# Patient Record
Sex: MALE | Race: WHITE | Employment: FULL TIME | URBAN - METROPOLITAN AREA
[De-identification: names, ages, dates, MRNs, and addresses within clinical notes are randomized per-mention and may not be internally consistent; named-entity substitution may affect disease eponyms.]

---

## 2017-01-01 ENCOUNTER — APPOINTMENT (OUTPATIENT)
Dept: CT IMAGING | Age: 82
DRG: 653 | End: 2017-01-01
Attending: INTERNAL MEDICINE
Payer: MEDICARE

## 2017-01-01 ENCOUNTER — ANESTHESIA EVENT (OUTPATIENT)
Dept: SURGERY | Age: 82
DRG: 653 | End: 2017-01-01
Payer: MEDICARE

## 2017-01-01 ENCOUNTER — ANESTHESIA (OUTPATIENT)
Dept: SURGERY | Age: 82
DRG: 653 | End: 2017-01-01
Payer: MEDICARE

## 2017-01-01 ENCOUNTER — APPOINTMENT (OUTPATIENT)
Dept: GENERAL RADIOLOGY | Age: 82
DRG: 653 | End: 2017-01-01
Attending: INTERNAL MEDICINE
Payer: MEDICARE

## 2017-01-01 ENCOUNTER — APPOINTMENT (OUTPATIENT)
Dept: ULTRASOUND IMAGING | Age: 82
DRG: 653 | End: 2017-01-01
Attending: INTERNAL MEDICINE
Payer: MEDICARE

## 2017-01-01 ENCOUNTER — APPOINTMENT (OUTPATIENT)
Dept: CT IMAGING | Age: 82
DRG: 653 | End: 2017-01-01
Attending: FAMILY MEDICINE
Payer: MEDICARE

## 2017-01-01 ENCOUNTER — APPOINTMENT (OUTPATIENT)
Dept: CT IMAGING | Age: 82
DRG: 653 | End: 2017-01-01
Attending: NURSE PRACTITIONER
Payer: MEDICARE

## 2017-01-01 ENCOUNTER — HOSPITAL ENCOUNTER (INPATIENT)
Age: 82
LOS: 45 days | DRG: 653 | End: 2017-09-11
Attending: EMERGENCY MEDICINE | Admitting: UROLOGY
Payer: MEDICARE

## 2017-01-01 ENCOUNTER — APPOINTMENT (OUTPATIENT)
Dept: MRI IMAGING | Age: 82
DRG: 653 | End: 2017-01-01
Attending: NURSE PRACTITIONER
Payer: MEDICARE

## 2017-01-01 ENCOUNTER — APPOINTMENT (OUTPATIENT)
Dept: GENERAL RADIOLOGY | Age: 82
DRG: 653 | End: 2017-01-01
Attending: ANESTHESIOLOGY
Payer: MEDICARE

## 2017-01-01 ENCOUNTER — HOSPITAL ENCOUNTER (INPATIENT)
Dept: CARDIAC CATH/INVASIVE PROCEDURES | Age: 82
LOS: 5 days | Discharge: HOME HEALTH | DRG: 247 | End: 2017-07-26
Attending: INTERNAL MEDICINE | Admitting: INTERNAL MEDICINE
Payer: MEDICARE

## 2017-01-01 ENCOUNTER — APPOINTMENT (OUTPATIENT)
Dept: GENERAL RADIOLOGY | Age: 82
DRG: 653 | End: 2017-01-01
Attending: SURGERY
Payer: MEDICARE

## 2017-01-01 ENCOUNTER — APPOINTMENT (OUTPATIENT)
Dept: NUCLEAR MEDICINE | Age: 82
DRG: 653 | End: 2017-01-01
Attending: INTERNAL MEDICINE
Payer: MEDICARE

## 2017-01-01 ENCOUNTER — APPOINTMENT (OUTPATIENT)
Dept: GENERAL RADIOLOGY | Age: 82
DRG: 653 | End: 2017-01-01
Attending: NURSE PRACTITIONER
Payer: MEDICARE

## 2017-01-01 ENCOUNTER — HOSPITAL ENCOUNTER (EMERGENCY)
Age: 82
Discharge: HOME | End: 2017-03-26
Attending: EMERGENCY MEDICINE
Payer: MEDICARE

## 2017-01-01 ENCOUNTER — APPOINTMENT (OUTPATIENT)
Dept: GENERAL RADIOLOGY | Age: 82
End: 2017-01-01
Attending: EMERGENCY MEDICINE
Payer: MEDICARE

## 2017-01-01 ENCOUNTER — HOSPITAL ENCOUNTER (OUTPATIENT)
Dept: GENERAL RADIOLOGY | Age: 82
Discharge: HOME | End: 2017-07-17
Payer: MEDICARE

## 2017-01-01 VITALS
SYSTOLIC BLOOD PRESSURE: 106 MMHG | WEIGHT: 244.05 LBS | DIASTOLIC BLOOD PRESSURE: 52 MMHG | OXYGEN SATURATION: 85 % | HEART RATE: 60 BPM | TEMPERATURE: 98.9 F | HEIGHT: 70 IN | BODY MASS INDEX: 34.94 KG/M2 | RESPIRATION RATE: 12 BRPM

## 2017-01-01 VITALS
BODY MASS INDEX: 30.24 KG/M2 | SYSTOLIC BLOOD PRESSURE: 160 MMHG | OXYGEN SATURATION: 98 % | TEMPERATURE: 98.4 F | RESPIRATION RATE: 18 BRPM | WEIGHT: 211.2 LBS | HEART RATE: 78 BPM | HEIGHT: 70 IN | DIASTOLIC BLOOD PRESSURE: 65 MMHG

## 2017-01-01 VITALS
BODY MASS INDEX: 30.91 KG/M2 | DIASTOLIC BLOOD PRESSURE: 93 MMHG | SYSTOLIC BLOOD PRESSURE: 171 MMHG | TEMPERATURE: 98 F | RESPIRATION RATE: 24 BRPM | WEIGHT: 215.39 LBS | OXYGEN SATURATION: 96 %

## 2017-01-01 DIAGNOSIS — Z71.89 COUNSELING REGARDING ADVANCED CARE PLANNING AND GOALS OF CARE: ICD-10-CM

## 2017-01-01 DIAGNOSIS — Z71.89 COUNSELING REGARDING GOALS OF CARE: ICD-10-CM

## 2017-01-01 DIAGNOSIS — R41.3 DISTURBANCE OF MEMORY: ICD-10-CM

## 2017-01-01 DIAGNOSIS — R55 CONVULSIVE SYNCOPE: ICD-10-CM

## 2017-01-01 DIAGNOSIS — I20.0 UNSTABLE ANGINA (HCC): Primary | ICD-10-CM

## 2017-01-01 DIAGNOSIS — R06.02 SHORTNESS OF BREATH: ICD-10-CM

## 2017-01-01 DIAGNOSIS — R31.9 HEMATURIA: Primary | ICD-10-CM

## 2017-01-01 DIAGNOSIS — R53.81 DEBILITY: ICD-10-CM

## 2017-01-01 DIAGNOSIS — R53.81 PHYSICAL DECONDITIONING: ICD-10-CM

## 2017-01-01 DIAGNOSIS — R13.11 ORAL PHASE DYSPHAGIA: ICD-10-CM

## 2017-01-01 DIAGNOSIS — I67.89 CEREBRAL MICROVASCULAR DISEASE: ICD-10-CM

## 2017-01-01 DIAGNOSIS — I65.23 STENOSIS OF BOTH INTERNAL CAROTID ARTERIES: ICD-10-CM

## 2017-01-01 DIAGNOSIS — F05 DELIRIUM DUE TO GENERAL MEDICAL CONDITION: ICD-10-CM

## 2017-01-01 DIAGNOSIS — T81.31XA WOUND DEHISCENCE, SURGICAL, INITIAL ENCOUNTER: ICD-10-CM

## 2017-01-01 DIAGNOSIS — R33.9 URINARY RETENTION: ICD-10-CM

## 2017-01-01 DIAGNOSIS — R41.82 ALTERED MENTAL STATUS, UNSPECIFIED: ICD-10-CM

## 2017-01-01 DIAGNOSIS — R60.1 ANASARCA: ICD-10-CM
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
## 2017-01-01 DIAGNOSIS — R53.1 WEAKNESS: ICD-10-CM

## 2017-01-01 DIAGNOSIS — S20.211A CONTUSION OF CHEST WALL, RIGHT, INITIAL ENCOUNTER: ICD-10-CM

## 2017-01-01 LAB
ABO + RH BLD: NORMAL
ABO + RH BLD: NORMAL
ACT BLD: 208 SECS (ref 79–138)
ACT BLD: 230 SECS (ref 79–138)
ACT BLD: 252 SECS (ref 79–138)
ALBUMIN SERPL BCP-MCNC: 1.6 G/DL (ref 3.5–5)
ALBUMIN SERPL BCP-MCNC: 1.6 G/DL (ref 3.5–5)
ALBUMIN SERPL BCP-MCNC: 1.7 G/DL (ref 3.5–5)
ALBUMIN SERPL BCP-MCNC: 1.7 G/DL (ref 3.5–5)
ALBUMIN SERPL BCP-MCNC: 1.8 G/DL (ref 3.5–5)
ALBUMIN SERPL BCP-MCNC: 1.9 G/DL (ref 3.5–5)
ALBUMIN SERPL BCP-MCNC: 2 G/DL (ref 3.5–5)
ALBUMIN SERPL BCP-MCNC: 2.5 G/DL (ref 3.5–5)
ALBUMIN SERPL BCP-MCNC: 3.3 G/DL (ref 3.5–5)
ALBUMIN SERPL-MCNC: 1.3 G/DL (ref 3.5–5)
ALBUMIN SERPL-MCNC: 1.4 G/DL (ref 3.5–5)
ALBUMIN SERPL-MCNC: 1.5 G/DL (ref 3.5–5)
ALBUMIN SERPL-MCNC: 1.5 G/DL (ref 3.5–5)
ALBUMIN SERPL-MCNC: 1.6 G/DL (ref 3.5–5)
ALBUMIN SERPL-MCNC: 1.6 G/DL (ref 3.5–5)
ALBUMIN SERPL-MCNC: 1.8 G/DL (ref 3.5–5)
ALBUMIN SERPL-MCNC: 2 G/DL (ref 3.5–5)
ALBUMIN SERPL-MCNC: 2 G/DL (ref 3.5–5)
ALBUMIN SERPL-MCNC: 2.2 G/DL (ref 3.5–5)
ALBUMIN/GLOB SERPL: 0.2 {RATIO} (ref 1.1–2.2)
ALBUMIN/GLOB SERPL: 0.2 {RATIO} (ref 1.1–2.2)
ALBUMIN/GLOB SERPL: 0.3 {RATIO} (ref 1.1–2.2)
ALBUMIN/GLOB SERPL: 0.4 {RATIO} (ref 1.1–2.2)
ALBUMIN/GLOB SERPL: 0.5 {RATIO} (ref 1.1–2.2)
ALBUMIN/GLOB SERPL: 0.5 {RATIO} (ref 1.1–2.2)
ALBUMIN/GLOB SERPL: 0.6 {RATIO} (ref 1.1–2.2)
ALBUMIN/GLOB SERPL: 0.6 {RATIO} (ref 1.1–2.2)
ALBUMIN/GLOB SERPL: 0.7 {RATIO} (ref 1.1–2.2)
ALP SERPL-CCNC: 122 U/L (ref 45–117)
ALP SERPL-CCNC: 136 U/L (ref 45–117)
ALP SERPL-CCNC: 150 U/L (ref 45–117)
ALP SERPL-CCNC: 161 U/L (ref 45–117)
ALP SERPL-CCNC: 167 U/L (ref 45–117)
ALP SERPL-CCNC: 168 U/L (ref 45–117)
ALP SERPL-CCNC: 174 U/L (ref 45–117)
ALP SERPL-CCNC: 186 U/L (ref 45–117)
ALP SERPL-CCNC: 189 U/L (ref 45–117)
ALP SERPL-CCNC: 224 U/L (ref 45–117)
ALP SERPL-CCNC: 39 U/L (ref 45–117)
ALP SERPL-CCNC: 52 U/L (ref 45–117)
ALP SERPL-CCNC: 53 U/L (ref 45–117)
ALP SERPL-CCNC: 56 U/L (ref 45–117)
ALP SERPL-CCNC: 61 U/L (ref 45–117)
ALP SERPL-CCNC: 62 U/L (ref 45–117)
ALP SERPL-CCNC: 69 U/L (ref 45–117)
ALP SERPL-CCNC: 69 U/L (ref 45–117)
ALP SERPL-CCNC: 83 U/L (ref 45–117)
ALP SERPL-CCNC: 90 U/L (ref 45–117)
ALP SERPL-CCNC: 92 U/L (ref 45–117)
ALT SERPL-CCNC: 101 U/L (ref 12–78)
ALT SERPL-CCNC: 127 U/L (ref 12–78)
ALT SERPL-CCNC: 150 U/L (ref 12–78)
ALT SERPL-CCNC: 157 U/L (ref 12–78)
ALT SERPL-CCNC: 19 U/L (ref 12–78)
ALT SERPL-CCNC: 27 U/L (ref 12–78)
ALT SERPL-CCNC: 28 U/L (ref 12–78)
ALT SERPL-CCNC: 28 U/L (ref 12–78)
ALT SERPL-CCNC: 30 U/L (ref 12–78)
ALT SERPL-CCNC: 31 U/L (ref 12–78)
ALT SERPL-CCNC: 31 U/L (ref 12–78)
ALT SERPL-CCNC: 32 U/L (ref 12–78)
ALT SERPL-CCNC: 36 U/L (ref 12–78)
ALT SERPL-CCNC: 44 U/L (ref 12–78)
ALT SERPL-CCNC: 44 U/L (ref 12–78)
ALT SERPL-CCNC: 46 U/L (ref 12–78)
ALT SERPL-CCNC: 59 U/L (ref 12–78)
ALT SERPL-CCNC: 61 U/L (ref 12–78)
ALT SERPL-CCNC: 67 U/L (ref 12–78)
ALT SERPL-CCNC: 71 U/L (ref 12–78)
ALT SERPL-CCNC: 76 U/L (ref 12–78)
ALT SERPL-CCNC: 87 U/L (ref 12–78)
ALT SERPL-CCNC: 92 U/L (ref 12–78)
AMMONIA PLAS-SCNC: 33 UMOL/L
ANION GAP BLD CALC-SCNC: 10 MMOL/L (ref 5–15)
ANION GAP BLD CALC-SCNC: 11 MMOL/L (ref 5–15)
ANION GAP BLD CALC-SCNC: 11 MMOL/L (ref 5–15)
ANION GAP BLD CALC-SCNC: 13 MMOL/L (ref 5–15)
ANION GAP BLD CALC-SCNC: 4 MMOL/L (ref 5–15)
ANION GAP BLD CALC-SCNC: 5 MMOL/L (ref 5–15)
ANION GAP BLD CALC-SCNC: 6 MMOL/L (ref 5–15)
ANION GAP BLD CALC-SCNC: 6 MMOL/L (ref 5–15)
ANION GAP BLD CALC-SCNC: 7 MMOL/L (ref 5–15)
ANION GAP BLD CALC-SCNC: 7 MMOL/L (ref 5–15)
ANION GAP BLD CALC-SCNC: 8 MMOL/L (ref 5–15)
ANION GAP BLD CALC-SCNC: 9 MMOL/L (ref 5–15)
ANION GAP SERPL CALC-SCNC: 10 MMOL/L (ref 5–15)
ANION GAP SERPL CALC-SCNC: 12 MMOL/L (ref 5–15)
ANION GAP SERPL CALC-SCNC: 13 MMOL/L (ref 5–15)
ANION GAP SERPL CALC-SCNC: 3 MMOL/L (ref 5–15)
ANION GAP SERPL CALC-SCNC: 4 MMOL/L (ref 5–15)
ANION GAP SERPL CALC-SCNC: 5 MMOL/L (ref 5–15)
ANION GAP SERPL CALC-SCNC: 6 MMOL/L (ref 5–15)
ANION GAP SERPL CALC-SCNC: 7 MMOL/L (ref 5–15)
ANION GAP SERPL CALC-SCNC: 8 MMOL/L (ref 5–15)
ANION GAP SERPL CALC-SCNC: 8 MMOL/L (ref 5–15)
ANION GAP SERPL CALC-SCNC: 9 MMOL/L (ref 5–15)
APPEARANCE UR: ABNORMAL
APPEARANCE UR: CLEAR
ARTERIAL PATENCY WRIST A: ABNORMAL
ARTERIAL PATENCY WRIST A: YES
AST SERPL W P-5'-P-CCNC: 27 U/L (ref 15–37)
AST SERPL W P-5'-P-CCNC: 31 U/L (ref 15–37)
AST SERPL W P-5'-P-CCNC: 32 U/L (ref 15–37)
AST SERPL W P-5'-P-CCNC: 34 U/L (ref 15–37)
AST SERPL W P-5'-P-CCNC: 35 U/L (ref 15–37)
AST SERPL W P-5'-P-CCNC: 35 U/L (ref 15–37)
AST SERPL W P-5'-P-CCNC: 54 U/L (ref 15–37)
AST SERPL W P-5'-P-CCNC: 60 U/L (ref 15–37)
AST SERPL W P-5'-P-CCNC: 70 U/L (ref 15–37)
AST SERPL W P-5'-P-CCNC: 72 U/L (ref 15–37)
AST SERPL W P-5'-P-CCNC: 75 U/L (ref 15–37)
AST SERPL-CCNC: 106 U/L (ref 15–37)
AST SERPL-CCNC: 133 U/L (ref 15–37)
AST SERPL-CCNC: 133 U/L (ref 15–37)
AST SERPL-CCNC: 164 U/L (ref 15–37)
AST SERPL-CCNC: 259 U/L (ref 15–37)
AST SERPL-CCNC: 282 U/L (ref 15–37)
AST SERPL-CCNC: 40 U/L (ref 15–37)
AST SERPL-CCNC: 40 U/L (ref 15–37)
AST SERPL-CCNC: 41 U/L (ref 15–37)
AST SERPL-CCNC: 42 U/L (ref 15–37)
AST SERPL-CCNC: 43 U/L (ref 15–37)
AST SERPL-CCNC: 48 U/L (ref 15–37)
ATRIAL RATE: 105 BPM
ATRIAL RATE: 68 BPM
ATRIAL RATE: 69 BPM
ATRIAL RATE: 70 BPM
ATRIAL RATE: 90 BPM
BACTERIA SPEC CULT: ABNORMAL
BACTERIA SPEC CULT: NORMAL
BACTERIA URNS QL MICRO: ABNORMAL /HPF
BACTERIA URNS QL MICRO: NEGATIVE /HPF
BASE DEFICIT BLDA-SCNC: 0.2 MMOL/L
BASE DEFICIT BLDA-SCNC: 17 MMOL/L
BASE DEFICIT BLDA-SCNC: 3.4 MMOL/L
BASE DEFICIT BLDA-SCNC: 3.7 MMOL/L
BASE DEFICIT BLDA-SCNC: 6.8 MMOL/L
BASE DEFICIT BLDA-SCNC: 7 MMOL/L
BASE DEFICIT BLDA-SCNC: 7.3 MMOL/L
BASE DEFICIT BLDA-SCNC: 8.8 MMOL/L
BASE EXCESS BLDA CALC-SCNC: 0.5 MMOL/L
BASE EXCESS BLDA CALC-SCNC: 1.4 MMOL/L
BASE EXCESS BLDA CALC-SCNC: 2.1 MMOL/L
BASE EXCESS BLDA CALC-SCNC: 4.9 MMOL/L
BASE EXCESS BLDA CALC-SCNC: 6.5 MMOL/L
BASOPHILS # BLD AUTO: 0 K/UL (ref 0–0.1)
BASOPHILS # BLD AUTO: 0.1 K/UL (ref 0–0.1)
BASOPHILS # BLD: 0 % (ref 0–1)
BASOPHILS # BLD: 0 % (ref 0–1)
BASOPHILS # BLD: 0 K/UL
BASOPHILS # BLD: 0 K/UL
BASOPHILS # BLD: 0 K/UL (ref 0–0.1)
BASOPHILS # BLD: 0.1 K/UL
BASOPHILS # BLD: 0.1 K/UL (ref 0–0.1)
BASOPHILS # BLD: 1 % (ref 0–1)
BASOPHILS NFR BLD: 0 %
BASOPHILS NFR BLD: 0 %
BASOPHILS NFR BLD: 0 % (ref 0–1)
BASOPHILS NFR BLD: 1 %
BASOPHILS NFR BLD: 1 % (ref 0–1)
BDY SITE: ABNORMAL
BILIRUB DIRECT SERPL-MCNC: 0.2 MG/DL (ref 0–0.2)
BILIRUB DIRECT SERPL-MCNC: 0.2 MG/DL (ref 0–0.2)
BILIRUB SERPL-MCNC: 0.4 MG/DL (ref 0.2–1)
BILIRUB SERPL-MCNC: 0.4 MG/DL (ref 0.2–1)
BILIRUB SERPL-MCNC: 0.5 MG/DL (ref 0.2–1)
BILIRUB SERPL-MCNC: 0.6 MG/DL (ref 0.2–1)
BILIRUB SERPL-MCNC: 0.7 MG/DL (ref 0.2–1)
BILIRUB SERPL-MCNC: 0.8 MG/DL (ref 0.2–1)
BILIRUB SERPL-MCNC: 0.9 MG/DL (ref 0.2–1)
BILIRUB SERPL-MCNC: 1 MG/DL (ref 0.2–1)
BILIRUB SERPL-MCNC: 1 MG/DL (ref 0.2–1)
BILIRUB SERPL-MCNC: 1.1 MG/DL (ref 0.2–1)
BILIRUB SERPL-MCNC: 1.4 MG/DL (ref 0.2–1)
BILIRUB SERPL-MCNC: 1.4 MG/DL (ref 0.2–1)
BILIRUB UR QL: NEGATIVE
BLD PROD TYP BPU: NORMAL
BLOOD GROUP ANTIBODIES SERPL: NORMAL
BLOOD GROUP ANTIBODIES SERPL: NORMAL
BNP SERPL-MCNC: 301 PG/ML (ref 0–450)
BNP SERPL-MCNC: 756 PG/ML (ref 0–100)
BPU ID: NORMAL
BREATHS.SPONTANEOUS ON VENT: 16
BREATHS.SPONTANEOUS ON VENT: 21
BREATHS.SPONTANEOUS ON VENT: 24
BUN SERPL-MCNC: 22 MG/DL (ref 6–20)
BUN SERPL-MCNC: 23 MG/DL (ref 6–20)
BUN SERPL-MCNC: 23 MG/DL (ref 6–20)
BUN SERPL-MCNC: 24 MG/DL (ref 6–20)
BUN SERPL-MCNC: 26 MG/DL (ref 6–20)
BUN SERPL-MCNC: 27 MG/DL (ref 6–20)
BUN SERPL-MCNC: 28 MG/DL (ref 6–20)
BUN SERPL-MCNC: 29 MG/DL (ref 6–20)
BUN SERPL-MCNC: 29 MG/DL (ref 6–20)
BUN SERPL-MCNC: 30 MG/DL (ref 6–20)
BUN SERPL-MCNC: 30 MG/DL (ref 6–20)
BUN SERPL-MCNC: 31 MG/DL (ref 6–20)
BUN SERPL-MCNC: 33 MG/DL (ref 6–20)
BUN SERPL-MCNC: 33 MG/DL (ref 6–20)
BUN SERPL-MCNC: 34 MG/DL (ref 6–20)
BUN SERPL-MCNC: 36 MG/DL (ref 6–20)
BUN SERPL-MCNC: 36 MG/DL (ref 6–20)
BUN SERPL-MCNC: 43 MG/DL (ref 6–20)
BUN SERPL-MCNC: 43 MG/DL (ref 6–20)
BUN SERPL-MCNC: 44 MG/DL (ref 6–20)
BUN SERPL-MCNC: 46 MG/DL (ref 6–20)
BUN SERPL-MCNC: 46 MG/DL (ref 6–20)
BUN SERPL-MCNC: 49 MG/DL (ref 6–20)
BUN SERPL-MCNC: 49 MG/DL (ref 6–20)
BUN SERPL-MCNC: 50 MG/DL (ref 6–20)
BUN SERPL-MCNC: 51 MG/DL (ref 6–20)
BUN SERPL-MCNC: 52 MG/DL (ref 6–20)
BUN SERPL-MCNC: 52 MG/DL (ref 6–20)
BUN SERPL-MCNC: 53 MG/DL (ref 6–20)
BUN SERPL-MCNC: 55 MG/DL (ref 6–20)
BUN SERPL-MCNC: 57 MG/DL (ref 6–20)
BUN SERPL-MCNC: 58 MG/DL (ref 6–20)
BUN SERPL-MCNC: 60 MG/DL (ref 6–20)
BUN SERPL-MCNC: 77 MG/DL (ref 6–20)
BUN SERPL-MCNC: 97 MG/DL (ref 6–20)
BUN SERPL-MCNC: 97 MG/DL (ref 6–20)
BUN/CREAT SERPL: 18 (ref 12–20)
BUN/CREAT SERPL: 18 (ref 12–20)
BUN/CREAT SERPL: 19 (ref 12–20)
BUN/CREAT SERPL: 20 (ref 12–20)
BUN/CREAT SERPL: 21 (ref 12–20)
BUN/CREAT SERPL: 23 (ref 12–20)
BUN/CREAT SERPL: 23 (ref 12–20)
BUN/CREAT SERPL: 25 (ref 12–20)
BUN/CREAT SERPL: 25 (ref 12–20)
BUN/CREAT SERPL: 26 (ref 12–20)
BUN/CREAT SERPL: 27 (ref 12–20)
BUN/CREAT SERPL: 28 (ref 12–20)
BUN/CREAT SERPL: 29 (ref 12–20)
BUN/CREAT SERPL: 30 (ref 12–20)
BUN/CREAT SERPL: 31 (ref 12–20)
BUN/CREAT SERPL: 32 (ref 12–20)
BUN/CREAT SERPL: 33 (ref 12–20)
BUN/CREAT SERPL: 33 (ref 12–20)
BUN/CREAT SERPL: 34 (ref 12–20)
BUN/CREAT SERPL: 35 (ref 12–20)
BUN/CREAT SERPL: 35 (ref 12–20)
BUN/CREAT SERPL: 36 (ref 12–20)
BUN/CREAT SERPL: 37 (ref 12–20)
BUN/CREAT SERPL: 40 (ref 12–20)
C DIFF TOX GENS STL QL NAA+PROBE: POSITIVE
CA PHOS MFR STONE: 3 %
CALCIUM SERPL-MCNC: 6.6 MG/DL (ref 8.5–10.1)
CALCIUM SERPL-MCNC: 6.8 MG/DL (ref 8.5–10.1)
CALCIUM SERPL-MCNC: 7 MG/DL (ref 8.5–10.1)
CALCIUM SERPL-MCNC: 7 MG/DL (ref 8.5–10.1)
CALCIUM SERPL-MCNC: 7.2 MG/DL (ref 8.5–10.1)
CALCIUM SERPL-MCNC: 7.2 MG/DL (ref 8.5–10.1)
CALCIUM SERPL-MCNC: 7.3 MG/DL (ref 8.5–10.1)
CALCIUM SERPL-MCNC: 7.3 MG/DL (ref 8.5–10.1)
CALCIUM SERPL-MCNC: 7.4 MG/DL (ref 8.5–10.1)
CALCIUM SERPL-MCNC: 7.4 MG/DL (ref 8.5–10.1)
CALCIUM SERPL-MCNC: 7.5 MG/DL (ref 8.5–10.1)
CALCIUM SERPL-MCNC: 7.5 MG/DL (ref 8.5–10.1)
CALCIUM SERPL-MCNC: 7.6 MG/DL (ref 8.5–10.1)
CALCIUM SERPL-MCNC: 7.7 MG/DL (ref 8.5–10.1)
CALCIUM SERPL-MCNC: 7.8 MG/DL (ref 8.5–10.1)
CALCIUM SERPL-MCNC: 7.9 MG/DL (ref 8.5–10.1)
CALCIUM SERPL-MCNC: 8 MG/DL (ref 8.5–10.1)
CALCIUM SERPL-MCNC: 8.1 MG/DL (ref 8.5–10.1)
CALCIUM SERPL-MCNC: 8.2 MG/DL (ref 8.5–10.1)
CALCIUM SERPL-MCNC: 8.3 MG/DL (ref 8.5–10.1)
CALCIUM SERPL-MCNC: 8.3 MG/DL (ref 8.5–10.1)
CALCIUM SERPL-MCNC: 8.6 MG/DL (ref 8.5–10.1)
CALCULATED P AXIS, ECG09: 61 DEGREES
CALCULATED P AXIS, ECG09: 62 DEGREES
CALCULATED P AXIS, ECG09: 64 DEGREES
CALCULATED P AXIS, ECG09: 77 DEGREES
CALCULATED R AXIS, ECG10: -3 DEGREES
CALCULATED R AXIS, ECG10: -36 DEGREES
CALCULATED R AXIS, ECG10: -37 DEGREES
CALCULATED R AXIS, ECG10: 1 DEGREES
CALCULATED R AXIS, ECG10: 2 DEGREES
CALCULATED T AXIS, ECG11: 37 DEGREES
CALCULATED T AXIS, ECG11: 68 DEGREES
CALCULATED T AXIS, ECG11: 76 DEGREES
CALCULATED T AXIS, ECG11: 82 DEGREES
CALCULATED T AXIS, ECG11: 92 DEGREES
CC UR VC: ABNORMAL
CC UR VC: NORMAL
CHLORIDE SERPL-SCNC: 100 MMOL/L (ref 97–108)
CHLORIDE SERPL-SCNC: 100 MMOL/L (ref 97–108)
CHLORIDE SERPL-SCNC: 101 MMOL/L (ref 97–108)
CHLORIDE SERPL-SCNC: 102 MMOL/L (ref 97–108)
CHLORIDE SERPL-SCNC: 103 MMOL/L (ref 97–108)
CHLORIDE SERPL-SCNC: 103 MMOL/L (ref 97–108)
CHLORIDE SERPL-SCNC: 104 MMOL/L (ref 97–108)
CHLORIDE SERPL-SCNC: 105 MMOL/L (ref 97–108)
CHLORIDE SERPL-SCNC: 105 MMOL/L (ref 97–108)
CHLORIDE SERPL-SCNC: 106 MMOL/L (ref 97–108)
CHLORIDE SERPL-SCNC: 106 MMOL/L (ref 97–108)
CHLORIDE SERPL-SCNC: 107 MMOL/L (ref 97–108)
CHLORIDE SERPL-SCNC: 108 MMOL/L (ref 97–108)
CHLORIDE SERPL-SCNC: 108 MMOL/L (ref 97–108)
CHLORIDE SERPL-SCNC: 109 MMOL/L (ref 97–108)
CHLORIDE SERPL-SCNC: 109 MMOL/L (ref 97–108)
CHLORIDE SERPL-SCNC: 110 MMOL/L (ref 97–108)
CHLORIDE SERPL-SCNC: 111 MMOL/L (ref 97–108)
CHLORIDE SERPL-SCNC: 112 MMOL/L (ref 97–108)
CHLORIDE SERPL-SCNC: 113 MMOL/L (ref 97–108)
CHLORIDE SERPL-SCNC: 114 MMOL/L (ref 97–108)
CHLORIDE SERPL-SCNC: 114 MMOL/L (ref 97–108)
CHLORIDE SERPL-SCNC: 115 MMOL/L (ref 97–108)
CHLORIDE SERPL-SCNC: 116 MMOL/L (ref 97–108)
CHLORIDE SERPL-SCNC: 117 MMOL/L (ref 97–108)
CHLORIDE SERPL-SCNC: 98 MMOL/L (ref 97–108)
CHOLEST SERPL-MCNC: 178 MG/DL
CK MB CFR SERPL CALC: 1.5 % (ref 0–2.5)
CK MB CFR SERPL CALC: ABNORMAL % (ref 0–2.5)
CK MB SERPL-MCNC: 2.7 NG/ML (ref 5–25)
CK MB SERPL-MCNC: <1 NG/ML (ref 5–25)
CK SERPL-CCNC: 184 U/L (ref 39–308)
CK SERPL-CCNC: 38 U/L (ref 39–308)
CO2 SERPL-SCNC: 16 MMOL/L (ref 21–32)
CO2 SERPL-SCNC: 16 MMOL/L (ref 21–32)
CO2 SERPL-SCNC: 17 MMOL/L (ref 21–32)
CO2 SERPL-SCNC: 17 MMOL/L (ref 21–32)
CO2 SERPL-SCNC: 19 MMOL/L (ref 21–32)
CO2 SERPL-SCNC: 20 MMOL/L (ref 21–32)
CO2 SERPL-SCNC: 21 MMOL/L (ref 21–32)
CO2 SERPL-SCNC: 22 MMOL/L (ref 21–32)
CO2 SERPL-SCNC: 23 MMOL/L (ref 21–32)
CO2 SERPL-SCNC: 24 MMOL/L (ref 21–32)
CO2 SERPL-SCNC: 25 MMOL/L (ref 21–32)
CO2 SERPL-SCNC: 26 MMOL/L (ref 21–32)
CO2 SERPL-SCNC: 27 MMOL/L (ref 21–32)
CO2 SERPL-SCNC: 27 MMOL/L (ref 21–32)
CO2 SERPL-SCNC: 28 MMOL/L (ref 21–32)
CO2 SERPL-SCNC: 29 MMOL/L (ref 21–32)
CO2 SERPL-SCNC: 30 MMOL/L (ref 21–32)
CO2 SERPL-SCNC: 31 MMOL/L (ref 21–32)
CO2 SERPL-SCNC: 32 MMOL/L (ref 21–32)
CO2 SERPL-SCNC: 33 MMOL/L (ref 21–32)
CO2 SERPL-SCNC: 33 MMOL/L (ref 21–32)
COLOR STONE: NORMAL
COLOR UR: ABNORMAL
COM MFR STONE: 40 %
COMMENT, 519994: NORMAL
COMPOSITION, 120440: NORMAL
CREAT FLD-MCNC: 1.06 MG/DL
CREAT FLD-MCNC: 1.08 MG/DL
CREAT SERPL-MCNC: 0.75 MG/DL (ref 0.7–1.3)
CREAT SERPL-MCNC: 0.78 MG/DL (ref 0.7–1.3)
CREAT SERPL-MCNC: 0.79 MG/DL (ref 0.7–1.3)
CREAT SERPL-MCNC: 0.82 MG/DL (ref 0.7–1.3)
CREAT SERPL-MCNC: 0.83 MG/DL (ref 0.7–1.3)
CREAT SERPL-MCNC: 0.85 MG/DL (ref 0.7–1.3)
CREAT SERPL-MCNC: 0.93 MG/DL (ref 0.7–1.3)
CREAT SERPL-MCNC: 0.96 MG/DL (ref 0.7–1.3)
CREAT SERPL-MCNC: 0.98 MG/DL (ref 0.7–1.3)
CREAT SERPL-MCNC: 0.98 MG/DL (ref 0.7–1.3)
CREAT SERPL-MCNC: 1 MG/DL (ref 0.7–1.3)
CREAT SERPL-MCNC: 1.02 MG/DL (ref 0.7–1.3)
CREAT SERPL-MCNC: 1.02 MG/DL (ref 0.7–1.3)
CREAT SERPL-MCNC: 1.05 MG/DL (ref 0.7–1.3)
CREAT SERPL-MCNC: 1.09 MG/DL (ref 0.7–1.3)
CREAT SERPL-MCNC: 1.12 MG/DL (ref 0.7–1.3)
CREAT SERPL-MCNC: 1.13 MG/DL (ref 0.7–1.3)
CREAT SERPL-MCNC: 1.15 MG/DL (ref 0.7–1.3)
CREAT SERPL-MCNC: 1.16 MG/DL (ref 0.7–1.3)
CREAT SERPL-MCNC: 1.21 MG/DL (ref 0.7–1.3)
CREAT SERPL-MCNC: 1.27 MG/DL (ref 0.7–1.3)
CREAT SERPL-MCNC: 1.31 MG/DL (ref 0.7–1.3)
CREAT SERPL-MCNC: 1.31 MG/DL (ref 0.7–1.3)
CREAT SERPL-MCNC: 1.36 MG/DL (ref 0.7–1.3)
CREAT SERPL-MCNC: 1.39 MG/DL (ref 0.7–1.3)
CREAT SERPL-MCNC: 1.46 MG/DL (ref 0.7–1.3)
CREAT SERPL-MCNC: 1.57 MG/DL (ref 0.7–1.3)
CREAT SERPL-MCNC: 1.58 MG/DL (ref 0.7–1.3)
CREAT SERPL-MCNC: 1.59 MG/DL (ref 0.7–1.3)
CREAT SERPL-MCNC: 1.61 MG/DL (ref 0.7–1.3)
CREAT SERPL-MCNC: 1.63 MG/DL (ref 0.7–1.3)
CREAT SERPL-MCNC: 1.64 MG/DL (ref 0.7–1.3)
CREAT SERPL-MCNC: 1.66 MG/DL (ref 0.7–1.3)
CREAT SERPL-MCNC: 1.66 MG/DL (ref 0.7–1.3)
CREAT SERPL-MCNC: 1.69 MG/DL (ref 0.7–1.3)
CREAT SERPL-MCNC: 1.77 MG/DL (ref 0.7–1.3)
CREAT SERPL-MCNC: 1.78 MG/DL (ref 0.7–1.3)
CREAT SERPL-MCNC: 1.79 MG/DL (ref 0.7–1.3)
CREAT SERPL-MCNC: 1.79 MG/DL (ref 0.7–1.3)
CREAT SERPL-MCNC: 2.15 MG/DL (ref 0.7–1.3)
CREAT SERPL-MCNC: 3.04 MG/DL (ref 0.7–1.3)
CREAT SERPL-MCNC: 4.26 MG/DL (ref 0.7–1.3)
CREAT SERPL-MCNC: 5.04 MG/DL (ref 0.7–1.3)
CROSSMATCH RESULT,%XM: NORMAL
DATE LAST DOSE: ABNORMAL
DIAGNOSIS, 93000: NORMAL
DIFFERENTIAL METHOD BLD: ABNORMAL
DISCLAIMER, STO32L: NORMAL
EOSINOPHIL # BLD: 0 K/UL
EOSINOPHIL # BLD: 0 K/UL (ref 0–0.4)
EOSINOPHIL # BLD: 0 K/UL (ref 0–0.4)
EOSINOPHIL # BLD: 0.1 K/UL (ref 0–0.4)
EOSINOPHIL # BLD: 0.2 K/UL (ref 0–0.4)
EOSINOPHIL # BLD: 0.3 K/UL (ref 0–0.4)
EOSINOPHIL # BLD: 0.4 K/UL (ref 0–0.4)
EOSINOPHIL # BLD: 0.5 K/UL
EOSINOPHIL # BLD: 0.5 K/UL (ref 0–0.4)
EOSINOPHIL # BLD: 0.6 K/UL
EOSINOPHIL # BLD: 0.6 K/UL (ref 0–0.4)
EOSINOPHIL # BLD: 0.7 K/UL (ref 0–0.4)
EOSINOPHIL NFR BLD: 0 %
EOSINOPHIL NFR BLD: 0 % (ref 0–7)
EOSINOPHIL NFR BLD: 0 % (ref 0–7)
EOSINOPHIL NFR BLD: 1 % (ref 0–7)
EOSINOPHIL NFR BLD: 2 % (ref 0–7)
EOSINOPHIL NFR BLD: 2 % (ref 0–7)
EOSINOPHIL NFR BLD: 3 % (ref 0–7)
EOSINOPHIL NFR BLD: 4 %
EOSINOPHIL NFR BLD: 4 %
EOSINOPHIL NFR BLD: 4 % (ref 0–7)
EOSINOPHIL NFR BLD: 4 % (ref 0–7)
EOSINOPHIL NFR BLD: 5 % (ref 0–7)
EOSINOPHIL NFR BLD: 5 % (ref 0–7)
EOSINOPHIL NFR BLD: 6 % (ref 0–7)
EOSINOPHIL NFR BLD: 8 % (ref 0–7)
EPAP/CPAP/PEEP, PAPEEP: 6
EPITH CASTS URNS QL MICRO: ABNORMAL /LPF
ERYTHROCYTE [DISTWIDTH] IN BLOOD BY AUTOMATED COUNT: 13.1 % (ref 11.5–14.5)
ERYTHROCYTE [DISTWIDTH] IN BLOOD BY AUTOMATED COUNT: 13.6 % (ref 11.5–14.5)
ERYTHROCYTE [DISTWIDTH] IN BLOOD BY AUTOMATED COUNT: 13.8 % (ref 11.5–14.5)
ERYTHROCYTE [DISTWIDTH] IN BLOOD BY AUTOMATED COUNT: 13.9 % (ref 11.5–14.5)
ERYTHROCYTE [DISTWIDTH] IN BLOOD BY AUTOMATED COUNT: 13.9 % (ref 11.5–14.5)
ERYTHROCYTE [DISTWIDTH] IN BLOOD BY AUTOMATED COUNT: 14.2 % (ref 11.5–14.5)
ERYTHROCYTE [DISTWIDTH] IN BLOOD BY AUTOMATED COUNT: 14.2 % (ref 11.5–14.5)
ERYTHROCYTE [DISTWIDTH] IN BLOOD BY AUTOMATED COUNT: 14.3 % (ref 11.5–14.5)
ERYTHROCYTE [DISTWIDTH] IN BLOOD BY AUTOMATED COUNT: 14.4 % (ref 11.5–14.5)
ERYTHROCYTE [DISTWIDTH] IN BLOOD BY AUTOMATED COUNT: 14.4 % (ref 11.5–14.5)
ERYTHROCYTE [DISTWIDTH] IN BLOOD BY AUTOMATED COUNT: 14.5 % (ref 11.5–14.5)
ERYTHROCYTE [DISTWIDTH] IN BLOOD BY AUTOMATED COUNT: 14.7 % (ref 11.5–14.5)
ERYTHROCYTE [DISTWIDTH] IN BLOOD BY AUTOMATED COUNT: 14.8 % (ref 11.5–14.5)
ERYTHROCYTE [DISTWIDTH] IN BLOOD BY AUTOMATED COUNT: 14.9 % (ref 11.5–14.5)
ERYTHROCYTE [DISTWIDTH] IN BLOOD BY AUTOMATED COUNT: 15 % (ref 11.5–14.5)
ERYTHROCYTE [DISTWIDTH] IN BLOOD BY AUTOMATED COUNT: 15.2 % (ref 11.5–14.5)
ERYTHROCYTE [DISTWIDTH] IN BLOOD BY AUTOMATED COUNT: 15.3 % (ref 11.5–14.5)
ERYTHROCYTE [DISTWIDTH] IN BLOOD BY AUTOMATED COUNT: 15.3 % (ref 11.5–14.5)
ERYTHROCYTE [DISTWIDTH] IN BLOOD BY AUTOMATED COUNT: 15.4 % (ref 11.5–14.5)
ERYTHROCYTE [DISTWIDTH] IN BLOOD BY AUTOMATED COUNT: 15.6 % (ref 11.5–14.5)
ERYTHROCYTE [DISTWIDTH] IN BLOOD BY AUTOMATED COUNT: 15.7 % (ref 11.5–14.5)
ERYTHROCYTE [DISTWIDTH] IN BLOOD BY AUTOMATED COUNT: 16.3 % (ref 11.5–14.5)
ERYTHROCYTE [DISTWIDTH] IN BLOOD BY AUTOMATED COUNT: 16.5 % (ref 11.5–14.5)
ERYTHROCYTE [DISTWIDTH] IN BLOOD BY AUTOMATED COUNT: 16.6 % (ref 11.5–14.5)
ERYTHROCYTE [DISTWIDTH] IN BLOOD BY AUTOMATED COUNT: 16.7 % (ref 11.5–14.5)
ERYTHROCYTE [DISTWIDTH] IN BLOOD BY AUTOMATED COUNT: 16.8 % (ref 11.5–14.5)
ERYTHROCYTE [DISTWIDTH] IN BLOOD BY AUTOMATED COUNT: 17 % (ref 11.5–14.5)
ERYTHROCYTE [DISTWIDTH] IN BLOOD BY AUTOMATED COUNT: 17.2 % (ref 11.5–14.5)
ERYTHROCYTE [DISTWIDTH] IN BLOOD BY AUTOMATED COUNT: 17.3 % (ref 11.5–14.5)
ERYTHROCYTE [DISTWIDTH] IN BLOOD BY AUTOMATED COUNT: 17.8 % (ref 11.5–14.5)
ERYTHROCYTE [DISTWIDTH] IN BLOOD BY AUTOMATED COUNT: 18.3 % (ref 11.5–14.5)
ERYTHROCYTE [DISTWIDTH] IN BLOOD BY AUTOMATED COUNT: 18.3 % (ref 11.5–14.5)
ERYTHROCYTE [DISTWIDTH] IN BLOOD BY AUTOMATED COUNT: 18.4 % (ref 11.5–14.5)
ERYTHROCYTE [DISTWIDTH] IN BLOOD BY AUTOMATED COUNT: 19.1 % (ref 11.5–14.5)
ERYTHROCYTE [DISTWIDTH] IN BLOOD BY AUTOMATED COUNT: 19.5 % (ref 11.5–14.5)
ERYTHROCYTE [DISTWIDTH] IN BLOOD BY AUTOMATED COUNT: NORMAL % (ref 11.5–14.5)
EST. AVERAGE GLUCOSE BLD GHB EST-MCNC: 128 MG/DL
FIO2 ON VENT: 100 %
FIO2 ON VENT: 100 %
FIO2 ON VENT: 28 %
FIO2 ON VENT: 30 %
FIO2 ON VENT: 35 %
FIO2 ON VENT: 40 %
FIO2 ON VENT: 50 %
FIO2 ON VENT: 50 %
FIO2 ON VENT: 80 %
FLUAV AG NPH QL IA: NEGATIVE
FLUBV AG NOSE QL IA: NEGATIVE
GAS FLOW.O2 O2 DELIVERY SYS: 3 L/MIN
GAS FLOW.O2 O2 DELIVERY SYS: 4 L/MIN
GAS FLOW.O2 SETTING OXYMISER: 12 L/MIN
GAS FLOW.O2 SETTING OXYMISER: 14 L/MIN
GLOBULIN SER CALC-MCNC: 3.3 G/DL (ref 2–4)
GLOBULIN SER CALC-MCNC: 3.9 G/DL (ref 2–4)
GLOBULIN SER CALC-MCNC: 4 G/DL (ref 2–4)
GLOBULIN SER CALC-MCNC: 4.1 G/DL (ref 2–4)
GLOBULIN SER CALC-MCNC: 4.2 G/DL (ref 2–4)
GLOBULIN SER CALC-MCNC: 4.3 G/DL (ref 2–4)
GLOBULIN SER CALC-MCNC: 4.3 G/DL (ref 2–4)
GLOBULIN SER CALC-MCNC: 4.4 G/DL (ref 2–4)
GLOBULIN SER CALC-MCNC: 4.5 G/DL (ref 2–4)
GLOBULIN SER CALC-MCNC: 4.6 G/DL (ref 2–4)
GLOBULIN SER CALC-MCNC: 4.7 G/DL (ref 2–4)
GLOBULIN SER CALC-MCNC: 5.2 G/DL (ref 2–4)
GLOBULIN SER CALC-MCNC: 5.3 G/DL (ref 2–4)
GLOBULIN SER CALC-MCNC: 5.4 G/DL (ref 2–4)
GLOBULIN SER CALC-MCNC: 5.4 G/DL (ref 2–4)
GLOBULIN SER CALC-MCNC: 5.5 G/DL (ref 2–4)
GLOBULIN SER CALC-MCNC: 5.6 G/DL (ref 2–4)
GLOBULIN SER CALC-MCNC: 5.6 G/DL (ref 2–4)
GLOBULIN SER CALC-MCNC: 5.7 G/DL (ref 2–4)
GLOBULIN SER CALC-MCNC: 5.7 G/DL (ref 2–4)
GLOBULIN SER CALC-MCNC: 5.9 G/DL (ref 2–4)
GLUCOSE BLD STRIP.AUTO-MCNC: 104 MG/DL (ref 65–100)
GLUCOSE BLD STRIP.AUTO-MCNC: 107 MG/DL (ref 65–100)
GLUCOSE BLD STRIP.AUTO-MCNC: 117 MG/DL (ref 65–100)
GLUCOSE BLD STRIP.AUTO-MCNC: 128 MG/DL (ref 65–100)
GLUCOSE BLD STRIP.AUTO-MCNC: 129 MG/DL (ref 65–100)
GLUCOSE BLD STRIP.AUTO-MCNC: 130 MG/DL (ref 65–100)
GLUCOSE BLD STRIP.AUTO-MCNC: 130 MG/DL (ref 65–100)
GLUCOSE BLD STRIP.AUTO-MCNC: 131 MG/DL (ref 65–100)
GLUCOSE BLD STRIP.AUTO-MCNC: 131 MG/DL (ref 65–100)
GLUCOSE BLD STRIP.AUTO-MCNC: 137 MG/DL (ref 65–100)
GLUCOSE BLD STRIP.AUTO-MCNC: 139 MG/DL (ref 65–100)
GLUCOSE BLD STRIP.AUTO-MCNC: 140 MG/DL (ref 65–100)
GLUCOSE BLD STRIP.AUTO-MCNC: 140 MG/DL (ref 65–100)
GLUCOSE BLD STRIP.AUTO-MCNC: 141 MG/DL (ref 65–100)
GLUCOSE BLD STRIP.AUTO-MCNC: 142 MG/DL (ref 65–100)
GLUCOSE BLD STRIP.AUTO-MCNC: 143 MG/DL (ref 65–100)
GLUCOSE BLD STRIP.AUTO-MCNC: 145 MG/DL (ref 65–100)
GLUCOSE BLD STRIP.AUTO-MCNC: 146 MG/DL (ref 65–100)
GLUCOSE BLD STRIP.AUTO-MCNC: 146 MG/DL (ref 65–100)
GLUCOSE BLD STRIP.AUTO-MCNC: 147 MG/DL (ref 65–100)
GLUCOSE BLD STRIP.AUTO-MCNC: 148 MG/DL (ref 65–100)
GLUCOSE BLD STRIP.AUTO-MCNC: 148 MG/DL (ref 65–100)
GLUCOSE BLD STRIP.AUTO-MCNC: 149 MG/DL (ref 65–100)
GLUCOSE BLD STRIP.AUTO-MCNC: 149 MG/DL (ref 65–100)
GLUCOSE BLD STRIP.AUTO-MCNC: 150 MG/DL (ref 65–100)
GLUCOSE BLD STRIP.AUTO-MCNC: 151 MG/DL (ref 65–100)
GLUCOSE BLD STRIP.AUTO-MCNC: 152 MG/DL (ref 65–100)
GLUCOSE BLD STRIP.AUTO-MCNC: 152 MG/DL (ref 65–100)
GLUCOSE BLD STRIP.AUTO-MCNC: 153 MG/DL (ref 65–100)
GLUCOSE BLD STRIP.AUTO-MCNC: 154 MG/DL (ref 65–100)
GLUCOSE BLD STRIP.AUTO-MCNC: 156 MG/DL (ref 65–100)
GLUCOSE BLD STRIP.AUTO-MCNC: 157 MG/DL (ref 65–100)
GLUCOSE BLD STRIP.AUTO-MCNC: 158 MG/DL (ref 65–100)
GLUCOSE BLD STRIP.AUTO-MCNC: 159 MG/DL (ref 65–100)
GLUCOSE BLD STRIP.AUTO-MCNC: 160 MG/DL (ref 65–100)
GLUCOSE BLD STRIP.AUTO-MCNC: 161 MG/DL (ref 65–100)
GLUCOSE BLD STRIP.AUTO-MCNC: 161 MG/DL (ref 65–100)
GLUCOSE BLD STRIP.AUTO-MCNC: 162 MG/DL (ref 65–100)
GLUCOSE BLD STRIP.AUTO-MCNC: 163 MG/DL (ref 65–100)
GLUCOSE BLD STRIP.AUTO-MCNC: 163 MG/DL (ref 65–100)
GLUCOSE BLD STRIP.AUTO-MCNC: 165 MG/DL (ref 65–100)
GLUCOSE BLD STRIP.AUTO-MCNC: 166 MG/DL (ref 65–100)
GLUCOSE BLD STRIP.AUTO-MCNC: 167 MG/DL (ref 65–100)
GLUCOSE BLD STRIP.AUTO-MCNC: 168 MG/DL (ref 65–100)
GLUCOSE BLD STRIP.AUTO-MCNC: 168 MG/DL (ref 65–100)
GLUCOSE BLD STRIP.AUTO-MCNC: 169 MG/DL (ref 65–100)
GLUCOSE BLD STRIP.AUTO-MCNC: 170 MG/DL (ref 65–100)
GLUCOSE BLD STRIP.AUTO-MCNC: 170 MG/DL (ref 65–100)
GLUCOSE BLD STRIP.AUTO-MCNC: 171 MG/DL (ref 65–100)
GLUCOSE BLD STRIP.AUTO-MCNC: 172 MG/DL (ref 65–100)
GLUCOSE BLD STRIP.AUTO-MCNC: 174 MG/DL (ref 65–100)
GLUCOSE BLD STRIP.AUTO-MCNC: 176 MG/DL (ref 65–100)
GLUCOSE BLD STRIP.AUTO-MCNC: 176 MG/DL (ref 65–100)
GLUCOSE BLD STRIP.AUTO-MCNC: 177 MG/DL (ref 65–100)
GLUCOSE BLD STRIP.AUTO-MCNC: 178 MG/DL (ref 65–100)
GLUCOSE BLD STRIP.AUTO-MCNC: 178 MG/DL (ref 65–100)
GLUCOSE BLD STRIP.AUTO-MCNC: 179 MG/DL (ref 65–100)
GLUCOSE BLD STRIP.AUTO-MCNC: 182 MG/DL (ref 65–100)
GLUCOSE BLD STRIP.AUTO-MCNC: 183 MG/DL (ref 65–100)
GLUCOSE BLD STRIP.AUTO-MCNC: 185 MG/DL (ref 65–100)
GLUCOSE BLD STRIP.AUTO-MCNC: 186 MG/DL (ref 65–100)
GLUCOSE BLD STRIP.AUTO-MCNC: 187 MG/DL (ref 65–100)
GLUCOSE BLD STRIP.AUTO-MCNC: 187 MG/DL (ref 65–100)
GLUCOSE BLD STRIP.AUTO-MCNC: 188 MG/DL (ref 65–100)
GLUCOSE BLD STRIP.AUTO-MCNC: 188 MG/DL (ref 65–100)
GLUCOSE BLD STRIP.AUTO-MCNC: 189 MG/DL (ref 65–100)
GLUCOSE BLD STRIP.AUTO-MCNC: 190 MG/DL (ref 65–100)
GLUCOSE BLD STRIP.AUTO-MCNC: 190 MG/DL (ref 65–100)
GLUCOSE BLD STRIP.AUTO-MCNC: 191 MG/DL (ref 65–100)
GLUCOSE BLD STRIP.AUTO-MCNC: 191 MG/DL (ref 65–100)
GLUCOSE BLD STRIP.AUTO-MCNC: 194 MG/DL (ref 65–100)
GLUCOSE BLD STRIP.AUTO-MCNC: 196 MG/DL (ref 65–100)
GLUCOSE BLD STRIP.AUTO-MCNC: 196 MG/DL (ref 65–100)
GLUCOSE BLD STRIP.AUTO-MCNC: 197 MG/DL (ref 65–100)
GLUCOSE BLD STRIP.AUTO-MCNC: 197 MG/DL (ref 65–100)
GLUCOSE BLD STRIP.AUTO-MCNC: 199 MG/DL (ref 65–100)
GLUCOSE BLD STRIP.AUTO-MCNC: 199 MG/DL (ref 65–100)
GLUCOSE BLD STRIP.AUTO-MCNC: 200 MG/DL (ref 65–100)
GLUCOSE BLD STRIP.AUTO-MCNC: 200 MG/DL (ref 65–100)
GLUCOSE BLD STRIP.AUTO-MCNC: 201 MG/DL (ref 65–100)
GLUCOSE BLD STRIP.AUTO-MCNC: 201 MG/DL (ref 65–100)
GLUCOSE BLD STRIP.AUTO-MCNC: 202 MG/DL (ref 65–100)
GLUCOSE BLD STRIP.AUTO-MCNC: 205 MG/DL (ref 65–100)
GLUCOSE BLD STRIP.AUTO-MCNC: 206 MG/DL (ref 65–100)
GLUCOSE BLD STRIP.AUTO-MCNC: 209 MG/DL (ref 65–100)
GLUCOSE BLD STRIP.AUTO-MCNC: 209 MG/DL (ref 65–100)
GLUCOSE BLD STRIP.AUTO-MCNC: 210 MG/DL (ref 65–100)
GLUCOSE BLD STRIP.AUTO-MCNC: 210 MG/DL (ref 65–100)
GLUCOSE BLD STRIP.AUTO-MCNC: 211 MG/DL (ref 65–100)
GLUCOSE BLD STRIP.AUTO-MCNC: 214 MG/DL (ref 65–100)
GLUCOSE BLD STRIP.AUTO-MCNC: 216 MG/DL (ref 65–100)
GLUCOSE BLD STRIP.AUTO-MCNC: 217 MG/DL (ref 65–100)
GLUCOSE BLD STRIP.AUTO-MCNC: 219 MG/DL (ref 65–100)
GLUCOSE BLD STRIP.AUTO-MCNC: 220 MG/DL (ref 65–100)
GLUCOSE BLD STRIP.AUTO-MCNC: 225 MG/DL (ref 65–100)
GLUCOSE BLD STRIP.AUTO-MCNC: 227 MG/DL (ref 65–100)
GLUCOSE BLD STRIP.AUTO-MCNC: 228 MG/DL (ref 65–100)
GLUCOSE BLD STRIP.AUTO-MCNC: 259 MG/DL (ref 65–100)
GLUCOSE BLD STRIP.AUTO-MCNC: 261 MG/DL (ref 65–100)
GLUCOSE BLD STRIP.AUTO-MCNC: 299 MG/DL (ref 65–100)
GLUCOSE BLD STRIP.AUTO-MCNC: 78 MG/DL (ref 65–100)
GLUCOSE BLD STRIP.AUTO-MCNC: >600 MG/DL (ref 65–100)
GLUCOSE SERPL-MCNC: 100 MG/DL (ref 65–100)
GLUCOSE SERPL-MCNC: 102 MG/DL (ref 65–100)
GLUCOSE SERPL-MCNC: 103 MG/DL (ref 65–100)
GLUCOSE SERPL-MCNC: 108 MG/DL (ref 65–100)
GLUCOSE SERPL-MCNC: 109 MG/DL (ref 65–100)
GLUCOSE SERPL-MCNC: 113 MG/DL (ref 65–100)
GLUCOSE SERPL-MCNC: 122 MG/DL (ref 65–100)
GLUCOSE SERPL-MCNC: 139 MG/DL (ref 65–100)
GLUCOSE SERPL-MCNC: 140 MG/DL (ref 65–100)
GLUCOSE SERPL-MCNC: 141 MG/DL (ref 65–100)
GLUCOSE SERPL-MCNC: 142 MG/DL (ref 65–100)
GLUCOSE SERPL-MCNC: 142 MG/DL (ref 65–100)
GLUCOSE SERPL-MCNC: 144 MG/DL (ref 65–100)
GLUCOSE SERPL-MCNC: 146 MG/DL (ref 65–100)
GLUCOSE SERPL-MCNC: 147 MG/DL (ref 65–100)
GLUCOSE SERPL-MCNC: 148 MG/DL (ref 65–100)
GLUCOSE SERPL-MCNC: 152 MG/DL (ref 65–100)
GLUCOSE SERPL-MCNC: 152 MG/DL (ref 65–100)
GLUCOSE SERPL-MCNC: 155 MG/DL (ref 65–100)
GLUCOSE SERPL-MCNC: 156 MG/DL (ref 65–100)
GLUCOSE SERPL-MCNC: 158 MG/DL (ref 65–100)
GLUCOSE SERPL-MCNC: 160 MG/DL (ref 65–100)
GLUCOSE SERPL-MCNC: 162 MG/DL (ref 65–100)
GLUCOSE SERPL-MCNC: 162 MG/DL (ref 65–100)
GLUCOSE SERPL-MCNC: 164 MG/DL (ref 65–100)
GLUCOSE SERPL-MCNC: 164 MG/DL (ref 65–100)
GLUCOSE SERPL-MCNC: 167 MG/DL (ref 65–100)
GLUCOSE SERPL-MCNC: 170 MG/DL (ref 65–100)
GLUCOSE SERPL-MCNC: 175 MG/DL (ref 65–100)
GLUCOSE SERPL-MCNC: 176 MG/DL (ref 65–100)
GLUCOSE SERPL-MCNC: 183 MG/DL (ref 65–100)
GLUCOSE SERPL-MCNC: 183 MG/DL (ref 65–100)
GLUCOSE SERPL-MCNC: 185 MG/DL (ref 65–100)
GLUCOSE SERPL-MCNC: 187 MG/DL (ref 65–100)
GLUCOSE SERPL-MCNC: 188 MG/DL (ref 65–100)
GLUCOSE SERPL-MCNC: 188 MG/DL (ref 65–100)
GLUCOSE SERPL-MCNC: 192 MG/DL (ref 65–100)
GLUCOSE SERPL-MCNC: 194 MG/DL (ref 65–100)
GLUCOSE SERPL-MCNC: 214 MG/DL (ref 65–100)
GLUCOSE SERPL-MCNC: 223 MG/DL (ref 65–100)
GLUCOSE SERPL-MCNC: 246 MG/DL (ref 65–100)
GLUCOSE UR STRIP.AUTO-MCNC: NEGATIVE MG/DL
GRAM STN SPEC: ABNORMAL
HBA1C MFR BLD: 6.1 % (ref 4.2–6.3)
HCO3 BLDA-SCNC: 13 MMOL/L (ref 22–26)
HCO3 BLDA-SCNC: 17 MMOL/L (ref 22–26)
HCO3 BLDA-SCNC: 18 MMOL/L (ref 22–26)
HCO3 BLDA-SCNC: 18 MMOL/L (ref 22–26)
HCO3 BLDA-SCNC: 19 MMOL/L (ref 22–26)
HCO3 BLDA-SCNC: 20 MMOL/L (ref 22–26)
HCO3 BLDA-SCNC: 22 MMOL/L (ref 22–26)
HCO3 BLDA-SCNC: 22 MMOL/L (ref 22–26)
HCO3 BLDA-SCNC: 24 MMOL/L (ref 22–26)
HCO3 BLDA-SCNC: 24 MMOL/L (ref 22–26)
HCO3 BLDA-SCNC: 26 MMOL/L (ref 22–26)
HCO3 BLDA-SCNC: 28 MMOL/L (ref 22–26)
HCO3 BLDA-SCNC: 32 MMOL/L (ref 22–26)
HCT VFR BLD AUTO: 18.7 % (ref 36.6–50.3)
HCT VFR BLD AUTO: 19.9 % (ref 36.6–50.3)
HCT VFR BLD AUTO: 20.2 % (ref 36.6–50.3)
HCT VFR BLD AUTO: 21.2 % (ref 36.6–50.3)
HCT VFR BLD AUTO: 22.2 % (ref 36.6–50.3)
HCT VFR BLD AUTO: 22.3 % (ref 36.6–50.3)
HCT VFR BLD AUTO: 22.6 % (ref 36.6–50.3)
HCT VFR BLD AUTO: 24.7 % (ref 36.6–50.3)
HCT VFR BLD AUTO: 25 % (ref 36.6–50.3)
HCT VFR BLD AUTO: 25.3 % (ref 36.6–50.3)
HCT VFR BLD AUTO: 25.8 % (ref 36.6–50.3)
HCT VFR BLD AUTO: 25.8 % (ref 36.6–50.3)
HCT VFR BLD AUTO: 26.4 % (ref 36.6–50.3)
HCT VFR BLD AUTO: 27 % (ref 36.6–50.3)
HCT VFR BLD AUTO: 27.5 % (ref 36.6–50.3)
HCT VFR BLD AUTO: 28.3 % (ref 36.6–50.3)
HCT VFR BLD AUTO: 28.4 % (ref 36.6–50.3)
HCT VFR BLD AUTO: 28.7 % (ref 36.6–50.3)
HCT VFR BLD AUTO: 29 % (ref 36.6–50.3)
HCT VFR BLD AUTO: 29.3 % (ref 36.6–50.3)
HCT VFR BLD AUTO: 29.4 % (ref 36.6–50.3)
HCT VFR BLD AUTO: 29.9 % (ref 36.6–50.3)
HCT VFR BLD AUTO: 29.9 % (ref 36.6–50.3)
HCT VFR BLD AUTO: 30.2 % (ref 36.6–50.3)
HCT VFR BLD AUTO: 30.2 % (ref 36.6–50.3)
HCT VFR BLD AUTO: 30.5 % (ref 36.6–50.3)
HCT VFR BLD AUTO: 30.5 % (ref 36.6–50.3)
HCT VFR BLD AUTO: 31.1 % (ref 36.6–50.3)
HCT VFR BLD AUTO: 31.6 % (ref 36.6–50.3)
HCT VFR BLD AUTO: 32.8 % (ref 36.6–50.3)
HCT VFR BLD AUTO: 33.2 % (ref 36.6–50.3)
HCT VFR BLD AUTO: 33.7 % (ref 36.6–50.3)
HCT VFR BLD AUTO: 33.9 % (ref 36.6–50.3)
HCT VFR BLD AUTO: 34.1 % (ref 36.6–50.3)
HCT VFR BLD AUTO: 34.7 % (ref 36.6–50.3)
HCT VFR BLD AUTO: 35.2 % (ref 36.6–50.3)
HCT VFR BLD AUTO: 35.3 % (ref 36.6–50.3)
HCT VFR BLD AUTO: 36.2 % (ref 36.6–50.3)
HCT VFR BLD AUTO: 42.4 % (ref 36.6–50.3)
HCT VFR BLD AUTO: 45.1 % (ref 36.6–50.3)
HCT VFR BLD AUTO: NORMAL %
HDLC SERPL-MCNC: 48 MG/DL
HDLC SERPL: 3.7 {RATIO} (ref 0–5)
HGB BLD-MCNC: 10.1 G/DL (ref 12.1–17)
HGB BLD-MCNC: 10.2 G/DL (ref 12.1–17)
HGB BLD-MCNC: 10.4 G/DL (ref 12.1–17)
HGB BLD-MCNC: 10.7 G/DL (ref 12.1–17)
HGB BLD-MCNC: 10.9 G/DL (ref 12.1–17)
HGB BLD-MCNC: 11 G/DL (ref 12.1–17)
HGB BLD-MCNC: 11 G/DL (ref 12.1–17)
HGB BLD-MCNC: 11.3 G/DL (ref 12.1–17)
HGB BLD-MCNC: 11.4 G/DL (ref 12.1–17)
HGB BLD-MCNC: 11.4 G/DL (ref 12.1–17)
HGB BLD-MCNC: 11.7 G/DL (ref 12.1–17)
HGB BLD-MCNC: 12.2 G/DL (ref 12.1–17)
HGB BLD-MCNC: 12.2 G/DL (ref 12.1–17)
HGB BLD-MCNC: 14.2 G/DL (ref 12.1–17)
HGB BLD-MCNC: 15.4 G/DL (ref 12.1–17)
HGB BLD-MCNC: 6.2 G/DL (ref 12.1–17)
HGB BLD-MCNC: 6.4 G/DL (ref 12.1–17)
HGB BLD-MCNC: 6.7 G/DL (ref 12.1–17)
HGB BLD-MCNC: 7 G/DL (ref 12.1–17)
HGB BLD-MCNC: 7.2 G/DL (ref 12.1–17)
HGB BLD-MCNC: 7.3 G/DL (ref 12.1–17)
HGB BLD-MCNC: 7.3 G/DL (ref 12.1–17)
HGB BLD-MCNC: 7.9 G/DL (ref 12.1–17)
HGB BLD-MCNC: 8.1 G/DL (ref 12.1–17)
HGB BLD-MCNC: 8.5 G/DL (ref 12.1–17)
HGB BLD-MCNC: 8.5 G/DL (ref 12.1–17)
HGB BLD-MCNC: 8.8 G/DL (ref 12.1–17)
HGB BLD-MCNC: 9 G/DL (ref 12.1–17)
HGB BLD-MCNC: 9.2 G/DL (ref 12.1–17)
HGB BLD-MCNC: 9.2 G/DL (ref 12.1–17)
HGB BLD-MCNC: 9.4 G/DL (ref 12.1–17)
HGB BLD-MCNC: 9.4 G/DL (ref 12.1–17)
HGB BLD-MCNC: 9.8 G/DL (ref 12.1–17)
HGB BLD-MCNC: 9.8 G/DL (ref 12.1–17)
HGB BLD-MCNC: 9.9 G/DL (ref 12.1–17)
HGB BLD-MCNC: 9.9 G/DL (ref 12.1–17)
HGB BLD-MCNC: NORMAL G/DL (ref 13–16)
HGB UR QL STRIP: ABNORMAL
HGB UR QL STRIP: NEGATIVE
HYALINE CASTS URNS QL MICRO: ABNORMAL /LPF (ref 0–5)
KETONES UR QL STRIP.AUTO: ABNORMAL MG/DL
KETONES UR QL STRIP.AUTO: NEGATIVE MG/DL
LACTATE SERPL-SCNC: 0.9 MMOL/L (ref 0.4–2)
LACTATE SERPL-SCNC: 1.1 MMOL/L (ref 0.4–2)
LACTATE SERPL-SCNC: 1.2 MMOL/L (ref 0.4–2)
LACTATE SERPL-SCNC: 1.4 MMOL/L (ref 0.4–2)
LACTATE SERPL-SCNC: 2 MMOL/L (ref 0.4–2)
LDLC SERPL CALC-MCNC: 117.8 MG/DL (ref 0–100)
LEUKOCYTE ESTERASE UR QL STRIP.AUTO: ABNORMAL
LEUKOCYTE ESTERASE UR QL STRIP.AUTO: NEGATIVE
LEUKOCYTE ESTERASE UR QL STRIP.AUTO: NEGATIVE
LIPID PROFILE,FLP: ABNORMAL
LYMPHOCYTES # BLD AUTO: 12 % (ref 12–49)
LYMPHOCYTES # BLD AUTO: 15 % (ref 12–49)
LYMPHOCYTES # BLD AUTO: 18 % (ref 12–49)
LYMPHOCYTES # BLD AUTO: 18 % (ref 12–49)
LYMPHOCYTES # BLD AUTO: 19 % (ref 12–49)
LYMPHOCYTES # BLD AUTO: 23 % (ref 12–49)
LYMPHOCYTES # BLD AUTO: 39 % (ref 12–49)
LYMPHOCYTES # BLD AUTO: 7 % (ref 12–49)
LYMPHOCYTES # BLD: 1.2 K/UL (ref 0.8–3.5)
LYMPHOCYTES # BLD: 1.4 K/UL (ref 0.8–3.5)
LYMPHOCYTES # BLD: 1.6 K/UL (ref 0.8–3.5)
LYMPHOCYTES # BLD: 1.7 K/UL (ref 0.8–3.5)
LYMPHOCYTES # BLD: 1.8 K/UL (ref 0.8–3.5)
LYMPHOCYTES # BLD: 1.8 K/UL (ref 0.8–3.5)
LYMPHOCYTES # BLD: 1.9 K/UL (ref 0.8–3.5)
LYMPHOCYTES # BLD: 2 K/UL (ref 0.8–3.5)
LYMPHOCYTES # BLD: 2 K/UL (ref 0.8–3.5)
LYMPHOCYTES # BLD: 2.2 K/UL (ref 0.8–3.5)
LYMPHOCYTES # BLD: 2.3 K/UL (ref 0.8–3.5)
LYMPHOCYTES # BLD: 2.3 K/UL (ref 0.8–3.5)
LYMPHOCYTES # BLD: 2.5 K/UL (ref 0.8–3.5)
LYMPHOCYTES # BLD: 2.6 K/UL (ref 0.8–3.5)
LYMPHOCYTES # BLD: 2.6 K/UL (ref 0.8–3.5)
LYMPHOCYTES # BLD: 2.7 K/UL
LYMPHOCYTES # BLD: 3.2 K/UL
LYMPHOCYTES # BLD: 3.5 K/UL
LYMPHOCYTES # BLD: 3.6 K/UL (ref 0.8–3.5)
LYMPHOCYTES NFR BLD: 18 % (ref 12–49)
LYMPHOCYTES NFR BLD: 19 %
LYMPHOCYTES NFR BLD: 19 % (ref 12–49)
LYMPHOCYTES NFR BLD: 20 % (ref 12–49)
LYMPHOCYTES NFR BLD: 21 %
LYMPHOCYTES NFR BLD: 21 % (ref 12–49)
LYMPHOCYTES NFR BLD: 21 % (ref 12–49)
LYMPHOCYTES NFR BLD: 22 % (ref 12–49)
LYMPHOCYTES NFR BLD: 23 % (ref 12–49)
LYMPHOCYTES NFR BLD: 23 % (ref 12–49)
LYMPHOCYTES NFR BLD: 24 % (ref 12–49)
LYMPHOCYTES NFR BLD: 25 % (ref 12–49)
LYMPHOCYTES NFR BLD: 26 %
MAGNESIUM SERPL-MCNC: 1.6 MG/DL (ref 1.6–2.4)
MAGNESIUM SERPL-MCNC: 1.7 MG/DL (ref 1.6–2.4)
MAGNESIUM SERPL-MCNC: 1.8 MG/DL (ref 1.6–2.4)
MAGNESIUM SERPL-MCNC: 1.9 MG/DL (ref 1.6–2.4)
MAGNESIUM SERPL-MCNC: 2 MG/DL (ref 1.6–2.4)
MAGNESIUM SERPL-MCNC: 2 MG/DL (ref 1.6–2.4)
MAGNESIUM SERPL-MCNC: 2.1 MG/DL (ref 1.6–2.4)
MAGNESIUM SERPL-MCNC: 2.1 MG/DL (ref 1.6–2.4)
MAGNESIUM SERPL-MCNC: 2.2 MG/DL (ref 1.6–2.4)
MAGNESIUM SERPL-MCNC: 2.3 MG/DL (ref 1.6–2.4)
MAGNESIUM SERPL-MCNC: 2.3 MG/DL (ref 1.6–2.4)
MAGNESIUM SERPL-MCNC: 2.4 MG/DL (ref 1.6–2.4)
MAGNESIUM SERPL-MCNC: 2.5 MG/DL (ref 1.6–2.4)
MAGNESIUM SERPL-MCNC: 2.6 MG/DL (ref 1.6–2.4)
MCH RBC QN AUTO: 28.3 PG (ref 26–34)
MCH RBC QN AUTO: 28.4 PG (ref 26–34)
MCH RBC QN AUTO: 28.7 PG (ref 26–34)
MCH RBC QN AUTO: 28.7 PG (ref 26–34)
MCH RBC QN AUTO: 28.8 PG (ref 26–34)
MCH RBC QN AUTO: 28.8 PG (ref 26–34)
MCH RBC QN AUTO: 28.9 PG (ref 26–34)
MCH RBC QN AUTO: 29 PG (ref 26–34)
MCH RBC QN AUTO: 29.3 PG (ref 26–34)
MCH RBC QN AUTO: 29.4 PG (ref 26–34)
MCH RBC QN AUTO: 29.4 PG (ref 26–34)
MCH RBC QN AUTO: 29.5 PG (ref 26–34)
MCH RBC QN AUTO: 29.5 PG (ref 26–34)
MCH RBC QN AUTO: 29.6 PG (ref 26–34)
MCH RBC QN AUTO: 29.7 PG (ref 26–34)
MCH RBC QN AUTO: 29.7 PG (ref 26–34)
MCH RBC QN AUTO: 29.8 PG (ref 26–34)
MCH RBC QN AUTO: 29.8 PG (ref 26–34)
MCH RBC QN AUTO: 29.9 PG (ref 26–34)
MCH RBC QN AUTO: 30 PG (ref 26–34)
MCH RBC QN AUTO: 30.1 PG (ref 26–34)
MCH RBC QN AUTO: 30.3 PG (ref 26–34)
MCH RBC QN AUTO: 30.3 PG (ref 26–34)
MCH RBC QN AUTO: 30.4 PG (ref 26–34)
MCH RBC QN AUTO: 30.5 PG (ref 26–34)
MCH RBC QN AUTO: 30.6 PG (ref 26–34)
MCH RBC QN AUTO: 30.7 PG (ref 26–34)
MCH RBC QN AUTO: 30.7 PG (ref 26–34)
MCH RBC QN AUTO: 30.9 PG (ref 26–34)
MCH RBC QN AUTO: 31 PG (ref 26–34)
MCH RBC QN AUTO: NORMAL PG (ref 25–35)
MCHC RBC AUTO-ENTMCNC: 31.1 G/DL (ref 30–36.5)
MCHC RBC AUTO-ENTMCNC: 31.4 G/DL (ref 30–36.5)
MCHC RBC AUTO-ENTMCNC: 31.5 G/DL (ref 30–36.5)
MCHC RBC AUTO-ENTMCNC: 31.6 G/DL (ref 30–36.5)
MCHC RBC AUTO-ENTMCNC: 31.7 G/DL (ref 30–36.5)
MCHC RBC AUTO-ENTMCNC: 31.9 G/DL (ref 30–36.5)
MCHC RBC AUTO-ENTMCNC: 32 G/DL (ref 30–36.5)
MCHC RBC AUTO-ENTMCNC: 32.1 G/DL (ref 30–36.5)
MCHC RBC AUTO-ENTMCNC: 32.2 G/DL (ref 30–36.5)
MCHC RBC AUTO-ENTMCNC: 32.3 G/DL (ref 30–36.5)
MCHC RBC AUTO-ENTMCNC: 32.3 G/DL (ref 30–36.5)
MCHC RBC AUTO-ENTMCNC: 32.4 G/DL (ref 30–36.5)
MCHC RBC AUTO-ENTMCNC: 32.5 G/DL (ref 30–36.5)
MCHC RBC AUTO-ENTMCNC: 32.5 G/DL (ref 30–36.5)
MCHC RBC AUTO-ENTMCNC: 32.7 G/DL (ref 30–36.5)
MCHC RBC AUTO-ENTMCNC: 32.8 G/DL (ref 30–36.5)
MCHC RBC AUTO-ENTMCNC: 32.9 G/DL (ref 30–36.5)
MCHC RBC AUTO-ENTMCNC: 32.9 G/DL (ref 30–36.5)
MCHC RBC AUTO-ENTMCNC: 33.1 G/DL (ref 30–36.5)
MCHC RBC AUTO-ENTMCNC: 33.2 G/DL (ref 30–36.5)
MCHC RBC AUTO-ENTMCNC: 33.3 G/DL (ref 30–36.5)
MCHC RBC AUTO-ENTMCNC: 33.4 G/DL (ref 30–36.5)
MCHC RBC AUTO-ENTMCNC: 33.5 G/DL (ref 30–36.5)
MCHC RBC AUTO-ENTMCNC: 33.7 G/DL (ref 30–36.5)
MCHC RBC AUTO-ENTMCNC: 33.9 G/DL (ref 30–36.5)
MCHC RBC AUTO-ENTMCNC: 34.1 G/DL (ref 30–36.5)
MCHC RBC AUTO-ENTMCNC: 34.4 G/DL (ref 30–36.5)
MCHC RBC AUTO-ENTMCNC: 34.6 G/DL (ref 30–36.5)
MCHC RBC AUTO-ENTMCNC: 34.7 G/DL (ref 30–36.5)
MCHC RBC AUTO-ENTMCNC: 34.8 G/DL (ref 30–36.5)
MCHC RBC AUTO-ENTMCNC: NORMAL G/DL (ref 31–37)
MCV RBC AUTO: 87.6 FL (ref 80–99)
MCV RBC AUTO: 87.8 FL (ref 80–99)
MCV RBC AUTO: 87.9 FL (ref 80–99)
MCV RBC AUTO: 87.9 FL (ref 80–99)
MCV RBC AUTO: 88.2 FL (ref 80–99)
MCV RBC AUTO: 88.3 FL (ref 80–99)
MCV RBC AUTO: 88.4 FL (ref 80–99)
MCV RBC AUTO: 88.5 FL (ref 80–99)
MCV RBC AUTO: 88.5 FL (ref 80–99)
MCV RBC AUTO: 88.9 FL (ref 80–99)
MCV RBC AUTO: 89 FL (ref 80–99)
MCV RBC AUTO: 89.2 FL (ref 80–99)
MCV RBC AUTO: 89.3 FL (ref 80–99)
MCV RBC AUTO: 89.4 FL (ref 80–99)
MCV RBC AUTO: 89.5 FL (ref 80–99)
MCV RBC AUTO: 89.8 FL (ref 80–99)
MCV RBC AUTO: 89.9 FL (ref 80–99)
MCV RBC AUTO: 90.1 FL (ref 80–99)
MCV RBC AUTO: 90.2 FL (ref 80–99)
MCV RBC AUTO: 90.4 FL (ref 80–99)
MCV RBC AUTO: 90.4 FL (ref 80–99)
MCV RBC AUTO: 90.6 FL (ref 80–99)
MCV RBC AUTO: 90.7 FL (ref 80–99)
MCV RBC AUTO: 90.8 FL (ref 80–99)
MCV RBC AUTO: 90.8 FL (ref 80–99)
MCV RBC AUTO: 91 FL (ref 80–99)
MCV RBC AUTO: 91.1 FL (ref 80–99)
MCV RBC AUTO: 91.2 FL (ref 80–99)
MCV RBC AUTO: 91.3 FL (ref 80–99)
MCV RBC AUTO: 91.6 FL (ref 80–99)
MCV RBC AUTO: 91.6 FL (ref 80–99)
MCV RBC AUTO: 91.8 FL (ref 80–99)
MCV RBC AUTO: 91.9 FL (ref 80–99)
MCV RBC AUTO: 91.9 FL (ref 80–99)
MCV RBC AUTO: 92.3 FL (ref 80–99)
MCV RBC AUTO: 92.4 FL (ref 80–99)
MCV RBC AUTO: 92.7 FL (ref 80–99)
MCV RBC AUTO: 95.3 FL (ref 80–99)
MCV RBC AUTO: NORMAL FL (ref 78–98)
METAMYELOCYTES NFR BLD MANUAL: 1 %
MONOCYTES # BLD: 0.4 K/UL
MONOCYTES # BLD: 0.4 K/UL (ref 0–1)
MONOCYTES # BLD: 0.4 K/UL (ref 0–1)
MONOCYTES # BLD: 0.5 K/UL (ref 0–1)
MONOCYTES # BLD: 0.6 K/UL
MONOCYTES # BLD: 0.6 K/UL (ref 0–1)
MONOCYTES # BLD: 0.7 K/UL (ref 0–1)
MONOCYTES # BLD: 0.7 K/UL (ref 0–1)
MONOCYTES # BLD: 0.8 K/UL
MONOCYTES # BLD: 0.8 K/UL (ref 0–1)
MONOCYTES # BLD: 0.8 K/UL (ref 0–1)
MONOCYTES # BLD: 0.9 K/UL (ref 0–1)
MONOCYTES # BLD: 1 K/UL (ref 0–1)
MONOCYTES # BLD: 1.1 K/UL (ref 0–1)
MONOCYTES # BLD: 1.1 K/UL (ref 0–1)
MONOCYTES # BLD: 1.9 K/UL (ref 0–1)
MONOCYTES NFR BLD AUTO: 10 % (ref 5–13)
MONOCYTES NFR BLD AUTO: 12 % (ref 5–13)
MONOCYTES NFR BLD AUTO: 4 % (ref 5–13)
MONOCYTES NFR BLD AUTO: 5 % (ref 5–13)
MONOCYTES NFR BLD AUTO: 6 % (ref 5–13)
MONOCYTES NFR BLD AUTO: 6 % (ref 5–13)
MONOCYTES NFR BLD AUTO: 8 % (ref 5–13)
MONOCYTES NFR BLD AUTO: 9 % (ref 5–13)
MONOCYTES NFR BLD: 10 % (ref 5–13)
MONOCYTES NFR BLD: 3 %
MONOCYTES NFR BLD: 4 %
MONOCYTES NFR BLD: 5 %
MONOCYTES NFR BLD: 5 % (ref 5–13)
MONOCYTES NFR BLD: 6 % (ref 5–13)
MONOCYTES NFR BLD: 6 % (ref 5–13)
MONOCYTES NFR BLD: 7 % (ref 5–13)
MUCOUS THREADS URNS QL MICRO: ABNORMAL /LPF
MUCOUS THREADS URNS QL MICRO: ABNORMAL /LPF
NEUTS BAND NFR BLD MANUAL: 2 % (ref 0–6)
NEUTS BAND NFR BLD MANUAL: 3 %
NEUTS SEG # BLD: 10.1 K/UL
NEUTS SEG # BLD: 10.5 K/UL (ref 1.8–8)
NEUTS SEG # BLD: 11 K/UL
NEUTS SEG # BLD: 11.3 K/UL (ref 1.8–8)
NEUTS SEG # BLD: 17.3 K/UL (ref 1.8–8)
NEUTS SEG # BLD: 3.3 K/UL (ref 1.8–8)
NEUTS SEG # BLD: 4.3 K/UL (ref 1.8–8)
NEUTS SEG # BLD: 4.3 K/UL (ref 1.8–8)
NEUTS SEG # BLD: 4.7 K/UL (ref 1.8–8)
NEUTS SEG # BLD: 6 K/UL (ref 1.8–8)
NEUTS SEG # BLD: 6.1 K/UL (ref 1.8–8)
NEUTS SEG # BLD: 6.2 K/UL (ref 1.8–8)
NEUTS SEG # BLD: 6.3 K/UL (ref 1.8–8)
NEUTS SEG # BLD: 6.7 K/UL (ref 1.8–8)
NEUTS SEG # BLD: 6.9 K/UL (ref 1.8–8)
NEUTS SEG # BLD: 6.9 K/UL (ref 1.8–8)
NEUTS SEG # BLD: 7 K/UL (ref 1.8–8)
NEUTS SEG # BLD: 7.5 K/UL (ref 1.8–8)
NEUTS SEG # BLD: 7.6 K/UL (ref 1.8–8)
NEUTS SEG # BLD: 7.7 K/UL (ref 1.8–8)
NEUTS SEG # BLD: 8 K/UL (ref 1.8–8)
NEUTS SEG # BLD: 8.6 K/UL
NEUTS SEG # BLD: 8.8 K/UL (ref 1.8–8)
NEUTS SEG # BLD: 8.9 K/UL (ref 1.8–8)
NEUTS SEG NFR BLD AUTO: 49 % (ref 32–75)
NEUTS SEG NFR BLD AUTO: 64 % (ref 32–75)
NEUTS SEG NFR BLD AUTO: 68 % (ref 32–75)
NEUTS SEG NFR BLD AUTO: 74 % (ref 32–75)
NEUTS SEG NFR BLD AUTO: 79 % (ref 32–75)
NEUTS SEG NFR BLD AUTO: 84 % (ref 32–75)
NEUTS SEG NFR BLD: 56 % (ref 32–75)
NEUTS SEG NFR BLD: 60 % (ref 32–75)
NEUTS SEG NFR BLD: 65 %
NEUTS SEG NFR BLD: 66 % (ref 32–75)
NEUTS SEG NFR BLD: 67 % (ref 32–75)
NEUTS SEG NFR BLD: 68 % (ref 32–75)
NEUTS SEG NFR BLD: 68 % (ref 32–75)
NEUTS SEG NFR BLD: 70 %
NEUTS SEG NFR BLD: 70 % (ref 32–75)
NEUTS SEG NFR BLD: 71 % (ref 32–75)
NEUTS SEG NFR BLD: 72 % (ref 32–75)
NEUTS SEG NFR BLD: 74 %
NEUTS SEG NFR BLD: 74 % (ref 32–75)
NEUTS SEG NFR BLD: 74 % (ref 32–75)
NIDUS STONE QL: NORMAL
NITRITE UR QL STRIP.AUTO: NEGATIVE
NRBC # BLD: 0 K/UL (ref 0–0.01)
NRBC BLD-RTO: 0 PER 100 WBC
P-R INTERVAL, ECG05: 178 MS
P-R INTERVAL, ECG05: 192 MS
P-R INTERVAL, ECG05: 196 MS
P-R INTERVAL, ECG05: 262 MS
PCO2 BLDA: 26 MMHG (ref 35–45)
PCO2 BLDA: 28 MMHG (ref 35–45)
PCO2 BLDA: 31 MMHG (ref 35–45)
PCO2 BLDA: 34 MMHG (ref 35–45)
PCO2 BLDA: 34 MMHG (ref 35–45)
PCO2 BLDA: 36 MMHG (ref 35–45)
PCO2 BLDA: 38 MMHG (ref 35–45)
PCO2 BLDA: 39 MMHG (ref 35–45)
PCO2 BLDA: 41 MMHG (ref 35–45)
PCO2 BLDA: 44 MMHG (ref 35–45)
PCO2 BLDA: 44 MMHG (ref 35–45)
PCO2 BLDA: 51 MMHG (ref 35–45)
PCO2 BLDA: 52 MMHG (ref 35–45)
PH BLDA: 7.08 [PH] (ref 7.35–7.45)
PH BLDA: 7.26 [PH] (ref 7.35–7.45)
PH BLDA: 7.27 [PH] (ref 7.35–7.45)
PH BLDA: 7.28 [PH] (ref 7.35–7.45)
PH BLDA: 7.33 [PH] (ref 7.35–7.45)
PH BLDA: 7.39 [PH] (ref 7.35–7.45)
PH BLDA: 7.39 [PH] (ref 7.35–7.45)
PH BLDA: 7.42 [PH] (ref 7.35–7.45)
PH BLDA: 7.45 [PH] (ref 7.35–7.45)
PH BLDA: 7.47 [PH] (ref 7.35–7.45)
PH BLDA: 7.48 [PH] (ref 7.35–7.45)
PH BLDA: 7.49 [PH] (ref 7.35–7.45)
PH BLDA: 7.55 [PH] (ref 7.35–7.45)
PH UR STRIP: 5 [PH] (ref 5–8)
PH UR STRIP: 5.5 [PH] (ref 5–8)
PH UR STRIP: 6 [PH] (ref 5–8)
PH UR STRIP: 6.5 [PH] (ref 5–8)
PH UR STRIP: 6.5 [PH] (ref 5–8)
PHOSPHATE SERPL-MCNC: 2 MG/DL (ref 2.6–4.7)
PHOSPHATE SERPL-MCNC: 2.2 MG/DL (ref 2.6–4.7)
PHOSPHATE SERPL-MCNC: 2.3 MG/DL (ref 2.6–4.7)
PHOSPHATE SERPL-MCNC: 2.4 MG/DL (ref 2.6–4.7)
PHOSPHATE SERPL-MCNC: 2.5 MG/DL (ref 2.6–4.7)
PHOSPHATE SERPL-MCNC: 3 MG/DL (ref 2.6–4.7)
PHOSPHATE SERPL-MCNC: 3.1 MG/DL (ref 2.6–4.7)
PHOSPHATE SERPL-MCNC: 3.2 MG/DL (ref 2.6–4.7)
PHOSPHATE SERPL-MCNC: 3.2 MG/DL (ref 2.6–4.7)
PHOSPHATE SERPL-MCNC: 3.3 MG/DL (ref 2.6–4.7)
PHOSPHATE SERPL-MCNC: 3.3 MG/DL (ref 2.6–4.7)
PHOSPHATE SERPL-MCNC: 3.4 MG/DL (ref 2.6–4.7)
PHOSPHATE SERPL-MCNC: 3.6 MG/DL (ref 2.6–4.7)
PHOSPHATE SERPL-MCNC: 3.6 MG/DL (ref 2.6–4.7)
PHOSPHATE SERPL-MCNC: 3.8 MG/DL (ref 2.6–4.7)
PHOSPHATE SERPL-MCNC: 4 MG/DL (ref 2.6–4.7)
PHOSPHATE SERPL-MCNC: 4.2 MG/DL (ref 2.6–4.7)
PHOSPHATE SERPL-MCNC: 4.4 MG/DL (ref 2.6–4.7)
PHOSPHATE SERPL-MCNC: 8 MG/DL (ref 2.6–4.7)
PLATELET # BLD AUTO: 159 K/UL (ref 150–400)
PLATELET # BLD AUTO: 201 K/UL (ref 150–400)
PLATELET # BLD AUTO: 204 K/UL (ref 150–400)
PLATELET # BLD AUTO: 213 K/UL (ref 150–400)
PLATELET # BLD AUTO: 218 K/UL (ref 150–400)
PLATELET # BLD AUTO: 221 K/UL (ref 150–400)
PLATELET # BLD AUTO: 222 K/UL (ref 150–400)
PLATELET # BLD AUTO: 235 K/UL (ref 150–400)
PLATELET # BLD AUTO: 237 K/UL (ref 150–400)
PLATELET # BLD AUTO: 239 K/UL (ref 150–400)
PLATELET # BLD AUTO: 247 K/UL (ref 150–400)
PLATELET # BLD AUTO: 248 K/UL (ref 150–400)
PLATELET # BLD AUTO: 254 K/UL (ref 150–400)
PLATELET # BLD AUTO: 255 K/UL (ref 150–400)
PLATELET # BLD AUTO: 256 K/UL (ref 150–400)
PLATELET # BLD AUTO: 257 K/UL (ref 150–400)
PLATELET # BLD AUTO: 270 K/UL (ref 150–400)
PLATELET # BLD AUTO: 274 K/UL (ref 150–400)
PLATELET # BLD AUTO: 275 K/UL (ref 150–400)
PLATELET # BLD AUTO: 279 K/UL (ref 150–400)
PLATELET # BLD AUTO: 282 K/UL (ref 150–400)
PLATELET # BLD AUTO: 283 K/UL (ref 150–400)
PLATELET # BLD AUTO: 286 K/UL (ref 150–400)
PLATELET # BLD AUTO: 289 K/UL (ref 150–400)
PLATELET # BLD AUTO: 299 K/UL (ref 150–400)
PLATELET # BLD AUTO: 303 K/UL (ref 150–400)
PLATELET # BLD AUTO: 303 K/UL (ref 150–400)
PLATELET # BLD AUTO: 305 K/UL (ref 150–400)
PLATELET # BLD AUTO: 307 K/UL (ref 150–400)
PLATELET # BLD AUTO: 309 K/UL (ref 150–400)
PLATELET # BLD AUTO: 317 K/UL (ref 150–400)
PLATELET # BLD AUTO: 317 K/UL (ref 150–400)
PLATELET # BLD AUTO: 327 K/UL (ref 150–400)
PLATELET # BLD AUTO: 329 K/UL (ref 150–400)
PLATELET # BLD AUTO: 339 K/UL (ref 150–400)
PLATELET # BLD AUTO: 340 K/UL (ref 150–400)
PLATELET # BLD AUTO: 343 K/UL (ref 150–400)
PLATELET # BLD AUTO: 345 K/UL (ref 150–400)
PLATELET # BLD AUTO: 347 K/UL (ref 150–400)
PLATELET # BLD AUTO: 357 K/UL (ref 150–400)
PLATELET # BLD AUTO: NORMAL K/UL
PO2 BLDA: 114 MMHG (ref 80–100)
PO2 BLDA: 118 MMHG (ref 80–100)
PO2 BLDA: 124 MMHG (ref 80–100)
PO2 BLDA: 124 MMHG (ref 80–100)
PO2 BLDA: 152 MMHG (ref 80–100)
PO2 BLDA: 166 MMHG (ref 80–100)
PO2 BLDA: 181 MMHG (ref 80–100)
PO2 BLDA: 199 MMHG (ref 80–100)
PO2 BLDA: 62 MMHG (ref 80–100)
PO2 BLDA: 84 MMHG (ref 80–100)
PO2 BLDA: 84 MMHG (ref 80–100)
PO2 BLDA: 86 MMHG (ref 80–100)
PO2 BLDA: 95 MMHG (ref 80–100)
POTASSIUM SERPL-SCNC: 3.4 MMOL/L (ref 3.5–5.1)
POTASSIUM SERPL-SCNC: 3.4 MMOL/L (ref 3.5–5.1)
POTASSIUM SERPL-SCNC: 3.5 MMOL/L (ref 3.5–5.1)
POTASSIUM SERPL-SCNC: 3.5 MMOL/L (ref 3.5–5.1)
POTASSIUM SERPL-SCNC: 3.6 MMOL/L (ref 3.5–5.1)
POTASSIUM SERPL-SCNC: 3.6 MMOL/L (ref 3.5–5.1)
POTASSIUM SERPL-SCNC: 3.7 MMOL/L (ref 3.5–5.1)
POTASSIUM SERPL-SCNC: 3.8 MMOL/L (ref 3.5–5.1)
POTASSIUM SERPL-SCNC: 3.9 MMOL/L (ref 3.5–5.1)
POTASSIUM SERPL-SCNC: 4 MMOL/L (ref 3.5–5.1)
POTASSIUM SERPL-SCNC: 4.1 MMOL/L (ref 3.5–5.1)
POTASSIUM SERPL-SCNC: 4.2 MMOL/L (ref 3.5–5.1)
POTASSIUM SERPL-SCNC: 4.3 MMOL/L (ref 3.5–5.1)
POTASSIUM SERPL-SCNC: 4.4 MMOL/L (ref 3.5–5.1)
POTASSIUM SERPL-SCNC: 4.5 MMOL/L (ref 3.5–5.1)
POTASSIUM SERPL-SCNC: 4.5 MMOL/L (ref 3.5–5.1)
POTASSIUM SERPL-SCNC: 4.7 MMOL/L (ref 3.5–5.1)
POTASSIUM SERPL-SCNC: 4.7 MMOL/L (ref 3.5–5.1)
PRESSURE SUPPORT SETTING VENT: 10 CM[H2O]
PRESSURE SUPPORT SETTING VENT: 5 CM[H2O]
PROT SERPL-MCNC: 5.2 G/DL (ref 6.4–8.2)
PROT SERPL-MCNC: 5.7 G/DL (ref 6.4–8.2)
PROT SERPL-MCNC: 5.8 G/DL (ref 6.4–8.2)
PROT SERPL-MCNC: 5.8 G/DL (ref 6.4–8.2)
PROT SERPL-MCNC: 5.9 G/DL (ref 6.4–8.2)
PROT SERPL-MCNC: 5.9 G/DL (ref 6.4–8.2)
PROT SERPL-MCNC: 6.1 G/DL (ref 6.4–8.2)
PROT SERPL-MCNC: 6.3 G/DL (ref 6.4–8.2)
PROT SERPL-MCNC: 6.5 G/DL (ref 6.4–8.2)
PROT SERPL-MCNC: 6.7 G/DL (ref 6.4–8.2)
PROT SERPL-MCNC: 6.8 G/DL (ref 6.4–8.2)
PROT SERPL-MCNC: 6.8 G/DL (ref 6.4–8.2)
PROT SERPL-MCNC: 7 G/DL (ref 6.4–8.2)
PROT SERPL-MCNC: 7.1 G/DL (ref 6.4–8.2)
PROT SERPL-MCNC: 7.2 G/DL (ref 6.4–8.2)
PROT SERPL-MCNC: 7.3 G/DL (ref 6.4–8.2)
PROT SERPL-MCNC: 7.6 G/DL (ref 6.4–8.2)
PROT SERPL-MCNC: 7.8 G/DL (ref 6.4–8.2)
PROT SERPL-MCNC: 7.9 G/DL (ref 6.4–8.2)
PROT UR STRIP-MCNC: 100 MG/DL
PROT UR STRIP-MCNC: 30 MG/DL
PROT UR STRIP-MCNC: ABNORMAL MG/DL
Q-T INTERVAL, ECG07: 316 MS
Q-T INTERVAL, ECG07: 346 MS
Q-T INTERVAL, ECG07: 378 MS
Q-T INTERVAL, ECG07: 444 MS
Q-T INTERVAL, ECG07: 448 MS
QRS DURATION, ECG06: 102 MS
QRS DURATION, ECG06: 88 MS
QRS DURATION, ECG06: 92 MS
QRS DURATION, ECG06: 94 MS
QRS DURATION, ECG06: 94 MS
QTC CALCULATION (BEZET), ECG08: 457 MS
QTC CALCULATION (BEZET), ECG08: 457 MS
QTC CALCULATION (BEZET), ECG08: 462 MS
QTC CALCULATION (BEZET), ECG08: 479 MS
QTC CALCULATION (BEZET), ECG08: 480 MS
RBC # BLD AUTO: 2.06 M/UL (ref 4.1–5.7)
RBC # BLD AUTO: 2.18 M/UL (ref 4.1–5.7)
RBC # BLD AUTO: 2.34 M/UL (ref 4.1–5.7)
RBC # BLD AUTO: 2.36 M/UL (ref 4.1–5.7)
RBC # BLD AUTO: 2.44 M/UL (ref 4.1–5.7)
RBC # BLD AUTO: 2.46 M/UL (ref 4.1–5.7)
RBC # BLD AUTO: 2.46 M/UL (ref 4.1–5.7)
RBC # BLD AUTO: 2.5 M/UL (ref 4.1–5.7)
RBC # BLD AUTO: 2.71 M/UL (ref 4.1–5.7)
RBC # BLD AUTO: 2.72 M/UL (ref 4.1–5.7)
RBC # BLD AUTO: 2.86 M/UL (ref 4.1–5.7)
RBC # BLD AUTO: 2.9 M/UL (ref 4.1–5.7)
RBC # BLD AUTO: 2.94 M/UL (ref 4.1–5.7)
RBC # BLD AUTO: 2.95 M/UL (ref 4.1–5.7)
RBC # BLD AUTO: 3.11 M/UL (ref 4.1–5.7)
RBC # BLD AUTO: 3.11 M/UL (ref 4.1–5.7)
RBC # BLD AUTO: 3.14 M/UL (ref 4.1–5.7)
RBC # BLD AUTO: 3.2 M/UL (ref 4.1–5.7)
RBC # BLD AUTO: 3.21 M/UL (ref 4.1–5.7)
RBC # BLD AUTO: 3.25 M/UL (ref 4.1–5.7)
RBC # BLD AUTO: 3.31 M/UL (ref 4.1–5.7)
RBC # BLD AUTO: 3.32 M/UL (ref 4.1–5.7)
RBC # BLD AUTO: 3.35 M/UL (ref 4.1–5.7)
RBC # BLD AUTO: 3.38 M/UL (ref 4.1–5.7)
RBC # BLD AUTO: 3.38 M/UL (ref 4.1–5.7)
RBC # BLD AUTO: 3.41 M/UL (ref 4.1–5.7)
RBC # BLD AUTO: 3.43 M/UL (ref 4.1–5.7)
RBC # BLD AUTO: 3.45 M/UL (ref 4.1–5.7)
RBC # BLD AUTO: 3.46 M/UL (ref 4.1–5.7)
RBC # BLD AUTO: 3.66 M/UL (ref 4.1–5.7)
RBC # BLD AUTO: 3.67 M/UL (ref 4.1–5.7)
RBC # BLD AUTO: 3.68 M/UL (ref 4.1–5.7)
RBC # BLD AUTO: 3.73 M/UL (ref 4.1–5.7)
RBC # BLD AUTO: 3.81 M/UL (ref 4.1–5.7)
RBC # BLD AUTO: 3.83 M/UL (ref 4.1–5.7)
RBC # BLD AUTO: 3.83 M/UL (ref 4.1–5.7)
RBC # BLD AUTO: 4.03 M/UL (ref 4.1–5.7)
RBC # BLD AUTO: 4.12 M/UL (ref 4.1–5.7)
RBC # BLD AUTO: 4.67 M/UL (ref 4.1–5.7)
RBC # BLD AUTO: 5.11 M/UL (ref 4.1–5.7)
RBC # BLD AUTO: NORMAL M/UL
RBC #/AREA URNS HPF: >100 /HPF (ref 0–5)
RBC #/AREA URNS HPF: >100 /HPF (ref 0–5)
RBC #/AREA URNS HPF: ABNORMAL /HPF (ref 0–5)
RBC MORPH BLD: ABNORMAL
REPORTED DOSE,DOSE: ABNORMAL UNITS
REPORTED DOSE/TIME,TMG: ABNORMAL
SAO2 % BLD: 93 % (ref 92–97)
SAO2 % BLD: 96 % (ref 92–97)
SAO2 % BLD: 96 % (ref 92–97)
SAO2 % BLD: 97 % (ref 92–97)
SAO2 % BLD: 97 % (ref 92–97)
SAO2 % BLD: 98 % (ref 92–97)
SAO2 % BLD: 99 % (ref 92–97)
SAO2% DEVICE SAO2% SENSOR NAME: ABNORMAL
SERVICE CMNT-IMP: ABNORMAL
SERVICE CMNT-IMP: NORMAL
SIZE STONE: 31 MM
SODIUM SERPL-SCNC: 131 MMOL/L (ref 136–145)
SODIUM SERPL-SCNC: 132 MMOL/L (ref 136–145)
SODIUM SERPL-SCNC: 133 MMOL/L (ref 136–145)
SODIUM SERPL-SCNC: 134 MMOL/L (ref 136–145)
SODIUM SERPL-SCNC: 135 MMOL/L (ref 136–145)
SODIUM SERPL-SCNC: 135 MMOL/L (ref 136–145)
SODIUM SERPL-SCNC: 136 MMOL/L (ref 136–145)
SODIUM SERPL-SCNC: 137 MMOL/L (ref 136–145)
SODIUM SERPL-SCNC: 137 MMOL/L (ref 136–145)
SODIUM SERPL-SCNC: 138 MMOL/L (ref 136–145)
SODIUM SERPL-SCNC: 139 MMOL/L (ref 136–145)
SODIUM SERPL-SCNC: 141 MMOL/L (ref 136–145)
SODIUM SERPL-SCNC: 142 MMOL/L (ref 136–145)
SODIUM SERPL-SCNC: 143 MMOL/L (ref 136–145)
SODIUM SERPL-SCNC: 143 MMOL/L (ref 136–145)
SODIUM SERPL-SCNC: 144 MMOL/L (ref 136–145)
SODIUM SERPL-SCNC: 145 MMOL/L (ref 136–145)
SODIUM SERPL-SCNC: 146 MMOL/L (ref 136–145)
SODIUM SERPL-SCNC: 146 MMOL/L (ref 136–145)
SODIUM SERPL-SCNC: 150 MMOL/L (ref 136–145)
SODIUM SERPL-SCNC: 151 MMOL/L (ref 136–145)
SODIUM SERPL-SCNC: 153 MMOL/L (ref 136–145)
SODIUM SERPL-SCNC: 157 MMOL/L (ref 136–145)
SP GR UR REFRACTOMETRY: 1.01 (ref 1–1.03)
SP GR UR REFRACTOMETRY: 1.02 (ref 1–1.03)
SPECIMEN EXP DATE BLD: NORMAL
SPECIMEN EXP DATE BLD: NORMAL
SPECIMEN SITE: ABNORMAL
SPECIMEN SOURCE FLD: NORMAL
SPECIMEN SOURCE FLD: NORMAL
STATUS OF UNIT,%ST: NORMAL
STONE ANALYSIS-IMP: NORMAL
TRIGL SERPL-MCNC: 286 MG/DL (ref ?–150)
TRIGL SERPL-MCNC: 61 MG/DL (ref ?–150)
TROPONIN I SERPL-MCNC: 0.05 NG/ML
TROPONIN I SERPL-MCNC: 0.06 NG/ML
TROPONIN I SERPL-MCNC: 0.12 NG/ML
TROPONIN I SERPL-MCNC: 0.12 NG/ML
TROPONIN I SERPL-MCNC: 1.59 NG/ML
TROPONIN I SERPL-MCNC: 2.35 NG/ML
TROPONIN I SERPL-MCNC: <0.04 NG/ML
TROPONIN I SERPL-MCNC: <0.04 NG/ML
TSH SERPL DL<=0.05 MIU/L-ACNC: 3.7 UIU/ML (ref 0.36–3.74)
UA: UC IF INDICATED,UAUC: ABNORMAL
UNIT DIVISION, %UDIV: 0
URATE MFR STONE: 57 %
UROBILINOGEN UR QL STRIP.AUTO: 0.2 EU/DL (ref 0.2–1)
UROBILINOGEN UR QL STRIP.AUTO: 1 EU/DL (ref 0.2–1)
UROBILINOGEN UR QL STRIP.AUTO: 2 EU/DL (ref 0.2–1)
UROBILINOGEN UR QL STRIP.AUTO: 2 EU/DL (ref 0.2–1)
VANCOMYCIN TROUGH SERPL-MCNC: 20.4 UG/ML (ref 5–10)
VENTILATION MODE VENT: ABNORMAL
VENTRICULAR RATE, ECG03: 105 BPM
VENTRICULAR RATE, ECG03: 126 BPM
VENTRICULAR RATE, ECG03: 69 BPM
VENTRICULAR RATE, ECG03: 70 BPM
VENTRICULAR RATE, ECG03: 90 BPM
VIT B12 SERPL-MCNC: 1046 PG/ML (ref 211–911)
VLDLC SERPL CALC-MCNC: 12.2 MG/DL
VT SETTING VENT: 450 ML
VT SETTING VENT: 450 ML
VT SETTING VENT: 500 ML
VT SETTING VENT: 550 ML
VT SETTING VENT: 550 ML
WBC # BLD AUTO: 10.2 K/UL (ref 4.1–11.1)
WBC # BLD AUTO: 10.7 K/UL (ref 4.1–11.1)
WBC # BLD AUTO: 10.9 K/UL (ref 4.1–11.1)
WBC # BLD AUTO: 11.2 K/UL (ref 4.1–11.1)
WBC # BLD AUTO: 11.4 K/UL (ref 4.1–11.1)
WBC # BLD AUTO: 11.7 K/UL (ref 4.1–11.1)
WBC # BLD AUTO: 11.8 K/UL (ref 4.1–11.1)
WBC # BLD AUTO: 12.3 K/UL (ref 4.1–11.1)
WBC # BLD AUTO: 13.1 K/UL (ref 4.1–11.1)
WBC # BLD AUTO: 13.3 K/UL (ref 4.1–11.1)
WBC # BLD AUTO: 13.8 K/UL (ref 4.1–11.1)
WBC # BLD AUTO: 14 K/UL (ref 4.1–11.1)
WBC # BLD AUTO: 14.2 K/UL (ref 4.1–11.1)
WBC # BLD AUTO: 14.3 K/UL (ref 4.1–11.1)
WBC # BLD AUTO: 14.4 K/UL (ref 4.1–11.1)
WBC # BLD AUTO: 15 K/UL (ref 4.1–11.1)
WBC # BLD AUTO: 15 K/UL (ref 4.1–11.1)
WBC # BLD AUTO: 15.1 K/UL (ref 4.1–11.1)
WBC # BLD AUTO: 15.4 K/UL (ref 4.1–11.1)
WBC # BLD AUTO: 15.4 K/UL (ref 4.1–11.1)
WBC # BLD AUTO: 15.6 K/UL (ref 4.1–11.1)
WBC # BLD AUTO: 16.1 K/UL (ref 4.1–11.1)
WBC # BLD AUTO: 16.7 K/UL (ref 4.1–11.1)
WBC # BLD AUTO: 17.3 K/UL (ref 4.1–11.1)
WBC # BLD AUTO: 18.5 K/UL (ref 4.1–11.1)
WBC # BLD AUTO: 18.7 K/UL (ref 4.1–11.1)
WBC # BLD AUTO: 20.4 K/UL (ref 4.1–11.1)
WBC # BLD AUTO: 20.6 K/UL (ref 4.1–11.1)
WBC # BLD AUTO: 6.7 K/UL (ref 4.1–11.1)
WBC # BLD AUTO: 6.8 K/UL (ref 4.1–11.1)
WBC # BLD AUTO: 7.1 K/UL (ref 4.1–11.1)
WBC # BLD AUTO: 7.7 K/UL (ref 4.1–11.1)
WBC # BLD AUTO: 8.9 K/UL (ref 4.1–11.1)
WBC # BLD AUTO: 8.9 K/UL (ref 4.1–11.1)
WBC # BLD AUTO: 9.1 K/UL (ref 4.1–11.1)
WBC # BLD AUTO: 9.2 K/UL (ref 4.1–11.1)
WBC # BLD AUTO: 9.3 K/UL (ref 4.1–11.1)
WBC # BLD AUTO: 9.3 K/UL (ref 4.1–11.1)
WBC # BLD AUTO: 9.4 K/UL (ref 4.1–11.1)
WBC # BLD AUTO: 9.5 K/UL (ref 4.1–11.1)
WBC # BLD AUTO: NORMAL K/UL
WBC MORPH BLD: ABNORMAL
WBC URNS QL MICRO: >100 /HPF (ref 0–4)
WBC URNS QL MICRO: >100 /HPF (ref 0–4)
WBC URNS QL MICRO: ABNORMAL /HPF (ref 0–4)
WT STONE: NORMAL MG
YEAST URNS QL MICRO: PRESENT

## 2017-01-01 PROCEDURE — 74011250637 HC RX REV CODE- 250/637: Performed by: INTERNAL MEDICINE

## 2017-01-01 PROCEDURE — 74011250636 HC RX REV CODE- 250/636: Performed by: INTERNAL MEDICINE

## 2017-01-01 PROCEDURE — 77030019563 HC DEV ATTCH FEED HOLL -A

## 2017-01-01 PROCEDURE — 77030018786 HC NDL GD F/USND BARD -B

## 2017-01-01 PROCEDURE — 77030002966 HC SUT PDS J&J -A: Performed by: FAMILY MEDICINE

## 2017-01-01 PROCEDURE — 74011636637 HC RX REV CODE- 636/637: Performed by: INTERNAL MEDICINE

## 2017-01-01 PROCEDURE — 36415 COLL VENOUS BLD VENIPUNCTURE: CPT | Performed by: INTERNAL MEDICINE

## 2017-01-01 PROCEDURE — 84100 ASSAY OF PHOSPHORUS: CPT | Performed by: INTERNAL MEDICINE

## 2017-01-01 PROCEDURE — 82962 GLUCOSE BLOOD TEST: CPT

## 2017-01-01 PROCEDURE — 77030018719 HC DRSG PTCH ANTIMIC J&J -A

## 2017-01-01 PROCEDURE — 77030032490 HC SLV COMPR SCD KNE COVD -B: Performed by: SURGERY

## 2017-01-01 PROCEDURE — 77030011640 HC PAD GRND REM COVD -A: Performed by: SURGERY

## 2017-01-01 PROCEDURE — 77030008771 HC TU NG SALEM SUMP -A: Performed by: FAMILY MEDICINE

## 2017-01-01 PROCEDURE — 36592 COLLECT BLOOD FROM PICC: CPT

## 2017-01-01 PROCEDURE — 83735 ASSAY OF MAGNESIUM: CPT | Performed by: NURSE PRACTITIONER

## 2017-01-01 PROCEDURE — 74011000250 HC RX REV CODE- 250: Performed by: INTERNAL MEDICINE

## 2017-01-01 PROCEDURE — 0WJG0ZZ INSPECTION OF PERITONEAL CAVITY, OPEN APPROACH: ICD-10-PCS | Performed by: SURGERY

## 2017-01-01 PROCEDURE — 85025 COMPLETE CBC W/AUTO DIFF WBC: CPT | Performed by: INTERNAL MEDICINE

## 2017-01-01 PROCEDURE — 65660000000 HC RM CCU STEPDOWN

## 2017-01-01 PROCEDURE — 51700 IRRIGATION OF BLADDER: CPT

## 2017-01-01 PROCEDURE — 77030018846 HC SOL IRR STRL H20 ICUM -A

## 2017-01-01 PROCEDURE — 71010 XR CHEST PORT: CPT

## 2017-01-01 PROCEDURE — 84484 ASSAY OF TROPONIN QUANT: CPT | Performed by: UROLOGY

## 2017-01-01 PROCEDURE — 82803 BLOOD GASES ANY COMBINATION: CPT | Performed by: INTERNAL MEDICINE

## 2017-01-01 PROCEDURE — C1874 STENT, COATED/COV W/DEL SYS: HCPCS

## 2017-01-01 PROCEDURE — C1769 GUIDE WIRE: HCPCS

## 2017-01-01 PROCEDURE — 80053 COMPREHEN METABOLIC PANEL: CPT | Performed by: INTERNAL MEDICINE

## 2017-01-01 PROCEDURE — 77030018836 HC SOL IRR NACL ICUM -A: Performed by: UROLOGY

## 2017-01-01 PROCEDURE — 51798 US URINE CAPACITY MEASURE: CPT

## 2017-01-01 PROCEDURE — 77030011256 HC DRSG MEPILEX <16IN NO BORD MOLN -A

## 2017-01-01 PROCEDURE — 81001 URINALYSIS AUTO W/SCOPE: CPT | Performed by: INTERNAL MEDICINE

## 2017-01-01 PROCEDURE — 74011000250 HC RX REV CODE- 250: Performed by: UROLOGY

## 2017-01-01 PROCEDURE — 77030011640 HC PAD GRND REM COVD -A: Performed by: FAMILY MEDICINE

## 2017-01-01 PROCEDURE — 83735 ASSAY OF MAGNESIUM: CPT | Performed by: INTERNAL MEDICINE

## 2017-01-01 PROCEDURE — 74011000250 HC RX REV CODE- 250

## 2017-01-01 PROCEDURE — 77010033678 HC OXYGEN DAILY

## 2017-01-01 PROCEDURE — 0270366 DILATION OF CORONARY ARTERY, ONE ARTERY, BIFURCATION, WITH THREE DRUG-ELUTING INTRALUMINAL DEVICES, PERCUTANEOUS APPROACH: ICD-10-PCS | Performed by: INTERNAL MEDICINE

## 2017-01-01 PROCEDURE — 80053 COMPREHEN METABOLIC PANEL: CPT | Performed by: NURSE PRACTITIONER

## 2017-01-01 PROCEDURE — 77030019908 HC STETH ESOPH SIMS -A: Performed by: NURSE ANESTHETIST, CERTIFIED REGISTERED

## 2017-01-01 PROCEDURE — 94640 AIRWAY INHALATION TREATMENT: CPT

## 2017-01-01 PROCEDURE — P9016 RBC LEUKOCYTES REDUCED: HCPCS | Performed by: UROLOGY

## 2017-01-01 PROCEDURE — 74011250637 HC RX REV CODE- 250/637: Performed by: SURGERY

## 2017-01-01 PROCEDURE — 65610000006 HC RM INTENSIVE CARE

## 2017-01-01 PROCEDURE — 74011250637 HC RX REV CODE- 250/637: Performed by: UROLOGY

## 2017-01-01 PROCEDURE — 3E0536Z INTRODUCTION OF NUTRITIONAL SUBSTANCE INTO PERIPHERAL ARTERY, PERCUTANEOUS APPROACH: ICD-10-PCS | Performed by: UROLOGY

## 2017-01-01 PROCEDURE — 81001 URINALYSIS AUTO W/SCOPE: CPT | Performed by: FAMILY MEDICINE

## 2017-01-01 PROCEDURE — 83735 ASSAY OF MAGNESIUM: CPT | Performed by: FAMILY MEDICINE

## 2017-01-01 PROCEDURE — 77030013567 HC DRN WND RESERV BARD -A: Performed by: UROLOGY

## 2017-01-01 PROCEDURE — 77030008027

## 2017-01-01 PROCEDURE — 77030012961 HC IRR KT CYSTO/TUR ICUM -A: Performed by: UROLOGY

## 2017-01-01 PROCEDURE — 82248 BILIRUBIN DIRECT: CPT | Performed by: SURGERY

## 2017-01-01 PROCEDURE — 94003 VENT MGMT INPAT SUBQ DAY: CPT

## 2017-01-01 PROCEDURE — 85027 COMPLETE CBC AUTOMATED: CPT | Performed by: INTERNAL MEDICINE

## 2017-01-01 PROCEDURE — 83880 ASSAY OF NATRIURETIC PEPTIDE: CPT | Performed by: INTERNAL MEDICINE

## 2017-01-01 PROCEDURE — 77030034849

## 2017-01-01 PROCEDURE — 80048 BASIC METABOLIC PNL TOTAL CA: CPT | Performed by: INTERNAL MEDICINE

## 2017-01-01 PROCEDURE — 87077 CULTURE AEROBIC IDENTIFY: CPT | Performed by: INTERNAL MEDICINE

## 2017-01-01 PROCEDURE — 74011000258 HC RX REV CODE- 258: Performed by: INTERNAL MEDICINE

## 2017-01-01 PROCEDURE — 87086 URINE CULTURE/COLONY COUNT: CPT | Performed by: INTERNAL MEDICINE

## 2017-01-01 PROCEDURE — 74011250636 HC RX REV CODE- 250/636: Performed by: SURGERY

## 2017-01-01 PROCEDURE — 80048 BASIC METABOLIC PNL TOTAL CA: CPT | Performed by: FAMILY MEDICINE

## 2017-01-01 PROCEDURE — 97162 PT EVAL MOD COMPLEX 30 MIN: CPT

## 2017-01-01 PROCEDURE — 85025 COMPLETE CBC W/AUTO DIFF WBC: CPT | Performed by: EMERGENCY MEDICINE

## 2017-01-01 PROCEDURE — 77030008771 HC TU NG SALEM SUMP -A

## 2017-01-01 PROCEDURE — 76060000033 HC ANESTHESIA 1 TO 1.5 HR: Performed by: FAMILY MEDICINE

## 2017-01-01 PROCEDURE — 94002 VENT MGMT INPAT INIT DAY: CPT

## 2017-01-01 PROCEDURE — 74011250636 HC RX REV CODE- 250/636: Performed by: NURSE PRACTITIONER

## 2017-01-01 PROCEDURE — 77030019607 HC DSG BURN S&N -A

## 2017-01-01 PROCEDURE — 74011250636 HC RX REV CODE- 250/636: Performed by: EMERGENCY MEDICINE

## 2017-01-01 PROCEDURE — 96375 TX/PRO/DX INJ NEW DRUG ADDON: CPT

## 2017-01-01 PROCEDURE — 74011000258 HC RX REV CODE- 258: Performed by: NURSE PRACTITIONER

## 2017-01-01 PROCEDURE — 87070 CULTURE OTHR SPECIMN AEROBIC: CPT | Performed by: INTERNAL MEDICINE

## 2017-01-01 PROCEDURE — 97535 SELF CARE MNGMENT TRAINING: CPT | Performed by: OCCUPATIONAL THERAPIST

## 2017-01-01 PROCEDURE — 97530 THERAPEUTIC ACTIVITIES: CPT | Performed by: OCCUPATIONAL THERAPIST

## 2017-01-01 PROCEDURE — 87040 BLOOD CULTURE FOR BACTERIA: CPT | Performed by: INTERNAL MEDICINE

## 2017-01-01 PROCEDURE — 85347 COAGULATION TIME ACTIVATED: CPT

## 2017-01-01 PROCEDURE — 74011250636 HC RX REV CODE- 250/636

## 2017-01-01 PROCEDURE — 84484 ASSAY OF TROPONIN QUANT: CPT | Performed by: INTERNAL MEDICINE

## 2017-01-01 PROCEDURE — 99153 MOD SED SAME PHYS/QHP EA: CPT

## 2017-01-01 PROCEDURE — 77030010545

## 2017-01-01 PROCEDURE — 74000 XR ABD (KUB): CPT

## 2017-01-01 PROCEDURE — 87804 INFLUENZA ASSAY W/OPTIC: CPT | Performed by: EMERGENCY MEDICINE

## 2017-01-01 PROCEDURE — 77030029131 HC ADMN ST IV BLD N DEHP ICUM -B

## 2017-01-01 PROCEDURE — 86900 BLOOD TYPING SEROLOGIC ABO: CPT | Performed by: INTERNAL MEDICINE

## 2017-01-01 PROCEDURE — 36415 COLL VENOUS BLD VENIPUNCTURE: CPT | Performed by: EMERGENCY MEDICINE

## 2017-01-01 PROCEDURE — 87186 SC STD MICRODIL/AGAR DIL: CPT | Performed by: INTERNAL MEDICINE

## 2017-01-01 PROCEDURE — C1729 CATH, DRAINAGE: HCPCS | Performed by: UROLOGY

## 2017-01-01 PROCEDURE — 74011250637 HC RX REV CODE- 250/637: Performed by: HOSPITALIST

## 2017-01-01 PROCEDURE — 80048 BASIC METABOLIC PNL TOTAL CA: CPT | Performed by: NURSE PRACTITIONER

## 2017-01-01 PROCEDURE — 36430 TRANSFUSION BLD/BLD COMPNT: CPT

## 2017-01-01 PROCEDURE — 74011636320 HC RX REV CODE- 636/320: Performed by: UROLOGY

## 2017-01-01 PROCEDURE — 80048 BASIC METABOLIC PNL TOTAL CA: CPT | Performed by: UROLOGY

## 2017-01-01 PROCEDURE — 86923 COMPATIBILITY TEST ELECTRIC: CPT | Performed by: UROLOGY

## 2017-01-01 PROCEDURE — 77030013798 HC KT TRNSDUC PRSSR EDWD -B: Performed by: NURSE ANESTHETIST, CERTIFIED REGISTERED

## 2017-01-01 PROCEDURE — 83036 HEMOGLOBIN GLYCOSYLATED A1C: CPT | Performed by: INTERNAL MEDICINE

## 2017-01-01 PROCEDURE — 77030027138 HC INCENT SPIROMETER -A

## 2017-01-01 PROCEDURE — 36593 DECLOT VASCULAR DEVICE: CPT

## 2017-01-01 PROCEDURE — 36600 WITHDRAWAL OF ARTERIAL BLOOD: CPT | Performed by: UROLOGY

## 2017-01-01 PROCEDURE — C1887 CATHETER, GUIDING: HCPCS

## 2017-01-01 PROCEDURE — 77030013797 HC KT TRNSDUC PRSSR EDWD -A

## 2017-01-01 PROCEDURE — 84484 ASSAY OF TROPONIN QUANT: CPT | Performed by: EMERGENCY MEDICINE

## 2017-01-01 PROCEDURE — 92526 ORAL FUNCTION THERAPY: CPT | Performed by: SPEECH-LANGUAGE PATHOLOGIST

## 2017-01-01 PROCEDURE — 36600 WITHDRAWAL OF ARTERIAL BLOOD: CPT | Performed by: INTERNAL MEDICINE

## 2017-01-01 PROCEDURE — 84100 ASSAY OF PHOSPHORUS: CPT | Performed by: UROLOGY

## 2017-01-01 PROCEDURE — 96374 THER/PROPH/DIAG INJ IV PUSH: CPT

## 2017-01-01 PROCEDURE — 77030029065 HC DRSG HEMO QCLOT ZMED -B

## 2017-01-01 PROCEDURE — 74011000250 HC RX REV CODE- 250: Performed by: NURSE PRACTITIONER

## 2017-01-01 PROCEDURE — 82803 BLOOD GASES ANY COMBINATION: CPT | Performed by: ANESTHESIOLOGY

## 2017-01-01 PROCEDURE — 74011250636 HC RX REV CODE- 250/636: Performed by: UROLOGY

## 2017-01-01 PROCEDURE — 74011250636 HC RX REV CODE- 250/636: Performed by: FAMILY MEDICINE

## 2017-01-01 PROCEDURE — 97530 THERAPEUTIC ACTIVITIES: CPT

## 2017-01-01 PROCEDURE — 77030020847 HC STATLOK BARD -A

## 2017-01-01 PROCEDURE — C1751 CATH, INF, PER/CENT/MIDLINE: HCPCS | Performed by: NURSE ANESTHETIST, CERTIFIED REGISTERED

## 2017-01-01 PROCEDURE — 74011000250 HC RX REV CODE- 250: Performed by: HOSPITALIST

## 2017-01-01 PROCEDURE — 97166 OT EVAL MOD COMPLEX 45 MIN: CPT | Performed by: OCCUPATIONAL THERAPIST

## 2017-01-01 PROCEDURE — 84100 ASSAY OF PHOSPHORUS: CPT | Performed by: NURSE PRACTITIONER

## 2017-01-01 PROCEDURE — 92526 ORAL FUNCTION THERAPY: CPT

## 2017-01-01 PROCEDURE — 77030018798 HC PMP KT ENTRL FED COVD -A

## 2017-01-01 PROCEDURE — 36415 COLL VENOUS BLD VENIPUNCTURE: CPT | Performed by: NURSE PRACTITIONER

## 2017-01-01 PROCEDURE — 82607 VITAMIN B-12: CPT | Performed by: INTERNAL MEDICINE

## 2017-01-01 PROCEDURE — 77030018836 HC SOL IRR NACL ICUM -A

## 2017-01-01 PROCEDURE — 76210000016 HC OR PH I REC 1 TO 1.5 HR: Performed by: SURGERY

## 2017-01-01 PROCEDURE — 77030032490 HC SLV COMPR SCD KNE COVD -B: Performed by: FAMILY MEDICINE

## 2017-01-01 PROCEDURE — C1894 INTRO/SHEATH, NON-LASER: HCPCS

## 2017-01-01 PROCEDURE — 80053 COMPREHEN METABOLIC PANEL: CPT | Performed by: UROLOGY

## 2017-01-01 PROCEDURE — 76010000131 HC OR TIME 2 TO 2.5 HR: Performed by: SURGERY

## 2017-01-01 PROCEDURE — 77030033269 HC SLV COMPR SCD KNE2 CARD -B

## 2017-01-01 PROCEDURE — 74000 XR ABD PORT  1 V: CPT

## 2017-01-01 PROCEDURE — 0TQB0ZZ REPAIR BLADDER, OPEN APPROACH: ICD-10-PCS | Performed by: UROLOGY

## 2017-01-01 PROCEDURE — 77030002996 HC SUT SLK J&J -A: Performed by: FAMILY MEDICINE

## 2017-01-01 PROCEDURE — 85025 COMPLETE CBC W/AUTO DIFF WBC: CPT | Performed by: NURSE PRACTITIONER

## 2017-01-01 PROCEDURE — 70450 CT HEAD/BRAIN W/O DYE: CPT

## 2017-01-01 PROCEDURE — 87086 URINE CULTURE/COLONY COUNT: CPT | Performed by: FAMILY MEDICINE

## 2017-01-01 PROCEDURE — 77030020186 HC BOOT HL PROTCT SAGE -B

## 2017-01-01 PROCEDURE — 80053 COMPREHEN METABOLIC PANEL: CPT | Performed by: EMERGENCY MEDICINE

## 2017-01-01 PROCEDURE — 84484 ASSAY OF TROPONIN QUANT: CPT | Performed by: SURGERY

## 2017-01-01 PROCEDURE — 85027 COMPLETE CBC AUTOMATED: CPT | Performed by: UROLOGY

## 2017-01-01 PROCEDURE — 77030020143 HC AIRWY LARYN INTUB CGAS -A: Performed by: NURSE ANESTHETIST, CERTIFIED REGISTERED

## 2017-01-01 PROCEDURE — P9045 ALBUMIN (HUMAN), 5%, 250 ML: HCPCS | Performed by: INTERNAL MEDICINE

## 2017-01-01 PROCEDURE — 36415 COLL VENOUS BLD VENIPUNCTURE: CPT | Performed by: SURGERY

## 2017-01-01 PROCEDURE — 65270000029 HC RM PRIVATE

## 2017-01-01 PROCEDURE — B2111ZZ FLUOROSCOPY OF MULTIPLE CORONARY ARTERIES USING LOW OSMOLAR CONTRAST: ICD-10-PCS | Performed by: INTERNAL MEDICINE

## 2017-01-01 PROCEDURE — 76937 US GUIDE VASCULAR ACCESS: CPT

## 2017-01-01 PROCEDURE — 0WJP0ZZ INSPECTION OF GASTROINTESTINAL TRACT, OPEN APPROACH: ICD-10-PCS | Performed by: SURGERY

## 2017-01-01 PROCEDURE — 77030031139 HC SUT VCRL2 J&J -A: Performed by: UROLOGY

## 2017-01-01 PROCEDURE — 86900 BLOOD TYPING SEROLOGIC ABO: CPT | Performed by: UROLOGY

## 2017-01-01 PROCEDURE — 74011250636 HC RX REV CODE- 250/636: Performed by: ANESTHESIOLOGY

## 2017-01-01 PROCEDURE — 94761 N-INVAS EAR/PLS OXIMETRY MLT: CPT

## 2017-01-01 PROCEDURE — 77030005538 HC CATH URETH FOL44 BARD -B

## 2017-01-01 PROCEDURE — 77030021678 HC GLIDESCP STAT DISP VERT -B: Performed by: NURSE ANESTHETIST, CERTIFIED REGISTERED

## 2017-01-01 PROCEDURE — 77030018846 HC SOL IRR STRL H20 ICUM -A: Performed by: FAMILY MEDICINE

## 2017-01-01 PROCEDURE — 36600 WITHDRAWAL OF ARTERIAL BLOOD: CPT

## 2017-01-01 PROCEDURE — 87077 CULTURE AEROBIC IDENTIFY: CPT | Performed by: NURSE PRACTITIONER

## 2017-01-01 PROCEDURE — 77030011267 HC ELECTRD BLD COVD -A: Performed by: SURGERY

## 2017-01-01 PROCEDURE — P9047 ALBUMIN (HUMAN), 25%, 50ML: HCPCS | Performed by: INTERNAL MEDICINE

## 2017-01-01 PROCEDURE — 77030034850: Performed by: FAMILY MEDICINE

## 2017-01-01 PROCEDURE — 93005 ELECTROCARDIOGRAM TRACING: CPT

## 2017-01-01 PROCEDURE — 82570 ASSAY OF URINE CREATININE: CPT | Performed by: UROLOGY

## 2017-01-01 PROCEDURE — 87040 BLOOD CULTURE FOR BACTERIA: CPT

## 2017-01-01 PROCEDURE — 77030004550 HC CATH ANGI DX PRF MRTM -B

## 2017-01-01 PROCEDURE — 76010000132 HC OR TIME 2.5 TO 3 HR: Performed by: UROLOGY

## 2017-01-01 PROCEDURE — 0TJB8ZZ INSPECTION OF BLADDER, VIA NATURAL OR ARTIFICIAL OPENING ENDOSCOPIC: ICD-10-PCS | Performed by: UROLOGY

## 2017-01-01 PROCEDURE — C1751 CATH, INF, PER/CENT/MIDLINE: HCPCS

## 2017-01-01 PROCEDURE — 36415 COLL VENOUS BLD VENIPUNCTURE: CPT | Performed by: FAMILY MEDICINE

## 2017-01-01 PROCEDURE — 77030011266 HC ELECTRD BLD INSL COVD -A: Performed by: SURGERY

## 2017-01-01 PROCEDURE — 97116 GAIT TRAINING THERAPY: CPT

## 2017-01-01 PROCEDURE — 85018 HEMOGLOBIN: CPT | Performed by: UROLOGY

## 2017-01-01 PROCEDURE — 76060000035 HC ANESTHESIA 2 TO 2.5 HR: Performed by: SURGERY

## 2017-01-01 PROCEDURE — 77030008467 HC STPLR SKN COVD -B: Performed by: SURGERY

## 2017-01-01 PROCEDURE — 77030034848

## 2017-01-01 PROCEDURE — 77030018836 HC SOL IRR NACL ICUM -A: Performed by: FAMILY MEDICINE

## 2017-01-01 PROCEDURE — 83605 ASSAY OF LACTIC ACID: CPT | Performed by: INTERNAL MEDICINE

## 2017-01-01 PROCEDURE — 0JQ80ZZ REPAIR ABDOMEN SUBCUTANEOUS TISSUE AND FASCIA, OPEN APPROACH: ICD-10-PCS | Performed by: FAMILY MEDICINE

## 2017-01-01 PROCEDURE — 77030011265 HC ELECTRD BLD HEX COVD -A: Performed by: UROLOGY

## 2017-01-01 PROCEDURE — 77030019697 HC SYR ANGI INFL MRTM -B

## 2017-01-01 PROCEDURE — 87205 SMEAR GRAM STAIN: CPT | Performed by: NURSE PRACTITIONER

## 2017-01-01 PROCEDURE — 77030002966 HC SUT PDS J&J -A: Performed by: UROLOGY

## 2017-01-01 PROCEDURE — 77030008684 HC TU ET CUF COVD -B: Performed by: NURSE ANESTHETIST, CERTIFIED REGISTERED

## 2017-01-01 PROCEDURE — 77030011640 HC PAD GRND REM COVD -A: Performed by: UROLOGY

## 2017-01-01 PROCEDURE — 87186 SC STD MICRODIL/AGAR DIL: CPT | Performed by: NURSE PRACTITIONER

## 2017-01-01 PROCEDURE — 82803 BLOOD GASES ANY COMBINATION: CPT | Performed by: UROLOGY

## 2017-01-01 PROCEDURE — 77030002916 HC SUT ETHLN J&J -A: Performed by: FAMILY MEDICINE

## 2017-01-01 PROCEDURE — 80061 LIPID PANEL: CPT | Performed by: INTERNAL MEDICINE

## 2017-01-01 PROCEDURE — 77030008467 HC STPLR SKN COVD -B: Performed by: FAMILY MEDICINE

## 2017-01-01 PROCEDURE — 71020 XR CHEST PA LAT: CPT

## 2017-01-01 PROCEDURE — 83735 ASSAY OF MAGNESIUM: CPT | Performed by: UROLOGY

## 2017-01-01 PROCEDURE — C1725 CATH, TRANSLUMIN NON-LASER: HCPCS

## 2017-01-01 PROCEDURE — 86923 COMPATIBILITY TEST ELECTRIC: CPT | Performed by: INTERNAL MEDICINE

## 2017-01-01 PROCEDURE — 93880 EXTRACRANIAL BILAT STUDY: CPT

## 2017-01-01 PROCEDURE — 97110 THERAPEUTIC EXERCISES: CPT | Performed by: OCCUPATIONAL THERAPIST

## 2017-01-01 PROCEDURE — 85025 COMPLETE CBC W/AUTO DIFF WBC: CPT | Performed by: UROLOGY

## 2017-01-01 PROCEDURE — G8978 MOBILITY CURRENT STATUS: HCPCS

## 2017-01-01 PROCEDURE — 93306 TTE W/DOPPLER COMPLETE: CPT

## 2017-01-01 PROCEDURE — 77030019702 HC WRP THER MENM -C: Performed by: SURGERY

## 2017-01-01 PROCEDURE — 97530 THERAPEUTIC ACTIVITIES: CPT | Performed by: PHYSICAL THERAPIST

## 2017-01-01 PROCEDURE — G8979 MOBILITY GOAL STATUS: HCPCS

## 2017-01-01 PROCEDURE — 36415 COLL VENOUS BLD VENIPUNCTURE: CPT | Performed by: UROLOGY

## 2017-01-01 PROCEDURE — 77030008467 HC STPLR SKN COVD -B: Performed by: UROLOGY

## 2017-01-01 PROCEDURE — 77030010547 HC BG URIN W/UMETER -A

## 2017-01-01 PROCEDURE — 74176 CT ABD & PELVIS W/O CONTRAST: CPT

## 2017-01-01 PROCEDURE — 99285 EMERGENCY DEPT VISIT HI MDM: CPT

## 2017-01-01 PROCEDURE — 77030011264 HC ELECTRD BLD EXT COVD -A: Performed by: UROLOGY

## 2017-01-01 PROCEDURE — 80053 COMPREHEN METABOLIC PANEL: CPT | Performed by: HOSPITALIST

## 2017-01-01 PROCEDURE — 99218 HC RM OBSERVATION: CPT

## 2017-01-01 PROCEDURE — 77030021668 HC NEB PREFIL KT VYRM -A

## 2017-01-01 PROCEDURE — 71101 X-RAY EXAM UNILAT RIBS/CHEST: CPT

## 2017-01-01 PROCEDURE — 74011000258 HC RX REV CODE- 258: Performed by: SURGERY

## 2017-01-01 PROCEDURE — 70553 MRI BRAIN STEM W/O & W/DYE: CPT

## 2017-01-01 PROCEDURE — 0T9B30Z DRAINAGE OF BLADDER WITH DRAINAGE DEVICE, PERCUTANEOUS APPROACH: ICD-10-PCS | Performed by: UROLOGY

## 2017-01-01 PROCEDURE — 76010000149 HC OR TIME 1 TO 1.5 HR: Performed by: FAMILY MEDICINE

## 2017-01-01 PROCEDURE — 92610 EVALUATE SWALLOWING FUNCTION: CPT

## 2017-01-01 PROCEDURE — 36569 INSJ PICC 5 YR+ W/O IMAGING: CPT | Performed by: INTERNAL MEDICINE

## 2017-01-01 PROCEDURE — 82248 BILIRUBIN DIRECT: CPT | Performed by: HOSPITALIST

## 2017-01-01 PROCEDURE — 77030028837 HC SYR ANGI PWR INJ COEU -A

## 2017-01-01 PROCEDURE — P9016 RBC LEUKOCYTES REDUCED: HCPCS | Performed by: INTERNAL MEDICINE

## 2017-01-01 PROCEDURE — 74011000272 HC RX REV CODE- 272: Performed by: FAMILY MEDICINE

## 2017-01-01 PROCEDURE — A9558 XE133 XENON 10MCI: HCPCS

## 2017-01-01 PROCEDURE — P9045 ALBUMIN (HUMAN), 5%, 250 ML: HCPCS

## 2017-01-01 PROCEDURE — B2151ZZ FLUOROSCOPY OF LEFT HEART USING LOW OSMOLAR CONTRAST: ICD-10-PCS | Performed by: INTERNAL MEDICINE

## 2017-01-01 PROCEDURE — 84478 ASSAY OF TRIGLYCERIDES: CPT | Performed by: INTERNAL MEDICINE

## 2017-01-01 PROCEDURE — 77030028157 HC ELECTRD BPLR BLDR RWOL -B: Performed by: UROLOGY

## 2017-01-01 PROCEDURE — 74011000250 HC RX REV CODE- 250: Performed by: EMERGENCY MEDICINE

## 2017-01-01 PROCEDURE — 77030005546 HC CATH URETH FOL 3W BARD -A: Performed by: UROLOGY

## 2017-01-01 PROCEDURE — 97161 PT EVAL LOW COMPLEX 20 MIN: CPT

## 2017-01-01 PROCEDURE — 77030018836 HC SOL IRR NACL ICUM -A: Performed by: SURGERY

## 2017-01-01 PROCEDURE — 74177 CT ABD & PELVIS W/CONTRAST: CPT

## 2017-01-01 PROCEDURE — 84100 ASSAY OF PHOSPHORUS: CPT | Performed by: FAMILY MEDICINE

## 2017-01-01 PROCEDURE — 77030005520 HC CATH URETH FOL38 BARD -A

## 2017-01-01 PROCEDURE — 77030026438 HC STYL ET INTUB CARD -A: Performed by: NURSE ANESTHETIST, CERTIFIED REGISTERED

## 2017-01-01 PROCEDURE — 77030031139 HC SUT VCRL2 J&J -A: Performed by: FAMILY MEDICINE

## 2017-01-01 PROCEDURE — 77030005401 HC CATH RAD ARRO -A: Performed by: NURSE ANESTHETIST, CERTIFIED REGISTERED

## 2017-01-01 PROCEDURE — 76770 US EXAM ABDO BACK WALL COMP: CPT

## 2017-01-01 PROCEDURE — 95816 EEG AWAKE AND DROWSY: CPT | Performed by: PSYCHIATRY & NEUROLOGY

## 2017-01-01 PROCEDURE — 93970 EXTREMITY STUDY: CPT

## 2017-01-01 PROCEDURE — 0BH17EZ INSERTION OF ENDOTRACHEAL AIRWAY INTO TRACHEA, VIA NATURAL OR ARTIFICIAL OPENING: ICD-10-PCS | Performed by: INTERNAL MEDICINE

## 2017-01-01 PROCEDURE — 80202 ASSAY OF VANCOMYCIN: CPT | Performed by: INTERNAL MEDICINE

## 2017-01-01 PROCEDURE — 74011000250 HC RX REV CODE- 250: Performed by: SURGERY

## 2017-01-01 PROCEDURE — 81001 URINALYSIS AUTO W/SCOPE: CPT | Performed by: EMERGENCY MEDICINE

## 2017-01-01 PROCEDURE — 80053 COMPREHEN METABOLIC PANEL: CPT | Performed by: SURGERY

## 2017-01-01 PROCEDURE — 77030007304 HC ELECTRD LP RND MEGA -A: Performed by: FAMILY MEDICINE

## 2017-01-01 PROCEDURE — 87106 FUNGI IDENTIFICATION YEAST: CPT | Performed by: FAMILY MEDICINE

## 2017-01-01 PROCEDURE — 87493 C DIFF AMPLIFIED PROBE: CPT | Performed by: INTERNAL MEDICINE

## 2017-01-01 PROCEDURE — 76210000000 HC OR PH I REC 2 TO 2.5 HR: Performed by: UROLOGY

## 2017-01-01 PROCEDURE — 4A023N7 MEASUREMENT OF CARDIAC SAMPLING AND PRESSURE, LEFT HEART, PERCUTANEOUS APPROACH: ICD-10-PCS | Performed by: INTERNAL MEDICINE

## 2017-01-01 PROCEDURE — 5A1945Z RESPIRATORY VENTILATION, 24-96 CONSECUTIVE HOURS: ICD-10-PCS | Performed by: UROLOGY

## 2017-01-01 PROCEDURE — 77030020061 HC IV BLD WRMR ADMIN SET 3M -B: Performed by: NURSE ANESTHETIST, CERTIFIED REGISTERED

## 2017-01-01 PROCEDURE — 74011250637 HC RX REV CODE- 250/637

## 2017-01-01 PROCEDURE — A9576 INJ PROHANCE MULTIPACK: HCPCS | Performed by: UROLOGY

## 2017-01-01 PROCEDURE — G8988 SELF CARE GOAL STATUS: HCPCS | Performed by: OCCUPATIONAL THERAPIST

## 2017-01-01 PROCEDURE — 77030009834 HC MSK O2 TRACH VYRM -A

## 2017-01-01 PROCEDURE — 5A1945Z RESPIRATORY VENTILATION, 24-96 CONSECUTIVE HOURS: ICD-10-PCS | Performed by: INTERNAL MEDICINE

## 2017-01-01 PROCEDURE — G8987 SELF CARE CURRENT STATUS: HCPCS | Performed by: OCCUPATIONAL THERAPIST

## 2017-01-01 PROCEDURE — 74011636320 HC RX REV CODE- 636/320: Performed by: INTERNAL MEDICINE

## 2017-01-01 PROCEDURE — C1760 CLOSURE DEV, VASC: HCPCS

## 2017-01-01 PROCEDURE — 0TCB0ZZ EXTIRPATION OF MATTER FROM BLADDER, OPEN APPROACH: ICD-10-PCS | Performed by: UROLOGY

## 2017-01-01 PROCEDURE — 83605 ASSAY OF LACTIC ACID: CPT | Performed by: FAMILY MEDICINE

## 2017-01-01 PROCEDURE — 77030008771 HC TU NG SALEM SUMP -A: Performed by: NURSE ANESTHETIST, CERTIFIED REGISTERED

## 2017-01-01 PROCEDURE — 5A1955Z RESPIRATORY VENTILATION, GREATER THAN 96 CONSECUTIVE HOURS: ICD-10-PCS | Performed by: INTERNAL MEDICINE

## 2017-01-01 PROCEDURE — 76060000036 HC ANESTHESIA 2.5 TO 3 HR: Performed by: UROLOGY

## 2017-01-01 PROCEDURE — 76210000006 HC OR PH I REC 0.5 TO 1 HR: Performed by: FAMILY MEDICINE

## 2017-01-01 PROCEDURE — 84443 ASSAY THYROID STIM HORMONE: CPT | Performed by: INTERNAL MEDICINE

## 2017-01-01 PROCEDURE — 85025 COMPLETE CBC W/AUTO DIFF WBC: CPT | Performed by: SURGERY

## 2017-01-01 PROCEDURE — 74011000250 HC RX REV CODE- 250: Performed by: FAMILY MEDICINE

## 2017-01-01 PROCEDURE — 77030010545: Performed by: UROLOGY

## 2017-01-01 PROCEDURE — 82140 ASSAY OF AMMONIA: CPT | Performed by: INTERNAL MEDICINE

## 2017-01-01 PROCEDURE — 51703 INSERT BLADDER CATH COMPLEX: CPT

## 2017-01-01 PROCEDURE — 82360 CALCULUS ASSAY QUANT: CPT | Performed by: UROLOGY

## 2017-01-01 PROCEDURE — 74011250636 HC RX REV CODE- 250/636: Performed by: PSYCHIATRY & NEUROLOGY

## 2017-01-01 PROCEDURE — 87040 BLOOD CULTURE FOR BACTERIA: CPT | Performed by: FAMILY MEDICINE

## 2017-01-01 PROCEDURE — 99283 EMERGENCY DEPT VISIT LOW MDM: CPT

## 2017-01-01 PROCEDURE — 77030012407 HC DRN WND BARD -B: Performed by: UROLOGY

## 2017-01-01 PROCEDURE — 82550 ASSAY OF CK (CPK): CPT | Performed by: EMERGENCY MEDICINE

## 2017-01-01 PROCEDURE — 77030032490 HC SLV COMPR SCD KNE COVD -B: Performed by: UROLOGY

## 2017-01-01 PROCEDURE — 82550 ASSAY OF CK (CPK): CPT | Performed by: UROLOGY

## 2017-01-01 PROCEDURE — 77030008027: Performed by: FAMILY MEDICINE

## 2017-01-01 PROCEDURE — 77030018846 HC SOL IRR STRL H20 ICUM -A: Performed by: UROLOGY

## 2017-01-01 PROCEDURE — 30243P1 TRANSFUSION OF NONAUTOLOGOUS FROZEN RED CELLS INTO CENTRAL VEIN, PERCUTANEOUS APPROACH: ICD-10-PCS | Performed by: UROLOGY

## 2017-01-01 PROCEDURE — 74011636320 HC RX REV CODE- 636/320

## 2017-01-01 PROCEDURE — 76450000000

## 2017-01-01 RX ORDER — DEXTROSE MONOHYDRATE 50 MG/ML
100 INJECTION, SOLUTION INTRAVENOUS CONTINUOUS
Status: DISCONTINUED | OUTPATIENT
Start: 2017-01-01 | End: 2017-01-01

## 2017-01-01 RX ORDER — SODIUM CHLORIDE, SODIUM LACTATE, POTASSIUM CHLORIDE, CALCIUM CHLORIDE 600; 310; 30; 20 MG/100ML; MG/100ML; MG/100ML; MG/100ML
25 INJECTION, SOLUTION INTRAVENOUS CONTINUOUS
Status: CANCELLED | OUTPATIENT
Start: 2017-01-01

## 2017-01-01 RX ORDER — MORPHINE SULFATE 10 MG/ML
2 INJECTION, SOLUTION INTRAMUSCULAR; INTRAVENOUS
Status: DISCONTINUED | OUTPATIENT
Start: 2017-01-01 | End: 2017-01-01 | Stop reason: HOSPADM

## 2017-01-01 RX ORDER — SODIUM CHLORIDE 0.9 % (FLUSH) 0.9 %
5-10 SYRINGE (ML) INJECTION EVERY 8 HOURS
Status: DISCONTINUED | OUTPATIENT
Start: 2017-01-01 | End: 2017-01-01

## 2017-01-01 RX ORDER — LABETALOL HYDROCHLORIDE 5 MG/ML
20 INJECTION, SOLUTION INTRAVENOUS
Status: COMPLETED | OUTPATIENT
Start: 2017-01-01 | End: 2017-01-01

## 2017-01-01 RX ORDER — FENTANYL CITRATE 50 UG/ML
25-50 INJECTION, SOLUTION INTRAMUSCULAR; INTRAVENOUS
Status: DISCONTINUED | OUTPATIENT
Start: 2017-01-01 | End: 2017-01-01

## 2017-01-01 RX ORDER — FENTANYL CITRATE 50 UG/ML
25 INJECTION, SOLUTION INTRAMUSCULAR; INTRAVENOUS
Status: DISCONTINUED | OUTPATIENT
Start: 2017-01-01 | End: 2017-01-01

## 2017-01-01 RX ORDER — HEPARIN SODIUM 200 [USP'U]/100ML
500 INJECTION, SOLUTION INTRAVENOUS ONCE
Status: COMPLETED | OUTPATIENT
Start: 2017-01-01 | End: 2017-01-01

## 2017-01-01 RX ORDER — NALOXONE HYDROCHLORIDE 0.4 MG/ML
0.4 INJECTION, SOLUTION INTRAMUSCULAR; INTRAVENOUS; SUBCUTANEOUS AS NEEDED
Status: DISCONTINUED | OUTPATIENT
Start: 2017-01-01 | End: 2017-01-01

## 2017-01-01 RX ORDER — SODIUM CHLORIDE 9 MG/ML
75 INJECTION, SOLUTION INTRAVENOUS CONTINUOUS
Status: DISCONTINUED | OUTPATIENT
Start: 2017-01-01 | End: 2017-01-01

## 2017-01-01 RX ORDER — CEFAZOLIN SODIUM IN 0.9 % NACL 2 G/100 ML
2 PLASTIC BAG, INJECTION (ML) INTRAVENOUS
Status: COMPLETED | OUTPATIENT
Start: 2017-01-01 | End: 2017-01-01

## 2017-01-01 RX ORDER — FENTANYL CITRATE 50 UG/ML
25 INJECTION, SOLUTION INTRAMUSCULAR; INTRAVENOUS
Status: CANCELLED | OUTPATIENT
Start: 2017-01-01

## 2017-01-01 RX ORDER — HEPARIN SODIUM 1000 [USP'U]/ML
3000 INJECTION, SOLUTION INTRAVENOUS; SUBCUTANEOUS ONCE
Status: COMPLETED | OUTPATIENT
Start: 2017-01-01 | End: 2017-01-01

## 2017-01-01 RX ORDER — OXYBUTYNIN CHLORIDE 5 MG/1
15 TABLET, EXTENDED RELEASE ORAL
Status: DISCONTINUED | OUTPATIENT
Start: 2017-01-01 | End: 2017-01-01

## 2017-01-01 RX ORDER — MORPHINE SULFATE 2 MG/ML
2 INJECTION, SOLUTION INTRAMUSCULAR; INTRAVENOUS
Status: DISCONTINUED | OUTPATIENT
Start: 2017-01-01 | End: 2017-01-01 | Stop reason: HOSPADM

## 2017-01-01 RX ORDER — MAGNESIUM SULFATE 100 %
4 CRYSTALS MISCELLANEOUS AS NEEDED
Status: DISCONTINUED | OUTPATIENT
Start: 2017-01-01 | End: 2017-01-01

## 2017-01-01 RX ORDER — MUPIROCIN 20 MG/G
OINTMENT TOPICAL 2 TIMES DAILY
Status: COMPLETED | OUTPATIENT
Start: 2017-01-01 | End: 2017-01-01

## 2017-01-01 RX ORDER — SODIUM CHLORIDE, SODIUM LACTATE, POTASSIUM CHLORIDE, CALCIUM CHLORIDE 600; 310; 30; 20 MG/100ML; MG/100ML; MG/100ML; MG/100ML
25 INJECTION, SOLUTION INTRAVENOUS CONTINUOUS
Status: CANCELLED | OUTPATIENT
Start: 2017-01-01 | End: 2017-01-01

## 2017-01-01 RX ORDER — ENOXAPARIN SODIUM 100 MG/ML
40 INJECTION SUBCUTANEOUS EVERY 24 HOURS
Status: DISCONTINUED | OUTPATIENT
Start: 2017-01-01 | End: 2017-01-01

## 2017-01-01 RX ORDER — IPRATROPIUM BROMIDE AND ALBUTEROL SULFATE 2.5; .5 MG/3ML; MG/3ML
3 SOLUTION RESPIRATORY (INHALATION)
Status: DISCONTINUED | OUTPATIENT
Start: 2017-01-01 | End: 2017-01-01

## 2017-01-01 RX ORDER — DIPHENHYDRAMINE HYDROCHLORIDE 50 MG/ML
12.5 INJECTION, SOLUTION INTRAMUSCULAR; INTRAVENOUS ONCE
Status: ACTIVE | OUTPATIENT
Start: 2017-01-01 | End: 2017-01-01

## 2017-01-01 RX ORDER — PROPOFOL 10 MG/ML
INJECTION, EMULSION INTRAVENOUS
Status: DISPENSED
Start: 2017-01-01 | End: 2017-01-01

## 2017-01-01 RX ORDER — SODIUM CHLORIDE 9 MG/ML
250 INJECTION, SOLUTION INTRAVENOUS AS NEEDED
Status: DISCONTINUED | OUTPATIENT
Start: 2017-01-01 | End: 2017-01-01 | Stop reason: SDUPTHER

## 2017-01-01 RX ORDER — GUAIFENESIN 100 MG/5ML
81 LIQUID (ML) ORAL DAILY
Status: DISCONTINUED | OUTPATIENT
Start: 2017-01-01 | End: 2017-01-01 | Stop reason: HOSPADM

## 2017-01-01 RX ORDER — PHENYLEPHRINE HCL IN 0.9% NACL 0.4MG/10ML
SYRINGE (ML) INTRAVENOUS AS NEEDED
Status: DISCONTINUED | OUTPATIENT
Start: 2017-01-01 | End: 2017-01-01 | Stop reason: HOSPADM

## 2017-01-01 RX ORDER — LIDOCAINE HYDROCHLORIDE 10 MG/ML
1-20 INJECTION INFILTRATION; PERINEURAL
Status: DISCONTINUED | OUTPATIENT
Start: 2017-01-01 | End: 2017-01-01 | Stop reason: HOSPADM

## 2017-01-01 RX ORDER — FENTANYL CITRATE 50 UG/ML
INJECTION, SOLUTION INTRAMUSCULAR; INTRAVENOUS
Status: COMPLETED
Start: 2017-01-01 | End: 2017-01-01

## 2017-01-01 RX ORDER — HYDROMORPHONE HYDROCHLORIDE 2 MG/ML
0.1 INJECTION, SOLUTION INTRAMUSCULAR; INTRAVENOUS; SUBCUTANEOUS
Status: DISCONTINUED | OUTPATIENT
Start: 2017-01-01 | End: 2017-01-01

## 2017-01-01 RX ORDER — LIDOCAINE HYDROCHLORIDE 20 MG/ML
INJECTION, SOLUTION EPIDURAL; INFILTRATION; INTRACAUDAL; PERINEURAL AS NEEDED
Status: DISCONTINUED | OUTPATIENT
Start: 2017-01-01 | End: 2017-01-01 | Stop reason: HOSPADM

## 2017-01-01 RX ORDER — SUCCINYLCHOLINE CHLORIDE 20 MG/ML
INJECTION INTRAMUSCULAR; INTRAVENOUS
Status: DISCONTINUED
Start: 2017-01-01 | End: 2017-01-01

## 2017-01-01 RX ORDER — MORPHINE SULFATE 2 MG/ML
4 INJECTION, SOLUTION INTRAMUSCULAR; INTRAVENOUS
Status: COMPLETED | OUTPATIENT
Start: 2017-01-01 | End: 2017-01-01

## 2017-01-01 RX ORDER — PHENYLEPHRINE HYDROCHLORIDE 10 MG/ML
INJECTION INTRAVENOUS
Status: DISPENSED
Start: 2017-01-01 | End: 2017-01-01

## 2017-01-01 RX ORDER — SODIUM CHLORIDE, SODIUM LACTATE, POTASSIUM CHLORIDE, CALCIUM CHLORIDE 600; 310; 30; 20 MG/100ML; MG/100ML; MG/100ML; MG/100ML
25 INJECTION, SOLUTION INTRAVENOUS CONTINUOUS
Status: DISCONTINUED | OUTPATIENT
Start: 2017-01-01 | End: 2017-01-01 | Stop reason: HOSPADM

## 2017-01-01 RX ORDER — SODIUM CHLORIDE 0.9 % (FLUSH) 0.9 %
5-10 SYRINGE (ML) INJECTION AS NEEDED
Status: DISCONTINUED | OUTPATIENT
Start: 2017-01-01 | End: 2017-01-01

## 2017-01-01 RX ORDER — METOCLOPRAMIDE HYDROCHLORIDE 5 MG/ML
5 INJECTION INTRAMUSCULAR; INTRAVENOUS
Status: DISCONTINUED | OUTPATIENT
Start: 2017-01-01 | End: 2017-01-01 | Stop reason: HOSPADM

## 2017-01-01 RX ORDER — LOSARTAN POTASSIUM 100 MG/1
100 TABLET ORAL DAILY
Status: ON HOLD | COMMUNITY
End: 2017-01-01

## 2017-01-01 RX ORDER — CEFAZOLIN SODIUM IN 0.9 % NACL 2 G/100 ML
PLASTIC BAG, INJECTION (ML) INTRAVENOUS AS NEEDED
Status: DISCONTINUED | OUTPATIENT
Start: 2017-01-01 | End: 2017-01-01 | Stop reason: HOSPADM

## 2017-01-01 RX ORDER — SUCCINYLCHOLINE CHLORIDE 20 MG/ML
INJECTION INTRAMUSCULAR; INTRAVENOUS AS NEEDED
Status: DISCONTINUED | OUTPATIENT
Start: 2017-01-01 | End: 2017-01-01 | Stop reason: HOSPADM

## 2017-01-01 RX ORDER — HYDROCODONE BITARTRATE AND ACETAMINOPHEN 5; 325 MG/1; MG/1
1 TABLET ORAL
Qty: 15 TAB | Refills: 0 | Status: ON HOLD | OUTPATIENT
Start: 2017-01-01 | End: 2017-01-01

## 2017-01-01 RX ORDER — PROPOFOL 10 MG/ML
INJECTION, EMULSION INTRAVENOUS
Status: DISCONTINUED | OUTPATIENT
Start: 2017-01-01 | End: 2017-01-01 | Stop reason: HOSPADM

## 2017-01-01 RX ORDER — ACETAMINOPHEN 325 MG/1
650 TABLET ORAL
Status: DISCONTINUED | OUTPATIENT
Start: 2017-01-01 | End: 2017-01-01

## 2017-01-01 RX ORDER — ROCURONIUM BROMIDE 10 MG/ML
INJECTION, SOLUTION INTRAVENOUS AS NEEDED
Status: DISCONTINUED | OUTPATIENT
Start: 2017-01-01 | End: 2017-01-01 | Stop reason: HOSPADM

## 2017-01-01 RX ORDER — LOSARTAN POTASSIUM 50 MG/1
50 TABLET ORAL DAILY
Qty: 30 TAB | Refills: 12 | Status: SHIPPED | OUTPATIENT
Start: 2017-01-01

## 2017-01-01 RX ORDER — MORPHINE SULFATE 2 MG/ML
1 INJECTION, SOLUTION INTRAMUSCULAR; INTRAVENOUS
Status: DISCONTINUED | OUTPATIENT
Start: 2017-01-01 | End: 2017-01-01

## 2017-01-01 RX ORDER — HYDROMORPHONE HYDROCHLORIDE 2 MG/ML
INJECTION, SOLUTION INTRAMUSCULAR; INTRAVENOUS; SUBCUTANEOUS AS NEEDED
Status: DISCONTINUED | OUTPATIENT
Start: 2017-01-01 | End: 2017-01-01 | Stop reason: HOSPADM

## 2017-01-01 RX ORDER — LEVALBUTEROL INHALATION SOLUTION 0.63 MG/3ML
0.63 SOLUTION RESPIRATORY (INHALATION)
Status: DISCONTINUED | OUTPATIENT
Start: 2017-01-01 | End: 2017-01-01

## 2017-01-01 RX ORDER — SODIUM CHLORIDE 0.9 % (FLUSH) 0.9 %
10 SYRINGE (ML) INJECTION
Status: DISCONTINUED | OUTPATIENT
Start: 2017-01-01 | End: 2017-01-01

## 2017-01-01 RX ORDER — SODIUM CHLORIDE 0.9 % (FLUSH) 0.9 %
5-10 SYRINGE (ML) INJECTION AS NEEDED
Status: CANCELLED | OUTPATIENT
Start: 2017-01-01

## 2017-01-01 RX ORDER — SODIUM CHLORIDE 0.9 % (FLUSH) 0.9 %
10 SYRINGE (ML) INJECTION EVERY 24 HOURS
Status: CANCELLED | OUTPATIENT
Start: 2017-01-01

## 2017-01-01 RX ORDER — SODIUM CHLORIDE 0.9 % (FLUSH) 0.9 %
5-10 SYRINGE (ML) INJECTION EVERY 8 HOURS
Status: DISCONTINUED | OUTPATIENT
Start: 2017-01-01 | End: 2017-01-01 | Stop reason: HOSPADM

## 2017-01-01 RX ORDER — SODIUM CHLORIDE 0.9 % (FLUSH) 0.9 %
10-40 SYRINGE (ML) INJECTION EVERY 8 HOURS
Status: DISCONTINUED | OUTPATIENT
Start: 2017-01-01 | End: 2017-01-01 | Stop reason: HOSPADM

## 2017-01-01 RX ORDER — CARVEDILOL 3.12 MG/1
3.12 TABLET ORAL 2 TIMES DAILY WITH MEALS
Status: DISCONTINUED | OUTPATIENT
Start: 2017-01-01 | End: 2017-01-01 | Stop reason: HOSPADM

## 2017-01-01 RX ORDER — DILTIAZEM HYDROCHLORIDE 5 MG/ML
10 INJECTION INTRAVENOUS ONCE
Status: DISCONTINUED | OUTPATIENT
Start: 2017-01-01 | End: 2017-01-01

## 2017-01-01 RX ORDER — GUAIFENESIN 100 MG/5ML
81 LIQUID (ML) ORAL DAILY
Qty: 30 TAB | Refills: 12 | Status: SHIPPED
Start: 2017-01-01

## 2017-01-01 RX ORDER — CARVEDILOL 3.12 MG/1
3.12 TABLET ORAL 2 TIMES DAILY WITH MEALS
Qty: 60 TAB | Refills: 12 | Status: SHIPPED | OUTPATIENT
Start: 2017-01-01

## 2017-01-01 RX ORDER — SODIUM CHLORIDE 0.9 % (FLUSH) 0.9 %
5-10 SYRINGE (ML) INJECTION AS NEEDED
Status: DISCONTINUED | OUTPATIENT
Start: 2017-01-01 | End: 2017-01-01 | Stop reason: HOSPADM

## 2017-01-01 RX ORDER — FENTANYL CITRATE 50 UG/ML
INJECTION, SOLUTION INTRAMUSCULAR; INTRAVENOUS AS NEEDED
Status: DISCONTINUED | OUTPATIENT
Start: 2017-01-01 | End: 2017-01-01 | Stop reason: HOSPADM

## 2017-01-01 RX ORDER — MUPIROCIN 20 MG/G
OINTMENT TOPICAL EVERY 12 HOURS
Status: COMPLETED | OUTPATIENT
Start: 2017-01-01 | End: 2017-01-01

## 2017-01-01 RX ORDER — BUPIVACAINE HYDROCHLORIDE AND EPINEPHRINE 5; 5 MG/ML; UG/ML
INJECTION, SOLUTION EPIDURAL; INTRACAUDAL; PERINEURAL AS NEEDED
Status: DISCONTINUED | OUTPATIENT
Start: 2017-01-01 | End: 2017-01-01 | Stop reason: HOSPADM

## 2017-01-01 RX ORDER — MIDAZOLAM HYDROCHLORIDE 1 MG/ML
INJECTION, SOLUTION INTRAMUSCULAR; INTRAVENOUS
Status: COMPLETED
Start: 2017-01-01 | End: 2017-01-01

## 2017-01-01 RX ORDER — SODIUM CHLORIDE 9 MG/ML
50 INJECTION, SOLUTION INTRAVENOUS
Status: DISCONTINUED | OUTPATIENT
Start: 2017-01-01 | End: 2017-01-01

## 2017-01-01 RX ORDER — SODIUM CHLORIDE 0.9 % (FLUSH) 0.9 %
10-30 SYRINGE (ML) INJECTION AS NEEDED
Status: DISCONTINUED | OUTPATIENT
Start: 2017-01-01 | End: 2017-01-01

## 2017-01-01 RX ORDER — DIPHENHYDRAMINE HYDROCHLORIDE 50 MG/ML
12.5 INJECTION, SOLUTION INTRAMUSCULAR; INTRAVENOUS AS NEEDED
Status: DISCONTINUED | OUTPATIENT
Start: 2017-01-01 | End: 2017-01-01

## 2017-01-01 RX ORDER — HYDROMORPHONE HYDROCHLORIDE 1 MG/ML
0.2 INJECTION, SOLUTION INTRAMUSCULAR; INTRAVENOUS; SUBCUTANEOUS
Status: CANCELLED | OUTPATIENT
Start: 2017-01-01

## 2017-01-01 RX ORDER — FUROSEMIDE 10 MG/ML
40 INJECTION INTRAMUSCULAR; INTRAVENOUS DAILY PRN
Status: DISCONTINUED | OUTPATIENT
Start: 2017-01-01 | End: 2017-01-01

## 2017-01-01 RX ORDER — HEPARIN 100 UNIT/ML
300 SYRINGE INTRAVENOUS AS NEEDED
Status: CANCELLED | OUTPATIENT
Start: 2017-01-01

## 2017-01-01 RX ORDER — ONDANSETRON 2 MG/ML
4 INJECTION INTRAMUSCULAR; INTRAVENOUS
Status: DISCONTINUED | OUTPATIENT
Start: 2017-01-01 | End: 2017-01-01 | Stop reason: HOSPADM

## 2017-01-01 RX ORDER — FINASTERIDE 5 MG/1
5 TABLET, FILM COATED ORAL DAILY
Qty: 30 TAB | Refills: 6 | Status: SHIPPED | OUTPATIENT
Start: 2017-01-01

## 2017-01-01 RX ORDER — ASPIRIN 300 MG/1
300 SUPPOSITORY RECTAL DAILY
Status: DISCONTINUED | OUTPATIENT
Start: 2017-01-01 | End: 2017-01-01

## 2017-01-01 RX ORDER — PROPOFOL 10 MG/ML
5-50 VIAL (ML) INTRAVENOUS
Status: DISCONTINUED | OUTPATIENT
Start: 2017-01-01 | End: 2017-01-01

## 2017-01-01 RX ORDER — HYDROMORPHONE HYDROCHLORIDE 1 MG/ML
0.1 INJECTION, SOLUTION INTRAMUSCULAR; INTRAVENOUS; SUBCUTANEOUS
Status: DISCONTINUED | OUTPATIENT
Start: 2017-01-01 | End: 2017-01-01

## 2017-01-01 RX ORDER — ALBUMIN HUMAN 50 G/1000ML
SOLUTION INTRAVENOUS AS NEEDED
Status: DISCONTINUED | OUTPATIENT
Start: 2017-01-01 | End: 2017-01-01 | Stop reason: HOSPADM

## 2017-01-01 RX ORDER — LEVALBUTEROL INHALATION SOLUTION 0.63 MG/3ML
0.63 SOLUTION RESPIRATORY (INHALATION)
Status: DISCONTINUED | OUTPATIENT
Start: 2017-01-01 | End: 2017-01-01 | Stop reason: HOSPADM

## 2017-01-01 RX ORDER — ZOLPIDEM TARTRATE 5 MG/1
5 TABLET ORAL
Status: DISCONTINUED | OUTPATIENT
Start: 2017-01-01 | End: 2017-01-01 | Stop reason: HOSPADM

## 2017-01-01 RX ORDER — CLOPIDOGREL BISULFATE 75 MG/1
75 TABLET ORAL DAILY
Qty: 30 TAB | Refills: 12 | Status: SHIPPED | OUTPATIENT
Start: 2017-01-01

## 2017-01-01 RX ORDER — METOPROLOL TARTRATE 5 MG/5ML
5 INJECTION INTRAVENOUS EVERY 6 HOURS
Status: DISCONTINUED | OUTPATIENT
Start: 2017-01-01 | End: 2017-01-01

## 2017-01-01 RX ORDER — FENTANYL CITRATE 50 UG/ML
25 INJECTION, SOLUTION INTRAMUSCULAR; INTRAVENOUS
Status: DISCONTINUED | OUTPATIENT
Start: 2017-01-01 | End: 2017-01-01 | Stop reason: HOSPADM

## 2017-01-01 RX ORDER — NEOSTIGMINE METHYLSULFATE 1 MG/ML
INJECTION INTRAVENOUS AS NEEDED
Status: DISCONTINUED | OUTPATIENT
Start: 2017-01-01 | End: 2017-01-01 | Stop reason: HOSPADM

## 2017-01-01 RX ORDER — ETOMIDATE 2 MG/ML
INJECTION INTRAVENOUS AS NEEDED
Status: DISCONTINUED | OUTPATIENT
Start: 2017-01-01 | End: 2017-01-01 | Stop reason: HOSPADM

## 2017-01-01 RX ORDER — CLOPIDOGREL BISULFATE 75 MG/1
75 TABLET ORAL DAILY
Status: DISCONTINUED | OUTPATIENT
Start: 2017-01-01 | End: 2017-01-01 | Stop reason: HOSPADM

## 2017-01-01 RX ORDER — FENTANYL CITRATE 50 UG/ML
50 INJECTION, SOLUTION INTRAMUSCULAR; INTRAVENOUS AS NEEDED
Status: DISCONTINUED | OUTPATIENT
Start: 2017-01-01 | End: 2017-01-01 | Stop reason: HOSPADM

## 2017-01-01 RX ORDER — GLYCOPYRROLATE 0.2 MG/ML
INJECTION INTRAMUSCULAR; INTRAVENOUS AS NEEDED
Status: DISCONTINUED | OUTPATIENT
Start: 2017-01-01 | End: 2017-01-01 | Stop reason: HOSPADM

## 2017-01-01 RX ORDER — KETOROLAC TROMETHAMINE 30 MG/ML
15 INJECTION, SOLUTION INTRAMUSCULAR; INTRAVENOUS
Status: COMPLETED | OUTPATIENT
Start: 2017-01-01 | End: 2017-01-01

## 2017-01-01 RX ORDER — DILTIAZEM HYDROCHLORIDE 5 MG/ML
10 INJECTION INTRAVENOUS ONCE
Status: COMPLETED | OUTPATIENT
Start: 2017-01-01 | End: 2017-01-01

## 2017-01-01 RX ORDER — LABETALOL HYDROCHLORIDE 5 MG/ML
10 INJECTION, SOLUTION INTRAVENOUS EVERY 6 HOURS
Status: DISCONTINUED | OUTPATIENT
Start: 2017-01-01 | End: 2017-01-01

## 2017-01-01 RX ORDER — BARIUM SULFATE 20 MG/ML
900 SUSPENSION ORAL
Status: DISCONTINUED | OUTPATIENT
Start: 2017-01-01 | End: 2017-01-01

## 2017-01-01 RX ORDER — FUROSEMIDE 10 MG/ML
40 INJECTION INTRAMUSCULAR; INTRAVENOUS ONCE
Status: COMPLETED | OUTPATIENT
Start: 2017-01-01 | End: 2017-01-01

## 2017-01-01 RX ORDER — SODIUM CHLORIDE 0.9 % (FLUSH) 0.9 %
10 SYRINGE (ML) INJECTION AS NEEDED
Status: DISCONTINUED | OUTPATIENT
Start: 2017-01-01 | End: 2017-01-01 | Stop reason: HOSPADM

## 2017-01-01 RX ORDER — LIDOCAINE HYDROCHLORIDE 20 MG/ML
JELLY TOPICAL
Status: COMPLETED | OUTPATIENT
Start: 2017-01-01 | End: 2017-01-01

## 2017-01-01 RX ORDER — VITAMIN E 1000 UNIT
1000 CAPSULE ORAL DAILY
COMMUNITY

## 2017-01-01 RX ORDER — HYDROMORPHONE HYDROCHLORIDE 1 MG/ML
.2-.5 INJECTION, SOLUTION INTRAMUSCULAR; INTRAVENOUS; SUBCUTANEOUS
Status: DISCONTINUED | OUTPATIENT
Start: 2017-01-01 | End: 2017-01-01 | Stop reason: HOSPADM

## 2017-01-01 RX ORDER — FUROSEMIDE 10 MG/ML
20 INJECTION INTRAMUSCULAR; INTRAVENOUS ONCE
Status: COMPLETED | OUTPATIENT
Start: 2017-01-01 | End: 2017-01-01

## 2017-01-01 RX ORDER — LOPERAMIDE HYDROCHLORIDE 2 MG/1
2 CAPSULE ORAL
Status: DISCONTINUED | OUTPATIENT
Start: 2017-01-01 | End: 2017-01-01 | Stop reason: HOSPADM

## 2017-01-01 RX ORDER — HEPARIN SODIUM 1000 [USP'U]/ML
INJECTION, SOLUTION INTRAVENOUS; SUBCUTANEOUS
Status: COMPLETED
Start: 2017-01-01 | End: 2017-01-01

## 2017-01-01 RX ORDER — LABETALOL HYDROCHLORIDE 5 MG/ML
20 INJECTION, SOLUTION INTRAVENOUS
Status: DISCONTINUED | OUTPATIENT
Start: 2017-01-01 | End: 2017-01-01

## 2017-01-01 RX ORDER — CLOPIDOGREL BISULFATE 75 MG/1
75 TABLET ORAL DAILY
Status: DISCONTINUED | OUTPATIENT
Start: 2017-01-01 | End: 2017-01-01

## 2017-01-01 RX ORDER — LORAZEPAM 2 MG/ML
1 INJECTION INTRAMUSCULAR
Status: DISCONTINUED | OUTPATIENT
Start: 2017-01-01 | End: 2017-01-01

## 2017-01-01 RX ORDER — HYDRALAZINE HYDROCHLORIDE 20 MG/ML
20 INJECTION INTRAMUSCULAR; INTRAVENOUS
Status: DISCONTINUED | OUTPATIENT
Start: 2017-01-01 | End: 2017-01-01

## 2017-01-01 RX ORDER — SUCCINYLCHOLINE CHLORIDE 20 MG/ML
INJECTION INTRAMUSCULAR; INTRAVENOUS
Status: DISPENSED
Start: 2017-01-01 | End: 2017-01-01

## 2017-01-01 RX ORDER — VECURONIUM BROMIDE FOR INJECTION 1 MG/ML
INJECTION, POWDER, LYOPHILIZED, FOR SOLUTION INTRAVENOUS AS NEEDED
Status: DISCONTINUED | OUTPATIENT
Start: 2017-01-01 | End: 2017-01-01 | Stop reason: HOSPADM

## 2017-01-01 RX ORDER — SODIUM CHLORIDE 9 MG/ML
100 INJECTION, SOLUTION INTRAVENOUS CONTINUOUS
Status: DISCONTINUED | OUTPATIENT
Start: 2017-01-01 | End: 2017-01-01

## 2017-01-01 RX ORDER — METOCLOPRAMIDE HYDROCHLORIDE 5 MG/ML
5 INJECTION INTRAMUSCULAR; INTRAVENOUS EVERY 6 HOURS
Status: DISCONTINUED | OUTPATIENT
Start: 2017-01-01 | End: 2017-01-01

## 2017-01-01 RX ORDER — ONDANSETRON 2 MG/ML
4 INJECTION INTRAMUSCULAR; INTRAVENOUS
Status: DISCONTINUED | OUTPATIENT
Start: 2017-01-01 | End: 2017-01-01

## 2017-01-01 RX ORDER — ALUMINA, MAGNESIA, AND SIMETHICONE 2400; 2400; 240 MG/30ML; MG/30ML; MG/30ML
15 SUSPENSION ORAL
Status: DISCONTINUED | OUTPATIENT
Start: 2017-01-01 | End: 2017-01-01 | Stop reason: HOSPADM

## 2017-01-01 RX ORDER — PROPOFOL 10 MG/ML
5-50 VIAL (ML) INTRAVENOUS
Status: DISCONTINUED | OUTPATIENT
Start: 2017-01-01 | End: 2017-01-01 | Stop reason: SDUPTHER

## 2017-01-01 RX ORDER — ACETAMINOPHEN 650 MG/1
650 SUPPOSITORY RECTAL
Status: DISCONTINUED | OUTPATIENT
Start: 2017-01-01 | End: 2017-01-01 | Stop reason: HOSPADM

## 2017-01-01 RX ORDER — SODIUM CHLORIDE 9 MG/ML
25 INJECTION, SOLUTION INTRAVENOUS CONTINUOUS
Status: DISCONTINUED | OUTPATIENT
Start: 2017-01-01 | End: 2017-01-01 | Stop reason: ALTCHOICE

## 2017-01-01 RX ORDER — SODIUM CHLORIDE, SODIUM LACTATE, POTASSIUM CHLORIDE, CALCIUM CHLORIDE 600; 310; 30; 20 MG/100ML; MG/100ML; MG/100ML; MG/100ML
25 INJECTION, SOLUTION INTRAVENOUS CONTINUOUS
Status: DISCONTINUED | OUTPATIENT
Start: 2017-01-01 | End: 2017-01-01

## 2017-01-01 RX ORDER — GUAIFENESIN 100 MG/5ML
81 LIQUID (ML) ORAL DAILY
Status: DISCONTINUED | OUTPATIENT
Start: 2017-01-01 | End: 2017-01-01

## 2017-01-01 RX ORDER — HEPARIN SODIUM 1000 [USP'U]/ML
9000 INJECTION, SOLUTION INTRAVENOUS; SUBCUTANEOUS ONCE
Status: COMPLETED | OUTPATIENT
Start: 2017-01-01 | End: 2017-01-01

## 2017-01-01 RX ORDER — NYSTATIN 100000 [USP'U]/G
POWDER TOPICAL 2 TIMES DAILY
Status: DISCONTINUED | OUTPATIENT
Start: 2017-01-01 | End: 2017-01-01 | Stop reason: HOSPADM

## 2017-01-01 RX ORDER — CLOPIDOGREL 300 MG/1
TABLET, FILM COATED ORAL
Status: COMPLETED
Start: 2017-01-01 | End: 2017-01-01

## 2017-01-01 RX ORDER — FLUMAZENIL 0.1 MG/ML
0.3 INJECTION INTRAVENOUS ONCE
Status: COMPLETED | OUTPATIENT
Start: 2017-01-01 | End: 2017-01-01

## 2017-01-01 RX ORDER — FENTANYL CITRATE 50 UG/ML
50 INJECTION, SOLUTION INTRAMUSCULAR; INTRAVENOUS ONCE
Status: COMPLETED | OUTPATIENT
Start: 2017-01-01 | End: 2017-01-01

## 2017-01-01 RX ORDER — PHENAZOPYRIDINE HYDROCHLORIDE 100 MG/1
200 TABLET, FILM COATED ORAL
Status: DISCONTINUED | OUTPATIENT
Start: 2017-01-01 | End: 2017-01-01

## 2017-01-01 RX ORDER — METOPROLOL TARTRATE 25 MG/1
25 TABLET, FILM COATED ORAL EVERY 12 HOURS
Status: DISCONTINUED | OUTPATIENT
Start: 2017-01-01 | End: 2017-01-01

## 2017-01-01 RX ORDER — DEXTROSE MONOHYDRATE 100 MG/ML
125-250 INJECTION, SOLUTION INTRAVENOUS AS NEEDED
Status: DISCONTINUED | OUTPATIENT
Start: 2017-01-01 | End: 2017-01-01

## 2017-01-01 RX ORDER — SODIUM CHLORIDE, SODIUM LACTATE, POTASSIUM CHLORIDE, CALCIUM CHLORIDE 600; 310; 30; 20 MG/100ML; MG/100ML; MG/100ML; MG/100ML
75 INJECTION, SOLUTION INTRAVENOUS CONTINUOUS
Status: DISCONTINUED | OUTPATIENT
Start: 2017-01-01 | End: 2017-01-01

## 2017-01-01 RX ORDER — CHLORHEXIDINE GLUCONATE 1.2 MG/ML
15 RINSE ORAL EVERY 12 HOURS
Status: DISCONTINUED | OUTPATIENT
Start: 2017-01-01 | End: 2017-01-01

## 2017-01-01 RX ORDER — HYDROMORPHONE HYDROCHLORIDE 1 MG/ML
0.5 INJECTION, SOLUTION INTRAMUSCULAR; INTRAVENOUS; SUBCUTANEOUS
Status: DISCONTINUED | OUTPATIENT
Start: 2017-01-01 | End: 2017-01-01

## 2017-01-01 RX ORDER — ACETAMINOPHEN 10 MG/ML
1000 INJECTION, SOLUTION INTRAVENOUS
Status: DISCONTINUED | OUTPATIENT
Start: 2017-01-01 | End: 2017-01-01

## 2017-01-01 RX ORDER — HYDROMORPHONE HYDROCHLORIDE 1 MG/ML
.2-.5 INJECTION, SOLUTION INTRAMUSCULAR; INTRAVENOUS; SUBCUTANEOUS
Status: DISCONTINUED | OUTPATIENT
Start: 2017-01-01 | End: 2017-01-01

## 2017-01-01 RX ORDER — METOPROLOL TARTRATE 5 MG/5ML
5 INJECTION INTRAVENOUS
Status: DISCONTINUED | OUTPATIENT
Start: 2017-01-01 | End: 2017-01-01

## 2017-01-01 RX ORDER — FINASTERIDE 5 MG/1
5 TABLET, FILM COATED ORAL DAILY
Status: DISCONTINUED | OUTPATIENT
Start: 2017-01-01 | End: 2017-01-01 | Stop reason: HOSPADM

## 2017-01-01 RX ORDER — VANCOMYCIN HYDROCHLORIDE
1250
Status: DISCONTINUED | OUTPATIENT
Start: 2017-01-01 | End: 2017-01-01

## 2017-01-01 RX ORDER — CARVEDILOL 3.12 MG/1
6.25 TABLET ORAL 2 TIMES DAILY WITH MEALS
Status: DISCONTINUED | OUTPATIENT
Start: 2017-01-01 | End: 2017-01-01

## 2017-01-01 RX ORDER — PROPOFOL 10 MG/ML
INJECTION, EMULSION INTRAVENOUS
Status: DISCONTINUED
Start: 2017-01-01 | End: 2017-01-01

## 2017-01-01 RX ORDER — HYDROMORPHONE HYDROCHLORIDE 1 MG/ML
0.2 INJECTION, SOLUTION INTRAMUSCULAR; INTRAVENOUS; SUBCUTANEOUS
Status: DISCONTINUED | OUTPATIENT
Start: 2017-01-01 | End: 2017-01-01

## 2017-01-01 RX ORDER — CARVEDILOL 12.5 MG/1
25 TABLET ORAL 2 TIMES DAILY WITH MEALS
Status: DISCONTINUED | OUTPATIENT
Start: 2017-01-01 | End: 2017-01-01

## 2017-01-01 RX ORDER — INSULIN LISPRO 100 [IU]/ML
INJECTION, SOLUTION INTRAVENOUS; SUBCUTANEOUS EVERY 6 HOURS
Status: DISCONTINUED | OUTPATIENT
Start: 2017-01-01 | End: 2017-01-01

## 2017-01-01 RX ORDER — DIPHENHYDRAMINE HYDROCHLORIDE 50 MG/ML
12.5 INJECTION, SOLUTION INTRAMUSCULAR; INTRAVENOUS AS NEEDED
Status: DISCONTINUED | OUTPATIENT
Start: 2017-01-01 | End: 2017-01-01 | Stop reason: HOSPADM

## 2017-01-01 RX ORDER — MIDAZOLAM HYDROCHLORIDE 1 MG/ML
0.5 INJECTION, SOLUTION INTRAMUSCULAR; INTRAVENOUS
Status: DISCONTINUED | OUTPATIENT
Start: 2017-01-01 | End: 2017-01-01 | Stop reason: HOSPADM

## 2017-01-01 RX ORDER — ALBUMIN HUMAN 50 G/1000ML
25 SOLUTION INTRAVENOUS ONCE
Status: COMPLETED | OUTPATIENT
Start: 2017-01-01 | End: 2017-01-01

## 2017-01-01 RX ORDER — ONDANSETRON 2 MG/ML
4 INJECTION INTRAMUSCULAR; INTRAVENOUS AS NEEDED
Status: DISCONTINUED | OUTPATIENT
Start: 2017-01-01 | End: 2017-01-01 | Stop reason: HOSPADM

## 2017-01-01 RX ORDER — MORPHINE SULFATE 10 MG/ML
2 INJECTION, SOLUTION INTRAMUSCULAR; INTRAVENOUS
Status: DISCONTINUED | OUTPATIENT
Start: 2017-01-01 | End: 2017-01-01

## 2017-01-01 RX ORDER — VANCOMYCIN/0.9 % SOD CHLORIDE 1.5G/250ML
1500 PLASTIC BAG, INJECTION (ML) INTRAVENOUS EVERY 24 HOURS
Status: DISCONTINUED | OUTPATIENT
Start: 2017-01-01 | End: 2017-01-01

## 2017-01-01 RX ORDER — ASPIRIN 81 MG/1
81 TABLET ORAL DAILY
Status: DISCONTINUED | OUTPATIENT
Start: 2017-01-01 | End: 2017-01-01

## 2017-01-01 RX ORDER — ACETAMINOPHEN 10 MG/ML
INJECTION, SOLUTION INTRAVENOUS AS NEEDED
Status: DISCONTINUED | OUTPATIENT
Start: 2017-01-01 | End: 2017-01-01 | Stop reason: HOSPADM

## 2017-01-01 RX ORDER — ONDANSETRON 2 MG/ML
INJECTION INTRAMUSCULAR; INTRAVENOUS AS NEEDED
Status: DISCONTINUED | OUTPATIENT
Start: 2017-01-01 | End: 2017-01-01 | Stop reason: HOSPADM

## 2017-01-01 RX ORDER — SODIUM CHLORIDE 9 MG/ML
50 INJECTION, SOLUTION INTRAVENOUS
Status: COMPLETED | OUTPATIENT
Start: 2017-01-01 | End: 2017-01-01

## 2017-01-01 RX ORDER — CARVEDILOL 12.5 MG/1
12.5 TABLET ORAL
Status: COMPLETED | OUTPATIENT
Start: 2017-01-01 | End: 2017-01-01

## 2017-01-01 RX ORDER — LEVOFLOXACIN 5 MG/ML
750 INJECTION, SOLUTION INTRAVENOUS
Status: DISCONTINUED | OUTPATIENT
Start: 2017-01-01 | End: 2017-01-01

## 2017-01-01 RX ORDER — ATROPA BELLADONNA AND OPIUM 16.2; 3 MG/1; MG/1
1 SUPPOSITORY RECTAL
Status: DISCONTINUED | OUTPATIENT
Start: 2017-01-01 | End: 2017-01-01 | Stop reason: HOSPADM

## 2017-01-01 RX ORDER — CHOLECALCIFEROL (VITAMIN D3) 125 MCG
2000 CAPSULE ORAL DAILY
COMMUNITY

## 2017-01-01 RX ORDER — HALOPERIDOL 5 MG/ML
5 INJECTION INTRAMUSCULAR
Status: DISCONTINUED | OUTPATIENT
Start: 2017-01-01 | End: 2017-01-01 | Stop reason: HOSPADM

## 2017-01-01 RX ORDER — ACETAMINOPHEN 10 MG/ML
1000 INJECTION, SOLUTION INTRAVENOUS EVERY 6 HOURS
Status: DISCONTINUED | OUTPATIENT
Start: 2017-01-01 | End: 2017-01-01

## 2017-01-01 RX ORDER — SODIUM CHLORIDE 9 MG/ML
250 INJECTION, SOLUTION INTRAVENOUS AS NEEDED
Status: DISCONTINUED | OUTPATIENT
Start: 2017-01-01 | End: 2017-01-01

## 2017-01-01 RX ORDER — OXYBUTYNIN CHLORIDE 15 MG/1
15 TABLET, EXTENDED RELEASE ORAL DAILY
COMMUNITY

## 2017-01-01 RX ORDER — HEPARIN SODIUM 5000 [USP'U]/ML
5000 INJECTION, SOLUTION INTRAVENOUS; SUBCUTANEOUS EVERY 12 HOURS
Status: DISCONTINUED | OUTPATIENT
Start: 2017-01-01 | End: 2017-01-01

## 2017-01-01 RX ORDER — CLOPIDOGREL 300 MG/1
600 TABLET, FILM COATED ORAL ONCE
Status: COMPLETED | OUTPATIENT
Start: 2017-01-01 | End: 2017-01-01

## 2017-01-01 RX ORDER — ACETAMINOPHEN 10 MG/ML
1000 INJECTION, SOLUTION INTRAVENOUS
Status: DISCONTINUED | OUTPATIENT
Start: 2017-01-01 | End: 2017-01-01 | Stop reason: SDUPTHER

## 2017-01-01 RX ORDER — LORAZEPAM 2 MG/ML
1-2 INJECTION INTRAMUSCULAR
Status: DISCONTINUED | OUTPATIENT
Start: 2017-01-01 | End: 2017-01-01

## 2017-01-01 RX ORDER — LIDOCAINE HYDROCHLORIDE 20 MG/ML
JELLY TOPICAL AS NEEDED
Status: DISCONTINUED | OUTPATIENT
Start: 2017-01-01 | End: 2017-01-01 | Stop reason: HOSPADM

## 2017-01-01 RX ORDER — DEXTROSE 50 % IN WATER (D50W) INTRAVENOUS SYRINGE
12.5-25 AS NEEDED
Status: DISCONTINUED | OUTPATIENT
Start: 2017-01-01 | End: 2017-01-01

## 2017-01-01 RX ORDER — SODIUM CHLORIDE 0.9 % (FLUSH) 0.9 %
10 SYRINGE (ML) INJECTION EVERY 24 HOURS
Status: DISCONTINUED | OUTPATIENT
Start: 2017-01-01 | End: 2017-01-01

## 2017-01-01 RX ORDER — FENTANYL CITRATE 50 UG/ML
25-50 INJECTION, SOLUTION INTRAMUSCULAR; INTRAVENOUS
Status: DISCONTINUED | OUTPATIENT
Start: 2017-01-01 | End: 2017-01-01 | Stop reason: HOSPADM

## 2017-01-01 RX ORDER — FUROSEMIDE 10 MG/ML
40 INJECTION INTRAMUSCULAR; INTRAVENOUS DAILY
Status: DISCONTINUED | OUTPATIENT
Start: 2017-01-01 | End: 2017-01-01

## 2017-01-01 RX ORDER — MUPIROCIN 20 MG/G
OINTMENT TOPICAL 2 TIMES DAILY
Status: DISCONTINUED | OUTPATIENT
Start: 2017-01-01 | End: 2017-01-01

## 2017-01-01 RX ORDER — ATORVASTATIN CALCIUM 20 MG/1
20 TABLET, FILM COATED ORAL
Qty: 30 TAB | Refills: 12 | Status: SHIPPED | OUTPATIENT
Start: 2017-01-01

## 2017-01-01 RX ORDER — PROPOFOL 10 MG/ML
INJECTION, EMULSION INTRAVENOUS AS NEEDED
Status: DISCONTINUED | OUTPATIENT
Start: 2017-01-01 | End: 2017-01-01 | Stop reason: HOSPADM

## 2017-01-01 RX ORDER — CEFAZOLIN SODIUM IN 0.9 % NACL 2 G/100 ML
2 PLASTIC BAG, INJECTION (ML) INTRAVENOUS EVERY 8 HOURS
Status: DISCONTINUED | OUTPATIENT
Start: 2017-01-01 | End: 2017-01-01

## 2017-01-01 RX ORDER — SODIUM CHLORIDE 0.9 % (FLUSH) 0.9 %
10-40 SYRINGE (ML) INJECTION EVERY 8 HOURS
Status: CANCELLED | OUTPATIENT
Start: 2017-01-01

## 2017-01-01 RX ORDER — NALOXONE HYDROCHLORIDE 0.4 MG/ML
INJECTION, SOLUTION INTRAMUSCULAR; INTRAVENOUS; SUBCUTANEOUS
Status: DISPENSED
Start: 2017-01-01 | End: 2017-01-01

## 2017-01-01 RX ORDER — CIPROFLOXACIN 500 MG/1
500 TABLET ORAL DAILY
Qty: 6 TAB | Refills: 0 | Status: SHIPPED | OUTPATIENT
Start: 2017-01-01 | End: 2017-01-01

## 2017-01-01 RX ORDER — SODIUM CHLORIDE 9 MG/ML
250 INJECTION, SOLUTION INTRAVENOUS AS NEEDED
Status: DISCONTINUED | OUTPATIENT
Start: 2017-01-01 | End: 2017-01-01 | Stop reason: ALTCHOICE

## 2017-01-01 RX ORDER — PROPOFOL 10 MG/ML
INJECTION, EMULSION INTRAVENOUS
Status: COMPLETED
Start: 2017-01-01 | End: 2017-01-01

## 2017-01-01 RX ORDER — LISINOPRIL 5 MG/1
5 TABLET ORAL DAILY
Status: DISCONTINUED | OUTPATIENT
Start: 2017-01-01 | End: 2017-01-01 | Stop reason: HOSPADM

## 2017-01-01 RX ORDER — VALSARTAN 80 MG/1
80 TABLET ORAL DAILY
Status: DISCONTINUED | OUTPATIENT
Start: 2017-01-01 | End: 2017-01-01

## 2017-01-01 RX ORDER — FENTANYL CITRATE 50 UG/ML
50 INJECTION, SOLUTION INTRAMUSCULAR; INTRAVENOUS
Status: COMPLETED | OUTPATIENT
Start: 2017-01-01 | End: 2017-01-01

## 2017-01-01 RX ORDER — LIDOCAINE HYDROCHLORIDE AND EPINEPHRINE 5; 5 MG/ML; UG/ML
INJECTION, SOLUTION INFILTRATION; PERINEURAL AS NEEDED
Status: DISCONTINUED | OUTPATIENT
Start: 2017-01-01 | End: 2017-01-01 | Stop reason: HOSPADM

## 2017-01-01 RX ORDER — VANCOMYCIN 2 GRAM/500 ML IN 0.9 % SODIUM CHLORIDE INTRAVENOUS
2000 ONCE
Status: COMPLETED | OUTPATIENT
Start: 2017-01-01 | End: 2017-01-01

## 2017-01-01 RX ORDER — ATORVASTATIN CALCIUM 20 MG/1
20 TABLET, FILM COATED ORAL
Status: DISCONTINUED | OUTPATIENT
Start: 2017-01-01 | End: 2017-01-01 | Stop reason: HOSPADM

## 2017-01-01 RX ORDER — KETAMINE HYDROCHLORIDE 10 MG/ML
0.1 INJECTION, SOLUTION INTRAMUSCULAR; INTRAVENOUS
Status: COMPLETED | OUTPATIENT
Start: 2017-01-01 | End: 2017-01-01

## 2017-01-01 RX ORDER — SODIUM CHLORIDE 9 MG/ML
75 INJECTION, SOLUTION INTRAVENOUS CONTINUOUS
Status: DISPENSED | OUTPATIENT
Start: 2017-01-01 | End: 2017-01-01

## 2017-01-01 RX ORDER — MIDAZOLAM HYDROCHLORIDE 1 MG/ML
.5-2 INJECTION, SOLUTION INTRAMUSCULAR; INTRAVENOUS
Status: DISCONTINUED | OUTPATIENT
Start: 2017-01-01 | End: 2017-01-01 | Stop reason: HOSPADM

## 2017-01-01 RX ORDER — ACETAMINOPHEN, DIPHENHYDRAMINE HCL, PHENYLEPHRINE HCL 325; 25; 5 MG/1; MG/1; MG/1
5000 TABLET ORAL DAILY
COMMUNITY

## 2017-01-01 RX ORDER — HYDROMORPHONE HYDROCHLORIDE 1 MG/ML
1 INJECTION, SOLUTION INTRAMUSCULAR; INTRAVENOUS; SUBCUTANEOUS ONCE
Status: COMPLETED | OUTPATIENT
Start: 2017-01-01 | End: 2017-01-01

## 2017-01-01 RX ORDER — HEPARIN SODIUM 200 [USP'U]/100ML
INJECTION, SOLUTION INTRAVENOUS
Status: COMPLETED
Start: 2017-01-01 | End: 2017-01-01

## 2017-01-01 RX ORDER — LIDOCAINE HYDROCHLORIDE 10 MG/ML
0.1 INJECTION, SOLUTION EPIDURAL; INFILTRATION; INTRACAUDAL; PERINEURAL AS NEEDED
Status: DISCONTINUED | OUTPATIENT
Start: 2017-01-01 | End: 2017-01-01 | Stop reason: HOSPADM

## 2017-01-01 RX ORDER — BISMUTH SUBSALICYLATE 262 MG
1 TABLET,CHEWABLE ORAL DAILY
COMMUNITY

## 2017-01-01 RX ORDER — FACIAL-BODY WIPES
10 EACH TOPICAL DAILY PRN
Status: DISCONTINUED | OUTPATIENT
Start: 2017-01-01 | End: 2017-01-01 | Stop reason: HOSPADM

## 2017-01-01 RX ORDER — LIDOCAINE HYDROCHLORIDE 10 MG/ML
0.1 INJECTION, SOLUTION EPIDURAL; INFILTRATION; INTRACAUDAL; PERINEURAL AS NEEDED
Status: CANCELLED | OUTPATIENT
Start: 2017-01-01

## 2017-01-01 RX ORDER — SCOLOPAMINE TRANSDERMAL SYSTEM 1 MG/1
1.5 PATCH, EXTENDED RELEASE TRANSDERMAL
Status: DISCONTINUED | OUTPATIENT
Start: 2017-01-01 | End: 2017-01-01 | Stop reason: HOSPADM

## 2017-01-01 RX ORDER — SODIUM CHLORIDE 9 MG/ML
150 INJECTION, SOLUTION INTRAVENOUS CONTINUOUS
Status: DISPENSED | OUTPATIENT
Start: 2017-01-01 | End: 2017-01-01

## 2017-01-01 RX ORDER — LANOLIN ALCOHOL/MO/W.PET/CERES
400 CREAM (GRAM) TOPICAL DAILY
COMMUNITY

## 2017-01-01 RX ORDER — SODIUM BICARBONATE 1 MEQ/ML
100 SYRINGE (ML) INTRAVENOUS ONCE
Status: COMPLETED | OUTPATIENT
Start: 2017-01-01 | End: 2017-01-01

## 2017-01-01 RX ORDER — SODIUM CHLORIDE 0.9 % (FLUSH) 0.9 %
SYRINGE (ML) INJECTION
Status: COMPLETED
Start: 2017-01-01 | End: 2017-01-01

## 2017-01-01 RX ORDER — OXYBUTYNIN CHLORIDE 15 MG/1
15 TABLET, EXTENDED RELEASE ORAL
Qty: 30 TAB | Refills: 0 | Status: SHIPPED | OUTPATIENT
Start: 2017-01-01 | End: 2017-01-01

## 2017-01-01 RX ORDER — SODIUM CHLORIDE 0.9 % (FLUSH) 0.9 %
10-30 SYRINGE (ML) INJECTION AS NEEDED
Status: CANCELLED | OUTPATIENT
Start: 2017-01-01

## 2017-01-01 RX ORDER — LOPERAMIDE HYDROCHLORIDE 2 MG/1
4 CAPSULE ORAL ONCE
Status: COMPLETED | OUTPATIENT
Start: 2017-01-01 | End: 2017-01-01

## 2017-01-01 RX ORDER — SODIUM CHLORIDE, SODIUM LACTATE, POTASSIUM CHLORIDE, CALCIUM CHLORIDE 600; 310; 30; 20 MG/100ML; MG/100ML; MG/100ML; MG/100ML
INJECTION, SOLUTION INTRAVENOUS
Status: DISCONTINUED | OUTPATIENT
Start: 2017-01-01 | End: 2017-01-01 | Stop reason: HOSPADM

## 2017-01-01 RX ORDER — CEPHRADINE 500 MG
200 CAPSULE ORAL DAILY
COMMUNITY

## 2017-01-01 RX ORDER — CARVEDILOL 3.12 MG/1
3.12 TABLET ORAL 2 TIMES DAILY WITH MEALS
Status: DISCONTINUED | OUTPATIENT
Start: 2017-01-01 | End: 2017-01-01

## 2017-01-01 RX ORDER — METOCLOPRAMIDE HYDROCHLORIDE 5 MG/ML
5 INJECTION INTRAMUSCULAR; INTRAVENOUS EVERY 8 HOURS
Status: DISCONTINUED | OUTPATIENT
Start: 2017-01-01 | End: 2017-01-01

## 2017-01-01 RX ORDER — FINASTERIDE 5 MG/1
5 TABLET, FILM COATED ORAL DAILY
Status: DISCONTINUED | OUTPATIENT
Start: 2017-01-01 | End: 2017-01-01

## 2017-01-01 RX ORDER — CIPROFLOXACIN 500 MG/1
500 TABLET ORAL DAILY
Status: DISCONTINUED | OUTPATIENT
Start: 2017-01-01 | End: 2017-01-01 | Stop reason: HOSPADM

## 2017-01-01 RX ORDER — CYANOCOBALAMIN (VITAMIN B-12) 1000MCG/ML
1000 DROPS ORAL DAILY
COMMUNITY

## 2017-01-01 RX ORDER — CHOLECALCIFEROL (VITAMIN D3) 125 MCG
300 CAPSULE ORAL DAILY
COMMUNITY

## 2017-01-01 RX ORDER — LIDOCAINE HYDROCHLORIDE 10 MG/ML
INJECTION INFILTRATION; PERINEURAL
Status: COMPLETED
Start: 2017-01-01 | End: 2017-01-01

## 2017-01-01 RX ORDER — ASPIRIN 325 MG
325 TABLET ORAL DAILY
Status: DISCONTINUED | OUTPATIENT
Start: 2017-01-01 | End: 2017-01-01

## 2017-01-01 RX ORDER — ONDANSETRON 2 MG/ML
4 INJECTION INTRAMUSCULAR; INTRAVENOUS AS NEEDED
Status: DISCONTINUED | OUTPATIENT
Start: 2017-01-01 | End: 2017-01-01

## 2017-01-01 RX ORDER — ASPIRIN 325 MG
325 TABLET ORAL DAILY
COMMUNITY
End: 2017-01-01

## 2017-01-01 RX ORDER — MORPHINE SULFATE 4 MG/ML
2 INJECTION, SOLUTION INTRAMUSCULAR; INTRAVENOUS
Status: DISCONTINUED | OUTPATIENT
Start: 2017-01-01 | End: 2017-01-01 | Stop reason: HOSPADM

## 2017-01-01 RX ORDER — VANCOMYCIN HYDROCHLORIDE
1250 EVERY 24 HOURS
Status: DISCONTINUED | OUTPATIENT
Start: 2017-01-01 | End: 2017-01-01

## 2017-01-01 RX ORDER — METRONIDAZOLE 500 MG/100ML
500 INJECTION, SOLUTION INTRAVENOUS EVERY 8 HOURS
Status: DISCONTINUED | OUTPATIENT
Start: 2017-01-01 | End: 2017-01-01

## 2017-01-01 RX ORDER — SODIUM CHLORIDE 0.9 % (FLUSH) 0.9 %
10 SYRINGE (ML) INJECTION
Status: COMPLETED | OUTPATIENT
Start: 2017-01-01 | End: 2017-01-01

## 2017-01-01 RX ORDER — VANCOMYCIN HYDROCHLORIDE
1250
Status: COMPLETED | OUTPATIENT
Start: 2017-01-01 | End: 2017-01-01

## 2017-01-01 RX ORDER — EPINEPHRINE 0.1 MG/ML
INJECTION INTRACARDIAC; INTRAVENOUS
Status: COMPLETED | OUTPATIENT
Start: 2017-01-01 | End: 2017-01-01

## 2017-01-01 RX ORDER — HALOPERIDOL 5 MG/ML
3 INJECTION INTRAMUSCULAR EVERY 6 HOURS
Status: DISCONTINUED | OUTPATIENT
Start: 2017-01-01 | End: 2017-01-01

## 2017-01-01 RX ORDER — NYSTATIN 100000 [USP'U]/G
POWDER TOPICAL 2 TIMES DAILY
Qty: 1 BOTTLE | Refills: 0 | Status: SHIPPED | OUTPATIENT
Start: 2017-01-01 | End: 2017-01-01

## 2017-01-01 RX ORDER — ALBUMIN HUMAN 250 G/1000ML
12.5 SOLUTION INTRAVENOUS ONCE
Status: COMPLETED | OUTPATIENT
Start: 2017-01-01 | End: 2017-01-01

## 2017-01-01 RX ORDER — LINEZOLID 2 MG/ML
600 INJECTION, SOLUTION INTRAVENOUS EVERY 12 HOURS
Status: DISCONTINUED | OUTPATIENT
Start: 2017-01-01 | End: 2017-01-01

## 2017-01-01 RX ORDER — SODIUM CHLORIDE 9 MG/ML
10 INJECTION, SOLUTION INTRAVENOUS CONTINUOUS
Status: DISCONTINUED | OUTPATIENT
Start: 2017-01-01 | End: 2017-01-01 | Stop reason: HOSPADM

## 2017-01-01 RX ORDER — SODIUM CHLORIDE 0.9 % (FLUSH) 0.9 %
5-10 SYRINGE (ML) INJECTION EVERY 8 HOURS
Status: CANCELLED | OUTPATIENT
Start: 2017-01-01

## 2017-01-01 RX ORDER — METOPROLOL SUCCINATE 25 MG/1
25 TABLET, EXTENDED RELEASE ORAL DAILY
Status: DISCONTINUED | OUTPATIENT
Start: 2017-01-01 | End: 2017-01-01

## 2017-01-01 RX ORDER — SODIUM CHLORIDE 0.9 % (FLUSH) 0.9 %
20 SYRINGE (ML) INJECTION EVERY 24 HOURS
Status: DISCONTINUED | OUTPATIENT
Start: 2017-01-01 | End: 2017-01-01

## 2017-01-01 RX ORDER — CEFOXITIN 2 G/1
INJECTION, POWDER, FOR SOLUTION INTRAVENOUS AS NEEDED
Status: DISCONTINUED | OUTPATIENT
Start: 2017-01-01 | End: 2017-01-01 | Stop reason: HOSPADM

## 2017-01-01 RX ORDER — DIPHENHYDRAMINE HYDROCHLORIDE 50 MG/ML
12.5 INJECTION, SOLUTION INTRAMUSCULAR; INTRAVENOUS AS NEEDED
Status: CANCELLED | OUTPATIENT
Start: 2017-01-01 | End: 2017-01-01

## 2017-01-01 RX ORDER — VANCOMYCIN 1.75 GRAM/500 ML IN 0.9 % SODIUM CHLORIDE INTRAVENOUS
1750
Status: DISCONTINUED | OUTPATIENT
Start: 2017-01-01 | End: 2017-01-01

## 2017-01-01 RX ADMIN — LINEZOLID 600 MG: 600 INJECTION, SOLUTION INTRAVENOUS at 21:29

## 2017-01-01 RX ADMIN — FAMOTIDINE: 10 INJECTION INTRAVENOUS at 19:09

## 2017-01-01 RX ADMIN — SODIUM CHLORIDE 1000 ML: 900 INJECTION, SOLUTION INTRAVENOUS at 14:30

## 2017-01-01 RX ADMIN — LABETALOL HYDROCHLORIDE 10 MG: 5 INJECTION INTRAVENOUS at 10:00

## 2017-01-01 RX ADMIN — METOCLOPRAMIDE 5 MG: 5 INJECTION, SOLUTION INTRAMUSCULAR; INTRAVENOUS at 11:56

## 2017-01-01 RX ADMIN — Medication 10 ML: at 12:29

## 2017-01-01 RX ADMIN — FUROSEMIDE 40 MG: 10 INJECTION, SOLUTION INTRAMUSCULAR; INTRAVENOUS at 21:43

## 2017-01-01 RX ADMIN — NITROGLYCERIN 1 INCH: 20 OINTMENT TOPICAL at 11:20

## 2017-01-01 RX ADMIN — ATORVASTATIN CALCIUM 20 MG: 20 TABLET, FILM COATED ORAL at 20:17

## 2017-01-01 RX ADMIN — FUROSEMIDE 40 MG: 10 INJECTION, SOLUTION INTRAMUSCULAR; INTRAVENOUS at 12:00

## 2017-01-01 RX ADMIN — FUROSEMIDE 40 MG: 10 INJECTION, SOLUTION INTRAMUSCULAR; INTRAVENOUS at 10:14

## 2017-01-01 RX ADMIN — PROPOFOL 17 MCG/KG/MIN: 10 INJECTION, EMULSION INTRAVENOUS at 07:03

## 2017-01-01 RX ADMIN — VANCOMYCIN HYDROCHLORIDE 250 MG: 1 INJECTION, POWDER, LYOPHILIZED, FOR SOLUTION INTRAVENOUS at 17:53

## 2017-01-01 RX ADMIN — FAMOTIDINE 20 MG: 10 INJECTION, SOLUTION INTRAVENOUS at 08:49

## 2017-01-01 RX ADMIN — VANCOMYCIN HYDROCHLORIDE 2250 MG: 10 INJECTION, POWDER, LYOPHILIZED, FOR SOLUTION INTRAVENOUS at 15:50

## 2017-01-01 RX ADMIN — Medication 10 ML: at 21:06

## 2017-01-01 RX ADMIN — FAMOTIDINE: 10 INJECTION INTRAVENOUS at 18:10

## 2017-01-01 RX ADMIN — Medication 10 ML: at 14:49

## 2017-01-01 RX ADMIN — AMIODARONE HYDROCHLORIDE 0.5 MG/MIN: 50 INJECTION, SOLUTION INTRAVENOUS at 18:42

## 2017-01-01 RX ADMIN — LEVALBUTEROL HYDROCHLORIDE 0.63 MG: 0.63 SOLUTION RESPIRATORY (INHALATION) at 11:29

## 2017-01-01 RX ADMIN — ENOXAPARIN SODIUM 40 MG: 30 INJECTION SUBCUTANEOUS at 09:22

## 2017-01-01 RX ADMIN — INSULIN LISPRO 2 UNITS: 100 INJECTION, SOLUTION INTRAVENOUS; SUBCUTANEOUS at 18:16

## 2017-01-01 RX ADMIN — INSULIN LISPRO 2 UNITS: 100 INJECTION, SOLUTION INTRAVENOUS; SUBCUTANEOUS at 18:05

## 2017-01-01 RX ADMIN — INSULIN LISPRO 2 UNITS: 100 INJECTION, SOLUTION INTRAVENOUS; SUBCUTANEOUS at 17:54

## 2017-01-01 RX ADMIN — MORPHINE SULFATE 1 MG: 2 INJECTION, SOLUTION INTRAMUSCULAR; INTRAVENOUS at 12:21

## 2017-01-01 RX ADMIN — Medication 10 ML: at 21:10

## 2017-01-01 RX ADMIN — ASPIRIN 300 MG: 300 SUPPOSITORY RECTAL at 10:31

## 2017-01-01 RX ADMIN — CLOPIDOGREL BISULFATE 75 MG: 75 TABLET ORAL at 08:35

## 2017-01-01 RX ADMIN — INSULIN LISPRO 3 UNITS: 100 INJECTION, SOLUTION INTRAVENOUS; SUBCUTANEOUS at 17:57

## 2017-01-01 RX ADMIN — METOCLOPRAMIDE 5 MG: 5 INJECTION, SOLUTION INTRAMUSCULAR; INTRAVENOUS at 18:51

## 2017-01-01 RX ADMIN — Medication 10 ML: at 05:27

## 2017-01-01 RX ADMIN — HYDROMORPHONE HYDROCHLORIDE 0.5 MG: 2 INJECTION, SOLUTION INTRAMUSCULAR; INTRAVENOUS; SUBCUTANEOUS at 01:56

## 2017-01-01 RX ADMIN — Medication 120 MCG: at 01:57

## 2017-01-01 RX ADMIN — I.V. FAT EMULSION 250 ML: 20 EMULSION INTRAVENOUS at 18:08

## 2017-01-01 RX ADMIN — LABETALOL HYDROCHLORIDE 10 MG: 5 INJECTION INTRAVENOUS at 00:51

## 2017-01-01 RX ADMIN — ASPIRIN 300 MG: 300 SUPPOSITORY RECTAL at 09:03

## 2017-01-01 RX ADMIN — CIPROFLOXACIN HYDROCHLORIDE 500 MG: 500 TABLET, FILM COATED ORAL at 16:22

## 2017-01-01 RX ADMIN — NITROGLYCERIN 1 INCH: 20 OINTMENT TOPICAL at 17:24

## 2017-01-01 RX ADMIN — INSULIN LISPRO 2 UNITS: 100 INJECTION, SOLUTION INTRAVENOUS; SUBCUTANEOUS at 00:14

## 2017-01-01 RX ADMIN — LABETALOL HYDROCHLORIDE 10 MG: 5 INJECTION INTRAVENOUS at 23:17

## 2017-01-01 RX ADMIN — METRONIDAZOLE 500 MG: 500 INJECTION, SOLUTION INTRAVENOUS at 21:53

## 2017-01-01 RX ADMIN — VANCOMYCIN HYDROCHLORIDE 250 MG: 1 INJECTION, POWDER, LYOPHILIZED, FOR SOLUTION INTRAVENOUS at 23:29

## 2017-01-01 RX ADMIN — DILTIAZEM HYDROCHLORIDE 10 MG: 5 INJECTION INTRAVENOUS at 12:05

## 2017-01-01 RX ADMIN — ASPIRIN 300 MG: 300 SUPPOSITORY RECTAL at 09:13

## 2017-01-01 RX ADMIN — FENTANYL CITRATE 50 MCG: 50 INJECTION, SOLUTION INTRAMUSCULAR; INTRAVENOUS at 10:14

## 2017-01-01 RX ADMIN — PROPOFOL 40 MCG/KG/MIN: 10 INJECTION, EMULSION INTRAVENOUS at 03:36

## 2017-01-01 RX ADMIN — ENOXAPARIN SODIUM 40 MG: 40 INJECTION SUBCUTANEOUS at 09:42

## 2017-01-01 RX ADMIN — FAMOTIDINE 20 MG: 10 INJECTION INTRAVENOUS at 21:58

## 2017-01-01 RX ADMIN — LINEZOLID 600 MG: 600 INJECTION, SOLUTION INTRAVENOUS at 22:04

## 2017-01-01 RX ADMIN — Medication 30 ML: at 05:29

## 2017-01-01 RX ADMIN — CEFEPIME HYDROCHLORIDE 2 G: 2 INJECTION, POWDER, FOR SOLUTION INTRAVENOUS at 04:31

## 2017-01-01 RX ADMIN — Medication 10 ML: at 21:59

## 2017-01-01 RX ADMIN — SODIUM CHLORIDE 100 ML/HR: 900 INJECTION, SOLUTION INTRAVENOUS at 08:33

## 2017-01-01 RX ADMIN — VANCOMYCIN HYDROCHLORIDE 250 MG: 1 INJECTION, POWDER, LYOPHILIZED, FOR SOLUTION INTRAVENOUS at 17:50

## 2017-01-01 RX ADMIN — LEVALBUTEROL HYDROCHLORIDE 0.63 MG: 0.63 SOLUTION RESPIRATORY (INHALATION) at 04:12

## 2017-01-01 RX ADMIN — AMIODARONE HYDROCHLORIDE 0.5 MG/MIN: 50 INJECTION, SOLUTION INTRAVENOUS at 22:21

## 2017-01-01 RX ADMIN — ACETAMINOPHEN 650 MG: 325 SOLUTION ORAL at 19:55

## 2017-01-01 RX ADMIN — INSULIN LISPRO 2 UNITS: 100 INJECTION, SOLUTION INTRAVENOUS; SUBCUTANEOUS at 13:51

## 2017-01-01 RX ADMIN — SODIUM CHLORIDE 3 G: 900 INJECTION, SOLUTION INTRAVENOUS at 05:11

## 2017-01-01 RX ADMIN — CEFEPIME HYDROCHLORIDE 2 G: 2 INJECTION, POWDER, FOR SOLUTION INTRAVENOUS at 03:38

## 2017-01-01 RX ADMIN — LABETALOL HYDROCHLORIDE 10 MG: 5 INJECTION INTRAVENOUS at 05:56

## 2017-01-01 RX ADMIN — PROPOFOL 25 MCG/KG/MIN: 10 INJECTION, EMULSION INTRAVENOUS at 11:56

## 2017-01-01 RX ADMIN — Medication 200 MCG/HR: at 08:51

## 2017-01-01 RX ADMIN — FAMOTIDINE 20 MG: 10 INJECTION INTRAVENOUS at 21:33

## 2017-01-01 RX ADMIN — Medication 10 ML: at 05:40

## 2017-01-01 RX ADMIN — IOPAMIDOL 30 ML: 755 INJECTION, SOLUTION INTRAVENOUS at 09:00

## 2017-01-01 RX ADMIN — MORPHINE SULFATE 1 MG: 2 INJECTION, SOLUTION INTRAMUSCULAR; INTRAVENOUS at 22:52

## 2017-01-01 RX ADMIN — FENTANYL CITRATE 50 MCG: 50 INJECTION, SOLUTION INTRAMUSCULAR; INTRAVENOUS at 23:39

## 2017-01-01 RX ADMIN — Medication 10 ML: at 22:35

## 2017-01-01 RX ADMIN — Medication 10 ML: at 05:55

## 2017-01-01 RX ADMIN — METRONIDAZOLE 500 MG: 500 INJECTION, SOLUTION INTRAVENOUS at 14:03

## 2017-01-01 RX ADMIN — ACETAMINOPHEN 975 MG: 325 SOLUTION ORAL at 00:19

## 2017-01-01 RX ADMIN — Medication 10 ML: at 14:54

## 2017-01-01 RX ADMIN — FAMOTIDINE 20 MG: 10 INJECTION INTRAVENOUS at 09:37

## 2017-01-01 RX ADMIN — CLOPIDOGREL BISULFATE 75 MG: 75 TABLET ORAL at 08:41

## 2017-01-01 RX ADMIN — Medication 20 ML: at 06:16

## 2017-01-01 RX ADMIN — NITROGLYCERIN 1 INCH: 20 OINTMENT TOPICAL at 18:04

## 2017-01-01 RX ADMIN — VANCOMYCIN HYDROCHLORIDE 250 MG: 1 INJECTION, POWDER, LYOPHILIZED, FOR SOLUTION INTRAVENOUS at 18:21

## 2017-01-01 RX ADMIN — FINASTERIDE 5 MG: 5 TABLET, FILM COATED ORAL at 11:24

## 2017-01-01 RX ADMIN — CHLORHEXIDINE GLUCONATE 15 ML: 1.2 RINSE ORAL at 21:00

## 2017-01-01 RX ADMIN — Medication 150 MCG/HR: at 17:55

## 2017-01-01 RX ADMIN — FUROSEMIDE 40 MG: 10 INJECTION, SOLUTION INTRAMUSCULAR; INTRAVENOUS at 04:44

## 2017-01-01 RX ADMIN — LABETALOL HYDROCHLORIDE 10 MG: 5 INJECTION INTRAVENOUS at 00:16

## 2017-01-01 RX ADMIN — METOCLOPRAMIDE 5 MG: 5 INJECTION, SOLUTION INTRAMUSCULAR; INTRAVENOUS at 23:34

## 2017-01-01 RX ADMIN — FENTANYL CITRATE 25 MCG: 50 INJECTION, SOLUTION INTRAMUSCULAR; INTRAVENOUS at 02:15

## 2017-01-01 RX ADMIN — ENOXAPARIN SODIUM 40 MG: 30 INJECTION SUBCUTANEOUS at 09:17

## 2017-01-01 RX ADMIN — SODIUM CHLORIDE 7.5 MG/HR: 900 INJECTION, SOLUTION INTRAVENOUS at 00:19

## 2017-01-01 RX ADMIN — LABETALOL HYDROCHLORIDE 10 MG: 5 INJECTION INTRAVENOUS at 17:30

## 2017-01-01 RX ADMIN — AMIODARONE HYDROCHLORIDE 0.5 MG/MIN: 50 INJECTION, SOLUTION INTRAVENOUS at 04:32

## 2017-01-01 RX ADMIN — Medication 10 ML: at 11:24

## 2017-01-01 RX ADMIN — Medication 10 ML: at 05:18

## 2017-01-01 RX ADMIN — FAMOTIDINE: 10 INJECTION INTRAVENOUS at 17:45

## 2017-01-01 RX ADMIN — GLYCOPYRROLATE 0.4 MG: 0.2 INJECTION INTRAMUSCULAR; INTRAVENOUS at 01:15

## 2017-01-01 RX ADMIN — Medication 10 ML: at 14:07

## 2017-01-01 RX ADMIN — LABETALOL HYDROCHLORIDE 10 MG: 5 INJECTION INTRAVENOUS at 05:41

## 2017-01-01 RX ADMIN — SODIUM CHLORIDE, SODIUM LACTATE, POTASSIUM CHLORIDE, CALCIUM CHLORIDE: 600; 310; 30; 20 INJECTION, SOLUTION INTRAVENOUS at 03:25

## 2017-01-01 RX ADMIN — VANCOMYCIN HYDROCHLORIDE 250 MG: 1 INJECTION, POWDER, LYOPHILIZED, FOR SOLUTION INTRAVENOUS at 05:33

## 2017-01-01 RX ADMIN — LABETALOL HYDROCHLORIDE 10 MG: 5 INJECTION INTRAVENOUS at 11:23

## 2017-01-01 RX ADMIN — INSULIN LISPRO 2 UNITS: 100 INJECTION, SOLUTION INTRAVENOUS; SUBCUTANEOUS at 06:45

## 2017-01-01 RX ADMIN — LABETALOL HYDROCHLORIDE 10 MG: 5 INJECTION INTRAVENOUS at 23:28

## 2017-01-01 RX ADMIN — LABETALOL HYDROCHLORIDE 10 MG: 5 INJECTION INTRAVENOUS at 05:51

## 2017-01-01 RX ADMIN — SODIUM CHLORIDE 10 MG/HR: 900 INJECTION, SOLUTION INTRAVENOUS at 01:15

## 2017-01-01 RX ADMIN — ATORVASTATIN CALCIUM 20 MG: 20 TABLET, FILM COATED ORAL at 21:12

## 2017-01-01 RX ADMIN — FAMOTIDINE 20 MG: 10 INJECTION INTRAVENOUS at 08:57

## 2017-01-01 RX ADMIN — VANCOMYCIN HYDROCHLORIDE 250 MG: 1 INJECTION, POWDER, LYOPHILIZED, FOR SOLUTION INTRAVENOUS at 06:38

## 2017-01-01 RX ADMIN — INSULIN LISPRO 2 UNITS: 100 INJECTION, SOLUTION INTRAVENOUS; SUBCUTANEOUS at 11:58

## 2017-01-01 RX ADMIN — Medication 80 MCG: at 19:18

## 2017-01-01 RX ADMIN — NITROGLYCERIN 1 INCH: 20 OINTMENT TOPICAL at 18:44

## 2017-01-01 RX ADMIN — INSULIN LISPRO 2 UNITS: 100 INJECTION, SOLUTION INTRAVENOUS; SUBCUTANEOUS at 14:05

## 2017-01-01 RX ADMIN — SODIUM CHLORIDE 3 G: 900 INJECTION, SOLUTION INTRAVENOUS at 13:33

## 2017-01-01 RX ADMIN — LABETALOL HYDROCHLORIDE 10 MG: 5 INJECTION INTRAVENOUS at 11:22

## 2017-01-01 RX ADMIN — Medication: at 09:20

## 2017-01-01 RX ADMIN — MUPIROCIN: 20 OINTMENT TOPICAL at 17:58

## 2017-01-01 RX ADMIN — Medication 10 ML: at 20:15

## 2017-01-01 RX ADMIN — MUPIROCIN: 20 OINTMENT TOPICAL at 18:02

## 2017-01-01 RX ADMIN — ASPIRIN 81 MG 81 MG: 81 TABLET ORAL at 09:16

## 2017-01-01 RX ADMIN — Medication 10 ML: at 20:46

## 2017-01-01 RX ADMIN — CHLORHEXIDINE GLUCONATE 15 ML: 1.2 RINSE ORAL at 20:19

## 2017-01-01 RX ADMIN — ENOXAPARIN SODIUM 40 MG: 30 INJECTION SUBCUTANEOUS at 10:26

## 2017-01-01 RX ADMIN — WATER: 1000 INJECTION, SOLUTION INTRAVENOUS at 14:16

## 2017-01-01 RX ADMIN — FUROSEMIDE 40 MG: 10 INJECTION, SOLUTION INTRAMUSCULAR; INTRAVENOUS at 09:15

## 2017-01-01 RX ADMIN — ASPIRIN 300 MG: 300 SUPPOSITORY RECTAL at 09:28

## 2017-01-01 RX ADMIN — Medication 10 ML: at 05:44

## 2017-01-01 RX ADMIN — Medication 10 ML: at 05:10

## 2017-01-01 RX ADMIN — ENOXAPARIN SODIUM 40 MG: 30 INJECTION SUBCUTANEOUS at 09:42

## 2017-01-01 RX ADMIN — NITROGLYCERIN 1 INCH: 20 OINTMENT TOPICAL at 09:38

## 2017-01-01 RX ADMIN — LABETALOL HYDROCHLORIDE 10 MG: 5 INJECTION INTRAVENOUS at 00:52

## 2017-01-01 RX ADMIN — Medication 10 ML: at 04:44

## 2017-01-01 RX ADMIN — ASPIRIN 300 MG: 300 SUPPOSITORY RECTAL at 08:10

## 2017-01-01 RX ADMIN — DEXTROSE MONOHYDRATE 100 ML/HR: 5 INJECTION, SOLUTION INTRAVENOUS at 12:13

## 2017-01-01 RX ADMIN — Medication 10 ML: at 16:07

## 2017-01-01 RX ADMIN — DEXTROSE MONOHYDRATE 10 MG/HR: 50 INJECTION, SOLUTION INTRAVENOUS at 20:20

## 2017-01-01 RX ADMIN — MUPIROCIN: 20 OINTMENT TOPICAL at 09:57

## 2017-01-01 RX ADMIN — HYDROMORPHONE HYDROCHLORIDE 0.2 MG: 1 INJECTION, SOLUTION INTRAMUSCULAR; INTRAVENOUS; SUBCUTANEOUS at 08:39

## 2017-01-01 RX ADMIN — LINEZOLID 600 MG: 600 INJECTION, SOLUTION INTRAVENOUS at 08:01

## 2017-01-01 RX ADMIN — ASPIRIN 81 MG 81 MG: 81 TABLET ORAL at 09:52

## 2017-01-01 RX ADMIN — NITROGLYCERIN 1 INCH: 20 OINTMENT TOPICAL at 18:13

## 2017-01-01 RX ADMIN — Medication 30 ML: at 13:12

## 2017-01-01 RX ADMIN — NITROGLYCERIN 1 INCH: 20 OINTMENT TOPICAL at 17:22

## 2017-01-01 RX ADMIN — FAMOTIDINE 20 MG: 10 INJECTION INTRAVENOUS at 09:25

## 2017-01-01 RX ADMIN — ONDANSETRON 4 MG: 2 INJECTION INTRAMUSCULAR; INTRAVENOUS at 19:32

## 2017-01-01 RX ADMIN — METRONIDAZOLE 500 MG: 500 INJECTION, SOLUTION INTRAVENOUS at 13:42

## 2017-01-01 RX ADMIN — IPRATROPIUM BROMIDE AND ALBUTEROL SULFATE 3 ML: .5; 3 SOLUTION RESPIRATORY (INHALATION) at 15:30

## 2017-01-01 RX ADMIN — I.V. FAT EMULSION 250 ML: 20 EMULSION INTRAVENOUS at 16:53

## 2017-01-01 RX ADMIN — Medication 10 ML: at 15:04

## 2017-01-01 RX ADMIN — MORPHINE SULFATE 2 MG: 2 INJECTION, SOLUTION INTRAMUSCULAR; INTRAVENOUS at 15:03

## 2017-01-01 RX ADMIN — LISINOPRIL 5 MG: 5 TABLET ORAL at 11:21

## 2017-01-01 RX ADMIN — NOREPINEPHRINE BITARTRATE 5 MCG/MIN: 1 INJECTION, SOLUTION, CONCENTRATE INTRAVENOUS at 19:37

## 2017-01-01 RX ADMIN — Medication 10 ML: at 14:12

## 2017-01-01 RX ADMIN — DEXTROSE MONOHYDRATE 10 MG/HR: 50 INJECTION, SOLUTION INTRAVENOUS at 23:08

## 2017-01-01 RX ADMIN — LEVALBUTEROL HYDROCHLORIDE 0.63 MG: 0.63 SOLUTION RESPIRATORY (INHALATION) at 08:14

## 2017-01-01 RX ADMIN — Medication 10 ML: at 22:15

## 2017-01-01 RX ADMIN — MORPHINE SULFATE 2 MG: 2 INJECTION, SOLUTION INTRAMUSCULAR; INTRAVENOUS at 13:10

## 2017-01-01 RX ADMIN — Medication 20 ML: at 14:49

## 2017-01-01 RX ADMIN — Medication 10 ML: at 15:24

## 2017-01-01 RX ADMIN — Medication 10 ML: at 21:40

## 2017-01-01 RX ADMIN — METOPROLOL TARTRATE 5 MG: 5 INJECTION, SOLUTION INTRAVENOUS at 00:58

## 2017-01-01 RX ADMIN — LINEZOLID 600 MG: 600 INJECTION, SOLUTION INTRAVENOUS at 09:15

## 2017-01-01 RX ADMIN — CEFOXITIN 2 G: 2 INJECTION, POWDER, FOR SOLUTION INTRAVENOUS at 23:55

## 2017-01-01 RX ADMIN — MORPHINE SULFATE 1 MG: 2 INJECTION, SOLUTION INTRAMUSCULAR; INTRAVENOUS at 13:32

## 2017-01-01 RX ADMIN — MORPHINE SULFATE 2 MG: 2 INJECTION, SOLUTION INTRAMUSCULAR; INTRAVENOUS at 00:37

## 2017-01-01 RX ADMIN — LABETALOL HYDROCHLORIDE 10 MG: 5 INJECTION INTRAVENOUS at 07:49

## 2017-01-01 RX ADMIN — ASPIRIN 81 MG 81 MG: 81 TABLET ORAL at 11:21

## 2017-01-01 RX ADMIN — NITROGLYCERIN 1 INCH: 20 OINTMENT TOPICAL at 09:13

## 2017-01-01 RX ADMIN — ASPIRIN 81 MG 81 MG: 81 TABLET ORAL at 08:57

## 2017-01-01 RX ADMIN — CHLORHEXIDINE GLUCONATE 15 ML: 1.2 RINSE ORAL at 09:23

## 2017-01-01 RX ADMIN — NITROGLYCERIN 1 INCH: 20 OINTMENT TOPICAL at 10:16

## 2017-01-01 RX ADMIN — ROCURONIUM BROMIDE 20 MG: 10 INJECTION, SOLUTION INTRAVENOUS at 02:05

## 2017-01-01 RX ADMIN — CHLORHEXIDINE GLUCONATE 15 ML: 1.2 RINSE ORAL at 09:14

## 2017-01-01 RX ADMIN — Medication 10 ML: at 07:51

## 2017-01-01 RX ADMIN — NITROGLYCERIN 1 INCH: 20 OINTMENT TOPICAL at 18:24

## 2017-01-01 RX ADMIN — FAMOTIDINE 20 MG: 10 INJECTION INTRAVENOUS at 21:42

## 2017-01-01 RX ADMIN — SODIUM CHLORIDE 100 MG: 900 INJECTION, SOLUTION INTRAVENOUS at 07:56

## 2017-01-01 RX ADMIN — LABETALOL HYDROCHLORIDE 10 MG: 5 INJECTION INTRAVENOUS at 06:59

## 2017-01-01 RX ADMIN — ATORVASTATIN CALCIUM 20 MG: 20 TABLET, FILM COATED ORAL at 22:15

## 2017-01-01 RX ADMIN — DEXTROSE MONOHYDRATE 100 ML/HR: 5 INJECTION, SOLUTION INTRAVENOUS at 15:01

## 2017-01-01 RX ADMIN — HYDRALAZINE HYDROCHLORIDE 20 MG: 20 INJECTION INTRAMUSCULAR; INTRAVENOUS at 08:39

## 2017-01-01 RX ADMIN — FUROSEMIDE 40 MG: 10 INJECTION, SOLUTION INTRAMUSCULAR; INTRAVENOUS at 16:50

## 2017-01-01 RX ADMIN — ROCURONIUM BROMIDE 20 MG: 10 INJECTION, SOLUTION INTRAVENOUS at 19:38

## 2017-01-01 RX ADMIN — INSULIN LISPRO 2 UNITS: 100 INJECTION, SOLUTION INTRAVENOUS; SUBCUTANEOUS at 18:00

## 2017-01-01 RX ADMIN — Medication 120 MCG: at 01:30

## 2017-01-01 RX ADMIN — Medication 10 ML: at 13:54

## 2017-01-01 RX ADMIN — CHLORHEXIDINE GLUCONATE 15 ML: 1.2 RINSE ORAL at 20:51

## 2017-01-01 RX ADMIN — LEVALBUTEROL HYDROCHLORIDE 0.63 MG: 0.63 SOLUTION RESPIRATORY (INHALATION) at 07:20

## 2017-01-01 RX ADMIN — ONDANSETRON HYDROCHLORIDE 4 MG: 2 INJECTION, SOLUTION INTRAMUSCULAR; INTRAVENOUS at 14:08

## 2017-01-01 RX ADMIN — SODIUM CHLORIDE 250 ML: 900 INJECTION, SOLUTION INTRAVENOUS at 08:32

## 2017-01-01 RX ADMIN — IOPAMIDOL 110 ML: 755 INJECTION, SOLUTION INTRAVENOUS at 09:37

## 2017-01-01 RX ADMIN — NITROGLYCERIN 1 INCH: 20 OINTMENT TOPICAL at 17:56

## 2017-01-01 RX ADMIN — Medication 10 ML: at 05:48

## 2017-01-01 RX ADMIN — ENOXAPARIN SODIUM 40 MG: 30 INJECTION SUBCUTANEOUS at 09:30

## 2017-01-01 RX ADMIN — PROPOFOL 40 MCG/KG/MIN: 10 INJECTION, EMULSION INTRAVENOUS at 07:14

## 2017-01-01 RX ADMIN — INSULIN LISPRO 2 UNITS: 100 INJECTION, SOLUTION INTRAVENOUS; SUBCUTANEOUS at 23:58

## 2017-01-01 RX ADMIN — Medication 10 ML: at 05:09

## 2017-01-01 RX ADMIN — AMPICILLIN 2 G: 2 INJECTION, POWDER, FOR SOLUTION INTRAVENOUS at 02:17

## 2017-01-01 RX ADMIN — LABETALOL HYDROCHLORIDE 20 MG: 5 INJECTION, SOLUTION INTRAVENOUS at 14:08

## 2017-01-01 RX ADMIN — LABETALOL HYDROCHLORIDE 10 MG: 5 INJECTION INTRAVENOUS at 18:05

## 2017-01-01 RX ADMIN — Medication 10 ML: at 21:56

## 2017-01-01 RX ADMIN — METRONIDAZOLE 500 MG: 500 INJECTION, SOLUTION INTRAVENOUS at 05:37

## 2017-01-01 RX ADMIN — Medication 10 ML: at 14:17

## 2017-01-01 RX ADMIN — HALOPERIDOL LACTATE 3 MG: 5 INJECTION, SOLUTION INTRAMUSCULAR at 17:27

## 2017-01-01 RX ADMIN — LABETALOL HYDROCHLORIDE 10 MG: 5 INJECTION INTRAVENOUS at 05:15

## 2017-01-01 RX ADMIN — ATORVASTATIN CALCIUM 20 MG: 20 TABLET, FILM COATED ORAL at 22:05

## 2017-01-01 RX ADMIN — INSULIN LISPRO 2 UNITS: 100 INJECTION, SOLUTION INTRAVENOUS; SUBCUTANEOUS at 12:41

## 2017-01-01 RX ADMIN — PROPOFOL 5 MCG/KG/MIN: 10 INJECTION, EMULSION INTRAVENOUS at 20:50

## 2017-01-01 RX ADMIN — INSULIN LISPRO 2 UNITS: 100 INJECTION, SOLUTION INTRAVENOUS; SUBCUTANEOUS at 11:29

## 2017-01-01 RX ADMIN — Medication 10 ML: at 15:12

## 2017-01-01 RX ADMIN — ONDANSETRON 4 MG: 2 INJECTION INTRAMUSCULAR; INTRAVENOUS at 19:45

## 2017-01-01 RX ADMIN — SODIUM CHLORIDE 3 G: 900 INJECTION, SOLUTION INTRAVENOUS at 13:00

## 2017-01-01 RX ADMIN — Medication 10 ML: at 15:06

## 2017-01-01 RX ADMIN — Medication 40 ML: at 14:35

## 2017-01-01 RX ADMIN — PROPOFOL 35 MCG/KG/MIN: 10 INJECTION, EMULSION INTRAVENOUS at 03:53

## 2017-01-01 RX ADMIN — OXYBUTYNIN CHLORIDE 15 MG: 5 TABLET, EXTENDED RELEASE ORAL at 15:05

## 2017-01-01 RX ADMIN — NITROGLYCERIN 1 INCH: 20 OINTMENT TOPICAL at 17:55

## 2017-01-01 RX ADMIN — NITROGLYCERIN 1 INCH: 20 OINTMENT TOPICAL at 09:01

## 2017-01-01 RX ADMIN — ASPIRIN 300 MG: 300 SUPPOSITORY RECTAL at 09:01

## 2017-01-01 RX ADMIN — Medication 10 ML: at 12:45

## 2017-01-01 RX ADMIN — INSULIN LISPRO 2 UNITS: 100 INJECTION, SOLUTION INTRAVENOUS; SUBCUTANEOUS at 23:26

## 2017-01-01 RX ADMIN — SODIUM CHLORIDE, SODIUM LACTATE, POTASSIUM CHLORIDE, AND CALCIUM CHLORIDE 75 ML/HR: 600; 310; 30; 20 INJECTION, SOLUTION INTRAVENOUS at 15:07

## 2017-01-01 RX ADMIN — FUROSEMIDE 40 MG: 10 INJECTION, SOLUTION INTRAMUSCULAR; INTRAVENOUS at 17:37

## 2017-01-01 RX ADMIN — HYDROMORPHONE HYDROCHLORIDE 0.5 MG: 1 INJECTION, SOLUTION INTRAMUSCULAR; INTRAVENOUS; SUBCUTANEOUS at 22:37

## 2017-01-01 RX ADMIN — FAMOTIDINE 20 MG: 10 INJECTION INTRAVENOUS at 09:42

## 2017-01-01 RX ADMIN — Medication 10 ML: at 05:23

## 2017-01-01 RX ADMIN — Medication 10 ML: at 00:13

## 2017-01-01 RX ADMIN — HEPARIN SODIUM 5000 UNITS: 5000 INJECTION, SOLUTION INTRAVENOUS; SUBCUTANEOUS at 22:54

## 2017-01-01 RX ADMIN — HYDROMORPHONE HYDROCHLORIDE 0.2 MG: 1 INJECTION, SOLUTION INTRAMUSCULAR; INTRAVENOUS; SUBCUTANEOUS at 00:22

## 2017-01-01 RX ADMIN — DILTIAZEM HYDROCHLORIDE 10 MG: 5 INJECTION INTRAVENOUS at 06:54

## 2017-01-01 RX ADMIN — FAMOTIDINE 20 MG: 10 INJECTION INTRAVENOUS at 09:15

## 2017-01-01 RX ADMIN — Medication 10 ML: at 06:34

## 2017-01-01 RX ADMIN — SODIUM BICARBONATE: 84 INJECTION, SOLUTION INTRAVENOUS at 09:29

## 2017-01-01 RX ADMIN — Medication 10 ML: at 13:01

## 2017-01-01 RX ADMIN — PROPOFOL 10 MCG/KG/MIN: 10 INJECTION, EMULSION INTRAVENOUS at 14:30

## 2017-01-01 RX ADMIN — Medication 20 ML: at 22:25

## 2017-01-01 RX ADMIN — INSULIN LISPRO 2 UNITS: 100 INJECTION, SOLUTION INTRAVENOUS; SUBCUTANEOUS at 12:25

## 2017-01-01 RX ADMIN — Medication 20 ML: at 21:42

## 2017-01-01 RX ADMIN — Medication 10 ML: at 15:18

## 2017-01-01 RX ADMIN — HYDRALAZINE HYDROCHLORIDE 20 MG: 20 INJECTION INTRAMUSCULAR; INTRAVENOUS at 05:28

## 2017-01-01 RX ADMIN — CHLORHEXIDINE GLUCONATE 15 ML: 1.2 RINSE ORAL at 08:35

## 2017-01-01 RX ADMIN — ASPIRIN 300 MG: 300 SUPPOSITORY RECTAL at 09:55

## 2017-01-01 RX ADMIN — INSULIN LISPRO 2 UNITS: 100 INJECTION, SOLUTION INTRAVENOUS; SUBCUTANEOUS at 23:24

## 2017-01-01 RX ADMIN — Medication 10 ML: at 06:11

## 2017-01-01 RX ADMIN — Medication 30 ML: at 05:08

## 2017-01-01 RX ADMIN — ASCORBIC ACID: 500 INJECTION, SOLUTION INTRAMUSCULAR; INTRAVENOUS; SUBCUTANEOUS at 17:52

## 2017-01-01 RX ADMIN — Medication 10 ML: at 12:02

## 2017-01-01 RX ADMIN — CEFEPIME HYDROCHLORIDE 2 G: 2 INJECTION, POWDER, FOR SOLUTION INTRAVENOUS at 15:20

## 2017-01-01 RX ADMIN — VANCOMYCIN HYDROCHLORIDE 250 MG: 1 INJECTION, POWDER, LYOPHILIZED, FOR SOLUTION INTRAVENOUS at 00:06

## 2017-01-01 RX ADMIN — Medication 10 ML: at 05:26

## 2017-01-01 RX ADMIN — FAMOTIDINE 20 MG: 10 INJECTION, SOLUTION INTRAVENOUS at 09:54

## 2017-01-01 RX ADMIN — ACETAMINOPHEN 650 MG: 650 SUPPOSITORY RECTAL at 23:24

## 2017-01-01 RX ADMIN — INSULIN LISPRO 2 UNITS: 100 INJECTION, SOLUTION INTRAVENOUS; SUBCUTANEOUS at 17:57

## 2017-01-01 RX ADMIN — VANCOMYCIN HYDROCHLORIDE 250 MG: 1 INJECTION, POWDER, LYOPHILIZED, FOR SOLUTION INTRAVENOUS at 23:41

## 2017-01-01 RX ADMIN — INSULIN LISPRO 2 UNITS: 100 INJECTION, SOLUTION INTRAVENOUS; SUBCUTANEOUS at 00:13

## 2017-01-01 RX ADMIN — HEPARIN SODIUM 1000 UNITS: 200 INJECTION, SOLUTION INTRAVENOUS at 08:45

## 2017-01-01 RX ADMIN — Medication 10 ML: at 05:11

## 2017-01-01 RX ADMIN — PROPOFOL 25 MCG/KG/MIN: 10 INJECTION, EMULSION INTRAVENOUS at 22:12

## 2017-01-01 RX ADMIN — PHENAZOPYRIDINE HYDROCHLORIDE 200 MG: 100 TABLET ORAL at 11:26

## 2017-01-01 RX ADMIN — MORPHINE SULFATE 1 MG: 2 INJECTION, SOLUTION INTRAMUSCULAR; INTRAVENOUS at 04:33

## 2017-01-01 RX ADMIN — Medication 10 ML: at 05:24

## 2017-01-01 RX ADMIN — LABETALOL HYDROCHLORIDE 10 MG: 5 INJECTION INTRAVENOUS at 17:41

## 2017-01-01 RX ADMIN — ATORVASTATIN CALCIUM 20 MG: 20 TABLET, FILM COATED ORAL at 21:08

## 2017-01-01 RX ADMIN — DEXTROSE MONOHYDRATE 10 MG/HR: 50 INJECTION, SOLUTION INTRAVENOUS at 14:54

## 2017-01-01 RX ADMIN — PHENYLEPHRINE HYDROCHLORIDE 30 MCG/MIN: 10 INJECTION INTRAVENOUS at 22:34

## 2017-01-01 RX ADMIN — IOPAMIDOL 100 ML: 755 INJECTION, SOLUTION INTRAVENOUS at 10:28

## 2017-01-01 RX ADMIN — NITROGLYCERIN 1 INCH: 20 OINTMENT TOPICAL at 18:16

## 2017-01-01 RX ADMIN — AMPICILLIN 2 G: 2 INJECTION, POWDER, FOR SOLUTION INTRAVENOUS at 11:59

## 2017-01-01 RX ADMIN — Medication 10 ML: at 07:05

## 2017-01-01 RX ADMIN — AMIODARONE HYDROCHLORIDE 0.5 MG/MIN: 50 INJECTION, SOLUTION INTRAVENOUS at 05:09

## 2017-01-01 RX ADMIN — CARVEDILOL 25 MG: 12.5 TABLET, FILM COATED ORAL at 09:16

## 2017-01-01 RX ADMIN — Medication 10 ML: at 06:50

## 2017-01-01 RX ADMIN — Medication 10 ML: at 20:08

## 2017-01-01 RX ADMIN — METRONIDAZOLE 500 MG: 500 INJECTION, SOLUTION INTRAVENOUS at 06:26

## 2017-01-01 RX ADMIN — Medication 10 ML: at 15:13

## 2017-01-01 RX ADMIN — I.V. FAT EMULSION 250 ML: 20 EMULSION INTRAVENOUS at 17:44

## 2017-01-01 RX ADMIN — SODIUM CHLORIDE 100 MG: 900 INJECTION, SOLUTION INTRAVENOUS at 08:58

## 2017-01-01 RX ADMIN — HYDROMORPHONE HYDROCHLORIDE 0.1 MG: 1 INJECTION, SOLUTION INTRAMUSCULAR; INTRAVENOUS; SUBCUTANEOUS at 13:55

## 2017-01-01 RX ADMIN — CHLORHEXIDINE GLUCONATE 15 ML: 1.2 RINSE ORAL at 08:11

## 2017-01-01 RX ADMIN — NITROGLYCERIN 1 INCH: 20 OINTMENT TOPICAL at 09:19

## 2017-01-01 RX ADMIN — Medication 10 ML: at 07:56

## 2017-01-01 RX ADMIN — INSULIN LISPRO 2 UNITS: 100 INJECTION, SOLUTION INTRAVENOUS; SUBCUTANEOUS at 12:00

## 2017-01-01 RX ADMIN — MUPIROCIN: 20 OINTMENT TOPICAL at 20:04

## 2017-01-01 RX ADMIN — Medication 20 ML: at 22:00

## 2017-01-01 RX ADMIN — ACETAMINOPHEN 1000 MG: 10 INJECTION, SOLUTION INTRAVENOUS at 11:12

## 2017-01-01 RX ADMIN — Medication 2 G: at 01:12

## 2017-01-01 RX ADMIN — PHENAZOPYRIDINE HYDROCHLORIDE 200 MG: 100 TABLET ORAL at 14:08

## 2017-01-01 RX ADMIN — ASPIRIN 300 MG: 300 SUPPOSITORY RECTAL at 09:09

## 2017-01-01 RX ADMIN — FENTANYL CITRATE 50 MCG: 50 INJECTION, SOLUTION INTRAMUSCULAR; INTRAVENOUS at 00:43

## 2017-01-01 RX ADMIN — FAMOTIDINE 20 MG: 10 INJECTION, SOLUTION INTRAVENOUS at 11:11

## 2017-01-01 RX ADMIN — SODIUM CHLORIDE 100 ML/HR: 900 INJECTION, SOLUTION INTRAVENOUS at 19:18

## 2017-01-01 RX ADMIN — SODIUM CHLORIDE 100 MG: 900 INJECTION, SOLUTION INTRAVENOUS at 11:28

## 2017-01-01 RX ADMIN — Medication 10 ML: at 00:37

## 2017-01-01 RX ADMIN — GLYCOPYRROLATE 0.8 MG: 0.2 INJECTION INTRAMUSCULAR; INTRAVENOUS at 03:25

## 2017-01-01 RX ADMIN — ENOXAPARIN SODIUM 40 MG: 40 INJECTION SUBCUTANEOUS at 09:56

## 2017-01-01 RX ADMIN — SUCCINYLCHOLINE CHLORIDE 180 MG: 20 INJECTION INTRAMUSCULAR; INTRAVENOUS at 19:08

## 2017-01-01 RX ADMIN — METRONIDAZOLE 500 MG: 500 INJECTION, SOLUTION INTRAVENOUS at 05:09

## 2017-01-01 RX ADMIN — INSULIN LISPRO 2 UNITS: 100 INJECTION, SOLUTION INTRAVENOUS; SUBCUTANEOUS at 11:34

## 2017-01-01 RX ADMIN — LEVALBUTEROL HYDROCHLORIDE 0.63 MG: 0.63 SOLUTION RESPIRATORY (INHALATION) at 23:02

## 2017-01-01 RX ADMIN — PHENYLEPHRINE HYDROCHLORIDE 90 MCG/MIN: 10 INJECTION INTRAVENOUS at 04:44

## 2017-01-01 RX ADMIN — ACETAMINOPHEN 1000 MG: 10 INJECTION, SOLUTION INTRAVENOUS at 01:34

## 2017-01-01 RX ADMIN — SODIUM CHLORIDE 10 ML/HR: 900 INJECTION, SOLUTION INTRAVENOUS at 17:55

## 2017-01-01 RX ADMIN — PROPOFOL 5 MCG/KG/MIN: 10 INJECTION, EMULSION INTRAVENOUS at 08:02

## 2017-01-01 RX ADMIN — FLUMAZENIL 0.3 MG: 0.1 INJECTION, SOLUTION INTRAVENOUS at 19:42

## 2017-01-01 RX ADMIN — INSULIN LISPRO 3 UNITS: 100 INJECTION, SOLUTION INTRAVENOUS; SUBCUTANEOUS at 06:38

## 2017-01-01 RX ADMIN — ASCORBIC ACID: 500 INJECTION, SOLUTION INTRAMUSCULAR; INTRAVENOUS; SUBCUTANEOUS at 17:38

## 2017-01-01 RX ADMIN — LINEZOLID 600 MG: 600 INJECTION, SOLUTION INTRAVENOUS at 11:21

## 2017-01-01 RX ADMIN — SODIUM BICARBONATE 100 MEQ: 84 INJECTION, SOLUTION INTRAVENOUS at 06:13

## 2017-01-01 RX ADMIN — VANCOMYCIN HYDROCHLORIDE 250 MG: 1 INJECTION, POWDER, LYOPHILIZED, FOR SOLUTION INTRAVENOUS at 05:21

## 2017-01-01 RX ADMIN — INSULIN LISPRO 2 UNITS: 100 INJECTION, SOLUTION INTRAVENOUS; SUBCUTANEOUS at 06:00

## 2017-01-01 RX ADMIN — PROPOFOL 10 MCG/KG/MIN: 10 INJECTION, EMULSION INTRAVENOUS at 14:19

## 2017-01-01 RX ADMIN — HEPARIN SODIUM 5000 UNITS: 5000 INJECTION, SOLUTION INTRAVENOUS; SUBCUTANEOUS at 12:57

## 2017-01-01 RX ADMIN — PROPOFOL 30 MCG/KG/MIN: 10 INJECTION, EMULSION INTRAVENOUS at 19:35

## 2017-01-01 RX ADMIN — Medication 10 ML: at 06:15

## 2017-01-01 RX ADMIN — HEPARIN SODIUM 5000 UNITS: 5000 INJECTION, SOLUTION INTRAVENOUS; SUBCUTANEOUS at 23:43

## 2017-01-01 RX ADMIN — Medication 10 ML: at 05:12

## 2017-01-01 RX ADMIN — CEFTRIAXONE 1 G: 1 INJECTION, POWDER, FOR SOLUTION INTRAMUSCULAR; INTRAVENOUS at 08:56

## 2017-01-01 RX ADMIN — Medication 10 ML: at 06:38

## 2017-01-01 RX ADMIN — WATER: 1000 INJECTION, SOLUTION INTRAVENOUS at 10:57

## 2017-01-01 RX ADMIN — SODIUM CHLORIDE 3 G: 900 INJECTION, SOLUTION INTRAVENOUS at 15:30

## 2017-01-01 RX ADMIN — PROPOFOL 35 MCG/KG/MIN: 10 INJECTION, EMULSION INTRAVENOUS at 00:06

## 2017-01-01 RX ADMIN — ACETAMINOPHEN 1000 MG: 10 INJECTION, SOLUTION INTRAVENOUS at 05:49

## 2017-01-01 RX ADMIN — LABETALOL HYDROCHLORIDE 10 MG: 5 INJECTION INTRAVENOUS at 23:25

## 2017-01-01 RX ADMIN — ALBUMIN (HUMAN) 12.5 G: 0.25 INJECTION, SOLUTION INTRAVENOUS at 17:42

## 2017-01-01 RX ADMIN — HEPARIN SODIUM 5000 UNITS: 5000 INJECTION, SOLUTION INTRAVENOUS; SUBCUTANEOUS at 11:25

## 2017-01-01 RX ADMIN — HYDROMORPHONE HYDROCHLORIDE 0.2 MG: 1 INJECTION, SOLUTION INTRAMUSCULAR; INTRAVENOUS; SUBCUTANEOUS at 20:07

## 2017-01-01 RX ADMIN — LABETALOL HYDROCHLORIDE 10 MG: 5 INJECTION INTRAVENOUS at 11:19

## 2017-01-01 RX ADMIN — Medication 10 ML: at 07:02

## 2017-01-01 RX ADMIN — Medication 10 ML: at 21:52

## 2017-01-01 RX ADMIN — PROPOFOL 20 MCG/KG/MIN: 10 INJECTION, EMULSION INTRAVENOUS at 08:16

## 2017-01-01 RX ADMIN — HYDROMORPHONE HYDROCHLORIDE 0.2 MG: 1 INJECTION, SOLUTION INTRAMUSCULAR; INTRAVENOUS; SUBCUTANEOUS at 04:17

## 2017-01-01 RX ADMIN — SODIUM CHLORIDE 3 G: 900 INJECTION, SOLUTION INTRAVENOUS at 06:34

## 2017-01-01 RX ADMIN — LABETALOL HYDROCHLORIDE 10 MG: 5 INJECTION INTRAVENOUS at 11:59

## 2017-01-01 RX ADMIN — ENOXAPARIN SODIUM 40 MG: 30 INJECTION SUBCUTANEOUS at 10:21

## 2017-01-01 RX ADMIN — RETINOL, ERGOCALCIFEROL, .ALPHA.-TOCOPHEROL ACETATE, DL-, PHYTONADIONE, ASCORBIC ACID, NIACINAMIDE, RIBOFLAVIN 5-PHOSPHATE SODIUM, THIAMINE HYDROCHLORIDE, PYRIDOXINE HYDROCHLORIDE, DEXPANTHENOL, BIOTIN, FOLIC ACID, AND CYANOCOBALAMIN: KIT at 18:27

## 2017-01-01 RX ADMIN — LABETALOL HYDROCHLORIDE 10 MG: 5 INJECTION INTRAVENOUS at 12:27

## 2017-01-01 RX ADMIN — Medication 50 MCG/HR: at 18:26

## 2017-01-01 RX ADMIN — MORPHINE SULFATE 1 MG: 2 INJECTION, SOLUTION INTRAMUSCULAR; INTRAVENOUS at 18:06

## 2017-01-01 RX ADMIN — METOCLOPRAMIDE 5 MG: 5 INJECTION, SOLUTION INTRAMUSCULAR; INTRAVENOUS at 00:19

## 2017-01-01 RX ADMIN — FENTANYL CITRATE 50 MCG: 50 INJECTION, SOLUTION INTRAMUSCULAR; INTRAVENOUS at 08:29

## 2017-01-01 RX ADMIN — CEFEPIME HYDROCHLORIDE 2 G: 2 INJECTION, POWDER, FOR SOLUTION INTRAVENOUS at 02:36

## 2017-01-01 RX ADMIN — LABETALOL HYDROCHLORIDE 10 MG: 5 INJECTION INTRAVENOUS at 12:24

## 2017-01-01 RX ADMIN — ACETAMINOPHEN 650 MG: 650 SUPPOSITORY RECTAL at 15:58

## 2017-01-01 RX ADMIN — LABETALOL HYDROCHLORIDE 10 MG: 5 INJECTION INTRAVENOUS at 00:19

## 2017-01-01 RX ADMIN — PROPOFOL 40 MCG/KG/MIN: 10 INJECTION, EMULSION INTRAVENOUS at 16:31

## 2017-01-01 RX ADMIN — LABETALOL HYDROCHLORIDE 10 MG: 5 INJECTION INTRAVENOUS at 13:11

## 2017-01-01 RX ADMIN — LABETALOL HYDROCHLORIDE 10 MG: 5 INJECTION INTRAVENOUS at 18:47

## 2017-01-01 RX ADMIN — LISINOPRIL 5 MG: 5 TABLET ORAL at 08:41

## 2017-01-01 RX ADMIN — Medication 10 ML: at 05:45

## 2017-01-01 RX ADMIN — SODIUM CHLORIDE 10 MG/HR: 900 INJECTION, SOLUTION INTRAVENOUS at 16:58

## 2017-01-01 RX ADMIN — Medication 10 ML: at 02:20

## 2017-01-01 RX ADMIN — INSULIN LISPRO 3 UNITS: 100 INJECTION, SOLUTION INTRAVENOUS; SUBCUTANEOUS at 00:00

## 2017-01-01 RX ADMIN — FAMOTIDINE 20 MG: 10 INJECTION, SOLUTION INTRAVENOUS at 08:39

## 2017-01-01 RX ADMIN — ACETAMINOPHEN 1000 MG: 10 INJECTION, SOLUTION INTRAVENOUS at 19:14

## 2017-01-01 RX ADMIN — Medication 10 ML: at 14:15

## 2017-01-01 RX ADMIN — VECURONIUM BROMIDE FOR INJECTION 4 MG: 1 INJECTION, POWDER, LYOPHILIZED, FOR SOLUTION INTRAVENOUS at 19:55

## 2017-01-01 RX ADMIN — FUROSEMIDE 40 MG: 10 INJECTION, SOLUTION INTRAMUSCULAR; INTRAVENOUS at 08:42

## 2017-01-01 RX ADMIN — VANCOMYCIN HYDROCHLORIDE 250 MG: 1 INJECTION, POWDER, LYOPHILIZED, FOR SOLUTION INTRAVENOUS at 13:11

## 2017-01-01 RX ADMIN — NITROGLYCERIN 1 INCH: 20 OINTMENT TOPICAL at 09:06

## 2017-01-01 RX ADMIN — LINEZOLID 600 MG: 600 INJECTION, SOLUTION INTRAVENOUS at 20:56

## 2017-01-01 RX ADMIN — HEPARIN SODIUM 5000 UNITS: 5000 INJECTION, SOLUTION INTRAVENOUS; SUBCUTANEOUS at 22:24

## 2017-01-01 RX ADMIN — NITROGLYCERIN 1 INCH: 20 OINTMENT TOPICAL at 18:00

## 2017-01-01 RX ADMIN — DEXTROSE MONOHYDRATE 10 MG/HR: 50 INJECTION, SOLUTION INTRAVENOUS at 22:29

## 2017-01-01 RX ADMIN — MORPHINE SULFATE 1 MG: 2 INJECTION, SOLUTION INTRAMUSCULAR; INTRAVENOUS at 00:51

## 2017-01-01 RX ADMIN — HYDROMORPHONE HYDROCHLORIDE 0.2 MG: 1 INJECTION, SOLUTION INTRAMUSCULAR; INTRAVENOUS; SUBCUTANEOUS at 11:54

## 2017-01-01 RX ADMIN — ENOXAPARIN SODIUM 40 MG: 30 INJECTION SUBCUTANEOUS at 10:33

## 2017-01-01 RX ADMIN — LABETALOL HYDROCHLORIDE 10 MG: 5 INJECTION INTRAVENOUS at 13:53

## 2017-01-01 RX ADMIN — Medication 10 ML: at 12:24

## 2017-01-01 RX ADMIN — HYDROMORPHONE HYDROCHLORIDE 0.5 MG: 1 INJECTION, SOLUTION INTRAMUSCULAR; INTRAVENOUS; SUBCUTANEOUS at 05:10

## 2017-01-01 RX ADMIN — ACETAMINOPHEN 650 MG: 325 SOLUTION ORAL at 12:03

## 2017-01-01 RX ADMIN — FAMOTIDINE 20 MG: 10 INJECTION INTRAVENOUS at 21:40

## 2017-01-01 RX ADMIN — HYDRALAZINE HYDROCHLORIDE 20 MG: 20 INJECTION INTRAMUSCULAR; INTRAVENOUS at 15:13

## 2017-01-01 RX ADMIN — Medication 10 ML: at 20:29

## 2017-01-01 RX ADMIN — FAMOTIDINE 20 MG: 10 INJECTION INTRAVENOUS at 22:42

## 2017-01-01 RX ADMIN — INSULIN LISPRO 2 UNITS: 100 INJECTION, SOLUTION INTRAVENOUS; SUBCUTANEOUS at 05:37

## 2017-01-01 RX ADMIN — FAMOTIDINE: 10 INJECTION INTRAVENOUS at 20:20

## 2017-01-01 RX ADMIN — Medication 10 ML: at 22:06

## 2017-01-01 RX ADMIN — Medication 10 ML: at 06:21

## 2017-01-01 RX ADMIN — HEPARIN SODIUM 5000 UNITS: 5000 INJECTION, SOLUTION INTRAVENOUS; SUBCUTANEOUS at 14:14

## 2017-01-01 RX ADMIN — Medication 80 MCG: at 19:14

## 2017-01-01 RX ADMIN — ACETAMINOPHEN 650 MG: 325 SOLUTION ORAL at 09:17

## 2017-01-01 RX ADMIN — INSULIN LISPRO 2 UNITS: 100 INJECTION, SOLUTION INTRAVENOUS; SUBCUTANEOUS at 02:00

## 2017-01-01 RX ADMIN — SODIUM CHLORIDE 500 ML: 900 INJECTION, SOLUTION INTRAVENOUS at 04:38

## 2017-01-01 RX ADMIN — HYDROMORPHONE HYDROCHLORIDE 0.2 MG: 1 INJECTION, SOLUTION INTRAMUSCULAR; INTRAVENOUS; SUBCUTANEOUS at 05:05

## 2017-01-01 RX ADMIN — Medication 20 ML: at 13:52

## 2017-01-01 RX ADMIN — CLOPIDOGREL BISULFATE 75 MG: 75 TABLET ORAL at 11:21

## 2017-01-01 RX ADMIN — INSULIN LISPRO 2 UNITS: 100 INJECTION, SOLUTION INTRAVENOUS; SUBCUTANEOUS at 12:30

## 2017-01-01 RX ADMIN — LABETALOL HYDROCHLORIDE 10 MG: 5 INJECTION INTRAVENOUS at 17:18

## 2017-01-01 RX ADMIN — INSULIN LISPRO 2 UNITS: 100 INJECTION, SOLUTION INTRAVENOUS; SUBCUTANEOUS at 18:10

## 2017-01-01 RX ADMIN — NITROGLYCERIN 1 INCH: 20 OINTMENT TOPICAL at 18:51

## 2017-01-01 RX ADMIN — MUPIROCIN: 20 OINTMENT TOPICAL at 17:59

## 2017-01-01 RX ADMIN — PROPOFOL 40 MCG/KG/MIN: 10 INJECTION, EMULSION INTRAVENOUS at 00:24

## 2017-01-01 RX ADMIN — KETOROLAC TROMETHAMINE 15 MG: 30 INJECTION, SOLUTION INTRAMUSCULAR at 01:44

## 2017-01-01 RX ADMIN — HEPARIN SODIUM 5000 UNITS: 5000 INJECTION, SOLUTION INTRAVENOUS; SUBCUTANEOUS at 12:58

## 2017-01-01 RX ADMIN — LABETALOL HYDROCHLORIDE 10 MG: 5 INJECTION INTRAVENOUS at 23:53

## 2017-01-01 RX ADMIN — FAMOTIDINE: 10 INJECTION INTRAVENOUS at 18:03

## 2017-01-01 RX ADMIN — Medication 10 ML: at 20:26

## 2017-01-01 RX ADMIN — INSULIN LISPRO 2 UNITS: 100 INJECTION, SOLUTION INTRAVENOUS; SUBCUTANEOUS at 17:46

## 2017-01-01 RX ADMIN — CEFEPIME HYDROCHLORIDE 2 G: 2 INJECTION, POWDER, FOR SOLUTION INTRAVENOUS at 02:13

## 2017-01-01 RX ADMIN — CLOPIDOGREL BISULFATE 75 MG: 75 TABLET ORAL at 08:57

## 2017-01-01 RX ADMIN — Medication 40 ML: at 15:14

## 2017-01-01 RX ADMIN — CARVEDILOL 3.12 MG: 3.12 TABLET, FILM COATED ORAL at 22:05

## 2017-01-01 RX ADMIN — DEXTROSE MONOHYDRATE 10 MG/HR: 50 INJECTION, SOLUTION INTRAVENOUS at 09:56

## 2017-01-01 RX ADMIN — LABETALOL HYDROCHLORIDE 10 MG: 5 INJECTION INTRAVENOUS at 13:20

## 2017-01-01 RX ADMIN — MORPHINE SULFATE 1 MG: 2 INJECTION, SOLUTION INTRAMUSCULAR; INTRAVENOUS at 08:24

## 2017-01-01 RX ADMIN — NITROGLYCERIN 1 INCH: 20 OINTMENT TOPICAL at 09:25

## 2017-01-01 RX ADMIN — DEXTROSE MONOHYDRATE 10 MG/HR: 50 INJECTION, SOLUTION INTRAVENOUS at 02:47

## 2017-01-01 RX ADMIN — PROPOFOL 30 MCG/KG/MIN: 10 INJECTION, EMULSION INTRAVENOUS at 15:14

## 2017-01-01 RX ADMIN — HEPARIN SODIUM 5000 UNITS: 5000 INJECTION, SOLUTION INTRAVENOUS; SUBCUTANEOUS at 11:19

## 2017-01-01 RX ADMIN — INSULIN LISPRO 2 UNITS: 100 INJECTION, SOLUTION INTRAVENOUS; SUBCUTANEOUS at 17:56

## 2017-01-01 RX ADMIN — CARVEDILOL 6.25 MG: 3.12 TABLET, FILM COATED ORAL at 16:54

## 2017-01-01 RX ADMIN — LABETALOL HYDROCHLORIDE 10 MG: 5 INJECTION INTRAVENOUS at 12:23

## 2017-01-01 RX ADMIN — LIDOCAINE HYDROCHLORIDE 5 ML: 10 INJECTION, SOLUTION INFILTRATION; PERINEURAL at 08:45

## 2017-01-01 RX ADMIN — LINEZOLID 600 MG: 600 INJECTION, SOLUTION INTRAVENOUS at 08:59

## 2017-01-01 RX ADMIN — ENOXAPARIN SODIUM 40 MG: 30 INJECTION SUBCUTANEOUS at 10:37

## 2017-01-01 RX ADMIN — EPINEPHRINE 1 MG: 0.1 INJECTION, SOLUTION ENDOTRACHEAL; INTRACARDIAC; INTRAVENOUS at 12:37

## 2017-01-01 RX ADMIN — LABETALOL HYDROCHLORIDE 10 MG: 5 INJECTION INTRAVENOUS at 23:27

## 2017-01-01 RX ADMIN — SODIUM CHLORIDE 75 ML/HR: 900 INJECTION, SOLUTION INTRAVENOUS at 22:48

## 2017-01-01 RX ADMIN — INSULIN LISPRO 2 UNITS: 100 INJECTION, SOLUTION INTRAVENOUS; SUBCUTANEOUS at 11:45

## 2017-01-01 RX ADMIN — VANCOMYCIN HYDROCHLORIDE 1250 MG: 10 INJECTION, POWDER, LYOPHILIZED, FOR SOLUTION INTRAVENOUS at 04:44

## 2017-01-01 RX ADMIN — Medication 10 ML: at 13:52

## 2017-01-01 RX ADMIN — INSULIN LISPRO 2 UNITS: 100 INJECTION, SOLUTION INTRAVENOUS; SUBCUTANEOUS at 06:38

## 2017-01-01 RX ADMIN — OXYBUTYNIN CHLORIDE 15 MG: 5 TABLET, EXTENDED RELEASE ORAL at 04:02

## 2017-01-01 RX ADMIN — NITROGLYCERIN 1 INCH: 20 OINTMENT TOPICAL at 07:05

## 2017-01-01 RX ADMIN — INSULIN LISPRO 2 UNITS: 100 INJECTION, SOLUTION INTRAVENOUS; SUBCUTANEOUS at 13:10

## 2017-01-01 RX ADMIN — I.V. FAT EMULSION 250 ML: 20 EMULSION INTRAVENOUS at 18:15

## 2017-01-01 RX ADMIN — MORPHINE SULFATE 1 MG: 2 INJECTION, SOLUTION INTRAMUSCULAR; INTRAVENOUS at 16:06

## 2017-01-01 RX ADMIN — INSULIN LISPRO 2 UNITS: 100 INJECTION, SOLUTION INTRAVENOUS; SUBCUTANEOUS at 06:15

## 2017-01-01 RX ADMIN — METOPROLOL TARTRATE 5 MG: 5 INJECTION, SOLUTION INTRAVENOUS at 14:14

## 2017-01-01 RX ADMIN — MORPHINE SULFATE 1 MG: 2 INJECTION, SOLUTION INTRAMUSCULAR; INTRAVENOUS at 02:48

## 2017-01-01 RX ADMIN — INSULIN LISPRO 2 UNITS: 100 INJECTION, SOLUTION INTRAVENOUS; SUBCUTANEOUS at 13:41

## 2017-01-01 RX ADMIN — HEPARIN SODIUM 3000 UNITS: 1000 INJECTION, SOLUTION INTRAVENOUS; SUBCUTANEOUS at 10:23

## 2017-01-01 RX ADMIN — ASPIRIN 300 MG: 300 SUPPOSITORY RECTAL at 09:00

## 2017-01-01 RX ADMIN — INSULIN LISPRO 2 UNITS: 100 INJECTION, SOLUTION INTRAVENOUS; SUBCUTANEOUS at 06:24

## 2017-01-01 RX ADMIN — IOPAMIDOL 100 ML: 755 INJECTION, SOLUTION INTRAVENOUS at 15:24

## 2017-01-01 RX ADMIN — METRONIDAZOLE 500 MG: 500 INJECTION, SOLUTION INTRAVENOUS at 16:03

## 2017-01-01 RX ADMIN — LEVALBUTEROL HYDROCHLORIDE 0.63 MG: 0.63 SOLUTION RESPIRATORY (INHALATION) at 15:08

## 2017-01-01 RX ADMIN — INSULIN LISPRO 2 UNITS: 100 INJECTION, SOLUTION INTRAVENOUS; SUBCUTANEOUS at 06:30

## 2017-01-01 RX ADMIN — INSULIN LISPRO 3 UNITS: 100 INJECTION, SOLUTION INTRAVENOUS; SUBCUTANEOUS at 18:05

## 2017-01-01 RX ADMIN — INSULIN LISPRO 2 UNITS: 100 INJECTION, SOLUTION INTRAVENOUS; SUBCUTANEOUS at 06:37

## 2017-01-01 RX ADMIN — CEFEPIME HYDROCHLORIDE 2 G: 2 INJECTION, POWDER, FOR SOLUTION INTRAVENOUS at 15:43

## 2017-01-01 RX ADMIN — FAMOTIDINE 20 MG: 10 INJECTION INTRAVENOUS at 22:22

## 2017-01-01 RX ADMIN — INSULIN LISPRO 2 UNITS: 100 INJECTION, SOLUTION INTRAVENOUS; SUBCUTANEOUS at 23:52

## 2017-01-01 RX ADMIN — SODIUM CHLORIDE 1000 ML: 900 INJECTION, SOLUTION INTRAVENOUS at 01:20

## 2017-01-01 RX ADMIN — METOCLOPRAMIDE 5 MG: 5 INJECTION, SOLUTION INTRAMUSCULAR; INTRAVENOUS at 17:41

## 2017-01-01 RX ADMIN — ATROPA BELLADONNA AND OPIUM 1 SUPPOSITORY: 16.2; 3 SUPPOSITORY RECTAL at 21:13

## 2017-01-01 RX ADMIN — ASPIRIN 300 MG: 300 SUPPOSITORY RECTAL at 08:20

## 2017-01-01 RX ADMIN — Medication 10 ML: at 05:31

## 2017-01-01 RX ADMIN — ASPIRIN 300 MG: 300 SUPPOSITORY RECTAL at 08:24

## 2017-01-01 RX ADMIN — LEVALBUTEROL HYDROCHLORIDE 0.63 MG: 0.63 SOLUTION RESPIRATORY (INHALATION) at 11:00

## 2017-01-01 RX ADMIN — SODIUM CHLORIDE 100 MG: 900 INJECTION, SOLUTION INTRAVENOUS at 07:59

## 2017-01-01 RX ADMIN — HEPARIN SODIUM 5000 UNITS: 5000 INJECTION, SOLUTION INTRAVENOUS; SUBCUTANEOUS at 23:26

## 2017-01-01 RX ADMIN — GADOTERIDOL 20 ML: 279.3 INJECTION, SOLUTION INTRAVENOUS at 19:49

## 2017-01-01 RX ADMIN — POTASSIUM PHOSPHATE, MONOBASIC AND POTASSIUM PHOSPHATE, DIBASIC: 224; 236 INJECTION, SOLUTION INTRAVENOUS at 10:17

## 2017-01-01 RX ADMIN — HYDRALAZINE HYDROCHLORIDE 20 MG: 20 INJECTION INTRAMUSCULAR; INTRAVENOUS at 12:00

## 2017-01-01 RX ADMIN — ASPIRIN 300 MG: 300 SUPPOSITORY RECTAL at 11:31

## 2017-01-01 RX ADMIN — VANCOMYCIN HYDROCHLORIDE 250 MG: 1 INJECTION, POWDER, LYOPHILIZED, FOR SOLUTION INTRAVENOUS at 12:51

## 2017-01-01 RX ADMIN — AMPICILLIN 2 G: 2 INJECTION, POWDER, FOR SOLUTION INTRAVENOUS at 11:50

## 2017-01-01 RX ADMIN — HEPARIN SODIUM 5000 UNITS: 5000 INJECTION, SOLUTION INTRAVENOUS; SUBCUTANEOUS at 00:22

## 2017-01-01 RX ADMIN — LISINOPRIL 5 MG: 5 TABLET ORAL at 08:35

## 2017-01-01 RX ADMIN — Medication 10 ML: at 22:00

## 2017-01-01 RX ADMIN — POTASSIUM PHOSPHATE, MONOBASIC AND POTASSIUM PHOSPHATE, DIBASIC: 224; 236 INJECTION, SOLUTION INTRAVENOUS at 10:47

## 2017-01-01 RX ADMIN — AMPICILLIN 2 G: 2 INJECTION, POWDER, FOR SOLUTION INTRAVENOUS at 20:05

## 2017-01-01 RX ADMIN — LINEZOLID 600 MG: 600 INJECTION, SOLUTION INTRAVENOUS at 20:19

## 2017-01-01 RX ADMIN — I.V. FAT EMULSION 250 ML: 20 EMULSION INTRAVENOUS at 17:38

## 2017-01-01 RX ADMIN — Medication 10 ML: at 04:10

## 2017-01-01 RX ADMIN — SODIUM CHLORIDE 100 MG: 900 INJECTION, SOLUTION INTRAVENOUS at 09:58

## 2017-01-01 RX ADMIN — Medication 10 ML: at 18:04

## 2017-01-01 RX ADMIN — LABETALOL HYDROCHLORIDE 10 MG: 5 INJECTION INTRAVENOUS at 00:03

## 2017-01-01 RX ADMIN — METOCLOPRAMIDE 5 MG: 5 INJECTION, SOLUTION INTRAMUSCULAR; INTRAVENOUS at 00:32

## 2017-01-01 RX ADMIN — Medication 10 ML: at 13:06

## 2017-01-01 RX ADMIN — Medication 10 ML: at 21:35

## 2017-01-01 RX ADMIN — HYDRALAZINE HYDROCHLORIDE 20 MG: 20 INJECTION INTRAMUSCULAR; INTRAVENOUS at 23:19

## 2017-01-01 RX ADMIN — DEXTROSE MONOHYDRATE 10 MG/HR: 50 INJECTION, SOLUTION INTRAVENOUS at 00:03

## 2017-01-01 RX ADMIN — Medication 10 ML: at 05:51

## 2017-01-01 RX ADMIN — HYDRALAZINE HYDROCHLORIDE 20 MG: 20 INJECTION INTRAMUSCULAR; INTRAVENOUS at 00:13

## 2017-01-01 RX ADMIN — INSULIN LISPRO 2 UNITS: 100 INJECTION, SOLUTION INTRAVENOUS; SUBCUTANEOUS at 18:38

## 2017-01-01 RX ADMIN — MUPIROCIN: 20 OINTMENT TOPICAL at 17:05

## 2017-01-01 RX ADMIN — ACETAMINOPHEN 650 MG: 325 SOLUTION ORAL at 00:21

## 2017-01-01 RX ADMIN — ALBUMIN HUMAN 250 ML: 50 SOLUTION INTRAVENOUS at 00:41

## 2017-01-01 RX ADMIN — PHENYLEPHRINE HYDROCHLORIDE 60 MCG/MIN: 10 INJECTION INTRAVENOUS at 01:44

## 2017-01-01 RX ADMIN — CHLORHEXIDINE GLUCONATE 15 ML: 1.2 RINSE ORAL at 22:05

## 2017-01-01 RX ADMIN — LINEZOLID 600 MG: 600 INJECTION, SOLUTION INTRAVENOUS at 12:18

## 2017-01-01 RX ADMIN — CHLORHEXIDINE GLUCONATE 15 ML: 1.2 RINSE ORAL at 08:22

## 2017-01-01 RX ADMIN — METOCLOPRAMIDE 5 MG: 5 INJECTION, SOLUTION INTRAMUSCULAR; INTRAVENOUS at 11:40

## 2017-01-01 RX ADMIN — INSULIN LISPRO 2 UNITS: 100 INJECTION, SOLUTION INTRAVENOUS; SUBCUTANEOUS at 13:12

## 2017-01-01 RX ADMIN — SODIUM CHLORIDE 100 MG: 900 INJECTION, SOLUTION INTRAVENOUS at 08:15

## 2017-01-01 RX ADMIN — CHLORHEXIDINE GLUCONATE 15 ML: 1.2 RINSE ORAL at 21:06

## 2017-01-01 RX ADMIN — PROPOFOL 50 MG: 10 INJECTION, EMULSION INTRAVENOUS at 23:45

## 2017-01-01 RX ADMIN — FENTANYL CITRATE 25 MCG: 50 INJECTION, SOLUTION INTRAMUSCULAR; INTRAVENOUS at 02:10

## 2017-01-01 RX ADMIN — MORPHINE SULFATE 2 MG: 4 INJECTION, SOLUTION INTRAMUSCULAR; INTRAVENOUS at 04:10

## 2017-01-01 RX ADMIN — Medication 20 ML: at 21:45

## 2017-01-01 RX ADMIN — HYDRALAZINE HYDROCHLORIDE 20 MG: 20 INJECTION INTRAMUSCULAR; INTRAVENOUS at 16:14

## 2017-01-01 RX ADMIN — Medication 120 MCG: at 01:09

## 2017-01-01 RX ADMIN — Medication 10 ML: at 21:16

## 2017-01-01 RX ADMIN — LABETALOL HYDROCHLORIDE 20 MG: 5 INJECTION, SOLUTION INTRAVENOUS at 06:38

## 2017-01-01 RX ADMIN — I.V. FAT EMULSION 250 ML: 20 EMULSION INTRAVENOUS at 17:47

## 2017-01-01 RX ADMIN — LABETALOL HYDROCHLORIDE 10 MG: 5 INJECTION INTRAVENOUS at 18:33

## 2017-01-01 RX ADMIN — INSULIN LISPRO 3 UNITS: 100 INJECTION, SOLUTION INTRAVENOUS; SUBCUTANEOUS at 11:19

## 2017-01-01 RX ADMIN — HYDRALAZINE HYDROCHLORIDE 10 MG: 20 INJECTION INTRAMUSCULAR; INTRAVENOUS at 22:14

## 2017-01-01 RX ADMIN — INSULIN LISPRO 2 UNITS: 100 INJECTION, SOLUTION INTRAVENOUS; SUBCUTANEOUS at 06:04

## 2017-01-01 RX ADMIN — METOPROLOL TARTRATE 5 MG: 5 INJECTION, SOLUTION INTRAVENOUS at 07:56

## 2017-01-01 RX ADMIN — CARVEDILOL 3.12 MG: 3.12 TABLET, FILM COATED ORAL at 17:07

## 2017-01-01 RX ADMIN — Medication 20 ML: at 21:43

## 2017-01-01 RX ADMIN — HYDRALAZINE HYDROCHLORIDE 20 MG: 20 INJECTION INTRAMUSCULAR; INTRAVENOUS at 15:07

## 2017-01-01 RX ADMIN — LABETALOL HYDROCHLORIDE 10 MG: 5 INJECTION INTRAVENOUS at 05:58

## 2017-01-01 RX ADMIN — Medication 10 ML: at 13:20

## 2017-01-01 RX ADMIN — INSULIN LISPRO 2 UNITS: 100 INJECTION, SOLUTION INTRAVENOUS; SUBCUTANEOUS at 19:28

## 2017-01-01 RX ADMIN — LABETALOL HYDROCHLORIDE 10 MG: 5 INJECTION INTRAVENOUS at 13:13

## 2017-01-01 RX ADMIN — Medication 10 ML: at 20:49

## 2017-01-01 RX ADMIN — NITROGLYCERIN 1 INCH: 20 OINTMENT TOPICAL at 17:25

## 2017-01-01 RX ADMIN — CALCIUM GLUCONATE: 94 INJECTION, SOLUTION INTRAVENOUS at 18:59

## 2017-01-01 RX ADMIN — LABETALOL HYDROCHLORIDE 10 MG: 5 INJECTION INTRAVENOUS at 17:44

## 2017-01-01 RX ADMIN — FAMOTIDINE 20 MG: 10 INJECTION INTRAVENOUS at 10:36

## 2017-01-01 RX ADMIN — Medication 10 ML: at 14:13

## 2017-01-01 RX ADMIN — CALCIUM GLUCONATE 2 G: 94 INJECTION, SOLUTION INTRAVENOUS at 12:24

## 2017-01-01 RX ADMIN — NITROGLYCERIN 1 INCH: 20 OINTMENT TOPICAL at 09:43

## 2017-01-01 RX ADMIN — ACETAMINOPHEN 1000 MG: 10 INJECTION, SOLUTION INTRAVENOUS at 15:17

## 2017-01-01 RX ADMIN — PROPOFOL 40 MCG/KG/MIN: 10 INJECTION, EMULSION INTRAVENOUS at 20:00

## 2017-01-01 RX ADMIN — FENTANYL CITRATE 50 MCG: 50 INJECTION, SOLUTION INTRAMUSCULAR; INTRAVENOUS at 01:20

## 2017-01-01 RX ADMIN — I.V. FAT EMULSION 250 ML: 20 EMULSION INTRAVENOUS at 18:52

## 2017-01-01 RX ADMIN — PROPOFOL 10 MCG/KG/MIN: 10 INJECTION, EMULSION INTRAVENOUS at 17:36

## 2017-01-01 RX ADMIN — LABETALOL HYDROCHLORIDE 10 MG: 5 INJECTION INTRAVENOUS at 06:17

## 2017-01-01 RX ADMIN — DEXTROSE MONOHYDRATE 10 MG/HR: 50 INJECTION, SOLUTION INTRAVENOUS at 15:58

## 2017-01-01 RX ADMIN — VANCOMYCIN HYDROCHLORIDE 250 MG: 1 INJECTION, POWDER, LYOPHILIZED, FOR SOLUTION INTRAVENOUS at 17:20

## 2017-01-01 RX ADMIN — LABETALOL HYDROCHLORIDE 10 MG: 5 INJECTION INTRAVENOUS at 06:48

## 2017-01-01 RX ADMIN — Medication 10 ML: at 22:28

## 2017-01-01 RX ADMIN — Medication 30 ML: at 22:22

## 2017-01-01 RX ADMIN — LABETALOL HYDROCHLORIDE 10 MG: 5 INJECTION INTRAVENOUS at 12:29

## 2017-01-01 RX ADMIN — LABETALOL HYDROCHLORIDE 10 MG: 5 INJECTION INTRAVENOUS at 13:43

## 2017-01-01 RX ADMIN — Medication: at 07:15

## 2017-01-01 RX ADMIN — ASPIRIN 300 MG: 300 SUPPOSITORY RECTAL at 09:36

## 2017-01-01 RX ADMIN — ASPIRIN 81 MG: 81 TABLET, COATED ORAL at 07:51

## 2017-01-01 RX ADMIN — ASPIRIN 300 MG: 300 SUPPOSITORY RECTAL at 09:18

## 2017-01-01 RX ADMIN — INSULIN LISPRO 2 UNITS: 100 INJECTION, SOLUTION INTRAVENOUS; SUBCUTANEOUS at 18:24

## 2017-01-01 RX ADMIN — NITROGLYCERIN 1 INCH: 20 OINTMENT TOPICAL at 22:00

## 2017-01-01 RX ADMIN — ENOXAPARIN SODIUM 40 MG: 40 INJECTION SUBCUTANEOUS at 09:27

## 2017-01-01 RX ADMIN — Medication 30 ML: at 22:15

## 2017-01-01 RX ADMIN — Medication 30 ML: at 06:11

## 2017-01-01 RX ADMIN — NITROGLYCERIN 1 INCH: 20 OINTMENT TOPICAL at 17:45

## 2017-01-01 RX ADMIN — CARVEDILOL 3.12 MG: 3.12 TABLET, FILM COATED ORAL at 18:01

## 2017-01-01 RX ADMIN — HYDROMORPHONE HYDROCHLORIDE 0.5 MG: 1 INJECTION, SOLUTION INTRAMUSCULAR; INTRAVENOUS; SUBCUTANEOUS at 14:40

## 2017-01-01 RX ADMIN — CHLORHEXIDINE GLUCONATE 15 ML: 1.2 RINSE ORAL at 08:00

## 2017-01-01 RX ADMIN — METOPROLOL TARTRATE 5 MG: 5 INJECTION, SOLUTION INTRAVENOUS at 12:36

## 2017-01-01 RX ADMIN — HEPARIN SODIUM 5000 UNITS: 5000 INJECTION, SOLUTION INTRAVENOUS; SUBCUTANEOUS at 23:25

## 2017-01-01 RX ADMIN — FENTANYL CITRATE 50 MCG: 50 INJECTION, SOLUTION INTRAMUSCULAR; INTRAVENOUS at 23:45

## 2017-01-01 RX ADMIN — CEFEPIME HYDROCHLORIDE 2 G: 2 INJECTION, POWDER, FOR SOLUTION INTRAVENOUS at 03:53

## 2017-01-01 RX ADMIN — Medication 200 MCG/HR: at 04:53

## 2017-01-01 RX ADMIN — LORAZEPAM 2 MG: 2 INJECTION INTRAMUSCULAR; INTRAVENOUS at 18:32

## 2017-01-01 RX ADMIN — FAMOTIDINE 20 MG: 10 INJECTION INTRAVENOUS at 20:49

## 2017-01-01 RX ADMIN — METOCLOPRAMIDE 5 MG: 5 INJECTION, SOLUTION INTRAMUSCULAR; INTRAVENOUS at 11:34

## 2017-01-01 RX ADMIN — NITROGLYCERIN 1 INCH: 20 OINTMENT TOPICAL at 08:10

## 2017-01-01 RX ADMIN — CARVEDILOL 3.12 MG: 3.12 TABLET, FILM COATED ORAL at 17:54

## 2017-01-01 RX ADMIN — SODIUM PHOSPHATE, DIBASIC AND SODIUM PHOSPHATE, MONOBASIC 118 ML: 7; 19 ENEMA RECTAL at 20:08

## 2017-01-01 RX ADMIN — PHENYLEPHRINE HYDROCHLORIDE 75 MCG/MIN: 10 INJECTION INTRAVENOUS at 15:15

## 2017-01-01 RX ADMIN — ONDANSETRON 4 MG: 2 INJECTION INTRAMUSCULAR; INTRAVENOUS at 09:51

## 2017-01-01 RX ADMIN — INSULIN LISPRO 2 UNITS: 100 INJECTION, SOLUTION INTRAVENOUS; SUBCUTANEOUS at 18:20

## 2017-01-01 RX ADMIN — FAMOTIDINE 20 MG: 10 INJECTION INTRAVENOUS at 10:22

## 2017-01-01 RX ADMIN — Medication 10 ML: at 05:37

## 2017-01-01 RX ADMIN — MIDAZOLAM HYDROCHLORIDE 1 MG: 1 INJECTION, SOLUTION INTRAMUSCULAR; INTRAVENOUS at 08:29

## 2017-01-01 RX ADMIN — MORPHINE SULFATE 2 MG: 4 INJECTION, SOLUTION INTRAMUSCULAR; INTRAVENOUS at 11:15

## 2017-01-01 RX ADMIN — ASPIRIN 300 MG: 300 SUPPOSITORY RECTAL at 11:22

## 2017-01-01 RX ADMIN — Medication 10 ML: at 22:43

## 2017-01-01 RX ADMIN — LABETALOL HYDROCHLORIDE: 5 INJECTION INTRAVENOUS at 18:12

## 2017-01-01 RX ADMIN — ENOXAPARIN SODIUM 40 MG: 30 INJECTION SUBCUTANEOUS at 11:21

## 2017-01-01 RX ADMIN — Medication: at 12:42

## 2017-01-01 RX ADMIN — PROPOFOL 35 MCG/KG/MIN: 10 INJECTION, EMULSION INTRAVENOUS at 12:49

## 2017-01-01 RX ADMIN — Medication 10 ML: at 23:11

## 2017-01-01 RX ADMIN — ONDANSETRON 4 MG: 2 INJECTION INTRAMUSCULAR; INTRAVENOUS at 03:26

## 2017-01-01 RX ADMIN — Medication 10 ML: at 13:08

## 2017-01-01 RX ADMIN — IOPAMIDOL 30 ML: 755 INJECTION, SOLUTION INTRAVENOUS at 09:01

## 2017-01-01 RX ADMIN — SODIUM CHLORIDE 100 MG: 900 INJECTION, SOLUTION INTRAVENOUS at 08:13

## 2017-01-01 RX ADMIN — INSULIN LISPRO 3 UNITS: 100 INJECTION, SOLUTION INTRAVENOUS; SUBCUTANEOUS at 12:23

## 2017-01-01 RX ADMIN — SODIUM CHLORIDE 100 MG: 900 INJECTION, SOLUTION INTRAVENOUS at 08:20

## 2017-01-01 RX ADMIN — METOCLOPRAMIDE 5 MG: 5 INJECTION, SOLUTION INTRAMUSCULAR; INTRAVENOUS at 06:04

## 2017-01-01 RX ADMIN — HYDROMORPHONE HYDROCHLORIDE 0.2 MG: 1 INJECTION, SOLUTION INTRAMUSCULAR; INTRAVENOUS; SUBCUTANEOUS at 15:04

## 2017-01-01 RX ADMIN — AMIODARONE HYDROCHLORIDE 1 MG/MIN: 50 INJECTION, SOLUTION INTRAVENOUS at 12:00

## 2017-01-01 RX ADMIN — FENTANYL CITRATE 25 MCG: 50 INJECTION, SOLUTION INTRAMUSCULAR; INTRAVENOUS at 10:41

## 2017-01-01 RX ADMIN — METOPROLOL TARTRATE 5 MG: 5 INJECTION, SOLUTION INTRAVENOUS at 00:00

## 2017-01-01 RX ADMIN — Medication 10 ML: at 00:51

## 2017-01-01 RX ADMIN — NOREPINEPHRINE BITARTRATE 15 MCG/MIN: 1 INJECTION, SOLUTION, CONCENTRATE INTRAVENOUS at 13:18

## 2017-01-01 RX ADMIN — SODIUM CHLORIDE 10 ML/HR: 900 INJECTION, SOLUTION INTRAVENOUS at 21:08

## 2017-01-01 RX ADMIN — HYDROMORPHONE HYDROCHLORIDE 0.1 MG: 1 INJECTION, SOLUTION INTRAMUSCULAR; INTRAVENOUS; SUBCUTANEOUS at 03:04

## 2017-01-01 RX ADMIN — LEVOFLOXACIN 750 MG: 5 INJECTION, SOLUTION INTRAVENOUS at 22:23

## 2017-01-01 RX ADMIN — CHLORHEXIDINE GLUCONATE 15 ML: 1.2 RINSE ORAL at 20:26

## 2017-01-01 RX ADMIN — LABETALOL HYDROCHLORIDE 10 MG: 5 INJECTION INTRAVENOUS at 18:21

## 2017-01-01 RX ADMIN — Medication 10 ML: at 22:17

## 2017-01-01 RX ADMIN — SODIUM CHLORIDE 3 G: 900 INJECTION, SOLUTION INTRAVENOUS at 05:44

## 2017-01-01 RX ADMIN — INSULIN LISPRO 3 UNITS: 100 INJECTION, SOLUTION INTRAVENOUS; SUBCUTANEOUS at 12:03

## 2017-01-01 RX ADMIN — Medication 10 ML: at 14:00

## 2017-01-01 RX ADMIN — POTASSIUM PHOSPHATE, MONOBASIC AND POTASSIUM PHOSPHATE, DIBASIC: 224; 236 INJECTION, SOLUTION INTRAVENOUS at 11:16

## 2017-01-01 RX ADMIN — Medication 10 ML: at 20:11

## 2017-01-01 RX ADMIN — ACETAMINOPHEN 650 MG: 325 SOLUTION ORAL at 22:29

## 2017-01-01 RX ADMIN — SODIUM CHLORIDE 75 ML/HR: 900 INJECTION, SOLUTION INTRAVENOUS at 22:51

## 2017-01-01 RX ADMIN — WATER: 1000 INJECTION, SOLUTION INTRAVENOUS at 03:47

## 2017-01-01 RX ADMIN — SODIUM CHLORIDE 50 ML/HR: 900 INJECTION, SOLUTION INTRAVENOUS at 15:24

## 2017-01-01 RX ADMIN — NEOSTIGMINE METHYLSULFATE 5 MG: 1 INJECTION INTRAVENOUS at 03:25

## 2017-01-01 RX ADMIN — CEFTRIAXONE 1 G: 1 INJECTION, POWDER, FOR SOLUTION INTRAMUSCULAR; INTRAVENOUS at 09:41

## 2017-01-01 RX ADMIN — NITROGLYCERIN 1 INCH: 20 OINTMENT TOPICAL at 08:08

## 2017-01-01 RX ADMIN — HYDROMORPHONE HYDROCHLORIDE 0.2 MG: 1 INJECTION, SOLUTION INTRAMUSCULAR; INTRAVENOUS; SUBCUTANEOUS at 14:46

## 2017-01-01 RX ADMIN — ACETAMINOPHEN 975 MG: 325 SOLUTION ORAL at 20:25

## 2017-01-01 RX ADMIN — RETINOL, ERGOCALCIFEROL, .ALPHA.-TOCOPHEROL ACETATE, DL-, PHYTONADIONE, ASCORBIC ACID, NIACINAMIDE, RIBOFLAVIN 5-PHOSPHATE SODIUM, THIAMINE HYDROCHLORIDE, PYRIDOXINE HYDROCHLORIDE, DEXPANTHENOL, BIOTIN, FOLIC ACID, AND CYANOCOBALAMIN: KIT at 18:36

## 2017-01-01 RX ADMIN — INSULIN LISPRO 2 UNITS: 100 INJECTION, SOLUTION INTRAVENOUS; SUBCUTANEOUS at 13:05

## 2017-01-01 RX ADMIN — LABETALOL HYDROCHLORIDE 10 MG: 5 INJECTION INTRAVENOUS at 17:35

## 2017-01-01 RX ADMIN — DEXTROSE MONOHYDRATE 10 MG/HR: 50 INJECTION, SOLUTION INTRAVENOUS at 21:18

## 2017-01-01 RX ADMIN — VANCOMYCIN HYDROCHLORIDE 250 MG: 1 INJECTION, POWDER, LYOPHILIZED, FOR SOLUTION INTRAVENOUS at 18:29

## 2017-01-01 RX ADMIN — LEVALBUTEROL HYDROCHLORIDE 0.63 MG: 0.63 SOLUTION RESPIRATORY (INHALATION) at 23:51

## 2017-01-01 RX ADMIN — CLOPIDOGREL BISULFATE 75 MG: 75 TABLET ORAL at 09:11

## 2017-01-01 RX ADMIN — HYDRALAZINE HYDROCHLORIDE 20 MG: 20 INJECTION INTRAMUSCULAR; INTRAVENOUS at 18:06

## 2017-01-01 RX ADMIN — LINEZOLID 600 MG: 600 INJECTION, SOLUTION INTRAVENOUS at 22:54

## 2017-01-01 RX ADMIN — METOCLOPRAMIDE 5 MG: 5 INJECTION, SOLUTION INTRAMUSCULAR; INTRAVENOUS at 17:24

## 2017-01-01 RX ADMIN — Medication 20 ML: at 05:47

## 2017-01-01 RX ADMIN — FENTANYL CITRATE 100 MCG: 50 INJECTION, SOLUTION INTRAMUSCULAR; INTRAVENOUS at 19:08

## 2017-01-01 RX ADMIN — SODIUM CHLORIDE 75 ML/HR: 900 INJECTION, SOLUTION INTRAVENOUS at 11:43

## 2017-01-01 RX ADMIN — LISINOPRIL 5 MG: 5 TABLET ORAL at 09:11

## 2017-01-01 RX ADMIN — METOPROLOL TARTRATE 5 MG: 5 INJECTION, SOLUTION INTRAVENOUS at 05:18

## 2017-01-01 RX ADMIN — MUPIROCIN: 20 OINTMENT TOPICAL at 08:00

## 2017-01-01 RX ADMIN — PHENYLEPHRINE HYDROCHLORIDE 60 MCG/MIN: 10 INJECTION INTRAVENOUS at 23:03

## 2017-01-01 RX ADMIN — INSULIN LISPRO 3 UNITS: 100 INJECTION, SOLUTION INTRAVENOUS; SUBCUTANEOUS at 18:13

## 2017-01-01 RX ADMIN — MUPIROCIN: 20 OINTMENT TOPICAL at 21:26

## 2017-01-01 RX ADMIN — METOCLOPRAMIDE 5 MG: 5 INJECTION, SOLUTION INTRAMUSCULAR; INTRAVENOUS at 23:28

## 2017-01-01 RX ADMIN — Medication 200 MCG/HR: at 13:24

## 2017-01-01 RX ADMIN — Medication 80 MCG: at 19:12

## 2017-01-01 RX ADMIN — Medication 10 ML: at 06:51

## 2017-01-01 RX ADMIN — Medication 10 ML: at 13:53

## 2017-01-01 RX ADMIN — Medication 25 MCG/HR: at 21:11

## 2017-01-01 RX ADMIN — SUCCINYLCHOLINE CHLORIDE 140 MG: 20 INJECTION INTRAMUSCULAR; INTRAVENOUS at 23:45

## 2017-01-01 RX ADMIN — SODIUM CHLORIDE 100 MG: 900 INJECTION, SOLUTION INTRAVENOUS at 08:08

## 2017-01-01 RX ADMIN — PROPOFOL 8 MCG/KG/MIN: 10 INJECTION, EMULSION INTRAVENOUS at 01:03

## 2017-01-01 RX ADMIN — HYDRALAZINE HYDROCHLORIDE 20 MG: 20 INJECTION INTRAMUSCULAR; INTRAVENOUS at 13:58

## 2017-01-01 RX ADMIN — Medication 10 ML: at 21:23

## 2017-01-01 RX ADMIN — HEPARIN SODIUM 5000 UNITS: 5000 INJECTION, SOLUTION INTRAVENOUS; SUBCUTANEOUS at 12:16

## 2017-01-01 RX ADMIN — METOPROLOL TARTRATE 25 MG: 25 TABLET ORAL at 13:39

## 2017-01-01 RX ADMIN — CEFEPIME HYDROCHLORIDE 2 G: 2 INJECTION, POWDER, FOR SOLUTION INTRAVENOUS at 03:08

## 2017-01-01 RX ADMIN — INSULIN LISPRO 3 UNITS: 100 INJECTION, SOLUTION INTRAVENOUS; SUBCUTANEOUS at 11:55

## 2017-01-01 RX ADMIN — Medication 10 ML: at 17:29

## 2017-01-01 RX ADMIN — SODIUM CHLORIDE 3 G: 900 INJECTION, SOLUTION INTRAVENOUS at 23:44

## 2017-01-01 RX ADMIN — CEFEPIME HYDROCHLORIDE 2 G: 2 INJECTION, POWDER, FOR SOLUTION INTRAVENOUS at 15:26

## 2017-01-01 RX ADMIN — INSULIN LISPRO 2 UNITS: 100 INJECTION, SOLUTION INTRAVENOUS; SUBCUTANEOUS at 17:27

## 2017-01-01 RX ADMIN — FAMOTIDINE: 10 INJECTION INTRAVENOUS at 17:38

## 2017-01-01 RX ADMIN — HALOPERIDOL LACTATE 3 MG: 5 INJECTION, SOLUTION INTRAMUSCULAR at 23:34

## 2017-01-01 RX ADMIN — ACETAMINOPHEN 1000 MG: 10 INJECTION, SOLUTION INTRAVENOUS at 15:51

## 2017-01-01 RX ADMIN — HEPARIN SODIUM 5000 UNITS: 5000 INJECTION, SOLUTION INTRAVENOUS; SUBCUTANEOUS at 22:22

## 2017-01-01 RX ADMIN — Medication 10 ML: at 13:44

## 2017-01-01 RX ADMIN — METRONIDAZOLE 500 MG: 500 INJECTION, SOLUTION INTRAVENOUS at 14:54

## 2017-01-01 RX ADMIN — NITROGLYCERIN 1 INCH: 20 OINTMENT TOPICAL at 08:20

## 2017-01-01 RX ADMIN — SODIUM CHLORIDE, SODIUM LACTATE, POTASSIUM CHLORIDE, AND CALCIUM CHLORIDE 75 ML/HR: 600; 310; 30; 20 INJECTION, SOLUTION INTRAVENOUS at 03:50

## 2017-01-01 RX ADMIN — HYDRALAZINE HYDROCHLORIDE 20 MG: 20 INJECTION INTRAMUSCULAR; INTRAVENOUS at 04:18

## 2017-01-01 RX ADMIN — LIDOCAINE HYDROCHLORIDE 40 ML: 20 SOLUTION ORAL; TOPICAL at 20:38

## 2017-01-01 RX ADMIN — ASPIRIN 300 MG: 300 SUPPOSITORY RECTAL at 08:00

## 2017-01-01 RX ADMIN — HYDROMORPHONE HYDROCHLORIDE 0.2 MG: 1 INJECTION, SOLUTION INTRAMUSCULAR; INTRAVENOUS; SUBCUTANEOUS at 17:23

## 2017-01-01 RX ADMIN — AMPICILLIN 2 G: 2 INJECTION, POWDER, FOR SOLUTION INTRAVENOUS at 18:13

## 2017-01-01 RX ADMIN — SODIUM CHLORIDE 5 MG/HR: 900 INJECTION, SOLUTION INTRAVENOUS at 10:33

## 2017-01-01 RX ADMIN — HYDROMORPHONE HYDROCHLORIDE 0.2 MG: 1 INJECTION, SOLUTION INTRAMUSCULAR; INTRAVENOUS; SUBCUTANEOUS at 22:04

## 2017-01-01 RX ADMIN — METRONIDAZOLE 500 MG: 500 INJECTION, SOLUTION INTRAVENOUS at 14:31

## 2017-01-01 RX ADMIN — FUROSEMIDE 20 MG: 10 INJECTION, SOLUTION INTRAMUSCULAR; INTRAVENOUS at 13:53

## 2017-01-01 RX ADMIN — ENOXAPARIN SODIUM 40 MG: 40 INJECTION SUBCUTANEOUS at 10:11

## 2017-01-01 RX ADMIN — METOCLOPRAMIDE 5 MG: 5 INJECTION, SOLUTION INTRAMUSCULAR; INTRAVENOUS at 05:10

## 2017-01-01 RX ADMIN — ALBUMIN (HUMAN) 25 G: 12.5 INJECTION, SOLUTION INTRAVENOUS at 22:18

## 2017-01-01 RX ADMIN — METOPROLOL TARTRATE 5 MG: 5 INJECTION, SOLUTION INTRAVENOUS at 11:50

## 2017-01-01 RX ADMIN — ASPIRIN 300 MG: 300 SUPPOSITORY RECTAL at 09:17

## 2017-01-01 RX ADMIN — CARVEDILOL 3.12 MG: 3.12 TABLET, FILM COATED ORAL at 07:52

## 2017-01-01 RX ADMIN — HYDROMORPHONE HYDROCHLORIDE 0.5 MG: 2 INJECTION, SOLUTION INTRAMUSCULAR; INTRAVENOUS; SUBCUTANEOUS at 00:41

## 2017-01-01 RX ADMIN — LABETALOL HYDROCHLORIDE 10 MG: 5 INJECTION INTRAVENOUS at 05:42

## 2017-01-01 RX ADMIN — INSULIN LISPRO 2 UNITS: 100 INJECTION, SOLUTION INTRAVENOUS; SUBCUTANEOUS at 19:46

## 2017-01-01 RX ADMIN — PROPOFOL 20 MCG/KG/MIN: 10 INJECTION, EMULSION INTRAVENOUS at 23:46

## 2017-01-01 RX ADMIN — SODIUM CHLORIDE 3 G: 900 INJECTION, SOLUTION INTRAVENOUS at 17:17

## 2017-01-01 RX ADMIN — PROPOFOL 50 MCG/KG/MIN: 10 INJECTION, EMULSION INTRAVENOUS at 04:24

## 2017-01-01 RX ADMIN — VANCOMYCIN HYDROCHLORIDE 250 MG: 1 INJECTION, POWDER, LYOPHILIZED, FOR SOLUTION INTRAVENOUS at 00:00

## 2017-01-01 RX ADMIN — CHLORHEXIDINE GLUCONATE 15 ML: 1.2 RINSE ORAL at 08:04

## 2017-01-01 RX ADMIN — LABETALOL HYDROCHLORIDE 10 MG: 5 INJECTION INTRAVENOUS at 11:37

## 2017-01-01 RX ADMIN — FUROSEMIDE 20 MG: 10 INJECTION, SOLUTION INTRAMUSCULAR; INTRAVENOUS at 00:23

## 2017-01-01 RX ADMIN — INSULIN LISPRO 3 UNITS: 100 INJECTION, SOLUTION INTRAVENOUS; SUBCUTANEOUS at 13:44

## 2017-01-01 RX ADMIN — VANCOMYCIN HYDROCHLORIDE 250 MG: 1 INJECTION, POWDER, LYOPHILIZED, FOR SOLUTION INTRAVENOUS at 05:51

## 2017-01-01 RX ADMIN — SODIUM CHLORIDE 100 MG: 900 INJECTION, SOLUTION INTRAVENOUS at 09:30

## 2017-01-01 RX ADMIN — FENTANYL CITRATE 25 MCG: 50 INJECTION, SOLUTION INTRAMUSCULAR; INTRAVENOUS at 09:14

## 2017-01-01 RX ADMIN — MORPHINE SULFATE 2 MG: 2 INJECTION, SOLUTION INTRAMUSCULAR; INTRAVENOUS at 08:54

## 2017-01-01 RX ADMIN — FAMOTIDINE 20 MG: 10 INJECTION INTRAVENOUS at 20:36

## 2017-01-01 RX ADMIN — INSULIN LISPRO 2 UNITS: 100 INJECTION, SOLUTION INTRAVENOUS; SUBCUTANEOUS at 23:17

## 2017-01-01 RX ADMIN — HALOPERIDOL LACTATE 3 MG: 5 INJECTION, SOLUTION INTRAMUSCULAR at 18:38

## 2017-01-01 RX ADMIN — SODIUM CHLORIDE, POTASSIUM CHLORIDE, SODIUM LACTATE AND CALCIUM CHLORIDE: 600; 310; 30; 20 INJECTION, SOLUTION INTRAVENOUS at 18:50

## 2017-01-01 RX ADMIN — SODIUM CHLORIDE 100 ML/HR: 900 INJECTION, SOLUTION INTRAVENOUS at 14:49

## 2017-01-01 RX ADMIN — ENOXAPARIN SODIUM 40 MG: 30 INJECTION SUBCUTANEOUS at 09:01

## 2017-01-01 RX ADMIN — LABETALOL HYDROCHLORIDE 10 MG: 5 INJECTION INTRAVENOUS at 17:53

## 2017-01-01 RX ADMIN — LABETALOL HYDROCHLORIDE 10 MG: 5 INJECTION INTRAVENOUS at 17:22

## 2017-01-01 RX ADMIN — ASCORBIC ACID: 500 INJECTION, SOLUTION INTRAMUSCULAR; INTRAVENOUS; SUBCUTANEOUS at 16:53

## 2017-01-01 RX ADMIN — INSULIN LISPRO 2 UNITS: 100 INJECTION, SOLUTION INTRAVENOUS; SUBCUTANEOUS at 18:58

## 2017-01-01 RX ADMIN — SODIUM CHLORIDE 10 MG/HR: 900 INJECTION, SOLUTION INTRAVENOUS at 11:25

## 2017-01-01 RX ADMIN — METRONIDAZOLE 500 MG: 500 INJECTION, SOLUTION INTRAVENOUS at 15:10

## 2017-01-01 RX ADMIN — I.V. FAT EMULSION 250 ML: 20 EMULSION INTRAVENOUS at 18:32

## 2017-01-01 RX ADMIN — FAMOTIDINE 20 MG: 10 INJECTION INTRAVENOUS at 22:13

## 2017-01-01 RX ADMIN — ASPIRIN 300 MG: 300 SUPPOSITORY RECTAL at 08:35

## 2017-01-01 RX ADMIN — INSULIN LISPRO 2 UNITS: 100 INJECTION, SOLUTION INTRAVENOUS; SUBCUTANEOUS at 13:02

## 2017-01-01 RX ADMIN — LEVALBUTEROL HYDROCHLORIDE 0.63 MG: 0.63 SOLUTION RESPIRATORY (INHALATION) at 04:43

## 2017-01-01 RX ADMIN — CLOPIDOGREL BISULFATE 75 MG: 75 TABLET ORAL at 09:52

## 2017-01-01 RX ADMIN — CARVEDILOL 3.12 MG: 3.12 TABLET, FILM COATED ORAL at 17:55

## 2017-01-01 RX ADMIN — LINEZOLID 600 MG: 600 INJECTION, SOLUTION INTRAVENOUS at 20:01

## 2017-01-01 RX ADMIN — ASPIRIN 300 MG: 300 SUPPOSITORY RECTAL at 08:13

## 2017-01-01 RX ADMIN — Medication 10 ML: at 22:54

## 2017-01-01 RX ADMIN — CEFEPIME HYDROCHLORIDE 2 G: 2 INJECTION, POWDER, FOR SOLUTION INTRAVENOUS at 02:50

## 2017-01-01 RX ADMIN — MORPHINE SULFATE 4 MG: 2 INJECTION, SOLUTION INTRAMUSCULAR; INTRAVENOUS at 20:55

## 2017-01-01 RX ADMIN — FAMOTIDINE 20 MG: 10 INJECTION INTRAVENOUS at 09:01

## 2017-01-01 RX ADMIN — SODIUM CHLORIDE 3 G: 900 INJECTION, SOLUTION INTRAVENOUS at 05:54

## 2017-01-01 RX ADMIN — NITROGLYCERIN 1 INCH: 20 OINTMENT TOPICAL at 17:37

## 2017-01-01 RX ADMIN — ASCORBIC ACID: 500 INJECTION, SOLUTION INTRAMUSCULAR; INTRAVENOUS; SUBCUTANEOUS at 17:37

## 2017-01-01 RX ADMIN — MORPHINE SULFATE 1 MG: 2 INJECTION, SOLUTION INTRAMUSCULAR; INTRAVENOUS at 16:29

## 2017-01-01 RX ADMIN — SODIUM CHLORIDE 100 ML/HR: 900 INJECTION, SOLUTION INTRAVENOUS at 08:02

## 2017-01-01 RX ADMIN — LABETALOL HYDROCHLORIDE 10 MG: 5 INJECTION INTRAVENOUS at 23:30

## 2017-01-01 RX ADMIN — Medication 30 ML: at 05:45

## 2017-01-01 RX ADMIN — FINASTERIDE 5 MG: 5 TABLET, FILM COATED ORAL at 08:41

## 2017-01-01 RX ADMIN — LABETALOL HYDROCHLORIDE 10 MG: 5 INJECTION INTRAVENOUS at 06:15

## 2017-01-01 RX ADMIN — FUROSEMIDE 40 MG: 10 INJECTION, SOLUTION INTRAMUSCULAR; INTRAVENOUS at 08:02

## 2017-01-01 RX ADMIN — LABETALOL HYDROCHLORIDE 20 MG: 5 INJECTION, SOLUTION INTRAVENOUS at 10:39

## 2017-01-01 RX ADMIN — LABETALOL HYDROCHLORIDE 10 MG: 5 INJECTION INTRAVENOUS at 11:35

## 2017-01-01 RX ADMIN — METOCLOPRAMIDE 5 MG: 5 INJECTION, SOLUTION INTRAMUSCULAR; INTRAVENOUS at 00:14

## 2017-01-01 RX ADMIN — Medication 10 ML: at 12:03

## 2017-01-01 RX ADMIN — METRONIDAZOLE 500 MG: 500 INJECTION, SOLUTION INTRAVENOUS at 05:45

## 2017-01-01 RX ADMIN — LABETALOL HYDROCHLORIDE 10 MG: 5 INJECTION INTRAVENOUS at 05:18

## 2017-01-01 RX ADMIN — CHLORHEXIDINE GLUCONATE 15 ML: 1.2 RINSE ORAL at 07:52

## 2017-01-01 RX ADMIN — Medication 50 MCG/HR: at 22:08

## 2017-01-01 RX ADMIN — ASPIRIN 81 MG 81 MG: 81 TABLET ORAL at 08:41

## 2017-01-01 RX ADMIN — CEFAZOLIN 2 G: 10 INJECTION, POWDER, FOR SOLUTION INTRAVENOUS; PARENTERAL at 15:18

## 2017-01-01 RX ADMIN — NITROGLYCERIN 1 INCH: 20 OINTMENT TOPICAL at 09:36

## 2017-01-01 RX ADMIN — MORPHINE SULFATE 2 MG: 4 INJECTION, SOLUTION INTRAMUSCULAR; INTRAVENOUS at 17:55

## 2017-01-01 RX ADMIN — HYDROMORPHONE HYDROCHLORIDE 0.2 MG: 1 INJECTION, SOLUTION INTRAMUSCULAR; INTRAVENOUS; SUBCUTANEOUS at 06:16

## 2017-01-01 RX ADMIN — LINEZOLID 600 MG: 600 INJECTION, SOLUTION INTRAVENOUS at 22:26

## 2017-01-01 RX ADMIN — MUPIROCIN: 20 OINTMENT TOPICAL at 21:14

## 2017-01-01 RX ADMIN — HYDRALAZINE HYDROCHLORIDE 20 MG: 20 INJECTION INTRAMUSCULAR; INTRAVENOUS at 20:07

## 2017-01-01 RX ADMIN — PROPOFOL 20 MCG/KG/MIN: 10 INJECTION, EMULSION INTRAVENOUS at 03:33

## 2017-01-01 RX ADMIN — SODIUM CHLORIDE 25 ML/HR: 900 INJECTION, SOLUTION INTRAVENOUS at 04:30

## 2017-01-01 RX ADMIN — LABETALOL HYDROCHLORIDE 10 MG: 5 INJECTION, SOLUTION INTRAVENOUS at 19:43

## 2017-01-01 RX ADMIN — LABETALOL HYDROCHLORIDE 10 MG: 5 INJECTION INTRAVENOUS at 17:55

## 2017-01-01 RX ADMIN — ROCURONIUM BROMIDE 20 MG: 10 INJECTION, SOLUTION INTRAVENOUS at 19:22

## 2017-01-01 RX ADMIN — NITROGLYCERIN 1 INCH: 20 OINTMENT TOPICAL at 18:32

## 2017-01-01 RX ADMIN — METOCLOPRAMIDE 5 MG: 5 INJECTION, SOLUTION INTRAMUSCULAR; INTRAVENOUS at 07:49

## 2017-01-01 RX ADMIN — Medication 10 ML: at 07:53

## 2017-01-01 RX ADMIN — LABETALOL HYDROCHLORIDE 10 MG: 5 INJECTION INTRAVENOUS at 11:08

## 2017-01-01 RX ADMIN — LEVALBUTEROL HYDROCHLORIDE 0.63 MG: 0.63 SOLUTION RESPIRATORY (INHALATION) at 07:17

## 2017-01-01 RX ADMIN — NITROGLYCERIN 1 INCH: 20 OINTMENT TOPICAL at 17:34

## 2017-01-01 RX ADMIN — CEFEPIME HYDROCHLORIDE 2 G: 2 INJECTION, POWDER, FOR SOLUTION INTRAVENOUS at 15:27

## 2017-01-01 RX ADMIN — MUPIROCIN: 20 OINTMENT TOPICAL at 19:16

## 2017-01-01 RX ADMIN — LABETALOL HYDROCHLORIDE 10 MG: 5 INJECTION INTRAVENOUS at 13:06

## 2017-01-01 RX ADMIN — FAMOTIDINE 20 MG: 10 INJECTION INTRAVENOUS at 21:34

## 2017-01-01 RX ADMIN — INSULIN LISPRO 5 UNITS: 100 INJECTION, SOLUTION INTRAVENOUS; SUBCUTANEOUS at 12:58

## 2017-01-01 RX ADMIN — LEVALBUTEROL HYDROCHLORIDE 0.63 MG: 0.63 SOLUTION RESPIRATORY (INHALATION) at 11:12

## 2017-01-01 RX ADMIN — METRONIDAZOLE 500 MG: 500 INJECTION, SOLUTION INTRAVENOUS at 22:01

## 2017-01-01 RX ADMIN — MUPIROCIN: 20 OINTMENT TOPICAL at 08:52

## 2017-01-01 RX ADMIN — LABETALOL HYDROCHLORIDE 10 MG: 5 INJECTION INTRAVENOUS at 00:55

## 2017-01-01 RX ADMIN — HYDROMORPHONE HYDROCHLORIDE 0.2 MG: 1 INJECTION, SOLUTION INTRAMUSCULAR; INTRAVENOUS; SUBCUTANEOUS at 02:32

## 2017-01-01 RX ADMIN — Medication 200 MCG: at 02:11

## 2017-01-01 RX ADMIN — INSULIN LISPRO 2 UNITS: 100 INJECTION, SOLUTION INTRAVENOUS; SUBCUTANEOUS at 11:57

## 2017-01-01 RX ADMIN — VANCOMYCIN HYDROCHLORIDE 250 MG: 1 INJECTION, POWDER, LYOPHILIZED, FOR SOLUTION INTRAVENOUS at 00:56

## 2017-01-01 RX ADMIN — SODIUM CHLORIDE 100 ML/HR: 900 INJECTION, SOLUTION INTRAVENOUS at 18:03

## 2017-01-01 RX ADMIN — SODIUM CHLORIDE 25 ML/HR: 900 INJECTION, SOLUTION INTRAVENOUS at 18:17

## 2017-01-01 RX ADMIN — HYDRALAZINE HYDROCHLORIDE 20 MG: 20 INJECTION INTRAMUSCULAR; INTRAVENOUS at 05:12

## 2017-01-01 RX ADMIN — ASCORBIC ACID: 500 INJECTION, SOLUTION INTRAMUSCULAR; INTRAVENOUS; SUBCUTANEOUS at 18:02

## 2017-01-01 RX ADMIN — CHLORHEXIDINE GLUCONATE 15 ML: 1.2 RINSE ORAL at 09:19

## 2017-01-01 RX ADMIN — HYDRALAZINE HYDROCHLORIDE 20 MG: 20 INJECTION INTRAMUSCULAR; INTRAVENOUS at 04:22

## 2017-01-01 RX ADMIN — HYDROMORPHONE HYDROCHLORIDE 0.5 MG: 1 INJECTION, SOLUTION INTRAMUSCULAR; INTRAVENOUS; SUBCUTANEOUS at 18:01

## 2017-01-01 RX ADMIN — Medication 10 ML: at 13:38

## 2017-01-01 RX ADMIN — Medication 10 ML: at 13:19

## 2017-01-01 RX ADMIN — I.V. FAT EMULSION 250 ML: 20 EMULSION INTRAVENOUS at 17:45

## 2017-01-01 RX ADMIN — FENTANYL CITRATE 50 MCG: 50 INJECTION, SOLUTION INTRAMUSCULAR; INTRAVENOUS at 01:16

## 2017-01-01 RX ADMIN — NITROGLYCERIN 1 INCH: 20 OINTMENT TOPICAL at 17:39

## 2017-01-01 RX ADMIN — NITROGLYCERIN 1 INCH: 20 OINTMENT TOPICAL at 09:27

## 2017-01-01 RX ADMIN — METOCLOPRAMIDE 5 MG: 5 INJECTION, SOLUTION INTRAMUSCULAR; INTRAVENOUS at 06:05

## 2017-01-01 RX ADMIN — INSULIN LISPRO 2 UNITS: 100 INJECTION, SOLUTION INTRAVENOUS; SUBCUTANEOUS at 11:50

## 2017-01-01 RX ADMIN — ENOXAPARIN SODIUM 40 MG: 30 INJECTION SUBCUTANEOUS at 10:15

## 2017-01-01 RX ADMIN — METRONIDAZOLE 500 MG: 500 INJECTION, SOLUTION INTRAVENOUS at 13:58

## 2017-01-01 RX ADMIN — INSULIN LISPRO 2 UNITS: 100 INJECTION, SOLUTION INTRAVENOUS; SUBCUTANEOUS at 17:53

## 2017-01-01 RX ADMIN — CARVEDILOL 3.12 MG: 3.12 TABLET, FILM COATED ORAL at 16:22

## 2017-01-01 RX ADMIN — I.V. FAT EMULSION 250 ML: 20 EMULSION INTRAVENOUS at 17:57

## 2017-01-01 RX ADMIN — SODIUM CHLORIDE 3 G: 900 INJECTION, SOLUTION INTRAVENOUS at 22:34

## 2017-01-01 RX ADMIN — METRONIDAZOLE 500 MG: 500 INJECTION, SOLUTION INTRAVENOUS at 05:55

## 2017-01-01 RX ADMIN — SODIUM CHLORIDE 3 G: 900 INJECTION, SOLUTION INTRAVENOUS at 01:38

## 2017-01-01 RX ADMIN — ASPIRIN 300 MG: 300 SUPPOSITORY RECTAL at 10:34

## 2017-01-01 RX ADMIN — HYDROMORPHONE HYDROCHLORIDE 0.5 MG: 1 INJECTION, SOLUTION INTRAMUSCULAR; INTRAVENOUS; SUBCUTANEOUS at 22:17

## 2017-01-01 RX ADMIN — CEFEPIME HYDROCHLORIDE 2 G: 2 INJECTION, POWDER, FOR SOLUTION INTRAVENOUS at 14:41

## 2017-01-01 RX ADMIN — Medication 10 ML: at 05:42

## 2017-01-01 RX ADMIN — Medication 50 MCG/HR: at 22:20

## 2017-01-01 RX ADMIN — LEVALBUTEROL HYDROCHLORIDE 0.63 MG: 0.63 SOLUTION RESPIRATORY (INHALATION) at 20:39

## 2017-01-01 RX ADMIN — Medication 10 ML: at 14:21

## 2017-01-01 RX ADMIN — LINEZOLID 600 MG: 600 INJECTION, SOLUTION INTRAVENOUS at 21:51

## 2017-01-01 RX ADMIN — LABETALOL HYDROCHLORIDE 10 MG: 5 INJECTION INTRAVENOUS at 01:10

## 2017-01-01 RX ADMIN — BISACODYL 10 MG: 10 SUPPOSITORY RECTAL at 18:44

## 2017-01-01 RX ADMIN — AMIODARONE HYDROCHLORIDE 0.5 MG/MIN: 50 INJECTION, SOLUTION INTRAVENOUS at 12:28

## 2017-01-01 RX ADMIN — METOCLOPRAMIDE 5 MG: 5 INJECTION, SOLUTION INTRAMUSCULAR; INTRAVENOUS at 11:13

## 2017-01-01 RX ADMIN — MORPHINE SULFATE 1 MG: 2 INJECTION, SOLUTION INTRAMUSCULAR; INTRAVENOUS at 03:13

## 2017-01-01 RX ADMIN — NITROGLYCERIN 1 INCH: 20 OINTMENT TOPICAL at 10:35

## 2017-01-01 RX ADMIN — MUPIROCIN: 20 OINTMENT TOPICAL at 08:04

## 2017-01-01 RX ADMIN — PROPOFOL 140 MG: 10 INJECTION, EMULSION INTRAVENOUS at 19:08

## 2017-01-01 RX ADMIN — CARVEDILOL 3.12 MG: 3.12 TABLET, FILM COATED ORAL at 08:41

## 2017-01-01 RX ADMIN — FAMOTIDINE: 10 INJECTION INTRAVENOUS at 18:39

## 2017-01-01 RX ADMIN — LIDOCAINE HYDROCHLORIDE 100 MG: 20 INJECTION, SOLUTION EPIDURAL; INFILTRATION; INTRACAUDAL; PERINEURAL at 19:08

## 2017-01-01 RX ADMIN — LABETALOL HYDROCHLORIDE 10 MG: 5 INJECTION INTRAVENOUS at 23:45

## 2017-01-01 RX ADMIN — VANCOMYCIN HYDROCHLORIDE 250 MG: 1 INJECTION, POWDER, LYOPHILIZED, FOR SOLUTION INTRAVENOUS at 12:58

## 2017-01-01 RX ADMIN — HEPARIN SODIUM 5000 UNITS: 5000 INJECTION, SOLUTION INTRAVENOUS; SUBCUTANEOUS at 11:23

## 2017-01-01 RX ADMIN — FENTANYL CITRATE 50 MCG: 50 INJECTION, SOLUTION INTRAMUSCULAR; INTRAVENOUS at 23:34

## 2017-01-01 RX ADMIN — METRONIDAZOLE 500 MG: 500 INJECTION, SOLUTION INTRAVENOUS at 22:24

## 2017-01-01 RX ADMIN — HYDROMORPHONE HYDROCHLORIDE 0.5 MG: 1 INJECTION, SOLUTION INTRAMUSCULAR; INTRAVENOUS; SUBCUTANEOUS at 21:49

## 2017-01-01 RX ADMIN — FUROSEMIDE 40 MG: 10 INJECTION, SOLUTION INTRAMUSCULAR; INTRAVENOUS at 21:22

## 2017-01-01 RX ADMIN — LABETALOL HYDROCHLORIDE 10 MG: 5 INJECTION INTRAVENOUS at 16:58

## 2017-01-01 RX ADMIN — INSULIN LISPRO 2 UNITS: 100 INJECTION, SOLUTION INTRAVENOUS; SUBCUTANEOUS at 05:27

## 2017-01-01 RX ADMIN — SODIUM CHLORIDE, SODIUM LACTATE, POTASSIUM CHLORIDE, CALCIUM CHLORIDE: 600; 310; 30; 20 INJECTION, SOLUTION INTRAVENOUS at 02:01

## 2017-01-01 RX ADMIN — MORPHINE SULFATE 2 MG: 4 INJECTION, SOLUTION INTRAMUSCULAR; INTRAVENOUS at 02:17

## 2017-01-01 RX ADMIN — MUPIROCIN: 20 OINTMENT TOPICAL at 08:59

## 2017-01-01 RX ADMIN — METOCLOPRAMIDE 5 MG: 5 INJECTION, SOLUTION INTRAMUSCULAR; INTRAVENOUS at 23:43

## 2017-01-01 RX ADMIN — METOCLOPRAMIDE 5 MG: 5 INJECTION, SOLUTION INTRAMUSCULAR; INTRAVENOUS at 05:46

## 2017-01-01 RX ADMIN — LABETALOL HYDROCHLORIDE 10 MG: 5 INJECTION INTRAVENOUS at 05:28

## 2017-01-01 RX ADMIN — VANCOMYCIN HYDROCHLORIDE 250 MG: 1 INJECTION, POWDER, LYOPHILIZED, FOR SOLUTION INTRAVENOUS at 05:24

## 2017-01-01 RX ADMIN — INSULIN LISPRO 3 UNITS: 100 INJECTION, SOLUTION INTRAVENOUS; SUBCUTANEOUS at 05:47

## 2017-01-01 RX ADMIN — NITROGLYCERIN 1 INCH: 20 OINTMENT TOPICAL at 13:38

## 2017-01-01 RX ADMIN — HEPARIN SODIUM 5000 UNITS: 5000 INJECTION, SOLUTION INTRAVENOUS; SUBCUTANEOUS at 23:42

## 2017-01-01 RX ADMIN — CARVEDILOL 25 MG: 12.5 TABLET, FILM COATED ORAL at 09:14

## 2017-01-01 RX ADMIN — HYDRALAZINE HYDROCHLORIDE 20 MG: 20 INJECTION INTRAMUSCULAR; INTRAVENOUS at 09:03

## 2017-01-01 RX ADMIN — Medication 10 ML: at 13:47

## 2017-01-01 RX ADMIN — ASPIRIN 300 MG: 300 SUPPOSITORY RECTAL at 08:08

## 2017-01-01 RX ADMIN — INSULIN LISPRO 2 UNITS: 100 INJECTION, SOLUTION INTRAVENOUS; SUBCUTANEOUS at 00:07

## 2017-01-01 RX ADMIN — FAMOTIDINE 20 MG: 10 INJECTION INTRAVENOUS at 08:26

## 2017-01-01 RX ADMIN — FAMOTIDINE: 10 INJECTION INTRAVENOUS at 17:18

## 2017-01-01 RX ADMIN — AMPICILLIN 2 G: 2 INJECTION, POWDER, FOR SOLUTION INTRAVENOUS at 18:51

## 2017-01-01 RX ADMIN — HYDROMORPHONE HYDROCHLORIDE 0.2 MG: 1 INJECTION, SOLUTION INTRAMUSCULAR; INTRAVENOUS; SUBCUTANEOUS at 16:59

## 2017-01-01 RX ADMIN — Medication 10 ML: at 15:27

## 2017-01-01 RX ADMIN — METOCLOPRAMIDE 5 MG: 5 INJECTION, SOLUTION INTRAMUSCULAR; INTRAVENOUS at 17:26

## 2017-01-01 RX ADMIN — FAMOTIDINE 20 MG: 10 INJECTION INTRAVENOUS at 20:07

## 2017-01-01 RX ADMIN — PHENYLEPHRINE HYDROCHLORIDE 120 MCG/MIN: 10 INJECTION INTRAVENOUS at 05:22

## 2017-01-01 RX ADMIN — Medication 200 MCG/HR: at 17:19

## 2017-01-01 RX ADMIN — Medication 10 ML: at 05:15

## 2017-01-01 RX ADMIN — LABETALOL HYDROCHLORIDE 10 MG: 5 INJECTION INTRAVENOUS at 06:14

## 2017-01-01 RX ADMIN — FAMOTIDINE 20 MG: 10 INJECTION INTRAVENOUS at 09:03

## 2017-01-01 RX ADMIN — METRONIDAZOLE 500 MG: 500 INJECTION, SOLUTION INTRAVENOUS at 22:06

## 2017-01-01 RX ADMIN — AMPICILLIN 2 G: 2 INJECTION, POWDER, FOR SOLUTION INTRAVENOUS at 02:35

## 2017-01-01 RX ADMIN — OXYBUTYNIN CHLORIDE 15 MG: 5 TABLET, EXTENDED RELEASE ORAL at 21:12

## 2017-01-01 RX ADMIN — METRONIDAZOLE 500 MG: 500 INJECTION, SOLUTION INTRAVENOUS at 13:33

## 2017-01-01 RX ADMIN — LINEZOLID 600 MG: 600 INJECTION, SOLUTION INTRAVENOUS at 09:30

## 2017-01-01 RX ADMIN — HYDRALAZINE HYDROCHLORIDE 20 MG: 20 INJECTION INTRAMUSCULAR; INTRAVENOUS at 02:03

## 2017-01-01 RX ADMIN — Medication 100 MCG/HR: at 12:45

## 2017-01-01 RX ADMIN — LINEZOLID 600 MG: 600 INJECTION, SOLUTION INTRAVENOUS at 08:02

## 2017-01-01 RX ADMIN — ACETAMINOPHEN 1000 MG: 10 INJECTION, SOLUTION INTRAVENOUS at 09:05

## 2017-01-01 RX ADMIN — CALCIUM GLUCONATE: 94 INJECTION, SOLUTION INTRAVENOUS at 19:06

## 2017-01-01 RX ADMIN — Medication 10 ML: at 05:46

## 2017-01-01 RX ADMIN — CHLORHEXIDINE GLUCONATE 15 ML: 1.2 RINSE ORAL at 22:20

## 2017-01-01 RX ADMIN — Medication 10 ML: at 13:15

## 2017-01-01 RX ADMIN — ENOXAPARIN SODIUM 40 MG: 40 INJECTION SUBCUTANEOUS at 09:44

## 2017-01-01 RX ADMIN — SODIUM CHLORIDE 100 ML/HR: 900 INJECTION, SOLUTION INTRAVENOUS at 07:52

## 2017-01-01 RX ADMIN — INSULIN LISPRO 3 UNITS: 100 INJECTION, SOLUTION INTRAVENOUS; SUBCUTANEOUS at 14:15

## 2017-01-01 RX ADMIN — HYDROMORPHONE HYDROCHLORIDE 0.2 MG: 1 INJECTION, SOLUTION INTRAMUSCULAR; INTRAVENOUS; SUBCUTANEOUS at 23:55

## 2017-01-01 RX ADMIN — NYSTATIN: 100000 POWDER TOPICAL at 16:22

## 2017-01-01 RX ADMIN — Medication 10 ML: at 22:09

## 2017-01-01 RX ADMIN — LEVALBUTEROL HYDROCHLORIDE 0.63 MG: 0.63 SOLUTION RESPIRATORY (INHALATION) at 21:10

## 2017-01-01 RX ADMIN — HYDROMORPHONE HYDROCHLORIDE 0.1 MG: 1 INJECTION, SOLUTION INTRAMUSCULAR; INTRAVENOUS; SUBCUTANEOUS at 18:01

## 2017-01-01 RX ADMIN — NITROGLYCERIN 1 INCH: 20 OINTMENT TOPICAL at 08:12

## 2017-01-01 RX ADMIN — Medication 10 ML: at 21:07

## 2017-01-01 RX ADMIN — HYDROMORPHONE HYDROCHLORIDE 0.2 MG: 1 INJECTION, SOLUTION INTRAMUSCULAR; INTRAVENOUS; SUBCUTANEOUS at 15:38

## 2017-01-01 RX ADMIN — SODIUM CHLORIDE 3 G: 900 INJECTION, SOLUTION INTRAVENOUS at 13:43

## 2017-01-01 RX ADMIN — VANCOMYCIN HYDROCHLORIDE 1750 MG: 10 INJECTION, POWDER, LYOPHILIZED, FOR SOLUTION INTRAVENOUS at 03:00

## 2017-01-01 RX ADMIN — CHLORHEXIDINE GLUCONATE 15 ML: 1.2 RINSE ORAL at 21:39

## 2017-01-01 RX ADMIN — ACETAMINOPHEN 1000 MG: 10 INJECTION, SOLUTION INTRAVENOUS at 04:20

## 2017-01-01 RX ADMIN — ACETAMINOPHEN 975 MG: 325 SOLUTION ORAL at 20:38

## 2017-01-01 RX ADMIN — MORPHINE SULFATE 1 MG: 2 INJECTION, SOLUTION INTRAMUSCULAR; INTRAVENOUS at 06:58

## 2017-01-01 RX ADMIN — HYDRALAZINE HYDROCHLORIDE 20 MG: 20 INJECTION INTRAMUSCULAR; INTRAVENOUS at 20:18

## 2017-01-01 RX ADMIN — NITROGLYCERIN 1 INCH: 20 OINTMENT TOPICAL at 10:32

## 2017-01-01 RX ADMIN — METOPROLOL TARTRATE 5 MG: 5 INJECTION, SOLUTION INTRAVENOUS at 23:21

## 2017-01-01 RX ADMIN — SODIUM CHLORIDE 25 ML/HR: 900 INJECTION, SOLUTION INTRAVENOUS at 09:25

## 2017-01-01 RX ADMIN — INSULIN LISPRO 2 UNITS: 100 INJECTION, SOLUTION INTRAVENOUS; SUBCUTANEOUS at 06:01

## 2017-01-01 RX ADMIN — CHLORHEXIDINE GLUCONATE 15 ML: 1.2 RINSE ORAL at 21:18

## 2017-01-01 RX ADMIN — LEVALBUTEROL HYDROCHLORIDE 0.63 MG: 0.63 SOLUTION RESPIRATORY (INHALATION) at 19:32

## 2017-01-01 RX ADMIN — AMIODARONE HYDROCHLORIDE 0.5 MG/MIN: 50 INJECTION, SOLUTION INTRAVENOUS at 22:07

## 2017-01-01 RX ADMIN — ROCURONIUM BROMIDE 45 MG: 10 INJECTION, SOLUTION INTRAVENOUS at 23:53

## 2017-01-01 RX ADMIN — Medication 10 ML: at 21:36

## 2017-01-01 RX ADMIN — CHLORHEXIDINE GLUCONATE 15 ML: 1.2 RINSE ORAL at 09:57

## 2017-01-01 RX ADMIN — FAMOTIDINE 20 MG: 10 INJECTION INTRAVENOUS at 09:17

## 2017-01-01 RX ADMIN — LINEZOLID 600 MG: 600 INJECTION, SOLUTION INTRAVENOUS at 21:24

## 2017-01-01 RX ADMIN — Medication 50 MCG/HR: at 17:32

## 2017-01-01 RX ADMIN — Medication 20 ML: at 14:15

## 2017-01-01 RX ADMIN — Medication 10 ML: at 13:26

## 2017-01-01 RX ADMIN — LEVALBUTEROL HYDROCHLORIDE 0.63 MG: 0.63 SOLUTION RESPIRATORY (INHALATION) at 19:56

## 2017-01-01 RX ADMIN — HEPARIN SODIUM 5000 UNITS: 5000 INJECTION, SOLUTION INTRAVENOUS; SUBCUTANEOUS at 23:51

## 2017-01-01 RX ADMIN — Medication 10 ML: at 21:27

## 2017-01-01 RX ADMIN — HYDRALAZINE HYDROCHLORIDE 20 MG: 20 INJECTION INTRAMUSCULAR; INTRAVENOUS at 20:22

## 2017-01-01 RX ADMIN — INSULIN LISPRO 2 UNITS: 100 INJECTION, SOLUTION INTRAVENOUS; SUBCUTANEOUS at 17:29

## 2017-01-01 RX ADMIN — METRONIDAZOLE 500 MG: 500 INJECTION, SOLUTION INTRAVENOUS at 21:25

## 2017-01-01 RX ADMIN — Medication 10 ML: at 13:45

## 2017-01-01 RX ADMIN — HYDROMORPHONE HYDROCHLORIDE 1 MG: 1 INJECTION, SOLUTION INTRAMUSCULAR; INTRAVENOUS; SUBCUTANEOUS at 20:18

## 2017-01-01 RX ADMIN — FUROSEMIDE 40 MG: 10 INJECTION, SOLUTION INTRAMUSCULAR; INTRAVENOUS at 08:22

## 2017-01-01 RX ADMIN — SODIUM CHLORIDE 3 G: 900 INJECTION, SOLUTION INTRAVENOUS at 21:57

## 2017-01-01 RX ADMIN — SODIUM CHLORIDE 25 ML/HR: 900 INJECTION, SOLUTION INTRAVENOUS at 00:56

## 2017-01-01 RX ADMIN — FAMOTIDINE 20 MG: 10 INJECTION INTRAVENOUS at 20:08

## 2017-01-01 RX ADMIN — HYDRALAZINE HYDROCHLORIDE 20 MG: 20 INJECTION INTRAMUSCULAR; INTRAVENOUS at 22:41

## 2017-01-01 RX ADMIN — LINEZOLID 600 MG: 600 INJECTION, SOLUTION INTRAVENOUS at 08:19

## 2017-01-01 RX ADMIN — LEVOFLOXACIN 750 MG: 5 INJECTION, SOLUTION INTRAVENOUS at 22:11

## 2017-01-01 RX ADMIN — Medication 10 ML: at 13:16

## 2017-01-01 RX ADMIN — MIDAZOLAM HYDROCHLORIDE 1 MG: 1 INJECTION, SOLUTION INTRAMUSCULAR; INTRAVENOUS at 10:07

## 2017-01-01 RX ADMIN — INSULIN LISPRO 3 UNITS: 100 INJECTION, SOLUTION INTRAVENOUS; SUBCUTANEOUS at 06:36

## 2017-01-01 RX ADMIN — HEPARIN SODIUM 9000 UNITS: 1000 INJECTION, SOLUTION INTRAVENOUS; SUBCUTANEOUS at 09:20

## 2017-01-01 RX ADMIN — CARVEDILOL 3.12 MG: 3.12 TABLET, FILM COATED ORAL at 23:16

## 2017-01-01 RX ADMIN — Medication 10 ML: at 06:30

## 2017-01-01 RX ADMIN — INSULIN LISPRO 2 UNITS: 100 INJECTION, SOLUTION INTRAVENOUS; SUBCUTANEOUS at 06:05

## 2017-01-01 RX ADMIN — INSULIN LISPRO 5 UNITS: 100 INJECTION, SOLUTION INTRAVENOUS; SUBCUTANEOUS at 07:05

## 2017-01-01 RX ADMIN — INSULIN LISPRO 2 UNITS: 100 INJECTION, SOLUTION INTRAVENOUS; SUBCUTANEOUS at 17:28

## 2017-01-01 RX ADMIN — FAMOTIDINE 20 MG: 10 INJECTION, SOLUTION INTRAVENOUS at 21:22

## 2017-01-01 RX ADMIN — NITROGLYCERIN 1 INCH: 20 OINTMENT TOPICAL at 09:21

## 2017-01-01 RX ADMIN — HYDROMORPHONE HYDROCHLORIDE 0.2 MG: 1 INJECTION, SOLUTION INTRAMUSCULAR; INTRAVENOUS; SUBCUTANEOUS at 17:57

## 2017-01-01 RX ADMIN — INSULIN LISPRO 2 UNITS: 100 INJECTION, SOLUTION INTRAVENOUS; SUBCUTANEOUS at 11:43

## 2017-01-01 RX ADMIN — Medication 10 ML: at 13:34

## 2017-01-01 RX ADMIN — ROCURONIUM BROMIDE 30 MG: 10 INJECTION, SOLUTION INTRAVENOUS at 02:00

## 2017-01-01 RX ADMIN — Medication 20 ML: at 14:50

## 2017-01-01 RX ADMIN — VANCOMYCIN HYDROCHLORIDE 250 MG: 1 INJECTION, POWDER, LYOPHILIZED, FOR SOLUTION INTRAVENOUS at 23:55

## 2017-01-01 RX ADMIN — INSULIN LISPRO 2 UNITS: 100 INJECTION, SOLUTION INTRAVENOUS; SUBCUTANEOUS at 00:40

## 2017-01-01 RX ADMIN — LABETALOL HYDROCHLORIDE 10 MG: 5 INJECTION INTRAVENOUS at 05:24

## 2017-01-01 RX ADMIN — KETAMINE HYDROCHLORIDE 9.6 MG: 10 INJECTION, SOLUTION INTRAMUSCULAR; INTRAVENOUS at 21:39

## 2017-01-01 RX ADMIN — Medication 10 ML: at 05:28

## 2017-01-01 RX ADMIN — POTASSIUM PHOSPHATE, MONOBASIC AND POTASSIUM PHOSPHATE, DIBASIC: 224; 236 INJECTION, SOLUTION INTRAVENOUS at 09:55

## 2017-01-01 RX ADMIN — FINASTERIDE 5 MG: 5 TABLET, FILM COATED ORAL at 08:57

## 2017-01-01 RX ADMIN — HYDROMORPHONE HYDROCHLORIDE 0.2 MG: 1 INJECTION, SOLUTION INTRAMUSCULAR; INTRAVENOUS; SUBCUTANEOUS at 03:23

## 2017-01-01 RX ADMIN — HEPARIN SODIUM 5000 UNITS: 5000 INJECTION, SOLUTION INTRAVENOUS; SUBCUTANEOUS at 12:10

## 2017-01-01 RX ADMIN — FAMOTIDINE 20 MG: 10 INJECTION INTRAVENOUS at 21:16

## 2017-01-01 RX ADMIN — AMPICILLIN 2 G: 2 INJECTION, POWDER, FOR SOLUTION INTRAVENOUS at 04:17

## 2017-01-01 RX ADMIN — FENTANYL CITRATE 50 MCG: 50 INJECTION, SOLUTION INTRAMUSCULAR; INTRAVENOUS at 01:04

## 2017-01-01 RX ADMIN — Medication 10 ML: at 05:13

## 2017-01-01 RX ADMIN — IPRATROPIUM BROMIDE AND ALBUTEROL SULFATE 3 ML: .5; 3 SOLUTION RESPIRATORY (INHALATION) at 01:10

## 2017-01-01 RX ADMIN — Medication 10 ML: at 20:17

## 2017-01-01 RX ADMIN — LEVALBUTEROL HYDROCHLORIDE 0.63 MG: 0.63 SOLUTION RESPIRATORY (INHALATION) at 23:54

## 2017-01-01 RX ADMIN — PHENAZOPYRIDINE HYDROCHLORIDE 200 MG: 100 TABLET ORAL at 08:35

## 2017-01-01 RX ADMIN — CEFEPIME HYDROCHLORIDE 2 G: 2 INJECTION, POWDER, FOR SOLUTION INTRAVENOUS at 04:47

## 2017-01-01 RX ADMIN — FAMOTIDINE: 10 INJECTION INTRAVENOUS at 18:46

## 2017-01-01 RX ADMIN — HYDRALAZINE HYDROCHLORIDE 20 MG: 20 INJECTION INTRAMUSCULAR; INTRAVENOUS at 20:03

## 2017-01-01 RX ADMIN — MIDAZOLAM HYDROCHLORIDE 1 MG: 1 INJECTION, SOLUTION INTRAMUSCULAR; INTRAVENOUS at 09:33

## 2017-01-01 RX ADMIN — Medication 30 ML: at 05:24

## 2017-01-01 RX ADMIN — ACETAMINOPHEN 975 MG: 325 SOLUTION ORAL at 14:51

## 2017-01-01 RX ADMIN — AMPICILLIN 2 G: 2 INJECTION, POWDER, FOR SOLUTION INTRAVENOUS at 11:07

## 2017-01-01 RX ADMIN — PROPOFOL 50 MCG/KG/MIN: 10 INJECTION, EMULSION INTRAVENOUS at 01:19

## 2017-01-01 RX ADMIN — Medication 10 ML: at 14:37

## 2017-01-01 RX ADMIN — INSULIN LISPRO 2 UNITS: 100 INJECTION, SOLUTION INTRAVENOUS; SUBCUTANEOUS at 11:09

## 2017-01-01 RX ADMIN — LABETALOL HYDROCHLORIDE 10 MG: 5 INJECTION INTRAVENOUS at 11:52

## 2017-01-01 RX ADMIN — INSULIN LISPRO 2 UNITS: 100 INJECTION, SOLUTION INTRAVENOUS; SUBCUTANEOUS at 05:56

## 2017-01-01 RX ADMIN — I.V. FAT EMULSION 250 ML: 20 EMULSION INTRAVENOUS at 18:37

## 2017-01-01 RX ADMIN — CARVEDILOL 6.25 MG: 3.12 TABLET, FILM COATED ORAL at 11:08

## 2017-01-01 RX ADMIN — MIDAZOLAM HYDROCHLORIDE 1 MG: 1 INJECTION INTRAMUSCULAR; INTRAVENOUS at 08:29

## 2017-01-01 RX ADMIN — ASPIRIN 81 MG 81 MG: 81 TABLET ORAL at 08:35

## 2017-01-01 RX ADMIN — LABETALOL HYDROCHLORIDE 10 MG: 5 INJECTION INTRAVENOUS at 13:03

## 2017-01-01 RX ADMIN — LABETALOL HYDROCHLORIDE 10 MG: 5 INJECTION INTRAVENOUS at 17:54

## 2017-01-01 RX ADMIN — FAMOTIDINE 20 MG: 10 INJECTION INTRAVENOUS at 09:00

## 2017-01-01 RX ADMIN — METRONIDAZOLE 500 MG: 500 INJECTION, SOLUTION INTRAVENOUS at 13:11

## 2017-01-01 RX ADMIN — HYDROMORPHONE HYDROCHLORIDE 0.5 MG: 1 INJECTION, SOLUTION INTRAMUSCULAR; INTRAVENOUS; SUBCUTANEOUS at 18:04

## 2017-01-01 RX ADMIN — MIDAZOLAM HYDROCHLORIDE 1 MG: 1 INJECTION, SOLUTION INTRAMUSCULAR; INTRAVENOUS at 09:14

## 2017-01-01 RX ADMIN — INSULIN LISPRO 2 UNITS: 100 INJECTION, SOLUTION INTRAVENOUS; SUBCUTANEOUS at 00:51

## 2017-01-01 RX ADMIN — POTASSIUM PHOSPHATE, MONOBASIC AND POTASSIUM PHOSPHATE, DIBASIC: 224; 236 INJECTION, SOLUTION INTRAVENOUS at 10:13

## 2017-01-01 RX ADMIN — VALSARTAN 80 MG: 80 TABLET ORAL at 08:41

## 2017-01-01 RX ADMIN — LABETALOL HYDROCHLORIDE 10 MG: 5 INJECTION INTRAVENOUS at 18:23

## 2017-01-01 RX ADMIN — Medication 10 ML: at 04:42

## 2017-01-01 RX ADMIN — HYDROMORPHONE HYDROCHLORIDE 0.2 MG: 1 INJECTION, SOLUTION INTRAMUSCULAR; INTRAVENOUS; SUBCUTANEOUS at 02:10

## 2017-01-01 RX ADMIN — AMPICILLIN 2 G: 2 INJECTION, POWDER, FOR SOLUTION INTRAVENOUS at 19:08

## 2017-01-01 RX ADMIN — ASCORBIC ACID: 500 INJECTION, SOLUTION INTRAMUSCULAR; INTRAVENOUS; SUBCUTANEOUS at 18:31

## 2017-01-01 RX ADMIN — Medication 20 ML: at 12:05

## 2017-01-01 RX ADMIN — HYDROMORPHONE HYDROCHLORIDE 0.2 MG: 1 INJECTION, SOLUTION INTRAMUSCULAR; INTRAVENOUS; SUBCUTANEOUS at 14:07

## 2017-01-01 RX ADMIN — HEPARIN SODIUM 5000 UNITS: 5000 INJECTION, SOLUTION INTRAVENOUS; SUBCUTANEOUS at 12:04

## 2017-01-01 RX ADMIN — Medication 5 ML: at 14:00

## 2017-01-01 RX ADMIN — ACETAMINOPHEN 1000 MG: 10 INJECTION, SOLUTION INTRAVENOUS at 11:05

## 2017-01-01 RX ADMIN — ENOXAPARIN SODIUM 40 MG: 30 INJECTION SUBCUTANEOUS at 09:23

## 2017-01-01 RX ADMIN — MORPHINE SULFATE 1 MG: 2 INJECTION, SOLUTION INTRAMUSCULAR; INTRAVENOUS at 20:35

## 2017-01-01 RX ADMIN — VANCOMYCIN HYDROCHLORIDE 250 MG: 1 INJECTION, POWDER, LYOPHILIZED, FOR SOLUTION INTRAVENOUS at 17:21

## 2017-01-01 RX ADMIN — HYDROMORPHONE HYDROCHLORIDE 0.2 MG: 1 INJECTION, SOLUTION INTRAMUSCULAR; INTRAVENOUS; SUBCUTANEOUS at 21:56

## 2017-01-01 RX ADMIN — NITROGLYCERIN 1 INCH: 20 OINTMENT TOPICAL at 08:34

## 2017-01-01 RX ADMIN — FENTANYL CITRATE 50 MCG: 50 INJECTION, SOLUTION INTRAMUSCULAR; INTRAVENOUS at 23:08

## 2017-01-01 RX ADMIN — I.V. FAT EMULSION 250 ML: 20 EMULSION INTRAVENOUS at 18:29

## 2017-01-01 RX ADMIN — Medication 10 ML: at 15:01

## 2017-01-01 RX ADMIN — MUPIROCIN: 20 OINTMENT TOPICAL at 20:38

## 2017-01-01 RX ADMIN — SODIUM CHLORIDE 3 G: 900 INJECTION, SOLUTION INTRAVENOUS at 20:17

## 2017-01-01 RX ADMIN — HEPARIN SODIUM 5000 UNITS: 5000 INJECTION, SOLUTION INTRAVENOUS; SUBCUTANEOUS at 23:11

## 2017-01-01 RX ADMIN — CALCIUM GLUCONATE: 94 INJECTION, SOLUTION INTRAVENOUS at 18:25

## 2017-01-01 RX ADMIN — FENTANYL CITRATE 50 MCG: 50 INJECTION, SOLUTION INTRAMUSCULAR; INTRAVENOUS at 00:05

## 2017-01-01 RX ADMIN — CHLORHEXIDINE GLUCONATE 15 ML: 1.2 RINSE ORAL at 10:47

## 2017-01-01 RX ADMIN — ASPIRIN 300 MG: 300 SUPPOSITORY RECTAL at 09:35

## 2017-01-01 RX ADMIN — Medication 10 ML: at 22:36

## 2017-01-01 RX ADMIN — NITROGLYCERIN 1 INCH: 20 OINTMENT TOPICAL at 17:17

## 2017-01-01 RX ADMIN — Medication 40 ML: at 14:20

## 2017-01-01 RX ADMIN — CEFAZOLIN 2 G: 10 INJECTION, POWDER, FOR SOLUTION INTRAVENOUS; PARENTERAL at 07:31

## 2017-01-01 RX ADMIN — Medication 10 ML: at 21:33

## 2017-01-01 RX ADMIN — NITROGLYCERIN 1 INCH: 20 OINTMENT TOPICAL at 17:27

## 2017-01-01 RX ADMIN — LABETALOL HYDROCHLORIDE 10 MG: 5 INJECTION INTRAVENOUS at 13:04

## 2017-01-01 RX ADMIN — CEFEPIME HYDROCHLORIDE 2 G: 2 INJECTION, POWDER, FOR SOLUTION INTRAVENOUS at 15:49

## 2017-01-01 RX ADMIN — ROCURONIUM BROMIDE 10 MG: 10 INJECTION, SOLUTION INTRAVENOUS at 02:37

## 2017-01-01 RX ADMIN — ASPIRIN 300 MG: 300 SUPPOSITORY RECTAL at 08:57

## 2017-01-01 RX ADMIN — Medication 10 ML: at 21:42

## 2017-01-01 RX ADMIN — Medication 10 ML: at 21:41

## 2017-01-01 RX ADMIN — LABETALOL HYDROCHLORIDE 10 MG: 5 INJECTION INTRAVENOUS at 06:04

## 2017-01-01 RX ADMIN — ASPIRIN 300 MG: 300 SUPPOSITORY RECTAL at 08:37

## 2017-01-01 RX ADMIN — NITROGLYCERIN 1 INCH: 20 OINTMENT TOPICAL at 08:43

## 2017-01-01 RX ADMIN — VANCOMYCIN HYDROCHLORIDE 250 MG: 1 INJECTION, POWDER, LYOPHILIZED, FOR SOLUTION INTRAVENOUS at 17:23

## 2017-01-01 RX ADMIN — LINEZOLID 600 MG: 600 INJECTION, SOLUTION INTRAVENOUS at 21:35

## 2017-01-01 RX ADMIN — ASCORBIC ACID: 500 INJECTION, SOLUTION INTRAMUSCULAR; INTRAVENOUS; SUBCUTANEOUS at 18:57

## 2017-01-01 RX ADMIN — Medication 10 ML: at 21:57

## 2017-01-01 RX ADMIN — LEVALBUTEROL HYDROCHLORIDE 0.63 MG: 0.63 SOLUTION RESPIRATORY (INHALATION) at 16:04

## 2017-01-01 RX ADMIN — Medication 10 ML: at 14:16

## 2017-01-01 RX ADMIN — LABETALOL HYDROCHLORIDE 10 MG: 5 INJECTION INTRAVENOUS at 12:01

## 2017-01-01 RX ADMIN — INSULIN LISPRO 3 UNITS: 100 INJECTION, SOLUTION INTRAVENOUS; SUBCUTANEOUS at 05:54

## 2017-01-01 RX ADMIN — SODIUM CHLORIDE 25 ML/HR: 900 INJECTION, SOLUTION INTRAVENOUS at 01:03

## 2017-01-01 RX ADMIN — INSULIN LISPRO 2 UNITS: 100 INJECTION, SOLUTION INTRAVENOUS; SUBCUTANEOUS at 05:24

## 2017-01-01 RX ADMIN — CARVEDILOL 12.5 MG: 12.5 TABLET, FILM COATED ORAL at 14:48

## 2017-01-01 RX ADMIN — VANCOMYCIN HYDROCHLORIDE 250 MG: 1 INJECTION, POWDER, LYOPHILIZED, FOR SOLUTION INTRAVENOUS at 05:46

## 2017-01-01 RX ADMIN — LEVALBUTEROL HYDROCHLORIDE 0.63 MG: 0.63 SOLUTION RESPIRATORY (INHALATION) at 05:15

## 2017-01-01 RX ADMIN — Medication 10 ML: at 05:47

## 2017-01-01 RX ADMIN — PROPOFOL 15 MCG/KG/MIN: 10 INJECTION, EMULSION INTRAVENOUS at 05:11

## 2017-01-01 RX ADMIN — NITROGLYCERIN 1 INCH: 20 OINTMENT TOPICAL at 16:57

## 2017-01-01 RX ADMIN — ASPIRIN 300 MG: 300 SUPPOSITORY RECTAL at 08:50

## 2017-01-01 RX ADMIN — INSULIN LISPRO 2 UNITS: 100 INJECTION, SOLUTION INTRAVENOUS; SUBCUTANEOUS at 06:34

## 2017-01-01 RX ADMIN — ENOXAPARIN SODIUM 40 MG: 30 INJECTION SUBCUTANEOUS at 09:32

## 2017-01-01 RX ADMIN — LABETALOL HYDROCHLORIDE 10 MG: 5 INJECTION INTRAVENOUS at 04:45

## 2017-01-01 RX ADMIN — NITROGLYCERIN 1 INCH: 20 OINTMENT TOPICAL at 09:54

## 2017-01-01 RX ADMIN — Medication 20 ML: at 19:46

## 2017-01-01 RX ADMIN — METRONIDAZOLE 500 MG: 500 INJECTION, SOLUTION INTRAVENOUS at 06:02

## 2017-01-01 RX ADMIN — Medication 10 ML: at 21:04

## 2017-01-01 RX ADMIN — HYDROMORPHONE HYDROCHLORIDE 0.2 MG: 1 INJECTION, SOLUTION INTRAMUSCULAR; INTRAVENOUS; SUBCUTANEOUS at 00:13

## 2017-01-01 RX ADMIN — LABETALOL HYDROCHLORIDE 10 MG: 5 INJECTION INTRAVENOUS at 17:24

## 2017-01-01 RX ADMIN — FAMOTIDINE 20 MG: 10 INJECTION INTRAVENOUS at 20:46

## 2017-01-01 RX ADMIN — LABETALOL HYDROCHLORIDE 10 MG: 5 INJECTION INTRAVENOUS at 05:17

## 2017-01-01 RX ADMIN — LABETALOL HYDROCHLORIDE 10 MG: 5 INJECTION INTRAVENOUS at 00:43

## 2017-01-01 RX ADMIN — INSULIN LISPRO 2 UNITS: 100 INJECTION, SOLUTION INTRAVENOUS; SUBCUTANEOUS at 11:38

## 2017-01-01 RX ADMIN — VANCOMYCIN HYDROCHLORIDE 250 MG: 1 INJECTION, POWDER, LYOPHILIZED, FOR SOLUTION INTRAVENOUS at 07:56

## 2017-01-01 RX ADMIN — SODIUM CHLORIDE 500 ML: 900 INJECTION, SOLUTION INTRAVENOUS at 17:49

## 2017-01-01 RX ADMIN — Medication 10 ML: at 22:04

## 2017-01-01 RX ADMIN — ONDANSETRON HYDROCHLORIDE 4 MG: 2 INJECTION, SOLUTION INTRAMUSCULAR; INTRAVENOUS at 22:12

## 2017-01-01 RX ADMIN — ROCURONIUM BROMIDE 5 MG: 10 INJECTION, SOLUTION INTRAVENOUS at 23:45

## 2017-01-01 RX ADMIN — FAMOTIDINE 20 MG: 10 INJECTION INTRAVENOUS at 08:24

## 2017-01-01 RX ADMIN — HYDROMORPHONE HYDROCHLORIDE 0.2 MG: 1 INJECTION, SOLUTION INTRAMUSCULAR; INTRAVENOUS; SUBCUTANEOUS at 03:07

## 2017-01-01 RX ADMIN — SODIUM CHLORIDE 3 G: 900 INJECTION, SOLUTION INTRAVENOUS at 07:06

## 2017-01-01 RX ADMIN — HYDROMORPHONE HYDROCHLORIDE 0.2 MG: 1 INJECTION, SOLUTION INTRAMUSCULAR; INTRAVENOUS; SUBCUTANEOUS at 16:28

## 2017-01-01 RX ADMIN — LABETALOL HYDROCHLORIDE 10 MG: 5 INJECTION INTRAVENOUS at 05:49

## 2017-01-01 RX ADMIN — Medication 10 ML: at 14:08

## 2017-01-01 RX ADMIN — DEXTROSE MONOHYDRATE 10 MG/HR: 50 INJECTION, SOLUTION INTRAVENOUS at 09:17

## 2017-01-01 RX ADMIN — LINEZOLID 600 MG: 600 INJECTION, SOLUTION INTRAVENOUS at 16:01

## 2017-01-01 RX ADMIN — INSULIN LISPRO 2 UNITS: 100 INJECTION, SOLUTION INTRAVENOUS; SUBCUTANEOUS at 06:19

## 2017-01-01 RX ADMIN — INSULIN LISPRO 2 UNITS: 100 INJECTION, SOLUTION INTRAVENOUS; SUBCUTANEOUS at 14:29

## 2017-01-01 RX ADMIN — SODIUM CHLORIDE 3 G: 900 INJECTION, SOLUTION INTRAVENOUS at 14:54

## 2017-01-01 RX ADMIN — CHLORHEXIDINE GLUCONATE 15 ML: 1.2 RINSE ORAL at 12:58

## 2017-01-01 RX ADMIN — LEVALBUTEROL HYDROCHLORIDE 0.63 MG: 0.63 SOLUTION RESPIRATORY (INHALATION) at 23:57

## 2017-01-01 RX ADMIN — Medication 10 ML: at 04:04

## 2017-01-01 RX ADMIN — LABETALOL HYDROCHLORIDE 10 MG: 5 INJECTION INTRAVENOUS at 05:13

## 2017-01-01 RX ADMIN — ETOMIDATE 20 MG: 2 INJECTION INTRAVENOUS at 23:45

## 2017-01-01 RX ADMIN — CEFEPIME HYDROCHLORIDE 2 G: 2 INJECTION, POWDER, FOR SOLUTION INTRAVENOUS at 15:14

## 2017-01-01 RX ADMIN — FAMOTIDINE 20 MG: 10 INJECTION INTRAVENOUS at 10:14

## 2017-01-01 RX ADMIN — WATER: 1000 INJECTION, SOLUTION INTRAVENOUS at 20:25

## 2017-01-01 RX ADMIN — CHLORHEXIDINE GLUCONATE 15 ML: 1.2 RINSE ORAL at 08:08

## 2017-01-01 RX ADMIN — INSULIN LISPRO 2 UNITS: 100 INJECTION, SOLUTION INTRAVENOUS; SUBCUTANEOUS at 00:10

## 2017-01-01 RX ADMIN — NITROGLYCERIN 1 INCH: 20 OINTMENT TOPICAL at 08:00

## 2017-01-01 RX ADMIN — I.V. FAT EMULSION 250 ML: 20 EMULSION INTRAVENOUS at 19:27

## 2017-01-01 RX ADMIN — Medication 10 ML: at 13:13

## 2017-01-01 RX ADMIN — INSULIN LISPRO 2 UNITS: 100 INJECTION, SOLUTION INTRAVENOUS; SUBCUTANEOUS at 23:40

## 2017-01-01 RX ADMIN — FAMOTIDINE: 10 INJECTION INTRAVENOUS at 19:25

## 2017-01-01 RX ADMIN — SODIUM CHLORIDE 3 G: 900 INJECTION, SOLUTION INTRAVENOUS at 22:52

## 2017-01-01 RX ADMIN — Medication 10 ML: at 13:36

## 2017-01-01 RX ADMIN — CHLORHEXIDINE GLUCONATE 15 ML: 1.2 RINSE ORAL at 08:52

## 2017-01-01 RX ADMIN — VANCOMYCIN HYDROCHLORIDE 250 MG: 1 INJECTION, POWDER, LYOPHILIZED, FOR SOLUTION INTRAVENOUS at 23:52

## 2017-01-01 RX ADMIN — LABETALOL HYDROCHLORIDE 10 MG: 5 INJECTION INTRAVENOUS at 05:10

## 2017-01-01 RX ADMIN — LABETALOL HYDROCHLORIDE 10 MG: 5 INJECTION INTRAVENOUS at 12:03

## 2017-01-01 RX ADMIN — FAMOTIDINE 20 MG: 10 INJECTION INTRAVENOUS at 08:33

## 2017-01-01 RX ADMIN — SODIUM CHLORIDE 100 ML/HR: 900 INJECTION, SOLUTION INTRAVENOUS at 00:46

## 2017-01-01 RX ADMIN — Medication 10 ML: at 22:21

## 2017-01-01 RX ADMIN — HYDROMORPHONE HYDROCHLORIDE 0.5 MG: 1 INJECTION, SOLUTION INTRAMUSCULAR; INTRAVENOUS; SUBCUTANEOUS at 06:20

## 2017-01-01 RX ADMIN — LABETALOL HYDROCHLORIDE 10 MG: 5 INJECTION INTRAVENOUS at 19:58

## 2017-01-01 RX ADMIN — AMIODARONE HYDROCHLORIDE 0.5 MG/MIN: 50 INJECTION, SOLUTION INTRAVENOUS at 07:52

## 2017-01-01 RX ADMIN — DEXTROSE MONOHYDRATE 10 MG/HR: 50 INJECTION, SOLUTION INTRAVENOUS at 16:49

## 2017-01-01 RX ADMIN — NITROGLYCERIN 1 INCH: 20 OINTMENT TOPICAL at 00:44

## 2017-01-01 RX ADMIN — HEPARIN SODIUM 5000 UNITS: 5000 INJECTION, SOLUTION INTRAVENOUS; SUBCUTANEOUS at 23:27

## 2017-01-01 RX ADMIN — LABETALOL HYDROCHLORIDE 10 MG: 5 INJECTION INTRAVENOUS at 23:42

## 2017-01-01 RX ADMIN — AMPICILLIN 2 G: 2 INJECTION, POWDER, FOR SOLUTION INTRAVENOUS at 18:38

## 2017-01-01 RX ADMIN — SODIUM CHLORIDE 75 ML/HR: 900 INJECTION, SOLUTION INTRAVENOUS at 10:07

## 2017-01-01 RX ADMIN — LEVALBUTEROL HYDROCHLORIDE 0.63 MG: 0.63 SOLUTION RESPIRATORY (INHALATION) at 01:00

## 2017-01-01 RX ADMIN — ONDANSETRON HYDROCHLORIDE 4 MG: 2 INJECTION, SOLUTION INTRAMUSCULAR; INTRAVENOUS at 05:27

## 2017-01-01 RX ADMIN — SODIUM CHLORIDE 15 MG/HR: 900 INJECTION, SOLUTION INTRAVENOUS at 12:05

## 2017-01-01 RX ADMIN — ASPIRIN 300 MG: 300 SUPPOSITORY RECTAL at 08:43

## 2017-01-01 RX ADMIN — ENOXAPARIN SODIUM 40 MG: 40 INJECTION SUBCUTANEOUS at 11:31

## 2017-01-01 RX ADMIN — HYDRALAZINE HYDROCHLORIDE 20 MG: 20 INJECTION INTRAMUSCULAR; INTRAVENOUS at 03:36

## 2017-01-01 RX ADMIN — INSULIN LISPRO 2 UNITS: 100 INJECTION, SOLUTION INTRAVENOUS; SUBCUTANEOUS at 00:31

## 2017-01-01 RX ADMIN — CARVEDILOL 25 MG: 12.5 TABLET, FILM COATED ORAL at 20:20

## 2017-01-01 RX ADMIN — INSULIN LISPRO 3 UNITS: 100 INJECTION, SOLUTION INTRAVENOUS; SUBCUTANEOUS at 23:33

## 2017-01-01 RX ADMIN — VANCOMYCIN HYDROCHLORIDE 250 MG: 1 INJECTION, POWDER, LYOPHILIZED, FOR SOLUTION INTRAVENOUS at 17:19

## 2017-01-01 RX ADMIN — INSULIN LISPRO 2 UNITS: 100 INJECTION, SOLUTION INTRAVENOUS; SUBCUTANEOUS at 12:24

## 2017-01-01 RX ADMIN — INSULIN LISPRO 2 UNITS: 100 INJECTION, SOLUTION INTRAVENOUS; SUBCUTANEOUS at 17:20

## 2017-01-01 RX ADMIN — Medication 10 ML: at 14:58

## 2017-01-01 RX ADMIN — LABETALOL HYDROCHLORIDE 10 MG: 5 INJECTION INTRAVENOUS at 13:02

## 2017-01-01 RX ADMIN — ASPIRIN 300 MG: 300 SUPPOSITORY RECTAL at 10:22

## 2017-01-01 RX ADMIN — MORPHINE SULFATE 1 MG: 2 INJECTION, SOLUTION INTRAMUSCULAR; INTRAVENOUS at 05:37

## 2017-01-01 RX ADMIN — ASPIRIN 300 MG: 300 SUPPOSITORY RECTAL at 08:58

## 2017-01-01 RX ADMIN — LISINOPRIL 5 MG: 5 TABLET ORAL at 09:52

## 2017-01-01 RX ADMIN — CHLORHEXIDINE GLUCONATE 15 ML: 1.2 RINSE ORAL at 08:15

## 2017-01-01 RX ADMIN — ASCORBIC ACID: 500 INJECTION, SOLUTION INTRAMUSCULAR; INTRAVENOUS; SUBCUTANEOUS at 18:12

## 2017-01-01 RX ADMIN — CEFAZOLIN 2 G: 10 INJECTION, POWDER, FOR SOLUTION INTRAVENOUS; PARENTERAL at 19:10

## 2017-01-01 RX ADMIN — HEPARIN SODIUM 5000 UNITS: 5000 INJECTION, SOLUTION INTRAVENOUS; SUBCUTANEOUS at 11:22

## 2017-01-01 RX ADMIN — LINEZOLID 600 MG: 600 INJECTION, SOLUTION INTRAVENOUS at 09:34

## 2017-01-01 RX ADMIN — DILTIAZEM HYDROCHLORIDE 10 MG: 5 INJECTION INTRAVENOUS at 00:29

## 2017-01-01 RX ADMIN — LIDOCAINE HYDROCHLORIDE: 20 JELLY TOPICAL at 15:05

## 2017-01-01 RX ADMIN — Medication 10 ML: at 14:20

## 2017-01-01 RX ADMIN — INSULIN LISPRO 2 UNITS: 100 INJECTION, SOLUTION INTRAVENOUS; SUBCUTANEOUS at 00:00

## 2017-01-01 RX ADMIN — FUROSEMIDE 40 MG: 10 INJECTION, SOLUTION INTRAMUSCULAR; INTRAVENOUS at 17:57

## 2017-01-01 RX ADMIN — LABETALOL HYDROCHLORIDE 10 MG: 5 INJECTION INTRAVENOUS at 06:23

## 2017-01-01 RX ADMIN — LEVALBUTEROL HYDROCHLORIDE 0.63 MG: 0.63 SOLUTION RESPIRATORY (INHALATION) at 08:05

## 2017-01-01 RX ADMIN — MORPHINE SULFATE 2 MG: 2 INJECTION, SOLUTION INTRAMUSCULAR; INTRAVENOUS at 14:12

## 2017-01-01 RX ADMIN — SODIUM CHLORIDE 10 MG/HR: 900 INJECTION, SOLUTION INTRAVENOUS at 17:55

## 2017-01-01 RX ADMIN — ACETAMINOPHEN 1000 MG: 10 INJECTION, SOLUTION INTRAVENOUS at 23:01

## 2017-01-01 RX ADMIN — DEXTROSE MONOHYDRATE 10 MG/HR: 50 INJECTION, SOLUTION INTRAVENOUS at 00:31

## 2017-01-01 RX ADMIN — LEVALBUTEROL HYDROCHLORIDE 0.63 MG: 0.63 SOLUTION RESPIRATORY (INHALATION) at 15:36

## 2017-01-01 RX ADMIN — SODIUM CHLORIDE 3 G: 900 INJECTION, SOLUTION INTRAVENOUS at 06:19

## 2017-01-01 RX ADMIN — MUPIROCIN: 20 OINTMENT TOPICAL at 22:55

## 2017-01-01 RX ADMIN — ENOXAPARIN SODIUM 40 MG: 30 INJECTION SUBCUTANEOUS at 11:07

## 2017-01-01 RX ADMIN — CHLORHEXIDINE GLUCONATE 15 ML: 1.2 RINSE ORAL at 09:36

## 2017-01-01 RX ADMIN — FAMOTIDINE: 10 INJECTION INTRAVENOUS at 17:32

## 2017-01-01 RX ADMIN — SODIUM CHLORIDE 3 G: 900 INJECTION, SOLUTION INTRAVENOUS at 05:24

## 2017-01-01 RX ADMIN — CHLORHEXIDINE GLUCONATE 15 ML: 1.2 RINSE ORAL at 22:04

## 2017-01-01 RX ADMIN — LEVALBUTEROL HYDROCHLORIDE 0.63 MG: 0.63 SOLUTION RESPIRATORY (INHALATION) at 07:47

## 2017-01-01 RX ADMIN — CARVEDILOL 3.12 MG: 3.12 TABLET, FILM COATED ORAL at 08:35

## 2017-01-01 RX ADMIN — SODIUM CHLORIDE 1000 ML: 900 INJECTION, SOLUTION INTRAVENOUS at 08:02

## 2017-01-01 RX ADMIN — NITROGLYCERIN 1 INCH: 20 OINTMENT TOPICAL at 09:35

## 2017-01-01 RX ADMIN — LABETALOL HYDROCHLORIDE 10 MG: 5 INJECTION INTRAVENOUS at 12:42

## 2017-01-01 RX ADMIN — HEPARIN SODIUM 5000 UNITS: 5000 INJECTION, SOLUTION INTRAVENOUS; SUBCUTANEOUS at 23:10

## 2017-01-01 RX ADMIN — VANCOMYCIN HYDROCHLORIDE 250 MG: 1 INJECTION, POWDER, LYOPHILIZED, FOR SOLUTION INTRAVENOUS at 11:22

## 2017-01-01 RX ADMIN — VANCOMYCIN HYDROCHLORIDE 250 MG: 1 INJECTION, POWDER, LYOPHILIZED, FOR SOLUTION INTRAVENOUS at 17:30

## 2017-01-01 RX ADMIN — Medication 50 MCG/HR: at 09:31

## 2017-01-01 RX ADMIN — LABETALOL HYDROCHLORIDE 10 MG: 5 INJECTION INTRAVENOUS at 00:26

## 2017-01-01 RX ADMIN — CARVEDILOL 25 MG: 12.5 TABLET, FILM COATED ORAL at 20:57

## 2017-01-01 RX ADMIN — MIDAZOLAM HYDROCHLORIDE 1 MG: 1 INJECTION, SOLUTION INTRAMUSCULAR; INTRAVENOUS at 10:41

## 2017-01-01 RX ADMIN — INSULIN LISPRO 2 UNITS: 100 INJECTION, SOLUTION INTRAVENOUS; SUBCUTANEOUS at 17:52

## 2017-01-01 RX ADMIN — MORPHINE SULFATE 1 MG: 2 INJECTION, SOLUTION INTRAMUSCULAR; INTRAVENOUS at 21:42

## 2017-01-01 RX ADMIN — HYDRALAZINE HYDROCHLORIDE 20 MG: 20 INJECTION INTRAMUSCULAR; INTRAVENOUS at 22:21

## 2017-01-01 RX ADMIN — Medication 20 ML: at 14:08

## 2017-01-01 RX ADMIN — HEPARIN SODIUM 3000 UNITS: 1000 INJECTION, SOLUTION INTRAVENOUS; SUBCUTANEOUS at 09:33

## 2017-01-01 RX ADMIN — ENOXAPARIN SODIUM 40 MG: 40 INJECTION SUBCUTANEOUS at 09:33

## 2017-01-01 RX ADMIN — CEFEPIME HYDROCHLORIDE 2 G: 2 INJECTION, POWDER, FOR SOLUTION INTRAVENOUS at 04:33

## 2017-01-01 RX ADMIN — FAMOTIDINE 20 MG: 10 INJECTION INTRAVENOUS at 21:01

## 2017-01-01 RX ADMIN — RETINOL, ERGOCALCIFEROL, .ALPHA.-TOCOPHEROL ACETATE, DL-, PHYTONADIONE, ASCORBIC ACID, NIACINAMIDE, RIBOFLAVIN 5-PHOSPHATE SODIUM, THIAMINE HYDROCHLORIDE, PYRIDOXINE HYDROCHLORIDE, DEXPANTHENOL, BIOTIN, FOLIC ACID, AND CYANOCOBALAMIN: KIT at 18:01

## 2017-01-01 RX ADMIN — IOPAMIDOL 100 ML: 755 INJECTION, SOLUTION INTRAVENOUS at 11:02

## 2017-01-01 RX ADMIN — FAMOTIDINE 20 MG: 10 INJECTION INTRAVENOUS at 22:36

## 2017-01-01 RX ADMIN — HYDRALAZINE HYDROCHLORIDE 20 MG: 20 INJECTION INTRAMUSCULAR; INTRAVENOUS at 16:54

## 2017-01-01 RX ADMIN — METOPROLOL TARTRATE 5 MG: 5 INJECTION, SOLUTION INTRAVENOUS at 17:30

## 2017-01-01 RX ADMIN — Medication 10 ML: at 13:14

## 2017-01-01 RX ADMIN — ONDANSETRON 4 MG: 2 INJECTION INTRAMUSCULAR; INTRAVENOUS at 15:11

## 2017-01-01 RX ADMIN — VANCOMYCIN HYDROCHLORIDE 2500 MG: 10 INJECTION, POWDER, LYOPHILIZED, FOR SOLUTION INTRAVENOUS at 05:33

## 2017-01-01 RX ADMIN — Medication 10 ML: at 15:17

## 2017-01-01 RX ADMIN — LABETALOL HYDROCHLORIDE 10 MG: 5 INJECTION INTRAVENOUS at 17:59

## 2017-01-01 RX ADMIN — SODIUM CHLORIDE 100 MG: 900 INJECTION, SOLUTION INTRAVENOUS at 08:04

## 2017-01-01 RX ADMIN — FAMOTIDINE 20 MG: 10 INJECTION INTRAVENOUS at 11:21

## 2017-01-01 RX ADMIN — LABETALOL HYDROCHLORIDE 10 MG: 5 INJECTION INTRAVENOUS at 17:28

## 2017-01-01 RX ADMIN — WATER: 1000 INJECTION, SOLUTION INTRAVENOUS at 22:24

## 2017-01-01 RX ADMIN — SODIUM CHLORIDE 3 G: 900 INJECTION, SOLUTION INTRAVENOUS at 13:54

## 2017-01-01 RX ADMIN — SODIUM CHLORIDE 1000 ML: 900 INJECTION, SOLUTION INTRAVENOUS at 08:45

## 2017-01-01 RX ADMIN — LABETALOL HYDROCHLORIDE 20 MG: 5 INJECTION, SOLUTION INTRAVENOUS at 14:48

## 2017-01-01 RX ADMIN — CHLORHEXIDINE GLUCONATE 15 ML: 1.2 RINSE ORAL at 21:25

## 2017-01-01 RX ADMIN — VANCOMYCIN HYDROCHLORIDE 250 MG: 1 INJECTION, POWDER, LYOPHILIZED, FOR SOLUTION INTRAVENOUS at 12:47

## 2017-01-01 RX ADMIN — CHLORHEXIDINE GLUCONATE 15 ML: 1.2 RINSE ORAL at 20:03

## 2017-01-01 RX ADMIN — VANCOMYCIN HYDROCHLORIDE 250 MG: 1 INJECTION, POWDER, LYOPHILIZED, FOR SOLUTION INTRAVENOUS at 23:14

## 2017-01-01 RX ADMIN — FAMOTIDINE 20 MG: 10 INJECTION INTRAVENOUS at 09:45

## 2017-01-01 RX ADMIN — INSULIN LISPRO 2 UNITS: 100 INJECTION, SOLUTION INTRAVENOUS; SUBCUTANEOUS at 05:31

## 2017-01-01 RX ADMIN — VANCOMYCIN HYDROCHLORIDE 2000 MG: 10 INJECTION, POWDER, LYOPHILIZED, FOR SOLUTION INTRAVENOUS at 06:10

## 2017-01-01 RX ADMIN — LABETALOL HYDROCHLORIDE 10 MG: 5 INJECTION INTRAVENOUS at 12:13

## 2017-01-01 RX ADMIN — FAMOTIDINE 20 MG: 10 INJECTION INTRAVENOUS at 20:59

## 2017-01-01 RX ADMIN — FAMOTIDINE 20 MG: 10 INJECTION INTRAVENOUS at 20:15

## 2017-01-01 RX ADMIN — Medication 10 ML: at 13:07

## 2017-01-01 RX ADMIN — CHLORHEXIDINE GLUCONATE 15 ML: 1.2 RINSE ORAL at 08:37

## 2017-01-01 RX ADMIN — METOCLOPRAMIDE 5 MG: 5 INJECTION, SOLUTION INTRAMUSCULAR; INTRAVENOUS at 05:40

## 2017-01-01 RX ADMIN — HEPARIN SODIUM 5000 UNITS: 5000 INJECTION, SOLUTION INTRAVENOUS; SUBCUTANEOUS at 23:19

## 2017-01-01 RX ADMIN — INSULIN LISPRO 3 UNITS: 100 INJECTION, SOLUTION INTRAVENOUS; SUBCUTANEOUS at 06:50

## 2017-01-01 RX ADMIN — VANCOMYCIN HYDROCHLORIDE 1250 MG: 10 INJECTION, POWDER, LYOPHILIZED, FOR SOLUTION INTRAVENOUS at 03:02

## 2017-01-01 RX ADMIN — IPRATROPIUM BROMIDE AND ALBUTEROL SULFATE 3 ML: .5; 3 SOLUTION RESPIRATORY (INHALATION) at 19:22

## 2017-01-01 RX ADMIN — NITROGLYCERIN 1 INCH: 20 OINTMENT TOPICAL at 04:17

## 2017-01-01 RX ADMIN — Medication 10 ML: at 03:25

## 2017-01-01 RX ADMIN — PROPOFOL 150 MG: 10 INJECTION, EMULSION INTRAVENOUS at 01:05

## 2017-01-01 RX ADMIN — LABETALOL HYDROCHLORIDE 10 MG: 5 INJECTION INTRAVENOUS at 05:52

## 2017-01-01 RX ADMIN — LABETALOL HYDROCHLORIDE 10 MG: 5 INJECTION INTRAVENOUS at 00:14

## 2017-01-01 RX ADMIN — HYDROMORPHONE HYDROCHLORIDE 0.2 MG: 1 INJECTION, SOLUTION INTRAMUSCULAR; INTRAVENOUS; SUBCUTANEOUS at 22:20

## 2017-01-01 RX ADMIN — PHENAZOPYRIDINE HYDROCHLORIDE 200 MG: 100 TABLET ORAL at 18:00

## 2017-01-01 RX ADMIN — HEPARIN SODIUM 5000 UNITS: 5000 INJECTION, SOLUTION INTRAVENOUS; SUBCUTANEOUS at 22:16

## 2017-01-01 RX ADMIN — LABETALOL HYDROCHLORIDE 10 MG: 5 INJECTION INTRAVENOUS at 06:58

## 2017-01-01 RX ADMIN — NEOSTIGMINE METHYLSULFATE 3 MG: 1 INJECTION INTRAVENOUS at 01:15

## 2017-01-01 RX ADMIN — FENTANYL CITRATE 50 MCG: 50 INJECTION, SOLUTION INTRAMUSCULAR; INTRAVENOUS at 13:03

## 2017-01-01 RX ADMIN — SODIUM CHLORIDE 500 ML: 900 INJECTION, SOLUTION INTRAVENOUS at 21:05

## 2017-01-01 RX ADMIN — CHLORHEXIDINE GLUCONATE 15 ML: 1.2 RINSE ORAL at 21:23

## 2017-01-01 RX ADMIN — VANCOMYCIN HYDROCHLORIDE 1500 MG: 10 INJECTION, POWDER, LYOPHILIZED, FOR SOLUTION INTRAVENOUS at 23:28

## 2017-01-01 RX ADMIN — CHLORHEXIDINE GLUCONATE 15 ML: 1.2 RINSE ORAL at 08:59

## 2017-01-01 RX ADMIN — SODIUM CHLORIDE 100 MG: 900 INJECTION, SOLUTION INTRAVENOUS at 08:18

## 2017-01-01 RX ADMIN — Medication 10 ML: at 22:07

## 2017-01-01 RX ADMIN — FAMOTIDINE: 10 INJECTION INTRAVENOUS at 17:46

## 2017-01-01 RX ADMIN — ASPIRIN 81 MG 81 MG: 81 TABLET ORAL at 09:08

## 2017-01-01 RX ADMIN — ACETAMINOPHEN 650 MG: 650 SUPPOSITORY RECTAL at 16:17

## 2017-01-01 RX ADMIN — VANCOMYCIN HYDROCHLORIDE 250 MG: 1 INJECTION, POWDER, LYOPHILIZED, FOR SOLUTION INTRAVENOUS at 06:57

## 2017-01-01 RX ADMIN — Medication 50 MCG/HR: at 01:33

## 2017-01-01 RX ADMIN — Medication 10 ML: at 18:01

## 2017-01-01 RX ADMIN — Medication 20 ML: at 21:41

## 2017-01-01 RX ADMIN — METRONIDAZOLE 500 MG: 500 INJECTION, SOLUTION INTRAVENOUS at 22:52

## 2017-01-01 RX ADMIN — NITROGLYCERIN 1 INCH: 20 OINTMENT TOPICAL at 15:28

## 2017-01-01 RX ADMIN — SODIUM CHLORIDE 3 G: 900 INJECTION, SOLUTION INTRAVENOUS at 08:08

## 2017-01-01 RX ADMIN — Medication 10 ML: at 00:50

## 2017-01-01 RX ADMIN — Medication 10 ML: at 21:01

## 2017-01-01 RX ADMIN — RETINOL, ERGOCALCIFEROL, .ALPHA.-TOCOPHEROL ACETATE, DL-, PHYTONADIONE, ASCORBIC ACID, NIACINAMIDE, RIBOFLAVIN 5-PHOSPHATE SODIUM, THIAMINE HYDROCHLORIDE, PYRIDOXINE HYDROCHLORIDE, DEXPANTHENOL, BIOTIN, FOLIC ACID, AND CYANOCOBALAMIN: KIT at 17:59

## 2017-01-01 RX ADMIN — INSULIN LISPRO 2 UNITS: 100 INJECTION, SOLUTION INTRAVENOUS; SUBCUTANEOUS at 06:18

## 2017-01-01 RX ADMIN — CARVEDILOL 3.12 MG: 3.12 TABLET, FILM COATED ORAL at 08:57

## 2017-01-01 RX ADMIN — Medication 50 MCG/HR: at 03:55

## 2017-01-01 RX ADMIN — PROPOFOL 50 MCG/KG/MIN: 10 INJECTION, EMULSION INTRAVENOUS at 01:44

## 2017-01-01 RX ADMIN — LABETALOL HYDROCHLORIDE 10 MG: 5 INJECTION INTRAVENOUS at 18:39

## 2017-01-01 RX ADMIN — HEPARIN SODIUM 5000 UNITS: 5000 INJECTION, SOLUTION INTRAVENOUS; SUBCUTANEOUS at 00:08

## 2017-01-01 RX ADMIN — LEVALBUTEROL HYDROCHLORIDE 0.63 MG: 0.63 SOLUTION RESPIRATORY (INHALATION) at 11:22

## 2017-01-01 RX ADMIN — METRONIDAZOLE 500 MG: 500 INJECTION, SOLUTION INTRAVENOUS at 00:32

## 2017-01-01 RX ADMIN — CALCIUM GLUCONATE: 94 INJECTION, SOLUTION INTRAVENOUS at 19:34

## 2017-01-01 RX ADMIN — ASCORBIC ACID: 500 INJECTION, SOLUTION INTRAMUSCULAR; INTRAVENOUS; SUBCUTANEOUS at 18:15

## 2017-01-01 RX ADMIN — SODIUM CHLORIDE 200 MG: 900 INJECTION, SOLUTION INTRAVENOUS at 10:12

## 2017-01-01 RX ADMIN — I.V. FAT EMULSION 250 ML: 20 EMULSION INTRAVENOUS at 18:18

## 2017-01-01 RX ADMIN — CHLORHEXIDINE GLUCONATE 15 ML: 1.2 RINSE ORAL at 09:01

## 2017-01-01 RX ADMIN — ASPIRIN 300 MG: 300 SUPPOSITORY RECTAL at 09:25

## 2017-01-01 RX ADMIN — SODIUM CHLORIDE 100 MG: 900 INJECTION, SOLUTION INTRAVENOUS at 09:15

## 2017-01-01 RX ADMIN — HEPARIN SODIUM 5000 UNITS: 5000 INJECTION, SOLUTION INTRAVENOUS; SUBCUTANEOUS at 22:51

## 2017-01-01 RX ADMIN — Medication 10 ML: at 05:41

## 2017-01-01 RX ADMIN — Medication 40 ML: at 15:30

## 2017-01-01 RX ADMIN — PROPOFOL 35 MCG/KG/MIN: 10 INJECTION, EMULSION INTRAVENOUS at 02:04

## 2017-01-01 RX ADMIN — Medication 20 ML: at 21:04

## 2017-01-01 RX ADMIN — Medication 10 ML: at 14:50

## 2017-01-01 RX ADMIN — IPRATROPIUM BROMIDE AND ALBUTEROL SULFATE 3 ML: .5; 3 SOLUTION RESPIRATORY (INHALATION) at 11:13

## 2017-01-01 RX ADMIN — METOCLOPRAMIDE 5 MG: 5 INJECTION, SOLUTION INTRAMUSCULAR; INTRAVENOUS at 17:30

## 2017-01-01 RX ADMIN — LEVALBUTEROL HYDROCHLORIDE 0.63 MG: 0.63 SOLUTION RESPIRATORY (INHALATION) at 15:04

## 2017-01-01 RX ADMIN — CEFEPIME HYDROCHLORIDE 2 G: 2 INJECTION, POWDER, FOR SOLUTION INTRAVENOUS at 02:52

## 2017-01-01 RX ADMIN — HYDROMORPHONE HYDROCHLORIDE 0.2 MG: 1 INJECTION, SOLUTION INTRAMUSCULAR; INTRAVENOUS; SUBCUTANEOUS at 11:16

## 2017-01-01 RX ADMIN — CHLORHEXIDINE GLUCONATE 15 ML: 1.2 RINSE ORAL at 20:01

## 2017-01-01 RX ADMIN — SODIUM CHLORIDE, SODIUM LACTATE, POTASSIUM CHLORIDE, CALCIUM CHLORIDE: 600; 310; 30; 20 INJECTION, SOLUTION INTRAVENOUS at 01:00

## 2017-01-01 RX ADMIN — ASPIRIN 81 MG 81 MG: 81 TABLET ORAL at 09:11

## 2017-01-01 RX ADMIN — Medication 10 ML: at 17:55

## 2017-01-01 RX ADMIN — LABETALOL HYDROCHLORIDE 20 MG: 5 INJECTION, SOLUTION INTRAVENOUS at 22:57

## 2017-01-01 RX ADMIN — LOPERAMIDE HYDROCHLORIDE 4 MG: 2 CAPSULE ORAL at 10:06

## 2017-01-01 RX ADMIN — LABETALOL HYDROCHLORIDE 10 MG: 5 INJECTION INTRAVENOUS at 00:21

## 2017-01-01 RX ADMIN — CHLORHEXIDINE GLUCONATE 15 ML: 1.2 RINSE ORAL at 20:47

## 2017-01-01 RX ADMIN — VANCOMYCIN HYDROCHLORIDE 250 MG: 1 INJECTION, POWDER, LYOPHILIZED, FOR SOLUTION INTRAVENOUS at 05:55

## 2017-01-01 RX ADMIN — Medication 10 ML: at 13:37

## 2017-01-01 RX ADMIN — METOCLOPRAMIDE 5 MG: 5 INJECTION, SOLUTION INTRAMUSCULAR; INTRAVENOUS at 13:02

## 2017-01-01 RX ADMIN — SODIUM CHLORIDE 25 ML/HR: 900 INJECTION, SOLUTION INTRAVENOUS at 15:58

## 2017-01-01 RX ADMIN — ASPIRIN 300 MG: 300 SUPPOSITORY RECTAL at 08:04

## 2017-01-01 RX ADMIN — LIDOCAINE HYDROCHLORIDE: 20 JELLY TOPICAL at 17:15

## 2017-01-01 RX ADMIN — CHLORHEXIDINE GLUCONATE 15 ML: 1.2 RINSE ORAL at 20:46

## 2017-01-01 RX ADMIN — PHENAZOPYRIDINE HYDROCHLORIDE 200 MG: 100 TABLET ORAL at 17:07

## 2017-01-01 RX ADMIN — Medication 10 ML: at 12:05

## 2017-01-01 RX ADMIN — LABETALOL HYDROCHLORIDE 10 MG: 5 INJECTION INTRAVENOUS at 05:37

## 2017-01-01 RX ADMIN — DEXTROSE MONOHYDRATE 10 MG/HR: 50 INJECTION, SOLUTION INTRAVENOUS at 05:44

## 2017-01-01 RX ADMIN — LABETALOL HYDROCHLORIDE 10 MG: 5 INJECTION INTRAVENOUS at 17:46

## 2017-01-01 RX ADMIN — LABETALOL HYDROCHLORIDE 20 MG: 5 INJECTION, SOLUTION INTRAVENOUS at 06:30

## 2017-01-01 RX ADMIN — Medication 10 ML: at 21:34

## 2017-01-01 RX ADMIN — INSULIN LISPRO 2 UNITS: 100 INJECTION, SOLUTION INTRAVENOUS; SUBCUTANEOUS at 05:47

## 2017-01-01 RX ADMIN — MORPHINE SULFATE 1 MG: 2 INJECTION, SOLUTION INTRAMUSCULAR; INTRAVENOUS at 00:59

## 2017-01-01 RX ADMIN — INSULIN LISPRO 2 UNITS: 100 INJECTION, SOLUTION INTRAVENOUS; SUBCUTANEOUS at 18:21

## 2017-01-01 RX ADMIN — NITROGLYCERIN 200 MCG: 5 INJECTION, SOLUTION INTRAVENOUS at 10:55

## 2017-01-01 RX ADMIN — LABETALOL HYDROCHLORIDE 10 MG: 5 INJECTION INTRAVENOUS at 11:26

## 2017-01-01 RX ADMIN — WATER 1 MG: 1 INJECTION INTRAMUSCULAR; INTRAVENOUS; SUBCUTANEOUS at 17:29

## 2017-01-01 RX ADMIN — SODIUM CHLORIDE 3 G: 900 INJECTION, SOLUTION INTRAVENOUS at 15:01

## 2017-01-01 RX ADMIN — NITROGLYCERIN 1 INCH: 20 OINTMENT TOPICAL at 09:24

## 2017-01-01 RX ADMIN — SODIUM CHLORIDE 3 G: 900 INJECTION, SOLUTION INTRAVENOUS at 00:05

## 2017-01-01 RX ADMIN — HYDRALAZINE HYDROCHLORIDE 20 MG: 20 INJECTION INTRAMUSCULAR; INTRAVENOUS at 12:19

## 2017-01-01 RX ADMIN — ATROPA BELLADONNA AND OPIUM 1 SUPPOSITORY: 16.2; 3 SUPPOSITORY RECTAL at 07:51

## 2017-01-01 RX ADMIN — INSULIN LISPRO 2 UNITS: 100 INJECTION, SOLUTION INTRAVENOUS; SUBCUTANEOUS at 12:36

## 2017-01-01 RX ADMIN — SODIUM CHLORIDE 5 MG/HR: 900 INJECTION, SOLUTION INTRAVENOUS at 17:08

## 2017-01-01 RX ADMIN — METOCLOPRAMIDE 5 MG: 5 INJECTION, SOLUTION INTRAMUSCULAR; INTRAVENOUS at 17:59

## 2017-01-01 RX ADMIN — HEPARIN SODIUM 5000 UNITS: 5000 INJECTION, SOLUTION INTRAVENOUS; SUBCUTANEOUS at 11:00

## 2017-01-01 RX ADMIN — AMPICILLIN 2 G: 2 INJECTION, POWDER, FOR SOLUTION INTRAVENOUS at 02:34

## 2017-01-01 RX ADMIN — HYDROMORPHONE HYDROCHLORIDE 0.2 MG: 1 INJECTION, SOLUTION INTRAMUSCULAR; INTRAVENOUS; SUBCUTANEOUS at 06:19

## 2017-01-01 RX ADMIN — METOPROLOL TARTRATE 5 MG: 5 INJECTION, SOLUTION INTRAVENOUS at 17:17

## 2017-01-01 RX ADMIN — INSULIN LISPRO 2 UNITS: 100 INJECTION, SOLUTION INTRAVENOUS; SUBCUTANEOUS at 05:14

## 2017-01-01 RX ADMIN — SODIUM CHLORIDE 100 ML/HR: 900 INJECTION, SOLUTION INTRAVENOUS at 02:32

## 2017-01-01 RX ADMIN — Medication 10 ML: at 10:53

## 2017-01-01 RX ADMIN — ASPIRIN 300 MG: 300 SUPPOSITORY RECTAL at 08:01

## 2017-01-01 RX ADMIN — FAMOTIDINE 20 MG: 10 INJECTION INTRAVENOUS at 09:35

## 2017-01-01 RX ADMIN — PHENYLEPHRINE HYDROCHLORIDE 100 MCG/MIN: 10 INJECTION INTRAVENOUS at 09:23

## 2017-01-01 RX ADMIN — INSULIN LISPRO 2 UNITS: 100 INJECTION, SOLUTION INTRAVENOUS; SUBCUTANEOUS at 23:59

## 2017-01-01 RX ADMIN — HYDRALAZINE HYDROCHLORIDE 20 MG: 20 INJECTION INTRAMUSCULAR; INTRAVENOUS at 00:57

## 2017-01-01 RX ADMIN — CLOPIDOGREL 600 MG: 300 TABLET, FILM COATED ORAL at 10:47

## 2017-01-01 RX ADMIN — VANCOMYCIN HYDROCHLORIDE 1500 MG: 10 INJECTION, POWDER, LYOPHILIZED, FOR SOLUTION INTRAVENOUS at 22:57

## 2017-01-01 RX ADMIN — INSULIN LISPRO 2 UNITS: 100 INJECTION, SOLUTION INTRAVENOUS; SUBCUTANEOUS at 23:45

## 2017-01-01 RX ADMIN — CHLORHEXIDINE GLUCONATE 15 ML: 1.2 RINSE ORAL at 08:01

## 2017-01-01 RX ADMIN — LABETALOL HYDROCHLORIDE 10 MG: 5 INJECTION INTRAVENOUS at 18:51

## 2017-01-01 RX ADMIN — MUPIROCIN: 20 OINTMENT TOPICAL at 10:47

## 2017-01-01 RX ADMIN — ATROPA BELLADONNA AND OPIUM 1 SUPPOSITORY: 16.2; 3 SUPPOSITORY RECTAL at 14:22

## 2017-01-01 RX ADMIN — MUPIROCIN: 20 OINTMENT TOPICAL at 11:21

## 2017-01-01 RX ADMIN — CHLORHEXIDINE GLUCONATE 15 ML: 1.2 RINSE ORAL at 21:14

## 2017-01-01 RX ADMIN — METRONIDAZOLE 500 MG: 500 INJECTION, SOLUTION INTRAVENOUS at 22:07

## 2017-01-01 RX ADMIN — LABETALOL HYDROCHLORIDE 10 MG: 5 INJECTION INTRAVENOUS at 06:36

## 2017-01-01 RX ADMIN — Medication 40 ML: at 14:58

## 2017-01-01 RX ADMIN — FAMOTIDINE 20 MG: 10 INJECTION INTRAVENOUS at 22:47

## 2017-01-01 RX ADMIN — FAMOTIDINE: 10 INJECTION INTRAVENOUS at 18:23

## 2017-01-01 RX ADMIN — FAMOTIDINE: 10 INJECTION INTRAVENOUS at 18:22

## 2017-01-01 RX ADMIN — FAMOTIDINE 20 MG: 10 INJECTION INTRAVENOUS at 08:14

## 2017-01-01 RX ADMIN — Medication 10 ML: at 18:33

## 2017-01-01 RX ADMIN — NITROGLYCERIN 1 INCH: 20 OINTMENT TOPICAL at 19:28

## 2017-01-01 RX ADMIN — VANCOMYCIN HYDROCHLORIDE 250 MG: 1 INJECTION, POWDER, LYOPHILIZED, FOR SOLUTION INTRAVENOUS at 11:34

## 2017-01-01 RX ADMIN — HYDROMORPHONE HYDROCHLORIDE 0.5 MG: 2 INJECTION, SOLUTION INTRAMUSCULAR; INTRAVENOUS; SUBCUTANEOUS at 03:30

## 2017-01-01 RX ADMIN — CARVEDILOL 6.25 MG: 3.12 TABLET, FILM COATED ORAL at 07:54

## 2017-01-01 RX ADMIN — ENOXAPARIN SODIUM 40 MG: 30 INJECTION SUBCUTANEOUS at 10:57

## 2017-01-01 RX ADMIN — Medication 10 ML: at 06:16

## 2017-01-01 RX ADMIN — LABETALOL HYDROCHLORIDE 10 MG: 5 INJECTION INTRAVENOUS at 23:50

## 2017-01-01 RX ADMIN — CARVEDILOL 3.12 MG: 3.12 TABLET, FILM COATED ORAL at 09:11

## 2017-01-01 RX ADMIN — Medication 10 ML: at 14:35

## 2017-01-01 RX ADMIN — FAMOTIDINE 20 MG: 10 INJECTION INTRAVENOUS at 09:21

## 2017-01-01 RX ADMIN — Medication 10 ML: at 15:30

## 2017-01-01 RX ADMIN — LIDOCAINE HYDROCHLORIDE 5 ML: 10 INJECTION INFILTRATION; PERINEURAL at 08:45

## 2017-01-01 RX ADMIN — Medication 10 ML: at 21:45

## 2017-01-01 RX ADMIN — ATROPA BELLADONNA AND OPIUM 1 SUPPOSITORY: 16.2; 3 SUPPOSITORY RECTAL at 02:04

## 2017-01-01 RX ADMIN — INSULIN LISPRO 2 UNITS: 100 INJECTION, SOLUTION INTRAVENOUS; SUBCUTANEOUS at 00:52

## 2017-01-01 RX ADMIN — CHLORHEXIDINE GLUCONATE 15 ML: 1.2 RINSE ORAL at 20:38

## 2017-01-01 RX ADMIN — MORPHINE SULFATE 1 MG: 2 INJECTION, SOLUTION INTRAMUSCULAR; INTRAVENOUS at 20:54

## 2017-01-01 RX ADMIN — VANCOMYCIN HYDROCHLORIDE 250 MG: 1 INJECTION, POWDER, LYOPHILIZED, FOR SOLUTION INTRAVENOUS at 03:01

## 2017-01-01 RX ADMIN — LIDOCAINE HYDROCHLORIDE 60 MG: 20 INJECTION, SOLUTION EPIDURAL; INFILTRATION; INTRACAUDAL; PERINEURAL at 23:45

## 2017-01-01 RX ADMIN — INSULIN LISPRO 2 UNITS: 100 INJECTION, SOLUTION INTRAVENOUS; SUBCUTANEOUS at 06:33

## 2017-01-01 RX ADMIN — CHLORHEXIDINE GLUCONATE 15 ML: 1.2 RINSE ORAL at 21:55

## 2017-01-01 RX ADMIN — INSULIN LISPRO 2 UNITS: 100 INJECTION, SOLUTION INTRAVENOUS; SUBCUTANEOUS at 00:41

## 2017-01-01 RX ADMIN — Medication 10 ML: at 14:36

## 2017-01-01 RX ADMIN — FAMOTIDINE 20 MG: 10 INJECTION, SOLUTION INTRAVENOUS at 10:12

## 2017-01-01 RX ADMIN — LISINOPRIL 5 MG: 5 TABLET ORAL at 08:57

## 2017-01-01 RX ADMIN — INSULIN LISPRO 2 UNITS: 100 INJECTION, SOLUTION INTRAVENOUS; SUBCUTANEOUS at 17:17

## 2017-01-01 RX ADMIN — I.V. FAT EMULSION 250 ML: 20 EMULSION INTRAVENOUS at 17:36

## 2017-01-01 RX ADMIN — LABETALOL HYDROCHLORIDE 10 MG: 5 INJECTION INTRAVENOUS at 12:41

## 2017-01-01 RX ADMIN — ATROPA BELLADONNA AND OPIUM 1 SUPPOSITORY: 16.2; 3 SUPPOSITORY RECTAL at 20:17

## 2017-01-01 RX ADMIN — ONDANSETRON HYDROCHLORIDE 4 MG: 2 INJECTION, SOLUTION INTRAMUSCULAR; INTRAVENOUS at 04:09

## 2017-01-01 RX ADMIN — ATROPA BELLADONNA AND OPIUM 1 SUPPOSITORY: 16.2; 3 SUPPOSITORY RECTAL at 03:26

## 2017-01-01 RX ADMIN — FAMOTIDINE 20 MG: 10 INJECTION INTRAVENOUS at 20:06

## 2017-01-01 RX ADMIN — Medication 200 MCG/HR: at 00:15

## 2017-01-01 RX ADMIN — METRONIDAZOLE 500 MG: 500 INJECTION, SOLUTION INTRAVENOUS at 21:37

## 2017-01-01 RX ADMIN — METRONIDAZOLE 500 MG: 500 INJECTION, SOLUTION INTRAVENOUS at 05:57

## 2017-01-01 RX ADMIN — METRONIDAZOLE 500 MG: 500 INJECTION, SOLUTION INTRAVENOUS at 05:50

## 2017-01-01 RX ADMIN — DEXTROSE MONOHYDRATE 10 MG/HR: 50 INJECTION, SOLUTION INTRAVENOUS at 12:11

## 2017-01-01 RX ADMIN — Medication 10 ML: at 21:12

## 2017-01-01 RX ADMIN — FAMOTIDINE: 10 INJECTION INTRAVENOUS at 18:29

## 2017-01-01 RX ADMIN — MUPIROCIN: 20 OINTMENT TOPICAL at 09:23

## 2017-01-01 RX ADMIN — Medication 10 ML: at 20:50

## 2017-01-01 RX ADMIN — HYDROMORPHONE HYDROCHLORIDE 0.2 MG: 1 INJECTION, SOLUTION INTRAMUSCULAR; INTRAVENOUS; SUBCUTANEOUS at 20:05

## 2017-01-01 RX ADMIN — METRONIDAZOLE 500 MG: 500 INJECTION, SOLUTION INTRAVENOUS at 05:42

## 2017-01-01 RX ADMIN — HEPARIN SODIUM 5000 UNITS: 5000 INJECTION, SOLUTION INTRAVENOUS; SUBCUTANEOUS at 11:34

## 2017-01-01 RX ADMIN — CLOPIDOGREL BISULFATE 600 MG: 300 TABLET, FILM COATED ORAL at 10:47

## 2017-01-01 RX ADMIN — MUPIROCIN: 20 OINTMENT TOPICAL at 08:48

## 2017-01-01 RX ADMIN — CARVEDILOL 3.12 MG: 3.12 TABLET, FILM COATED ORAL at 17:57

## 2017-01-01 RX ADMIN — NITROGLYCERIN 1 INCH: 20 OINTMENT TOPICAL at 08:56

## 2017-01-01 RX ADMIN — LABETALOL HYDROCHLORIDE 10 MG: 5 INJECTION INTRAVENOUS at 23:01

## 2017-01-01 RX ADMIN — HALOPERIDOL LACTATE 3 MG: 5 INJECTION, SOLUTION INTRAMUSCULAR at 13:02

## 2017-01-01 RX ADMIN — VANCOMYCIN HYDROCHLORIDE 250 MG: 1 INJECTION, POWDER, LYOPHILIZED, FOR SOLUTION INTRAVENOUS at 11:26

## 2017-01-01 RX ADMIN — CHLORHEXIDINE GLUCONATE 15 ML: 1.2 RINSE ORAL at 09:08

## 2017-01-01 RX ADMIN — INSULIN LISPRO 2 UNITS: 100 INJECTION, SOLUTION INTRAVENOUS; SUBCUTANEOUS at 12:07

## 2017-01-01 RX ADMIN — LABETALOL HYDROCHLORIDE 10 MG: 5 INJECTION INTRAVENOUS at 12:58

## 2017-01-01 RX ADMIN — AMPICILLIN 2 G: 2 INJECTION, POWDER, FOR SOLUTION INTRAVENOUS at 11:23

## 2017-01-01 RX ADMIN — VANCOMYCIN HYDROCHLORIDE 1250 MG: 10 INJECTION, POWDER, LYOPHILIZED, FOR SOLUTION INTRAVENOUS at 04:33

## 2017-01-01 RX ADMIN — PHENYLEPHRINE HYDROCHLORIDE 15 MCG/MIN: 10 INJECTION INTRAVENOUS at 06:06

## 2017-01-01 RX ADMIN — FUROSEMIDE 40 MG: 10 INJECTION, SOLUTION INTRAMUSCULAR; INTRAVENOUS at 08:14

## 2017-01-01 RX ADMIN — LEVALBUTEROL HYDROCHLORIDE 0.63 MG: 0.63 SOLUTION RESPIRATORY (INHALATION) at 03:07

## 2017-01-01 RX ADMIN — HYDROMORPHONE HYDROCHLORIDE 0.5 MG: 2 INJECTION, SOLUTION INTRAMUSCULAR; INTRAVENOUS; SUBCUTANEOUS at 01:52

## 2017-01-01 RX ADMIN — LEVALBUTEROL HYDROCHLORIDE 0.63 MG: 0.63 SOLUTION RESPIRATORY (INHALATION) at 15:02

## 2017-01-01 RX ADMIN — INSULIN LISPRO 3 UNITS: 100 INJECTION, SOLUTION INTRAVENOUS; SUBCUTANEOUS at 12:42

## 2017-01-01 RX ADMIN — CHLORHEXIDINE GLUCONATE 15 ML: 1.2 RINSE ORAL at 21:24

## 2017-01-01 RX ADMIN — ONDANSETRON HYDROCHLORIDE 4 MG: 2 INJECTION, SOLUTION INTRAMUSCULAR; INTRAVENOUS at 10:13

## 2017-01-01 RX ADMIN — Medication 20 ML: at 14:13

## 2017-01-01 RX ADMIN — SODIUM CHLORIDE 25 ML/HR: 900 INJECTION, SOLUTION INTRAVENOUS at 21:57

## 2017-01-01 RX ADMIN — FINASTERIDE 5 MG: 5 TABLET, FILM COATED ORAL at 23:00

## 2017-01-01 RX ADMIN — DEXTROSE MONOHYDRATE 10 MG/HR: 50 INJECTION, SOLUTION INTRAVENOUS at 02:28

## 2017-01-01 RX ADMIN — METOCLOPRAMIDE 5 MG: 5 INJECTION, SOLUTION INTRAMUSCULAR; INTRAVENOUS at 13:05

## 2017-01-01 RX ADMIN — HYDROMORPHONE HYDROCHLORIDE 0.1 MG: 1 INJECTION, SOLUTION INTRAMUSCULAR; INTRAVENOUS; SUBCUTANEOUS at 23:36

## 2017-01-01 RX ADMIN — HYDROMORPHONE HYDROCHLORIDE 0.5 MG: 1 INJECTION, SOLUTION INTRAMUSCULAR; INTRAVENOUS; SUBCUTANEOUS at 11:36

## 2017-01-01 RX ADMIN — Medication 80 MCG: at 19:16

## 2017-01-01 RX ADMIN — NITROGLYCERIN 1 INCH: 20 OINTMENT TOPICAL at 18:46

## 2017-01-01 RX ADMIN — MORPHINE SULFATE 2 MG: 4 INJECTION, SOLUTION INTRAMUSCULAR; INTRAVENOUS at 14:08

## 2017-01-01 RX ADMIN — Medication 50 MCG/HR: at 12:24

## 2017-01-01 RX ADMIN — HYDROMORPHONE HYDROCHLORIDE 0.2 MG: 1 INJECTION, SOLUTION INTRAMUSCULAR; INTRAVENOUS; SUBCUTANEOUS at 00:14

## 2017-01-01 RX ADMIN — PROPOFOL 35 MCG/KG/MIN: 10 INJECTION, EMULSION INTRAVENOUS at 21:53

## 2017-01-01 RX ADMIN — INSULIN LISPRO 3 UNITS: 100 INJECTION, SOLUTION INTRAVENOUS; SUBCUTANEOUS at 13:25

## 2017-01-01 RX ADMIN — ACETAMINOPHEN 975 MG: 325 SOLUTION ORAL at 00:57

## 2017-01-01 RX ADMIN — FENTANYL CITRATE 50 MCG: 50 INJECTION, SOLUTION INTRAMUSCULAR; INTRAVENOUS at 14:51

## 2017-01-01 RX ADMIN — SODIUM CHLORIDE, SODIUM LACTATE, POTASSIUM CHLORIDE, AND CALCIUM CHLORIDE: 600; 310; 30; 20 INJECTION, SOLUTION INTRAVENOUS at 00:45

## 2017-01-01 RX ADMIN — AMPICILLIN 2 G: 2 INJECTION, POWDER, FOR SOLUTION INTRAVENOUS at 03:19

## 2017-01-01 RX ADMIN — ASPIRIN 300 MG: 300 SUPPOSITORY RECTAL at 08:12

## 2017-01-01 RX ADMIN — ASCORBIC ACID: 500 INJECTION, SOLUTION INTRAMUSCULAR; INTRAVENOUS; SUBCUTANEOUS at 18:06

## 2017-01-01 RX ADMIN — SODIUM CHLORIDE 10 ML/HR: 900 INJECTION, SOLUTION INTRAVENOUS at 15:27

## 2017-01-01 RX ADMIN — CHLORHEXIDINE GLUCONATE 15 ML: 1.2 RINSE ORAL at 20:57

## 2017-01-01 RX ADMIN — HEPARIN SODIUM 5000 UNITS: 5000 INJECTION, SOLUTION INTRAVENOUS; SUBCUTANEOUS at 12:42

## 2017-01-01 RX ADMIN — Medication 10 ML: at 21:09

## 2017-01-01 RX ADMIN — Medication 10 ML: at 04:41

## 2017-03-26 NOTE — ED PROVIDER NOTES
HPI Comments: Devora Philip is a 80 y.o. male with PMHx of HTN presenting ambulatory to [facility] c/o generalized body aches and chills since 4 days ago. Pt notes additional symptoms of right rib pain following a recent fall while at the dentist, nonproductive cough, and diarrhea. He reports that he was going into the dentist's office when he opened the door, and the wind blew causing him to fall. Pt denies fever, nausea, vomiting, sick contacts, smoking, or hx of heart issues. PCP: Karolyn Cummings MD    There are no other complaints, changes, or physical findings at this time. The history is provided by the patient. No  was used. Past Medical History:   Diagnosis Date    Hypertension        Past Surgical History:   Procedure Laterality Date    ABDOMEN SURGERY PROC UNLISTED      hernia repair         History reviewed. No pertinent family history. Social History     Social History    Marital status:      Spouse name: N/A    Number of children: N/A    Years of education: N/A     Occupational History    Not on file. Social History Main Topics    Smoking status: Never Smoker    Smokeless tobacco: Not on file    Alcohol use No    Drug use: No    Sexual activity: Not on file     Other Topics Concern    Not on file     Social History Narrative         ALLERGIES: Review of patient's allergies indicates no known allergies. Review of Systems   Constitutional: Positive for chills. Negative for activity change and fever. HENT: Negative for congestion and sore throat. Eyes: Negative for pain and redness. Respiratory: Positive for cough. Negative for chest tightness and shortness of breath. Cardiovascular: Negative for chest pain and palpitations. Gastrointestinal: Positive for diarrhea. Negative for abdominal pain, nausea and vomiting. Genitourinary: Negative for dysuria, frequency and urgency.    Musculoskeletal: Positive for myalgias (generalized body aches). Negative for back pain and neck pain. +right rib pain   Skin: Negative for rash. Neurological: Negative for syncope, light-headedness and headaches. Psychiatric/Behavioral: Negative for confusion. All other systems reviewed and are negative. Patient Vitals for the past 12 hrs:   Temp Resp BP SpO2   03/26/17 0903 - - - 96 %   03/26/17 0900 - - (!) 171/93 -   03/26/17 0759 - - - 96 %   03/26/17 0757 - - 178/71 -   03/26/17 0743 98 °F (36.7 °C) 24 (!) 200/103 95 %            Physical Exam   Constitutional: He is oriented to person, place, and time. He appears well-developed and well-nourished. No distress. HENT:   Head: Normocephalic. Nose: Nose normal.   Mouth/Throat: Oropharynx is clear and moist. No oropharyngeal exudate. Eyes: Conjunctivae are normal. Pupils are equal, round, and reactive to light. No scleral icterus. Neck: Normal range of motion. Neck supple. No JVD present. No tracheal deviation present. No thyromegaly present. Cardiovascular: Normal rate, regular rhythm and intact distal pulses. Exam reveals no gallop and no friction rub. No murmur heard. Pulmonary/Chest: Effort normal and breath sounds normal. No stridor. No respiratory distress. He has no wheezes. He has no rales. Abdominal: Soft. Bowel sounds are normal. He exhibits no distension. There is no tenderness. There is no rebound and no guarding. Musculoskeletal: Normal range of motion. He exhibits tenderness. He exhibits no edema. Moderate tenderness over the 6th and 7th rib area anteriorly of the mid clavicular line   Lymphadenopathy:     He has no cervical adenopathy. Neurological: He is alert and oriented to person, place, and time. No cranial nerve deficit. He exhibits normal muscle tone. Coordination normal.   Skin: Skin is warm and dry. No rash noted. He is not diaphoretic. No erythema. Psychiatric: He has a normal mood and affect.  His behavior is normal.   Nursing note and vitals reviewed. MDM  Number of Diagnoses or Management Options  Contusion of chest wall, right, initial encounter:   Fall, initial encounter:   Diagnosis management comments: DDx: rib fracture, pneumonia, flu, ACS       Amount and/or Complexity of Data Reviewed  Clinical lab tests: ordered and reviewed  Tests in the radiology section of CPT®: ordered and reviewed  Tests in the medicine section of CPT®: ordered and reviewed  Review and summarize past medical records: yes  Independent visualization of images, tracings, or specimens: yes    Risk of Complications, Morbidity, and/or Mortality  General comments: CXR clear; no evidence of rib fx; cbc, cmp , cardiac enzymes unremarkable; flu negative; reassured pt; dc home; Sarah Abdi MD      Patient Progress  Patient progress: stable    ED Course       Procedures      ED EKG interpretation: 08:56  Rhythm: normal sinus rhythm; and regular . Rate (approx.): 90; Axis: left axis deviation; VA Interval: normal; QRS interval: incomplete LBBB; ST/T wave: non-specific changes; This EKG was interpreted by Sarah Abdi MD,ED Provider. LABORATORY TESTS:  Recent Results (from the past 12 hour(s))   CBC WITH AUTOMATED DIFF    Collection Time: 03/26/17  8:30 AM   Result Value Ref Range    WBC 6.8 4.1 - 11.1 K/uL    RBC 5.11 4.10 - 5.70 M/uL    HGB 15.4 12.1 - 17.0 g/dL    HCT 45.1 36.6 - 50.3 %    MCV 88.3 80.0 - 99.0 FL    MCH 30.1 26.0 - 34.0 PG    MCHC 34.1 30.0 - 36.5 g/dL    RDW 13.9 11.5 - 14.5 %    PLATELET 755 402 - 376 K/uL    NEUTROPHILS 68 32 - 75 %    LYMPHOCYTES 18 12 - 49 %    MONOCYTES 12 5 - 13 %    EOSINOPHILS 1 0 - 7 %    BASOPHILS 1 0 - 1 %    ABS. NEUTROPHILS 4.7 1.8 - 8.0 K/UL    ABS. LYMPHOCYTES 1.2 0.8 - 3.5 K/UL    ABS. MONOCYTES 0.8 0.0 - 1.0 K/UL    ABS. EOSINOPHILS 0.1 0.0 - 0.4 K/UL    ABS.  BASOPHILS 0.1 0.0 - 0.1 K/UL   METABOLIC PANEL, COMPREHENSIVE    Collection Time: 03/26/17  8:30 AM   Result Value Ref Range    Sodium 138 136 - 145 mmol/L Potassium 4.4 3.5 - 5.1 mmol/L    Chloride 106 97 - 108 mmol/L    CO2 23 21 - 32 mmol/L    Anion gap 9 5 - 15 mmol/L    Glucose 108 (H) 65 - 100 mg/dL    BUN 22 (H) 6 - 20 MG/DL    Creatinine 1.12 0.70 - 1.30 MG/DL    BUN/Creatinine ratio 20 12 - 20      GFR est AA >60 >60 ml/min/1.73m2    GFR est non-AA >60 >60 ml/min/1.73m2    Calcium 8.3 (L) 8.5 - 10.1 MG/DL    Bilirubin, total 0.4 0.2 - 1.0 MG/DL    ALT (SGPT) 31 12 - 78 U/L    AST (SGOT) 27 15 - 37 U/L    Alk. phosphatase 52 45 - 117 U/L    Protein, total 7.8 6.4 - 8.2 g/dL    Albumin 3.3 (L) 3.5 - 5.0 g/dL    Globulin 4.5 (H) 2.0 - 4.0 g/dL    A-G Ratio 0.7 (L) 1.1 - 2.2     CK W/ CKMB & INDEX    Collection Time: 03/26/17  8:30 AM   Result Value Ref Range     39 - 308 U/L    CK - MB 2.7 <3.6 NG/ML    CK-MB Index 1.5 0 - 2.5     TROPONIN I    Collection Time: 03/26/17  8:30 AM   Result Value Ref Range    Troponin-I, Qt. <0.04 <0.05 ng/mL   INFLUENZA A & B AG (RAPID TEST)    Collection Time: 03/26/17  8:30 AM   Result Value Ref Range    Influenza A Antigen NEGATIVE  NEG      Influenza B Antigen NEGATIVE  NEG     EKG, 12 LEAD, INITIAL    Collection Time: 03/26/17  8:56 AM   Result Value Ref Range    Ventricular Rate 90 BPM    Atrial Rate 90 BPM    P-R Interval 196 ms    QRS Duration 94 ms    Q-T Interval 378 ms    QTC Calculation (Bezet) 462 ms    Calculated P Axis 64 degrees    Calculated R Axis -37 degrees    Calculated T Axis 76 degrees    Diagnosis       Sinus rhythm with premature atrial complexes  Left axis deviation  Incomplete right bundle branch block  Abnormal ECG  When compared with ECG of 02-APR-2012 09:16,  premature atrial complexes are now present  Incomplete right bundle branch block is now present         IMAGING RESULTS:    CLINICAL HISTORY: Status post fall with acute right rib pain     Comparison to 4/2/2012     PA view of the chest and 3 oblique views of the right ribs demonstrate normal  heart size.  There is no acute process in the lung fields. No fracture or  pneumothorax.     IMPRESSION  IMPRESSION: No acute fracture or process. IMPRESSION:  1. Fall, initial encounter    2. Contusion of chest wall, right, initial encounter        PLAN:  1. Current Discharge Medication List      START taking these medications    Details   HYDROcodone-acetaminophen (NORCO) 5-325 mg per tablet Take 1 Tab by mouth every four (4) hours as needed for Pain. Max Daily Amount: 6 Tabs. Qty: 15 Tab, Refills: 0           2. Follow-up Information     Follow up With Details Comments Contact Info    Carmen Marin MD Schedule an appointment as soon as possible for a visit in 1 day  [facility]  [phone]          Return to ED if worse     DISCHARGE NOTE:  9:41 AM  The patient is ready for discharge. The patient's signs, symptoms, diagnosis, and discharge instructions have been discussed and the patient has conveyed their understanding. The patient is to follow up as recommended or return to the ER should their symptoms worsen. Plan has been discussed and the patient is in agreement. This note is prepared by Nilda Ewing, acting as Scribe for MD Ferdinand Moe MD: The scribe's documentation has been prepared under my direction and personally reviewed by me in its entirety. I confirm that the note above accurately reflects all work, treatment, procedures, and medical decision making performed by me.

## 2017-03-26 NOTE — ED TRIAGE NOTES
Assumed care of pt, pt brought from triage by wheelchair, resting on stretcher in position of comfort, call bell within reach, pt reports he fell last week when the door to dentist office was blown open by wind when he was holding on to the door, caused him to fall and hit right ribs, has pain to right ribs, reports generalized body-aches since Wednesday prior to fall, pt reports he has \"problems with balance\" x4-5 months and has been seen by other MD's and can not find cause, pt denies fever, pt reports he had diarrhea yesterday x1

## 2017-03-26 NOTE — ED NOTES
Pt was assisted to change to gown by RN, assisted to stretcher, side rail up x1, given ice chips by Dr. Nicolle Martinez

## 2017-03-26 NOTE — DISCHARGE INSTRUCTIONS
Chest Contusion: Care Instructions  Your Care Instructions  A chest contusion, or bruise, is caused by a fall or direct blow to the chest. Car crashes, falls, getting punched, and injury from bicycle handlebars are common causes of chest contusions. A very forceful blow to the chest can injure the heart or blood vessels in the chest, the lungs, the airway, the liver, or the spleen. Pain may be caused by an injury to muscles, cartilage, or ribs. Deep breathing, coughing, or sneezing can increase your pain. Lying on the injured area also can cause pain. Follow-up care is a key part of your treatment and safety. Be sure to make and go to all appointments, and call your doctor if you are having problems. It's also a good idea to know your test results and keep a list of the medicines you take. How can you care for yourself at home? · Rest and protect the injured or sore area. Stop, change, or take a break from any activity that may be causing your pain. · Put ice or a cold pack on the area for 10 to 20 minutes at a time. Put a thin cloth between the ice and your skin. · After 2 or 3 days, if your swelling is gone, apply a heating pad set on low or a warm cloth to your chest. Some doctors suggest that you go back and forth between hot and cold. Put a thin cloth between the heating pad and your skin. · Do not wrap or tape your ribs for support. This may cause you to take smaller breaths, which could increase your risk of pneumonia and lung collapse. · Ask your doctor if you can take an over-the-counter pain medicine, such as acetaminophen (Tylenol), ibuprofen (Advil, Motrin), or naproxen (Aleve). Be safe with medicines. Read and follow all instructions on the label. · Take your medicines exactly as prescribed. Call your doctor if you think you are having a problem with your medicine.   · Gentle stretching and massage may help you feel better after a few days of rest. Stretch slowly to the point just before discomfort begins, then hold the stretch for at least 15 to 30 seconds. Do this 3 or 4 times per day. · As your pain gets better, slowly return to your normal activities. Be patient, because chest bruises can take weeks or months to heal. Any increased pain may be a sign that you need to rest a while longer. When should you call for help? Call 911 anytime you think you may need emergency care. For example, call if:  · You have severe trouble breathing. · You cough up blood. Call your doctor now or seek immediate medical care if:  · You have belly pain. · You are dizzy or lightheaded, or you feel like you may faint. · You develop new symptoms with the chest pain. · Your chest pain gets worse. · You have a fever. · You have some shortness of breath. · You have a cough that brings up mucus from the lungs. Watch closely for changes in your health, and be sure to contact your doctor if:  · Your chest pain is not improving after 1 week. Where can you learn more? Go to http://blanca-linwood.info/. Enter I174 in the search box to learn more about \"Chest Contusion: Care Instructions. \"  Current as of: May 27, 2016  Content Version: 11.1  © 8893-6620 [employer]. Care instructions adapted under license by [employer] (which disclaims liability or warranty for this information). If you have questions about a medical condition or this instruction, always ask your healthcare professional. [place] [address] any warranty or liability for your use of this information.

## 2017-07-20 PROBLEM — I20.0 UNSTABLE ANGINA (HCC): Status: ACTIVE | Noted: 2017-01-01

## 2017-07-20 NOTE — PROCEDURES
PCI of CX marginal.   .      100% - 0 %   ANDERSON 3 flow  Perforation sealed with balloon inflations. 3 Synergy HORACIO deployed. Hemodynamically stable.

## 2017-07-20 NOTE — PROGRESS NOTES
7/20/2017 7:18 PM  Patient without complaints. Last VS:   Visit Vitals    /62 (BP 1 Location: Left arm, BP Patient Position: At rest)    Pulse 82    Temp 97.3 °F (36.3 °C)    Resp 15    Ht 5' 10\" (1.778 m)    Wt 93.9 kg (207 lb)    SpO2 96%    BMI 29.7 kg/m2     Cath site without hematoma, bleeding or new bruit. Distal pulses at baseline. Mild hematuria noted w/o clot. Continue current plan of care. Results of cath discussed with patient and available family members.

## 2017-07-20 NOTE — IP AVS SNAPSHOT
[address] [facility] 
[phone] Patient: Viry James MRN: [id number] DJB:[birth year]30[birth year]3 You are allergic to the following Allergen Reactions Metoprolol Nausea and Vomiting Recent Documentation Height Weight BMI Smoking Status 1.778 m 95.8 kg 30.3 kg/m2 Never Smoker Unresulted Labs Order Current Status CULTURE, URINE In process Emergency Contacts Name Discharge Info Relation Home Work Mobile Alethea Lesch  Spouse [3] 21 [phone] About your hospitalization You were admitted on:  July 20, 2017 You last received care in the:  hospitals 2 CARDIOPULMONARY CARE You were discharged on:  July 26, 2017 Unit phone number:  [phone] Why you were hospitalized Your primary diagnosis was:  Not on File Your diagnoses also included:  Unstable Angina (Hcc) Providers Seen During Your Hospitalizations Provider Role Specialty Primary office phone Allie Stone MD Attending Provider Cardiology [phone] Your Primary Care Physician (PCP) Primary Care Physician Office Phone Office Fax Veronica Hager [phone] Follow-up Information Follow up With Details Comments Contact Info Allie Stone MD Schedule an appointment as soon as possible for a visit in 3 weeks  [address] [facility] 
[phone] Ld Garcia MD  7 to 10 days [address] [facility] 
[phone] Katharina Salguero MD In 10 days  [address] [address] [address] [facility] 
[phone] Current Discharge Medication List  
  
START taking these medications Dose & Instructions Dispensing Information Comments Morning Noon Evening Bedtime  
 aspirin 81 mg chewable tablet Replaces:  aspirin 325 mg tablet Your next dose is:  TOMORROW Dose:  81 mg Take 1 Tab by mouth daily. Quantity:  30 Tab Refills:  12  
     
  
   
   
   
  
 atorvastatin 20 mg tablet Commonly known as:  LIPITOR Your next dose is:  TODAY Dose:  20 mg Take 1 Tab by mouth nightly. Quantity:  30 Tab Refills:  12  
     
   
   
   
  
  
 carvedilol 3.125 mg tablet Commonly known as:  Jaxshayan Nicole Your next dose is:  TOMORROW Dose:  3.125 mg Take 1 Tab by mouth two (2) times daily (with meals). Quantity:  60 Tab Refills:  12  
     
  
   
   
   
  
 ciprofloxacin HCl 500 mg tablet Commonly known as:  CIPRO Your next dose is:  TOMORROW Dose:  500 mg Take 1 Tab by mouth daily for 6 days. Quantity:  6 Tab Refills:  0  
     
   
  
   
   
  
 clopidogrel 75 mg Tab Commonly known as:  PLAVIX Your next dose is:  TOMORROW Dose:  75 mg Take 1 Tab by mouth daily. Quantity:  30 Tab Refills:  12  
     
  
   
   
   
  
 finasteride 5 mg tablet Commonly known as:  PROSCAR Your next dose is:  TOMORROW Dose:  5 mg Take 1 Tab by mouth daily. Quantity:  30 Tab Refills:  6  
     
  
   
   
   
  
 nystatin powder Commonly known as:  MYCOSTATIN Your next dose is:  TOMORROW Apply  to affected area two (2) times a day for 7 days. Quantity:  1 Bottle Refills:  0 CONTINUE these medications which have CHANGED Dose & Instructions Dispensing Information Comments Morning Noon Evening Bedtime  
 losartan 50 mg tablet Commonly known as:  COZAAR What changed:   
- medication strength 
- how much to take Your next dose is:  TOMORROW Dose:  50 mg Take 1 Tab by mouth daily. Quantity:  30 Tab Refills:  12 CONTINUE these medications which have NOT CHANGED Dose & Instructions Dispensing Information Comments Morning Noon Evening Bedtime HYDROcodone-acetaminophen 5-325 mg per tablet Commonly known as:  Katherine Kemp Your next dose is:  AS NEEDED Dose:  1 Tab Take 1 Tab by mouth every four (4) hours as needed for Pain. Max Daily Amount: 6 Tabs. Quantity:  15 Tab Refills:  0 STOP taking these medications   
 aspirin 325 mg tablet Commonly known as:  ASPIRIN Replaced by:  aspirin 81 mg chewable tablet Where to Get Your Medications Information on where to get these meds will be given to you by the nurse or doctor. ! Ask your nurse or doctor about these medications  
  aspirin 81 mg chewable tablet  
 atorvastatin 20 mg tablet  
 carvedilol 3.125 mg tablet  
 ciprofloxacin HCl 500 mg tablet  
 clopidogrel 75 mg Tab  
 finasteride 5 mg tablet  
 losartan 50 mg tablet  
 nystatin powder Discharge Instructions [address]    [phone] [address]    [email or web address] Patient Discharge Instructions Keith Matamoros / [id number] : 1928 Admitted 2017 Discharged 2017 · It is important that you take the medication exactly as they are prescribed. · Keep your medication in the bottles provided by the pharmacist and keep a list of the medication names, dosages, and times to be taken in your wallet. · Do not take other medications without consulting your doctor. BRING ALL OF YOUR MEDICINES TO YOUR OFFICE VISIT with Dr. Kathleen Velazquez. Cardiac Catheterization  Discharge Instructions ? Do not drive, operate any machinery, or sign any legal documents for 24 hours after your procedure. You must have someone to drive you home. ? You may take a shower 24 hours after your cardiac catheterization. Be sure to get the dressing wet and then remove it; gently wash the area with warm soapy water. Pat dry and leave open to air. To help prevent infections, be sure to keep the cath site clean and dry.   No lotions, creams, powders, ointments, etc. in the cath site for approximately 1 week. ? Do not take a tub bath, get in a hot tub or swimming pool for approximately 5 days or until the cath site is completely healed. ? No strenuous activity or heavy lifting over 10 lbs. for 7 days. ? Drink plenty of fluids for 24-48 hours after your cath to flush the contrast dye from your kidneys. No alcoholic beverages for 24 hours. You may resume your previous diet (low fat, low cholesterol) after your cath. ? After your cath, some bruising or discomfort is common during the healing process. Tylenol, 1-2 tablets every 6 hours as needed, is recommended if you experience any discomfort. If you experience any signs or symptoms of infection such as fever, chills, or poorly healing incision, persistent tenderness or swelling in the groin, redness and/or warmth to the touch, numbness, significant tingling or pain at the groin site or affected extremity, rash, drainage from the cath site, or if the leg feels tight or swollen, call your physician right away. ? If bleeding at the cath site occurs, take a clean gauze pad and apply direct pressure to the groin just above the puncture site. Call 911 immediately, and continue to apply direct pressure until an ambulance gets to your location. If Plavix, or Brilinta, or Effient is prescribed with your aspirin, you must take them to prevent your stent from clotting. You will likely need them for at least 1 to 2 years. Follow-up:  
Follow-up Information Follow up With Details Comments Contact Info Kasia Sethi MD Schedule an appointment as soon as possible for a visit in 3 weeks  [id number] [facility] 
[phone] Daksha Mejia MD   [address] [facility] 
[phone] Danyel Emery MD  as they direct [address] [place] [address] [address] [facility] 
[phone] Information obtained by : 
I understand that if any problems occur once I am at home I am to contact my physician. I understand and acknowledge receipt of the instructions indicated above. R.N.'s Signature                                                                  Date/Time Patient or Representative Signature                                                          Date/Time Alvaro Lucas MD 
   
[address]    [phone] [address]    [email or web address] Discharge Orders Procedure Order Date Status Priority Quantity Spec Type Associated Dx NURSING-MISCELLANEOUS: D/C with wick indwelling. Attach to leg bag. Instruct on changeover to big bag at night. 07/26/17 1305 Normal Routine 1  Unstable angina ([address] [[address]647] Questions: Description of Order:  D/C with wick indwelling. Attach to leg bag. Instruct on changeover to big bag at night. [address] [facility] 07/26/17 1706 Normal Routine 1 [employer] Announcement We are excited to announce that we are making your provider's discharge notes available to you in [employer]. You will see these notes when they are completed and signed by the physician that discharged you from your recent hospital stay. If you have any questions or concerns about any information you see in [employer], please call the Health Information Department where you were seen or reach out to your Primary Care Provider for more information about your plan of care. Introducing [facility]!    
 [facility] introduces [employer] patient portal. Now you can access parts of your medical record, email your doctor's office, and request medication refills online. 1. In your internet browser, go to [email or web address] 2. Click on the First Time User? Click Here link in the Sign In box. You will see the New Member Sign Up page. 3. Enter your [employer] Access Code exactly as it appears below. You will not need to use this code after youve completed the sign-up process. If you do not sign up before the expiration date, you must request a new code. · [employer] Access Code: [email or web address] Expires: 10/15/2017 12:00 PM 
 
4. Enter the last four digits of your Social Security Number (xxxx) and Date of Birth (mm/dd/yyyy) as indicated and click Submit. You will be taken to the next sign-up page. 5. Create a [employer] ID. This will be your [employer] login ID and cannot be changed, so think of one that is secure and easy to remember. 6. Create a [employer] password. You can change your password at any time. 7. Enter your Password Reset Question and Answer. This can be used at a later time if you forget your password. 8. Enter your e-mail address. You will receive e-mail notification when new information is available in [address] 9. Click Sign Up. You can now view and download portions of your medical record. 10. Click the Download Summary menu link to download a portable copy of your medical information. If you have questions, please visit the Frequently Asked Questions section of the [employer] website. Remember, [employer] is NOT to be used for urgent needs. For medical emergencies, dial 911. Now available from your iPhone and Android! General Information Please provide this summary of care documentation to your next provider. Patient Signature:  ____________________________________________________________ Date:  ____________________________________________________________  
  
Gearldine Moulds Provider Signature:  ____________________________________________________________ Date:  ____________________________________________________________

## 2017-07-20 NOTE — PROGRESS NOTES
Prelim Cath Report    PreProc Dx:    [x]CAD/angina       [x]Abnormal MPI  PostProc Dx: See below. MD/Assistants: Alena/None    Moderate sedation start time: 0829  Moderate sedation stop time: 0858  Sedation used: Versed/Fentnyl. Sedation was administered by a cath lab nurse; I directly supervised the cath lab staff as the patient was monitored for the duration of the procedure. 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 No specimens removed. Minimal blood loss. No apparent complications. Plan:    [x]PCI;  For other plans, see orders.     Rakel Flowers MD

## 2017-07-20 NOTE — PROGRESS NOTES
Bedside and Verbal shift change report given to Nayeli Westfall (oncoming nurse) by Poppy Wong (offgoing nurse). Report included the following information SBAR, Kardex, Intake/Output, MAR, Accordion and Recent Results.

## 2017-07-21 NOTE — PROGRESS NOTES
Cardiology Progress Note  2017     Admit Date: 2017  Admit Diagnosis: cath lab  Unstable angina [facility])  CC: none currently  Cardiac Assessment/Plan:   CAD: HORACIO to circ; poor distal flow: Med Rx only; needs to stay on asa/plavix. HTN: Increased: adding acei/cont low dose bblocker. Rhythm: sinus    Volume status:euvolemic  Renal function: stable    Lipids: LDL too high; added lipitor 20. Hematuria: new issue; no clots; needs to stay on asa/plavix; will ask urology to see. Check U/A; Cont IVF for now. Diarrhea: loose stool; not suggestive of C diff: add imodium. Dispo: keep today. For other plans, see orders. Hospital problem list   Active Hospital Problems    Diagnosis Date Noted    Unstable angina ([facility] [address] 2017        Subjective: Amanda Aver reports N/dry heaves; cont hematuria; Loose stool (not watery).    Chest pain X none  consistent with:  Non-cardiac CP         Atypical CP     None now  On going  Anginal CP     Dyspnea X none  at rest  with exertion         improved  unchanged  worse              PND X none  overnight       Orthopnea X none  improved  unchanged  worse   Presyncope X none  improved  unchanged  worse     Ambulated in hallway without symptoms   Yes   Ambulated in room without symptoms  Yes   Objective:    Physical Exam:  Overall VSSAF;    Visit Vitals    /78    Pulse (!) 57    Temp 97.5 °F (36.4 °C)    Resp 18    Ht 5' 10\" (1.778 m)    Wt 93.9 kg (207 lb)    SpO2 95%    BMI 29.7 kg/m2     Temp (24hrs), Av.5 °F (36.4 °C), Min:97.3 °F (36.3 °C), Max:98.2 °F (36.8 °C)    Patient Vitals for the past 8 hrs:   Pulse   17 0704 (!) 57   17 0242 64    Patient Vitals for the past 8 hrs:   Resp   17 0704 18   17 0242 16    Patient Vitals for the past 8 hrs:   BP   17 0704 148/78   17 0242 150/80        Intake/Output Summary (Last 24 hours) at 17 0811  Last data filed at 17 5658   Gross per 24 hour   Intake                0 ml   Output              500 ml   Net             -500 ml     General Appearance: Well developed, well nourished, no acute distress. Ears/Nose/Mouth/Throat:   Normal MM; anicteric. JVP: WNL   Resp:   clear to auscultation bilaterally. Nl resp effort. Cardiovascular:  RRR, S1, S2 normal, no new murmur. No gallop or rub. Abdomen:   Soft, non-tender, bowel sounds are present. Extremities: No edema bilaterally. Skin:  Neuro: Warm and dry. A/O x3, grossly nonfocal   cath site intact w/o hematoma or new bruit; distal pulse unchanged x Yes   Data Review:     Telemetry independently reviewed x sinus  chronic afib  parox afib  NSVT     ECG independently reviewed  NSR  afib  no significant changes  NSST-Tw chgs   x no new ECG provided for review   Lab results reviewed as noted below. Current medications reviewed as noted below. No results for input(s): PH, PCO2, PO2 in the last 72 hours. No results for input(s): CPK, CKMB, CKNDX, TROIQ in the last 72 hours. Recent Labs      07/21/17   0250  07/20/17   1228   NA  135*   --    K  4.5   --    CL  104   --    CO2  25   --    BUN  22*   --    CREA  1.13   --    GLU  102*   --    CA  8.6   --    WBC   --   6.7   HGB   --   14.2   HCT   --   42.4   PLT   --   204     Lab Results   Component Value Date/Time    Cholesterol, total 178 07/20/2017 12:28 PM    HDL Cholesterol 48 07/20/2017 12:28 PM    LDL, calculated 117.8 07/20/2017 12:28 PM    Triglyceride 61 07/20/2017 12:28 PM    CHOL/HDL Ratio 3.7 07/20/2017 12:28 PM     No results for input(s): SGOT, GPT, AP, TBIL, TP, ALB, GLOB, GGT, AML, LPSE in the last 72 hours. No lab exists for component: AMYP, HLPSE  No results for input(s): INR, PTP, APTT in the last 72 hours.     No lab exists for component: INREXT   No components found for: [place]    Current Facility-Administered Medications   Medication Dose Route Frequency    metoprolol succinate (TOPROL-XL) XL tablet 25 mg 25 mg Oral DAILY    lisinopril (PRINIVIL, ZESTRIL) tablet 5 mg  5 mg Oral DAILY    atorvastatin (LIPITOR) tablet 20 mg  20 mg Oral QHS    loperamide (IMODIUM) capsule 4 mg  4 mg Oral ONCE    Followed by   [address] [place] [address] loperamide (IMODIUM) capsule 2 mg  2 mg Oral Q4H PRN    0.9% sodium chloride infusion  75 mL/hr IntraVENous CONTINUOUS    aspirin chewable tablet 81 mg  81 mg Oral DAILY    clopidogrel (PLAVIX) tablet 75 mg  75 mg Oral DAILY    sodium chloride (NS) flush 5-10 mL  5-10 mL IntraVENous Q8H    sodium chloride (NS) flush 5-10 mL  5-10 mL IntraVENous PRN    zolpidem (AMBIEN) tablet 5 mg  5 mg Oral QHS PRN        Crys Ogden MD

## 2017-07-21 NOTE — PROCEDURES
[address]   CORONARY ANGIOGRAPHY       Name:  Gerald Diaz   MR#:  [id number]   :  1928   Account #:  [de-identified]        Date of Adm:  2017       INDICATION: Progressive dyspnea, consistent with anginal equivalent;   high risk stress test.    PROCEDURES PERFORMED:   1. Left heart catheterization. 2. Left ventriculography in MIRANDA projection. 3. Selective coronary angiography. 4. Abdominal aortography in AP projection. SEDATION START TIME: 53. END TIME:  94. Sedation with Versed and fentanyl. IV sedation administered by cath lab nurse under my direct supervision. Cath lab staff   interpretation after procedure under my direct supervision. DESCRIPTION OF PROCEDURE: After informed consent of all   potential complications, the patient was prepped and draped in the   usual sterile manner. Lidocaine 1% was utilized for local anesthesia. Conscious sedation per flow sheet. Right femoral artery was entered   and a 6-Panamanian sheath placed without complication using modified   Seldinger technique. Sheath was flushed at all catheter exchanges,   and all catheters were advanced over guidewire under direct   fluoroscopic visualization. Left coronary angiography was performed in   multiple views using a 6-Panamanian JL5 catheter. Right coronary   angiography was performed in multiple views using a JR4 catheter. Left heart catheterization and left ventriculography was performed in   MIRANDA projection using a pigtail catheter. Abdominal aortography was   performed in AP projection using a pigtail catheter due to evidence of   atherosclerosis during catheter transition; An exchange length wire was used for   catheter exchanges after intial central aortic access was obtained. COMPLICATIONS: No apparent complications. ESTIMATED BLOOD LOSS: Minimal.  SPECIMENS: No specimens.     FINDINGS:   LEFT MAIN: Large vessel with minor luminal irregularities. LAD: A medium-sized vessel with   scattered plaque of 20%-30% throughout its course. Two small mid   diagonals were noted and multiple trivial diagonal vessels. RAMUS: Ramus intermedius is a small vessel with no focal stenosis   that provides perfusion to the anterolateral wall. CIRCUMFLEX: Moderate-sized vessel that is subtotally occluded in its   proximal and mid portion; there were some faint ANDERSON 1 flow distally   that maybe collateralized. RCA: Medium-sized dominant vessel giving   rise to small PDA and posterolateral branch vessels; 30% eccentric   mid stenosis is noted, with some systolic compression. LV: Left ventriculography in MIRANDA projection   reveals no focal wall motion abnormality. Ejection fraction of 55% to   60%. Mild-to-moderate MR. No aortic stenosis on pullback. EDP of 14. Abdominal aortography in AP projection reveals no renal artery   stenosis. There is scattered plaque in the abdominal aorta with no   focal stenosis or aneurysm. 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 Nl EF. PLAN: PCI attempt of the circumflex vessel.         Lima Keyes MD DC / Perla   D:  07/21/2017   17:06   T:  07/21/2017   17:56   Job #:  [id number]

## 2017-07-21 NOTE — CONSULTS
Requesting Provider: Kevin Berman MD  Reason for Consultation: \"hematuria\"  Pre-existing [facility] Patient:   No                Patient: David Blizzard MRN: [id number]  SSN: [id number]    YOB: 1928  Age: 80 y.o. Sex: male     Location:        Code Status: No Order   PCP: Nicholas James MD  - [phone]   Emergency Contact:  Primary Emergency Contact: Titus Schaumann, Home Phone: [phone]   Race/Episcopal/Language: WHITE OR Pearle Rede / Christian Pippins / Sheila Pillion: Payor: [employer] / Plan: [address] [place] [address] HMO / Product Type: Managed Care Medicare /    Prior Admission Data: 3/26/17 \[facility]\"" EMERGENCY DEPT     Hospitalized:  Hospital Day: 2 - Admitted 2017  6:32 AM   POD # * No surgery found *  by * Surgery not found * - Blood Loss: * No surgery found * * Surgery not found *     CONSULTANTS  IP CONSULT TO UROLOGY   ADMISSION DIAGNOSES  No diagnosis found. Assessment/Plan:     · Gross Hematuria - on Plavix/ASA. Obviously these need to continue. Will deserve outpatient workup. Saved samplee urine dark blod, small broken up clots at bottom. 22 2x cath inserted, no residual, no clots of flushing/aspirating    Currently hematuria is thin, no clots, and I expect it to resolved with conservative measures. Flush cath q shift and PRN. Will check him in the AM as well     CC: No chief complaint on file. HPI: He is a 80 y.o. male admitted after cardiac stent. Developed new onset gross hematuria w/o clots appreciated.  Problem: blood, severity: gross, duration recent, context: ASA/Plavix, Alleviating: none    Temp (24hrs), Av.5 °F (36.4 °C), Min:97.3 °F (36.3 °C), Max:98.2 °F (36.8 °C)    Creatinine   Date/Time Value Ref Range Status   2017 02:50 AM 1.13 0.70 - 1.30 MG/DL Final   2017 08:30 AM 1.12 0.70 - 1.30 MG/DL Final   2012 09:10 AM 1.2 0.6 - 1.3 MG/DL Final     Current Antimicrobial Therapy     None        Diet: DIET CARDIAC Regular - Urinary Status: Voiding     Labs     Lab Results   Component Value Date/Time    WBC 6.7 07/20/2017 12:28 PM    HCT 42.4 07/20/2017 12:28 PM    PLATELET 768 38/33/4880 12:28 PM    Sodium 135 07/21/2017 02:50 AM    Potassium 4.5 07/21/2017 02:50 AM    Chloride 104 07/21/2017 02:50 AM    CO2 25 07/21/2017 02:50 AM    BUN 22 07/21/2017 02:50 AM    Creatinine 1.13 07/21/2017 02:50 AM    Glucose 102 07/21/2017 02:50 AM    Calcium 8.6 07/21/2017 02:50 AM     UA: Lab Results   Component Value Date/Time    Color YELLOW/STRAW 03/26/2017 09:00 AM    Appearance CLEAR 03/26/2017 09:00 AM    Specific gravity 1.022 03/26/2017 09:00 AM    pH (UA) 6.0 03/26/2017 09:00 AM    Protein TRACE 03/26/2017 09:00 AM    Glucose NEGATIVE  03/26/2017 09:00 AM    Ketone NEGATIVE  03/26/2017 09:00 AM    Bilirubin NEGATIVE  03/26/2017 09:00 AM    Urobilinogen 1.0 03/26/2017 09:00 AM    Nitrites NEGATIVE  03/26/2017 09:00 AM    Leukocyte Esterase NEGATIVE  03/26/2017 09:00 AM    Epithelial cells FEW 03/26/2017 09:00 AM    Bacteria NEGATIVE  03/26/2017 09:00 AM    WBC 0-4 03/26/2017 09:00 AM    RBC 0-5 03/26/2017 09:00 AM     Imaging     Results for orders placed during the hospital encounter of 08/26/16   CT HEAD POST FOSSA W WO CONT    Narrative INDICATION:  Ataxia. Previous CVA and loss of balance. EXAM: CT of the head and posterior fossa was performed before and after the  intravenous administration of 100 cc of Isovue 300. Thin section axial images  were obtained. CT dose reduction was achieved through use of a standardized  protocol tailored for this examination and automatic exposure control for dose  modulation. COMPARISON: September 25, 2008    FINDINGS: The ventricles and cisterns are of normal size and configuration. There is no evidence of acute bleed, mass or mass effect. No midline shift. The postcontrast images demonstrate no abnormal enhancement.     Specific attention to the cerebral pontine angle demonstrates no mass lesion in  this region. There is normal aeration of the middle ear cavity, mastoid air  cells and external auditory canal.    No bone destruction or fracture. Impression IMPRESSION:  1. No acute intracranial abnormality         US Results (most recent):  No results found for this or any previous visit. Cultures     All Micro Results     None           Past History: (Complete 2+/3 areas)   No Known Allergies   Current Facility-Administered Medications   Medication Dose Route Frequency    metoprolol succinate (TOPROL-XL) XL tablet 25 mg  25 mg Oral DAILY    lisinopril (PRINIVIL, ZESTRIL) tablet 5 mg  5 mg Oral DAILY    atorvastatin (LIPITOR) tablet 20 mg  20 mg Oral QHS    loperamide (IMODIUM) capsule 4 mg  4 mg Oral ONCE    Followed by    [facility] loperamide (IMODIUM) capsule 2 mg  2 mg Oral Q4H PRN    0.9% sodium chloride infusion  75 mL/hr IntraVENous CONTINUOUS    aspirin chewable tablet 81 mg  81 mg Oral DAILY    clopidogrel (PLAVIX) tablet 75 mg  75 mg Oral DAILY    sodium chloride (NS) flush 5-10 mL  5-10 mL IntraVENous Q8H    sodium chloride (NS) flush 5-10 mL  5-10 mL IntraVENous PRN    zolpidem (AMBIEN) tablet 5 mg  5 mg Oral QHS PRN    Prior to Admission medications    Medication Sig Start Date End Date Taking? Authorizing Provider   aspirin (ASPIRIN) 325 mg tablet Take 325 mg by mouth daily. Yes Historical Provider   losartan (COZAAR) 50 mg tablet Take  by mouth daily. Yes Phys Other, MD   HYDROcodone-acetaminophen (NORCO) 5-325 mg per tablet Take 1 Tab by mouth every four (4) hours as needed for Pain. Max Daily Amount: 6 Tabs. 3/26/17   Tonny Fowler MD        PMHx:  has a past medical history of Hypertension. PSurgHx:  has a past surgical history that includes abdomen surgery proc unlisted. PSocHx:  reports that he has never smoked. He does not have any smokeless tobacco history on file. He reports that he does not drink alcohol or use illicit drugs.    ROS:  (Complete - 10 systems) - positives are bolded : Weightloss (Constitutional), Dry mouth (ENMT), Chest pain (CV), SOB (Respiratory), Constipation (GI), Weakness (MS), Pallor (Skin), TIA Sx (Neuro), Confusion (Psych), Easy bruising (Heme)    Physical Exam: (Comprehesive - 8+ 1995 Systems)     (1) Constitutional:  FIO2:   on SpO2: O2 Sat (%): 95 %  O2 Device: Room air    Patient Vitals for the past 24 hrs:   BP Temp Pulse Resp SpO2   07/21/17 0704 148/78 97.5 °F (36.4 °C) (!) 57 18 -   07/21/17 0242 150/80 97.6 °F (36.4 °C) 64 16 -   07/20/17 2200 150/80 97.3 °F (36.3 °C) 64 16 95 %   07/20/17 1913 138/58 97.3 °F (36.3 °C) 82 - 96 %   07/20/17 1600 155/62 98.2 °F (36.8 °C) 81 - 95 %   07/20/17 1500 - - (!) 58 - 97 %   07/20/17 1400 - - 66 - 97 %   07/20/17 1355 - - 70 - 97 %   07/20/17 1330 (!) 201/129 - 81 - 94 %   07/20/17 1304 (!) 193/97 - 69 - 98 %   07/20/17 1230 174/84 - 65 - 98 %   07/20/17 1215 167/84 - 61 - 95 %   07/20/17 1200 (!) 153/91 - 70 - 97 %   07/20/17 1145 170/74 - 65 - 94 %   07/20/17 1130 (!) 168/98 - - - 97 %   07/20/17 1100 (!) 186/93 97.3 °F (36.3 °C) (!) 55 15 95 %         Date 07/20/17 0700 - 07/21/17 0659 07/21/17 0700 - 07/22/17 0659   Shift 3796-4140 0278-2144 24 Hour Total 1154-1435 9138-5739 24 Hour Total   I  N  T  A  K  E   Shift Total  (mL/kg)         O  U  T  P  U  T   Urine  (mL/kg/hr) 200  (0.2) 300  (0.3) 500  (0.2)         Urine Voided 200 300 500       Shift Total  (mL/kg) 200  (2.1) 300  (3.2) 500  (5.3)      NET -200 -300 -500      Weight (kg) 93.9 93.9 93.9 93.9 93.9 93.9      (2) ENMT:   moist mucous membranes   (3) Respiratory:  breathing easily   (4) GI:  no abdominal masses, tenderness, no hepatosplenomegaly, no ventral hernia, stool for occult blood not indicated as urologist.   (5) :   normal   (6) Lymphatic:  no adenopathy   (7) Muscloskeletal:  no gross deformity   (8) Skin:  no rash   (9) Neuro:  no focal deficits      Signed By: Keith Adams MD  - July 21, 2017

## 2017-07-21 NOTE — CARDIO/PULMONARY
C/P REHAB:  Chart reviewed. Admitted with unstable angina, now S/P cardiac cath with PCI/HORACIO to LCx. PMH significant for HTN. Nonsmoker. Met with patient and his daughter. Pt with wash cloth on head and emesis bag in his lab. Teaching directed to daughter but kept brief. Cardiac procedure folder given and discussed regarding risk factors for CAD, heart healthy diet, medication management and post cardiac catheterization instructions. Discussed post catheterization restrictions including no lifting, pushing or pulling more than 10 pounds, no straining and no tub soaking. Also discussed what to do if bleeding or bruising at the cath insertion site is observed. Reviewed the importance of medication compliance, monitoring for any unusual signs & symptoms and when to call the doctor. [facility] diiet and benefits of exercise and outpatient cardiac rehab. Pt's daughter indicated understanding. Due to patient's current condition, will return for follow up teaching and support.

## 2017-07-22 NOTE — OP NOTES
[address]   [place], [address]   OP NOTE       Name:  Jacqui Coats   MR#:  [id number]   :  1928   Account #:  [de-identified]    Surgery Date:  2017   Date of Adm:  2017       PREOPERATIVE DIAGNOSES:    1. Unstable  angina pectoris. 2. High-risk abnormal stress test.     POSTOPERATIVE DIAGNOSES:    1. Unstable angina pectoris. 2. High-risk abnormal stress test.     PROCEDURES PERFORMED:    1. Percutaneous coronary intervention with stenting of the circumflex   marginal artery for chronic total occlusion. 2. Diagnostic angiography was performed by Dr. Laura Valera and reported   separately. PRIMARY CARDIOLOGIST: Dr. Laura Valera. PRIMARY CARE PHYSICIAN: Dr. Ghazal Medrano. ESTIMATED BLOOD LOSS: 10 mL. SPECIMENS REMOVED: None. ANESTHESIA:  Sedation with Versed and fentanyl administered   percutaneous supervision. Please see the catheterization log for   specific doses and timing. TECHNIQUE: The sheath was replaced with a 25 cm 6-Tristanian sheath. A 6-Tristanian  JL4   guide was engaged in the left coronary artery. I initially   tried to wire the vessel using a Choice PT extra support wire, but I did not have   catheter backup with this and we exchanged the system for an EBU   4.0 catheter. I was able to cross the stenosis using a Fielder XT wire. Once across the   marginal branch, this went easily down distally to the vessel. We then attempted to dilate the vessel using first a 1.5 balloon, which I   could not advance, and then we switched out to a 1.20 balloon. We   were able to do some dilatation with this balloon. We then went back   with a 2.0 balloon and did inflation in the proximal circumflex artery. After several inflations with this balloon, angiography showed   dissection and perforation of the proximal circumflex artery. We   readvanced the balloon.  I was able to advance the balloon distally this   time, and we exchanged the wire for a Prowater wire. We then inflated   the balloon at the site of perforation and used this to seal the   dissection. We did a couple of inflations, including prolonged inflation   up to 5 minutes. The patient remained hemodynamically stable   throughout this. CT angiograms were performed, and there was no   evidence of any further leakage, so we continued forward. The vessel was prepared and then stented using Synergy drug-eluting   stents. Two Synergy drug-eluting stents were deployed in the   beginning of the very proximal circumflex and extending into the   marginal branch midway down. The proximal stent was 2.75 diameter   and this tapered into a 2.25 diameter stent to the marginal. On repeat   angiography, there was evidence of distal dissection. Therefore, we   passed a third Synergy stent distally overlapping with the stent in the   marginal branch. We used a guideliner catheter to help in delivery of all   these stents. The third stent was deployed. Repeat angiogram was   performed. There was good flow down the vessel. There was some   dissection present in the very distal small branches, which was not   suitable for any further intervention. Repeat angiograms were   performed in several projections. Intravenous heparin was utilized for the procedure and continually   monitored to maintain at therapeutic levels. This was discontinued on   completion of the procedure. The sheath was removed, and hemostasis was obtained with   placement of an Angio-Seal closure device. COMPLICATIONS: None. HEMODYNAMICS: As recorded. DESCRIPTION: The circumflex marginal was a chronic total occlusion. There was   100% occlusion of the vessel with ANDERSON grade 0 flow. By angioplasty   and stenting with a series of 3 Synergy drug-eluting stents, beginning   in the proximal circumflex, extending down through the marginal   branch, there was 0% residual narrowing. Distal flow was ANDERSON grade 3. Note: There was dissection in distal terminal branches evident on final   angiography, which was not suitable for further intervention due to the   small size.         Cecilia Sanches MD      [facility] / [facility]   D:  07/21/2017   23:19   T:  07/22/2017   00:48   Job #:  [id number]

## 2017-07-22 NOTE — PROGRESS NOTES
Visited by Pk Teresa Partner on 7/21/17    Freada Kawasaki, MPS, [facility], [address]     Paging Service  [phone])

## 2017-07-22 NOTE — CARDIO/PULMONARY
C/P REHAB:  Chart reviewed. Admitted with unstable angina, now S/P cardiac cath with PCI/HORACIO to LCx. PMH significant for HTN. Nonsmoker. Met with patient and his wife. Cardiac teaching folder at bedside. Discussed risk factors for CAD, heart healthy diet, medication management and post cardiac catheterization instructions. Discussed post catheterization restrictions including no lifting, pushing or pulling more than 10 pounds, no straining and no tub soaking. Also discussed what to do if bleeding or bruising at the cath insertion site is observed. Reviewed the importance of medication compliance, monitoring for any unusual signs & symptoms and when to call the doctor. Reviewed specifics of a HH diet and benefits of exercise as well outpatient cardiac rehab. Pt uses a recumbent bike daily for 30 minutes. He was given contact info for outpatient cardiac rehab if he decides to participate. Pt had no questions and indicated understanding.

## 2017-07-22 NOTE — PROGRESS NOTES
Cardiology Progress Note  2017     Admit Date: 2017  Admit Diagnosis: cath lab  Unstable angina [facility])  Unstable angina (HCC)  CC: none currently  Cardiac Assessment/Plan:   CAD: HORACIO to circ; poor distal flow: Med Rx only; needs to stay on asa/plavix. HTN: Increased: adding acei/cont low dose bblocker: apparently has an intolerance to toprol; tolerating coreg well. Rhythm: sinus    Volume status:euvolemic  Renal function: stable    Lipids: LDL too high; added lipitor 20. Hematuria: new issue; no clots; needs to stay on asa/plavix; appreciate urology's input. Neg U/A; Cont IVF for now. Diarrhea: loose stool; not suggestive of C diff:  Imodium x1; resolved. Dispo: stable for d/c from cardiac standpoint; issue is hematuria. For other plans, see orders. Hospital problem list   Active Hospital Problems    Diagnosis Date Noted    Unstable angina ([facility] [address] 2017        Subjective: David Blizzard reports N/dry heaves resolved. Cont hematuria; Loose stool (not watery) have resolved.    Chest pain X none  consistent with:  Non-cardiac CP         Atypical CP     None now  On going  Anginal CP     Dyspnea X none  at rest  with exertion         improved  unchanged  worse              PND X none  overnight       Orthopnea X none  improved  unchanged  worse   Presyncope X none  improved  unchanged  worse     Ambulated in hallway without symptoms   Yes   Ambulated in room without symptoms  Yes   Objective:    Physical Exam:  Overall VSSAF;    Visit Vitals    /62    Pulse 67    Temp 97.6 °F (36.4 °C)    Resp 17    Ht 5' 10\" (1.778 m)    Wt 93.9 kg (207 lb)    SpO2 96%    BMI 29.7 kg/m2     Temp (24hrs), Av.1 °F (36.7 °C), Min:97.4 °F (36.3 °C), Max:99 °F (37.2 °C)    Patient Vitals for the past 8 hrs:   Pulse   17 0714 67   17 0322 72    Patient Vitals for the past 8 hrs:   Resp   17 0714 17   17 0322 16    Patient Vitals for the past 8 hrs:   BP   07/22/17 0714 138/62   07/22/17 0322 140/50          Intake/Output Summary (Last 24 hours) at 07/22/17 1005  Last data filed at 07/22/17 0331   Gross per 24 hour   Intake             1785 ml   Output             1125 ml   Net              660 ml     General Appearance: Well developed, well nourished, no acute distress. Ears/Nose/Mouth/Throat:   Normal MM; anicteric. JVP: WNL   Resp:   clear to auscultation bilaterally. Nl resp effort. Cardiovascular:  RRR, S1, S2 normal, no new murmur. No gallop or rub. Abdomen:   Soft, non-tender, bowel sounds are present. Extremities: No edema bilaterally. Skin:  Neuro: Warm and dry. A/O x3, grossly nonfocal   cath site intact w/o hematoma or new bruit; distal pulse unchanged x Yes   Data Review:     Telemetry independently reviewed x sinus  chronic afib  parox afib  NSVT     ECG independently reviewed  NSR  afib  no significant changes  NSST-Tw chgs   x no new ECG provided for review   Lab results reviewed as noted below. Current medications reviewed as noted below. No results for input(s): PH, PCO2, PO2 in the last 72 hours. No results for input(s): CPK, CKMB, CKNDX, TROIQ in the last 72 hours. Recent Labs      07/21/17   0250  07/20/17   1228   NA  135*   --    K  4.5   --    CL  104   --    CO2  25   --    BUN  22*   --    CREA  1.13   --    GLU  102*   --    CA  8.6   --    WBC   --   6.7   HGB   --   14.2   HCT   --   42.4   PLT   --   204     Lab Results   Component Value Date/Time    Cholesterol, total 178 07/20/2017 12:28 PM    HDL Cholesterol 48 07/20/2017 12:28 PM    LDL, calculated 117.8 07/20/2017 12:28 PM    Triglyceride 61 07/20/2017 12:28 PM    CHOL/HDL Ratio 3.7 07/20/2017 12:28 PM     No results for input(s): SGOT, GPT, AP, TBIL, TP, ALB, GLOB, GGT, AML, LPSE in the last 72 hours. No lab exists for component: AMYP, HLPSE  No results for input(s): INR, PTP, APTT in the last 72 hours.     No lab exists for component: INREXT, INREXT   No components found for: GLPOC    Current Facility-Administered Medications   Medication Dose Route Frequency    lisinopril (PRINIVIL, ZESTRIL) tablet 5 mg  5 mg Oral DAILY    atorvastatin (LIPITOR) tablet 20 mg  20 mg Oral QHS    loperamide (IMODIUM) capsule 2 mg  2 mg Oral Q4H PRN    0.9% sodium chloride infusion  75 mL/hr IntraVENous CONTINUOUS    carvedilol (COREG) tablet 3.125 mg  3.125 mg Oral BID WITH MEALS    ondansetron (ZOFRAN) injection 4 mg  4 mg IntraVENous Q4H PRN    opium-belladonna (B&O 15-A) 16.2-30 mg suppository 1 Suppository  1 Suppository Rectal Q6H PRN    oxybutynin chloride XL (DITROPAN XL) tablet 15 mg  15 mg Oral DAILY PRN    aspirin chewable tablet 81 mg  81 mg Oral DAILY    clopidogrel (PLAVIX) tablet 75 mg  75 mg Oral DAILY    sodium chloride (NS) flush 5-10 mL  5-10 mL IntraVENous Q8H    sodium chloride (NS) flush 5-10 mL  5-10 mL IntraVENous PRN    zolpidem (AMBIEN) tablet 5 mg  5 mg Oral QHS PRN        Prmaod Ham MD

## 2017-07-22 NOTE — PROGRESS NOTES
The patient's Rosales catheter is draining bloody urine, there's no blood clots. The catheter was irrigated earlier during the shift and there's no blood clots seen. Update  0336  Patient's Rosales catheter's irrigated with sterile water 60 ml. No blood clots seen but the urine is still bloody, good urine output net 375ml. Gown and pads changed.

## 2017-07-22 NOTE — PROGRESS NOTES
Denies bladder pain. Urine remains bloody. Continue hand irrigation prn. Will need outpatient workup.

## 2017-07-23 NOTE — PROGRESS NOTES
Three way Rosales was inserted by the urologist. The patient is now on continuous bladder irrigation. The patient reported \"relief\" from previous discomfort he was having. He reports \"no pain\". The patient was assisted to the chair. Gown and linens changed. Urine output in doc flow sheet.

## 2017-07-23 NOTE — PROGRESS NOTES
Cardiology Progress Note  2017     Admit Date: 2017  Admit Diagnosis: cath lab  Unstable angina [facility])  Unstable angina (HCC)  CC: none currently  Cardiac Assessment/Plan:   CAD: HORACIO to circ; poor distal flow: Med Rx only; needs to stay on asa/plavix. HTN: Stable: added acei/cont low dose bblocker: apparently has an intolerance to toprol; tolerating coreg well. Rhythm: sinus    Volume status:euvolemic  Renal function: stable    Lipids: LDL too high; added lipitor 20. Hematuria: new issue; no clots; needs to stay on asa/plavix; appreciate urology's input. Neg U/A; Cont IVF for now. Diarrhea: loose stool; not suggestive of C diff:  Imodium x1; resolved. Dispo: stable for d/c from cardiac standpoint; issue is hematuria: will d/w urology: ?d/c with or w/o wick. For other plans, see orders. Hospital problem list   Active Hospital Problems    Diagnosis Date Noted    Unstable angina ([facility] [address] 2017        Subjective: Clair Fossa reports N/dry heaves resolved. Cont hematuria; Loose stool (not watery) have resolved.  C/O discomfort from wick   Chest pain X none  consistent with:  Non-cardiac CP         Atypical CP     None now  On going  Anginal CP     Dyspnea X none  at rest  with exertion         improved  unchanged  worse              PND X none  overnight       Orthopnea X none  improved  unchanged  worse   Presyncope X none  improved  unchanged  worse     Ambulated in hallway without symptoms   Yes   Ambulated in room without symptoms  Yes   Objective:    Physical Exam:  Overall VSSAF;    Visit Vitals    /60    Pulse 60    Temp 97.7 °F (36.5 °C)    Resp 18    Ht 5' 10\" (1.778 m)    Wt 93.9 kg (207 lb)    SpO2 95%    BMI 29.7 kg/m2     Temp (24hrs), Av °F (36.7 °C), Min:97.7 °F (36.5 °C), Max:98.3 °F (36.8 °C)    Patient Vitals for the past 8 hrs:   Pulse   17 0700 60   17 0413 60    Patient Vitals for the past 8 hrs:   Resp 07/23/17 0700 18   07/23/17 0413 18    Patient Vitals for the past 8 hrs:   BP   07/23/17 0700 132/60   07/23/17 0413 144/58          Intake/Output Summary (Last 24 hours) at 07/23/17 0955  Last data filed at 07/23/17 0414   Gross per 24 hour   Intake          2393.75 ml   Output             1600 ml   Net           793.75 ml     General Appearance: Well developed, well nourished, no acute distress. Ears/Nose/Mouth/Throat:   Normal MM; anicteric. JVP: WNL   Resp:   clear to auscultation bilaterally. Nl resp effort. Cardiovascular:  RRR, S1, S2 normal, no new murmur. No gallop or rub. Abdomen:   Soft, non-tender, bowel sounds are present. Extremities: No edema bilaterally. Skin:  Neuro: Warm and dry. A/O x3, grossly nonfocal   cath site intact w/o hematoma or new bruit; distal pulse unchanged x Yes   Data Review:     Telemetry independently reviewed x sinus  chronic afib  parox afib  NSVT     ECG independently reviewed  NSR  afib  no significant changes  NSST-Tw chgs   x no new ECG provided for review   Lab results reviewed as noted below. Current medications reviewed as noted below. No results for input(s): PH, PCO2, PO2 in the last 72 hours. No results for input(s): CPK, CKMB, CKNDX, TROIQ in the last 72 hours. Recent Labs      07/21/17   0250  07/20/17   1228   NA  135*   --    K  4.5   --    CL  104   --    CO2  25   --    BUN  22*   --    CREA  1.13   --    GLU  102*   --    CA  8.6   --    WBC   --   6.7   HGB   --   14.2   HCT   --   42.4   PLT   --   204     Lab Results   Component Value Date/Time    Cholesterol, total 178 07/20/2017 12:28 PM    HDL Cholesterol 48 07/20/2017 12:28 PM    LDL, calculated 117.8 07/20/2017 12:28 PM    Triglyceride 61 07/20/2017 12:28 PM    CHOL/HDL Ratio 3.7 07/20/2017 12:28 PM     No results for input(s): SGOT, GPT, AP, TBIL, TP, ALB, GLOB, GGT, AML, LPSE in the last 72 hours.     No lab exists for component: AMYP, HLPSE  No results for input(s): INR, PTP, APTT in the last 72 hours.     No lab exists for component: INREXT, INREXT   No components found for: GLPOC    Current Facility-Administered Medications   Medication Dose Route Frequency    lisinopril (PRINIVIL, ZESTRIL) tablet 5 mg  5 mg Oral DAILY    atorvastatin (LIPITOR) tablet 20 mg  20 mg Oral QHS    loperamide (IMODIUM) capsule 2 mg  2 mg Oral Q4H PRN    0.9% sodium chloride infusion  75 mL/hr IntraVENous CONTINUOUS    carvedilol (COREG) tablet 3.125 mg  3.125 mg Oral BID WITH MEALS    ondansetron (ZOFRAN) injection 4 mg  4 mg IntraVENous Q4H PRN    opium-belladonna (B&O 15-A) 16.2-30 mg suppository 1 Suppository  1 Suppository Rectal Q6H PRN    oxybutynin chloride XL (DITROPAN XL) tablet 15 mg  15 mg Oral DAILY PRN    aspirin chewable tablet 81 mg  81 mg Oral DAILY    clopidogrel (PLAVIX) tablet 75 mg  75 mg Oral DAILY    sodium chloride (NS) flush 5-10 mL  5-10 mL IntraVENous Q8H    sodium chloride (NS) flush 5-10 mL  5-10 mL IntraVENous PRN    zolpidem (AMBIEN) tablet 5 mg  5 mg Oral QHS PRN        Zbigniew Bae MD

## 2017-07-23 NOTE — PROGRESS NOTES
Irrigated Rosales catheter, no blood clots. Urine output is now pink tinged. Rosales catheter care done.

## 2017-07-24 NOTE — PROGRESS NOTES
Bedside and Verbal shift change report given to Katarzyna Vasquez (oncoming nurse) by Aldo Meyer (offgoing nurse). Report included the following information SBAR, Kardex, Intake/Output, MAR, Accordion and Recent Results.

## 2017-07-24 NOTE — PROGRESS NOTES
Cardiology Progress Note  2017     Admit Date: 2017  Admit Diagnosis: cath lab  Unstable angina [facility])  Unstable angina (HCC)  CC: none currently  Cardiac Assessment/Plan:   CAD: HORACIO to circ; poor distal flow: Med Rx only; needs to stay on asa/plavix. HTN: Stable: added acei/cont low dose bblocker: apparently has an intolerance to toprol; tolerating coreg well. Rhythm: sinus    Volume status:euvolemic  Renal function: stable    Lipids: LDL too high; added lipitor 20. Hematuria: new issue after admit; needs to stay on asa/plavix; appreciate urology's input. Neg U/A; Now with cont bladder irrigation. Diarrhea: loose stool; not suggestive of C diff:  Imodium x1; resolved. Dispo: stable for d/c from cardiac standpoint; issue is hematuria: ?d/c with or w/o wick. For other plans, see orders. Hospital problem list   Active Hospital Problems    Diagnosis Date Noted    Unstable angina ([facility] [address] 2017        Subjective: Erik Laming reports N/dry heaves resolved. Cont hematuria; Loose stool (not watery) have resolved.  C/O less discomfort from wick   Chest pain X none  consistent with:  Non-cardiac CP         Atypical CP     None now  On going  Anginal CP     Dyspnea X none  at rest  with exertion         improved  unchanged  worse              PND X none  overnight       Orthopnea X none  improved  unchanged  worse   Presyncope X none  improved  unchanged  worse     Ambulated in hallway without symptoms   Yes   Ambulated in room without symptoms  Yes   Objective:    Physical Exam:  Overall VSSAF;    Visit Vitals    /50    Pulse 63    Temp 98.4 °F (36.9 °C)    Resp 14    Ht 5' 10\" (1.778 m)    Wt 93.9 kg (207 lb)    SpO2 93%    BMI 29.7 kg/m2     Temp (24hrs), Av.4 °F (36.9 °C), Min:97.8 °F (36.6 °C), Max:99.1 °F (37.3 °C)    Patient Vitals for the past 8 hrs:   Pulse   17 0648 63   17 0255 75    Patient Vitals for the past 8 hrs:   Resp 07/24/17 0648 14   07/24/17 0255 20    Patient Vitals for the past 8 hrs:   BP   07/24/17 0648 122/50   07/24/17 0255 118/54          Intake/Output Summary (Last 24 hours) at 07/24/17 0838  Last data filed at 07/24/17 0759   Gross per 24 hour   Intake            58688 ml   Output            92183 ml   Net             2360 ml     General Appearance: Well developed, well nourished, no acute distress. Ears/Nose/Mouth/Throat:   Normal MM; anicteric. JVP: WNL   Resp:   clear to auscultation bilaterally. Nl resp effort. Cardiovascular:  RRR, S1, S2 normal, no new murmur. No gallop or rub. Abdomen:   Soft, non-tender, bowel sounds are present. Extremities: No edema bilaterally. Skin:  Neuro: Warm and dry. A/O x3, grossly nonfocal   cath site intact w/o hematoma or new bruit; distal pulse unchanged x Yes   Data Review:     Telemetry independently reviewed x sinus  chronic afib  parox afib  NSVT     ECG independently reviewed  NSR  afib  no significant changes  NSST-Tw chgs   x no new ECG provided for review   Lab results reviewed as noted below. Current medications reviewed as noted below. No results for input(s): PH, PCO2, PO2 in the last 72 hours. No results for input(s): CPK, CKMB, CKNDX, TROIQ in the last 72 hours. No results for input(s): NA, K, CL, CO2, BUN, CREA, GLU, PHOS, CA, ALB, WBC, HGB, HCT, PLT, HGBEXT, HCTEXT, PLTEXT, HGBEXT, HCTEXT, PLTEXT in the last 72 hours. Lab Results   Component Value Date/Time    Cholesterol, total 178 07/20/2017 12:28 PM    HDL Cholesterol 48 07/20/2017 12:28 PM    LDL, calculated 117.8 07/20/2017 12:28 PM    Triglyceride 61 07/20/2017 12:28 PM    CHOL/HDL Ratio 3.7 07/20/2017 12:28 PM     No results for input(s): SGOT, GPT, AP, TBIL, TP, ALB, GLOB, GGT, AML, LPSE in the last 72 hours. No lab exists for component: AMYP, HLPSE  No results for input(s): INR, PTP, APTT in the last 72 hours.     No lab exists for component: INREXT, INREXT   No components found for:  [place]    Current Facility-Administered Medications   Medication Dose Route Frequency    phenazopyridine (PYRIDIUM) tablet 200 mg  200 mg Oral TIDPC    lidocaine (XYLOCAINE) 2 % jelly   Mucous Membrane PRN    morphine injection 2 mg  2 mg IntraVENous Q4H PRN    finasteride (PROSCAR) tablet 5 mg  5 mg Oral DAILY    lisinopril (PRINIVIL, ZESTRIL) tablet 5 mg  5 mg Oral DAILY    atorvastatin (LIPITOR) tablet 20 mg  20 mg Oral QHS    loperamide (IMODIUM) capsule 2 mg  2 mg Oral Q4H PRN    carvedilol (COREG) tablet 3.125 mg  3.125 mg Oral BID WITH MEALS    ondansetron (ZOFRAN) injection 4 mg  4 mg IntraVENous Q4H PRN    opium-belladonna (B&O 15-A) 16.2-30 mg suppository 1 Suppository  1 Suppository Rectal Q6H PRN    oxybutynin chloride XL (DITROPAN XL) tablet 15 mg  15 mg Oral DAILY PRN    aspirin chewable tablet 81 mg  81 mg Oral DAILY    clopidogrel (PLAVIX) tablet 75 mg  75 mg Oral DAILY    sodium chloride (NS) flush 5-10 mL  5-10 mL IntraVENous Q8H    sodium chloride (NS) flush 5-10 mL  5-10 mL IntraVENous PRN    zolpidem (AMBIEN) tablet 5 mg  5 mg Oral QHS PRN        Virgen Bear MD

## 2017-07-24 NOTE — PROGRESS NOTES
16F catheter clotted off  Placed 26F 3W and irrigated to clear  Now clear on CBI  Dr. Indio Tyson to follow up in am.  Start Finasteride.

## 2017-07-24 NOTE — PROGRESS NOTES
The patient is still on continuous bladder irrigation, urine output is clear, no clots seen. 1953  Paged on call MD Dr. Kelley Hurtado for the patient's complaints of \"heartburn\". Orders given.

## 2017-07-24 NOTE — CARDIO/PULMONARY
C/P REHAB:  Chart reviewed.    Admitted with unstable angina, now S/P cardiac cath with PCI/HORACIO to LCx.    PMH significant for HTN. Nonsmoker. Met with patient and his wife to reinforce previous cardiac teaching including post cath/stent instructions, medication compliance, HH diet and benefits of exercise/cardiac rehab. Pt had no questions and indicated understanding.

## 2017-07-25 NOTE — PROGRESS NOTES
Bedside and Verbal shift change report given to [facility] (oncoming nurse) by Marshall Jarvis (offgoing nurse). Report included the following information SBAR, Kardex, Intake/Output, MAR, Accordion and Recent Results.

## 2017-07-25 NOTE — DISCHARGE SUMMARY
Cardiology Discharge Summary     Patient ID: Sachin Araujo  [id number]  57 y.o.  7/22/1928    Admit Date: 7/20/2017  Discharge Date: 7/25/2017   Admitting Physician: Dr Lisa Iglesias   Discharge Physician: Connie Cabrera MD    Admission Diagnoses:USAP    Discharge Diagnoses:   USAP  CAD  HTN  Dyslipidemia  Gross hematuria. Cardiology Procedures this Admission:  Left heart catheterization with PCI    Hospital Course:  CAD: HORACIO to circ; poor distal flow: Med Rx only; needs to stay on asa/plavix.     HTN: Stable: added acei/cont low dose bblocker: apparently has an intolerance to toprol; tolerating coreg well.     Rhythm: sinus     Volume status:euvolemic  Renal function: stable     Lipids: LDL too high; added lipitor 20.     Hematuria: new issue after admit; needs to stay on asa/plavix; appreciate urology's input. Neg U/A; Rx with cont bladder irrigation; now clear: wick removed prior to discharge. Plan: cont proscar and prn ditropan: f/u with urology as outpt.     Diarrhea: loose stool; not suggestive of C diff:  Imodium x1; resolved. Lab Results   Component Value Date/Time    Cholesterol, total 178 07/20/2017 12:28 PM    HDL Cholesterol 48 07/20/2017 12:28 PM    LDL, calculated 117.8 07/20/2017 12:28 PM    Triglyceride 61 07/20/2017 12:28 PM       Patient was ambulating without symptoms prior to d/c. Consults: Urology  Disposition: home  Discharge Condition: Stable  Patient Instructions:   Current Discharge Medication List      START taking these medications    Details   aspirin 81 mg chewable tablet Take 1 Tab by mouth daily. Qty: 30 Tab, Refills: 12      atorvastatin (LIPITOR) 20 mg tablet Take 1 Tab by mouth nightly. Qty: 30 Tab, Refills: 12      carvedilol (COREG) 3.125 mg tablet Take 1 Tab by mouth two (2) times daily (with meals). Qty: 60 Tab, Refills: 12      clopidogrel (PLAVIX) 75 mg tab Take 1 Tab by mouth daily.   Qty: 30 Tab, Refills: 12      finasteride (PROSCAR) 5 mg tablet Take 1 Tab by mouth daily.  Qty: 30 Tab, Refills: 6      oxybutynin chloride XL (DITROPAN XL) 15 mg CR tablet Take 1 Tab by mouth daily as needed (bladder spasms, SP pain - if has catheter assure it is draining first). Qty: 30 Tab, Refills: 0         CONTINUE these medications which have CHANGED    Details   losartan (COZAAR) 50 mg tablet Take 1 Tab by mouth daily. Qty: 30 Tab, Refills: 12         CONTINUE these medications which have NOT CHANGED    Details   HYDROcodone-acetaminophen (NORCO) 5-325 mg per tablet Take 1 Tab by mouth every four (4) hours as needed for Pain. Max Daily Amount: 6 Tabs. Qty: 15 Tab, Refills: 0         STOP taking these medications       aspirin (ASPIRIN) 325 mg tablet Comments:   Reason for Stopping:               Referenced discharge instructions provided by nursing for diet and activity.   Follow-up:   Follow-up Information     Follow up With Details Comments Contact Info    Remy Morales MD Schedule an appointment as soon as possible for a visit in 3 weeks  [address]  [id number]  [address] MD Steven Ayala [address] [place] [address]  [phone]      Franko James MD  as they direct [address] [place] [address]  [address] [phone]              > 30 minutes coordinating discharge x Yes     Signed:  Remy Morales MD  7/25/2017  11:09 AM

## 2017-07-25 NOTE — PROGRESS NOTES
CBI discontinued this morning however his urine is dark/borwn. This would be consistent with no active bleeding but some breakdown of old blood. Will remove cath for outpatient followup. -  followup added to e-discharge materials. [id number] Ami Camargo for discharge for  perspective anytime.

## 2017-07-25 NOTE — DISCHARGE INSTRUCTIONS
[address]    [phone]  [place], [place] [address]    [email or web address]    Patient Discharge Instructions    Clair Norman / [id number] : 1928    Admitted 2017 Discharged 2017     · It is important that you take the medication exactly as they are prescribed. · Keep your medication in the bottles provided by the pharmacist and keep a list of the medication names, dosages, and times to be taken in your wallet. · Do not take other medications without consulting your doctor. BRING ALL OF YOUR MEDICINES TO YOUR OFFICE VISIT with Dr. Elvia Yao. Cardiac Catheterization  Discharge Instructions     Do not drive, operate any machinery, or sign any legal documents for 24 hours after your procedure. You must have someone to drive you home.  You may take a shower 24 hours after your cardiac catheterization. Be sure to get the dressing wet and then remove it; gently wash the area with warm soapy water. Pat dry and leave open to air. To help prevent infections, be sure to keep the cath site clean and dry. No lotions, creams, powders, ointments, etc. in the cath site for approximately 1 week.  Do not take a tub bath, get in a hot tub or swimming pool for approximately 5 days or until the cath site is completely healed.  No strenuous activity or heavy lifting over 10 lbs. for 7 days.  Drink plenty of fluids for 24-48 hours after your cath to flush the contrast dye from your kidneys. No alcoholic beverages for 24 hours. You may resume your previous diet (low fat, low cholesterol) after your cath.  After your cath, some bruising or discomfort is common during the healing process. Tylenol, 1-2 tablets every 6 hours as needed, is recommended if you experience any discomfort.   If you experience any signs or symptoms of infection such as fever, chills, or poorly healing incision, persistent tenderness or swelling in the groin, redness and/or warmth to the touch, numbness, significant tingling or pain at the groin site or affected extremity, rash, drainage from the cath site, or if the leg feels tight or swollen, call your physician right away.  If bleeding at the cath site occurs, take a clean gauze pad and apply direct pressure to the groin just above the puncture site. Call 911 immediately, and continue to apply direct pressure until an ambulance gets to your location. If Plavix, or Brilinta, or Effient is prescribed with your aspirin, you must take them to prevent your stent from clotting. You will likely need them for at least 1 to 2 years. Follow-up:   Follow-up Information     Follow up With Details Comments Fiorella Hobson MD Schedule an appointment as soon as possible for a visit in 3 weeks  [address]  [id number]  [address], [address] [place] [address]  [phone]      Juan J Saldana MD  as they direct [address] [place] [address]  [phone]            Information obtained by :  I understand that if any problems occur once I am at home I am to contact my physician. I understand and acknowledge receipt of the instructions indicated above. R.N.'s Signature                                                                  Date/Time                                                                                                                                              Patient or Representative Signature                                                          Date/Time      Cecillia Bumpers, MD      [address]    [phone]  [address]    [email or web address]

## 2017-07-25 NOTE — PROGRESS NOTES
Cardiology Progress Note  2017     Admit Date: 2017  Admit Diagnosis: cath lab  Unstable angina [facility])  Unstable angina (HCC)  CC: none currently  Cardiac Assessment/Plan:   CAD: HORACIO to circ; poor distal flow: Med Rx only; needs to stay on asa/plavix. HTN: Stable: added acei/cont low dose bblocker: apparently has an intolerance to toprol; tolerating coreg well. Rhythm: sinus    Volume status:euvolemic  Renal function: stable    Lipids: LDL too high; added lipitor 20. Hematuria: new issue after admit; needs to stay on asa/plavix; appreciate urology's input. Neg U/A; Now with cont bladder irrigation. Diarrhea: loose stool; not suggestive of C diff:  Imodium x1; resolved. Dispo: again, stable for d/c from cardiac standpoint; issue is hematuria: ?d/c with or w/o wick; hopefully home today. For other plans, see orders. Hospital problem list   Active Hospital Problems    Diagnosis Date Noted    Unstable angina ([facility] [address] 2017        Subjective: Samuel Colunga reports N/dry heaves resolved. Cont hematuria; Loose stool (not watery) have resolved.  C/O less discomfort from wick   Chest pain X none  consistent with:  Non-cardiac CP         Atypical CP     None now  On going  Anginal CP     Dyspnea X none  at rest  with exertion         improved  unchanged  worse              PND X none  overnight       Orthopnea X none  improved  unchanged  worse   Presyncope X none  improved  unchanged  worse     Ambulated in hallway without symptoms   Yes   Ambulated in room without symptoms  Yes   Objective:    Physical Exam:  Overall VSSAF;    Visit Vitals    /58 (BP 1 Location: Right arm, BP Patient Position: Post activity)    Pulse 64    Temp 97.6 °F (36.4 °C)    Resp 20    Ht 5' 10\" (1.778 m)    Wt 93.9 kg (207 lb)    SpO2 98%    BMI 29.7 kg/m2     Temp (24hrs), Av.9 °F (36.6 °C), Min:97.4 °F (36.3 °C), Max:98.5 °F (36.9 °C)    Patient Vitals for the past 8 hrs:   Pulse   07/25/17 0639 64   07/25/17 0302 69    Patient Vitals for the past 8 hrs:   Resp   07/25/17 0639 20   07/25/17 0302 14    Patient Vitals for the past 8 hrs:   BP   07/25/17 0639 144/58   07/25/17 0302 112/48          Intake/Output Summary (Last 24 hours) at 07/25/17 0828  Last data filed at 07/25/17 0754   Gross per 24 hour   Intake            65431 ml   Output            60014 ml   Net           -56404 ml     General Appearance: Well developed, well nourished, no acute distress. Ears/Nose/Mouth/Throat:   Normal MM; anicteric. JVP: WNL   Resp:   clear to auscultation bilaterally. Nl resp effort. Cardiovascular:  RRR, S1, S2 normal, no new murmur. No gallop or rub. Abdomen:   Soft, non-tender, bowel sounds are present. Extremities: No edema bilaterally. Skin:  Neuro: Warm and dry. A/O x3, grossly nonfocal   cath site intact w/o hematoma or new bruit; distal pulse unchanged x Yes   Data Review:     Telemetry independently reviewed x sinus  chronic afib  parox afib  NSVT     ECG independently reviewed  NSR  afib  no significant changes  NSST-Tw chgs   x no new ECG provided for review   Lab results reviewed as noted below. Current medications reviewed as noted below. No results for input(s): PH, PCO2, PO2 in the last 72 hours. No results for input(s): CPK, CKMB, CKNDX, TROIQ in the last 72 hours. No results for input(s): NA, K, CL, CO2, BUN, CREA, GLU, PHOS, CA, ALB, WBC, HGB, HCT, PLT, HGBEXT, HCTEXT, PLTEXT, HGBEXT, HCTEXT, PLTEXT in the last 72 hours. Lab Results   Component Value Date/Time    Cholesterol, total 178 07/20/2017 12:28 PM    HDL Cholesterol 48 07/20/2017 12:28 PM    LDL, calculated 117.8 07/20/2017 12:28 PM    Triglyceride 61 07/20/2017 12:28 PM    CHOL/HDL Ratio 3.7 07/20/2017 12:28 PM     No results for input(s): SGOT, GPT, AP, TBIL, TP, ALB, GLOB, GGT, AML, LPSE in the last 72 hours.     No lab exists for component: AMYP, HLPSE  No results for input(s): INR, PTP, APTT in the last 72 hours.     No lab exists for component: INREXT, INREXT   No components found for: GLPOC    Current Facility-Administered Medications   Medication Dose Route Frequency    aluminum & magnesium hydroxide-simethicone (MYLANTA II) oral suspension 15 mL  15 mL Oral QID PRN    lidocaine (XYLOCAINE) 2 % jelly   Mucous Membrane PRN    morphine injection 2 mg  2 mg IntraVENous Q4H PRN    finasteride (PROSCAR) tablet 5 mg  5 mg Oral DAILY    lisinopril (PRINIVIL, ZESTRIL) tablet 5 mg  5 mg Oral DAILY    atorvastatin (LIPITOR) tablet 20 mg  20 mg Oral QHS    loperamide (IMODIUM) capsule 2 mg  2 mg Oral Q4H PRN    carvedilol (COREG) tablet 3.125 mg  3.125 mg Oral BID WITH MEALS    ondansetron (ZOFRAN) injection 4 mg  4 mg IntraVENous Q4H PRN    opium-belladonna (B&O 15-A) 16.2-30 mg suppository 1 Suppository  1 Suppository Rectal Q6H PRN    oxybutynin chloride XL (DITROPAN XL) tablet 15 mg  15 mg Oral DAILY PRN    aspirin chewable tablet 81 mg  81 mg Oral DAILY    clopidogrel (PLAVIX) tablet 75 mg  75 mg Oral DAILY    sodium chloride (NS) flush 5-10 mL  5-10 mL IntraVENous Q8H    sodium chloride (NS) flush 5-10 mL  5-10 mL IntraVENous PRN    zolpidem (AMBIEN) tablet 5 mg  5 mg Oral QHS PRN        Sin Arreola MD

## 2017-07-25 NOTE — PROGRESS NOTES
Nutrition Services      Nutrition Screen:  Wt Readings from Last 10 Encounters:   07/20/17 93.9 kg (207 lb)   03/26/17 97.7 kg (215 lb 6.2 oz)   11/03/16 98.6 kg (217 lb 6 oz)   03/17/15 97.2 kg (214 lb 4.8 oz)   10/02/14 90.7 kg (200 lb)   05/16/13 96.9 kg (213 lb 10 oz)   02/10/13 99.5 kg (219 lb 5.7 oz)   04/02/12 96.1 kg (211 lb 13.8 oz)   03/13/11 96.8 kg (213 lb 6.5 oz)     Body mass index is 29.7 kg/(m^2). Supplements:                        _____ ordered ______  declined. __ __  Pt is nutritionally stable at this time, will rescreen in 7 days. __x__    Pt is at nutritional risk and will be rescreened in 3-5 days. __ __  Pt is at moderate or high nutritional risk, will refer to RD for assessment.        Ovidio Mendoza  Dietetic Technician, Registered

## 2017-07-26 NOTE — PROGRESS NOTES
0800: pt. hasnt voided since 0230. bladder scan= 340mL    0830: Dr. Paulino Weir gave order to insert 20g 2 way wick. 0900: Pt. And pt. Family refusing wick at this time. They want to give pt. More time to void. 1100: Dr. Larissa De office called and updated on pt. Condition. Natalia at Dr. Larissa De office said she would inform Dr. Paulino Weir    [phone]: 20g 2 way wick insterted with 1100mL brown hazy urine drained. Urine culture and urine analysis obtained and sent as well.

## 2017-07-26 NOTE — PROGRESS NOTES
Cardiology Progress Note  2017     Admit Date: 2017  Admit Diagnosis: cath lab  Unstable angina [facility])  Unstable angina (HCC)  CC: none currently  Cardiac Assessment/Plan:   CAD: HORACIO to circ; poor distal flow: Med Rx only; needs to stay on asa/plavix. HTN: Stable: added acei/cont low dose bblocker: apparently has an intolerance to toprol; tolerating coreg well. Rhythm: sinus    Volume status:euvolemic  Renal function: stable    Lipids: LDL too high; added lipitor 20. Hematuria: new issue after admit; needs to stay on asa/plavix; appreciate urology's input. Neg U/A; Rx cont bladder irrigation until bleeding resolved; Markedly poor UOP since wick out: reportedly \"zero\" on bladder scan but ? bladder palpable (obese abd); fluid bolus; will likely need wick back: management per urology. Diarrhea: loose stool; not suggestive of C diff:  Imodium x1; resolved. Dispo: pending urolgical issues. For other plans, see orders.   Hospital problem list   Active Hospital Problems    Diagnosis Date Noted    Unstable angina ([facility] [address] 2017        Subjective: Kwabena Billy reports penile discomfort   Chest pain X none  consistent with:  Non-cardiac CP         Atypical CP     None now  On going  Anginal CP     Dyspnea X none  at rest  with exertion         improved  unchanged  worse              PND X none  overnight       Orthopnea X none  improved  unchanged  worse   Presyncope X none  improved  unchanged  worse     Ambulated in hallway without symptoms   Yes   Ambulated in room without symptoms  Yes   Objective:    Physical Exam:  Overall VSSAF;    Visit Vitals    /80 (BP 1 Location: Right arm, BP Patient Position: At rest)    Pulse 85    Temp 97.5 °F (36.4 °C)    Resp 18    Ht 5' 10\" (1.778 m)    Wt 95.8 kg (211 lb 3.2 oz)    SpO2 95%    BMI 30.3 kg/m2     Temp (24hrs), Av.8 °F (36.6 °C), Min:97.4 °F (36.3 °C), Max:98.2 °F (36.8 °C)    Patient Vitals for the past 8 hrs:   Pulse   07/26/17 0652 85   07/26/17 0200 60    Patient Vitals for the past 8 hrs:   Resp   07/26/17 0652 18   07/26/17 0200 18    Patient Vitals for the past 8 hrs:   BP   07/26/17 0652 158/80   07/26/17 0200 132/58          Intake/Output Summary (Last 24 hours) at 07/26/17 0831  Last data filed at 07/25/17 1900   Gross per 24 hour   Intake               50 ml   Output             5425 ml   Net            -5375 ml     General Appearance: Well developed, well nourished, no acute distress. Ears/Nose/Mouth/Throat:   Normal MM; anicteric. JVP: WNL   Resp:   clear to auscultation bilaterally. Nl resp effort. Cardiovascular:  RRR, S1, S2 normal, no new murmur. No gallop or rub. Abdomen:   Soft, non-tender, bowel sounds are present. Extremities: No edema bilaterally. Skin:  Neuro: Warm and dry. A/O x3, grossly nonfocal   cath site intact w/o hematoma or new bruit; distal pulse unchanged x Yes   Data Review:     Telemetry independently reviewed x sinus  chronic afib  parox afib  NSVT     ECG independently reviewed  NSR  afib  no significant changes  NSST-Tw chgs   x no new ECG provided for review   Lab results reviewed as noted below. Current medications reviewed as noted below. No results for input(s): PH, PCO2, PO2 in the last 72 hours. No results for input(s): CPK, CKMB, CKNDX, TROIQ in the last 72 hours. No results for input(s): NA, K, CL, CO2, BUN, CREA, GLU, PHOS, CA, ALB, WBC, HGB, HCT, PLT, HGBEXT, HCTEXT, PLTEXT, HGBEXT, HCTEXT, PLTEXT in the last 72 hours. Lab Results   Component Value Date/Time    Cholesterol, total 178 07/20/2017 12:28 PM    HDL Cholesterol 48 07/20/2017 12:28 PM    LDL, calculated 117.8 07/20/2017 12:28 PM    Triglyceride 61 07/20/2017 12:28 PM    CHOL/HDL Ratio 3.7 07/20/2017 12:28 PM     No results for input(s): SGOT, GPT, AP, TBIL, TP, ALB, GLOB, GGT, AML, LPSE in the last 72 hours.     No lab exists for component: AMYP, HLPSE  No results for input(s): INR, PTP, APTT in the last 72 hours.     No lab exists for component: INREXT, INREXT   No components found for: GLPOC    Current Facility-Administered Medications   Medication Dose Route Frequency    0.9% sodium chloride infusion  150 mL/hr IntraVENous CONTINUOUS    sodium chloride 0.9 % bolus infusion 250 mL  250 mL IntraVENous ONCE    aluminum & magnesium hydroxide-simethicone (MYLANTA II) oral suspension 15 mL  15 mL Oral QID PRN    lidocaine (XYLOCAINE) 2 % jelly   Mucous Membrane PRN    morphine injection 2 mg  2 mg IntraVENous Q4H PRN    finasteride (PROSCAR) tablet 5 mg  5 mg Oral DAILY    lisinopril (PRINIVIL, ZESTRIL) tablet 5 mg  5 mg Oral DAILY    atorvastatin (LIPITOR) tablet 20 mg  20 mg Oral QHS    loperamide (IMODIUM) capsule 2 mg  2 mg Oral Q4H PRN    carvedilol (COREG) tablet 3.125 mg  3.125 mg Oral BID WITH MEALS    ondansetron (ZOFRAN) injection 4 mg  4 mg IntraVENous Q4H PRN    opium-belladonna (B&O 15-A) 16.2-30 mg suppository 1 Suppository  1 Suppository Rectal Q6H PRN    oxybutynin chloride XL (DITROPAN XL) tablet 15 mg  15 mg Oral DAILY PRN    aspirin chewable tablet 81 mg  81 mg Oral DAILY    clopidogrel (PLAVIX) tablet 75 mg  75 mg Oral DAILY    sodium chloride (NS) flush 5-10 mL  5-10 mL IntraVENous Q8H    sodium chloride (NS) flush 5-10 mL  5-10 mL IntraVENous PRN    zolpidem (AMBIEN) tablet 5 mg  5 mg Oral QHS PRN        Carlos Caputo MD

## 2017-07-26 NOTE — PROGRESS NOTES
ESTEFANÍA received a call from Dr Laura Valera that the patient was going home this evening. CM contacted the patient and as no agencies are open for referrals CM called the patient and informed him that she will call in the morning. The patient agreed with the plan.  Kassandra Jacobson RN #[id number]

## 2017-07-26 NOTE — PROGRESS NOTES
Problem: Mobility Impaired (Adult and Pediatric)  Goal: *Acute Goals and Plan of Care (Insert Text)  Physical Therapy Goals  Initiated 7/26/2017  1. Patient will move from supine to sit and sit to supine in bed with modified independence within 7 day(s). 2. Patient will transfer from bed to chair and chair to bed with modified independence using the least restrictive device within 7 day(s). 3. Patient will perform sit to stand with modified independence within 7 day(s). 4. Patient will ambulate with modified independence for 500 feet with the least restrictive device within 7 day(s). 5. Patient will ascend/descend 13 stairs with single handrail(s) with modified independence within 7 day(s). PHYSICAL THERAPY EVALUATION  Patient: Erik Guy (89 y.o. male)  Date: 7/26/2017  Primary Diagnosis: cath lab  Unstable angina [facility])  Unstable angina [facility])        Precautions:   Fall      ASSESSMENT :  Based on the objective data described below, the patient presents with decreased functional mobility, impaired gait and balance and decreased tolerance to activity. Prior to admission patient reported being independent without assistive device, lives with spouse in a 2 story home with bedroom/bathroom on 2nd floor. Patient is a practicing . Patient received supine in bed and agreeable to PT. Patient completed supine to sit with CGA, additional time and use of bed rails and noticed increased effort. Patient performed sit<>Stand with RW with CGA and verbal cueing for proper hand placement. Patient ambulated 250 feet with RW with CGA and noted minor SOB post gait. Patient returned to seated in chair on exit and required increased effort and use of UEs to lower self into chair. Patient eager to return home and became somewhat irritable when therapist spoke of seeing him tomorrow if he should stay overnight. Spoke with RN to address. Patient will continue to benefit from PT to progress mobility as tolerated.  Patient would benefit from HHPT and RW, if agreeable. .     Patient will benefit from skilled intervention to address the above impairments. Patients rehabilitation potential is considered to be Good  Factors which may influence rehabilitation potential include:   [ ]         None noted  [ ]         Mental ability/status  [ ]         Medical condition  [ ]         Home/family situation and support systems  [ ]         Safety awareness  [ ]         Pain tolerance/management  [ ]         Other:        PLAN :  Recommendations and Planned Interventions:  [ ]           Bed Mobility Training             [ ]    Neuromuscular Re-Education  [ ]           Transfer Training                   [ ]    Orthotic/Prosthetic Training  [ ]           Gait Training                         [ ]    Modalities  [ ]           Therapeutic Exercises           [ ]    Edema Management/Control  [ ]           Therapeutic Activities            [ ]    Patient and Family Training/Education  [ ]           Other (comment):     Frequency/Duration: Patient will be followed by physical therapy  4 times a week to address goals. Discharge Recommendations: Home Health  Further Equipment Recommendations for Discharge: rolling walker       SUBJECTIVE:   Patient stated [facility] doctor said I am discharging. My nurse said I am discharging. Why are you people saying I'll see you tomorrow? Seferino Mcconnell      OBJECTIVE DATA SUMMARY:   HISTORY:    Past Medical History:   Diagnosis Date    Hypertension       Past Surgical History:   Procedure Laterality Date    ABDOMEN SURGERY PROC UNLISTED         hernia repair     Prior Level of Function/Home Situation: independent, +, , lives with spouse in 2 story home  Personal factors and/or comorbidities impacting plan of care:      Home Situation  Home Environment: Private residence  # Steps to Enter: 5  One/Two Story Residence: Two story  # of Interior Steps: 13  Living Alone: No  Support Systems: Spouse/Significant Other/Partner  Patient Expects to be Discharged to[de-identified] Private residence  Current DME Used/Available at Home: None     EXAMINATION/PRESENTATION/DECISION MAKING:   Critical Behavior:  Neurologic State: Alert  Orientation Level: Oriented X4  Cognition: Appropriate for age attention/concentration, Follows commands     Hearing: Auditory  Auditory Impairment: None  Skin:  Intact to exposed skin  Edema:   Range Of Motion:  AROM: Within functional limits           PROM: Within functional limits           Strength:    Strength: Generally decreased, functional                    Tone & Sensation:                                  Coordination:     Vision:      Functional Mobility:  Bed Mobility:     Supine to Sit: Supervision; Adaptive equipment; Additional time        Transfers:  Sit to Stand: Contact guard assistance; Adaptive equipment; Additional time  Stand to Sit: Minimum assistance; Additional time                       Balance:   Sitting: Intact  Standing: Intact; With support  Ambulation/Gait Training:  Distance (ft): 250 Feet (ft)  Assistive Device: Gait belt;Walker, rolling  Ambulation - Level of Assistance: Contact guard assistance        Gait Abnormalities: Decreased step clearance (forward flexed trunk)        Base of Support: Narrowed     Speed/Janice: Pace decreased (<100 feet/min)  Step Length: Right shortened;Left shortened           Functional Measure:  Tinetti test:      Sitting Balance: 1  Arises: 1  Attempts to Rise: 1  Immediate Standing Balance: 1  Standing Balance: 1  Nudged: 1  Eyes Closed: 1  Turn 360 Degrees - Continuous/Discontinuous: 1  Turn 360 Degrees - Steady/Unsteady: 1  Sitting Down: 1  Balance Score: 10  Indication of Gait: 1  R Step Length/Height: 1  L Step Length/Height: 1  R Foot Clearance: 1  L Foot Clearance: 1  Step Symmetry: 1  Step Continuity: 1  Path: 1  Trunk: 0  Walking Time: 0  Gait Score: 8  Total Score: 18         Tinetti Test and G-code impairment scale:  Percentage of Impairment CH     0%    CI     1-19% CJ     20-39% CK     40-59% CL     60-79% CM     80-99% CN      100%   Tinetti  Score 0-28 28 23-27 17-22 12-16 6-11 1-5 0          Tinetti Tool Score Risk of Falls  <19 = High Fall Risk  19-24 = Moderate Fall Risk  25-28 = Low Fall Risk  Tinetti ME. Performance-Oriented Assessment of Mobility Problems in Elderly Patients. Luz 66; [id number]. (Scoring Description: PT Bulletin Feb. 10, 1993)     Older adults: Brock Marlow et al, 2009; n = 1000 [facility] elderly evaluated with ABC, KIMMY, ADL, and IADL)  · Mean KIMMY score for males aged 69-68 years = 26.21(3.40)  · Mean KIMMY score for females age 69-68 years = 25.16(4.30)  · Mean KIMMY score for males over 80 years = 23.29(6.02)  · Mean KIMMY score for females over 80 years = 17.20(8.32)            G codes: In compliance with CMSs Claims Based Outcome Reporting, the following G-code set was chosen for this patient based on their primary functional limitation being treated: The outcome measure chosen to determine the severity of the functional limitation was the Tinetti with a score of 18/28 which was correlated with the impairment scale. · Mobility - Walking and Moving Around:               - CURRENT STATUS:    CJ - 20%-39% impaired, limited or restricted               - GOAL STATUS:           CI - 1%-19% impaired, limited or restricted               - D/C STATUS:                       ---------------To be determined---------------       Based on the above components, the patient evaluation is determined to be of the following complexity level: LOW      Pain:  Pain Scale 1: Numeric (0 - 10)  Pain Intensity 1: 5  Pain Location 1: Scrotum;Groin  Pain Orientation 1: Lower  Pain Description 1: Aching  Pain Intervention(s) 1: Medication (see MAR) (B&O suppository)  Activity Tolerance:   Fair. VSS  Please refer to the flowsheet for vital signs taken during this treatment.   After treatment:   [X]         Patient left in no apparent distress sitting up in chair  [ ]         Patient left in no apparent distress in bed  [X]         Call bell left within reach  [X]         Nursing notified  [ ]         Caregiver present  [ ]         Bed alarm activated      COMMUNICATION/EDUCATION:   The patients plan of care was discussed with: Registered Nurse.  [X]         Fall prevention education was provided and the patient/caregiver indicated understanding. [X]         Patient/family have participated as able in goal setting and plan of care. [X]         Patient/family agree to work toward stated goals and plan of care. [ ]         Patient understands intent and goals of therapy, but is neutral about his/her participation. [ ]         Patient is unable to participate in goal setting and plan of care.      Thank you for this referral.  Dori Rooney, PT, DPT   Time Calculation: 13 mins

## 2017-07-26 NOTE — PROGRESS NOTES
Little UOP overnight - \"Pink\" per nursing  PVR questionable, but at least 300 recently  Reinsert 20 coude - to be maintained through discharge. Initial resistant to wick reinsert, but eventually agreed and wick placed 1100ccs  Urine minimal blood tinged, old. Home with wick  Will see for void trial in office  Contact info in e-discharge materials.

## 2017-07-26 NOTE — PROGRESS NOTES
1900 - Bedside report from [facility]. Post wick dc at 1800 - Due to void. Scrotum w redness and swelling which he states is not new. Pt resting w eyes closed, no complaints voiced or distress noted. NSR.    2200 - Assisted up to toilet for audibly moderate void of cranberry PINK colored urine in toilet with moderate of straining for large soft BM and to void (over 15 minutes), no clots noted. Appears miserable, moans almost constantly. Post void residual per bladder scan 300cc. Pt preferred to sit on commode stating this was the easiest method for him to attempt void. Pt discharged deferred by himself and family for tonight. Dr. Jose Juan Staton briefed on pt while in restroom attempting to void. 0230 - Assisted up to toilet to sit and attempt void. Pt had another soft BM and voided x 2 (same color, no clots) with post void residual of 245cc. Pt voicing groin and lower abdominal discomfort but only when asked, still moaning and groaning. Agreed to B&O suppository and Morphine 2mg IV. Encouraged good volume of PO fluids to flush bladder which he has not yet done this shift.    0300 - Pt resting comfortably at this time. 0500 - continues to rest comfortably with eyes closed. 3370 - Pt wanted to walk in hallway prior to void attempt to \"see if this may help\". Walked 200 feet with walker, did fair. Went in to restroom to attempt void. 0700 - Bedside report to RN. Unable to void, still trying. NSR.      0745 - Bladder scanner shows ample bladder but reading zero after multiple scans and no void since 200am.  Pt has not been drinking water - 240cc in 12 hours, pt ed provided. 5/10 lower abd, groin scrotal pain, B&O suppository administered. Weight is up on this pt. Pt family requesting urine culture prior to discharge.

## 2017-07-26 NOTE — PROGRESS NOTES
TRANSFER - IN REPORT:    Verbal report received from Guevara(name) on [address]  being received from [facility](unit) for routine progression of care      Report consisted of patients Situation, Background, Assessment and   Recommendations(SBAR). Information from the following report(s) SBAR, Kardex and MAR was reviewed with the receiving nurse. Opportunity for questions and clarification was provided. Assessment completed upon patients arrival to unit and care assumed. Primary Nurse Huan Barron and MELISSA Ugarte performed a dual skin assessment on this patient Impairment noted- see wound doc flow sheet  Kong score is 18    Pt arrived to unit with incision and ecchymosis to R groin. Pt has dime sized reddened area to RLE. Pt with dry, red patch to outer RLE. 1815 IV and tele removed. Discharge education completed with patient. Opportunity for questions provided. Pt stable for discharge.

## 2017-07-26 NOTE — DISCHARGE SUMMARY
Cardiology Discharge Summary     Patient ID: Stfefen Benites  [id number]  47 y.o.  7/22/1928    Admit Date: 7/20/2017  Discharge Date:  7/26/17  Admitting Physician: Dr Merlinda Ferrara   Discharge Physician: Yahaira Rivera MD    Admission Diagnoses:USAP    Discharge Diagnoses:   USAP  CAD  HTN  Dyslipidemia  Gross hematuria. Urinary retention. Cardiology Procedures this Admission:  Left heart catheterization with PCI    Hospital Course:  CAD: HORACIO to circ; poor distal flow: Med Rx only; needs to stay on asa/plavix.     HTN: Stable: added acei/cont low dose bblocker: apparently has an intolerance to toprol; tolerating coreg well.     Rhythm: sinus     Volume status:euvolemic  Renal function: stable     Lipids: LDL too high; added lipitor 20.     Hematuria: new issue after admit; needs to stay on asa/plavix; appreciate urology's input. Neg U/A; Rx with cont bladder irrigation; now clear: wick removed prior to discharge. Discharged delayed by low UOP/urinary retention after wick out: wick replaced and to remain in until removed   per urology as outpt after voiding trials. Abnl f/u U/A: levaquin started. Plan: cont proscar and prn ditropan: f/u with urology as outpt.     Diarrhea: loose stool; not suggestive of C diff:  Imodium x1; resolved. Lab Results   Component Value Date/Time    Cholesterol, total 178 07/20/2017 12:28 PM    HDL Cholesterol 48 07/20/2017 12:28 PM    LDL, calculated 117.8 07/20/2017 12:28 PM    Triglyceride 61 07/20/2017 12:28 PM       Patient was ambulating without symptoms prior to d/c. Consults: Urology  Disposition: home; Home health to be set up (felt needed per PT). Discharge Condition: Stable  Patient Instructions:   Current Discharge Medication List      START taking these medications    Details   aspirin 81 mg chewable tablet Take 1 Tab by mouth daily. Qty: 30 Tab, Refills: 12      atorvastatin (LIPITOR) 20 mg tablet Take 1 Tab by mouth nightly.   Qty: 30 Tab, Refills: 12 carvedilol (COREG) 3.125 mg tablet Take 1 Tab by mouth two (2) times daily (with meals). Qty: 60 Tab, Refills: 12      clopidogrel (PLAVIX) 75 mg tab Take 1 Tab by mouth daily. Qty: 30 Tab, Refills: 12      finasteride (PROSCAR) 5 mg tablet Take 1 Tab by mouth daily. Qty: 30 Tab, Refills: 6      oxybutynin chloride XL (DITROPAN XL) 15 mg CR tablet Take 1 Tab by mouth daily as needed (bladder spasms, SP pain - if has catheter assure it is draining first). Qty: 30 Tab, Refills: 0   Levaquin 500 every day for 6 more days. CONTINUE these medications which have CHANGED    Details   losartan (COZAAR) 50 mg tablet Take 1 Tab by mouth daily. Qty: 30 Tab, Refills: 12         CONTINUE these medications which have NOT CHANGED    Details   HYDROcodone-acetaminophen (NORCO) 5-325 mg per tablet Take 1 Tab by mouth every four (4) hours as needed for Pain. Max Daily Amount: 6 Tabs. Qty: 15 Tab, Refills: 0         STOP taking these medications       aspirin (ASPIRIN) 325 mg tablet Comments:   Reason for Stopping:               Referenced discharge instructions provided by nursing for diet and activity.   Follow-up:   Follow-up Information     Follow up With Details Comments Contact Info    Sin Arreola MD Schedule an appointment as soon as possible for a visit in 3 weeks  [address]  [id number]  [address] MD [place]   [address] [place] [phone]      Rahel Martin MD  as they direct [address] [place] [address]  [address] [phone]              > 30 minutes coordinating discharge x Yes     Signed:  Sin Arreola MD  7/26/2017  11:09 AM

## 2017-07-26 NOTE — PROGRESS NOTES
The patient is a 80year old male who was admitted for CLARKE that has been worsening and hematuria with history of HTN and CAD. He lives with his wife and has 3 daughters who live in the area and assist with transportation to doctors office, grocery store, prescriptions and other needs. His pharmacy is [facility] on [address]. Care Management Interventions  PCP Verified by CM: Yes (2 weeks ago)  Mode of Transport at Discharge: Other (see comment) (daughters by car)  Transition of Care Consult (CM Consult): Discharge Planning  Discharge Durable Medical Equipment:  (has a hurricane cane and recombent bike)  Physical Therapy Consult: Yes (pending)  Current Support Network: Lives with Spouse (2 story home with bed/bath on the second floor, was ambulating independently and caring for self, daughters assist with errands, transportation )  Confirm Follow Up Transport: Family (by car)  Plan discussed with Pt/Family/Caregiver: Yes (he plans to go home with follow up with cardiologist and pcp)   The patient is pending PT today to determine his needs at home and any DME. He is going home with a wick catheter with plan to see urology in 10 days. CM will follow for discharge planning.  Deyvi Fontenot RN #[id number]

## 2017-07-27 NOTE — PROGRESS NOTES
ESTEFANÍA received a message to call Quynh with [facility]. CM called and left a message. ESTEFANÍA called the daughter who states that she had spoken to her earlier as well. She states that her father has had 2 blood clots that had to be flushed, she had called and been instructed by the urologist office to flush. She stated that he now is having abdominal muscle spasms every 15 minutes and the bag is almost empty,  that he has drunk a lot of water today. ESTEFANÍA recommended she call the urologist office again and if can not get in touch with the doctor she can take him to the ED.  Farhan España RN #[id number]

## 2017-07-27 NOTE — PROGRESS NOTES
ESTEFANÍA sent referrals to 3 agencies who declined accepting the patient. ESTEFANÍA called [facility] and was directed to his  Sadaf Pfeiffer [phone] ext. [phone]. ESTEFANÍA left a message to call ESTEFANÍA back.  [facility] RN #[id number]

## 2017-07-27 NOTE — PROGRESS NOTES
ESTEFANÍA called and spoke with . Clarisse Patel regarding referral for home health, that [facility] could not accept him. He had no preference for new home health so CM sent referral to [facility]. He will get a call later today to arrange a home visit tomorrow. The patient states his daughter empted the wick bag this morning.  Alaina RN #[id number]

## 2017-07-27 NOTE — ED TRIAGE NOTES
Pt had cardiac stents placed 3 days ago along with a new wick catheter placed. Pt reports that they had problems with his wick clotting off while in the hospital. Pt reports that it is clotted off and has had very little output today. Pt reports having bladder pain. Pt unsure of last time having normal output in catheter. Pt states he has been drinking a ton of fluids. Pt has hx of enlarged prostate.

## 2017-07-28 PROBLEM — S37.20XA BLADDER INJURY: Status: ACTIVE | Noted: 2017-01-01

## 2017-07-28 NOTE — PERIOP NOTES
Handoff Report from Operating Room to PACU    Report received from CHERYL Ornelas CRNA regarding Samuel Colunga. Surgeon(s):  Megan Koroma MD  And Procedure(s) (LRB):  CYSTOSCOPY/ EXPLORATORY LAPAROTOMY WITH BLADDER STONE RETRIEVAL AND REPAIR OF BLADDER PERFORATION (N/A)  confirmed   with allergies, drains and dressings discussed. Anesthesia type, drugs, patient history, complications, estimated blood loss, vital signs, intake and output, and last pain medication, lines, reversal medications and temperature were reviewed.

## 2017-07-28 NOTE — ED PROVIDER NOTES
HPI Comments: Jitendra Garduno is a 80 y.o. male s/p hospital admission 7/20/2017 at which time he had cardiac catheterization performed with stent placement and PMHx of HTN / prostate enlargement who presents via EMS to the ED c/o progressively worsening hematuria and clotting off of his wick catheter that was changed prior to his cardiac catheterization. He reports issues with his catheter clotting off while in the hospital. Pt states that he has had very little urine output despite drinking lots of fluids. Pt's daughter states that he was released from the hospital yesterday. Per daughter, pt's symptoms had improved yesterday and notes that his urine was an edwin color and flowing well but she reports that his symptoms became progressively worse today. Per daughter, pt's urologist started him on ABX today after finding bacteria in his urine. Pt specifically denies nausea, vomiting, fever, chills, CP, or SOB. Social hx: - Tobacco use, - EtOH use, - Illicit drug use    PCP: Devika Jalloh MD    There are no other complaints, changes or physical findings at this time. The history is provided by the patient and a relative. No  was used. Past Medical History:   Diagnosis Date    Hypertension     Prostate enlargement        Past Surgical History:   Procedure Laterality Date    ABDOMEN SURGERY PROC UNLISTED      hernia repair    CARDIAC SURG PROCEDURE UNLIST           History reviewed. No pertinent family history. Social History     Social History    Marital status:      Spouse name: N/A    Number of children: N/A    Years of education: N/A     Occupational History    Not on file.      Social History Main Topics    Smoking status: Never Smoker    Smokeless tobacco: Never Used    Alcohol use No    Drug use: No    Sexual activity: Not on file     Other Topics Concern    Not on file     Social History Narrative         ALLERGIES: Metoprolol    Review of Systems Constitutional: Negative for chills, diaphoresis, fever and unexpected weight change. HENT: Negative for rhinorrhea and sore throat. Eyes: Negative for pain. Respiratory: Negative for shortness of breath, wheezing and stridor. Cardiovascular: Negative for chest pain and leg swelling. Gastrointestinal: Negative for blood in stool, diarrhea, nausea and vomiting. Genitourinary: Positive for hematuria. Negative for difficulty urinating, dysuria and flank pain. Musculoskeletal: Negative for back pain and neck stiffness. Skin: Negative for rash. Neurological: Negative for seizures, syncope, weakness, light-headedness and headaches. Psychiatric/Behavioral: Negative for confusion. Patient Vitals for the past 12 hrs:   Temp Pulse Resp BP SpO2   07/27/17 2230 - 75 20 135/75 97 %   07/27/17 2200 - 72 21 - 98 %   07/27/17 2120 - - - - 95 %   07/27/17 2100 - 75 23 138/70 97 %   07/27/17 2000 - 67 16 - 96 %   07/27/17 1830 98 °F (36.7 °C) 60 20 152/71 93 %       Physical Exam   Constitutional: He is oriented to person, place, and time. He appears well-developed and well-nourished. Moderate distress. HENT:   Nose: Nose normal.   Mouth/Throat: Oropharynx is clear and moist. No oropharyngeal exudate. Eyes: Conjunctivae and EOM are normal. Pupils are equal, round, and reactive to light. Right eye exhibits no discharge. Left eye exhibits no discharge. No scleral icterus. Neck: Normal range of motion. Neck supple. No JVD present. Cardiovascular: Normal rate, regular rhythm, normal heart sounds and intact distal pulses. No murmur heard. Pulmonary/Chest: Effort normal and breath sounds normal. No stridor. No respiratory distress. He has no wheezes. He has no rales. Abdominal: Soft. Bowel sounds are normal. He exhibits no distension. There is no tenderness. There is no rebound and no guarding. Genitourinary:   Genitourinary Comments: Rosales catheter in place draining frankly bloody urine. Musculoskeletal: He exhibits no edema or tenderness. Neurological: He is alert and oriented to person, place, and time. Skin: Skin is warm and dry. He is not diaphoretic. Psychiatric: He has a normal mood and affect. Nursing note and vitals reviewed. MDM  Number of Diagnoses or Management Options  Hematuria:   Urinary retention:   Diagnosis management comments: Hematuria with urinary retention despite wick placement. In ED, unable to successfully clear the bladder despite heroic efforts by nursing. Urology at bedside to assume care. Amount and/or Complexity of Data Reviewed  Clinical lab tests: ordered and reviewed  Obtain history from someone other than the patient: yes (Daughter)  Review and summarize past medical records: yes  Discuss the patient with other providers: yes (Urology)    Patient Progress  Patient progress: stable    ED Course     Procedures    CONSULT NOTE:   8:56 PM  Faustino Ugarte MD spoke with Dr. Nia Clifford,  Specialty: Urology  Discussed pt's hx, disposition, and available diagnostic and imaging results. Reviewed care plans. Consultant agrees with plans as outlined. Consult recommends taking out 3 way catheter and replacing it with a large 2 way catheter and irrigating to clear. If successful, can follow up in office tomorrow. If unsuccessful, contact him again. Written by Navjot Nash ED Scribe, as dictated by Faustino Ugarte MD.    CONSULT NOTE:   10:50 PM  Faustino Ugarte MD spoke with Dr. Nia Clifford,  Specialty: Urology  Discussed pt's hx, disposition, and available diagnostic and imaging results. Reviewed care plans. Consultant agrees with plans as outlined. Consult will come to the ED and evaluate the pt. Written by Navjot Nash ED Scribe, as dictated by Faustino Ugarte MD.    Progress Note:  11:52 PM  Dr. Glenda Dalton has evaluated the pt in the ED and attempted to unblock the catheter without long-lasting success.  He will admit the pt and transfer him to the OR for surgical intervention. Written by SAMUEL Aguayo, as dictated by Thaddeus Murray MD.    LABORATORY TESTS:  Recent Results (from the past 12 hour(s))   CBC WITH AUTOMATED DIFF    Collection Time: 07/27/17  8:40 PM   Result Value Ref Range    WBC 10.2 4.1 - 11.1 K/uL    RBC 3.73 (L) 4.10 - 5.70 M/uL    HGB 11.4 (L) 12.1 - 17.0 g/dL    HCT 32.8 (L) 36.6 - 50.3 %    MCV 87.9 80.0 - 99.0 FL    MCH 30.6 26.0 - 34.0 PG    MCHC 34.8 30.0 - 36.5 g/dL    RDW 13.1 11.5 - 14.5 %    PLATELET 818 885 - 728 K/uL    NEUTROPHILS 68 32 - 75 %    LYMPHOCYTES 18 12 - 49 %    MONOCYTES 10 5 - 13 %    EOSINOPHILS 3 0 - 7 %    BASOPHILS 1 0 - 1 %    ABS. NEUTROPHILS 6.9 1.8 - 8.0 K/UL    ABS. LYMPHOCYTES 1.9 0.8 - 3.5 K/UL    ABS. MONOCYTES 1.0 0.0 - 1.0 K/UL    ABS. EOSINOPHILS 0.3 0.0 - 0.4 K/UL    ABS. BASOPHILS 0.1 0.0 - 0.1 K/UL   METABOLIC PANEL, COMPREHENSIVE    Collection Time: 07/27/17  8:40 PM   Result Value Ref Range    Sodium 132 (L) 136 - 145 mmol/L    Potassium 4.1 3.5 - 5.1 mmol/L    Chloride 101 97 - 108 mmol/L    CO2 23 21 - 32 mmol/L    Anion gap 8 5 - 15 mmol/L    Glucose 109 (H) 65 - 100 mg/dL    BUN 27 (H) 6 - 20 MG/DL    Creatinine 1.15 0.70 - 1.30 MG/DL    BUN/Creatinine ratio 23 (H) 12 - 20      GFR est AA >60 >60 ml/min/1.73m2    GFR est non-AA 60 (L) >60 ml/min/1.73m2    Calcium 8.6 8.5 - 10.1 MG/DL    Bilirubin, total 0.5 0.2 - 1.0 MG/DL    ALT (SGPT) 32 12 - 78 U/L    AST (SGOT) 31 15 - 37 U/L    Alk.  phosphatase 53 45 - 117 U/L    Protein, total 6.8 6.4 - 8.2 g/dL    Albumin 2.5 (L) 3.5 - 5.0 g/dL    Globulin 4.3 (H) 2.0 - 4.0 g/dL    A-G Ratio 0.6 (L) 1.1 - 2.2         MEDICATIONS GIVEN:  Medications   lidocaine (URO-JET) 2 % jelly ( Urethral Given 7/27/17 1715)   morphine injection 4 mg (4 mg IntraVENous Given 7/27/17 2055)   ketamine (KETALAR) 10 mg/mL injection 9.6 mg (9.6 mg IntraVENous Given 7/27/17 2139)   fentaNYL citrate (PF) injection 50 mcg (50 mcg IntraVENous Given 7/27/17 2097)       IMPRESSION:  1. Hematuria    2. Urinary retention        PLAN: Admit to Urology    12:13 AM  Patient is being admitted to the hospital by Dr. Dilshad Puentes. The results of their tests and reasons for their admission have been discussed with them and/or available family. They convey agreement and understanding for the need to be admitted and for their admission diagnosis. Consultation has been made with the inpatient physician specialist for hospitalization. This note is prepared by Zoe Ragland, acting as Scribe for Jean-Paul Keyes MD.    Jean-Paul Keyes MD: The scribe's documentation has been prepared under my direction and personally reviewed by me in its entirety. I confirm that the note above accurately reflects all work, treatment, procedures, and medical decision making performed by me.

## 2017-07-28 NOTE — BRIEF OP NOTE
BRIEF OPERATIVE NOTE    Date of Procedure: 7/28/2017   Preoperative Diagnosis: bladder clots  Postoperative Diagnosis: bladder clots    Procedure(s):  CYSTOSCOPY WITH ATTEMPTED CLOT EVACUATION  EXPLORATORY LAPAROTOMY  BLADDER STONE RETRIEVAL  REPAIR OF LARGE POSTERIOR WALL BLADDER PERFORATION  Surgeon(s) and Role:     * Neil Blackwood MD - Primary         Assistant Staff:       Surgical Staff:  Circ-1: Linn Sierra RN  Scrub Tech-Relief: Deepak Cook  Scrub RN-1: Claudell Etienne, RN; Sachin Miller RN  Surg Asst-1: Sachin Miller RN  Surg Asst-Relief: Matilda Kayser; Yamileth Liriano  Event Time In   Incision Start 0114   Incision Close 8315     Anesthesia: General   Estimated Blood Loss: 100Ml  Specimens:   ID Type Source Tests Collected by Time Destination   1 : Bladder Stone for Analysis [facility] Bladder STONE ANALYSIS Neil Blackwood MD 7/28/2017 5039 Other (See Notes)      Findings:   BLADDER STONE, LARGE  POSTERIOR BLADDER PERFORATION    Complications: NONE  Implants: * No implants in log *

## 2017-07-28 NOTE — PROGRESS NOTES
Post op check:      Awake, alert. Asking for food. C/o's abd pain. Afeb. VSS. Good uo. Urine bloody, but no clots. Min raissa. Abd distended. Crepitance L loin. Imp: Stable. Plan: clear liquids. Check labs.     Kurt Ramos M.D.  [phone]

## 2017-07-28 NOTE — ED NOTES
After multiple attempts at irrigating 2-way wick catheter, per MD order, pt still with minimal urine output at this time. MD consulting urology who will come to see patient. Urology cart set at bedside.

## 2017-07-28 NOTE — PROGRESS NOTES
Bedside shift change report given to [employer] (oncoming nurse) by Karly Marcelo RN (offgoing nurse). Report included the following information SBAR, Kardex, Procedure Summary, Intake/Output, MAR, Recent Results and Cardiac Rhythm Lines.

## 2017-07-28 NOTE — PERIOP NOTES
TRANSFER - OUT REPORT:    Verbal report given to Ed Serrano RN (name) on Chuck Tyler  being transferred to [facility] [id number] (unit) for routine post - op       Report consisted of patients Situation, Background, Assessment and   Recommendations(SBAR). Information from the following report(s) SBAR, OR Summary, Intake/Output, MAR, Recent Results, Med Rec Status and Cardiac Rhythm SB was reviewed with the receiving nurse. Opportunity for questions and clarification was provided.       Patient transported with:   O2 @ 4 liters  Registered Nurse  [facility]

## 2017-07-28 NOTE — DISCHARGE INSTRUCTIONS
Blood in the Urine: Care Instructions  Your Care Instructions  Blood in the urine, or hematuria, may make the urine look red, brown, or pink. There may be blood every time you urinate or just from time to time. You cannot always see blood in the urine, but it will show up in a urine test.  Blood in the urine may be serious. It should always be checked by a doctor. Your doctor may recommend more tests, including an X-ray, a CT scan, or a cystoscopy (which lets a doctor look inside the urethra and bladder). Blood in the urine can be a sign of another problem. Common causes are bladder infections and kidney stones. An injury to your groin or your genital area can also cause bleeding in the urinary tract. Very hard exercise--such as running a marathon--can cause blood in the urine. Blood in the urine can also be a sign of kidney disease or cancer in the bladder or kidney. Many cases of blood in the urine are caused by a harmless condition that runs in families. This is called benign familial hematuria. It does not need any treatment. Sometimes your urine may look red or brown even though it does not contain blood. For example, not getting enough fluids (dehydration), taking certain medicines, or having a liver problem can change the color of your urine. Eating foods such as beets, rhubarb, or blackberries or foods with red food coloring can make your urine look red or pink. Follow-up care is a key part of your treatment and safety. Be sure to make and go to all appointments, and call your doctor if you are having problems. It's also a good idea to know your test results and keep a list of the medicines you take. When should you call for help? Call your doctor now or seek immediate medical care if:  · You have symptoms of a urinary infection. For example:  ¨ You have pus in your urine. ¨ You have pain in your back just below your rib cage. This is called flank pain.   ¨ You have a fever, chills, or body aches.  ¨ It hurts to urinate. ¨ You have groin or belly pain. · You have more blood in your urine. Watch closely for changes in your health, and be sure to contact your doctor if:  · You have new urination problems. · You do not get better as expected. Where can you learn more? Go to http://blanca-linwood.info/. Enter F795 in the search box to learn more about \"Blood in the Urine: Care Instructions. \"  Current as of: March 20, 2017  Content Version: 11.3  © 2164-3431 [employer]. Care instructions adapted under license by [employer]. (which disclaims liability or warranty for this information). If you have questions about a medical condition or this instruction, always ask your healthcare professional. [address] any warranty or liability for your use of this information. Urinary Retention: Care Instructions  Your Care Instructions    Urinary retention means that you aren't able to urinate. In men, it is often caused by a blockage of the urinary tract from an enlarged prostate gland. In men and women, it can also be caused by an infection or nerve damage. Or it may be a side effect of a medicine. The doctor may have drained the urine from your bladder. If you still have problems passing urine, you may need to use a catheter at home. This is used to empty your bladder until the problem can be fixed. Your doctor may put a catheter in your bladder before you go home. If so, he or she will tell you when to come back to have the catheter removed. The doctor has checked you closely. But problems can develop later. If you notice any problems or new symptoms, get medical treatment right away. Follow-up care is a key part of your treatment and safety. Be sure to make and go to all appointments, and call your doctor if you are having problems. It's also a good idea to know your test results and keep a list of the medicines you take.   How can you care for yourself at home? · Take your medicines exactly as prescribed. Call your doctor if you think you are having a problem with your medicine. You will get more details on the specific medicines your doctor prescribes. · Check with your doctor before you use any over-the-counter medicines. Many cold and allergy medicines, for example, can make this problem worse. Make sure your doctor knows all of the medicines, vitamins, supplements, and herbal remedies you take. · Spread out through the day the amount of fluid you drink. Do not drink a lot at bedtime. · Avoid alcohol and caffeine. · If you have been given a catheter, or if one is already in place, follow the instructions you were given. Always wash your hands before and after you handle the catheter. When should you call for help? Call your doctor now or seek immediate medical care if:  · You cannot urinate at all, or it is getting harder to urinate. · You have symptoms of a urinary tract infection. These may include:  ¨ Pain or burning when you urinate. ¨ A frequent need to urinate without being able to pass much urine. ¨ Pain in the flank, which is just below the rib cage and above the waist on either side of the back. ¨ Blood in your urine. ¨ A fever. Watch closely for changes in your health, and be sure to contact your doctor if:  · You have any problems with your catheter. · You do not get better as expected. Where can you learn more? Go to http://blanca-linwood.info/. Enter M244 in the search box to learn more about \"Urinary Retention: Care Instructions. \"  Current as of: August 12, 2016  Content Version: 11.3  © 0448-5832 [employer]. Care instructions adapted under license by [employer] (which disclaims liability or warranty for this information).  If you have questions about a medical condition or this instruction, always ask your healthcare professional. [address] any warranty or liability for your use of this information.

## 2017-07-28 NOTE — ANESTHESIA POSTPROCEDURE EVALUATION
Post-Anesthesia Evaluation and Assessment    Patient: Concepcion Gallegos MRN: [id number]  SSN: [id number]    YOB: 1928  Age: 80 y.o. Sex: male       Cardiovascular Function/Vital Signs  Visit Vitals    /44    Pulse (!) 57    Temp 36.6 °C (97.9 °F)    Resp 18    SpO2 100%       Patient is status post general anesthesia for Procedure(s):  CYSTOSCOPY/ EXPLORATORY LAPAROTOMY WITH BLADDER STONE RETRIEVAL AND REPAIR OF BLADDER PERFORATION. Nausea/Vomiting: None    Postoperative hydration reviewed and adequate. Pain:  Pain Scale 1: Numeric (0 - 10) (07/28/17 0445)  Pain Intensity 1: 0 (07/28/17 0445)   Managed    Neurological Status:   Neuro (WDL): Exceptions to WDL (07/28/17 0400)  Neuro  Neurologic State: Appropriate for age;Drowsy; Eyes open spontaneously; Eyes open to voice (07/28/17 0400)  Orientation Level: Oriented to person;Oriented to place (07/28/17 0400)  Cognition: Follows commands (07/28/17 0400)  LUE Motor Response: Purposeful (07/28/17 0400)  LLE Motor Response: Purposeful (07/28/17 0400)  RUE Motor Response: Purposeful (07/28/17 0400)  RLE Motor Response: Purposeful (07/28/17 0400)   At baseline    Mental Status and Level of Consciousness: Arousable    Pulmonary Status:   O2 Device: Nasal cannula (07/28/17 0530)   Adequate oxygenation and airway patent    Complications related to anesthesia: None    Post-anesthesia assessment completed.  No concerns    Signed By: Lilliana Frederick MD     July 28, 2017

## 2017-07-28 NOTE — CONSULTS
Patient seen and examined. Hematuria after recent cardiac eval.  On aspirin and plavix per Dr. Morgan Ahn. Family at bedside. Patient in painful distress. Abd soft. Suprapubic region TTP. 24Fr 2 way wick in place. Attempts to irrigate unsuccessful. 26Fr wick placed. Unable to irrigate. Patient needs to go to OR immediately for clot evacuation. Patient has elevated cardiac risk as noted by Dr. Morgan Ahn note and has elevated perioperative risk including increased risk for death. This case is emergent. I have called OR who states they have a vascular case in the OR and are unable to get another team in. I have asked for them to call in another team urgently.

## 2017-07-28 NOTE — ED NOTES
Bedside and Verbal shift change report received from Avtar Boyle RN (offgoing nurse) given to Lani Knight RN (oncoming nurse). Report included the following information SBAR, Kardex, ED Summary, MAR, Recent Results and Med Rec Status.

## 2017-07-28 NOTE — OP NOTES
[address]   OP NOTE       Name:  Yolanda Pa   MR#:  [id number]   :  1928   Account #:  [de-identified]    Surgery Date:  2017   Date of Adm:  2017       PREOPERATIVE DIAGNOSIS: Bladder clot. POSTOPERATIVE DIAGNOSIS: Bladder perforation and bladder   stone. PROCEDURES PERFORMED: Cystoscopy with attempted clot   evacuation with subsequent exploratory laparotomy with bladder stone   retrieval and repair of large posterior bladder perforation. SURGEON: Mino Ugarte MD    ESTIMATED BLOOD LOSS: 100 mL. SPECIMENS REMOVED: Bladder stone. ANESTHESIA: General.    FINDINGS: Large bladder stone and posterior bladder perforation. COMPLICATIONS: None. DESCRIPTION OF PROCEDURE: The patient was brought to the   operating room, with informed consent for cystoscopy and clot   evacuation, possible open exploration. The patient had signed consent   and was brought to the operating room with time out performed. We   started with cystoscopy with a 28-Indonesian sheath and the bladder was   entered. Attempts were made to evacuate clot from the bladder. After   evacuating a copious amount of clot, I did note a large bladder stone. I   had a lot of difficulty visualizing the remainder of the bladder and as I   worked for approximately 45 minutes to 1 hour, I noticed that I was   having some abdominal distention and I became concerned that I had   a bladder perforation on top of his bladder stone because  of his issues   over the last several days. Therefore, I opted to convert to exploratory   laparotomy. The patient was reprepped and draped. An infraumbilical   incision was made and carried down. The fascia was opened. I stayed   within the extraperitoneal space.  The Rosales catheter was palpable,   therefore, I cut down the balloon, entered the bladder and a large   stone was then removed, the size of approximately 6 x 6 cm and I   opted to then explore the bladder and I noticed there was a very large   posterior wall gash. This was cause for continued bleeding. I used a 2-  0 Vicryl and took a large mucosal bite and closed it from the bladder   side with good approximation. Once I did that, there was no further   bleeding. Now that I had good control of the bleeding and a good   assessment of the bladder with no further lacerations, we were well   away from the UOs, and everything else looked reasonable,  I opted to   open up the peritoneal cavity by making a small incision just below the   umbilicus in the peritoneum and entered that space and I did note clear   fluid consistent with effluent from our procedure, suggesting an   intraperitoneal bladder perforation. I did have some fluid upon opening   the abdominal cavity and the extraperitoneal space; I think it was   a combined perforation. Nonetheless, I widened it and explored the   posterior portion of the peritoneum of the bladder and could not   appreciate a large defect there, again with good closure of the bladder   from the cystotomy and therefore I opted to place a Kingston drain in   the intraperitoneal space and closed that with a running Vicryl. I placed   a drain through a stab incision lateral, then I turned my attention back   to the bladder. I had a good 3-way 24-Niuean catheter. I also opted to   place a Malecot through a stab incision, a 22-Niuean, and secured that   in position and then I oriented the bladder in 2 layers, checking to   make sure that hemostasis was good after that. All drains were   secured. The fascia was then closed with #1 PDS. The patient was   infiltrated with local. The patient was awakened and brought to the   PACU in stable condition.         MD Charity Moore / RIDGE   D:  07/28/2017   03:45   T:  07/28/2017   08:01   Job #:  [id number]

## 2017-07-28 NOTE — PROGRESS NOTES
CM sent referral to [facility] for home health PT and was declined. CM called 3 other agencies and was declined. CM spoke with the daughter who stated she was trying to get in touch with his worker at [facility] and talked to her once,was told she would check on agency in network  and call her back. ESTEFANÍA also called Quynh at [facility] and left a message.  Kassie Chowdhury RN #[id number]

## 2017-07-28 NOTE — PROGRESS NOTES
Primary Nurse Lucas Schilling RN and Loida Way RN performed a dual skin assessment on this patient No impairment noted  Kong score is 20

## 2017-07-28 NOTE — PERIOP NOTES
1305: Dr. Yamilex Uribe notified of BP continuing to drop from systolic 315C to 58B. Orders received for 500ml NS bolus. 6749: Dr. Judy Ball paged regarding UOP. Malecot drain flushed per orders at 9065 with 60ml of NS, 35ml returned in wick cath bag, no other output otherwise. Clarification of output received per Dr. Judy Ball, output coming from either wick or malecot and adequate is okay. 0515: family in pacu to see pt, family going home until later today.

## 2017-07-28 NOTE — ANESTHESIA PREPROCEDURE EVALUATION
Anesthetic History   No history of anesthetic complications            Review of Systems / Medical History  Patient summary reviewed, nursing notes reviewed and pertinent labs reviewed    Pulmonary  Within defined limits                 Neuro/Psych   Within defined limits           Cardiovascular    Hypertension    Angina: at rest      CAD    Exercise tolerance: <4 METS  Comments: EF 45-50%  1st degree AV block     GI/Hepatic/Renal  Within defined limits              Endo/Other        Obesity     Other Findings   Comments: bladder clots  Prostate enlargement   hyponatremia         Physical Exam    Airway  Mallampati: II    Neck ROM: normal range of motion   Mouth opening: Normal     Cardiovascular  Regular rate and rhythm,  S1 and S2 normal,  no murmur, click, rub, or gallop             Dental    Dentition: Implants     Pulmonary  Breath sounds clear to auscultation               Abdominal  GI exam deferred       Other Findings            Anesthetic Plan    ASA: 3, emergent  Anesthesia type: general          Induction: Intravenous  Anesthetic plan and risks discussed with: Patient

## 2017-07-28 NOTE — PROGRESS NOTES
Bedside and Verbal shift change report given to Miguelito Roldan (oncoming nurse) by Quentin Tejeda (offgoing nurse). Report included the following information SBAR, Kardex, Intake/Output, MAR and Recent Results.

## 2017-07-29 NOTE — PROGRESS NOTES
Pharmacy Automatic Renal Dosing Protocol - Antimicrobials    Indication for Antimicrobials: Diffuse gastric pneumatosis, pneumatosis of ascending and descending colon. Previous positive urine culture for Enterococcus Faecalis on , pansensitive. Current Regimen of Each Antimicrobial (Start Day & Day of Therapy):  Vancomycin 2,500 mg loading dose followed by 1500 mg Q24H maintenance dose. Goal Vancomycin Level (if needed): 15-20  Level(s) Ordered (if needed): none  Measured / Extrapolated Vancomycin Levels (if drawn): n/a    Significant Cultures: - Urine culture- Pansensitive Enterococcus Faecalis   CAPD, Hemodialysis or Renal Replacement Therapy: N/A   Paralysis, amputations, malnutrition: none  Recent Labs      17   0552  17   1345  17   2040   CREA  1.36*  1.21  1.15   BUN  24*  23*  27*   WBC  20.6*  20.4*  10.2     Temp (24hrs), Av.7 °F (37.1 °C), Min:98.2 °F (36.8 °C), Max:99.5 °F (37.5 °C)    Creatinine Clearance (Creatinine Clearance (ml/min)): 38 ml/min    Impression/Plan:   1. Vancomycin maintenance dose of 1500 mg Q24H. Plan to draw level  after three doses. Estimated trough of ~18.82 mg/dl. Pharmacy will follow daily and adjust medications as appropriate for renal function and/or serum levels.     Thank you,  DENNY Baker     Renal Dosing Tables on [employer]

## 2017-07-29 NOTE — ANESTHESIA PROCEDURE NOTES
Central Line Placement    Start time: 7/29/2017 11:53 PM  End time: 7/29/2017 12:02 AM  Performed by: Sofia Edwards  Authorized by: Sofia Edwards     Timeout Time: 23:53       Pre-procedure: All elements of maximal sterile barrier technique followed?  Yes    Maximal barrier precautions followed, 2% Chlorhexidine for cutaneous antisepsis and Hand hygiene performed prior to catheter insertion            Procedure:   Prep:  Chlorhexidine  Location:  Internal jugular  Orientation:  Right  Patient position:  Trendelenburg  Catheter type:  Quad lumen  Catheter size:  8.5 Fr  Catheter length:  16 cm  Number of attempts:  1  Successful placement: Yes      Assessment:   Post-procedure:  Catheter secured, sterile dressing applied and sterile dressing with CHG applied  Assessment:  Blood return through all ports, free fluid flow and guidewire removal verified  Insertion:  Uncomplicated  Patient tolerance:  Patient tolerated the procedure well with no immediate complications

## 2017-07-29 NOTE — ANESTHESIA PREPROCEDURE EVALUATION
Anesthetic History   No history of anesthetic complications            Review of Systems / Medical History  Patient summary reviewed, nursing notes reviewed and pertinent labs reviewed    Pulmonary  Within defined limits                 Neuro/Psych   Within defined limits           Cardiovascular    Hypertension    Angina: at rest      CAD    Exercise tolerance: <4 METS  Comments: TTE (7/20/17):   Moderate MR, mild PI, EF=45-50%  1st degree AV block     GI/Hepatic/Renal  Within defined limits             Comments: Perforated viscus Endo/Other  Within defined limits           Other Findings   Comments: Prostate enlargement     Anesthetic History   No history of anesthetic complications            Review of Systems / Medical History  Patient summary reviewed, nursing notes reviewed and pertinent labs reviewed    Pulmonary  Within defined limits                 Neuro/Psych   Within defined limits           Cardiovascular    Hypertension    Angina: at rest      CAD    Exercise tolerance: <4 METS  Comments: EF 45-50%  1st degree AV block     GI/Hepatic/Renal  Within defined limits              Endo/Other        Obesity     Other Findings   Comments: bladder clots  Prostate enlargement   hyponatremia         Physical Exam    Airway  Mallampati: II    Neck ROM: normal range of motion   Mouth opening: Normal     Cardiovascular  Regular rate and rhythm,  S1 and S2 normal,  no murmur, click, rub, or gallop             Dental    Dentition: Implants     Pulmonary  Breath sounds clear to auscultation               Abdominal  GI exam deferred       Other Findings            Anesthetic Plan    ASA: 3, emergent  Anesthesia type: general          Induction: Intravenous  Anesthetic plan and risks discussed with: Patient          Anesthetic History   No history of anesthetic complications            Review of Systems / Medical History  Patient summary reviewed, nursing notes reviewed and pertinent labs reviewed    Pulmonary  Within defined limits                 Neuro/Psych   Within defined limits           Cardiovascular    Hypertension    Angina: at rest      CAD    Exercise tolerance: <4 METS  Comments: EF 45-50%  1st degree AV block     GI/Hepatic/Renal  Within defined limits              Endo/Other        Obesity     Other Findings   Comments: bladder clots  Prostate enlargement   hyponatremia         Physical Exam    Airway  Mallampati: II    Neck ROM: normal range of motion   Mouth opening: Normal     Cardiovascular  Regular rate and rhythm,  S1 and S2 normal,  no murmur, click, rub, or gallop             Dental    Dentition: Implants     Pulmonary  Breath sounds clear to auscultation               Abdominal  GI exam deferred       Other Findings            Anesthetic Plan    ASA: 3, emergent  Anesthesia type: general          Induction: Intravenous  Anesthetic plan and risks discussed with: Patient              Physical Exam    Airway  Mallampati: II  TM Distance: > 6 cm  Neck ROM: normal range of motion   Mouth opening: Normal     Cardiovascular  Regular rate and rhythm,  S1 and S2 normal,  no murmur, click, rub, or gallop             Dental    Dentition: Implants  Comments: Multiple missing teeth   Pulmonary  Breath sounds clear to auscultation               Abdominal  GI exam deferred       Other Findings            Anesthetic Plan    ASA: 3, emergent  Anesthesia type: general    Monitoring Plan: Arterial line and CVP      Induction: Intravenous  Anesthetic plan and risks discussed with: Patient

## 2017-07-29 NOTE — PROGRESS NOTES
3199: Transported from PACU to CCU 2524 via Ambu bag.  0328:Patient back on 840 ventilator. Patient tolerated well.

## 2017-07-29 NOTE — PROGRESS NOTES
Urology  Notified of the CT findings and the abdominal pain that Mr. Pooja Beth had been having. We consulted Dr. Clare Sims who recommends emergent exploration for probable bowel perforation. Will follow.   Verito Ahn MD

## 2017-07-29 NOTE — PROGRESS NOTES
Call and spoke with the hospitalist Dr Elaine Campos concerning patient abdominal pain with a new order given for a CT of the pelvis.

## 2017-07-29 NOTE — BRIEF OP NOTE
BRIEF OPERATIVE NOTE    Date of Procedure: 7/28/2017   Preoperative Diagnosis: perforated viscus  Postoperative Diagnosis: diffuse pneumatosis and gastric distention without bowel perforation   Procedure(s):  LAPAROTOMY EXPLORATORY  Surgeon(s) and Role:     Oksana Phoenix MD - Primary         Assistant Staff:       Surgical Staff:  Circ-1: Fish Gordon  Scrub Tech-1: Ginny Caper  Surg Asst-1: Monica Eldridge  Event Time In   Incision Start 0017   Incision Close 0116     Anesthesia: General   Estimated Blood Loss: < 25 cc  Specimens: * No specimens in log *   Findings: diffuse gastric pneumatosis, pneumatosis of ascending and descending colon and dependent distal small bowel, extensive retroperitoneal pneumatosis. No perforation identified throughout abdomen, No infected or exudative looking ascites.    Complications: none  Implants: * No implants in log *

## 2017-07-29 NOTE — CONSULTS
Surgery Consult    Subjective:      Kailey Ferrera is a 80 y.o. male who underwent cystoscopy for hematuria/clot evacuation overnight last night and was found to have a massive bladder stone and a posterior retroperitoneal bladder laceration. He had conversion to open procedure and bladder repair with wick and malecot suprapubic tube placement and peritoneum was entered and had some fluid consistent with extravasation from the procedure, although no peritoneal laceration was identified. The patient has had progressive worsening abdominal pain and distention throughout the day today and now has a markedly distended abdomen with peritoneal signs. CT has just been completed showing extensive hydropneumoperitoneum, extensive retroperitoneal emphysema especially on the left, and mediastinal emphysema. Pt c/o pain, nausea. Past Medical History:   Diagnosis Date    Hypertension     Prostate enlargement      Past Surgical History:   Procedure Laterality Date    ABDOMEN SURGERY PROC UNLISTED      hernia repair    CARDIAC SURG PROCEDURE UNLIST        History reviewed. No pertinent family history.   Social History     Social History    Marital status:      Spouse name: N/A    Number of children: N/A    Years of education: N/A     Social History Main Topics    Smoking status: Never Smoker    Smokeless tobacco: Never Used    Alcohol use No    Drug use: No    Sexual activity: Not Asked     Other Topics Concern    None     Social History Narrative      Current Facility-Administered Medications   Medication Dose Route Frequency Provider Last Rate Last Dose    carvedilol (COREG) tablet 3.125 mg  3.125 mg Oral BID WITH MEALS Sammi Bamberger, MD   3.125 mg at 07/28/17 1801    finasteride (PROSCAR) tablet 5 mg  5 mg Oral DAILY Sammi Bamberger, MD   5 mg at 07/28/17 0841    valsartan (DIOVAN) tablet 80 mg  80 mg Oral DAILY Sammi Bamberger, MD   80 mg at 07/28/17 0841    sodium chloride (NS) flush 5-10 mL  5-10 mL IntraVENous Q8H Clara Miles MD        sodium chloride (NS) flush 5-10 mL  5-10 mL IntraVENous PRN Clara Miles MD        acetaminophen (TYLENOL) tablet 650 mg  650 mg Oral Q4H PRN Clara Miles MD        HYDROmorphone (PF) (DILAUDID) injection 0.5 mg  0.5 mg IntraVENous Q3H PRN Clara Miles MD   0.5 mg at 17 2217    naloxone (NARCAN) injection 0.4 mg  0.4 mg IntraVENous PRN Clara Miles MD        ondansetron [facility]) injection 4 mg  4 mg IntraVENous Q4H PRN Clara Miles MD   4 mg at 17 1932    lactated Ringers infusion  75 mL/hr IntraVENous CONTINUOUS Clara Miles MD 75 mL/hr at 17 1507 75 mL/hr at 17 1507    ceFAZolin (ANCEF) 2g in 100 ml 0.9% NS IVPB  2 g IntraVENous Hamilton MD Lyla   2 g at 17 1518    0.9% sodium chloride infusion  50 mL/hr IntraVENous RAD ONCE Clara Miles MD        iopamidol (ISOVUE-370) 76 % injection 100 mL  100 mL IntraVENous RAD ONCE Clara Miles MD        sodium chloride (NS) flush 10 mL  10 mL IntraVENous RAD ONCE Clara Miles MD            Allergies   Allergen Reactions    Metoprolol Nausea and Vomiting       Review of Systems:  Pertinent items are noted in the History of Present Illness. Objective:      Patient Vitals for the past 8 hrs:   BP Temp Pulse Resp SpO2   17 2027 164/75 98.2 °F (36.8 °C) 82 23 96 %       Temp (24hrs), Av.2 °F (36.8 °C), Min:97.5 °F (36.4 °C), Max:99.5 °F (37.5 °C)      Physical Exam:  GENERAL: severe distress, appears stated age, LUNG: clear to auscultation bilaterally, HEART: regular rate and rhythm, ABDOMEN: soft, markedly distended and tender.  EXTREMITIES:  extremities normal, atraumatic, no cyanosis or edema    Assessment:     Hospital Problems  Date Reviewed: 2017          Codes Class Noted POA    Bladder injury ICD-10-CM: S37.20XA  ICD-9-CM: 867.0  2017 Unknown            Postop hydropneumoperitoneum and subcutaneous/retroperitoneal emphysema, mediastinal emphysema. Worrisome for perforated viscus    Plan:     Discussed the risk of surgery including bleeding, infection, possible need for bowel resection and or ostomy, high chance of need for postop ventilator support, and the risks of general anesthetic. The patient and his family understand the risks; any and all questions were answered to their satisfaction.     Signed By: Renetta Walters MD, [facility]    July 28, 2017

## 2017-07-29 NOTE — OP NOTES
[address]   OP NOTE       Name:  Teddy Burciaga   MR#:  [id number]   :  1928   Account #:  [de-identified]    Surgery Date:  2017   Date of Adm:  2017       PREOPERATIVE DIAGNOSIS: Perforated viscus postoperatively. POSTOPERATIVE DIAGNOSIS: Diffuse pneumatosis and gastric   distention without evidence of bowel perforation. PROCEDURES PERFORMED: Exploratory laparotomy. SURGEON: Desire Mark. Emily Ronquillo MD.    ANESTHESIA: General endotracheal anesthesia     ESTIMATED BLOOD LOSS: Less than 25 mL. SPECIMENS REMOVED: None. COMPLICATIONS: None. IMPLANTS: None. FINDINGS: Diffuse pneumatosis involving the right and left colon. The   dependent most portion of the distal small bowel, mostly in the   mesentery, extensive pneumatosis along the greater curve of the   stomach and extensive retroperitoneal pneumatosis, posterior to the   left and sigmoid colon. DESCRIPTION OF PROCEDURE: After appropriate consent was   obtained, the patient was brought to the operating room, made   comfortable in the supine position, and administered general   endotracheal anesthesia. The Rosales and suprapubic tubes were   already in place. A nasogastric tube was placed by Anesthesia and   once this was positioned you could see the upper abdomen become   remarkably decompressed. A central line and radial art line were placed   by Anesthesia concurrently. At this time, the abdomen was prepped   and draped in standard fashion. The lower midline incision was opened   after first removing the skin staples prior to the prep by cutting the skin   incision and removing the running looped stitch which appeared to be   a chasidy PDS. The abdomen was entered and pneumatosis was   identified, although there did not appear to be a rush of actual free air.    The abdomen was explored through this incision and there was no   inflammatory or murky appearing ascites fluid noted as well. At this   time, it was elected to extend the incision superiorly and this was   actually done twice throughout the case to provide adequate complete   exposure to the abdominal cavity. The bowel was run from the   ligament of Treitz to the ileocecal valve with no other remarkable   findings except for extensive pneumatosis in the distal small bowel in   the mesentery just adjacent to the wall of the bowel, but no evidence of   any injury or perforation. The right and left colon were mobilized along   the white line of Toldt with a LigaSure device, and inspected   circumferentially and there was no evidence of injury in either the right   transverse or left colon or sigmoid colon or rectum. The rectum was   followed all the way down to the peritoneal reflection with no evidence   of injury related to the surgery yesterday. The patient did have some   dependent ecchymosis in the retroperitoneum tracking laterally on   either side and extensive retroperitoneal pneumatosis, mostly on the   left and posterior and lateral to the left and sigmoid colon. At this time,   the stomach was inspected and it was found to be massively dilated   and now  decompressed with the NG tube and floppy, but clearly   markedly enlarged with extensive pneumatosis along the inferior   aspect of the perigastric fat. The lesser sac was entered, and the   stomach was inspected anteriorly and posteriorly with no evidence of   perforated ulcer. The duodenum was mobilized with a Kocher   maneuver and inspected circumferentially and there was no evidence   of perforated duodenal ulcer as well. At this time, the entire abdomen   had been fairly explored. The site of the bladder repair was inspected   and found to be satisfactory. It appeared to be watertight. There was a   lot of ecchymosis and no evidence of leak or breakdown. The   suprapubic tube was in place and the Rosales was palpated in the   bladder.  The existing Serjio Risser drain was repositioned into the pelvis in the   dependent base. At this time, the viscera were all returned to the   native location. The peritoneum was closed inferiorly in a similar   fashion to Dr. Alyssa Chen had done yesterday with a 3-0 Vicryl running   stitch. When this was complete, the midline fascia was approximated   with a #1 PDS running stitch x2. The wound was cleaned and dried   and dressed it was hard and the skin incision was approximated with   skin staples. The wound was then dressed with a sterile occlusive   bandage. The drains were dressed with drain sponges. The patient   remained intubated at the end of the case and was transferred to the   recovery room. It was felt that most likely explanation of the patient's   clinical findings was that he developed extensive pneumatosis   secondary to massive gastric distention and this correlates with the   fact that his primary symptoms were upper abdominal discomfort   despite the marked left-sided retroperitoneal pneumatosis. Apparently   there was no evidence of an intestinal or gastric injury and   inflammatory or exudative-appearing fluid in the abdomen.         MD JOSÉ LUIS Peck / Nicolás.[facility]   D:  07/29/2017   01:42   T:  07/29/2017   11:16   Job #:  [id number]

## 2017-07-29 NOTE — PROGRESS NOTES
[facility]  Pulmonary, Critical Care, and Sleep Medicine    Name: Isaac Waggoner MRN: [id number]   : 1928 Hospital: [facility]   Date: 2017        IMPRESSION:   · Postop respiratory failure  · S/P removal of bladder stone and repair of perforated bladder  · S/P subsequent ex-lap due to extensive sq emphysema in abdomen/retroperitoneum - no sign of bowel perforation  · Shock  · Acute renal failure  · \"pneumothorax\" due to dissection of air from retroperitoneum cephalad, different physiology or behavior of typical pneumothorax   · CAD      PLAN:   · Ventilator support - wean as tolerated  · IV fluids  · Pressors  · Pain control  · Sedation as needed  · No need for chest tubes for this type of pneumothorax, should resolve spontaneously, but will monitor  · DVT/GI prophylaxis  · Critically ill     Subjective/Interval History:   I have reviewed the flowsheet and previous days notes. The patient is unable to give any meaningful history or review of systems because the patient is:   Intubated/sedated - chart reviewed - patient initially admitted with bladder stone and perforation, subsequently found to have lots of sq air in flanks and concerning abdominal CT, and he was taken for ex-lap last night     The patient is critically ill on:      Mechanical ventilation/pressors      Review of Systems   Unable to perform ROS: Intubated     Objective:   Vital Signs:    Visit Vitals    /46    Pulse (!) 56    Temp 98.2 °F (36.8 °C)    Resp 12    Ht 5' 10\" (1.778 m)    Wt 106.1 kg (233 lb 14.5 oz)    SpO2 99%    BMI 33.56 kg/m2       O2 Device: Ventilator   O2 Flow Rate (L/min): 3 l/min   Temp (24hrs), Av.7 °F (37.1 °C), Min:98.2 °F (36.8 °C), Max:99.5 °F (37.5 °C)       Intake/Output:   Last shift:      701 - 1900  In: 963.5 [I.V.:863.5]  Out: 150 [Drains:150]  Last 3 shifts: 1901 -  0700  In: 6263.5 [I.V.:5363.5]  Out: 0866 [XFHAH:3247; Drains:2374]    Intake/Output Summary (Last 24 hours) at 07/29/17 1022  Last data filed at 07/29/17 1000   Gross per 24 hour   Intake          4363.16 ml   Output             3099 ml   Net          1264.16 ml     Hemodynamics:   PAP:   CO:     Wedge:   CI:     CVP:    SVR:       PVR:       Ventilator Settings:  Mode Rate Tidal Volume Pressure FiO2 PEEP   Assist control   450 ml    40 % 6 cm H20     Peak airway pressure: 20 cm H2O    Minute ventilation: 7.36 l/min       Physical Exam   Constitutional: He appears ill. He is sedated and intubated. HENT:   Head: Normocephalic and atraumatic. Eyes: No scleral icterus. Cardiovascular: Normal rate and regular rhythm. No murmur heard. Pulmonary/Chest: He is intubated. No respiratory distress. He has no wheezes. He has no rales. Abdominal: He exhibits distension. Bowel sounds are absent. Postop abdomen, also with sq emphysema   Musculoskeletal: He exhibits edema. Skin: Skin is warm and dry.      Data:     Current Facility-Administered Medications   Medication Dose Route Frequency    sodium chloride (NS) flush 5-10 mL  5-10 mL IntraVENous Q8H    0.9% sodium chloride infusion  100 mL/hr IntraVENous CONTINUOUS    PHENYLephrine (NEOSYNEPHRINE) 10 mg/mL injection        PHENYLephrine (NEOSYNEPHRINE) 30,000 mcg in 0.9% sodium chloride 250 mL infusion   mcg/min IntraVENous TITRATE    propofol (DIPRIVAN) infusion  5-50 mcg/kg/min IntraVENous TITRATE    chlorhexidine (PERIDEX) 0.12 % mouthwash 15 mL  15 mL Oral Q12H    mupirocin (BACTROBAN) 2 % ointment   Both Nostrils BID    vancomycin (VANCOCIN) 1500 mg in  ml infusion  1,500 mg IntraVENous Q24H                Labs:  Recent Labs      07/29/17   0552  07/28/17   1345  07/27/17   2040   WBC  20.6*  20.4*  10.2   HGB  7.3*  9.0*  11.4*   HCT  21.2*  26.4*  32.8*   PLT  303  282  299     Recent Labs      07/29/17   0552  07/28/17   1345  07/27/17   2040   NA  136  136  132*   K  4.4  4.7 4.1   CL  107  109*  101   CO2  21  19*  23   GLU  140*  122*  109*   BUN  24*  23*  27*   CREA  1.36*  1.21  1.15   CA  6.8*  7.2*  8.6   ALB  1.9*   --   2.5*   TBILI  0.4   --   0.5   SGOT  34   --   31   ALT  19   --   32     Recent Labs      07/29/17   0207   PH  7.33*   PCO2  36   PO2  181*   HCO3  18*   FIO2  100     Imaging:  I have personally reviewed the patients radiographs and have reviewed the reports:  Intubated, abdominal CT noted        Total critical care time exclusive of procedures: 35 minutes  Pat Ferrell MD

## 2017-07-29 NOTE — CARDIO/PULMONARY
CP REHAB NOTE    Chart Review: Patient admitted for bladder injury. S/p cystoscopy for hematuria/clot evacuation   Medical History: HTN, enlarged prostate  EF 45-50%, Echo 7/20/2017  Non Smoker    No history of heart failure. Teaching deferred.

## 2017-07-29 NOTE — PERIOP NOTES
0145-Handoff Report from Operating Room to PACU    Report received from [facility]  and Tatyana Johnson CRNA regarding Seng Maddox. Surgeon(s):  Donita Flynn MD  And Procedure(s) (LRB):  LAPAROTOMY EXPLORATORY (Bilateral)  confirmed   with allergies, drains and dressings discussed. Anesthesia type, drugs, patient history, complications, estimated blood loss, vital signs, intake and output, and last pain medication, lines, reversal medications and temperature were reviewed. 0155- Pt pulling at lines despite instruction not to do so. Soft wrist restraints applied. Dr. Morales Linker present and assessed pt at this time. Nursing supervisor and family made aware of restraints. 0255-TRANSFER - OUT REPORT:    Verbal report given to Mani Arce RN(name) on Seng Maddox  being transferred to CCU(unit) for routine post - op       Report consisted of patients Situation, Background, Assessment and   Recommendations(SBAR). Information from the following report(s) SBAR, Kardex, OR Summary, Procedure Summary, Intake/Output, MAR and Recent Results was reviewed with the receiving nurse. Opportunity for questions and clarification was provided.       Patient transported with:   Monitor  Registered Nurse x2  RT  Ventilator

## 2017-07-29 NOTE — H&P
Pt has leucocytosis on CBC today, stress v/s intra abdominal infection, concern about perforation, CT abd pending, Urology and Gen surgery aware, will add vancomycin to cefazolin and obtain lactate and monitor

## 2017-07-29 NOTE — PROGRESS NOTES
Pt transferred to unit via bed from PACU with IV's infusing, on vent. VSS. On 120mcq josh. Pt bathed and repostioned. 0600 labs drawn without difficulty. VSS.

## 2017-07-29 NOTE — PROGRESS NOTES
Urology. Events noted. Urine is clear but output a little slow. Likely 3rd spacing from extensive dissection. I irrigated the wick and it comes right out the sp tube. dont think there is any intraabdom leak. Making about 50 cc per hour. Leave  All tubes. Following.

## 2017-07-29 NOTE — ANESTHESIA POSTPROCEDURE EVALUATION
Post-Anesthesia Evaluation and Assessment    Patient: Rodriguez Montaño MRN: [id number]  SSN: [id number]    YOB: 1928  Age: 80 y.o. Sex: male       Cardiovascular Function/Vital Signs  Visit Vitals    BP (!) 94/38    Pulse 81    Temp 37.4 °C (99.3 °F)    Resp 13    SpO2 100%       Patient is status post general anesthesia for Procedure(s):  LAPAROTOMY EXPLORATORY. Nausea/Vomiting: None    Postoperative hydration reviewed and adequate. Pain:  Pain Scale 1: (P) Behavioral Pain Scale (BPS) (07/29/17 0145)  Pain Intensity 1: 8 (07/28/17 2127)   Managed    Neurological Status:   Neuro (WDL): Exceptions to WDL (07/28/17 0545)  Neuro  Neurologic State: Alert;Eyes open to stimulus; Eyes open to pain;Eyes open spontaneously; Eyes open to voice;Irritable (07/28/17 2000)  Orientation Level: Oriented X4 (07/28/17 2000)  Cognition: Appropriate decision making; Appropriate for age attention/concentration; Appropriate safety awareness; Follows commands (07/28/17 2000)  LUE Motor Response: Purposeful (07/28/17 2000)  LLE Motor Response: Purposeful;Weak (07/28/17 2000)  RUE Motor Response: Purposeful (07/28/17 2000)  RLE Motor Response: Purposeful;Weak (07/28/17 2000)   At baseline    Mental Status and Level of Consciousness: Arousable    Pulmonary Status:   O2 Device: (P) Ventilator (07/29/17 0145)   Adequate oxygenation and airway patent    Complications related to anesthesia: None    Post-anesthesia assessment completed. Patient to remains intubated and sedated.   Will transfer to ICU and can reassess for extubation in AM.    Signed By: Charli Hall MD     July 29, 2017

## 2017-07-29 NOTE — PROGRESS NOTES
Admit Date: 2017    POD 1 Day Post-Op    Procedure:  Procedure(s):  LAPAROTOMY EXPLORATORY    Subjective:     Patient remains intubated, sedated    Objective:     Blood pressure 108/45, pulse 62, temperature 98.9 °F (37.2 °C), resp. rate 23, height 5' 10\" (1.778 m), weight 233 lb 14.5 oz (106.1 kg), SpO2 100 %. Temp (24hrs), Av.6 °F (37 °C), Min:98.2 °F (36.8 °C), Max:99.3 °F (37.4 °C)      Physical Exam:  GENERAL: no distress, appears stated age, LUNG: clear to auscultation bilaterally, HEART: regular rate and rhythm, ABDOMEN: soft, less distended, non-tender. Wound c/d/i, EXTREMITIES:  extremities normal, atraumatic, no cyanosis or edema    Labs:   Recent Results (from the past 24 hour(s))   BLOOD GAS, ARTERIAL    Collection Time: 17  2:07 AM   Result Value Ref Range    pH 7.33 (L) 7.35 - 7.45      PCO2 36 35.0 - 45.0 mmHg    PO2 181 (H) 80 - 100 mmHg    O2 SAT 99 (H) 92 - 97 %    BICARBONATE 18 (L) 22 - 26 mmol/L    BASE DEFICIT 7.0 mmol/L    O2 METHOD VENTILATOR      FIO2 100 %    MODE A/C      Tidal volume 450      SET RATE 14      EPAP/CPAP/PEEP 6.0      Sample source ARTERIAL      SITE DRAWN FROM ARTERIAL LINE      KAREN'S TEST N/A     CBC WITH AUTOMATED DIFF    Collection Time: 17  5:52 AM   Result Value Ref Range    WBC 20.6 (H) 4.1 - 11.1 K/uL    RBC 2.36 (L) 4.10 - 5.70 M/uL    HGB 7.3 (L) 12.1 - 17.0 g/dL    HCT 21.2 (L) 36.6 - 50.3 %    MCV 89.8 80.0 - 99.0 FL    MCH 30.9 26.0 - 34.0 PG    MCHC 34.4 30.0 - 36.5 g/dL    RDW 13.9 11.5 - 14.5 %    PLATELET 350 720 - 838 K/uL    NEUTROPHILS 84 (H) 32 - 75 %    LYMPHOCYTES 7 (L) 12 - 49 %    MONOCYTES 9 5 - 13 %    EOSINOPHILS 0 0 - 7 %    BASOPHILS 0 0 - 1 %    ABS. NEUTROPHILS 17.3 (H) 1.8 - 8.0 K/UL    ABS. LYMPHOCYTES 1.4 0.8 - 3.5 K/UL    ABS. MONOCYTES 1.9 (H) 0.0 - 1.0 K/UL    ABS. EOSINOPHILS 0.0 0.0 - 0.4 K/UL    ABS.  BASOPHILS 0.0 0.0 - 0.1 K/UL    RBC COMMENTS ANISOCYTOSIS  1+        RBC COMMENTS POLYCHROMASIA  1+        DF SMEAR SCANNED     METABOLIC PANEL, COMPREHENSIVE    Collection Time: 07/29/17  5:52 AM   Result Value Ref Range    Sodium 136 136 - 145 mmol/L    Potassium 4.4 3.5 - 5.1 mmol/L    Chloride 107 97 - 108 mmol/L    CO2 21 21 - 32 mmol/L    Anion gap 8 5 - 15 mmol/L    Glucose 140 (H) 65 - 100 mg/dL    BUN 24 (H) 6 - 20 MG/DL    Creatinine 1.36 (H) 0.70 - 1.30 MG/DL    BUN/Creatinine ratio 18 12 - 20      GFR est AA 60 (L) >60 ml/min/1.73m2    GFR est non-AA 49 (L) >60 ml/min/1.73m2    Calcium 6.8 (L) 8.5 - 10.1 MG/DL    Bilirubin, total 0.4 0.2 - 1.0 MG/DL    ALT (SGPT) 19 12 - 78 U/L    AST (SGOT) 34 15 - 37 U/L    Alk. phosphatase 39 (L) 45 - 117 U/L    Protein, total 5.2 (L) 6.4 - 8.2 g/dL    Albumin 1.9 (L) 3.5 - 5.0 g/dL    Globulin 3.3 2.0 - 4.0 g/dL    A-G Ratio 0.6 (L) 1.1 - 2.2     BILIRUBIN, DIRECT    Collection Time: 07/29/17  5:52 AM   Result Value Ref Range    Bilirubin, direct 0.2 0.0 - 0.2 MG/DL   TROPONIN I    Collection Time: 07/29/17  5:52 AM   Result Value Ref Range    Troponin-I, Qt. 1.59 (H) <0.05 ng/mL       Data Review images and reports reviewed    Assessment:     Active Problems:    Bladder injury (7/28/2017)        Plan/Recommendations/Medical Decision Making:     Continue present treatment   S/p exploratory laparotomy for severe abdominal pain and extensive pneumatosis, no perforation identified after exhaustive exploration. Suspect pneumatosis was related to massively distended stomach full of air. Likely also the cause of his pain. Likely extubate in am.  Would leave NGT in until return of bowel function. Will continue to follow along until taking po, but Urology remains attending service. Flex Linda.  Huma Burris MD, [facility]

## 2017-07-29 NOTE — PROGRESS NOTES
TRANSFER - IN REPORT:    Verbal report received from [facility] on [facility]  being received from PACU for routine progression of care      Report consisted of patients Situation, Background, Assessment and   Recommendations(SBAR). Information from the following report(s) SBAR was reviewed with the receiving nurse. Opportunity for questions and clarification was provided. Assessment completed upon patients arrival to unit and care assumed.

## 2017-07-29 NOTE — PROGRESS NOTES
Hospitalist Progress Note    NAME: Gisella Sagastume   :  1928   MRN:  [id number]       Interim Hospital Summary: 80 y.o. male whom presented on 2017 with      Assessment / Plan:    S/p postop respiratory failure tx per icu   S/P removal of bladder stone and repair of perforated bladder  S/P subsequent ex-lap due to extensive sq emphysema in abdomen/retroperitoneum - no sign of bowel perforationShock  Acute renal failure  \"pneumothorax\" due to dissection of air from retroperitoneum cephalad, different physiology or behavior of typical pneumothorax   CAD  Critically ill  Code status: FULL code  Prophylaxis: per primary team  Recommended Disposition: per primary team-     Subjective:     Chief Complaint / Reason for Physician Visit Patient remains intubated, sedated   Discussed with RN events overnight. Review of Systems:  Symptom Y/N Comments  Symptom Y/N Comments   Fever/Chills    Chest Pain     Poor Appetite    Edema     Cough    Abdominal Pain     Sputum    Joint Pain     SOB/CLARKE    Pruritis/Rash     Nausea/vomit    Tolerating PT/OT     Diarrhea    Tolerating Diet     Constipation    Other       Could NOT obtain due to: intubated     Objective:     VITALS:   Last 24hrs VS reviewed since prior progress note.  Most recent are:  Patient Vitals for the past 24 hrs:   Temp Pulse Resp BP SpO2   17 1500 - 62 19 108/45 99 %   17 1400 - 62 20 - 100 %   17 1300 - 62 16 108/43 -   17 1254 98.9 °F (37.2 °C) 64 18 - -   17 1230 - 62 19 - -   17 1207 - 62 17 - 98 %   17 1200 - 61 15 - -   17 1100 - (!) 57 15 - 100 %   17 1000 - (!) 56 12 - 99 %   17 0930 - (!) 56 15 - 100 %   17 0900 - 60 14 117/46 98 %   17 0830 - (!) 56 13 - 99 %   17 0800 - (!) 57 11 109/40 100 %   17 0745 98.2 °F (36.8 °C) (!) 55 14 - 99 %   17 0740 - (!) 54 14 - 100 %   17 0730 - (!) 57 12 - 100 %   17 0700 - (!) 54 13 112/44 100 % 07/29/17 0600 - (!) 55 13 102/41 99 %   07/29/17 0530 - (!) 58 12 119/46 100 %   07/29/17 0500 - (!) 59 12 124/42 100 %   07/29/17 0445 - 60 13 123/42 100 %   07/29/17 0430 - 61 12 128/45 100 %   07/29/17 0421 - 62 13 - 100 %   07/29/17 0415 - 63 13 118/49 100 %   07/29/17 0406 98.3 °F (36.8 °C) 63 12 124/45 99 %   07/29/17 0400 - 63 14 126/42 99 %   07/29/17 0331 98.3 °F (36.8 °C) - - - -   07/29/17 0300 - 67 11 121/51 100 %   07/29/17 0255 98.9 °F (37.2 °C) 67 11 - 100 %   07/29/17 0250 - 67 11 - 100 %   07/29/17 0245 - 67 17 - 100 %   07/29/17 0240 - 67 14 - 100 %   07/29/17 0235 - 67 14 - 100 %   07/29/17 0230 - 67 14 105/43 100 %   07/29/17 0225 - 71 13 - 100 %   07/29/17 0220 - 70 16 - 100 %   07/29/17 0215 - 73 14 - 100 %   07/29/17 0210 - 73 11 - 100 %   07/29/17 0205 - 77 11 - 100 %   07/29/17 0200 - 81 13 (!) 94/38 100 %   07/29/17 0155 - 79 13 - 100 %   07/29/17 0152 99.3 °F (37.4 °C) 80 12 96/41 100 %   07/29/17 0150 - 82 16 - 100 %   07/29/17 0149 - 84 15 - -   07/29/17 0146 - 84 20 - 100 %   07/29/17 0145 - - - 96/41 100 %   07/28/17 2027 98.2 °F (36.8 °C) 82 23 164/75 96 %       Intake/Output Summary (Last 24 hours) at 07/29/17 1523  Last data filed at 07/29/17 1500   Gross per 24 hour   Intake          5767.94 ml   Output             2323 ml   Net          3444.94 ml        PHYSICAL EXAM:  General: WD, WN. Alert, cooperative, no acute distress    EENT:  EOMI. Anicteric sclerae. MMM  Resp:  CTA bilaterally, no wheezing or rales.   No accessory muscle use  CV:  Regular  rhythm,  No edema  GI:  Soft, obese mild distended, Non tender.  +Bowel sounds  Neurologic:  Intubated skin wounds c/di  Psych:   Intubated sedated  Reviewed most current lab test results and cultures  YES  Reviewed most current radiology test results   YES  Review and summation of old records today    NO  Reviewed patient's current orders and MAR    YES  PMH/SH reviewed - no change compared to H&P  ________________________________________________________________________  Care Plan discussed with:    Comments   Patient x    Family      RN x    Care Manager     Consultant                        Multidiciplinary team rounds were held today with , nursing, pharmacist and clinical coordinator. Patient's plan of care was discussed; medications were reviewed and discharge planning was addressed. ________________________________________________________________________  Total NON critical care TIME:  30   Minutes    Total CRITICAL CARE TIME Spent:   Minutes non procedure based      Comments   >50% of visit spent in counseling and coordination of care n    ________________________________________________________________________  Paulo Ocampo MD     Procedures: see electronic medical records for all procedures/Xrays and details which were not copied into this note but were reviewed prior to creation of Plan. LABS:  I reviewed today's most current labs and imaging studies.   Pertinent labs include:  Recent Labs      07/29/17   0552  07/28/17   1345  07/27/17   2040   WBC  20.6*  20.4*  10.2   HGB  7.3*  9.0*  11.4*   HCT  21.2*  26.4*  32.8*   PLT  303  282  299     Recent Labs      07/29/17   0552  07/28/17   1345  07/27/17   2040   NA  136  136  132*   K  4.4  4.7  4.1   CL  107  109*  101   CO2  21  19*  23   GLU  140*  122*  109*   BUN  24*  23*  27*   CREA  1.36*  1.21  1.15   CA  6.8*  7.2*  8.6   ALB  1.9*   --   2.5*   TBILI  0.4   --   0.5   SGOT  34   --   31   ALT  19   --   32       Signed: Paulo Ocampo MD

## 2017-07-29 NOTE — PROGRESS NOTES
Report received from Po Ribera at bedside, patient is sedated, Andre supporting BP and sedative of Diprivan infusing at 50 mcs. Patient well sedated, lab values noted. 0745  Assessment noted, urine output via wick 0. [place] drain with 60cc. No orders to irrigate wick, will speak to Urology. Dr. Norma Grady rounding for pulmonary. 5581  Dr. Kyle Henning covering for urology paged, returned call and order to irrigate wick PRN obtained. To call Dr. Ping Marie with low urine output. 200  Dr. Ping Marie returned page, to give 1 liter bolus for low urine output. Made aware of Troponin, no other order received. 56  Dr. Norma Grady in, orders received. Aware of slow heart rate, urine output and current I/O. Discussed weaning of Diprivan and patient awakening. No relatives currently visiting. 200  Dr. Albino Kerns in, wick irrigated with 140 CC of NS. No orders received. 80  Dr. Norma Grady paged due to low urine output. 1600 No change in assessment, urine output remains very low.   1925  Report given to Helen.

## 2017-07-29 NOTE — PROGRESS NOTES
Call Dr Lora López concerning patient severe pain in abdomen with a new order to give dilaudid 1mg now as a one time order and to call the hospitalist to come to see the patient.

## 2017-07-30 NOTE — PROGRESS NOTES
80 YOM on pepcid for GI prophylaxis. Famotidine scheduled 20 mg BID. Per renal dosing protocol, will reduce dose to 20 mg daily.    Scr- 1.58  CrCl- <50 ml/min

## 2017-07-30 NOTE — PROGRESS NOTES
Hospitalist Progress Note    NAME: Clair Norman   :  1928   MRN:  [id number]       Interim Hospital Summary: 80 y.o. male whom presented on 2017 with      Assessment / Plan:  S/p postop respiratory failure tx per icu   S/P removal of bladder stone and repair of perforated bladder  S/P subsequent ex-lap due to extensive sq emphysema in abdomen/retroperitoneum - no sign of bowel perforationShock  Acute renal failure ivf  \"pneumothorax\" due to dissection of air from retroperitoneum cephalad, different physiology or behavior of typical pneumothorax   CAD     worsening anemia  Consider blood tx  Critically ill  Code status: FULL code  Prophylaxis: per primary team  Recommended Disposition: per primary team-                Subjective:     Chief Complaint / Reason for Physician Visit  Sedated on vent   Discussed with RN events overnight. Review of Systems:  Symptom Y/N Comments  Symptom Y/N Comments   Fever/Chills    Chest Pain     Poor Appetite    Edema     Cough    Abdominal Pain     Sputum    Joint Pain     SOB/CLARKE    Pruritis/Rash     Nausea/vomit    Tolerating PT/OT     Diarrhea    Tolerating Diet     Constipation    Other       Could NOT obtain due to: intubated     Objective:     VITALS:   Last 24hrs VS reviewed since prior progress note.  Most recent are:  Patient Vitals for the past 24 hrs:   Temp Pulse Resp BP SpO2   17 0600 - 68 17 103/43 -   17 0500 - 73 24 125/51 -   17 0400 99.4 °F (37.4 °C) 65 17 115/41 99 %   17 0336 - 70 18 - 98 %   17 0300 - 66 17 111/41 -   17 0200 99.4 °F (37.4 °C) 69 22 109/41 97 %   17 0112 - 68 22 109/43 99 %   17 0100 - 68 18 - 98 %   17 0001 - 66 18 - 100 %   17 0000 - 65 18 107/42 99 %   17 2300 (!) 100.8 °F (38.2 °C) 68 21 96/42 99 %   17 2229 - 80 (!) 32 - -   17 2200 - 69 26 (!) 115/37 -   17 2115 - 73 28 - 99 %   17 2100 (!) 101.2 °F (38.4 °C) 69 25 (!) 115/38 96 % 07/29/17 2030 - 70 26 (!) 116/38 -   07/29/17 1928 (!) 100.7 °F (38.2 °C) 72 28 122/43 98 %   07/29/17 1900 - 64 23 113/41 -   07/29/17 1800 - 65 22 - -   07/29/17 1700 - 66 22 108/43 (!) 89 %   07/29/17 1616 - 70 28 - -   07/29/17 1609 - 79 25 - (!) 60 %   07/29/17 1600 99.7 °F (37.6 °C) 64 20 - 100 %   07/29/17 1549 - 62 23 - 100 %   07/29/17 1500 - 62 19 108/45 99 %   07/29/17 1400 - 62 20 - 100 %   07/29/17 1300 - 62 16 108/43 -   07/29/17 1254 98.9 °F (37.2 °C) 64 18 - -   07/29/17 1230 - 62 19 - -   07/29/17 1207 - 62 17 - 98 %   07/29/17 1200 - 61 15 - -   07/29/17 1100 - (!) 57 15 - 100 %   07/29/17 1000 - (!) 56 12 - 99 %   07/29/17 0930 - (!) 56 15 - 100 %   07/29/17 0900 - 60 14 117/46 98 %   07/29/17 0830 - (!) 56 13 - 99 %   07/29/17 0800 - (!) 57 11 109/40 100 %   07/29/17 0745 98.2 °F (36.8 °C) (!) 55 14 - 99 %   07/29/17 0740 - (!) 54 14 - 100 %   07/29/17 0730 - (!) 57 12 - 100 %       Intake/Output Summary (Last 24 hours) at 07/30/17 0728  Last data filed at 07/30/17 0708   Gross per 24 hour   Intake          5543.96 ml   Output              744 ml   Net          4799.96 ml        PHYSICAL EXAM:  General:  Sedated on vent ill looking intubated  EENT:  EOMI. Anicteric sclerae. MMM  Resp:  decrease bs jose, no wheezing or rales. No accessory muscle use  CV:  Regular  rhythm,  + jose pedal edema  GI:  Soft, +distended, Non tender.  +Bowel sounds  Neurologic:  Skin:  No rashes.   No jaundice    Reviewed most current lab test results and cultures  YES  Reviewed most current radiology test results   YES  Review and summation of old records today    NO  Reviewed patient's current orders and MAR    YES  PMH/SH reviewed - no change compared to H&P  ________________________________________________________________________  Care Plan discussed with:    Comments   Patient x    Family      RN     Care Manager     Consultant                        Multidiciplinary team rounds were held today with , nursing, pharmacist and clinical coordinator. Patient's plan of care was discussed; medications were reviewed and discharge planning was addressed. ________________________________________________________________________  Total NON critical care TIME:  20  Minutes    Total CRITICAL CARE TIME Spent:   Minutes non procedure based      Comments   >50% of visit spent in counseling and coordination of care     ________________________________________________________________________  Oleg Thakkar MD     Procedures: see electronic medical records for all procedures/Xrays and details which were not copied into this note but were reviewed prior to creation of Plan. LABS:  I reviewed today's most current labs and imaging studies.   Pertinent labs include:  Recent Labs      07/30/17   0412  07/29/17   0552  07/28/17   1345   WBC  18.5*  20.6*  20.4*   HGB  6.2*  7.3*  9.0*   HCT  18.7*  21.2*  26.4*   PLT  248  303  282     Recent Labs      07/30/17   0412  07/29/17   0552  07/28/17   1345  07/27/17   2040   NA  138  136  136  132*   K  4.0  4.4  4.7  4.1   CL  111*  107  109*  101   CO2  17*  21  19*  23   GLU  103*  140*  122*  109*   BUN  29*  24*  23*  27*   CREA  1.58*  1.36*  1.21  1.15   CA  6.6*  6.8*  7.2*  8.6   MG  1.8   --    --    --    PHOS  2.3*   --    --    --    ALB   --   1.9*   --   2.5*   TBILI   --   0.4   --   0.5   SGOT   --   34   --   31   ALT   --   19   --   32       Signed: Oleg Thakkar MD

## 2017-07-30 NOTE — PROGRESS NOTES
0725 Bedside and Verbal shift change report given to Madelyn RN (oncoming nurse) by [facility] FATOUMATA TORRES (offgoing nurse). Report included the following information SBAR, Kardex, Intake/Output, Recent Results and Cardiac Rhythm 1 degree heart block. .   Intravenous fluids: Andre at 20 mcg/min, NS at 100ml/hr  Restraint: yes  Rosales: Yes: urological reasons  0730Verbal phone consent obtained for blood. Witnessed by Kate Marks RN    3363 Dr Karlo Downs at the bedside. Propofol stopped for SAT. HE remains in restraints with family at the bedside. 7818 Informed Dr. Karlo Downs patient SpO2 drops into the low 80's when flat position. Ordered to placed patient back on a rate and will try again tomorrow. 26 Informed Dr. Karlo Downs of patient is not fully waking up or following comands. Propofol off for 3.5 hours. Diluadid given due to increase in vital signs. Questioned if a different or less potent pain med that can be given. Ordered to change Dilaudid to 0.2-0.5mcg every 2 hours PRN. [facility] Dr Jolanta Yuan of hgb of 6.7 post 1 unit of blood. Ordered to give two more units and check h/h 1 hr post 2nd infusion. Shift summary: Patient tolerated the vent with out difficulties. Vital signs stable Andre requirement increase with increasing propofol requirements. Two attempts at SBT today failed both times. He received 3 units of blood total no reaction to the blood transfusion. Juancarlos Cates RN in the room checking on patient as patient was coughing and synchronizing with the vent. Patient was suctioned by Gurinder Gasca RN and he immediately told the writer. Propofol increased to 20mcg/kg/hr. Patient settled tolerating the vent with out difficulty. 5 Daughter came up to writer almost hysterical. Vianey Gutierrez no body has been in the room \"for the past 2 hours\" Nurse attempted to explain how he was being watched and propofol was increased at 1915. Which was in the past two hours.  She complained she had not met the night nurse and did not understand why no body was checking in on her dad. She was politely reminded how the male nurse suctioned the patient and the writer increased the propofol so the patient could tolerate the vent. She continues to argue when the nurse suggested her speak to the charge nurse. 107 [facility] charge informed.

## 2017-07-30 NOTE — PROGRESS NOTES
07/30/17 9603   ABCDE Bundle   SBT Safety Screen Passed No   SBT Screen Reason for Failure Vasopressor use

## 2017-07-30 NOTE — PROGRESS NOTES
2000, patient received from day shift. Temp 100.7 axi  Neuro; sedated on propofol at 35 coby/kg/min, follow command, no eyes contact, pupils Reactive, GCS;  Eye; 3, verbal; 1 for ETT, motor; 6. Can do  at hands but weak, withdraw at both leg,  Respiratory; Sat 98% on Ventilator A/C mode, , RR 14, Peep 4, FiO2 40%, ETT 22 cm at teeth, secretion; small, thick yellow, clear upper diminished lower lung sounds. Cardiac; 1 AV Block, 72 B/min, NIBP 122/43 mmHg, /37 mmHg, on Andre at 60 coby/min. CVP 8-10 mmHg. GI; NPO with NG tube ( 65 cm at Nare) connected to suction, brown, Abd distended tender with hypoactive bowel sound, on NS at 100 ml/hr. ARM[facility] at [facility],   Renal; 3 ways wick cath with anuria > flushed, Malecot drain ( suprapubic cath)   Skin; Pale, ecchymosis at hands and right flanks, edema at both feet +3 pitting, peripheral pulses with doppler . Midline abd incision dressing with honey comb > slight oozed blood at lower site,  Endo; none,   Lines; ETT, right IJ CVP, NGT, 3 ways Wick. ARMEN drain, malecot drain  Code; Full   Lab; WBC 20.6 , HGB; 7.3, platelet; 252, Na; 502, K; 4.4, BUN; 24, Crea; 1.36,  DVT; SCD both legs.   Restraints both hands for patient safety for 24 hours  Plan; ventilator management, pain and agitation management, follow urine out,  follow lab tomorrow,    2133, Dr Angelito Coleman updated about urine out put still low, Temp 101.2 axillary, prescribed Levofloxacin 750 mg IV OD, continue with vancomycin, Albumin 5% 500 ml over 2 hours, Acetaminophen 650 mg PRN Q 4 hrs for Temp Per NGT,   0000, Temp now 100.8 axi from 101.2, Andre increased to 95 coby to keep MAP above than 65 mmHg, urine out put still about the same, one hour give 35 ml after position then next hour give 15 ml, to follow up.  0540, Dr Mariann Batters; Urology on call, returned back the call, updated about the Lab result, HGB 6.2 from 7.3, Urea 24> 29, Crea; 1.36 > 1.58, Bicar 17, order to transfuse 1 PRBC then repeat HGB after 1 hour, 0608, Type & Cross match sent to Blood bank, Janene Carter ( the daughter as family requested to be the first one to call in the list), no respond, message left to call back no emergency. Will call again when the PRBCs be ready. 0730, propofol turned off for sedation vacation, patient start to wake up but still weak as dilaudid given at 5am, BP start to rise up, Andre titrated down and propofol restarted at half the dose at 17 coby/kg/min, Andre now at 20 coby/min, blood ready on bank, consent taken over the phone from the wife by Madelyn TORRES witness by me, urine out put still low but clearer than yesterday, wick cath flushed many times, ARMEN not draining,   Bedside and Verbal shift change report given to Madelyn MATHEW RN (oncoming nurse) by Chandler Landa RN (offgoing nurse). Report included the following information SBAR, Kardex, ED Summary, OR Summary, Procedure Summary, Intake/Output, MAR, Accordion, Recent Results, Med Rec Status and Cardiac Rhythm 1 AV Block HR 70.

## 2017-07-30 NOTE — PROGRESS NOTES
vss af .  Sedated on vent. abd soft. Urine output is about 50/ hr.  hgb 6.7 after one unit. May be oozing due to extensive exploration. Urine is clear. Creat up some. Plan  Transfuse 2 units. May help w uo and renal function as well. Remains ill. Appreciate help from everyone.

## 2017-07-30 NOTE — PROGRESS NOTES
[facility]  Pulmonary, Critical Care, and Sleep Medicine    Name: Kwabena Billy MRN: [id number]   : 1928 Hospital: [facility]   Date: 2017        IMPRESSION:   · Postop respiratory failure  · S/P removal of bladder stone and repair of perforated bladder  · S/P subsequent ex-lap due to extensive sq emphysema in abdomen/retroperitoneum - no sign of bowel perforation  · Shock  · Acute renal failure  · \"pneumothorax\" due to dissection of air from retroperitoneum cephalad, different physiology or behavior of typical pneumothorax   · CAD      PLAN:   · Ventilator support - wean to extubate today  · IV fluids  · Pressors  · Pain control  · Sedation as needed  · No need for chest tubes for this type of pneumothorax, should resolve spontaneously, but will monitor  · DVT/GI prophylaxis     Subjective/Interval History:   I have reviewed the flowsheet and previous days notes. The patient is unable to give any meaningful history or review of systems because the patient is:   Intubated/sedated      The patient is critically ill on:      Mechanical ventilation/pressors      Review of Systems   Unable to perform ROS: Intubated     Objective:   Vital Signs:    Visit Vitals    /44    Pulse 78    Temp 99.8 °F (37.7 °C)    Resp 20    Ht 5' 10\" (1.778 m)    Wt 106.1 kg (233 lb 14.5 oz)    SpO2 97%    BMI 33.56 kg/m2       O2 Device: Ventilator, Endotracheal tube   O2 Flow Rate (L/min): 3 l/min   Temp (24hrs), Av °F (37.8 °C), Min:98.9 °F (37.2 °C), Max:101.2 °F (38.4 °C)       Intake/Output:   Last shift:      701 - 1900  In: -   Out: 15 [Drains:15]  Last 3 shifts: 1901 -  07  In: 9326.6 [I.V.:8876.6]  Out: 2164 [Urine:343; Drains:1076]    Intake/Output Summary (Last 24 hours) at 17 0848  Last data filed at 17 0708   Gross per 24 hour   Intake          5855.45 ml   Output              684 ml   Net          5171.45 ml Hemodynamics:   PAP:   CO:     Wedge:   CI:     CVP:  CVP (mmHg): 15 mmHg (07/30/17 0400) SVR:       PVR:       Ventilator Settings:  Mode Rate Tidal Volume Pressure FiO2 PEEP   Assist control   450 ml    40 % 6 cm H20     Peak airway pressure: 15 cm H2O    Minute ventilation: 11 l/min       Physical Exam   Constitutional: He appears ill. He is sedated and intubated. HENT:   Head: Normocephalic and atraumatic. Eyes: No scleral icterus. Cardiovascular: Normal rate and regular rhythm. No murmur heard. Pulmonary/Chest: He is intubated. No respiratory distress. He has no wheezes. He has no rales. Abdominal: He exhibits distension. Bowel sounds are absent. Postop abdomen, sq emphysema resolved   Musculoskeletal: He exhibits edema. Skin: Skin is warm and dry.      Data:     Current Facility-Administered Medications   Medication Dose Route Frequency    sodium chloride (NS) flush 5-10 mL  5-10 mL IntraVENous Q8H    0.9% sodium chloride infusion  100 mL/hr IntraVENous CONTINUOUS    PHENYLephrine (NEOSYNEPHRINE) 30,000 mcg in 0.9% sodium chloride 250 mL infusion   mcg/min IntraVENous TITRATE    propofol (DIPRIVAN) infusion  5-50 mcg/kg/min IntraVENous TITRATE    chlorhexidine (PERIDEX) 0.12 % mouthwash 15 mL  15 mL Oral Q12H    mupirocin (BACTROBAN) 2 % ointment   Both Nostrils BID    famotidine (PF) (PEPCID) 20 mg in sodium chloride 0.9 % 10 mL injection  20 mg IntraVENous Q12H    sodium chloride (NS) flush 10 mL  10 mL InterCATHeter Q24H    sodium chloride (NS) flush 10-40 mL  10-40 mL InterCATHeter Q8H    sodium chloride (NS) flush 20 mL  20 mL InterCATHeter Q24H    levoFLOXacin (LEVAQUIN) 750 mg in D5W IVPB  750 mg IntraVENous Q48H    vancomycin (VANCOCIN) 1500 mg in  ml infusion  1,500 mg IntraVENous Q24H                Labs:  Recent Labs      07/30/17   0412  07/29/17   0552  07/28/17   1345   WBC  18.5*  20.6*  20.4*   HGB  6.2*  7.3*  9.0*   HCT  18.7*  21.2*  26.4*   PLT 248  303  282     Recent Labs      07/30/17   0412  07/29/17   0552  07/28/17   1345  07/27/17   2040   NA  138  136  136  132*   K  4.0  4.4  4.7  4.1   CL  111*  107  109*  101   CO2  17*  21  19*  23   GLU  103*  140*  122*  109*   BUN  29*  24*  23*  27*   CREA  1.58*  1.36*  1.21  1.15   CA  6.6*  6.8*  7.2*  8.6   MG  1.8   --    --    --    PHOS  2.3*   --    --    --    ALB   --   1.9*   --   2.5*   TBILI   --   0.4   --   0.5   SGOT   --   34   --   31   ALT   --   19   --   32     Recent Labs      07/30/17   0450  07/29/17   0207   PH  7.39  7.33*   PCO2  28*  36   PO2  124*  181*   HCO3  17*  18*   FIO2  40  100     Imaging:  I have personally reviewed the patients radiographs and have reviewed the reports:  No change        Total critical care time exclusive of procedures: 25 minutes  Pat Ferrell MD

## 2017-07-30 NOTE — PROGRESS NOTES
Admit Date: 2017    POD 2 Day Post-Op    Procedure:  Procedure(s):  LAPAROTOMY EXPLORATORY    Subjective:     Patient remains intubated, sedated, failed weaning trial today. Objective:     Blood pressure 115/48, pulse 87, temperature 99.8 °F (37.7 °C), resp. rate 23, height 5' 10\" (1.778 m), weight 233 lb 14.5 oz (106.1 kg), SpO2 98 %. Temp (24hrs), Av °F (37.8 °C), Min:98.9 °F (37.2 °C), Max:101.2 °F (38.4 °C)      Physical Exam:  GENERAL: no distress, appears stated age, LUNG: clear to auscultation bilaterally, HEART: regular rate and rhythm, ABDOMEN: soft, less distended, non-tender.  Wound c/d/i, EXTREMITIES:  extremities normal, atraumatic, no cyanosis or edema    Labs:   Recent Results (from the past 24 hour(s))   CBC W/O DIFF    Collection Time: 17  4:12 AM   Result Value Ref Range    WBC 18.5 (H) 4.1 - 11.1 K/uL    RBC 2.06 (L) 4.10 - 5.70 M/uL    HGB 6.2 (L) 12.1 - 17.0 g/dL    HCT 18.7 (L) 36.6 - 50.3 %    MCV 90.8 80.0 - 99.0 FL    MCH 30.1 26.0 - 34.0 PG    MCHC 33.2 30.0 - 36.5 g/dL    RDW 14.3 11.5 - 14.5 %    PLATELET 726 242 - 084 K/uL   METABOLIC PANEL, BASIC    Collection Time: 17  4:12 AM   Result Value Ref Range    Sodium 138 136 - 145 mmol/L    Potassium 4.0 3.5 - 5.1 mmol/L    Chloride 111 (H) 97 - 108 mmol/L    CO2 17 (L) 21 - 32 mmol/L    Anion gap 10 5 - 15 mmol/L    Glucose 103 (H) 65 - 100 mg/dL    BUN 29 (H) 6 - 20 MG/DL    Creatinine 1.58 (H) 0.70 - 1.30 MG/DL    BUN/Creatinine ratio 18 12 - 20      GFR est AA 50 (L) >60 ml/min/1.73m2    GFR est non-AA 42 (L) >60 ml/min/1.73m2    Calcium 6.6 (L) 8.5 - 10.1 MG/DL   MAGNESIUM    Collection Time: 17  4:12 AM   Result Value Ref Range    Magnesium 1.8 1.6 - 2.4 mg/dL   PHOSPHORUS    Collection Time: 17  4:12 AM   Result Value Ref Range    Phosphorus 2.3 (L) 2.6 - 4.7 MG/DL   BLOOD GAS, ARTERIAL    Collection Time: 17  4:50 AM   Result Value Ref Range    pH 7.39 7.35 - 7.45      PCO2 28 (L) 35.0 - 45.0 mmHg    PO2 124 (H) 80 - 100 mmHg    O2 SAT 99 (H) 92 - 97 %    BICARBONATE 17 (L) 22 - 26 mmol/L    BASE DEFICIT 6.8 mmol/L    O2 METHOD VENTILATOR      FIO2 40 %    MODE A/C      Tidal volume 450      SET RATE 14      EPAP/CPAP/PEEP 6.0      Sample source ARTERIAL      SITE DRAWN FROM ARTERIAL LINE      KAREN'S TEST N/A     TYPE & CROSSMATCH    Collection Time: 07/30/17  5:56 AM   Result Value Ref Range    Crossmatch Expiration 08/02/2017     ABO/Rh(D) A POSITIVE     Antibody screen NEG     Unit number [id number]     Blood component type RC LR AS1     Unit division 00     Status of unit ISSUED     Crossmatch result Compatible        Data Review images and reports reviewed    Assessment:     Active Problems:    Bladder injury (7/28/2017)        Plan/Recommendations/Medical Decision Making:     Continue present treatment   S/p exploratory laparotomy for severe abdominal pain and extensive pneumatosis, no perforation identified after exhaustive exploration. Suspect pneumatosis was related to massively distended stomach full of air. Likely also the cause of his pain. Repeat weaning trial tomorrow. Would leave NGT in until return of bowel function. Will continue to follow along until taking po, but Urology remains attending service. Katie Monroy.  Kyle Hammond MD, [facility]

## 2017-07-31 NOTE — PROGRESS NOTES
Summary; Patient more responsive on morning after reduced the sedation over night to be 15 coby/kg/min then  turning the sedation off for SAT & SBT, dilaudid given after turned the sedation off as BP goes up to 170s, move all limbs but still weak open eyes, Passed the spontaneous trial, has leakage at lower part on midline incision > new dressing applied after cleaned with chlor prep extra Dry and Taped, adequate urine out put, edema increased at arms hands and abd and rex, NGT drainage more light green > total over night 570 ml,  HGB 8.8 > 8.5, Bicarb 16, Crea 1.64, urea 31, to follow up  Bedside and Verbal shift change report given to Wanda Willis (oncoming nurse) by Miguel Rinaldi RN (offgoing nurse). Report included the following information SBAR, Kardex, OR Summary, Procedure Summary, Intake/Output, MAR, Accordion, Recent Results, Med Rec Status and Cardiac Rhythm 1 AV Block .

## 2017-07-31 NOTE — PROGRESS NOTES
Report received from [facility] FATOUMATA, patient resting with eyes closed at present. Lab values noted. 0830  Assessed in agreement with Edgardo Carver, patient resting quietly at present. 1250  No changes noted in assessment. 1630  Cooperative still, no changes in assessment.

## 2017-07-31 NOTE — INTERDISCIPLINARY ROUNDS
Interdisciplinary team rounds were held 7/31/2017 with the following team members:Care Management, Diabetes Treatment Specialist, Nursing, Nutrition, Pharmacy and Physician. Plan of care discussed. See clinical pathway and/or care plan for interventions and desired outcomes.

## 2017-07-31 NOTE — PROGRESS NOTES
Surgery      S/P negative exploration per Dr Jarret Gallagher, except gastric distention. Extubated earlier today    Abd appears distended and quiet. Likely postop ileus. Cont NG for now until good bowel function. Christen Lopez.  [place] Cerrato MD MD, [facility]

## 2017-07-31 NOTE — PROGRESS NOTES
[facility]  Pulmonary, Critical Care, and Sleep Medicine    Name: Zenobia Palma MRN: [id number]   : 1928 Hospital: [facility]   Date: 2017        IMPRESSION:   · Postop respiratory failure on vent but slow to arouse  · Complicated Enterococcal UTI- sensitive to Amp  · S/P removal of bladder stone and repair of perforated bladder  · S/P subsequent ex-lap due to extensive sq emphysema in abdomen/retroperitoneum - no sign of bowel perforation  · Shock better, now hypertensive  · hypocalcemia  · Acute renal failure  · \"pneumothorax\" due to dissection of air from retroperitoneum cephalad, different physiology or behavior of typical pneumothorax   · CAD      PLAN:   · Ventilator support - extubate today when more alert  · IV ampicillin- will need 7 days course, d/c vancomycin  · IV fluids  · antihypertensives  · Pain control  · No need for chest tubes for this type of pneumothorax, should resolve spontaneously, but will monitor  · DVT/GI prophylaxis     Subjective/Interval History:   I have reviewed the flowsheet and previous days notes. The patient is unable to give any meaningful history or review of systems because the patient is:   Intubated/sedated        Review of Systems   Unable to perform ROS: Intubated     Objective:   Vital Signs:    Visit Vitals    /61    Pulse 82    Temp 98.4 °F (36.9 °C)    Resp 16    Ht 5' 10\" (1.778 m)    Wt 106.1 kg (233 lb 14.5 oz)    SpO2 97%    BMI 33.56 kg/m2       O2 Device: Ventilator, Endotracheal tube   O2 Flow Rate (L/min): 3 l/min   Temp (24hrs), Av.2 °F (37.3 °C), Min:98.4 °F (36.9 °C), Max:100 °F (37.8 °C)       Intake/Output:   Last shift:      701 - 1900  In: 100 [I.V.:100]  Out: 290 [Urine:220; Drains:20]  Last 3 shifts: 1901 - 700  In: 4855 [I.V.:4839.7]  Out: 1648 [Urine:261; Drains:967]    Intake/Output Summary (Last 24 hours) at 17 0914  Last data filed at 07/31/17 0900   Gross per 24 hour   Intake          3574.34 ml   Output             1453 ml   Net          2121.34 ml     Hemodynamics:   PAP:   CO:     Wedge:   CI:     CVP:  CVP (mmHg): 14 mmHg (07/31/17 0400) SVR:       PVR:       Ventilator Settings:  Mode Rate Tidal Volume Pressure FiO2 PEEP   CPAP   450 ml  5 cm H2O 40 % 6 cm H20     Peak airway pressure: 12 cm H2O    Minute ventilation: 9.88 l/min       Physical Exam   Constitutional: He appears ill. He is sedated and intubated. HENT:   Head: Normocephalic and atraumatic. Eyes: No scleral icterus. Cardiovascular: Normal rate and regular rhythm. No murmur heard. Pulmonary/Chest: He is intubated. No respiratory distress. He has no wheezes. He has no rales. Abdominal: He exhibits distension. Bowel sounds are absent. Postop abdomen, sq emphysema resolved   Musculoskeletal: He exhibits edema. Skin: Skin is warm and dry.      Data:     Current Facility-Administered Medications   Medication Dose Route Frequency    famotidine (PF) (PEPCID) 20 mg in sodium chloride 0.9 % 10 mL injection  20 mg IntraVENous Q24H    sodium chloride (NS) flush 5-10 mL  5-10 mL IntraVENous Q8H    0.9% sodium chloride infusion  100 mL/hr IntraVENous CONTINUOUS    PHENYLephrine (NEOSYNEPHRINE) 30,000 mcg in 0.9% sodium chloride 250 mL infusion   mcg/min IntraVENous TITRATE    propofol (DIPRIVAN) infusion  5-50 mcg/kg/min IntraVENous TITRATE    chlorhexidine (PERIDEX) 0.12 % mouthwash 15 mL  15 mL Oral Q12H    mupirocin (BACTROBAN) 2 % ointment   Both Nostrils BID    sodium chloride (NS) flush 10 mL  10 mL InterCATHeter Q24H    sodium chloride (NS) flush 10-40 mL  10-40 mL InterCATHeter Q8H    sodium chloride (NS) flush 20 mL  20 mL InterCATHeter Q24H    levoFLOXacin (LEVAQUIN) 750 mg in D5W IVPB  750 mg IntraVENous Q48H    vancomycin (VANCOCIN) 1500 mg in  ml infusion  1,500 mg IntraVENous Q24H                Labs:  Recent Labs      07/31/17   9536 07/30/17   2225  07/30/17   1149  07/30/17   0412  07/29/17   0552   WBC  15.1*   --    --   18.5*  20.6*   HGB  8.5*  8.8*  6.7*  6.2*  7.3*   HCT  25.8*  25.3*  20.2*  18.7*  21.2*   PLT  218   --    --   248  303     Recent Labs      07/31/17   0441  07/30/17   0412  07/29/17   0552   NA  139  138  136   K  4.1  4.0  4.4   CL  112*  111*  107   CO2  16*  17*  21   GLU  113*  103*  140*   BUN  31*  29*  24*   CREA  1.64*  1.58*  1.36*   CA  7.0*  6.6*  6.8*   MG  1.9  1.8   --    PHOS  2.5*  2.3*   --    ALB   --    --   1.9*   TBILI   --    --   0.4   SGOT   --    --   34   ALT   --    --   19     Recent Labs      07/30/17   0450  07/29/17   0207   PH  7.39  7.33*   PCO2  28*  36   PO2  124*  181*   HCO3  17*  18*   FIO2  40  100     Imaging:  I have personally reviewed the patients radiographs and have reviewed the reports:  No change        Total critical care time exclusive of procedures: 25 minutes  Srinivas Liu MD

## 2017-07-31 NOTE — PROGRESS NOTES
Report received from [facility] FATOUMATA, patient resting with eyes closed at present. Lab values noted. 0830  Assessed in agreement with Margo Hercules, patient resting quietly at present. 1250  No changes noted in assessment. 1630  Cooperative still, no changes in assessment.

## 2017-07-31 NOTE — PROGRESS NOTES
Hospitalist Progress Note    NAME: Fe Renee   :  1928   MRN:  [id number]       Interim Hospital Summary: 80 y.o. male whom presented on 2017 with      Assessment / Plan:  S/p postop respiratory failure tx per icu on vent support  S/P removal of bladder stone and repair of perforated bladder  S/P subsequent ex-lap due to extensive sq emphysema in abdomen/retroperitoneum - no sign of bowel perforation. Shock  Acute renal failure ivf  \"pneumothorax\" due to dissection of air from retroperitoneum cephalad, different physiology or behavior of typical pneumothorax   CAD    Anemia, s/p PRBC  UTI with enterococcus on ampicillin  Critically ill  Code status: FULL code  Prophylaxis: per primary team  Recommended Disposition: per primary team-      Subjective:     Chief Complaint / Reason for Physician Visit  Sedated on vent   Discussed with RN events overnight. Review of Systems:  Symptom Y/N Comments  Symptom Y/N Comments   Fever/Chills    Chest Pain     Poor Appetite    Edema     Cough    Abdominal Pain     Sputum    Joint Pain     SOB/CLARKE    Pruritis/Rash     Nausea/vomit    Tolerating PT/OT     Diarrhea    Tolerating Diet     Constipation    Other       Could NOT obtain due to: intubated     Objective:     VITALS:   Last 24hrs VS reviewed since prior progress note.  Most recent are:  Patient Vitals for the past 24 hrs:   Temp Pulse Resp BP SpO2   17 1200 98.8 °F (37.1 °C) 87 18 157/72 96 %   17 1100 - 86 20 156/72 97 %   17 1000 - 87 18 143/61 97 %   17 0900 - 82 16 139/61 97 %   17 0800 98.4 °F (36.9 °C) 81 16 137/53 100 %   17 0745 98.4 °F (36.9 °C) 81 16 - 100 %   17 0713 - 73 15 - 97 %   17 0700 - 74 15 131/49 100 %   17 0627 - 78 16 - 100 %   17 0600 - 72 15 125/52 -   17 0500 - 76 19 134/56 -   17 0400 98.4 °F (36.9 °C) 70 20 122/55 -   17 0300 - 72 21 125/54 -   17 0200 - 72 18 119/50 100 %   17 0117 - 66 21 - 100 %   07/31/17 0100 - 68 22 127/54 98 %   07/31/17 0000 99.7 °F (37.6 °C) 63 21 112/50 97 %   07/30/17 2300 99.7 °F (37.6 °C) 72 19 122/47 96 %   07/30/17 2200 - 71 18 109/44 96 %   07/30/17 2155 - 66 20 - 98 %   07/30/17 2100 - 67 20 112/44 97 %   07/30/17 2030 - 66 20 110/42 97 %   07/30/17 2000 99.7 °F (37.6 °C) 64 19 105/44 96 %   07/30/17 1930 - 66 21 106/41 96 %   07/30/17 1900 - 66 19 104/44 97 %   07/30/17 1845 - 66 19 101/44 97 %   07/30/17 1830 - 67 19 105/40 97 %   07/30/17 1815 - 69 19 109/44 97 %   07/30/17 1800 99.2 °F (37.3 °C) 70 23 122/59 97 %   07/30/17 1700 99.3 °F (37.4 °C) 72 21 115/47 97 %   07/30/17 1600 99.2 °F (37.3 °C) 75 22 (!) 97/39 96 %   07/30/17 1530 - 84 25 119/48 96 %   07/30/17 1500 - 88 30 122/50 97 %   07/30/17 1430 - 89 25 114/48 96 %   07/30/17 1425 - 94 28 - 97 %   07/30/17 1415 - 89 24 131/53 97 %   07/30/17 1414 - 92 29 - 97 %   07/30/17 1412 100 °F (37.8 °C) 95 (!) 32 (!) 113/37 97 %   07/30/17 1400 - 84 22 113/47 96 %   07/30/17 1330 - 84 23 119/46 96 %   07/30/17 1300 - 87 21 116/47 96 %   07/30/17 1230 - 88 21 111/42 96 %   07/30/17 1210 - 84 26 - 96 %       Intake/Output Summary (Last 24 hours) at 07/31/17 1203  Last data filed at 07/31/17 1100   Gross per 24 hour   Intake          3147.43 ml   Output             1485 ml   Net          1662.43 ml        PHYSICAL EXAM:  General:  Sedated on vent ill looking intubated  EENT:  EOMI. Anicteric sclerae. MMM  Resp:  decrease BS BL, no wheezing or rales.   No accessory muscle use  CV:  Regular  rhythm,  + jose pedal edema  GI:  Soft, +distended, Non tender.  +Bowel sounds  Neurologic:  Sedated, opens eyes    Reviewed most current lab test results and cultures  YES  Reviewed most current radiology test results   YES  Review and summation of old records today    NO  Reviewed patient's current orders and MAR    YES  PMH/SH reviewed - no change compared to H&P  ________________________________________________________________________  Care Plan discussed with:    Comments   Patient     Family      RN x    Care Manager     Consultant                        Multidiciplinary team rounds were held today with , nursing, pharmacist and clinical coordinator. Patient's plan of care was discussed; medications were reviewed and discharge planning was addressed. ________________________________________________________________________  Total NON critical care TIME:  25  Minutes    Total CRITICAL CARE TIME Spent:   Minutes non procedure based      Comments   >50% of visit spent in counseling and coordination of care     ________________________________________________________________________  Padmini Doherty MD     Procedures: see electronic medical records for all procedures/Xrays and details which were not copied into this note but were reviewed prior to creation of Plan. LABS:  I reviewed today's most current labs and imaging studies.   Pertinent labs include:  Recent Labs      07/31/17 0441 07/30/17 2225 07/30/17   1149  07/30/17   0412  07/29/17   0552   WBC  15.1*   --    --   18.5*  20.6*   HGB  8.5*  8.8*  6.7*  6.2*  7.3*   HCT  25.8*  25.3*  20.2*  18.7*  21.2*   PLT  218   --    --   248  303     Recent Labs      07/31/17   0441 07/30/17   0412  07/29/17   0552   NA  139  138  136   K  4.1  4.0  4.4   CL  112*  111*  107   CO2  16*  17*  21   GLU  113*  103*  140*   BUN  31*  29*  24*   CREA  1.64*  1.58*  1.36*   CA  7.0*  6.6*  6.8*   MG  1.9  1.8   --    PHOS  2.5*  2.3*   --    ALB   --    --   1.9*   TBILI   --    --   0.4   SGOT   --    --   34   ALT   --    --   19       Signed: Padmini Doherty MD

## 2017-07-31 NOTE — PROGRESS NOTES
Pharmacy Automatic Renal Dosing Protocol - Antimicrobials    Indication for Antimicrobials: UTI  Current Regimen of Each Antimicrobial (Start Day & Day of Therapy): Ampicillin 2 q 8hr (, Day1)    Significant cultures:       CAPD, Intermittent Hemodialysis or Renal Replacement Therapy: none  Paralysis, amputations, malnutrition: none  Recent Labs      17   0441  17   0412  17   0552   CREA  1.64*  1.58*  1.36*   BUN  31*  29*  24*   WBC  15.1*  18.5*  20.6*     Temp (24hrs), Av.2 °F (37.3 °C), Min:98.4 °F (36.9 °C), Max:100 °F (37.8 °C)    Creatinine Clearance (ml/min): ~32    Goal Vancomycin Level(s):    Impression/Plan:   Renally dosed Ampicillin 2g q 8hr due to CrCl 10-50mLmin  Pharmacy will continue to monitor. Daily BMP ordered x 3days        Pharmacy will follow daily and adjust doses of monitored medications as appropriate for renal function and/or serum levels.     Thank you,  Roula Ovalles, PharmD

## 2017-07-31 NOTE — PROGRESS NOTES
0800: initial assessment completed  0920: RRT notified about patient's readiness to extubate  0935: Patient extubated and placed on 4L nasal canula with O2 satuation 97%.   1200: Reassessment performed; patient opens eyes to voice; pupils reactive; able to squeeze hand when prompted, but unable to verbalize \"hello\" when asked by nurse. 1405: Patient starting to move around in bed, respirations 24 breaths/min, heart rate 103, blood pressure elevated 180/61. When asked if patient was in pain, patient able to verbalize \"yes\" and squeeze nurse's hand on command. PRN dilaudid given. 1507: PRN hydralazine given for a BP of 173/69  1628: Patient grimacing and vital signs reveal increased respirations, heart rate now tachycardic, and blood pressure up after redoing midline dressing. PRN dilaudid given  1655:  Arterial line removed; pressure held on site for 5 minutes; no evidence of bleeding; pressure dressing applied  1900: Change of shift report given to Sonido Brooks RN

## 2017-08-01 NOTE — PROGRESS NOTES
[facility] [place]  Pulmonary, Critical Care, and Sleep Medicine    Name: Gisella Sagastume MRN: [id number]   : 1928 Hospital: [facility]   Date: 2017        IMPRESSION:   · Postop respiratory failure on vent but slow to arouse  · Complicated Enterococcal UTI- sensitive to Amp  · CAD with recent stents per [facility]  · NSTEMI? Elevated troponin still climbing- likley demand ischemia but now with AF RVR  · AF RVR starting this AM  · Echo earlier this year LVEF 45%  · anasarca  · S/P removal of bladder stone and repair of perforated bladder  · S/P subsequent ex-lap due to extensive sq emphysema in abdomen/retroperitoneum - no sign of bowel perforation  · Shock better, now hypertensive  · Hypocalcemia  · hypophosphatemia  · Acute renal failure  · \"pneumothorax\" due to dissection of air from retroperitoneum cephalad, different physiology or behavior of typical pneumothorax   · CAD      PLAN:   · Troponin  · Diltiazem drip   · Consult - pt of his  · Head CT scan if he does not perk up. He interacts with daughter  · IV ampicillin- will need 7 days course, d/c vancomycin  · Reduce IV fluids  · Gentle diuresis  · antihypertensives  · Pain control  · No need for chest tubes for this type of pneumothorax, should resolve spontaneously, but will monitor  · DVT/GI prophylaxis     Subjective/Interval History:   I have reviewed the flowsheet and previous days notes. The patient is unable to give any meaningful history or review of systems because the patient is:  Very somnolent/ sedated        Nursing notes reviewed      Two PRBCs given over night > HGB 8.5 > 12.2, HTC 25.8 > 36.2, Lasix 20 mg between the Packs given > polyuria total 3 L overnight, pain medication given with each positions, BP still around 150-176 mmHg despite of hydralazine PRN Q 6 hr, Pain med, carvedilol, new A Fib in morning > Diltiazem 10 mg IV once given HR been around , BP stable,     AFRVR now. SBP > 150 and CVP 10-12. Anasraca. Pt retired . Spoke with daughter at bedside. They would like Dr. Clifford Gray to see pt. Repeat troponin higher. Pt moaning and abdomen still distended and sore. NGT in place          Review of Systems   Unable to perform ROS: Mental status change     Objective:   Vital Signs:    Visit Vitals    /60    Pulse (!) 105    Temp 98 °F (36.7 °C)    Resp 16    Ht 5' 10\" (1.778 m)    Wt 106.1 kg (233 lb 14.5 oz)    SpO2 94%    BMI 33.56 kg/m2       O2 Device: Nasal cannula   O2 Flow Rate (L/min): 2 l/min   Temp (24hrs), Av.6 °F (37 °C), Min:97.6 °F (36.4 °C), Max:99.2 °F (37.3 °C)       Intake/Output:   Last shift:         Last 3 shifts:  1901 -  0700  In: 4835.3 [I.V.:3795.3]  Out: 5605 [Urine:345; Drains:4540]    Intake/Output Summary (Last 24 hours) at 17 0825  Last data filed at 17 0700   Gross per 24 hour   Intake          2895.33 ml   Output             4920 ml   Net         -2024.67 ml     Hemodynamics:   PAP:   CO:     Wedge:   CI:     CVP:  CVP (mmHg): 10 mmHg (17 2300) SVR:       PVR:       Ventilator Settings:  Mode Rate Tidal Volume Pressure FiO2 PEEP   CPAP   450 ml  5 cm H2O 40 % 6 cm H20     Peak airway pressure: 12 cm H2O    Minute ventilation: 9.88 l/min       Physical Exam   Constitutional: He appears ill. He is sedated and intubated. HENT:   Head: Normocephalic and atraumatic. Eyes: No scleral icterus. Cardiovascular: Normal rate and regular rhythm. No murmur heard. Pulmonary/Chest: He is intubated. No respiratory distress. He has no wheezes. He has no rales. Abdominal: He exhibits distension. Bowel sounds are absent. Postop abdomen, sq emphysema resolved   Musculoskeletal: He exhibits edema. Skin: Skin is warm and dry.      Data:     Current Facility-Administered Medications   Medication Dose Route Frequency    ampicillin (OMNIPEN) 2 g in 0.9% sodium chloride (MBP/ADV) 100 mL  2 g IntraVENous Q8H    carvedilol (COREG) tablet 3.125 mg  3.125 mg Oral BID WITH MEALS    famotidine (PF) (PEPCID) 20 mg in sodium chloride 0.9 % 10 mL injection  20 mg IntraVENous Q24H    sodium chloride (NS) flush 5-10 mL  5-10 mL IntraVENous Q8H    0.9% sodium chloride infusion  100 mL/hr IntraVENous CONTINUOUS    PHENYLephrine (NEOSYNEPHRINE) 30,000 mcg in 0.9% sodium chloride 250 mL infusion   mcg/min IntraVENous TITRATE    chlorhexidine (PERIDEX) 0.12 % mouthwash 15 mL  15 mL Oral Q12H    mupirocin (BACTROBAN) 2 % ointment   Both Nostrils BID    sodium chloride (NS) flush 10 mL  10 mL InterCATHeter Q24H    sodium chloride (NS) flush 10-40 mL  10-40 mL InterCATHeter Q8H    sodium chloride (NS) flush 20 mL  20 mL InterCATHeter Q24H    levoFLOXacin (LEVAQUIN) 750 mg in D5W IVPB  750 mg IntraVENous Q48H                Labs:  Recent Labs      08/01/17   0514  07/31/17   0441  07/30/17 2225   07/30/17   0412   WBC  15.6*  15.1*   --    --   18.5*   HGB  12.2  8.5*  8.8*   < >  6.2*   HCT  36.2*  25.8*  25.3*   < >  18.7*   PLT  270  218   --    --   248    < > = values in this interval not displayed.      Recent Labs      08/01/17   0514  07/31/17 0441  07/30/17   0412   NA  139  139  138   K  3.8  4.1  4.0   CL  110*  112*  111*   CO2  16*  16*  17*   GLU  144*  113*  103*   BUN  27*  31*  29*   CREA  1.27  1.64*  1.58*   CA  8.0*  7.0*  6.6*   MG  1.6  1.9  1.8   PHOS  2.2*  2.5*  2.3*     Recent Labs      07/30/17   0450   PH  7.39   PCO2  28*   PO2  124*   HCO3  17*   FIO2  40     Imaging:  I have personally reviewed the patients radiographs and have reviewed the reports:  No change        Total critical care time exclusive of procedures: 25 minutes  Dianne De Oliveira MD

## 2017-08-01 NOTE — PROGRESS NOTES
Pharmacy Medication Reconciliation     The patient and patient's daughter was interviewed regarding current PTA medication list, use and drug allergies; Patient's daughter and wife were present in room and obtained permission from patient to discuss drug regimen with visitor(s) present. The patient was questioned regarding use of any other inhalers, topical products, over the counter medications, herbal medications, vitamin products or ophthalmic/nasal/otic medication use. Allergy Update: Metoprolol (Already on list)    Recommendations/Findings: The following amendments were made to the patient's active medication list on file at [facility]:   1) Additions:   - Oxybutynin ER 15 mg PO daily (Filled on 7/27/17, the day before readmission, for the first time)    2) Deletions:   - Hydrocodone-acetaminophen PRN    3) Changes:   - None    4) Pertinent Pharmacy Findings:  - Per family, patient \"uses vitamin pack from the vitamin store daily. \" Per family, this vitamin pack has at least 6 different vitamins in it. Have asked family to bring in the vitamin pack (or at least the name of the vitamin pack) on 8/2/17 so that pharmacy can evaluate for any interactions.      -Clarified PTA med list with [employer], and the patient's daughter. PTA medication list was corrected to the following:     Prior to Admission Medications   Prescriptions Last Dose Informant Patient Reported? Taking?   aspirin 81 mg chewable tablet 7/25/2017 at Unknown time  No Yes   Sig: Take 1 Tab by mouth daily. atorvastatin (LIPITOR) 20 mg tablet 7/25/2017 at Unknown time  No Yes   Sig: Take 1 Tab by mouth nightly. carvedilol (COREG) 3.125 mg tablet 7/25/2017 at Unknown time  No Yes   Sig: Take 1 Tab by mouth two (2) times daily (with meals). ciprofloxacin HCl (CIPRO) 500 mg tablet 7/25/2017 at Unknown time  No Yes   Sig: Take 1 Tab by mouth daily for 6 days.    clopidogrel (PLAVIX) 75 mg tab 7/25/2017 at Unknown time  No Yes   Sig: Take 1 Tab by mouth daily. finasteride (PROSCAR) 5 mg tablet 7/25/2017 at Unknown time  No Yes   Sig: Take 1 Tab by mouth daily. losartan (COZAAR) 50 mg tablet 7/25/2017 at Unknown time  No Yes   Sig: Take 1 Tab by mouth daily. nystatin (MYCOSTATIN) powder 7/25/2017 at Unknown time  No Yes   Sig: Apply  to affected area two (2) times a day for 7 days. oxybutynin chloride XL (DITROPAN XL) 15 mg CR tablet 7/25/2017 at Unknown time  Yes Yes   Sig: Take 15 mg by mouth daily.       Facility-Administered Medications: None     Thank you,  Alexander Taveras, PHARMD

## 2017-08-01 NOTE — PROGRESS NOTES
Hospitalist Progress Note    NAME: Amanda Alejo   :  1928   MRN:  [id number]       Interim Hospital Summary: 80 y.o. male whom presented on 2017 with      Assessment / Plan:  S/p postop respiratory failure s/p extubation  S/P removal of bladder stone and repair of perforated bladder  S/P subsequent ex-lap due to extensive sq emphysema in abdomen/retroperitoneum - no sign of bowel perforation. Shock  Acute renal failure improving ivf  \"pneumothorax\" due to dissection of air from retroperitoneum cephalad, different physiology or behavior of typical pneumothorax   CAD afib with RVR- cardizem gtt  Anemia, s/p PRBC  UTI with enterococcus on ampicillin  Electrolyte disturbance replete as indicated  Critically ill  Code status: FULL code  Prophylaxis: per primary team  Recommended Disposition: per primary team-      Subjective:     Chief Complaint / Reason for Physician Visit  extubated   Discussed with RN events overnight. Review of Systems:  Symptom Y/N Comments  Symptom Y/N Comments   Fever/Chills    Chest Pain     Poor Appetite    Edema     Cough    Abdominal Pain     Sputum    Joint Pain     SOB/CLARKE    Pruritis/Rash     Nausea/vomit    Tolerating PT/OT     Diarrhea    Tolerating Diet     Constipation    Other       Could NOT obtain due to:      Objective:     VITALS:   Last 24hrs VS reviewed since prior progress note.  Most recent are:  Patient Vitals for the past 24 hrs:   Temp Pulse Resp BP SpO2   17 0930 - (!) 143 19 154/64 95 %   17 0900 - (!) 147 17 161/70 96 %   17 0845 - (!) 129 16 (!) 160/102 96 %   17 0835 - (!) 129 17 (!) 141/93 97 %   17 0830 - (!) 129 17 183/69 96 %   17 0815 - (!) 126 18 (!) 170/92 97 %   17 0811 - (!) 126 18 157/84 96 %   17 0800 98.1 °F (36.7 °C) (!) 123 17 (!) 185/106 96 %   17 0700 - (!) 105 16 144/60 94 %   17 0650 - (!) 138 17 166/69 96 %   17 0649 - (!) 146 18 136/63 96 %   17 0623 - (!) 137 18 153/65 96 %   08/01/17 0611 - (!) 131 26 - 97 %   08/01/17 0500 - 100 19 171/74 95 %   08/01/17 0430 - 96 18 164/79 95 %   08/01/17 0409 98 °F (36.7 °C) 94 18 170/79 96 %   08/01/17 0400 - 95 18 176/72 96 %   08/01/17 0330 97.8 °F (36.6 °C) (!) 102 19 175/80 96 %   08/01/17 0300 - (!) 102 21 173/52 96 %   08/01/17 0230 98.1 °F (36.7 °C) 94 19 170/59 96 %   08/01/17 0200 - 96 21 165/88 97 %   08/01/17 0145 97.6 °F (36.4 °C) 93 19 168/65 96 %   08/01/17 0130 97.7 °F (36.5 °C) 98 19 159/59 96 %   08/01/17 0118 97.8 °F (36.6 °C) (!) 109 23 165/81 96 %   08/01/17 0100 98.9 °F (37.2 °C) 98 22 164/64 95 %   08/01/17 0030 98.9 °F (37.2 °C) (!) 103 19 148/75 96 %   08/01/17 0000 - (!) 106 22 171/81 96 %   07/31/17 2330 99.1 °F (37.3 °C) (!) 108 23 174/54 96 %   07/31/17 2315 - (!) 110 21 167/68 96 %   07/31/17 2300 98.9 °F (37.2 °C) (!) 104 20 155/62 95 %   07/31/17 2245 - (!) 107 18 167/66 96 %   07/31/17 2230 98.9 °F (37.2 °C) (!) 105 22 168/58 96 %   07/31/17 2215 98.9 °F (37.2 °C) (!) 105 24 180/58 96 %   07/31/17 2200 98.9 °F (37.2 °C) (!) 108 26 173/64 95 %   07/31/17 2145 99 °F (37.2 °C) (!) 107 26 166/70 95 %   07/31/17 2130 98.9 °F (37.2 °C) (!) 109 25 172/84 95 %   07/31/17 2100 - (!) 106 25 150/54 95 %   07/31/17 2030 - (!) 108 24 159/61 95 %   07/31/17 2000 - (!) 108 24 186/70 96 %   07/31/17 1900 99.2 °F (37.3 °C) 100 20 174/71 96 %   07/31/17 1800 - 95 20 160/68 95 %   07/31/17 1700 - 99 23 171/71 94 %   07/31/17 1600 98.8 °F (37.1 °C) (!) 103 25 166/64 96 %   07/31/17 1500 - 94 19 173/69 95 %   07/31/17 1400 - 93 25 161/61 95 %   07/31/17 1300 - 86 23 140/90 96 %   07/31/17 1200 98.8 °F (37.1 °C) 87 18 157/72 96 %   07/31/17 1100 - 86 20 156/72 97 %   07/31/17 1000 - 87 18 143/61 97 %       Intake/Output Summary (Last 24 hours) at 08/01/17 0954  Last data filed at 08/01/17 0900   Gross per 24 hour   Intake          2915.33 ml   Output             4860 ml   Net         -1944.67 ml        PHYSICAL EXAM:  General:  extubated nad  EENT:  EOMI. Anicteric sclerae. MMM  Resp:  decrease BS BL, no wheezing or rales. No accessory muscle use  CV:  Regular  rhythm,  + jose pedal edema  GI:  Soft, +distended, Non tender.  +Bowel sounds  Neurologic:  sleeping    Reviewed most current lab test results and cultures  YES  Reviewed most current radiology test results   YES  Review and summation of old records today    NO  Reviewed patient's current orders and MAR    YES  PMH/SH reviewed - no change compared to H&P  ________________________________________________________________________  Care Plan discussed with:    Comments   Patient     Family      RN x    Care Manager     Consultant                        Multidiciplinary team rounds were held today with , nursing, pharmacist and clinical coordinator. Patient's plan of care was discussed; medications were reviewed and discharge planning was addressed. ________________________________________________________________________  Total NON critical care TIME:  25  Minutes    Total CRITICAL CARE TIME Spent:   Minutes non procedure based      Comments   >50% of visit spent in counseling and coordination of care     ________________________________________________________________________  Doreen Garcia MD     Procedures: see electronic medical records for all procedures/Xrays and details which were not copied into this note but were reviewed prior to creation of Plan. LABS:  I reviewed today's most current labs and imaging studies. Pertinent labs include:  Recent Labs      08/01/17   0514  07/31/17   0441  07/30/17   2225   07/30/17   0412   WBC  15.6*  15.1*   --    --   18.5*   HGB  12.2  8.5*  8.8*   < >  6.2*   HCT  36.2*  25.8*  25.3*   < >  18.7*   PLT  270  218   --    --   248    < > = values in this interval not displayed.      Recent Labs      08/01/17   0514  07/31/17   0441  07/30/17   0412   NA  139  139  138   K  3.8  4.1  4.0   CL  110*  112* 111*   CO2  16*  16*  17*   GLU  144*  113*  103*   BUN  27*  31*  29*   CREA  1.27  1.64*  1.58*   CA  8.0*  7.0*  6.6*   MG  1.6  1.9  1.8   PHOS  2.2*  2.5*  2.3*       Signed: Bethany Alvarez MD

## 2017-08-01 NOTE — PROGRESS NOTES
Surgery      Notes reviewed    Abd distended, , no BM    Postop ileus, cont NG      Tod Tello MD, [facility]

## 2017-08-01 NOTE — PROGRESS NOTES
0730 Report received from off-going nurse, Maryanne Covarrubias, [facility]. Assumed care of patient. 0800 Assessment as documented. Continues in Afib, rate 120-140s. Patient in no distress. 4672 Patient with underpad saturated. Appears to have large amount of serosanguineous fluid leaking from distal end of abdominal dressing. Dressing changed and linen changed, patient tolerated well. 0840 /92 and repeat 183/69, Hydralizine given IV as well as Dilaudid 0.2mg. Will monitor for response. 0900 BP down to 161/70, will continue to monitor. 200 Spoke with Dr. Shuan Sim about events of morning. Patient remains in Afib, rate 140-150's. Orders received from Dr. Shaun Sim, will carry out. Medicated with Dilaudid 0.2mg IV for discomfort. 1205 Cardizem bolus given and drip started. Also gave Lasix IV. Will monitor for response. No change in assessment. 1230 Heart rate down to 101, remains in Afib. Blood pressure 114/45. Patient in no apparent distress. 1300 Heart rate at 99, /76. Patient diuresing well from Lasix. 1500 Patient continues to diurese from Lasix. Oxygen sats 95-97%. Oxygen weaned to room air, will monitor to see how patient tolerates. 1520 Medicated with Dilaudid 0.2 mg IV for abdominal pain. 1600 Assessment unchanged from previous. Right IJ QLC appears to be leaking at the insertion site. All connections checked. Dressing changed and IVs moved to most distal ports. Blood return positive in all except white port. PICC team consulted for PIV placement for back-up. 1700 BP elevated to 110-056 systolic, hydralizine IV given as well as scheduled Coreg via NG.     1800 Medicated with Dilaudid 0.2 mg IV for abdominal pain. BP down to 123/54. Wife and daughter at bedside. 1900 Report given to MELISSA Joshi.

## 2017-08-01 NOTE — PROGRESS NOTES
Progress Note    Patient: David Blizzard MRN: [id number]  SSN: [id number]    YOB: 1928 Age: 80 y.o. Sex: male   Height: Height: 5' 10\" (177.8 cm) Weight: Weight: 106.1 kg (233 lb 14.5 oz) BMI: Body mass index is 33.56 kg/(m^2). PCP: Nicholas James MD [phone]   Contact:  Primary Emergency Contact: Catalina Alonso Phone: [phone] [facility] [address] Day: 5 - Admitted 2017  6:19 PM by Austen Gray MD Surgeon(s):  Isabela Sultana MD 3 Days Post-Op Procedure(s):  LAPAROTOMY EXPLORATORY           Assessment/Plan:     Elevated Creatinine - Decent UOP via  SP/Malencot, Rise in creatinine may be pseudo-renal failure from reasbsorbtion urine from bladder perforation    Bladder Perforation - Scant out ARMEN, Maximized drainage with wick/Malencot SP to continure    Respiratory Failure - extubated today. Post-op ileus - NPO/IVG/NGT. Diet per General surgery, but expect slow resolution    Post-op anemia - will transfuse additional 2 PRBC with lasix in between       Imaging: N/A   Exam:    ROS:  Denies Chest Pain, SOB         ID: Temp (24hrs), Av.9 °F (37.2 °C), Min:98.4 °F (36.9 °C), Max:99.7 °F (37.6 °C)    2017: WBC 6.7 K/uL (Ref range: 4.1 - 11.1 K/uL)  2017: WBC 10.2 K/uL (Ref range: 4.1 - 11.1 K/uL)  2017: WBC 20.4 K/uL* (Ref range: 4.1 - 11.1 K/uL)  2017: WBC 20.6 K/uL* (Ref range: 4.1 - 11.1 K/uL)  2017: WBC 18.5 K/uL* (Ref range: 4.1 - 11.1 K/uL)  2017: WBC 15.1 K/uL* (Ref range: 4.1 - 11.1 K/uL)  Current Antimicrobial Therapy (168h ago through future)    Ordered     Start Stop    17 0958  ampicillin (OMNIPEN) 2 g in 0.9% sodium chloride (MBP/ADV) 100 mL  2 g,   IntraVENous,   EVERY 8 HOURS      17 1100 --    17 2152  levoFLOXacin (LEVAQUIN) 750 mg in D5W IVPB  750 mg,   IntraVENous,   EVERY 48 HOURS      17 2200 --        Cultures:    All Micro Results     None         Pulm: Nasal cannula 2 l/min 96 %     IS:   of predicted         GI: Intake: DIET NPO   Appetite: NPO          Abdominal Assessment: Distended, Semi-soft  Bowel Sounds: Hypoactive     No data found. Pain: 4/10 Aching - Abdomen - Mid         Current Analgesic Therapy (168h ago through future)    Ordered     Start Stop    07/31/17 0914  HYDROmorphone (PF) (DILAUDID) injection 0.2 mg  0.2 mg,   IntraVENous,   EVERY 2 HOURS AS NEEDED      07/31/17 0913 --    07/29/17 2152  acetaminophen (TYLENOL) solution 650 mg  650 mg,   Per NG tube,   EVERY 4 HOURS AS NEEDED      07/29/17 2150 --         : Malecot Drain (Abdominal Drainage) 07/28/17-Output (ml): 175 ml  Aaron-Yoo Drain 07/28/17 Abdomen-Output (ml): 10 ml  Rosales;Suprapubic cath - Urinary Catheter 07/28/17 3- way; Rosales-Status: Patent      7/21/2017: Creatinine 1.13 MG/DL (Ref range: 0.70 - 1.30 MG/DL)  7/27/2017: Creatinine 1.15 MG/DL (Ref range: 0.70 - 1.30 MG/DL)  7/28/2017: Creatinine 1.21 MG/DL (Ref range: 0.70 - 1.30 MG/DL)  7/29/2017: Creatinine 1.36 MG/DL* (Ref range: 0.70 - 1.30 MG/DL)  7/30/2017: Creatinine 1.58 MG/DL* (Ref range: 0.70 - 1.30 MG/DL)  7/31/2017: Creatinine 1.64 MG/DL* (Ref range: 0.70 - 1.30 MG/DL)  No results for input(s): FCREA in the last 336 hours. Lines:       Quad Lumen 07/29/17 Right Internal jugular (Active)   Central Line Being Utilized Yes 7/31/2017  4:00 PM   Criteria for Appropriate Use Hemodynamically unstable, requiring monitoring lines, vasopressors, or volume resuscitation 7/31/2017  4:00 PM   Site Assessment Clean, dry, & intact 7/31/2017  4:00 PM   Infiltration Assessment 0 7/31/2017  4:00 PM   Affected Extremity/Extremities Color distal to insertion site pink (or appropriate for race) 7/31/2017  4:00 PM   Date of Last Dressing Change 07/30/17 7/31/2017  4:00 PM   Dressing Status Clean, dry, & intact 7/31/2017  4:00 PM   Dressing Type Disk with Chlorhexadine gluconate (CHG); Transparent 7/31/2017  4:00 PM   Action Taken Open ports on tubing capped 7/31/2017  4:00 PM   Proximal Hub Color/Line Status White;Capped 7/31/2017  4:00 PM   Positive Blood Return (Medial Site) Yes 7/31/2017  4:00 PM   Medial 1 Hub Color/Line Status Blue;Capped 7/31/2017  4:00 PM   Positive Blood Return (Lateral Site) Yes 7/31/2017  4:00 PM   Medial 2 Hub Color/Line Status Renita Wahl; Infusing 7/31/2017  4:00 PM   Positive Blood Return (Site #3) Yes 7/31/2017  4:00 PM   Distal Hub Color/Line Status Brown; Infusing 7/31/2017  4:00 PM   Positive Blood Return (Site #4) Yes 7/31/2017  4:00 PM   Alcohol Cap Used Yes 7/31/2017  4:00 PM             Vitals: O2 Device: Nasal cannula O2 Flow Rate (L/min): 2 l/min  Patient Vitals for the past 24 hrs:   BP Temp Pulse Resp SpO2   07/31/17 2000 186/70 - (!) 108 24 96 %   07/31/17 1900 174/71 99.2 °F (37.3 °C) 100 20 96 %   07/31/17 1800 160/68 - 95 20 95 %   07/31/17 1700 171/71 - 99 23 94 %   07/31/17 1600 166/64 98.8 °F (37.1 °C) (!) 103 25 96 %   07/31/17 1500 173/69 - 94 19 95 %   07/31/17 1400 161/61 - 93 25 95 %   07/31/17 1300 140/90 - 86 23 96 %   07/31/17 1200 157/72 98.8 °F (37.1 °C) 87 18 96 %   07/31/17 1100 156/72 - 86 20 97 %   07/31/17 1000 143/61 - 87 18 97 %   07/31/17 0900 139/61 - 82 16 97 %   07/31/17 0800 137/53 98.4 °F (36.9 °C) 81 16 100 %   07/31/17 0745 - 98.4 °F (36.9 °C) 81 16 100 %   07/31/17 0713 - - 73 15 97 %   07/31/17 0700 131/49 - 74 15 100 %   07/31/17 0627 - - 78 16 100 %   07/31/17 0600 125/52 - 72 15 -   07/31/17 0500 134/56 - 76 19 -   07/31/17 0400 122/55 98.4 °F (36.9 °C) 70 20 -   07/31/17 0300 125/54 - 72 21 -   07/31/17 0200 119/50 - 72 18 100 %   07/31/17 0111 - - 66 21 100 %   07/31/17 0100 127/54 - 68 22 98 %   07/31/17 0000 112/50 99.7 °F (37.6 °C) 63 21 97 %   07/30/17 2300 122/47 99.7 °F (37.6 °C) 72 19 96 %   07/30/17 2200 109/44 - 71 18 96 %   07/30/17 2155 - - 66 20 98 %   07/30/17 2100 112/44 - 67 20 97 %   07/30/17 2030 110/42 - 66 20 97 %      I&O's:    Date 07/30/17 1900 - 07/31/17 0659 07/31/17 0700 - 08/01/17 0659   Shift 2672-3018 24 Hour Total 1854-3820 9969-2014 24 Hour Total   I  N  T  A  K  E   I.V.  (mL/kg/hr) 1289.9 2989. 6 1520  (1.2)  1520      Diprivan Volume 144.3 253.5         Phenylephrine Volume 145.6 336.1         Volume (0.9% sodium chloride infusion) 500 1900 1300  1300      Volume (ampicillin (OMNIPEN) 2 g in 0.9% sodium chloride (MBP/ADV) 100 mL)   100  100      Volume (calcium gluconate 2 g in 0.9% sodium chloride 100 mL IVPB)   120  120      Volume (vancomycin (VANCOCIN) 1500 mg in  ml infusion) 500 500       Blood 310 893.3         Volume (TRANSFUSE PACKED RBC'S)  295.8         Volume (TRANSFUSE PACKED RBC'S)  287.5         Volume (TRANSFUSE PACKED RBC'S) 310 310       Other 40 210 20  20      Irrigation Volume Input (mL) (Urinary Catheter 07/28/17 3- way; Rosales) 40 210 20  20    Shift Total  (mL/kg) 1639.9  (15.5) 4092.9  (38.6) 1540  (14.5)  1540  (14.5)   O  U  T  P  U  T   Urine  (mL/kg/hr) 170 223 115  (0.1) 0 115      Urine Output (mL) (Urinary Catheter 07/28/17 3- way; Rosales) 170 223 115 0 115    Emesis/NG output 170 300 180  180      Output (ml) (Nasogastric Tube 07/28/17) 170 300 180  180    Drains  175 1360      Output (ml) (Malecot Drain (Abdominal Drainage) 07/28/17)  175 1350      Output (ml) (Aaron-Yoo Drain 07/28/17 Abdomen)  5 10  10    Shift Total  (mL/kg) 615  (5.8) 1248  (11.8) 1480  (13.9) 175  (1.6) 1655  (15.6)   NET 1024.9 2844. 9 60 -175 -115   Weight (kg) 106.1 106.1 106.1 106.1 106.1       Meds:    Current Facility-Administered Medications:     HYDROmorphone (PF) (DILAUDID) injection 0.2 mg, 0.2 mg, IntraVENous, Q2H PRN, Karyna Fernández MD, 0.2 mg at 07/31/17 1628    ampicillin (OMNIPEN) 2 g in 0.9% sodium chloride (MBP/ADV) 100 mL, 2 g, IntraVENous, Q8H, Karyna Fernández MD, Last Rate: 200 mL/hr at 07/31/17 1813, 2 g at 07/31/17 1813    hydrALAZINE (APRESOLINE) 20 mg/mL injection 20 mg, 20 mg, IntraVENous, Q6H PRN, Dawson LINDSEY Dorothy David MD, 20 mg at 07/31/17 2003    famotidine (PF) (PEPCID) 20 mg in sodium chloride 0.9 % 10 mL injection, 20 mg, IntraVENous, Q24H, Silvestre Lopez MD, 20 mg at 07/31/17 0849    0.9% sodium chloride infusion 250 mL, 250 mL, IntraVENous, PRN, Kaylan Garrett MD    sodium chloride (NS) flush 5-10 mL, 5-10 mL, IntraVENous, Q8H, Lori Drummond MD, 10 mL at 07/31/17 1353    sodium chloride (NS) flush 5-10 mL, 5-10 mL, IntraVENous, PRN, Lori Drummond MD    [facility]) injection 4 mg, 4 mg, IntraVENous, Q4H PRN, Lori Drummond MD    0.9% sodium chloride infusion, 100 mL/hr, IntraVENous, CONTINUOUS, Lori Drummond MD, Last Rate: 100 mL/hr at 07/31/17 1803, 100 mL/hr at 07/31/17 1803    PHENYLephrine (NEOSYNEPHRINE) 30,000 mcg in 0.9% sodium chloride 250 mL infusion,  mcg/min, IntraVENous, TITRATE, Feliz Fuentes MD, Stopped at 07/31/17 0115    chlorhexidine (PERIDEX) 0.12 % mouthwash 15 mL, 15 mL, Oral, Q12H, Lori Drummond MD, 15 mL at 07/31/17 0835    mupirocin (BACTROBAN) 2 % ointment, , Both Nostrils, BID, Lori Drummond MD    sodium chloride (NS) flush 10-30 mL, 10-30 mL, InterCATHeter, PRN, Lori Drummond MD    sodium chloride (NS) flush 10 mL, 10 mL, InterCATHeter, Q24H, Lori Drummond MD, 10 mL at 07/31/17 1352    sodium chloride (NS) flush 10 mL, 10 mL, InterCATHeter, PRN, Lori Drummond MD    sodium chloride (NS) flush 10-40 mL, 10-40 mL, InterCATHeter, Q8H, Lori Drummond MD, 10 mL at 07/31/17 1352    sodium chloride (NS) flush 20 mL, 20 mL, InterCATHeter, Q24H, Lori Drummond MD, 20 mL at 07/31/17 1352    levoFLOXacin (LEVAQUIN) 750 mg in D5W IVPB, 750 mg, IntraVENous, Q48H, Boston Cox MD, Last Rate: 100 mL/hr at 07/29/17 2211, 750 mg at 07/29/17 2211    acetaminophen (TYLENOL) solution 650 mg, 650 mg, Per NG tube, Q4H PRN, Boston Cox MD, 650 mg at 07/31/17 1955   Labs: Recent Labs      07/31/17   0441  07/30/17   2225  07/30/17   1149  07/30/17   0412  07/29/17   0552   WBC 15.1*   --    --   18.5*  20.6*   HGB  8.5*  8.8*  6.7*  6.2*  7.3*   HCT  25.8*  25.3*  20.2*  18.7*  21.2*   PLT  218   --    --   248  303     Recent Labs      07/31/17   0441  07/30/17   0412  07/29/17   0552   NA  139  138  136   K  4.1  4.0  4.4   CL  112*  111*  107   CO2  16*  17*  21   GLU  113*  103*  140*   BUN  31*  29*  24*   CREA  1.64*  1.58*  1.36*   CA  7.0*  6.6*  6.8*   MG  1.9  1.8   --    PHOS  2.5*  2.3*   --              Signed By: Keith Adams MD - July 31, 2017

## 2017-08-01 NOTE — PROGRESS NOTES
2000, patient received from day shift. Neuro; Drowsy, respond to voice, Aphasic, decrease follow command, GCS;  Eye; 3, verbal; 1, motor; 6.  Respiratory; Sat 96% on NC 2 L/min, rhonchi diminished lung sounds. Weak cough,   Cardiac; Sinus Tachycardia 108 Irrigular with PACs, 108 B/min, NIBP 186/70 mmHg, mmHg. GI; NPO with NGT ( 65 cm at Nares) connected to continuous suction, brown to greenish, obese Abd distended with abcent bowel sound, midline incision oozing at lower side ascites fluid, ARMEN at LLQ > no drain,   Renal; 3 ways wick cath no draining, Maletcon ( suprapubic cath present with adequate urine out put. Skin; ecchymosis at right thigh, and both hands, edema +4 both hands, + 3 both legs, scrotum edema,   Endo; no,   Lines; right IJCVC,  Wick. Ayesha Damion, NGT  Code; Full. Lab; WBC; 15.1,  HGB; 8.5, platelet; 387 , Na; 977 , K; 4.1 , BUN; 31, Crea; 1.64 ,  DVT; SCD both legs. Plan; BP management to keep SBP less than 150 mmHg, frequent neuro check,  pain management > tylenol given, follow lab tomorrow, 2003, Hydralazine 20 mg IV PRN for /70 mmHg  2055, confirmed with Dr Jorge Hayes ( Urology) regard order of transfuse 2 PRBC, consent in the file,   2130, first PRBC started. IVF held till Blood Transfusion finished,  2220, Dilaudid 0.2 mg IV PRN for Pain given  2310, case discussed with Hospitalist on call regard his HR sinus Tachycardia 105-110 with frequent PACs and BP -180 mmHg, agreed to resumed Carvedilol 3.125 mg BID,   0022, Dilaudid 0.2 mg IV PRN for Pain given,   0033, Blood Transfusion finished without complication, Lasix 20 mg IV post first PRBCs given,  0107, second PRBC started. HR 98, /64 mmHg, patient polyuric now.  0203. Hydralazine 20 mg IV PRN for BP  0307, Dilaudid 0.2 mg IV PRN for Pain given as BP still 173/52 mmHg.  0407, second RBCs completed without any complication.   0534, patient responses and BP issues discussed with CCU on Call and Hospitalist, they adviced to keep monitor the patient,   2373, during turning the patient start to cough > secretion in throat and he could not take it out, NT trial nothing came out, he resist suction then develop A fib with RVR at rate 130-155,  Dilaudid 0.2 mg IV PRN for Pain given, / mmHg, Mg and Phos added to sample. 5655, discussed with Hospitalist on call regard new event of A Fib, order to give Diltiazem 10 mg IV once. 0700, summary, 2 PRBCs given over night > HGB 8.5 > 12.2, HTC 25.8 > 36.2, Lasix 20 mg between the Packs given > polyuria total 3 L overnight, pain medication given with each positions, BP still around 150-176 mmHg despite of hydralazine PRN Q 6 hr, Pain med, carvedilol, new A Fib in morning > Diltiazem 10 mg IV once given HR been around , BP stable, electrolytes check and hospitalist on call updated,  Neuro wise patient still the same wake up for the voice, Aphasic, moving arms, decreased follow commands, to follow up  Bedside and Verbal shift change report given to Yulisa RN (oncoming nurse) by Shannan Reyes RN (offgoing nurse). Report included the following information SBAR, Kardex, OR Summary, Procedure Summary, Intake/Output, MAR, Accordion, Recent Results, Med Rec Status and Cardiac Rhythm new A Fib HR .

## 2017-08-01 NOTE — PROGRESS NOTES
IDR - extubated 7/31/2017. shamika MCMAHON, ileostomy. Confused but purposeful.     Missy Eldridge  ext [phone]

## 2017-08-01 NOTE — PROGRESS NOTES
Progress Note    Patient: Steffen Benites MRN: [id number]  SSN: [id number]    YOB: 1928 Age: 80 y.o. Sex: male   Height: Height: 5' 10\" (177.8 cm) Weight: Weight: 106.1 kg (233 lb 14.5 oz) BMI: Body mass index is 33.56 kg/(m^2). PCP: Balbir Morris MD [phone]   Contact:  Primary Emergency Contact: Aurelio Elkins Phone: [phone] [place] [address] Day: 6 - Admitted 2017  6:19 PM by Chrissie Francois MD Surgeon(s):  Geetha Tijerina MD 4 Days Post-Op Procedure(s):  LAPAROTOMY EXPLORATORY           Assessment/Plan:     AF with RVR - stable, cardiology consultation    Post-Op Ileus - NGT/IVF/NPO awaiting bowel function. 150 NGT. No BM    Bladder perforation s/p repair - 4165 UOP nearly all fro SP tube, ARMEN 10 - good closure. Presumably \"free air\" was from partial intraperitoneal bladder rupture, and transgression of air/fluid with cath manipulation given negative exploration and complete bowel run (small/large)    Elevated creatinine - real ARF vs pseudo-renal failure from urine resorption, improving    Enterococcal UTI - Ampicillin    Post-op Anemia - resolved s/p transfusion, do not suspect active bleeding. Spoke at length with family today.      Imaging: N/A   Exam: Distended, nontender, generralized anasarca   ROS:  Denies Chest Pain, SOB         ID: Temp (24hrs), Av.5 °F (36.9 °C), Min:97.6 °F (36.4 °C), Max:99.2 °F (37.3 °C)    2017: WBC 6.7 K/uL (Ref range: 4.1 - 11.1 K/uL)  2017: WBC 10.2 K/uL (Ref range: 4.1 - 11.1 K/uL)  2017: WBC 20.4 K/uL* (Ref range: 4.1 - 11.1 K/uL)  2017: WBC 20.6 K/uL* (Ref range: 4.1 - 11.1 K/uL)  2017: WBC 18.5 K/uL* (Ref range: 4.1 - 11.1 K/uL)  2017: WBC 15.1 K/uL* (Ref range: 4.1 - 11.1 K/uL)  2017: WBC 15.6 K/uL* (Ref range: 4.1 - 11.1 K/uL)  Current Antimicrobial Therapy (168h ago through future)    Ordered     Start Stop    17 0958  ampicillin (OMNIPEN) 2 g in 0.9% sodium chloride (MBP/ADV) 100 mL  2 g,   IntraVENous,   EVERY 8 HOURS      07/31/17 1100 --        Cultures: All Micro Results     None         Pulm: Nasal cannula 2 l/min 95 %     IS:   of predicted         GI: Intake: DIET NPO   Appetite: NPO          Abdominal Assessment: Distended, Soft, Tender  Bowel Sounds: Hypoactive     No data found. Pain: 6/10 Aching - Abdomen - Mid         Current Analgesic Therapy (168h ago through future)    Ordered     Start Stop    07/31/17 0914  HYDROmorphone (PF) (DILAUDID) injection 0.2 mg  0.2 mg,   IntraVENous,   EVERY 2 HOURS AS NEEDED      07/31/17 0913 --    07/29/17 2152  acetaminophen (TYLENOL) solution 650 mg  650 mg,   Per NG tube,   EVERY 4 HOURS AS NEEDED      07/29/17 2150 --         : Malecot Drain (Abdominal Drainage) 07/28/17-Output (ml): 60 ml  Rosales;Suprapubic cath - Urinary Catheter 07/28/17 3- way; [facility]-Status: Patent      7/21/2017: Creatinine 1.13 MG/DL (Ref range: 0.70 - 1.30 MG/DL)  7/27/2017: Creatinine 1.15 MG/DL (Ref range: 0.70 - 1.30 MG/DL)  7/28/2017: Creatinine 1.21 MG/DL (Ref range: 0.70 - 1.30 MG/DL)  7/29/2017: Creatinine 1.36 MG/DL* (Ref range: 0.70 - 1.30 MG/DL)  7/30/2017: Creatinine 1.58 MG/DL* (Ref range: 0.70 - 1.30 MG/DL)  7/31/2017: Creatinine 1.64 MG/DL* (Ref range: 0.70 - 1.30 MG/DL)  8/1/2017: Creatinine 1.27 MG/DL (Ref range: 0.70 - 1.30 MG/DL)  No results for input(s): FCREA in the last 336 hours.    Lines:       Quad Lumen 07/29/17 Right Internal jugular (Active)   Central Line Being Utilized Yes 8/1/2017  8:00 AM   Criteria for Appropriate Use Hemodynamically unstable, requiring monitoring lines, vasopressors, or volume resuscitation 8/1/2017  8:00 AM   Site Assessment Clean, dry, & intact 8/1/2017  8:00 AM   Infiltration Assessment 0 8/1/2017  8:00 AM   Affected Extremity/Extremities Color distal to insertion site pink (or appropriate for race) 8/1/2017  8:00 AM   Date of Last Dressing Change 07/30/17 8/1/2017  8:00 AM   Dressing Status Clean, dry, & intact 8/1/2017  8:00 AM   Dressing Type Disk with Chlorhexadine gluconate (CHG) 8/1/2017  8:00 AM   Action Taken Open ports on tubing capped 8/1/2017  8:00 AM   Proximal Hub Color/Line Status White;Capped 8/1/2017  8:00 AM   Positive Blood Return (Medial Site) No 8/1/2017  8:00 AM   Medial 1 Hub Color/Line Status Blue; Infusing 8/1/2017  8:00 AM   Positive Blood Return (Lateral Site) Yes 8/1/2017  8:00 AM   Medial 2 Hub Color/Line Status Gray;Capped 8/1/2017  8:00 AM   Positive Blood Return (Site #3) Yes 8/1/2017  8:00 AM   Distal Hub Color/Line Status Brown; Infusing 8/1/2017  8:00 AM   Positive Blood Return (Site #4) Yes 8/1/2017  8:00 AM   Alcohol Cap Used Yes 8/1/2017  3:30 AM             Vitals: O2 Device: Nasal cannula O2 Flow Rate (L/min): 2 l/min  Patient Vitals for the past 24 hrs:   BP Temp Pulse Resp SpO2   08/01/17 0930 154/64 - (!) 143 19 95 %   08/01/17 0900 161/70 - (!) 147 17 96 %   08/01/17 0845 (!) 160/102 - (!) 129 16 96 %   08/01/17 0835 (!) 141/93 - (!) 129 17 97 %   08/01/17 0830 183/69 - (!) 129 17 96 %   08/01/17 0815 (!) 170/92 - (!) 126 18 97 %   08/01/17 0811 157/84 - (!) 126 18 96 %   08/01/17 0800 (!) 185/106 98.1 °F (36.7 °C) (!) 123 17 96 %   08/01/17 0700 144/60 - (!) 105 16 94 %   08/01/17 0650 166/69 - (!) 138 17 96 %   08/01/17 0649 136/63 - (!) 146 18 96 %   08/01/17 0623 153/65 - (!) 137 18 96 %   08/01/17 0611 - - (!) 131 26 97 %   08/01/17 0500 171/74 - 100 19 95 %   08/01/17 0430 164/79 - 96 18 95 %   08/01/17 0409 170/79 98 °F (36.7 °C) 94 18 96 %   08/01/17 0400 176/72 - 95 18 96 %   08/01/17 0330 175/80 97.8 °F (36.6 °C) (!) 102 19 96 %   08/01/17 0300 173/52 - (!) 102 21 96 %   08/01/17 0230 170/59 98.1 °F (36.7 °C) 94 19 96 %   08/01/17 0200 165/88 - 96 21 97 %   08/01/17 0145 168/65 97.6 °F (36.4 °C) 93 19 96 %   08/01/17 0130 159/59 97.7 °F (36.5 °C) 98 19 96 %   08/01/17 0118 165/81 97.8 °F (36.6 °C) (!) 109 23 96 %   08/01/17 0100 164/64 98.9 °F (37.2 °C) 98 22 95 %   08/01/17 0030 148/75 98.9 °F (37.2 °C) (!) 103 19 96 %   08/01/17 0000 171/81 - (!) 106 22 96 %   07/31/17 2330 174/54 99.1 °F (37.3 °C) (!) 108 23 96 %   07/31/17 2315 167/68 - (!) 110 21 96 %   07/31/17 2300 155/62 98.9 °F (37.2 °C) (!) 104 20 95 %   07/31/17 2245 167/66 - (!) 107 18 96 %   07/31/17 2230 168/58 98.9 °F (37.2 °C) (!) 105 22 96 %   07/31/17 2215 180/58 98.9 °F (37.2 °C) (!) 105 24 96 %   07/31/17 2200 173/64 98.9 °F (37.2 °C) (!) 108 26 95 %   07/31/17 2145 166/70 99 °F (37.2 °C) (!) 107 26 95 %   07/31/17 2130 172/84 98.9 °F (37.2 °C) (!) 109 25 95 %   07/31/17 2100 150/54 - (!) 106 25 95 %   07/31/17 2030 159/61 - (!) 108 24 95 %   07/31/17 2000 186/70 - (!) 108 24 96 %   07/31/17 1900 174/71 99.2 °F (37.3 °C) 100 20 96 %   07/31/17 1800 160/68 - 95 20 95 %   07/31/17 1700 171/71 - 99 23 94 %   07/31/17 1600 166/64 98.8 °F (37.1 °C) (!) 103 25 96 %   07/31/17 1500 173/69 - 94 19 95 %   07/31/17 1400 161/61 - 93 25 95 %   07/31/17 1300 140/90 - 86 23 96 %   07/31/17 1200 157/72 98.8 °F (37.1 °C) 87 18 96 %   07/31/17 1100 156/72 - 86 20 97 %      I&O's:    Date 07/31/17 0700 - 08/01/17 0659 08/01/17 0700 - 08/02/17 0659   Shift [id number] 1900-0659 24 Hour Total 8362-7353 1706-3191 24 Hour Total   I  N  T  A  K  E   I.V.  (mL/kg/hr) 1520  (1.2) 700  (0.5) 2220  (0.9) 385.3  385. 3      Cardizem Volume    2  2      Volume (0.9% sodium chloride infusion) 2923 884 5875 383.3  383. 3      Volume (ampicillin (OMNIPEN) 2 g in 0.9% sodium chloride (MBP/ADV) 100 mL) 100 200 300         Volume (calcium gluconate 2 g in 0.9% sodium chloride 100 mL IVPB) 120  120         Volume (levoFLOXacin (LEVAQUIN) 750 mg in D5W IVPB)  150 150       Blood  620 620         Volume (TRANSFUSE PACKED RBC'S)  310 310         Volume (TRANSFUSE PACKED RBC'S)  310 310       Other 20 50 70         Irrigation Volume Input (mL) (Urinary Catheter 07/28/17 3- way; [facility]ales) 20 50 70       NG/GT    20  20      Water Flush Volume (mL) (Nasogastric Tube 07/28/17)    20  20    Shift Total  (mL/kg) 1540  (14.5) 1370  (12.9) 2910  (27.4) 405.3  (3.8)  405.3  (3.8)   O  U  T  P  U  T   Urine  (mL/kg/hr) 115  (0.1) 30  (0) 145  (0.1) 35  35      Urine Output (mL) (Urinary Catheter 07/28/17 3- way; [facility]) 115 30 145 35  35    Emesis/NG output 180 220 400 150  150      Output (ml) (Nasogastric Tube 07/28/17) 180 220 400 150  150    Drains 1185 2990 4175 275  275      Output (ml) (Malecot Drain (Abdominal Drainage) 07/28/17) 1175 2990 4165 275  275      Output (ml) (Aaron-Yoo Drain 07/28/17 Abdomen) 10  10       Shift Total  (mL/kg) 1480  (13.9) 3240  (30.5) 4720  (44.5) 460  (4.3)  460  (4.3)   NET 60 -1870 -1810 -54.7  -54.7   Weight (kg) 106.1 106.1 106.1 106.1 106.1 106.1       Meds:    Current Facility-Administered Medications:     carvedilol (COREG) tablet 6.25 mg, 6.25 mg, Per NG tube, BID WITH MEALS, Анна Sage MD    potassium phosphate 20 mmol in 0.9% sodium chloride 500 mL infusion, , IntraVENous, ONCE, Анна Sage MD    HYDROmorphone (PF) (DILAUDID) injection 0.2 mg, 0.2 mg, IntraVENous, Q2H PRN, Анна Sage MD, 0.2 mg at 08/01/17 0839    ampicillin (OMNIPEN) 2 g in 0.9% sodium chloride (MBP/ADV) 100 mL, 2 g, IntraVENous, Q8H, Анна Sage MD, Last Rate: 200 mL/hr at 08/01/17 0234, 2 g at 08/01/17 0234    hydrALAZINE (APRESOLINE) 20 mg/mL injection 20 mg, 20 mg, IntraVENous, Q6H PRN, Анна Sage MD, 20 mg at 08/01/17 0839    0.9% sodium chloride infusion 250 mL, 250 mL, IntraVENous, PRN, Cj Chan MD    sodium chloride (NS) flush 5-10 mL, 5-10 mL, IntraVENous, Q8H, Edvin Louis MD, 10 mL at 08/01/17 0702    sodium chloride (NS) flush 5-10 mL, 5-10 mL, IntraVENous, PRN, Edvin Louis MD    ondansetron [facility]) injection 4 mg, 4 mg, IntraVENous, Q4H PRN, Edvin Louis MD    0.9% sodium chloride infusion, 100 mL/hr, IntraVENous, CONTINUOUS, Edvin Louis MD, Last Rate: 100 mL/hr at 08/01/17 0833, 100 mL/hr at 08/01/17 0833    PHENYLephrine (NEOSYNEPHRINE) 30,000 mcg in 0.9% sodium chloride 250 mL infusion,  mcg/min, IntraVENous, TITRATE, Radha Boyce MD, Stopped at 07/31/17 0115    chlorhexidine (PERIDEX) 0.12 % mouthwash 15 mL, 15 mL, Oral, Q12H, Elina Roca MD, 15 mL at 07/31/17 2106    mupirocin (BACTROBAN) 2 % ointment, , Both Nostrils, BID, Elina Roca MD    sodium chloride (NS) flush 10-30 mL, 10-30 mL, InterCATHeter, PRN, Elina Roca MD    sodium chloride (NS) flush 10 mL, 10 mL, InterCATHeter, Q24H, Elina Roca MD, 10 mL at 07/31/17 1352    sodium chloride (NS) flush 10 mL, 10 mL, InterCATHeter, PRN, Elina Roca MD    sodium chloride (NS) flush 10-40 mL, 10-40 mL, InterCATHeter, Q8H, Elina Roca MD, 10 mL at 08/01/17 0702    sodium chloride (NS) flush 20 mL, 20 mL, InterCATHeter, Q24H, Elina Roca MD, 20 mL at 07/31/17 1352    acetaminophen (TYLENOL) solution 650 mg, 650 mg, Per NG tube, Q4H PRN, Carrillo Rosario MD, 650 mg at 07/31/17 1955   Labs: Recent Labs      08/01/17   0514  07/31/17   0441  07/30/17   2225   07/30/17   0412   WBC  15.6*  15.1*   --    --   18.5*   HGB  12.2  8.5*  8.8*   < >  6.2*   HCT  36.2*  25.8*  25.3*   < >  18.7*   PLT  270  218   --    --   248    < > = values in this interval not displayed.      Recent Labs      08/01/17   0514  07/31/17   0441  07/30/17   0412   NA  139  139  138   K  3.8  4.1  4.0   CL  110*  112*  111*   CO2  16*  16*  17*   GLU  144*  113*  103*   BUN  27*  31*  29*   CREA  1.27  1.64*  1.58*   CA  8.0*  7.0*  6.6*   MG  1.6  1.9  1.8   PHOS  2.2*  2.5*  2.3*             Signed By: Catrachito Agee MD - August 1, 2017

## 2017-08-01 NOTE — CONSULTS
CARDIOLOGY CONSULT    Patient ID:  Patient: Jitendra Garduno  MRN: [id number]  Age: 80 y.o.  : 1928    Date of  Admission: 2017  6:19 PM   PCP:  Devika Jalloh MD   Primary cardiologist:  Monica Lane MD    Assessment: 1. Atrial fibrillation with RVR. 2. Positive troponin, probably type 2 NSTEMI (supply-demand mismatch resulting in myocardial necrosis) given the history. 3. 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 Linh Damico. A lexiscan nuclear stress test done before this procedure suggested lateral ischemia in that vessel's distribution. 4. Hypertension. 5. Bladder perforation s/p repair. 6. Postop ileus. 7. Postop anemia s/p prbc transfusion. 8. Not on dual antiplatelet therapy with recent surgeries. Plan:     1. Continue diltiazem for rate control. Has some room to titrate up for goal resting HR <=100.  2. Not a good anticoagulation candidate due to bleeding risk. 3. At risk for acute stent thrombosis of left circumflex artery since he's not on dual antiplatelet therapy. There was evidence of possible collateralization on last cath, which would be desirable should this vessel close down. 4. Continue beta-blocker. Discussed patient with wife and daughter. [x]       High complexity decision making was performed in this patient at high risk for decompensation with multiple organ involvement. Jitendra Garduno is a 80 y.o. male with a history of surgery, now with postoperative atrial fibrillation. Cardiac catheterization 2017 prior to stenting of left circumflex:  LEFT MAIN: Large vessel with minor luminal irregularities.      LAD: A medium-sized vessel with   scattered plaque of 20%-30% throughout its course.  Two small mid   diagonals were noted and multiple trivial diagonal vessels.      RAMUS: Ramus intermedius is a small vessel with no focal stenosis   that provides perfusion to the anterolateral wall.      CIRCUMFLEX: Moderate-sized vessel that is subtotally occluded in its   proximal and mid portion; there were some faint ANDERSON 1 flow distally   that maybe collateralized.       RCA: Medium-sized dominant vessel giving   rise to small PDA and posterolateral branch vessels; 30% eccentric   mid stenosis is noted, with some systolic compression.       LV: Left ventriculography in MIRANDA projection   reveals no focal wall motion abnormality. Ejection fraction of 55% to   60%. Mild-to-moderate MR. No aortic stenosis on pullback. EDP of 14.     Abdominal aortography in AP projection reveals no renal artery   stenosis. There is scattered plaque in the abdominal aorta with no   focal stenosis or aneurysm. Past Medical History:   Diagnosis Date    Hypertension     Prostate enlargement         Past Surgical History:   Procedure Laterality Date    ABDOMEN SURGERY PROC UNLISTED      hernia repair    CARDIAC SURG PROCEDURE UNLIST         Social History   Substance Use Topics    Smoking status: Never Smoker    Smokeless tobacco: Never Used    Alcohol use No        History reviewed. No pertinent family history.      Allergies   Allergen Reactions    Metoprolol Nausea and Vomiting          Current Facility-Administered Medications   Medication Dose Route Frequency    carvedilol (COREG) tablet 6.25 mg  6.25 mg Per NG tube BID WITH MEALS    dilTIAZem (CARDIZEM) 100 mg in 0.9% sodium chloride (MBP/ADV) 100 mL infusion  0-15 mg/hr IntraVENous TITRATE    furosemide (LASIX) injection 40 mg  40 mg IntraVENous DAILY    HYDROmorphone (PF) (DILAUDID) injection 0.2 mg  0.2 mg IntraVENous Q2H PRN    ampicillin (OMNIPEN) 2 g in 0.9% sodium chloride (MBP/ADV) 100 mL  2 g IntraVENous Q8H    hydrALAZINE (APRESOLINE) 20 mg/mL injection 20 mg  20 mg IntraVENous Q6H PRN    0.9% sodium chloride infusion 250 mL  250 mL IntraVENous PRN    sodium chloride (NS) flush 5-10 mL  5-10 mL IntraVENous Q8H    sodium chloride (NS) flush 5-10 mL  5-10 mL IntraVENous PRN    ondansetron (ZOFRAN) injection 4 mg  4 mg IntraVENous Q4H PRN    0.9% sodium chloride infusion  25 mL/hr IntraVENous CONTINUOUS    PHENYLephrine (NEOSYNEPHRINE) 30,000 mcg in 0.9% sodium chloride 250 mL infusion   mcg/min IntraVENous TITRATE    chlorhexidine (PERIDEX) 0.12 % mouthwash 15 mL  15 mL Oral Q12H    mupirocin (BACTROBAN) 2 % ointment   Both Nostrils BID    sodium chloride (NS) flush 10-30 mL  10-30 mL InterCATHeter PRN    sodium chloride (NS) flush 10 mL  10 mL InterCATHeter Q24H    sodium chloride (NS) flush 10 mL  10 mL InterCATHeter PRN    sodium chloride (NS) flush 10-40 mL  10-40 mL InterCATHeter Q8H    sodium chloride (NS) flush 20 mL  20 mL InterCATHeter Q24H    acetaminophen (TYLENOL) solution 650 mg  650 mg Per NG tube Q4H PRN       Review of Symptoms:  Patient cannot communicate.   General: negative for fever, chills, sweats, weakness, weight loss   Eyes: negative for blurred vision, eye pain, loss of vision, diplopia   Ear Nose and Throat: negative for rhinorrhea, pharyngitis, otalgia, tinnitus, speech or swallowing difficulties   Respiratory: negative for SOB, cough, sputum production, wheezing, CLARKE, pleuritic pain   Cardiology: negative for chest pain, palpitations, orthopnea, PND, edema, syncope   Gastrointestinal: negative for abdominal pain, N/V, dysphagia, change in bowel habits, bleeding   Genitourinary: negative for frequency, urgency, dysuria, hematuria, incontinence   Muskuloskeletal : negative for arthralgia, myalgia   Hematology: negative for easy bruising, bleeding, lymphadenopathy   Dermatological: negative for rash, ulceration, mole change, new lesion   Endocrine: negative for hot flashes or polydipsia   Neurological: negative for headache, dizziness, confusion, focal weakness, paresthesia, memory loss, gait disturbance   Psychological: negative for anxiety, depression, agitation Objective:      Physical Exam:  Temp (24hrs), Av.4 °F (36.9 °C), Min:97.6 °F (36.4 °C), Max:99.2 °F (37.3 °C)    Patient Vitals for the past 8 hrs:   Pulse   17 1803 (!) 108   17 1730 (!) 112   17 1700 97   17 1630 (!) 103   17 1600 (!) 104   17 1530 (!) 105   17 1500 99   17 1430 93   17 1400 91   17 1330 100   17 1300 99   17 1230 (!) 101   17 1200 (!) 137   17 1130 (!) 131   17 1100 (!) 136   17 1030 (!) 141    Patient Vitals for the past 8 hrs:   Resp   17 1803 18   17 1730 20   17 1700 20   17 1630 18   17 1600 20   17 1530 21   17 1500 18   17 1430 17   17 1400 18   17 1330 19   17 1300 18   17 1230 15   17 1200 21   17 1130 18   17 1100 21   17 1030 20    Patient Vitals for the past 8 hrs:   BP   17 1803 151/64   17 1730 152/60   17 1700 180/78   17 1630 175/65   17 1600 161/69   17 1530 158/69   17 1500 151/65   17 1430 153/82   17 1400 121/64   17 1330 139/62   17 1300 120/76   17 1230 114/45   17 1200 158/69   17 1130 150/61   17 1100 164/67   17 1030 159/81        Intake/Output Summary (Last 24 hours) at 17 1829  Last data filed at 17 1720   Gross per 24 hour   Intake          3004.82 ml   Output             6715 ml   Net         -3710.18 ml       Nondiaphoretic, not in acute distress, NG tube in place. Supple, no palpable thyromegaly. RIJ central line. No scleral icterus, mucous membranes moist, conjuctivae pink, no xanthelasma. +deep respirations, clear to auscultation bilaterally anteriorly, symmetric air movement. Irregular rate and rhythm, no murmur, pericardial rub, knock, or gallop. No JVD but +peripheral edema. No carotid bruit. Palpable radial and DP pulses bilaterally.   Abdomen not examined. Extremities without cyanosis or clubbing. Muscle tone and bulk normal.  Skin warm and dry. No rashes or ulcers. Neuro grossly nonfocal.  No tremor. Lethargic. CARDIOGRAPHICS and STUDIES, I reviewed:    Telemetry:  Atrial fibrillation with RVR. ECG:  No recent study. Last one 7/20/2017 showed sinus rhythm with first degree AV block. Echo 7/20/2017:  LEFT VENTRICLE: Size was at the upper limits of normal. Systolic function  was mildly reduced. Ejection fraction was estimated in the range of 45 %  to 50 %. No obvious wall motion abnormalities identified in the views  obtained. Wall thickness was mildly increased. Hypertrophy was noted. RIGHT VENTRICLE: The size was normal. Systolic function was normal. Wall  thickness was normal.    LEFT ATRIUM: The atrium was mildly dilated. RIGHT ATRIUM: The atrium was mildly dilated. MITRAL VALVE: Normal valve structure. There was mild thickening. There was  normal leaflet separation. DOPPLER: There was no evidence for stenosis. There was mild to moderate regurgitation. AORTIC VALVE: The valve was probably trileaflet. Leaflets exhibited mildly  increased thickness and normal cuspal separation. DOPPLER: There was no  stenosis. There was trivial regurgitation. TRICUSPID VALVE: Normal valve structure. There was normal leaflet  separation. DOPPLER: There was no evidence for tricuspid stenosis. There  was no significant regurgitation. PULMONIC VALVE: Not well visualized, but normal Doppler findings. DOPPLER:  There was mild regurgitation. AORTA: The root exhibited normal size. PERICARDIUM: Insignificant pericardial effusion and/or pericardial fat was  present.          Labs:  Recent Labs      08/01/17   0925   TROIQ  2.35*     Lab Results   Component Value Date/Time    Cholesterol, total 178 07/20/2017 12:28 PM    HDL Cholesterol 48 07/20/2017 12:28 PM    LDL, calculated 117.8 07/20/2017 12:28 PM    Triglyceride 61 07/20/2017 12:28 PM CHOL/HDL Ratio 3.7 07/20/2017 12:28 PM     No results for input(s): INR, PTP, APTT in the last 72 hours. No lab exists for component: INREXT   Recent Labs      08/01/17   0514  07/31/17   0441  07/30/17   2225   07/30/17   0412   NA  139  139   --    --   138   K  3.8  4.1   --    --   4.0   CL  110*  112*   --    --   111*   CO2  16*  16*   --    --   17*   BUN  27*  31*   --    --   29*   CREA  1.27  1.64*   --    --   1.58*   GLU  144*  113*   --    --   103*   PHOS  2.2*  2.5*   --    --   2.3*   CA  8.0*  7.0*   --    --   6.6*   WBC  15.6*  15.1*   --    --   18.5*   HGB  12.2  8.5*  8.8*   < >  6.2*   HCT  36.2*  25.8*  25.3*   < >  18.7*   PLT  270  218   --    --   248    < > = values in this interval not displayed. No results for input(s): SGOT, GPT, AP, TBIL, TP, ALB, GLOB, GGT, AML, LPSE in the last 72 hours.     No lab exists for component: AMYP, HLPSE  No components found for: [place]  Recent Labs      07/30/17   0450   PH  7.39   PCO2  28*   PO2  124*           Jj Lees MD  8/1/2017

## 2017-08-01 NOTE — PROGRESS NOTES
Attended Interdisciplinary rounds in Critical Care Unit, where patient care was discussed. Visit by: Madeline Hoffman. Claudean Bower.  Stephenie Beaver MA, [address]

## 2017-08-01 NOTE — PROGRESS NOTES
Interdisciplinary team rounds were held  8/1/2017 with the following team members:Care Management, Diabetes Treatment Specialist, Nursing, Nutrition, Pastoral Care, Pharmacy, Physician and Clinical Coordinator. Plan of care discussed. Goal: See MD orders and progress notes for further  interventions and desired outcomes.

## 2017-08-02 NOTE — PROGRESS NOTES
Pharmacy Medication Reconciliation     [facility] initially completed on 8/1/17 pending the family bringing in the patient's \"vitamin pack\" to be added to the medication list.    Patient's family brought in the patient's \"vitamin pack. \" The vitamins include arginine, lactobacillus, alpha lipoic acid, ascorbic acid, cholecalciferol, co-enzyme Q, cyanocobalamin, ferrous sulfate, magnesium, and multivitamin. These have been added to the patient's medication list.    Prior to Admission Medications   Prescriptions Last Dose Informant Patient Reported? Taking? Arginine HCl, L-Arginine, 1,000 mg tab 7/26/2017 at Unknown time  Yes Yes   Sig: Take 1,000 mg by mouth daily. IRON, FERROUS SULFATE, PO 7/26/2017 at Unknown time  Yes Yes   Sig: Take 30 mg by mouth daily. Lactobacillus acidophilus (ACIDOPHILUS) cap 7/26/2017 at Unknown time  Yes Yes   alpha lipoic acid 200 mg cap 7/26/2017 at Unknown time  Yes Yes   Sig: Take 200 mg by mouth daily. ascorbic acid, vitamin C, (VITAMIN C WITH DAVID HIPS) 1,000 mg tablet 7/26/2017 at Unknown time  Yes Yes   Sig: Take 1,000 mg by mouth daily. aspirin 81 mg chewable tablet 7/25/2017 at Unknown time  No Yes   Sig: Take 1 Tab by mouth daily. atorvastatin (LIPITOR) 20 mg tablet 7/25/2017 at Unknown time  No Yes   Sig: Take 1 Tab by mouth nightly. carvedilol (COREG) 3.125 mg tablet 7/25/2017 at Unknown time  No Yes   Sig: Take 1 Tab by mouth two (2) times daily (with meals). cholecalciferol, vitamin D3, (VITAMIN D3) 2,000 unit tab 7/26/2017 at Unknown time  Yes Yes   Sig: Take 2,000 Units by mouth daily. ciprofloxacin HCl (CIPRO) 500 mg tablet 7/25/2017 at Unknown time  No Yes   Sig: Take 1 Tab by mouth daily for 6 days. clopidogrel (PLAVIX) 75 mg tab 7/25/2017 at Unknown time  No Yes   Sig: Take 1 Tab by mouth daily. co-enzyme Q-10 (CO Q-10) 100 mg capsule 7/26/2017 at Unknown time  Yes Yes   Sig: Take 300 mg by mouth daily.    cyanocobalamin, vitamin B-12, (VITAMIN B-12) 5,000 mcg subl 2017 at Unknown time  Yes Yes   Si,000 mg by SubLINGual route daily. finasteride (PROSCAR) 5 mg tablet 2017 at Unknown time  No Yes   Sig: Take 1 Tab by mouth daily. losartan (COZAAR) 50 mg tablet 2017 at Unknown time  No Yes   Sig: Take 1 Tab by mouth daily. magnesium oxide (MAG-OX) 400 mg tablet 2017 at Unknown time  Yes Yes   Sig: Take 400 mg by mouth daily. multivitamin (ONE A DAY) tablet 2017 at Unknown time  Yes Yes   Sig: Take 1 Tab by mouth daily. nystatin (MYCOSTATIN) powder 2017 at Unknown time  No Yes   Sig: Apply  to affected area two (2) times a day for 7 days. oxybutynin chloride XL (DITROPAN XL) 15 mg CR tablet 2017 at Unknown time  Yes Yes   Sig: Take 15 mg by mouth daily.       Facility-Administered Medications: None     Thank you,  Mendel Ala, PHARMD

## 2017-08-02 NOTE — PROGRESS NOTES
Attended Interdisciplinary rounds in Critical Care Unit, where patient care was discussed. Visit by: Naz Kumar. Gustavo Rodriguez.  Precious Malik MA, [address]

## 2017-08-02 NOTE — PROGRESS NOTES
1930- Bedside and verbal report received from [address] [place] [address]. Pt resting in bed, no signs pf distress. VSS. 2015- Assessment complete. Pt follows some commands, does not answer questions, shakes head no to pain. 2030- Daughter Hillary Rooney called for update, very concerned that pt is not receiving adequate pain medication. Ensured daughter that pt is receiving ordered medications as needed and will monitor this closely. 2200- , pt appears to grimace at times, 0.2mg dilaudid given. 2330- Reassess unchanged. Midline abd dressing with serous drainage. Cleaned and changed. Midline incision well approximated with staples, noted to be draining serous fluid from bottom of incision. 2340- Pt converted from Afib to NSR, HR 70-90's.   0500- Reassess unchanged. 0715- Bedside and verbal report given to Yulisa TORRES.

## 2017-08-02 NOTE — PROGRESS NOTES
PICC Education: Explained reason and rationale for PICC placement along with providing education in order to make an informed consent including nature, risks, benefits, potential complications, care and maintenance of PICC line. The opportunity for questions or concerns was given. A 'Patient PICC Handbook was also provided. Patient's spouse, Lisy Claudio, gave telephone consent for PICC procedure to be done at the bedside. Patient's spouse verbalizes understanding and denies questions at this time. Patient lethargic and unable to sign consent due to altered mental status. 3CG PICC tip confirmation/Printout on the chart. Printout placed on the chart. Primary nurse MELISSA Keys aware PICC catheter may be used (see PICC order set) and to hang new infusion tubing prior to connecting to PICC line. Inserted double lumen 5 fr PICC in  Left basilic vein at 53GT with external length 0 cm out (at the hub),   using using modified seldinger technique, Sherlock TLS and 3CG TPS.  (5 fr occupies 8% of the  basilic vein according to U/S measurement)   Pt's rhythm sinus / HR 85. Sherlock Tip Placement device, Ultrasound guidance, Lidocaine 1% used (yes)   and amount of lidocaine used (4ml), maximum sterile barrier precautions observed. Reason for access (replace IJ, MD order, poor vascular access). Complications related to insertion (unable to pass PICC line in RUE, pt has leaking R IJ, R basilic vein accessed without difficulty, guidwire passed, and introducer placed without difficulty but unable to pass PICC line to R basilic vein. R brachial vein cloudy and small, no R cephalic vein noted. No difficulty or complications to insertion of PICC line to LUE). Primary nurse, Ludmila, RN notified no BP to RUE x 24 hours. Patient tolerated procedure well with minimal blood loss. Sterile dressing applied with Biopatch, Stat loc and Tegaderm. PICC Booklet/Handout provided. Left  arm circumference:35 cm.    Timeout verified the correct patient and correct procedure. Brand of catheter BARD SOLO POWER PICC, Lot #[id number]/ REF# [id number] / EXP 2018-10-31. Assisting PICC nurse: Maryann Hubbard RN / Vascular Access Team  Curtis Deleon RN / Vascular Access Team

## 2017-08-02 NOTE — PROGRESS NOTES
0730 Report received from night nurseMadelyn RN. Assumed care of patient. 0800 Assessment as documented. Continues on Cardizem at 10ml/hr. Heart rhythm is sinus with PAC's and PVCs. BP elevated. Will give AM Coreg. [id number] Aldo Buenrostro, and Aminata in to see patient. All updated on events of the night and this morning. New orders place by Dr. Coates.    1000 Patient diuresing well from AM Lasix. 1040 PICC team in to place PICC line. 1200 PICC line difficult to place on right, new dual lumen placed on left. Assessment unchanged. Hydralizine given for elevated BP.  1330 Central line d/c'd, tip intact, no signs of infection at site. New PICC in use, all tubing changed. Dr. Coates alerted of elevated BP's despite hydralizine. One time orders received. 1445 Labetolol 20mg given IV as well as additional dose of Coreg. Will monitor. Dr. Ramy Fam in to see patient. 1500 BP down to 133/57. Patient escorted off unit for CT of head as ordered. 1530 Return from CT, BP remains stable at 136/57. Family at bedside. 1600 Assessment unchanged. Cardizem remains at 10mg. 1700 Patient stated \"yeah\" when asked if he had pain or discomfort. Medicated with Dilaudid 0.2 mg IV.  1815 Heart rate remains in 70's, sinus arrhythmia. Cardizem decreased to 5mg.    1900 Report given to on-coming nurse, Madelyn, RN.

## 2017-08-02 NOTE — PROGRESS NOTES
Hospitalist Progress Note    NAME: David Blizzard   :  1928   MRN:  [id number]       Interim Hospital Summary: 80 y.o. male whom presented on 2017 with      Assessment / Plan:  Postop respiratory failure s/p extubation on 2 L O2  S/P removal of bladder stone and repair of perforated bladder  S/P subsequent ex-lap due to extensive sq emphysema in abdomen/retroperitoneum - no sign of bowel perforation. Shock improved  LINDSEY improving good diuresis with lasix, cxr with Probable persistent congestive changes. Mild to moderate bibasilar atelectasis and trace pleural effusions. \"pneumothorax\" due to dissection of air from retroperitoneum cephalad, different physiology or behavior of typical pneumothorax   CAD afib with RVR- cardizem gtt, cardiology following  Anemia, s/p PRBC  UTI with enterococcus on ampicillin  Electrolyte disturbance replete as indicated  HTN cardizem, consider increasing coreg, cont lasix,   Encephalopathy ? Medication/infectious etiology, on IV abx for UTI   Critically ill  Code status: FULL code  Prophylaxis: per primary team  Recommended Disposition: per primary team-      Subjective:     Chief Complaint / Reason for Physician Visit  encepahlopathic   Discussed with RN events overnight. Review of Systems:  Symptom Y/N Comments  Symptom Y/N Comments   Fever/Chills    Chest Pain     Poor Appetite    Edema     Cough    Abdominal Pain     Sputum    Joint Pain     SOB/CLARKE    Pruritis/Rash     Nausea/vomit    Tolerating PT/OT     Diarrhea    Tolerating Diet     Constipation    Other       Could NOT obtain due to: ams     Objective:     VITALS:   Last 24hrs VS reviewed since prior progress note.  Most recent are:  Patient Vitals for the past 24 hrs:   Temp Pulse Resp BP SpO2   17 0900 - 94 23 174/61 93 %   17 0832 - 96 22 189/57 93 %   17 0800 99 °F (37.2 °C) 90 20 159/66 94 %   17 0734 - 89 21 172/51 92 %   17 0630 - 83 24 159/51 93 %   17 0600 - 100 (!) 31 171/50 92 %   08/02/17 0500 98 °F (36.7 °C) 88 24 188/77 93 %   08/02/17 0430 - 81 19 168/63 93 %   08/02/17 0330 - 77 18 161/53 94 %   08/02/17 0300 - 82 17 158/64 93 %   08/02/17 0230 - 90 21 172/56 94 %   08/02/17 0212 - 90 25 170/70 93 %   08/02/17 0200 - 95 24 - 95 %   08/02/17 0100 - 93 23 154/64 94 %   08/02/17 0000 - 83 22 161/58 91 %   08/01/17 2330 98.6 °F (37 °C) (!) 107 22 151/63 91 %   08/01/17 2300 - (!) 109 21 168/63 91 %   08/01/17 2200 - (!) 105 22 161/58 91 %   08/01/17 2130 - (!) 110 23 160/84 92 %   08/01/17 2100 - (!) 110 20 153/67 92 %   08/01/17 2014 98.5 °F (36.9 °C) (!) 103 21 159/51 91 %   08/01/17 1900 - (!) 103 18 128/59 92 %   08/01/17 1830 - (!) 105 18 123/54 91 %   08/01/17 1803 - (!) 108 18 151/64 91 %   08/01/17 1730 - (!) 112 20 152/60 93 %   08/01/17 1700 - 97 20 180/78 93 %   08/01/17 1630 - (!) 103 18 175/65 94 %   08/01/17 1600 97.9 °F (36.6 °C) (!) 104 20 161/69 93 %   08/01/17 1530 - (!) 105 21 158/69 93 %   08/01/17 1500 - 99 18 151/65 95 %   08/01/17 1430 - 93 17 153/82 94 %   08/01/17 1400 - 91 18 121/64 94 %   08/01/17 1330 - 100 19 139/62 94 %   08/01/17 1300 - 99 18 120/76 95 %   08/01/17 1230 - (!) 101 15 114/45 95 %   08/01/17 1218 97.8 °F (36.6 °C) - - - -   08/01/17 1200 97.8 °F (36.6 °C) (!) 137 21 158/69 97 %   08/01/17 1130 - (!) 131 18 150/61 96 %   08/01/17 1100 - (!) 136 21 164/67 97 %   08/01/17 1030 - (!) 141 20 159/81 96 %   08/01/17 1000 - (!) 134 20 150/73 96 %   08/01/17 0930 - (!) 143 19 154/64 95 %       Intake/Output Summary (Last 24 hours) at 08/02/17 0924  Last data filed at 08/02/17 0900   Gross per 24 hour   Intake          2246.99 ml   Output             5046 ml   Net         -2799.01 ml        PHYSICAL EXAM:  General:  nad, encepahopathy  EENT:  EOMI. Anicteric sclerae. MMM  Resp:  decrease BS BL, no wheezing or rales.   No accessory muscle use  CV:  Regular  rhythm,  + jose pedal and UE edema  GI:  Soft, +distended, Non tender.  +Bowel sounds, dressing intact  Neurologic:  Limited as encepahopathic    Reviewed most current lab test results and cultures  YES  Reviewed most current radiology test results   YES  Review and summation of old records today    NO  Reviewed patient's current orders and MAR    YES  PMH/SH reviewed - no change compared to H&P  ________________________________________________________________________  Care Plan discussed with:    Comments   Patient     Family      RN x    Care Manager     Consultant                        Multidiciplinary team rounds were held today with , nursing, pharmacist and clinical coordinator. Patient's plan of care was discussed; medications were reviewed and discharge planning was addressed. ________________________________________________________________________  Total NON critical care TIME:  25  Minutes    Total CRITICAL CARE TIME Spent:   Minutes non procedure based      Comments   >50% of visit spent in counseling and coordination of care     ________________________________________________________________________  Ana Rosa Huffman MD     Procedures: see electronic medical records for all procedures/Xrays and details which were not copied into this note but were reviewed prior to creation of Plan. LABS:  I reviewed today's most current labs and imaging studies.   Pertinent labs include:  Recent Labs      08/02/17   0508  08/01/17   0514  07/31/17   0441   WBC  15.4*  15.6*  15.1*   HGB  12.2  12.2  8.5*   HCT  35.3*  36.2*  25.8*   PLT  303  270  218     Recent Labs      08/02/17   0508  08/01/17   0514  07/31/17   0441   NA  144  139  139   K  3.7  3.8  4.1   CL  112*  110*  112*   CO2  21  16*  16*   GLU  142*  144*  113*   BUN  29*  27*  31*   CREA  1.16  1.27  1.64*   CA  7.7*  8.0*  7.0*   MG  1.8  1.6  1.9   PHOS  2.0*  2.2*  2.5*   ALB  1.8*   --    --    TBILI  1.0   --    --    SGOT  32   --    --    ALT  28   --    --        Signed: Ana Rosa Huffman MD

## 2017-08-02 NOTE — PROGRESS NOTES
Surgery      Pt getting PICC now    Discussed with nursing, no BM    Serous drainage from lower portion of wound(may be small facial dehiscense)    Cont NG for postop ileus      Phill Amado.  Lisa Velazquez MD, [facility]

## 2017-08-02 NOTE — PROGRESS NOTES
POD # 5    Somnolent. Afeb, vss. In afib. good urine output, but down last shift. Urine clear. Penis and scrotum edematous. Abdomen softly distended. WBC 15k stable. H&H stable. Creat 1.16    Imp: Mental status a concern-for CT head. Plan: Continue present.     Fabian Harrison M.D.  [phone]

## 2017-08-02 NOTE — PROGRESS NOTES
CARDIOLOGY Progress Note    Patient ID:  Patient: Samuel Coyle  MRN: [id number]  Age: 80 y.o.  : 1928    Date of  Admission: 2017  6:19 PM   PCP:  Robb Venegas MD   Primary cardiologist:  Rafael García MD    Assessment: 1. Atrial fibrillation with RVR, back to sinus with PAC's. 2. Positive troponin, probably type 2 NSTEMI (supply-demand mismatch resulting in myocardial necrosis) given the history. 3. 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 Marley Zuñiga. A lexiscan nuclear stress test done before this procedure suggested lateral ischemia in that vessel's distribution. 4. Hypertension. 5. Bladder perforation s/p repair. 6. Postop ileus. 7. Postop anemia s/p prbc transfusion. 8. Not on dual antiplatelet therapy with recent surgeries. Plan:     1. Continue beta-blocker, carvedilol dose has been increased. 2. Continue diltiazem infusion for rate control atop carvedilol if needed for recurrent Afib. Goal resting HR . 3. Not a good anticoagulation candidate due to bleeding risk. 4. At risk for acute stent thrombosis of left circumflex artery since he's not on dual antiplatelet therapy. There was evidence of possible collateralization on last cath, which would be desirable should this vessel close down. Discussed patient with wife and daughter. [x]       High complexity decision making was performed in this patient at high risk for decompensation with multiple organ involvement. Samuel Coyle is a 80 y.o. male with a history of surgery, now with postoperative atrial fibrillation. Cardiac catheterization 2017 prior to stenting of left circumflex:  LEFT MAIN: Large vessel with minor luminal irregularities.      LAD: A medium-sized vessel with   scattered plaque of 20%-30% throughout its course.  Two small mid   diagonals were noted and multiple trivial diagonal vessels.      RAMUS: Ramus intermedius is a small vessel with no focal stenosis   that provides perfusion to the anterolateral wall.      CIRCUMFLEX: Moderate-sized vessel that is subtotally occluded in its   proximal and mid portion; there were some faint ANDERSON 1 flow distally   that maybe collateralized.       RCA: Medium-sized dominant vessel giving   rise to small PDA and posterolateral branch vessels; 30% eccentric   mid stenosis is noted, with some systolic compression.       LV: Left ventriculography in MIRANDA projection   reveals no focal wall motion abnormality. Ejection fraction of 55% to   60%. Mild-to-moderate MR. No aortic stenosis on pullback. EDP of 14.     Abdominal aortography in AP projection reveals no renal artery   stenosis. There is scattered plaque in the abdominal aorta with no   focal stenosis or aneurysm. Past Medical History:   Diagnosis Date    Hypertension     Prostate enlargement         Past Surgical History:   Procedure Laterality Date    ABDOMEN SURGERY PROC UNLISTED      hernia repair    CARDIAC SURG PROCEDURE UNLIST         Social History   Substance Use Topics    Smoking status: Never Smoker    Smokeless tobacco: Never Used    Alcohol use No        History reviewed. No pertinent family history.      Allergies   Allergen Reactions    Metoprolol Nausea and Vomiting          Current Facility-Administered Medications   Medication Dose Route Frequency    potassium phosphate 30 mmol in dextrose 5% 250 mL infusion   IntraVENous ONCE    carvedilol (COREG) tablet 25 mg  25 mg Per NG tube BID WITH MEALS    labetalol (NORMODYNE;TRANDATE) 20 mg/4 mL (5 mg/mL) injection 20 mg  20 mg IntraVENous NOW    carvedilol (COREG) tablet 12.5 mg  12.5 mg Per NG tube NOW    dilTIAZem (CARDIZEM) 100 mg in 0.9% sodium chloride (MBP/ADV) 100 mL infusion  0-15 mg/hr IntraVENous TITRATE    furosemide (LASIX) injection 40 mg  40 mg IntraVENous DAILY    HYDROmorphone (PF) (DILAUDID) injection 0.2 mg  0.2 mg IntraVENous Q2H PRN    ampicillin (OMNIPEN) 2 g in 0.9% sodium chloride (MBP/ADV) 100 mL  2 g IntraVENous Q8H    hydrALAZINE (APRESOLINE) 20 mg/mL injection 20 mg  20 mg IntraVENous Q6H PRN    0.9% sodium chloride infusion 250 mL  250 mL IntraVENous PRN    sodium chloride (NS) flush 5-10 mL  5-10 mL IntraVENous Q8H    sodium chloride (NS) flush 5-10 mL  5-10 mL IntraVENous PRN    ondansetron (ZOFRAN) injection 4 mg  4 mg IntraVENous Q4H PRN    0.9% sodium chloride infusion  25 mL/hr IntraVENous CONTINUOUS    chlorhexidine (PERIDEX) 0.12 % mouthwash 15 mL  15 mL Oral Q12H    mupirocin (BACTROBAN) 2 % ointment   Both Nostrils BID    sodium chloride (NS) flush 10-30 mL  10-30 mL InterCATHeter PRN    sodium chloride (NS) flush 10 mL  10 mL InterCATHeter Q24H    sodium chloride (NS) flush 10 mL  10 mL InterCATHeter PRN    sodium chloride (NS) flush 10-40 mL  10-40 mL InterCATHeter Q8H    sodium chloride (NS) flush 20 mL  20 mL InterCATHeter Q24H    acetaminophen (TYLENOL) solution 650 mg  650 mg Per NG tube Q4H PRN       Review of Symptoms:  Patient cannot communicate.   General: negative for fever, chills, sweats, weakness, weight loss   Eyes: negative for blurred vision, eye pain, loss of vision, diplopia   Ear Nose and Throat: negative for rhinorrhea, pharyngitis, otalgia, tinnitus, speech or swallowing difficulties   Respiratory: negative for SOB, cough, sputum production, wheezing, CLARKE, pleuritic pain   Cardiology: negative for chest pain, palpitations, orthopnea, PND, edema, syncope   Gastrointestinal: negative for abdominal pain, N/V, dysphagia, change in bowel habits, bleeding   Genitourinary: negative for frequency, urgency, dysuria, hematuria, incontinence   Muskuloskeletal : negative for arthralgia, myalgia   Hematology: negative for easy bruising, bleeding, lymphadenopathy   Dermatological: negative for rash, ulceration, mole change, new lesion   Endocrine: negative for hot flashes or polydipsia   Neurological: negative for headache, dizziness, confusion, focal weakness, paresthesia, memory loss, gait disturbance   Psychological: negative for anxiety, depression, agitation       Objective:      Physical Exam:  Temp (24hrs), Av.3 °F (36.8 °C), Min:97.9 °F (36.6 °C), Max:99 °F (37.2 °C)    Patient Vitals for the past 8 hrs:   Pulse   17 1237 99   17 1230 (!) 105   17 1157 88   17 1135 96   17 1100 85   17 1030 89   17 1000 90   17 0900 94   17 0832 96   17 0800 90   17 0734 89    Patient Vitals for the past 8 hrs:   Resp   17 1237 18   17 1230 25   17 1157 19   17 1135 22   17 1100 29   17 1030 (!) 33   17 1000 23   17 0900 23   17 0832 22   17 0800 20   17 0734 21    Patient Vitals for the past 8 hrs:   BP   17 1237 172/57   17 1230 192/72   17 1157 200/82   17 1135 (!) 134/107   17 1100 178/81   17 1030 164/63   17 1000 179/49   17 0900 174/61   17 0832 189/57   17 0800 159/66   17 0734 172/51          Intake/Output Summary (Last 24 hours) at 17 1434  Last data filed at 17 1230   Gross per 24 hour   Intake          1969.49 ml   Output             5216 ml   Net         -3246.51 ml       Nondiaphoretic, not in acute distress, NG tube in place. Supple neck. RIJ central line. No scleral icterus, mucous membranes moist, conjuctivae pink, no xanthelasma. +deep respirations, clear to auscultation bilaterally anteriorly, symmetric air movement. Irregular rate and rhythm, no murmur, pericardial rub, knock, or gallop. No JVD but +peripheral edema. No carotid bruit. Palpable radial and DP pulses bilaterally. Abdomen not examined. Extremities without cyanosis or clubbing. Muscle tone and bulk normal.  Skin warm and dry. No rashes or ulcers.   Neuro grossly nonfocal.  No tremor. Lethargic. CARDIOGRAPHICS and STUDIES, I reviewed:    Telemetry:  Atrial fibrillation has converted to sinus with frequent PAC's. ECG:  No recent study. Last one 7/20/2017 showed sinus rhythm with first degree AV block. Echo 7/20/2017:  LEFT VENTRICLE: Size was at the upper limits of normal. Systolic function  was mildly reduced. Ejection fraction was estimated in the range of 45 %  to 50 %. No obvious wall motion abnormalities identified in the views  obtained. Wall thickness was mildly increased. Hypertrophy was noted. RIGHT VENTRICLE: The size was normal. Systolic function was normal. Wall  thickness was normal.    LEFT ATRIUM: The atrium was mildly dilated. RIGHT ATRIUM: The atrium was mildly dilated. MITRAL VALVE: Normal valve structure. There was mild thickening. There was  normal leaflet separation. DOPPLER: There was no evidence for stenosis. There was mild to moderate regurgitation. AORTIC VALVE: The valve was probably trileaflet. Leaflets exhibited mildly  increased thickness and normal cuspal separation. DOPPLER: There was no  stenosis. There was trivial regurgitation. TRICUSPID VALVE: Normal valve structure. There was normal leaflet  separation. DOPPLER: There was no evidence for tricuspid stenosis. There  was no significant regurgitation. PULMONIC VALVE: Not well visualized, but normal Doppler findings. DOPPLER:  There was mild regurgitation. AORTA: The root exhibited normal size. PERICARDIUM: Insignificant pericardial effusion and/or pericardial fat was  present. Labs:  Recent Labs      08/01/17   0925   TROIQ  2.35*     Lab Results   Component Value Date/Time    Cholesterol, total 178 07/20/2017 12:28 PM    HDL Cholesterol 48 07/20/2017 12:28 PM    LDL, calculated 117.8 07/20/2017 12:28 PM    Triglyceride 61 07/20/2017 12:28 PM    CHOL/HDL Ratio 3.7 07/20/2017 12:28 PM     No results for input(s): INR, PTP, APTT in the last 72 hours.     No lab exists for Anil [place]   Recent Labs      08/02/17   0508  08/01/17   0514  07/31/17   0441   NA  144  139  139   K  3.7  3.8  4.1   CL  112*  110*  112*   CO2  21  16*  16*   BUN  29*  27*  31*   CREA  1.16  1.27  1.64*   GLU  142*  144*  113*   PHOS  2.0*  2.2*  2.5*   CA  7.7*  8.0*  7.0*   ALB  1.8*   --    --    WBC  15.4*  15.6*  15.1*   HGB  12.2  12.2  8.5*   HCT  35.3*  36.2*  25.8*   PLT  303  270  218     Recent Labs      08/02/17   0508   SGOT  32   AP  69   TP  5.9*   ALB  1.8*   GLOB  4.1*     No components found for: GLPOC  Recent Labs      08/02/17   1017   PH  7.48*   PCO2  31*   PO2  84           Rossy High MD  8/2/2017

## 2017-08-02 NOTE — PROGRESS NOTES
[facility]  Pulmonary, Critical Care, and Sleep Medicine    Name: Jermaine Santos MRN: [id number]   : 1928 Hospital: [facility]   Date: 2017            IMPRESSION:   · Postop respiratory failure on vent but slow to arouse  · Encephalopathy- needs head CT given afib, risk of CVA- likely from dilaudid. Family aware but feels pt needs pain meds  · Complicated Enterococcal UTI- sensitive to Amp  · CAD with recent stents per [facility]  · NSTEMI? Elevated troponin still climbing- likley demand ischemia but now with AF RVR  · AF RVR starting this AM  · Echo earlier this year LVEF 45%  · Anasarca and volume overload? Third spacing- good response to diuretics  · S/P removal of bladder stone and repair of perforated bladder  · S/P subsequent ex-lap due to extensive sq emphysema in abdomen/retroperitoneum - no sign of bowel perforation  · Shock better, now hypertensive  · Hypocalcemia  · Severe hypoalbuminemia - catabolic  · hypophosphatemia  · Acute renal failure  · \"pneumothorax\" due to dissection of air from retroperitoneum cephalad, different physiology or behavior of typical pneumothorax   · CAD      PLAN:   · Appreciate Cardiology's help  · Will start TPN tomorrow  · Spoke with Urology  · replete lytes  · Head CT  · PICC- IJ not functioning  · Diltiazem drip   · Consult - pt of his  · Head CT scan if he does not perk up. He interacts with daughter  · IV ampicillin- will need 7 days course, d/c vancomycin  · Reduce IV fluids  · Gentle diuresis  · antihypertensives  · Pain control  · No need for chest tubes for this type of pneumothorax, should resolve spontaneously, but will monitor  · DVT/GI prophylaxis     Subjective/Interval History:   I have reviewed the flowsheet and previous days notes. The patient is unable to give any meaningful history or review of systems because the patient is:  Very somnolent/ sedated    Nursing notes reviewed     tachypnea. Somnolent. 3:42 PM  Head Ct scan - atrophic changes only. Dark NG output. Will restart Pepcid, may need PPI.   Two PRBCs given over night > HGB 8.5 > 12.2, HTC 25.8 > 36.2, Lasix 20 mg between the Packs given > polyuria total 3 L overnight, pain medication given with each positions, BP still around 150-176 mmHg despite of hydralazine PRN Q 6 hr, Pain med, carvedilol, new A Fib in morning > Diltiazem 10 mg IV once given HR been around , BP stable,     AFRVR now. SBP > 150 and CVP 10-12. Anasraca. Pt retired . Spoke with daughter at bedside. They would like Dr. Jeff Love to see pt. Repeat troponin higher. Pt moaning and abdomen still distended and sore. NGT in place      Review of Systems   Unable to perform ROS: Mental status change     Objective:   Vital Signs:    Visit Vitals    /51    Pulse 83    Temp 98 °F (36.7 °C)    Resp 24    Ht 5' 10\" (1.778 m)    Wt 106.1 kg (233 lb 14.5 oz)    SpO2 93%    BMI 33.56 kg/m2       O2 Device: Nasal cannula   O2 Flow Rate (L/min): 3 l/min   Temp (24hrs), Av.1 °F (36.7 °C), Min:97.8 °F (36.6 °C), Max:98.6 °F (37 °C)       Intake/Output:   Last shift:         Last 3 shifts:  1901 -  0700  In: 3657.3 [I.V.:2787.3]  Out: 7916 [Urine:101; Drains:6695]    Intake/Output Summary (Last 24 hours) at 17 0837  Last data filed at 17 0600   Gross per 24 hour   Intake          2081.99 ml   Output             4276 ml   Net         -2194.01 ml     Hemodynamics:   PAP:   CO:     Wedge:   CI:     CVP:  CVP (mmHg): 8 mmHg (17) SVR:       PVR:       Ventilator Settings:  Mode Rate Tidal Volume Pressure FiO2 PEEP   CPAP   450 ml  5 cm H2O 40 % 6 cm H20     Peak airway pressure: 12 cm H2O    Minute ventilation: 9.88 l/min       Physical Exam   Constitutional: He appears ill. He is sedated and intubated. HENT:   Head: Normocephalic and atraumatic. Eyes: No scleral icterus. Cardiovascular: Normal rate and regular rhythm.     No murmur heard.  Pulmonary/Chest: He is intubated. No respiratory distress. He has no wheezes. He has no rales. Abdominal: He exhibits distension. Bowel sounds are absent. Postop abdomen, sq emphysema resolved   Musculoskeletal: He exhibits edema. Skin: Skin is warm and dry. Data:     Current Facility-Administered Medications   Medication Dose Route Frequency    carvedilol (COREG) tablet 6.25 mg  6.25 mg Per NG tube BID WITH MEALS    dilTIAZem (CARDIZEM) 100 mg in 0.9% sodium chloride (MBP/ADV) 100 mL infusion  0-15 mg/hr IntraVENous TITRATE    furosemide (LASIX) injection 40 mg  40 mg IntraVENous DAILY    ampicillin (OMNIPEN) 2 g in 0.9% sodium chloride (MBP/ADV) 100 mL  2 g IntraVENous Q8H    sodium chloride (NS) flush 5-10 mL  5-10 mL IntraVENous Q8H    0.9% sodium chloride infusion  25 mL/hr IntraVENous CONTINUOUS    PHENYLephrine (NEOSYNEPHRINE) 30,000 mcg in 0.9% sodium chloride 250 mL infusion   mcg/min IntraVENous TITRATE    chlorhexidine (PERIDEX) 0.12 % mouthwash 15 mL  15 mL Oral Q12H    mupirocin (BACTROBAN) 2 % ointment   Both Nostrils BID    sodium chloride (NS) flush 10 mL  10 mL InterCATHeter Q24H    sodium chloride (NS) flush 10-40 mL  10-40 mL InterCATHeter Q8H    sodium chloride (NS) flush 20 mL  20 mL InterCATHeter Q24H                Labs:  Recent Labs      08/02/17   0508  08/01/17   0514  07/31/17   0441   WBC  15.4*  15.6*  15.1*   HGB  12.2  12.2  8.5*   HCT  35.3*  36.2*  25.8*   PLT  303  270  218     Recent Labs      08/02/17   0508  08/01/17   0514  07/31/17   0441   NA  144  139  139   K  3.7  3.8  4.1   CL  112*  110*  112*   CO2  21  16*  16*   GLU  142*  144*  113*   BUN  29*  27*  31*   CREA  1.16  1.27  1.64*   CA  7.7*  8.0*  7.0*   MG  1.8  1.6  1.9   PHOS  2.0*  2.2*  2.5*   ALB  1.8*   --    --    TBILI  1.0   --    --    SGOT  32   --    --    ALT  28   --    --      No results for input(s): PH, PCO2, PO2, HCO3, FIO2 in the last 72 hours.   Imaging:  I have personally reviewed the patients radiographs and have reviewed the reports:  No change        Total critical care time exclusive of procedures: 35 minutes  Roby Prado MD

## 2017-08-02 NOTE — PROGRESS NOTES
Nutrition Assessment:    RECOMMENDATIONS:   Advance diet as medically able per surgeon  If unable to advance to PO diet in 24-48h, recommend initiating nutrition support (RD to provide TPN recommendations prn)     DIETITIANS INTERVENTIONS/PLAN:   Monitor plan of care  Advance diet as medically able  Monitor need for nutrition support    ASSESSMENT:   Pt admitted with bladder injury. PMH: CAD, HTN. Chart reviewed, case discussed during CCU rounds. Pt s/p cystoscopy + ex lap + repair of bladder perforation. Now with postop ileus. NGT to suction. He is NPO day 5. Abdomen is distended and he is currently encephalopathic. Phos 2.0, being repleted. PICC just placed. If pt is unable to advance to PO diet in 24-48h he will likely need TPN. SUBJECTIVE/OBJECTIVE:   Pt encephalopathic, obtunded   Diet Order: NPO  % Eaten:  No data found. Pertinent Medications:ampicillin, CaGluconate, lasix, Kphos; [email or web address]). Chemistries:  Lab Results   Component Value Date/Time    Sodium 144 08/02/2017 05:08 AM    Potassium 3.7 08/02/2017 05:08 AM    Chloride 112 08/02/2017 05:08 AM    CO2 21 08/02/2017 05:08 AM    Anion gap 11 08/02/2017 05:08 AM    Glucose 142 08/02/2017 05:08 AM    BUN 29 08/02/2017 05:08 AM    Creatinine 1.16 08/02/2017 05:08 AM    BUN/Creatinine ratio 25 08/02/2017 05:08 AM    GFR est AA >60 08/02/2017 05:08 AM    GFR est non-AA 59 08/02/2017 05:08 AM    Calcium 7.7 08/02/2017 05:08 AM    AST (SGOT) 32 08/02/2017 05:08 AM    Alk. phosphatase 69 08/02/2017 05:08 AM    Protein, total 5.9 08/02/2017 05:08 AM    Albumin 1.8 08/02/2017 05:08 AM    Globulin 4.1 08/02/2017 05:08 AM    A-G Ratio 0.4 08/02/2017 05:08 AM    ALT (SGPT) 28 08/02/2017 05:08 AM      Anthropometrics: Height: 5' 10\" (177.8 cm) Weight: 106.1 kg (233 lb 14.5 oz)    IBW (%IBW):   ( ) UBW (%UBW):   (  %)    BMI: Body mass index is 33.56 kg/(m^2).     This BMI is indicative of:  []Underweight   []Normal   []Overweight   [x] Obesity [] Extreme Obesity (BMI>40)  Estimated Nutrition Needs (Based on): 2078 Kcals/day (MSJ 1732 x 1.2) , 85 g (0.8gPro/kg) Protein  Carbohydrate: At Least 130 g/day  Fluids: 2000 mL/day    Last BM: PTA   []Active     []Hyperactive  [x]Hypoactive       [] Absent   BS  Skin:    [] Intact   [x] Incision  [] Breakdown   [] DTI   [] Tears/Excoriation/Abrasion  [x]Edema(+2-generalized; +3 pitting-BUE; +1-BLE) [] Other: Wt Readings from Last 30 Encounters:   07/29/17 106.1 kg (233 lb 14.5 oz)   07/26/17 95.8 kg (211 lb 3.2 oz)   03/26/17 97.7 kg (215 lb 6.2 oz)   11/03/16 98.6 kg (217 lb 6 oz)   03/17/15 97.2 kg (214 lb 4.8 oz)   10/02/14 90.7 kg (200 lb)   05/16/13 96.9 kg (213 lb 10 oz)   02/10/13 99.5 kg (219 lb 5.7 oz)   04/02/12 96.1 kg (211 lb 13.8 oz)   03/13/11 96.8 kg (213 lb 6.5 oz)      NUTRITION DIAGNOSES:   Problem:  Altered GI function      Etiology: related to postop ileus     Signs/Symptoms: as evidenced by NGT to suction, pt NPO. NUTRITION INTERVENTIONS:  Meals/Snacks: Other (advance diet as medically able per surgeon)                  GOAL:   Pt will be started on PO diet vs nutrition support in 2-3 days.      Cultural, Jainism, or Ethnic Dietary Needs: None     LEARNING NEEDS (Diet, Food/Nutrient-Drug Interaction):    [x] None Identified   [] Identified and Education Provided/Documented   [] Identified and Pt declined/was not appropriate      [x] Interdisciplinary Care Plan Reviewed/Documented    [x] Participated in Discharge Planning: Unable to determine    [x] Interdisciplinary Rounds     NUTRITION RISK:    [x] High              [] Moderate           []  Low  []  Minimal/Uncompromised    PT SEEN FOR:    []  MD Consult: []Calorie Count      []Diabetic Diet Education        []Diet Education     []Electrolyte Management     []General Nutrition Management and Supplements     []Management of Tube Feeding     []TPN Recommendations    []  RN Referral:  []MST score >=2     []Enteral/Parenteral Nutrition PTA     []Pregnant: Gestational DM or Multigestation   []  Low BMI  []  DTR Referral   SONIA Gonzalez, [address], [address] [place] [address] Dr   Pager [phone]  Weekend Pager [phone]

## 2017-08-02 NOTE — WOUND CARE
Pressure Ulcer Prevention In basket Alert Received for Kong < 14 (moderate risk).      Suggested Care Plan/Interventions for Nursing  1. Complete Kong Pressure Ulcer Risk Scale and use sub scores to identify appropriate interventions. 2. Perform Assessment: skin, changes in LOC, visual cues for pain, monitor skin under medical devices  3. Respond to Reduced Sensory Perception: changes in LOC, check visual cues for pain, float heels, suspension boots, pressure redistribution bed/mattress/chair cushion, turning and reposition approximately every 2 hours (pillows & wedges), pad between skin to skin, turn & reposition  4. Manage Moisture: absorbent under pads, internal / external urinary device, internal /  external fecal device, minimize layers, contain wound drainage, access need for specialty bed, limit adult briefs, maintain skin hydration (lotion/cream), moisture barrier, offer toileting every hour  5. Promote Activity: increase time out of bed, chair cushion, PT/OT evaluation, trapeze to reposition, pressure redistribution bed/mattress/chair  6. Address Reduced Mobility: float heels / suspension boot, HOB 30 degrees or less, pressure redistribution bed/mattress/cushion, PT / OT evaluation, turn and reposition approximately every 2 hours (pillows & wedges)  7. Promote Nutrition: document food / fluid / supplement intake, encourage/assist with meals as needed  8. Reduce Friction and Shear: transferring/repositioning devices (lift/draw sheet), lift team/ patient mobility team, feet elevated on foot rest, minimize layers, foam dressing / transparent film / skin sealants, protective barrier creams and emollients, transfer aides (board, Noel lift, ceiling lift, stand assist), HOB 30 degrees or less, trapeze to reposition.   Wound Care Team

## 2017-08-03 NOTE — PROGRESS NOTES
Progress Note    Patient: Fe Renee MRN: [id number]  SSN: [id number]    YOB: 1928 Age: 80 y.o. Sex: male   Height: Height: 5' 10\" (177.8 cm) Weight: Weight: 106.1 kg (233 lb 14.5 oz) BMI: Body mass index is 33.56 kg/(m^2). PCP: Susan Holman MD [phone]   Contact:  Primary Emergency Contact: Jennifer Madrid Phone: [phone] [facility] [address] Day: 8 - Admitted 2017  6:19 PM by Aldo Palomares MD Surgeon(s):  Mookie Gaspar MD 6 Days Post-Op Procedure(s):  LAPAROTOMY EXPLORATORY           Assessment/Plan:     Fluid through lower abdominal wound - send for creatinine. AF with RVR - stable, cardiology consultation, resolve    NSTMI - ?     Post-Op Ileus - NGT/IVF/NPO awaiting bowel function. 1100 NGT. No BM     Bladder perforation s/p repair - 3310 UOP nearly all fro SP tube, ARMEN 180 - good closure. Presumably \"free air\" was from partial intraperitoneal bladder rupture, and transgression of air/fluid with cath manipulation given negative exploration and complete bowel run (small/large)     Elevated creatinine - resolved     Enterococcal UTI - Ampicillin, afebrile     Post-op Anemia - resolved s/p transfusion, do not suspect active bleeding.      Imaging: N/A   Exam:    ROS:  Denies Chest Pain, SOB         ID: Temp (24hrs), Av.2 °F (36.8 °C), Min:97.9 °F (36.6 °C), Max:99 °F (37.2 °C)    2017: WBC 6.7 K/uL (Ref range: 4.1 - 11.1 K/uL)  2017: WBC 10.2 K/uL (Ref range: 4.1 - 11.1 K/uL)  2017: WBC 20.4 K/uL* (Ref range: 4.1 - 11.1 K/uL)  2017: WBC 20.6 K/uL* (Ref range: 4.1 - 11.1 K/uL)  2017: WBC 18.5 K/uL* (Ref range: 4.1 - 11.1 K/uL)  2017: WBC 15.1 K/uL* (Ref range: 4.1 - 11.1 K/uL)  2017: WBC 15.6 K/uL* (Ref range: 4.1 - 11.1 K/uL)  2017: WBC 15.4 K/uL* (Ref range: 4.1 - 11.1 K/uL)  8/3/2017: WBC 14.3 K/uL* (Ref range: 4.1 - 11.1 K/uL)  Current Antimicrobial Therapy (168h ago through future)    Ordered Start Stop    07/31/17 0958  ampicillin (OMNIPEN) 2 g in 0.9% sodium chloride (MBP/ADV) 100 mL  2 g,   IntraVENous,   EVERY 8 HOURS      07/31/17 1100 08/05/17 1059        Cultures: All Micro Results     None         Pulm: Nasal cannula 3 l/min 95 %     IS:   of predicted         GI: Intake: DIET NPO   Appetite: NPO       I.V.: 250 mL (K phos)  Abdominal Assessment: Distended, Semi-soft, Tender  Bowel Sounds: Hypoactive     No data found. Pain: 0/10 Aching - Abdomen - Mid         Current Analgesic Therapy (168h ago through future)    Ordered     Start Stop    07/31/17 0914  HYDROmorphone (PF) (DILAUDID) injection 0.2 mg  0.2 mg,   IntraVENous,   EVERY 2 HOURS AS NEEDED      07/31/17 0913 --    07/29/17 2152  acetaminophen (TYLENOL) solution 650 mg  650 mg,   Per NG tube,   EVERY 4 HOURS AS NEEDED      07/29/17 2150 --         : [place] Drain (Abdominal Drainage) 07/28/17-Output (ml): 45 ml  Aaron-Yoo Drain 07/28/17 Abdomen-Output (ml): 30 ml  Suprapubic cath - Urinary Catheter 07/28/17 3- way; [place]-Status: Draining;Patent      7/21/2017: Creatinine 1.13 MG/DL (Ref range: 0.70 - 1.30 MG/DL)  7/27/2017: Creatinine 1.15 MG/DL (Ref range: 0.70 - 1.30 MG/DL)  7/28/2017: Creatinine 1.21 MG/DL (Ref range: 0.70 - 1.30 MG/DL)  7/29/2017: Creatinine 1.36 MG/DL* (Ref range: 0.70 - 1.30 MG/DL)  7/30/2017: Creatinine 1.58 MG/DL* (Ref range: 0.70 - 1.30 MG/DL)  7/31/2017: Creatinine 1.64 MG/DL* (Ref range: 0.70 - 1.30 MG/DL)  8/1/2017: Creatinine 1.27 MG/DL (Ref range: 0.70 - 1.30 MG/DL)  8/2/2017: Creatinine 1.16 MG/DL (Ref range: 0.70 - 1.30 MG/DL)  8/3/2017: Creatinine 0.98 MG/DL (Ref range: 0.70 - 1.30 MG/DL)  No results for input(s): FCREA in the last 336 hours.    Lines:    PICC Double Lumen 71/03/86 Basilic;Left (Active)   Central Line Being Utilized Yes 8/3/2017  5:00 AM   Criteria for Appropriate Use Limited/no vessel suitable for conventional peripheral access 8/3/2017  5:00 AM   Site Assessment Clean, dry, & intact 8/3/2017  5:00 AM   Phlebitis Assessment 0 8/3/2017  5:00 AM   Infiltration Assessment 0 8/3/2017  5:00 AM   Arm Circumference (cm) 33 cm 8/2/2017 10:47 AM   Date of Last Dressing Change 08/02/17 8/3/2017  5:00 AM   Dressing Status Clean, dry, & intact 8/3/2017  5:00 AM   Action Taken Open ports on tubing capped 8/3/2017  5:00 AM   External Catheter Length (cm) 0 centimeters 8/2/2017 10:47 AM   Dressing Type Disk with Chlorhexadine gluconate (CHG); Transparent 8/3/2017  5:00 AM   Hub Color/Line Status Purple; Infusing 8/3/2017  5:00 AM   Positive Blood Return (Site #1) Yes 8/3/2017  5:00 AM   Hub Color/Line Status Red; Infusing 8/3/2017  5:00 AM   Positive Blood Return (Site #2) Yes 8/3/2017  5:00 AM   Alcohol Cap Used Yes 8/3/2017  5:00 AM                Vitals: O2 Device: Nasal cannula O2 Flow Rate (L/min): 3 l/min  Patient Vitals for the past 24 hrs:   BP Temp Pulse Resp SpO2   08/03/17 0700 160/74 98.1 °F (36.7 °C) 80 15 95 %   08/03/17 0600 159/54 - 78 14 95 %   08/03/17 0526 183/87 - 81 14 94 %   08/03/17 0500 178/78 97.9 °F (36.6 °C) 86 15 93 %   08/03/17 0417 175/76 - 81 16 96 %   08/03/17 0400 - - 78 17 93 %   08/03/17 0300 167/54 - 79 17 93 %   08/03/17 0213 158/54 - 85 19 94 %   08/03/17 0200 180/56 - 71 18 94 %   08/03/17 0100 161/60 - 76 19 93 %   08/03/17 0030 156/61 98.1 °F (36.7 °C) 78 18 95 %   08/03/17 0001 147/62 - 73 16 95 %   08/02/17 2330 150/65 - 73 19 92 %   08/02/17 2300 149/67 - 73 19 94 %   08/02/17 2230 168/69 - 76 19 94 %   08/02/17 2200 166/65 - 75 17 93 %   08/02/17 2100 167/79 - 80 20 96 %   08/02/17 2029 166/62 - 86 18 95 %   08/02/17 2000 - 98.1 °F (36.7 °C) 87 22 95 %   08/02/17 1930 152/57 - 78 19 93 %   08/02/17 1900 166/64 - 80 18 94 %   08/02/17 1843 167/78 - 79 19 94 %   08/02/17 1800 155/56 - 77 17 94 %   08/02/17 1730 151/49 - 75 18 94 %   08/02/17 1700 148/60 - 74 19 95 %   08/02/17 1630 140/54 - 74 20 94 %   08/02/17 1600 145/56 98.6 °F (37 °C) 73 19 95 % 08/02/17 1530 143/52 - 70 19 94 %   08/02/17 1521 136/57 - - - -   08/02/17 1457 133/57 - 72 19 92 %   08/02/17 1400 185/61 - 89 21 94 %   08/02/17 1330 168/52 - 91 19 94 %   08/02/17 1300 177/73 - 93 20 93 %   08/02/17 1237 172/57 - 99 18 94 %   08/02/17 1230 192/72 97.9 °F (36.6 °C) (!) 105 25 95 %   08/02/17 1157 200/82 - 88 19 95 %   08/02/17 1135 (!) 134/107 - 96 22 94 %   08/02/17 1100 178/81 - 85 29 93 %   08/02/17 1030 164/63 - 89 (!) 33 93 %   08/02/17 1000 179/49 - 90 23 92 %   08/02/17 0900 174/61 - 94 23 93 %   08/02/17 0832 189/57 - 96 22 93 %   08/02/17 0800 159/66 99 °F (37.2 °C) 90 20 94 %   08/02/17 0734 172/51 - 89 21 92 %      I&O's:    Date 08/02/17 0700 - 08/03/17 0659 08/03/17 0700 - 08/04/17 0659   Shift 4899-5823 2409-7624 24 Hour Total 3175-5951 9485-8347 24 Hour Total   I  N  T  A  K  E   I.V.  (mL/kg/hr) 770.6  (0.6) 459.5  (0.4) 1230.1  (0.5)         I.V. 250  250         Cardizem Volume 120.2 59.9 180.1         Volume (0.9% sodium chloride infusion) 300.4 299.6 600         Volume (ampicillin (OMNIPEN) 2 g in 0.9% sodium chloride (MBP/ADV) 100 mL) 100 100 200       NG/ 130 310         Water Flush Volume (mL) (Nasogastric Tube 07/28/17) 120 50 170         Medication Volume (Nasogastric Tube 07/28/17) 60 80 140       Shift Total  (mL/kg) 950.6  (9) 589.5  (5.6) 1540.1  (14.5)      O  U  T  P  U  T   Urine  (mL/kg/hr) 25  (0) 0  (0) 25  (0) 10  10      Urine Output (mL) (Urinary Catheter 07/28/17 3- way; Rosales) 25 0 25 10  10    Emesis/NG output          Output (ml) (Nasogastric Tube 07/28/17)        Drains 2925 565 3490 45  45      Output (ml) (Malecot Drain (Abdominal Drainage) 07/28/17) 2825 485 3310 45  45      Output (ml) (Aaron-Yoo Drain 07/28/17 Abdomen) 100 80 180       Shift Total  (mL/kg) 3470  (32.7) 1065  (10) 4535  (42.7) 55  (0.5)  55  (0.5)   NET -2519.4 -475.5 -2994.9 -55  -55   Weight (kg) 106.1 106.1 106.1 106.1 106.1 106.1       Meds: Current Facility-Administered Medications:     carvedilol (COREG) tablet 25 mg, 25 mg, Per NG tube, BID WITH MEALS, Jimmy Bain MD, 25 mg at 08/02/17 2057    famotidine (PF) (PEPCID) 20 mg in sodium chloride 0.9 % 10 mL injection, 20 mg, IntraVENous, Q12H, Jimmy Bain MD, 20 mg at 08/02/17 2059    [COMPLETED] dilTIAZem (CARDIZEM) injection 10 mg, 10 mg, IntraVENous, ONCE, 10 mg at 08/01/17 1205 **FOLLOWED BY** dilTIAZem (CARDIZEM) 100 mg in 0.9% sodium chloride (MBP/ADV) 100 mL infusion, 0-15 mg/hr, IntraVENous, TITRATE, Jimmy Bain MD, Last Rate: 5 mL/hr at 08/02/17 1801, 5 mg/hr at 08/02/17 1801    furosemide (LASIX) injection 40 mg, 40 mg, IntraVENous, DAILY, Jimmy Bain MD, 40 mg at 08/02/17 0802    HYDROmorphone (PF) (DILAUDID) injection 0.2 mg, 0.2 mg, IntraVENous, Q2H PRN, Jimmy Bain MD, 0.2 mg at 08/03/17 0505    ampicillin (OMNIPEN) 2 g in 0.9% sodium chloride (MBP/ADV) 100 mL, 2 g, IntraVENous, Q8H, Jimmy Bain MD, Last Rate: 200 mL/hr at 08/03/17 0217, 2 g at 08/03/17 0217    hydrALAZINE (APRESOLINE) 20 mg/mL injection 20 mg, 20 mg, IntraVENous, Q6H PRN, Jimmy Bain MD, 20 mg at 08/03/17 0528    0.9% sodium chloride infusion 250 mL, 250 mL, IntraVENous, PRN, Jhony Esteves MD    sodium chloride (NS) flush 5-10 mL, 5-10 mL, IntraVENous, Q8H, Lizandro Gupta MD, 10 mL at 08/03/17 0531    sodium chloride (NS) flush 5-10 mL, 5-10 mL, IntraVENous, PRN, Lizandro Gupta MD    ondansetron [facility]) injection 4 mg, 4 mg, IntraVENous, Q4H PRN, Lizandro Gupta MD    0.9% sodium chloride infusion, 25 mL/hr, IntraVENous, CONTINUOUS, Jimmy Bain MD, Last Rate: 25 mL/hr at 08/02/17 0056, 25 mL/hr at 08/02/17 0056    chlorhexidine (PERIDEX) 0.12 % mouthwash 15 mL, 15 mL, Oral, Q12H, Lizandro Gupta MD, 15 mL at 08/02/17 2057    sodium chloride (NS) flush 10-30 mL, 10-30 mL, InterCATHeter, PRN, Lizandro Gupta MD    sodium chloride (NS) flush 10 mL, 10 mL, InterCATHeter, Q24H, Lizandro Gupta MD, 10 mL at 08/02/17 1449    sodium chloride (NS) flush 10 mL, 10 mL, InterCATHeter, PRN, Farideh Mack MD    sodium chloride (NS) flush 10-40 mL, 10-40 mL, InterCATHeter, Q8H, Farideh Mack MD, 10 mL at 08/03/17 0531    sodium chloride (NS) flush 20 mL, 20 mL, InterCATHeter, Q24H, Farideh Mack MD, 20 mL at 08/02/17 1449    acetaminophen (TYLENOL) solution 650 mg, 650 mg, Per NG tube, Q4H PRN, Michael Mark MD, 650 mg at 07/31/17 1955   Labs: Recent Labs      08/03/17   0405  08/02/17   0508  08/01/17   0514   WBC  14.3*  15.4*  15.6*   HGB  11.7*  12.2  12.2   HCT  33.7*  35.3*  36.2*   PLT  286  303  270     Recent Labs      08/03/17   0405  08/02/17   0508  08/01/17   0514   NA  146*  144  139   K  3.4*  3.7  3.8   CL  110*  112*  110*   CO2  29  21  16*   GLU  141*  142*  144*   BUN  31*  29*  27*   CREA  0.98  1.16  1.27   CA  7.6*  7.7*  8.0*   MG  1.8  1.8  1.6   PHOS  2.4*  2.0*  2.2*             Signed By: Robert Fay MD - August 3, 2017

## 2017-08-03 NOTE — PROGRESS NOTES
[facility] Section  Pulmonary, Critical Care, and Sleep Medicine    Name: David Blizzard MRN: [id number]   : 1928 Hospital: [facility]   Date: 8/3/2017            IMPRESSION:   · Postop respiratory failure off vent but slow to arouse  · Encephalopathy- head CT ok ; likely from dilaudid. Family aware but feels pt needs pain meds  · Complicated Enterococcal UTI- sensitive to Amp  · CAD with recent stents per   · NSTEMI demand ischemia but now with AF RVR  · AF RVR on Iv diltiazem  · HTN  · Echo earlier this year LVEF 45%  · Anasarca and volume overload? Third spacing- good response to diuretics  · S/P removal of bladder stone and repair of perforated bladder  · S/P subsequent ex-lap due to extensive sq emphysema in abdomen/retroperitoneum - no sign of bowel perforation  · Shock better, now hypertensive  · Hypocalcemia  · Severe hypoalbuminemia - catabolic  · hypophosphatemia  · Acute renal failure  · \"pneumothorax\" due to dissection of air from retroperitoneum cephalad, different physiology or behavior of typical pneumothorax   · CAD      PLAN:   · Appreciate Cardiology's help  · Will start TPN  · REorient  · replete lytes  · PICC  · Cardiology following  · IV ampicillin- will need 7 days course, d/c vancomycin  · Gentle diuresis  · antihypertensives  · Pain control  · No need for chest tubes for this type of pneumothorax, should resolve spontaneously, but will monitor  · DVT/GI prophylaxis     Subjective/Interval History:   I have reviewed the flowsheet and previous days notes. The patient is unable to give any meaningful history or review of systems because the patient is:  Very somnolent/ sedated    Nursing notes reviewed  8/3 will arouse some but still sleepy. NG output still dark. Hgb slowly drifting down     tachypnea. Somnolent. 3:42 PM  Head Ct scan - atrophic changes only. Dark NG output. Will restart Pepcid, may need PPI.       Two PRBCs given over night > HGB 8.5 > 12.2, HTC 25.8 > 36.2, Lasix 20 mg between the Packs given > polyuria total 3 L overnight, pain medication given with each positions, BP still around 150-176 mmHg despite of hydralazine PRN Q 6 hr, Pain med, carvedilol, new A Fib in morning > Diltiazem 10 mg IV once given HR been around , BP stable,     AFRVR now. SBP > 150 and CVP 10-12. Anasraca. Pt retired . Spoke with daughter at bedside. They would like Dr. Rosales Scott to see pt. Repeat troponin higher. Pt moaning and abdomen still distended and sore. NGT in place      Review of Systems   Unable to perform ROS: Mental status change     Objective:   Vital Signs:    Visit Vitals    /61    Pulse 82    Temp 98.1 °F (36.7 °C)    Resp 15    Ht 5' 10\" (1.778 m)    Wt 106.1 kg (233 lb 14.5 oz)    SpO2 93%    BMI 33.56 kg/m2       O2 Device: Nasal cannula   O2 Flow Rate (L/min): 3 l/min   Temp (24hrs), Av.1 °F (36.7 °C), Min:97.9 °F (36.6 °C), Max:98.6 °F (37 °C)       Intake/Output:   Last shift:      701 - 1900  In: 25 [I.V.:25]  Out: 70   Last 3 shifts: 1901 -  07  In: 2172 [I.V.:1772]  Out: 9778 [Urine:71; Drains:4240]    Intake/Output Summary (Last 24 hours) at 17 0858  Last data filed at 17 0800   Gross per 24 hour   Intake          1460.09 ml   Output             4180 ml   Net         -2719.91 ml     Hemodynamics:   PAP:   CO:     Wedge:   CI:     CVP:  CVP (mmHg): 8 mmHg (17 0800) SVR:       PVR:       Ventilator Settings:  Mode Rate Tidal Volume Pressure FiO2 PEEP   CPAP   450 ml  5 cm H2O 40 % 6 cm H20     Peak airway pressure: 12 cm H2O    Minute ventilation: 9.88 l/min       Physical Exam   Constitutional: He appears ill. He is sedated and intubated. HENT:   Head: Normocephalic and atraumatic. Eyes: No scleral icterus. Cardiovascular: Normal rate and regular rhythm. No murmur heard. Pulmonary/Chest: He is intubated. No respiratory distress. He has no wheezes. He has no rales. Abdominal: He exhibits distension. Bowel sounds are absent. Postop abdomen, sq emphysema resolved   Musculoskeletal: He exhibits edema. Skin: Skin is warm and dry.      Data:     Current Facility-Administered Medications   Medication Dose Route Frequency    carvedilol (COREG) tablet 25 mg  25 mg Per NG tube BID WITH MEALS    famotidine (PF) (PEPCID) 20 mg in sodium chloride 0.9 % 10 mL injection  20 mg IntraVENous Q12H    dilTIAZem (CARDIZEM) 100 mg in 0.9% sodium chloride (MBP/ADV) 100 mL infusion  0-15 mg/hr IntraVENous TITRATE    furosemide (LASIX) injection 40 mg  40 mg IntraVENous DAILY    ampicillin (OMNIPEN) 2 g in 0.9% sodium chloride (MBP/ADV) 100 mL  2 g IntraVENous Q8H    sodium chloride (NS) flush 5-10 mL  5-10 mL IntraVENous Q8H    0.9% sodium chloride infusion  25 mL/hr IntraVENous CONTINUOUS    sodium chloride (NS) flush 10 mL  10 mL InterCATHeter Q24H    sodium chloride (NS) flush 10-40 mL  10-40 mL InterCATHeter Q8H    sodium chloride (NS) flush 20 mL  20 mL InterCATHeter Q24H                Labs:  Recent Labs      08/03/17   0405  08/02/17   0508  08/01/17   0514   WBC  14.3*  15.4*  15.6*   HGB  11.7*  12.2  12.2   HCT  33.7*  35.3*  36.2*   PLT  286  303  270     Recent Labs      08/03/17   0405  08/02/17   0508  08/01/17   0514   NA  146*  144  139   K  3.4*  3.7  3.8   CL  110*  112*  110*   CO2  29  21  16*   GLU  141*  142*  144*   BUN  31*  29*  27*   CREA  0.98  1.16  1.27   CA  7.6*  7.7*  8.0*   MG  1.8  1.8  1.6   PHOS  2.4*  2.0*  2.2*   ALB  1.8*  1.8*   --    TBILI  1.0  1.0   --    SGOT  35  32   --    ALT  30  28   --      Recent Labs      08/02/17   1017   PH  7.48*   PCO2  31*   PO2  84   HCO3  22     Imaging:  I have personally reviewed the patients radiographs and have reviewed the reports:  No change        Total critical care time exclusive of procedures: 35 minutes  Chelsea Boyd MD

## 2017-08-03 NOTE — PROGRESS NOTES
Surgery      Nursing concerned about possible wound dehiscense. Area of fatty tissue between intact staples, does not appear to be bowel, area is also soft and no mass affect appreciated at site. Will keep an eye on this. Paint incision with betadine bid.         Martha Gore MD, FACS  ED [facility]

## 2017-08-03 NOTE — PROGRESS NOTES
0700:  Bedside handoff report received from Mark Silvestre RN (offgoing nurse). Pt's eyes open to verbal command, pupils equal, round, reactive to light. Able to state name when prompted but quickly drifts off to sleep. Lungs coarse, faint expiratory wheezes noted; SaO2 92% + on 3L NC.    1000:  S/w Dr. Ruben Bartholomew regarding patient's order for enoxaparin; per Dr. Ruben Bartholomew, ok to give at this time. 1540:  Patient's abdominal incision dressing changed; old serosanguinous drainage noted. Small area of dehiscence noted in midline incision; staples in tact, incision margins otherwise intact. Dr. Gaurang Caraballo paged; coming to assess. 1630:  Pt's SaO2 noted to be 89-90%, O2 increased to 4L NC. Worsening  Expiratory wheezes noted bilaterally. S/w Dr. Ruben Bartholomew; order received for xopenex nebulizers. 1752:  TPN started. 1819:  Pt's HR intermittently reaching 105-110; remains in Sinus rhythm; diltiazem increased to 7.5 mg/hr. 1900:  Bedside handoff report given to Heriberto Kevin RN (oncoming nurse).     Tano Neely RN

## 2017-08-03 NOTE — PROGRESS NOTES
CARDIOLOGY Progress Note    Patient ID:  Patient: Mayra Carias  MRN: [id number]  Age: 80 y.o.  : 1928    Date of  Admission: 2017  6:19 PM   PCP:  Mihir Little MD   Primary cardiologist:  Neymar Rose MD    Assessment: 1. Atrial fibrillation with RVR, back to sinus with PAC's. 2. Positive troponin, probably type 2 NSTEMI (supply-demand mismatch resulting in myocardial necrosis) given the history. 3. 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 Jennifer Fields. A lexiscan nuclear stress test done before this procedure suggested lateral ischemia in that vessel's distribution. 4. Hypertension. 5. Bladder perforation s/p repair. 6. Postop ileus. 7. Postop anemia s/p prbc transfusion. 8. Not on dual antiplatelet therapy with recent surgeries. Plan:     1. Continue beta-blocker, carvedilol dose has been increased. 2. OK with Xopenex if needed. 3. Not a good full anticoagulation candidate for atrial fibrillation due to bleeding risk. 4. At risk for acute stent thrombosis of left circumflex artery since he's not on dual antiplatelet therapy. There was evidence of possible collateralization on last cath, which would be desirable should this vessel close down. Discussed with nursing. Unclear postoperative timeline for restarting ASA and/or Plavix. Can we start ASA tomorrow? [x]       High complexity decision making was performed in this patient at high risk for decompensation with multiple organ involvement. Mayra Carias is a 80 y.o. male with a history of surgery, some postoperative atrial fibrillation. He had recent stenting of his LCX artery, but remains off antiplatelet therapy in the postoperative setting. He is lethargic, cannot give a history or ROS.       Cardiac catheterization 2017 prior to stenting of left circumflex:  LEFT MAIN: Large vessel with minor luminal irregularities.      LAD: A medium-sized vessel with   scattered plaque of 20%-30% throughout its course. Two small mid   diagonals were noted and multiple trivial diagonal vessels.      RAMUS: Ramus intermedius is a small vessel with no focal stenosis   that provides perfusion to the anterolateral wall.      CIRCUMFLEX: Moderate-sized vessel that is subtotally occluded in its   proximal and mid portion; there were some faint ANDERSON 1 flow distally   that maybe collateralized.       RCA: Medium-sized dominant vessel giving   rise to small PDA and posterolateral branch vessels; 30% eccentric   mid stenosis is noted, with some systolic compression.       LV: Left ventriculography in MIRANDA projection   reveals no focal wall motion abnormality. Ejection fraction of 55% to   60%. Mild-to-moderate MR. No aortic stenosis on pullback. EDP of 14.     Abdominal aortography in AP projection reveals no renal artery   stenosis. There is scattered plaque in the abdominal aorta with no   focal stenosis or aneurysm. Past Medical History:   Diagnosis Date    Hypertension     Prostate enlargement         Past Surgical History:   Procedure Laterality Date    ABDOMEN SURGERY PROC UNLISTED      hernia repair    CARDIAC SURG PROCEDURE UNLIST         Social History   Substance Use Topics    Smoking status: Never Smoker    Smokeless tobacco: Never Used    Alcohol use No        History reviewed. No pertinent family history.      Allergies   Allergen Reactions    Metoprolol Nausea and Vomiting          Current Facility-Administered Medications   Medication Dose Route Frequency    insulin lispro (HUMALOG) injection   SubCUTAneous Q6H    glucose chewable tablet 16 g  4 Tab Oral PRN    dextrose (D50W) injection syrg 12.5-25 g  12.5-25 g IntraVENous PRN    glucagon (GLUCAGEN) injection 1 mg  1 mg IntraMUSCular PRN    enoxaparin (LOVENOX) injection 40 mg  40 mg SubCUTAneous Q24H    TPN ADULT-CENTRAL AA 5% D20%-MVI W/ VIT [facility] IntraVENous CONTINUOUS    levalbuterol (XOPENEX) nebulizer soln 0.63 mg/3 mL  0.63 mg Nebulization Q4H RT    carvedilol (COREG) tablet 25 mg  25 mg Per NG tube BID WITH MEALS    famotidine (PF) (PEPCID) 20 mg in sodium chloride 0.9 % 10 mL injection  20 mg IntraVENous Q12H    dilTIAZem (CARDIZEM) 100 mg in 0.9% sodium chloride (MBP/ADV) 100 mL infusion  0-15 mg/hr IntraVENous TITRATE    furosemide (LASIX) injection 40 mg  40 mg IntraVENous DAILY    HYDROmorphone (PF) (DILAUDID) injection 0.2 mg  0.2 mg IntraVENous Q2H PRN    ampicillin (OMNIPEN) 2 g in 0.9% sodium chloride (MBP/ADV) 100 mL  2 g IntraVENous Q8H    hydrALAZINE (APRESOLINE) 20 mg/mL injection 20 mg  20 mg IntraVENous Q6H PRN    0.9% sodium chloride infusion 250 mL  250 mL IntraVENous PRN    sodium chloride (NS) flush 5-10 mL  5-10 mL IntraVENous Q8H    sodium chloride (NS) flush 5-10 mL  5-10 mL IntraVENous PRN    ondansetron (ZOFRAN) injection 4 mg  4 mg IntraVENous Q4H PRN    0.9% sodium chloride infusion  25 mL/hr IntraVENous CONTINUOUS    sodium chloride (NS) flush 10-30 mL  10-30 mL InterCATHeter PRN    sodium chloride (NS) flush 10 mL  10 mL InterCATHeter Q24H    sodium chloride (NS) flush 10 mL  10 mL InterCATHeter PRN    sodium chloride (NS) flush 10-40 mL  10-40 mL InterCATHeter Q8H    sodium chloride (NS) flush 20 mL  20 mL InterCATHeter Q24H    acetaminophen (TYLENOL) solution 650 mg  650 mg Per NG tube Q4H PRN       Review of Symptoms:  Patient cannot communicate.   General: negative for fever, chills, sweats, weakness, weight loss   Eyes: negative for blurred vision, eye pain, loss of vision, diplopia   Ear Nose and Throat: negative for rhinorrhea, pharyngitis, otalgia, tinnitus, speech or swallowing difficulties   Respiratory: negative for SOB, cough, sputum production, wheezing, CLARKE, pleuritic pain   Cardiology: negative for chest pain, palpitations, orthopnea, PND, edema, syncope   Gastrointestinal: negative for abdominal pain, N/V, dysphagia, change in bowel habits, bleeding   Genitourinary: negative for frequency, urgency, dysuria, hematuria, incontinence   Muskuloskeletal : negative for arthralgia, myalgia   Hematology: negative for easy bruising, bleeding, lymphadenopathy   Dermatological: negative for rash, ulceration, mole change, new lesion   Endocrine: negative for hot flashes or polydipsia   Neurological: negative for headache, dizziness, confusion, focal weakness, paresthesia, memory loss, gait disturbance   Psychological: negative for anxiety, depression, agitation       Objective:      Physical Exam:  Temp (24hrs), Av.1 °F (36.7 °C), Min:97.9 °F (36.6 °C), Max:98.4 °F (36.9 °C)    Patient Vitals for the past 8 hrs:   Pulse   17 1800 86   17 1700 81   17 1600 81   17 1500 82   17 1400 82   17 1200 87   17 1148 84   17 1100 80    Patient Vitals for the past 8 hrs:   Resp   17 1800 18   17 1700 15   17 1600 17   17 1500 17   17 1400 16   17 1200 19   17 1148 19   17 1100 18    Patient Vitals for the past 8 hrs:   BP   17 1800 167/81   17 1700 156/66   17 1600 175/69   17 1500 165/72   17 1400 162/64   17 1200 180/56   17 1148 173/70   17 1100 163/72          Intake/Output Summary (Last 24 hours) at 17 1839  Last data filed at 17 1800   Gross per 24 hour   Intake          1310.93 ml   Output             4077 ml   Net         -2766.07 ml       Nondiaphoretic, not in acute distress, NG tube in place. Supple neck. RIJ central line. No scleral icterus, mucous membranes moist, conjuctivae pink, no xanthelasma. +deep respirations, clear to auscultation bilaterally anteriorly, symmetric air movement. Irregular rate and rhythm, no murmur, pericardial rub, knock, or gallop. No JVD but +peripheral edema. No carotid bruit.   Palpable radial and DP pulses bilaterally. Abdomen not examined. Extremities without cyanosis or clubbing. Muscle tone and bulk normal.  Skin warm and dry. No rashes or ulcers. Neuro grossly nonfocal.  No tremor. Lethargic, but opens eyes on my command. CARDIOGRAPHICS and STUDIES, I reviewed:    Telemetry:  Sinus with frequent PAC's. ECG:  No recent study. Last one 7/20/2017 showed sinus rhythm with first degree AV block. Echo 7/20/2017:  LEFT VENTRICLE: Size was at the upper limits of normal. Systolic function  was mildly reduced. Ejection fraction was estimated in the range of 45 %  to 50 %. No obvious wall motion abnormalities identified in the views  obtained. Wall thickness was mildly increased. Hypertrophy was noted. RIGHT VENTRICLE: The size was normal. Systolic function was normal. Wall  thickness was normal.    LEFT ATRIUM: The atrium was mildly dilated. RIGHT ATRIUM: The atrium was mildly dilated. MITRAL VALVE: Normal valve structure. There was mild thickening. There was  normal leaflet separation. DOPPLER: There was no evidence for stenosis. There was mild to moderate regurgitation. AORTIC VALVE: The valve was probably trileaflet. Leaflets exhibited mildly  increased thickness and normal cuspal separation. DOPPLER: There was no  stenosis. There was trivial regurgitation. TRICUSPID VALVE: Normal valve structure. There was normal leaflet  separation. DOPPLER: There was no evidence for tricuspid stenosis. There  was no significant regurgitation. PULMONIC VALVE: Not well visualized, but normal Doppler findings. DOPPLER:  There was mild regurgitation. AORTA: The root exhibited normal size. PERICARDIUM: Insignificant pericardial effusion and/or pericardial fat was  present.          Labs:  Recent Labs      08/01/17   0925   TROIQ  2.35*     Lab Results   Component Value Date/Time    Cholesterol, total 178 07/20/2017 12:28 PM    HDL Cholesterol 48 07/20/2017 12:28 PM    LDL, calculated 117.8 07/20/2017 12:28 PM    Triglyceride 61 07/20/2017 12:28 PM    CHOL/HDL Ratio 3.7 07/20/2017 12:28 PM     No results for input(s): INR, PTP, APTT in the last 72 hours.     No lab exists for component: INREXT, INREXT   Recent Labs      08/03/17   0405  08/02/17   0508  08/01/17   0514   NA  146*  144  139   K  3.4*  3.7  3.8   CL  110*  112*  110*   CO2  29  21  16*   BUN  31*  29*  27*   CREA  0.98  1.16  1.27   GLU  141*  142*  144*   PHOS  2.4*  2.0*  2.2*   CA  7.6*  7.7*  8.0*   ALB  1.8*  1.8*   --    WBC  14.3*  15.4*  15.6*   HGB  11.7*  12.2  12.2   HCT  33.7*  35.3*  36.2*   PLT  286  303  270     Recent Labs      08/03/17   0405  08/02/17   0508   SGOT  35  32   AP  61  69   TP  5.8*  5.9*   ALB  1.8*  1.8*   GLOB  4.0  4.1*     No components found for: GLPOC  Recent Labs      08/02/17   1017   PH  7.48*   PCO2  31*   PO2  84           Larissa Mcclain MD  8/3/2017

## 2017-08-03 NOTE — PROGRESS NOTES
Interdisciplinary team rounds were held 8/3/2017 shashank the following team members:Care Management, Diabetes Treatment Specialist, Nursing, Nutrition, Pharmacy, Physical Therapy, Physician and Respiratory Therapy. Plan of care discussed. Goal: Adjust medications, start TPN, See MD orders and progress notes for further  interventions and desired outcomes.

## 2017-08-03 NOTE — PROGRESS NOTES
Pt is an 79 yo male admitted for evaluation/treatment of hematuria and clotting of his Rosales catheter. Pt was discharged from [address] [place] [address] on 7/27/17 after undergoing cardiac catheterization with stent placement. On 7/28, pt underwent cystoscopy with attempted clot evacuation but when this was not successful, pt had exploratory laparotomy for retrieval of massive bladder stone. After this procedure, pt has progressively worsening abdominal pain and distention and eventually underwent an exploratory laparotomy for possible bowel/bladder perforation. Pt has past medical hx which includes CAD, HTN, BPH. Pt lives w/ his wife in a 2-story house and was apparently fairly independent for mobility. He has a cane and a RW at home. Pt was brought to CCU for post-operative respiratory failure and was extubated on 7/31. Pt has made slow progress, is currently on 3 lpm - being followed by Intensivists, Surgery, Hospitalist, Urology and Cardiology. CM will follow to assist w/ dc planning as pt approaches medical stability. Care Management Interventions  PCP Verified by CM: Yes  Mode of Transport at Discharge:  Other (see comment) (TBD)  Transition of Care Consult (CM Consult): Discharge Planning (Pt was assessed for possible dc needs)  Discharge Durable Medical Equipment: No  Physical Therapy Consult: No  Occupational Therapy Consult: No  Current Support Network: Lives with Spouse  Discharge Location  Discharge Placement: Rehab hospital/unit acute (Inpt Rehab vs SNF?)     PRATIMA Little

## 2017-08-03 NOTE — PROGRESS NOTES
Nutrition:  Chart reviewed, case discussed during CCU rounds. Noted plan to start TPN this evening. D20, 5% AA @ 42mL/h ordered (provides 887kcals/50gPro). Recommend:     Day 2) Advance TPN rate to 63mL/h, continue electrolytes as is    Day 3) Advance to Goal Rate of D20, 5% AA @ 83mL/h (provides 1753kcals/100gPro)     Will monitor electrolytes and gut function closely. Thank you!     Ashish, [phone]   Pager [phone]

## 2017-08-03 NOTE — PROGRESS NOTES
Hospitalist Progress Note    NAME: Sachin Araujo   :  1928   MRN:  [id number]       Interim Hospital Summary: 80 y.o. male whom presented on 2017 with      Assessment / Plan:  Postop respiratory failure s/p extubation on 2 L O2  S/P removal of bladder stone and repair of perforated bladder  S/P subsequent ex-lap due to extensive sq emphysema in abdomen/retroperitoneum - no sign of bowel perforation. Shock improved  LINDSEY improving good diuresis with lasix, cxr with Probable persistent congestive changes. Mild to moderate bibasilar atelectasis and trace pleural effusions. \"pneumothorax\" due to dissection of air from retroperitoneum cephalad, different physiology or behavior of typical pneumothorax   CAD afib with RVR- cardizem gtt, coreg incrreased cardiology following,   Anemia, s/p PRBC  UTI with enterococcus on ampicillin  Electrolyte disturbance replete as indicated  HTN cardizem, coreg increased, lasix  Encephalopathy ? Medication/infectious etiology, on IV abx for UTI   Starting TPN  Critically ill  Code status: FULL code  Prophylaxis: per primary team  Recommended Disposition: per primary team-      Subjective:     Chief Complaint / Reason for Physician Visit  encepahlopathic   Discussed with RN events overnight. Review of Systems:  Symptom Y/N Comments  Symptom Y/N Comments   Fever/Chills    Chest Pain     Poor Appetite    Edema     Cough    Abdominal Pain     Sputum    Joint Pain     SOB/CLARKE    Pruritis/Rash     Nausea/vomit    Tolerating PT/OT     Diarrhea    Tolerating Diet     Constipation    Other       Could NOT obtain due to: ams     Objective:     VITALS:   Last 24hrs VS reviewed since prior progress note.  Most recent are:  Patient Vitals for the past 24 hrs:   Temp Pulse Resp BP SpO2   17 0800 - 82 15 150/61 93 %   17 0700 98.1 °F (36.7 °C) 80 15 160/74 95 %   17 0600 - 78 14 159/54 95 %   17 0526 - 81 14 183/87 94 %   17 0500 97.9 °F (36.6 °C) 86 15 178/78 93 %   08/03/17 0417 - 81 16 175/76 96 %   08/03/17 0400 - 78 17 - 93 %   08/03/17 0300 - 79 17 167/54 93 %   08/03/17 0213 - 85 19 158/54 94 %   08/03/17 0200 - 71 18 180/56 94 %   08/03/17 0100 - 76 19 161/60 93 %   08/03/17 0030 98.1 °F (36.7 °C) 78 18 156/61 95 %   08/03/17 0001 - 73 16 147/62 95 %   08/02/17 2330 - 73 19 150/65 92 %   08/02/17 2300 - 73 19 149/67 94 %   08/02/17 2230 - 76 19 168/69 94 %   08/02/17 2200 - 75 17 166/65 93 %   08/02/17 2100 - 80 20 167/79 96 %   08/02/17 2029 - 86 18 166/62 95 %   08/02/17 2000 98.1 °F (36.7 °C) 87 22 - 95 %   08/02/17 1930 - 78 19 152/57 93 %   08/02/17 1900 - 80 18 166/64 94 %   08/02/17 1843 - 79 19 167/78 94 %   08/02/17 1800 - 77 17 155/56 94 %   08/02/17 1730 - 75 18 151/49 94 %   08/02/17 1700 - 74 19 148/60 95 %   08/02/17 1630 - 74 20 140/54 94 %   08/02/17 1600 98.6 °F (37 °C) 73 19 145/56 95 %   08/02/17 1530 - 70 19 143/52 94 %   08/02/17 1521 - - - 136/57 -   08/02/17 1457 - 72 19 133/57 92 %   08/02/17 1400 - 89 21 185/61 94 %   08/02/17 1330 - 91 19 168/52 94 %   08/02/17 1300 - 93 20 177/73 93 %   08/02/17 1237 - 99 18 172/57 94 %   08/02/17 1230 97.9 °F (36.6 °C) (!) 105 25 192/72 95 %   08/02/17 1157 - 88 19 200/82 95 %   08/02/17 1135 - 96 22 (!) 134/107 94 %   08/02/17 1100 - 85 29 178/81 93 %   08/02/17 1030 - 89 (!) 33 164/63 93 %   08/02/17 1000 - 90 23 179/49 92 %       Intake/Output Summary (Last 24 hours) at 08/03/17 0928  Last data filed at 08/03/17 0900   Gross per 24 hour   Intake          1450.09 ml   Output             4542 ml   Net         -3091.91 ml        PHYSICAL EXAM:  General:  nad, encepahopathy, opens eyes  EENT:  EOMI. Anicteric sclerae. MMM  Resp:  decrease BS BL, no wheezing or rales.   No accessory muscle use  CV:  Regular  rhythm,  + jose pedal and UE edema  GI:  Soft, +distended, Non tender.  +Bowel sounds, dressing intact  Neurologic:  Limited as encepahopathic    Reviewed most current lab test results and cultures  YES  Reviewed most current radiology test results   YES  Review and summation of old records today    NO  Reviewed patient's current orders and MAR    YES  PMH/SH reviewed - no change compared to H&P  ________________________________________________________________________  Care Plan discussed with:    Comments   Patient     Family      RN x    Care Manager     Consultant                        Multidiciplinary team rounds were held today with , nursing, pharmacist and clinical coordinator. Patient's plan of care was discussed; medications were reviewed and discharge planning was addressed. ________________________________________________________________________  Total NON critical care TIME:  25  Minutes    Total CRITICAL CARE TIME Spent:   Minutes non procedure based      Comments   >50% of visit spent in counseling and coordination of care     ________________________________________________________________________  Levi Salamanca MD     Procedures: see electronic medical records for all procedures/Xrays and details which were not copied into this note but were reviewed prior to creation of Plan. LABS:  I reviewed today's most current labs and imaging studies.   Pertinent labs include:  Recent Labs      08/03/17   0405 08/02/17   0508  08/01/17   0514   WBC  14.3*  15.4*  15.6*   HGB  11.7*  12.2  12.2   HCT  33.7*  35.3*  36.2*   PLT  286  303  270     Recent Labs      08/03/17   0405  08/02/17   0508  08/01/17   0514   NA  146*  144  139   K  3.4*  3.7  3.8   CL  110*  112*  110*   CO2  29  21  16*   GLU  141*  142*  144*   BUN  31*  29*  27*   CREA  0.98  1.16  1.27   CA  7.6*  7.7*  8.0*   MG  1.8  1.8  1.6   PHOS  2.4*  2.0*  2.2*   ALB  1.8*  1.8*   --    TBILI  1.0  1.0   --    SGOT  35  32   --    ALT  30  28   --        Signed: Levi Salamanca MD

## 2017-08-03 NOTE — PROGRESS NOTES
1900-Bedside and verbal report received from [address] [place] [address].  2100- Coreg 25mg given per NGT. NGT clamped. 2200- NGT unclamped, return to suction. BP remains elevated. 2235- /69, prn hydralizine to be given. 0030- Reassess unchanged. 0200- Pt mouth suctioned and cleaned, pt complain of pain. BP elevated, pt restless in bed, will give prn dilaudid. [address] as noted. Lower abd dressing saturated with sanguinous fluid. Dressing changed, fluid noted to be draining from the lowest point of incision. Pt complains of abdominal pain, diluadid to be given. Pt with secretions to back of throat, unable to effectively clear. Small amounts of thick tan secretions suctioned. 0700- Bedside and verbal report given to SHARRI's.

## 2017-08-04 NOTE — PROGRESS NOTES
0700 Bedside and Verbal shift change report given to [address] [place] [address] Drive (oncoming nurse) by Katie Cole (offgoing nurse). Report included the following information SBAR, Kardex, Procedure Summary, Intake/Output, MAR and Recent Results.

## 2017-08-04 NOTE — PROGRESS NOTES
[facility]  Pulmonary, Critical Care, and Sleep Medicine    Name: Sachin Araujo MRN: [id number]   : 1928 Hospital: [facility]   Date: 2017            IMPRESSION:   · Postop respiratory failure off vent but slow to arouse  · Encephalopathy- head CT ok ; likely from dilaudid. Family aware but feels pt needs pain meds; now that pain better, perhaps waking up some? ?  · Complicated Enterococcal UTI- sensitive to Amp  · CAD with recent stents per   · NSTEMI demand ischemia but now with AF RVR  · AF RVR on Iv diltiazem  · HTN  · Echo earlier this year LVEF 45%  · Anasarca and volume overload? Third spacing- good response to diuretics  · S/P removal of bladder stone and repair of perforated bladder  · S/P subsequent ex-lap due to extensive sq emphysema in abdomen/retroperitoneum - no sign of bowel perforation  · Shock better, now hypertensive  · Hypocalcemia  · Severe hypoalbuminemia - catabolic  · hypophosphatemia  · Acute renal failure  · \"pneumothorax\" due to dissection of air from retroperitoneum cephalad, different physiology or behavior of typical pneumothorax   · CAD      PLAN:   · Appreciate Cardiology's help  · renew TPN  · REorient  · replete lytes  · PICC  · Cardiology following  · IV ampicillin- will need 7 days course, d/c vancomycin  · Gentle diuresis  · antihypertensives  · Pain control  · No need for chest tubes for this type of pneumothorax, should resolve spontaneously, but will monitor  · DVT/GI prophylaxis     Subjective/Interval History:   I have reviewed the flowsheet and previous days notes. The patient is unable to give any meaningful history or review of systems because the patient is:     Nursing notes reviewed   agitated overnight and now restrained. Easily awakened and in no pain. No SOB. No nausea    8/3 will arouse some but still sleepy. NG output still dark. Hgb slowly drifting down     tachypnea. Somnolent.  3:42 PM  Head Ct scan - atrophic changes only. Dark NG output. Will restart Pepcid, may need PPI.   Two PRBCs given over night > HGB 8.5 > 12.2, HTC 25.8 > 36.2, Lasix 20 mg between the Packs given > polyuria total 3 L overnight, pain medication given with each positions, BP still around 150-176 mmHg despite of hydralazine PRN Q 6 hr, Pain med, carvedilol, new A Fib in morning > Diltiazem 10 mg IV once given HR been around , BP stable,     AFRVR now. SBP > 150 and CVP 10-12. Anasraca. Pt retired . Spoke with daughter at bedside. They would like Dr. Merlinda Ferrara to see pt. Repeat troponin higher. Pt moaning and abdomen still distended and sore. NGT in place      Review of Systems   Unable to perform ROS: Mental status change     Objective:   Vital Signs:    Visit Vitals    /52    Pulse 67    Temp 97.7 °F (36.5 °C)    Resp 15    Ht 5' 10\" (1.778 m)    Wt 101.3 kg (223 lb 5.2 oz)    SpO2 96%    BMI 32.04 kg/m2       O2 Device: Nasal cannula   O2 Flow Rate (L/min): 4 l/min   Temp (24hrs), Av °F (36.7 °C), Min:97.4 °F (36.3 °C), Max:98.4 °F (36.9 °C)       Intake/Output:   Last shift:      701 - 1900  In: 99 [I.V.:99]  Out: 125 [Urine:115; Drains:10]  Last 3 shifts: 1901 - 700  In: 2469.1 [I.V.:1964.1]  Out: 4497 [Urine:42; Drains:3400]    Intake/Output Summary (Last 24 hours) at 17  Last data filed at 17   Gross per 24 hour   Intake          1903.64 ml   Output             2765 ml   Net          -861.36 ml     Hemodynamics:   PAP:   CO:     Wedge:   CI:     CVP:  CVP (mmHg): 8 mmHg (08/02/17 0800) SVR:       PVR:       Ventilator Settings:  Mode Rate Tidal Volume Pressure FiO2 PEEP   CPAP   450 ml  5 cm H2O 40 % 6 cm H20     Peak airway pressure: 12 cm H2O    Minute ventilation: 9.88 l/min       Physical Exam   Constitutional: He appears ill. He is sedated and intubated. HENT:   Head: Normocephalic and atraumatic. Eyes: No scleral icterus. Cardiovascular: Normal rate and regular rhythm. No murmur heard. Pulmonary/Chest: He is intubated. No respiratory distress. He has no wheezes. He has no rales. Abdominal: He exhibits distension. Bowel sounds are absent. Postop abdomen, sq emphysema resolved   Musculoskeletal: He exhibits edema. Skin: Skin is warm and dry.      Data:     Current Facility-Administered Medications   Medication Dose Route Frequency    insulin lispro (HUMALOG) injection   SubCUTAneous Q6H    enoxaparin (LOVENOX) injection 40 mg  40 mg SubCUTAneous Q24H    TPN ADULT-CENTRAL AA 5% D20%-MVI W/ VIT K-RCH   IntraVENous CONTINUOUS    levalbuterol (XOPENEX) nebulizer soln 0.63 mg/3 mL  0.63 mg Nebulization Q4H RT    carvedilol (COREG) tablet 25 mg  25 mg Per NG tube BID WITH MEALS    famotidine (PF) (PEPCID) 20 mg in sodium chloride 0.9 % 10 mL injection  20 mg IntraVENous Q12H    dilTIAZem (CARDIZEM) 100 mg in 0.9% sodium chloride (MBP/ADV) 100 mL infusion  0-15 mg/hr IntraVENous TITRATE    furosemide (LASIX) injection 40 mg  40 mg IntraVENous DAILY    ampicillin (OMNIPEN) 2 g in 0.9% sodium chloride (MBP/ADV) 100 mL  2 g IntraVENous Q8H    sodium chloride (NS) flush 5-10 mL  5-10 mL IntraVENous Q8H    0.9% sodium chloride infusion  25 mL/hr IntraVENous CONTINUOUS    sodium chloride (NS) flush 10 mL  10 mL InterCATHeter Q24H    sodium chloride (NS) flush 10-40 mL  10-40 mL InterCATHeter Q8H    sodium chloride (NS) flush 20 mL  20 mL InterCATHeter Q24H                Labs:  Recent Labs      08/04/17   0415  08/03/17   0405  08/02/17   0508   WBC  13.8*  14.3*  15.4*   HGB  11.4*  11.7*  12.2   HCT  34.7*  33.7*  35.3*   PLT  275  286  303     Recent Labs      08/04/17   0415  08/03/17   0405  08/02/17   0508   NA  145  146*  144   K  3.7  3.4*  3.7   CL  108  110*  112*   CO2  33*  29  21   GLU  192*  141*  142*   BUN  30*  31*  29*   CREA  0.93  0.98  1.16   CA  7.0*  7.6*  7.7*   MG  2.1  1.8  1.8   PHOS  2.4*  2.4* 2.0*   ALB  1.8*  1.8*  1.8*   TBILI  0.8  1.0  1.0   SGOT  35  35  32   ALT  36  30  28     Recent Labs      08/02/17   1017   PH  7.48*   PCO2  31*   PO2  84   HCO3  22     Imaging:  I have personally reviewed the patients radiographs and have reviewed the reports:  No change        Total critical care time exclusive of procedures: 35 minutes  Lila Stanley MD

## 2017-08-04 NOTE — INTERDISCIPLINARY ROUNDS
Interdisciplinary team rounds were held 8/4/17 with the following team members:Care Management, Diabetes Treatment Specialist, Nursing, Nutrition, Pharmacy, Physician, Respiratory Therapy and Clinical Coordinator. Plan of care discussed. Goal: See MD orders and progress notes for further  interventions and desired outcomes.

## 2017-08-04 NOTE — PROGRESS NOTES
Assessment/Plan:   Fluid through lower abdominal wound - not urine leak     AF with RVR - resolved     NSTMI - cards help appreciated.      Post-Op Ileus - NGT/IVF/NPO awaiting bowel function. 1020 NGT. No BM      Bladder perforation s/p repair - 2380 UOP nearly all from SP tube, Michele Amado continues to go up 430ccs. I suspect this is transudate as part of overall anasarca picture. Presumably \"free air\" was from partial intraperitoneal bladder rupture, and transgression of air/fluid with cath manipulation given negative exploration and complete bowel run (small/large)      Elevated creatinine - resolved, Creat 0.93     Enterococcal UTI - Ampicillin, afebrile      Post-op Anemia - resolved s/p transfusion, do not suspect active bleeding. Wound examined, on infection  With pressure, copious serous fluid drains from lower incision  periumbical a little blood, a touch of exposed fat.     Fluid for creatinine yesterday negative for urine leak (creat 1.08), will also send ARMEN output

## 2017-08-04 NOTE — PROGRESS NOTES
Nutrition Assessment:    RECOMMENDATIONS:   Recommend advancing TPN goal rate to D20, 5% AA @ 83mL/h + 20% lipids 3 times per week (provides 1967kcals/100gPro)     ASSESSMENT:   Chart reviewed, case discussed during CCU rounds. Pt remains with abdominal distension and likely ileus. NGT to suction with 600mL OP. TPN bumped up this morning to 63mL/h, I discussed the goal rate of 83mL/h for tomorrow with Dr. Loc Thorne. Electrolytes are WNL thus far. Fairly good BG control (141-174). Still no BM and hypoactive BS. Once at goal rate, TPN will meet 95% kcal and 100% protein needs. Dietitians Intervention(s)/Plan(s): Increase TPN tomorrow, monitor electrolytes  SUBJECTIVE/OBJECTIVE:     Diet Order: NPO, Other (comment) (TPN: D20, 5% AA @ 63mL/h + 20% lipids (provides 1544kcals/76gPro_))  % Eaten:  No data found. to suction  at   flush with       via NG Tube   Residuals: 600 mL    Pertinent Medications:ampicillin, CaGluconate, lasix, pepcid, humalog; [email or web address]). Chemistries:  Lab Results   Component Value Date/Time    Sodium 145 08/04/2017 04:15 AM    Potassium 3.7 08/04/2017 04:15 AM    Chloride 108 08/04/2017 04:15 AM    CO2 33 08/04/2017 04:15 AM    Anion gap 4 08/04/2017 04:15 AM    Glucose 192 08/04/2017 04:15 AM    BUN 30 08/04/2017 04:15 AM    Creatinine 0.93 08/04/2017 04:15 AM    BUN/Creatinine ratio 32 08/04/2017 04:15 AM    GFR est AA >60 08/04/2017 04:15 AM    GFR est non-AA >60 08/04/2017 04:15 AM    Calcium 7.0 08/04/2017 04:15 AM    Albumin 1.8 08/04/2017 04:15 AM      Anthropometrics: Height: 5' 10\" (177.8 cm) Weight: 101.3 kg (223 lb 5.2 oz)    IBW (%IBW):   ( ) UBW (%UBW):   (  %)    BMI: Body mass index is 32.04 kg/(m^2). This BMI is indicative of:  []Underweight   []Normal   []Overweight   [x] Obesity   [] Extreme Obesity (BMI>40)  Estimated Nutrition Needs (Based on): 2078 Kcals/day (MSJ 1732 x 1.2) , 85 g (0.8gPro/kg) Protein  Carbohydrate:  At Least 130 g/day  Fluids: 2000 mL/day    Last BM: PTA   []Active     []Hyperactive  [x]Hypoactive       [] Absent   BS  Skin:    [] Intact   [x] Incision  [] Breakdown   [] DTI   [] Tears/Excoriation/Abrasion  [x]Edema(+2-generalized; +3 pitting-BUE; +trace-BLE)  [] Other: Wt Readings from Last 30 Encounters:   08/03/17 101.3 kg (223 lb 5.2 oz)   07/26/17 95.8 kg (211 lb 3.2 oz)   03/26/17 97.7 kg (215 lb 6.2 oz)   11/03/16 98.6 kg (217 lb 6 oz)   03/17/15 97.2 kg (214 lb 4.8 oz)   10/02/14 90.7 kg (200 lb)   05/16/13 96.9 kg (213 lb 10 oz)   02/10/13 99.5 kg (219 lb 5.7 oz)   04/02/12 96.1 kg (211 lb 13.8 oz)   03/13/11 96.8 kg (213 lb 6.5 oz)      NUTRITION DIAGNOSES:   Problem:  Altered GI function      Etiology: related to postop ileus     Signs/Symptoms: as evidenced by NGT to suction, pt NPO. Previous dx re: altered GI function continues, pt remains NPO and on TPN. NUTRITION INTERVENTIONS:  Meals/Snacks: Other (advance diet as medically able per surgeon) Enteral/Parenteral Nutrition: Modify rate, concentration, composition, and schedule                GOAL:   Pt will tolerate TPN @ goal rate with stable electrolytes in 2-4 days.      NUTRITION MONITORING AND EVALUATION   Previous Goal: Pt will be started on PO diet vs nutrition support in 2-3 days   Previous Goal Met: Yes   Previous Recommendations Implemented: Yes   Cultural, Tenriism, or Ethnic Dietary Needs: None   LEARNING NEEDS (Diet, Food/Nutrient-Drug Interaction):    [x] None Identified   [] Identified and Education Provided/Documented   [] Identified and Pt declined/was not appropriate      [x] Interdisciplinary Care Plan Reviewed/Documented    [x] Participated in Discharge Planning: Unable to determine    [x] Interdisciplinary Rounds     NUTRITION RISK:    [x] High              [] Moderate           []  Low  []  Minimal/Uncompromised      Gin Lopez, [address], [address] [place] [address] Dr  Pager [phone]  Weekend Pager [phone]

## 2017-08-04 NOTE — PROGRESS NOTES
Hospitalist Progress Note    NAME: Fe Renee   :  1928   MRN:  [id number]       Interim Hospital Summary: 80 y.o. male whom presented on 2017 with      Assessment / Plan:  Postop respiratory failure s/p extubation on 4 L O2  S/P removal of bladder stone and repair of perforated bladder  S/P subsequent ex-lap due to extensive sq emphysema in abdomen/retroperitoneum - no sign of bowel perforation. Shock improved  LINDSEY improving good diuresis with lasix, cxr with pulmonary edema. \"pneumothorax\" due to dissection of air from retroperitoneum cephalad, different physiology or behavior of typical pneumothorax -conservative management  CAD afib with RVR- cardizem gtt, nitrobid  Anemia, s/p PRBC  UTI with enterococcus on ampicillin  Electrolyte disturbance replete as indicated  HTN cardizem,  lasix  Encephalopathy ? Medication/infectious etiology, on IV abx for UTI   On TPN  Critically ill  Code status: FULL code  Prophylaxis: per primary team  Recommended Disposition: per primary team-      Subjective:     Chief Complaint / Reason for Physician Visit encephalopathic, opening eyes   Discussed with RN events overnight. Review of Systems:  Symptom Y/N Comments  Symptom Y/N Comments   Fever/Chills    Chest Pain     Poor Appetite    Edema     Cough    Abdominal Pain     Sputum    Joint Pain     SOB/CLARKE    Pruritis/Rash     Nausea/vomit    Tolerating PT/OT     Diarrhea    Tolerating Diet     Constipation    Other       Could NOT obtain due to: ams     Objective:     VITALS:   Last 24hrs VS reviewed since prior progress note.  Most recent are:  Patient Vitals for the past 24 hrs:   Temp Pulse Resp BP SpO2   17 1330 - 65 22 149/51 93 %   17 1300 - 65 17 140/77 95 %   17 1230 97.9 °F (36.6 °C) 71 17 159/53 93 %   17 1200 - 64 28 141/53 93 %   17 1130 - 68 16 145/54 93 %   17 1100 - 69 19 146/61 90 %   17 1000 - 65 15 144/62 92 %   17 0905 - 67 15 - 96 % 08/04/17 0900 - 67 15 170/52 95 %   08/04/17 0800 97.7 °F (36.5 °C) 65 16 158/61 94 %   08/04/17 0720 - - - - 96 %   08/04/17 0700 - 68 17 157/62 96 %   08/04/17 0600 - 69 17 151/53 93 %   08/04/17 0500 - 63 16 156/69 98 %   08/04/17 0443 - - - - 95 %   08/04/17 0400 97.4 °F (36.3 °C) 61 15 146/59 98 %   08/04/17 0300 - 67 16 135/54 98 %   08/04/17 0200 - 64 16 141/57 95 %   08/04/17 0100 - 60 14 129/53 94 %   08/04/17 0000 98.2 °F (36.8 °C) 64 16 - 96 %   08/03/17 2351 - - - - 90 %   08/03/17 2300 - 65 18 107/63 93 %   08/03/17 2200 - 65 15 120/62 93 %   08/03/17 2100 - 78 15 118/62 93 %   08/03/17 2039 - - - - 95 %   08/03/17 2000 98.3 °F (36.8 °C) 86 22 189/86 93 %   08/03/17 1900 - 86 18 180/77 90 %   08/03/17 1800 - 86 18 167/81 93 %   08/03/17 1700 - 81 15 156/66 90 %   08/03/17 1600 98.4 °F (36.9 °C) 81 17 175/69 94 %   08/03/17 1502 - - - - 94 %   08/03/17 1500 - 82 17 165/72 96 %   08/03/17 1400 - 82 16 162/64 94 %       Intake/Output Summary (Last 24 hours) at 08/04/17 1359  Last data filed at 08/04/17 1329   Gross per 24 hour   Intake          2071.02 ml   Output             3280 ml   Net         -1208.98 ml        PHYSICAL EXAM:  General:  nad, encepahopathy, opens eyes  EENT:  EOMI. Anicteric sclerae. MMM  Resp:  decrease BS BL, no wheezing or rales.   No accessory muscle use  CV:  Regular  rhythm,  + jose pedal and UE edema  GI:  Soft, +distended, Non tender.  +Bowel sounds, dressing intact  Neurologic:  Limited as encepahopathic    Reviewed most current lab test results and cultures  YES  Reviewed most current radiology test results   YES  Review and summation of old records today    NO  Reviewed patient's current orders and MAR    YES  PMH/ reviewed - no change compared to H&P  ________________________________________________________________________  Care Plan discussed with:    Comments   Patient     Family      RN x    Care Manager     Consultant                        Multidiciplinary team rounds were held today with , nursing, pharmacist and clinical coordinator. Patient's plan of care was discussed; medications were reviewed and discharge planning was addressed. ________________________________________________________________________  Total NON critical care TIME:  25  Minutes    Total CRITICAL CARE TIME Spent:   Minutes non procedure based      Comments   >50% of visit spent in counseling and coordination of care     ________________________________________________________________________  Roby Jeffries MD     Procedures: see electronic medical records for all procedures/Xrays and details which were not copied into this note but were reviewed prior to creation of Plan. LABS:  I reviewed today's most current labs and imaging studies.   Pertinent labs include:  Recent Labs      08/04/17   0415  08/03/17   0405  08/02/17   0508   WBC  13.8*  14.3*  15.4*   HGB  11.4*  11.7*  12.2   HCT  34.7*  33.7*  35.3*   PLT  275  286  303     Recent Labs      08/04/17   0415  08/03/17   0405  08/02/17   0508   NA  145  146*  144   K  3.7  3.4*  3.7   CL  108  110*  112*   CO2  33*  29  21   GLU  192*  141*  142*   BUN  30*  31*  29*   CREA  0.93  0.98  1.16   CA  7.0*  7.6*  7.7*   MG  2.1  1.8  1.8   PHOS  2.4*  2.4*  2.0*   ALB  1.8*  1.8*  1.8*   TBILI  0.8  1.0  1.0   SGOT  35  35  32   ALT  36  30  28       Signed: Roby Jeffries MD

## 2017-08-04 NOTE — PROGRESS NOTES
Surgery      Notes reviewed    Wound checked :stable    NG bilious output    No BM    Postop ileus    Cont present RX      Tod Otto MD, [facility]

## 2017-08-04 NOTE — PROGRESS NOTES
CARDIOLOGY Progress Note    Patient ID:  Patient: Erik Guy  MRN: [id number]  Age: 80 y.o.  : 1928    Date of  Admission: 2017  6:19 PM   PCP:  Bassam Prasad MD   Primary cardiologist:  Carlos Beltrán MD    Assessment: 1. Atrial fibrillation with RVR, back to sinus with PAC's. 2. Positive troponin, probably type 2 NSTEMI (supply-demand mismatch resulting in myocardial necrosis) given the history. 3. CAD with chronically and subtotally-occluded left circumflex artery stented 3/21/9997 (complicated procedure) by Dr. Khurram Garsia. A lexiscan nuclear stress test done before this procedure suggested lateral ischemia in that vessel's distribution. 4. Hypertension. 5. Bladder perforation s/p repair. 6. Postop ileus. 7. Postop anemia s/p prbc transfusion. 8. Not on dual antiplatelet therapy with recent surgeries. Plan:     1. Continue beta-blocker, carvedilol. 2. OK with Xopenex if needed. 3. Not a good full anticoagulation candidate for atrial fibrillation due to bleeding risk. 4. At risk for acute stent thrombosis of left circumflex artery since he's not on dual antiplatelet therapy. Will start  mg daily on . [x]       High complexity decision making was performed in this patient at high risk for decompensation with multiple organ involvement. Erik Guy is a 80 y.o. male with a history of surgery, some postoperative atrial fibrillation. He had recent stenting of his LCX artery, but has been off antiplatelet therapy in the postoperative setting for a while. Starting ASA on . Cardiac catheterization 2017 prior to stenting of left circumflex:  LEFT MAIN: Large vessel with minor luminal irregularities.      LAD: A medium-sized vessel with   scattered plaque of 20%-30% throughout its course.  Two small mid   diagonals were noted and multiple trivial diagonal vessels.      RAMUS: Ramus intermedius is a small vessel with no focal stenosis   that provides perfusion to the anterolateral wall.      CIRCUMFLEX: Moderate-sized vessel that is subtotally occluded in its   proximal and mid portion; there were some faint ANDERSON 1 flow distally   that maybe collateralized.       RCA: Medium-sized dominant vessel giving   rise to small PDA and posterolateral branch vessels; 30% eccentric   mid stenosis is noted, with some systolic compression.       LV: Left ventriculography in MIRANDA projection   reveals no focal wall motion abnormality. Ejection fraction of 55% to   60%. Mild-to-moderate MR. No aortic stenosis on pullback. EDP of 14.     Abdominal aortography in AP projection reveals no renal artery   stenosis. There is scattered plaque in the abdominal aorta with no   focal stenosis or aneurysm. Past Medical History:   Diagnosis Date    Hypertension     Prostate enlargement         Past Surgical History:   Procedure Laterality Date    ABDOMEN SURGERY PROC UNLISTED      hernia repair    CARDIAC SURG PROCEDURE UNLIST         Social History   Substance Use Topics    Smoking status: Never Smoker    Smokeless tobacco: Never Used    Alcohol use No        History reviewed. No pertinent family history.      Allergies   Allergen Reactions    Metoprolol Nausea and Vomiting          Current Facility-Administered Medications   Medication Dose Route Frequency    hydrALAZINE (APRESOLINE) 20 mg/mL injection 20 mg  20 mg IntraVENous Q4H PRN    nitroglycerin (NITROBID) 2 % ointment 1 Inch  1 Inch Topical Q6H    labetalol (NORMODYNE;TRANDATE) injection 20 mg  20 mg IntraVENous Q4H PRN    TPN ADULT-CENTRAL AA 5% D20%-MVI W/ VIT K-RCH   IntraVENous CONTINUOUS    fat emulsion 20% (LIPOSYN, INTRALIPID) infusion 250 mL  250 mL IntraVENous Q MON, WED & FRI    [START ON 8/5/2017] aspirin (ASPIRIN) tablet 325 mg  325 mg Oral DAILY    insulin lispro (HUMALOG) injection   SubCUTAneous Q6H    glucose chewable tablet 16 g  4 Tab Oral PRN    dextrose (D50W) injection syrg 12.5-25 g  12.5-25 g IntraVENous PRN    glucagon (GLUCAGEN) injection 1 mg  1 mg IntraMUSCular PRN    enoxaparin (LOVENOX) injection 40 mg  40 mg SubCUTAneous Q24H    TPN ADULT-CENTRAL AA 5% D20%-MVI W/ VIT K-RCH   IntraVENous CONTINUOUS    levalbuterol (XOPENEX) nebulizer soln 0.63 mg/3 mL  0.63 mg Nebulization Q4H RT    famotidine (PF) (PEPCID) 20 mg in sodium chloride 0.9 % 10 mL injection  20 mg IntraVENous Q12H    dilTIAZem (CARDIZEM) 100 mg in 0.9% sodium chloride (MBP/ADV) 100 mL infusion  0-15 mg/hr IntraVENous TITRATE    furosemide (LASIX) injection 40 mg  40 mg IntraVENous DAILY    HYDROmorphone (PF) (DILAUDID) injection 0.2 mg  0.2 mg IntraVENous Q2H PRN    ampicillin (OMNIPEN) 2 g in 0.9% sodium chloride (MBP/ADV) 100 mL  2 g IntraVENous Q8H    sodium chloride (NS) flush 5-10 mL  5-10 mL IntraVENous Q8H    sodium chloride (NS) flush 5-10 mL  5-10 mL IntraVENous PRN    ondansetron (ZOFRAN) injection 4 mg  4 mg IntraVENous Q4H PRN    0.9% sodium chloride infusion  25 mL/hr IntraVENous CONTINUOUS    sodium chloride (NS) flush 10-30 mL  10-30 mL InterCATHeter PRN    sodium chloride (NS) flush 10 mL  10 mL InterCATHeter Q24H    sodium chloride (NS) flush 10 mL  10 mL InterCATHeter PRN    sodium chloride (NS) flush 10-40 mL  10-40 mL InterCATHeter Q8H    sodium chloride (NS) flush 20 mL  20 mL InterCATHeter Q24H    acetaminophen (TYLENOL) solution 650 mg  650 mg Per NG tube Q4H PRN       Review of Symptoms:  Patient cannot communicate.   General: negative for fever, chills, sweats, weakness, weight loss   Eyes: negative for blurred vision, eye pain, loss of vision, diplopia   Ear Nose and Throat: negative for rhinorrhea, pharyngitis, otalgia, tinnitus, speech or swallowing difficulties   Respiratory: negative for SOB, cough, sputum production, wheezing, CLARKE, pleuritic pain   Cardiology: negative for chest pain, palpitations, orthopnea, PND, edema, syncope   Gastrointestinal: negative for abdominal pain, N/V, dysphagia, change in bowel habits, bleeding   Genitourinary: negative for frequency, urgency, dysuria, hematuria, incontinence   Muskuloskeletal : negative for arthralgia, myalgia   Hematology: negative for easy bruising, bleeding, lymphadenopathy   Dermatological: negative for rash, ulceration, mole change, new lesion   Endocrine: negative for hot flashes or polydipsia   Neurological: negative for headache, dizziness, confusion, focal weakness, paresthesia, memory loss, gait disturbance   Psychological: negative for anxiety, depression, agitation       Objective:      Physical Exam:  Temp (24hrs), Av.9 °F (36.6 °C), Min:97.4 °F (36.3 °C), Max:98.3 °F (36.8 °C)    Patient Vitals for the past 8 hrs:   Pulse   17 1430 (!) 58   17 1408 76   17 1400 69   17 1330 65   17 1300 65   17 1230 71   17 1200 64   17 1130 68   17 1100 69   17 1000 65   17 0905 67   17 0900 67    Patient Vitals for the past 8 hrs:   Resp   17 1430 17   17 1400 18   17 1330 22   17 1300 17   17 1230 17   17 1200 28   17 1130 16   17 1100 19   17 1000 15   17 0905 15   17 0900 15    Patient Vitals for the past 8 hrs:   BP   17 1430 129/52   17 1408 165/67   17 1400 165/57   17 1330 149/51   17 1300 140/77   17 1230 159/53   17 1200 141/53   17 1130 145/54   17 1100 146/61   17 1000 144/62   17 0900 170/52          Intake/Output Summary (Last 24 hours) at 17 1621  Last data filed at 17 1329   Gross per 24 hour   Intake          1571.02 ml   Output             3040 ml   Net         -1468.98 ml       Nondiaphoretic, not in acute distress, NG tube in place. Supple neck. RIJ central line. No scleral icterus, mucous membranes moist, conjuctivae pink, no xanthelasma.   +deep respirations, clear to auscultation bilaterally anteriorly, symmetric air movement. Irregular rate and rhythm, no murmur, pericardial rub, knock, or gallop. No JVD but +peripheral edema. No carotid bruit. Palpable radial and DP pulses bilaterally. Abdomen not examined. Extremities without cyanosis or clubbing. Muscle tone and bulk normal.  Skin warm and dry. No rashes or ulcers. Neuro grossly nonfocal.  No tremor. Lethargic, but opens eyes on my command. CARDIOGRAPHICS and STUDIES, I reviewed:    Telemetry:  Sinus with frequent PAC's. ECG:  No recent study. Last one 7/20/2017 showed sinus rhythm with first degree AV block. Echo 7/20/2017:  LEFT VENTRICLE: Size was at the upper limits of normal. Systolic function  was mildly reduced. Ejection fraction was estimated in the range of 45 %  to 50 %. No obvious wall motion abnormalities identified in the views  obtained. Wall thickness was mildly increased. Hypertrophy was noted. RIGHT VENTRICLE: The size was normal. Systolic function was normal. Wall  thickness was normal.    LEFT ATRIUM: The atrium was mildly dilated. RIGHT ATRIUM: The atrium was mildly dilated. MITRAL VALVE: Normal valve structure. There was mild thickening. There was  normal leaflet separation. DOPPLER: There was no evidence for stenosis. There was mild to moderate regurgitation. AORTIC VALVE: The valve was probably trileaflet. Leaflets exhibited mildly  increased thickness and normal cuspal separation. DOPPLER: There was no  stenosis. There was trivial regurgitation. TRICUSPID VALVE: Normal valve structure. There was normal leaflet  separation. DOPPLER: There was no evidence for tricuspid stenosis. There  was no significant regurgitation. PULMONIC VALVE: Not well visualized, but normal Doppler findings. DOPPLER:  There was mild regurgitation. AORTA: The root exhibited normal size. PERICARDIUM: Insignificant pericardial effusion and/or pericardial fat was  present. Labs:  No results for input(s): CPK, CKMB, CKNDX, TROIQ in the last 72 hours. No lab exists for component: CPKMB  Lab Results   Component Value Date/Time    Cholesterol, total 178 07/20/2017 12:28 PM    HDL Cholesterol 48 07/20/2017 12:28 PM    LDL, calculated 117.8 07/20/2017 12:28 PM    Triglyceride 61 07/20/2017 12:28 PM    CHOL/HDL Ratio 3.7 07/20/2017 12:28 PM     No results for input(s): INR, PTP, APTT in the last 72 hours.     No lab exists for component: Huel Gosselin   Recent Labs      08/04/17 0415 08/03/17   0405  08/02/17   0508   NA  145  146*  144   K  3.7  3.4*  3.7   CL  108  110*  112*   CO2  33*  29  21   BUN  30*  31*  29*   CREA  0.93  0.98  1.16   GLU  192*  141*  142*   PHOS  2.4*  2.4*  2.0*   CA  7.0*  7.6*  7.7*   ALB  1.8*  1.8*  1.8*   WBC  13.8*  14.3*  15.4*   HGB  11.4*  11.7*  12.2   HCT  34.7*  33.7*  35.3*   PLT  275  286  303     Recent Labs      08/04/17 0415 08/03/17   0405  08/02/17   0508   SGOT  35  35  32   AP  56  61  69   TP  5.7*  5.8*  5.9*   ALB  1.8*  1.8*  1.8*   GLOB  3.9  4.0  4.1*     No components found for: GLPOC  Recent Labs      08/02/17   1017   PH  7.48*   PCO2  31*   PO2  84           Asiya Bar MD  8/4/2017

## 2017-08-05 NOTE — ANESTHESIA PREPROCEDURE EVALUATION
Anesthetic History   No history of anesthetic complications            Review of Systems / Medical History  Patient summary reviewed, nursing notes reviewed and pertinent labs reviewed    Pulmonary  Within defined limits                 Neuro/Psych   Within defined limits           Cardiovascular    Hypertension    Angina: at rest      Past MI (Nstemi this admission), CAD (severe, non-sugical candidate) and cardiac stents (stented 7/20/17 (3 stents))    Exercise tolerance: <4 METS  Comments: TTE (7/20/17): Moderate MR, mild PI, EF=45-50%  1st degree AV block     Slight interval worsening in  pulmonary edema.     Interval A-fib with RVR   GI/Hepatic/Renal  Within defined limits             Comments: Perforated viscus Endo/Other  Within defined limits           Other Findings   Comments: Prostate enlargement  S/p bladder repair   And hernia repair  subQ emphysema     Anesthetic History   No history of anesthetic complications            Review of Systems / Medical History  Patient summary reviewed, nursing notes reviewed and pertinent labs reviewed    Pulmonary  Within defined limits                 Neuro/Psych   Within defined limits           Cardiovascular    Hypertension    Angina: at rest      CAD    Exercise tolerance: <4 METS  Comments: EF 45-50%  1st degree AV block     GI/Hepatic/Renal  Within defined limits              Endo/Other        Obesity     Other Findings   Comments: bladder clots  Prostate enlargement   hyponatremia         Physical Exam    Airway  Mallampati: II    Neck ROM: normal range of motion   Mouth opening: Normal     Cardiovascular  Regular rate and rhythm,  S1 and S2 normal,  no murmur, click, rub, or gallop             Dental    Dentition: Implants     Pulmonary  Breath sounds clear to auscultation               Abdominal  GI exam deferred       Other Findings            Anesthetic Plan    ASA: 3, emergent  Anesthesia type: general          Induction: Intravenous  Anesthetic plan and risks discussed with: Patient          Anesthetic History   No history of anesthetic complications            Review of Systems / Medical History  Patient summary reviewed, nursing notes reviewed and pertinent labs reviewed    Pulmonary  Within defined limits                 Neuro/Psych   Within defined limits           Cardiovascular    Hypertension    Angina: at rest      CAD    Exercise tolerance: <4 METS  Comments: EF 45-50%  1st degree AV block     GI/Hepatic/Renal  Within defined limits              Endo/Other        Obesity     Other Findings   Comments: bladder clots  Prostate enlargement   hyponatremia         Physical Exam    Airway  Mallampati: II    Neck ROM: normal range of motion   Mouth opening: Normal     Cardiovascular  Regular rate and rhythm,  S1 and S2 normal,  no murmur, click, rub, or gallop             Dental    Dentition: Implants     Pulmonary  Breath sounds clear to auscultation               Abdominal  GI exam deferred       Other Findings            Anesthetic Plan    ASA: 3, emergent  Anesthesia type: general          Induction: Intravenous  Anesthetic plan and risks discussed with: Patient              Physical Exam    Airway  Mallampati: II  TM Distance: > 6 cm  Neck ROM: normal range of motion   Mouth opening: Normal    Comments: Pt obtunded and not cooperative Cardiovascular    Rhythm: regular  Rate: normal    Murmur: Grade 3, Mitral area     Dental    Dentition: Implants  Comments: Multiple missing teeth   Pulmonary  Breath sounds clear to auscultation               Abdominal  GI exam deferred       Other Findings            Anesthetic Plan    ASA: 4, emergent  Anesthesia type: general    Monitoring Plan: CVP      Induction: Intravenous  Anesthetic plan and risks discussed with: Patient      Critically ill ICU pt at high risk for any surgery, possible post op ventilation (high likelyhood due to currently being obtunded and combative earlier today trying to pull out his lines).   Had BB today  RSI with glidescope used prior, and with postop ileus, will likely do same again today.

## 2017-08-05 NOTE — PROGRESS NOTES
Hospitalist Progress Note    NAME: Joanna Carmona   :  1928   MRN:  [id number]       Interim Hospital Summary: 80 y.o. male whom presented on 2017 with      Assessment / Plan:  Postop respiratory failure s/p extubation on NC  S/P removal of bladder stone and repair of perforated bladder  S/P subsequent ex-lap due to extensive sq emphysema in abdomen/retroperitoneum - no sign of bowel perforation. Shock improved  LINDSEY improving good diuresis with lasix, cxr with pulmonary edema. \"pneumothorax\" due to dissection of air from retroperitoneum cephalad, different physiology or behavior of typical pneumothorax -conservative management  CAD afib with RVR- cardizem gtt, nitrobid  Anemia, post ops/p PRBC  UTI with enterococcus on ampicillin  Electrolyte disturbance replete as indicated  HTN cardizem, nitrobid asix  Encephalopathy ? Medication/infectious etiology, on IV abx for UTI   On TPN, Paralytic ileus, NGT  Critically ill  Code status: FULL code  Prophylaxis: per primary team  Recommended Disposition: per primary team-      Subjective:     Chief Complaint / Reason for Physician Visit encephalopathic, opening eyes, verbalizing   Discussed with RN events overnight. Review of Systems:  Symptom Y/N Comments  Symptom Y/N Comments   Fever/Chills    Chest Pain     Poor Appetite    Edema     Cough    Abdominal Pain     Sputum    Joint Pain     SOB/CLARKE    Pruritis/Rash     Nausea/vomit    Tolerating PT/OT     Diarrhea    Tolerating Diet     Constipation    Other       Could NOT obtain due to: ams     Objective:     VITALS:   Last 24hrs VS reviewed since prior progress note.  Most recent are:  Patient Vitals for the past 24 hrs:   Temp Pulse Resp BP SpO2   17 1300 - 77 20 155/61 96 %   17 1255 - 74 21 - 96 %   17 1200 97.4 °F (36.3 °C) 71 18 140/68 96 %   17 1100 - 63 15 129/64 96 %   17 1000 - 77 15 157/71 96 %   17 0900 - 80 17 168/76 (!) 84 %   17 0800 - 77 16 157/70 98 %   08/05/17 0751 98.1 °F (36.7 °C) - - - -   08/05/17 0700 - 70 15 147/61 95 %   08/05/17 0606 - 86 16 - 96 %   08/05/17 0600 - 88 15 171/85 97 %   08/05/17 0515 - - - - 96 %   08/05/17 0500 - 83 - 166/68 94 %   08/05/17 0400 98.3 °F (36.8 °C) 82 21 173/48 96 %   08/05/17 0300 - 82 19 103/67 96 %   08/05/17 0200 - 86 16 - 96 %   08/05/17 0100 - 80 16 142/54 95 %   08/05/17 0000 98 °F (36.7 °C) 76 20 168/78 99 %   08/04/17 2357 - - - - 98 %   08/04/17 2200 - 62 14 135/59 92 %   08/04/17 2100 - (!) 59 12 135/50 97 %   08/04/17 2000 - 64 20 144/54 99 %   08/04/17 1954 97.8 °F (36.6 °C) 65 14 146/59 98 %   08/04/17 1941 - 68 13 - 98 %   08/04/17 1932 - - - - 98 %   08/04/17 1900 - 64 13 149/56 91 %   08/04/17 1800 - 65 18 140/57 92 %   08/04/17 1700 - 72 17 132/57 94 %   08/04/17 1626 - 64 22 - 97 %   08/04/17 1600 96.6 °F (35.9 °C) 61 17 144/63 98 %   08/04/17 1430 - (!) 58 17 129/52 96 %       Intake/Output Summary (Last 24 hours) at 08/05/17 1421  Last data filed at 08/05/17 1200   Gross per 24 hour   Intake          1676.77 ml   Output             2955 ml   Net         -1278.23 ml        PHYSICAL EXAM:  General:  nad, encepahopathy, opens eyes  EENT:  EOMI. Anicteric sclerae. MMM  Resp:  decrease BS BL, no wheezing or rales.   No accessory muscle use  CV:  Regular  rhythm,  + jose pedal and UE edema  GI:  Soft, +distended, Non tender.  +Bowel sounds, dressing intact  Neurologic:  Limited as encepahopathic    Reviewed most current lab test results and cultures  YES  Reviewed most current radiology test results   YES  Review and summation of old records today    NO  Reviewed patient's current orders and MAR    YES  PMH/SH reviewed - no change compared to H&P  ________________________________________________________________________  Care Plan discussed with:    Comments   Patient     Family      RN x    Care Manager     Consultant                        Multidiciplinary team rounds were held today with case manager, nursing, pharmacist and clinical coordinator. Patient's plan of care was discussed; medications were reviewed and discharge planning was addressed. ________________________________________________________________________  Total NON critical care TIME:  25  Minutes    Total CRITICAL CARE TIME Spent:   Minutes non procedure based      Comments   >50% of visit spent in counseling and coordination of care     ________________________________________________________________________  Ladonna España MD     Procedures: see electronic medical records for all procedures/Xrays and details which were not copied into this note but were reviewed prior to creation of Plan. LABS:  I reviewed today's most current labs and imaging studies.   Pertinent labs include:  Recent Labs      08/04/17 0415 08/03/17   0405   WBC  13.8*  14.3*   HGB  11.4*  11.7*   HCT  34.7*  33.7*   PLT  275  286     Recent Labs      08/04/17 0415  08/03/17   0405   NA  145  146*   K  3.7  3.4*   CL  108  110*   CO2  33*  29   GLU  192*  141*   BUN  30*  31*   CREA  0.93  0.98   CA  7.0*  7.6*   MG  2.1  1.8   PHOS  2.4*  2.4*   ALB  1.8*  1.8*   TBILI  0.8  1.0   SGOT  35  35   ALT  36  30       Signed: Ladonna España MD

## 2017-08-05 NOTE — PROGRESS NOTES
Spoke with nurse in regards to restraint documentation per audit. Orders obtained to reflect accurately type of restraints used and how they are administered.

## 2017-08-05 NOTE — PERIOP NOTES
TRANSFER - IN REPORT:    Verbal report received from [facility]  being received from 2524(unit) for ordered procedure      Report consisted of patients Situation, Background, Assessment and   Recommendations(SBAR). Information from the following report(s) SBAR, OR Summary, Procedure Summary, Intake/Output, MAR, Recent Results and Cardiac Rhythm NSR was reviewed with the receiving nurse. Opportunity for questions and clarification was provided. Assessment completed upon patients arrival to unit and care assumed.

## 2017-08-05 NOTE — PROGRESS NOTES
Patient is confused, pulling at lines and abdominal dressing. Bilateral soft mitts added for patient's protection. 0700 Bedside and Verbal shift change report given to [address] [place] [address] Drive (oncoming nurse) by Kika Beltrán (offgoing nurse). Report included the following information SBAR, Kardex, Procedure Summary, Intake/Output, MAR and Recent Results.

## 2017-08-05 NOTE — PROGRESS NOTES
CARDIOLOGY Progress Note    Patient ID:  Patient: Samuel Coyle  MRN: [id number]  Age: 80 y.o.  : 1928    Date of  Admission: 2017  6:19 PM   PCP:  Robb Venegas MD   Primary cardiologist:  Rafael García MD    Assessment: 1. Atrial fibrillation with RVR, back to sinus with PAC's. 2. Positive troponin, probably type 2 NSTEMI (supply-demand mismatch resulting in myocardial necrosis) given the history. 3. CAD with chronically and subtotally-occluded left circumflex artery stented 3/50/4094 (complicated procedure) by Dr. Marley Zuñiga. A lexiscan nuclear stress test done before this procedure suggested lateral ischemia in that vessel's distribution. 4. Hypertension. 5. Bladder perforation s/p repair. 6. Postop ileus. 7. Postop anemia s/p prbc transfusion. 8. Not on dual antiplatelet therapy with recent surgeries. Plan:     1. Currently NG tube to suction  2. Start asa suppository, 3 HORACIO stents 2017  3. Resume plavix when ok surgically  4. Add standing IV labetolol, appears sinus with PAC's today  5. Cont NTP        [x]       High complexity decision making was performed in this patient at high risk for decompensation with multiple organ involvement. Samuel Coyle is a 80 y.o. male with a history of surgery, some postoperative atrial fibrillation. He had recent stenting of his LCX artery, but has been off antiplatelet therapy in the postoperative setting for a while. Starting ASA on . Cardiac catheterization 2017 prior to stenting of left circumflex:  LEFT MAIN: Large vessel with minor luminal irregularities.      LAD: A medium-sized vessel with   scattered plaque of 20%-30% throughout its course.  Two small mid   diagonals were noted and multiple trivial diagonal vessels.      RAMUS: Ramus intermedius is a small vessel with no focal stenosis   that provides perfusion to the anterolateral wall.      CIRCUMFLEX: Moderate-sized vessel that is subtotally occluded in its   proximal and mid portion; there were some faint ANDERSON 1 flow distally   that maybe collateralized.       RCA: Medium-sized dominant vessel giving   rise to small PDA and posterolateral branch vessels; 30% eccentric   mid stenosis is noted, with some systolic compression.       LV: Left ventriculography in MIRANDA projection   reveals no focal wall motion abnormality. Ejection fraction of 55% to   60%. Mild-to-moderate MR. No aortic stenosis on pullback. EDP of 14.     Abdominal aortography in AP projection reveals no renal artery   stenosis. There is scattered plaque in the abdominal aorta with no   focal stenosis or aneurysm. Past Medical History:   Diagnosis Date    Hypertension     Prostate enlargement         Past Surgical History:   Procedure Laterality Date    ABDOMEN SURGERY PROC UNLISTED      hernia repair    CARDIAC SURG PROCEDURE UNLIST         Social History   Substance Use Topics    Smoking status: Never Smoker    Smokeless tobacco: Never Used    Alcohol use No        History reviewed. No pertinent family history.      Allergies   Allergen Reactions    Metoprolol Nausea and Vomiting          Current Facility-Administered Medications   Medication Dose Route Frequency    fentaNYL citrate (PF) injection 25-50 mcg  25-50 mcg IntraVENous Q4H PRN    haloperidol lactate (HALDOL) injection 3 mg  3 mg IntraVENous Q6H    potassium phosphate 30 mmol in 0.9% sodium chloride 500 mL infusion   IntraVENous ONCE    TPN ADULT-CENTRAL AA 5% D20%-MVI W/ VIT K-RCH   IntraVENous CONTINUOUS    metoclopramide HCl (REGLAN) injection 5 mg  5 mg IntraVENous Q6H    aspirin (ASA) suppository 300 mg  300 mg Rectal DAILY    labetalol (NORMODYNE;TRANDATE) 20 mg/4 mL (5 mg/mL) injection 10 mg  10 mg IntraVENous Q6H    hydrALAZINE (APRESOLINE) 20 mg/mL injection 20 mg  20 mg IntraVENous Q4H PRN    nitroglycerin (NITROBID) 2 % ointment 1 Inch  1 Inch Topical Q6H    labetalol (NORMODYNE;TRANDATE) injection 20 mg  20 mg IntraVENous Q4H PRN    TPN ADULT-CENTRAL AA 5% D20%-MVI W/ VIT K-RCH   IntraVENous CONTINUOUS    fat emulsion 20% (LIPOSYN, INTRALIPID) infusion 250 mL  250 mL IntraVENous Q MON, WED & FRI    insulin lispro (HUMALOG) injection   SubCUTAneous Q6H    glucose chewable tablet 16 g  4 Tab Oral PRN    dextrose (D50W) injection syrg 12.5-25 g  12.5-25 g IntraVENous PRN    glucagon (GLUCAGEN) injection 1 mg  1 mg IntraMUSCular PRN    enoxaparin (LOVENOX) injection 40 mg  40 mg SubCUTAneous Q24H    levalbuterol (XOPENEX) nebulizer soln 0.63 mg/3 mL  0.63 mg Nebulization Q4H RT    famotidine (PF) (PEPCID) 20 mg in sodium chloride 0.9 % 10 mL injection  20 mg IntraVENous Q12H    dilTIAZem (CARDIZEM) 100 mg in 0.9% sodium chloride (MBP/ADV) 100 mL infusion  0-15 mg/hr IntraVENous TITRATE    furosemide (LASIX) injection 40 mg  40 mg IntraVENous DAILY    sodium chloride (NS) flush 5-10 mL  5-10 mL IntraVENous Q8H    sodium chloride (NS) flush 5-10 mL  5-10 mL IntraVENous PRN    ondansetron (ZOFRAN) injection 4 mg  4 mg IntraVENous Q4H PRN    0.9% sodium chloride infusion  25 mL/hr IntraVENous CONTINUOUS    sodium chloride (NS) flush 10-30 mL  10-30 mL InterCATHeter PRN    sodium chloride (NS) flush 10 mL  10 mL InterCATHeter Q24H    sodium chloride (NS) flush 10 mL  10 mL InterCATHeter PRN    sodium chloride (NS) flush 10-40 mL  10-40 mL InterCATHeter Q8H    sodium chloride (NS) flush 20 mL  20 mL InterCATHeter Q24H    acetaminophen (TYLENOL) solution 650 mg  650 mg Per NG tube Q4H PRN       Review of Symptoms:  Patient cannot communicate.   General: negative for fever, chills, sweats, weakness, weight loss   Eyes: negative for blurred vision, eye pain, loss of vision, diplopia   Ear Nose and Throat: negative for rhinorrhea, pharyngitis, otalgia, tinnitus, speech or swallowing difficulties   Respiratory: negative for SOB, cough, sputum production, wheezing, CLARKE, pleuritic pain   Cardiology: negative for chest pain, palpitations, orthopnea, PND, edema, syncope   Gastrointestinal: negative for abdominal pain, N/V, dysphagia, change in bowel habits, bleeding   Genitourinary: negative for frequency, urgency, dysuria, hematuria, incontinence   Muskuloskeletal : negative for arthralgia, myalgia   Hematology: negative for easy bruising, bleeding, lymphadenopathy   Dermatological: negative for rash, ulceration, mole change, new lesion   Endocrine: negative for hot flashes or polydipsia   Neurological: negative for headache, dizziness, confusion, focal weakness, paresthesia, memory loss, gait disturbance   Psychological: negative for anxiety, depression, agitation       Objective:      Physical Exam:  Temp (24hrs), Av.8 °F (36.6 °C), Min:96.6 °F (35.9 °C), Max:98.3 °F (36.8 °C)    Patient Vitals for the past 8 hrs:   Pulse   17 0700 70   17 0606 86   17 0600 88   17 0500 83   17 0400 82   17 0300 82   17 0200 86    Patient Vitals for the past 8 hrs:   Resp   17 0700 15   17 0606 16   17 0600 15   17 0400 21   17 0300 19   17 0200 16    Patient Vitals for the past 8 hrs:   BP   17 0700 147/61   17 0600 171/85   17 0500 166/68   17 0400 173/48   17 0300 103/67          Intake/Output Summary (Last 24 hours) at 17 0930  Last data filed at 17 0600   Gross per 24 hour   Intake          1269.65 ml   Output             3360 ml   Net         -2090.35 ml       Nondiaphoretic, not in acute distress, NG tube in place. Supple neck. RIJ central line. No scleral icterus, mucous membranes moist, conjuctivae pink, no xanthelasma. +deep respirations, clear to auscultation bilaterally anteriorly, symmetric air movement. Irregular rate and rhythm, no murmur, pericardial rub, knock, or gallop. No JVD but +peripheral edema. No carotid bruit.   Palpable radial and DP pulses bilaterally. Abdomen not examined. Extremities without cyanosis or clubbing. Muscle tone and bulk normal.  Skin warm and dry. No rashes or ulcers. Neuro grossly nonfocal.  No tremor. Lethargic, but opens eyes on my command. CARDIOGRAPHICS and STUDIES, I reviewed:    Telemetry:  Sinus with frequent PAC's. ECG:  No recent study. Last one 7/20/2017 showed sinus rhythm with first degree AV block. Echo 7/20/2017:  LEFT VENTRICLE: Size was at the upper limits of normal. Systolic function  was mildly reduced. Ejection fraction was estimated in the range of 45 %  to 50 %. No obvious wall motion abnormalities identified in the views  obtained. Wall thickness was mildly increased. Hypertrophy was noted. RIGHT VENTRICLE: The size was normal. Systolic function was normal. Wall  thickness was normal.    LEFT ATRIUM: The atrium was mildly dilated. RIGHT ATRIUM: The atrium was mildly dilated. MITRAL VALVE: Normal valve structure. There was mild thickening. There was  normal leaflet separation. DOPPLER: There was no evidence for stenosis. There was mild to moderate regurgitation. AORTIC VALVE: The valve was probably trileaflet. Leaflets exhibited mildly  increased thickness and normal cuspal separation. DOPPLER: There was no  stenosis. There was trivial regurgitation. TRICUSPID VALVE: Normal valve structure. There was normal leaflet  separation. DOPPLER: There was no evidence for tricuspid stenosis. There  was no significant regurgitation. PULMONIC VALVE: Not well visualized, but normal Doppler findings. DOPPLER:  There was mild regurgitation. AORTA: The root exhibited normal size. PERICARDIUM: Insignificant pericardial effusion and/or pericardial fat was  present. Labs:  No results for input(s): CPK, CKMB, CKNDX, TROIQ in the last 72 hours.     No lab exists for component: CPKMB  Lab Results   Component Value Date/Time    Cholesterol, total 178 07/20/2017 12:28 PM    HDL Cholesterol 48 07/20/2017 12:28 PM    LDL, calculated 117.8 07/20/2017 12:28 PM    Triglyceride 61 07/20/2017 12:28 PM    CHOL/HDL Ratio 3.7 07/20/2017 12:28 PM     No results for input(s): INR, PTP, APTT in the last 72 hours.     No lab exists for component: Howardpriya Satish   Recent Labs      08/04/17 0415 08/03/17   0405   NA  145  146*   K  3.7  3.4*   CL  108  110*   CO2  33*  29   BUN  30*  31*   CREA  0.93  0.98   GLU  192*  141*   PHOS  2.4*  2.4*   CA  7.0*  7.6*   ALB  1.8*  1.8*   WBC  13.8*  14.3*   HGB  11.4*  11.7*   HCT  34.7*  33.7*   PLT  275  286     Recent Labs      08/04/17 0415  08/03/17   0405   SGOT  35  35   AP  56  61   TP  5.7*  5.8*   ALB  1.8*  1.8*   GLOB  3.9  4.0     No components found for: [place]  Recent Labs      08/02/17   1017   PH  7.48*   PCO2  31*   PO2  84           Felecia Guerrero MD  8/5/2017

## 2017-08-05 NOTE — PROGRESS NOTES
[facility]  Pulmonary, Critical Care, and Sleep Medicine    Name: Amanda Alejo MRN: [id number]   : 1928 Hospital: [facility]   Date: 2017            IMPRESSION:   · Postop respiratory failure off vent but slow to arouse  · Encephalopathy- head CT ok ; likely from dilaudid. Family aware but feels pt needs pain meds; now that pain better, perhaps waking up some? ? Not polite in evening and night. owning? Dementia? · Complicated Enterococcal UTI- sensitive to Amp  · ileus  · CAD with recent stents per   · NSTEMI demand ischemia but now with AF RVR  · AF RVR on Iv diltiazem  · HTN- more than he had at home? ?  · Echo earlier this year LVEF 45%  · Anasarca and volume overload? Third spacing- good response to diuretics  · S/P removal of bladder stone and repair of perforated bladder  · S/P subsequent ex-lap due to extensive sq emphysema in abdomen/retroperitoneum - no sign of bowel perforation  · Shock better, now hypertensive  · Hypocalcemia  · Severe hypoalbuminemia - catabolic  · hypophosphatemia  · Acute renal failure  · \"pneumothorax\" due to dissection of air from retroperitoneum cephalad, different physiology or behavior of typical pneumothorax   · CAD      PLAN:   · Appreciate Cardiology's help  · renew TPN  · REorient  · Change dilaudid to fentanyl prn  · Haldol scheduled  · Unable to use oral meds yet, would have liked to try melatonin  · IV reglan- not great choice but want to help bowels recover  · replete lytes  · PICC  · Cardiology following  · IV ampicillin- will need 7 days course,   · Gentle diuresis  · antihypertensives  · Pain control  · No need for chest tubes for this type of pneumothorax, should resolve spontaneously, but will monitor  · DVT/GI prophylaxis  · Patient requiring restraints due to threat of injury to self, interference with medical devices.    · Will be available to assist in medical management while in the CCU pending disposition     Subjective/Interval History:   I have reviewed the flowsheet and previous days notes. The patient is unable to give any meaningful history or review of systems because the patient is:     Nursing notes reviewed     seems to be sundowning and striking out at nursing. Still with high NG output. Still on IV pain meds. TPN. No pressors. HTn has been tough to control     agitated overnight and now restrained. Easily awakened and in no pain. No SOB. No nausea. TPN    8/3 will arouse some but still sleepy. NG output still dark. Hgb slowly drifting down     tachypnea. Somnolent. 3:42 PM  Head Ct scan - atrophic changes only. Dark NG output. Will restart Pepcid, may need PPI.   Two PRBCs given over night > HGB 8.5 > 12.2, HTC 25.8 > 36.2, Lasix 20 mg between the Packs given > polyuria total 3 L overnight, pain medication given with each positions, BP still around 150-176 mmHg despite of hydralazine PRN Q 6 hr, Pain med, carvedilol, new A Fib in morning > Diltiazem 10 mg IV once given HR been around , BP stable,     AFRVR now. SBP > 150 and CVP 10-12. Nirmal. Pt retired . Spoke with daughter at bedside. They would like Dr. Shaw Styles to see pt. Repeat troponin higher. Pt moaning and abdomen still distended and sore.  NGT in place      Review of Systems   Unable to perform ROS: Mental status change     Objective:   Vital Signs:    Visit Vitals    /61    Pulse 70    Temp 98.1 °F (36.7 °C)    Resp 15    Ht 5' 10\" (1.778 m)    Wt 99.7 kg (219 lb 12.8 oz)    SpO2 95%    BMI 31.54 kg/m2       O2 Device: Nasal cannula   O2 Flow Rate (L/min): 4 l/min   Temp (24hrs), Av.8 °F (36.6 °C), Min:96.6 °F (35.9 °C), Max:98.3 °F (36.8 °C)       Intake/Output:   Last shift:         Last 3 shifts:  1901 -  0700  In: 2427.7 [I.V.:2002.7]  Out: 6852 [Urine:3260; Drains:220]    Intake/Output Summary (Last 24 hours) at 17  Last data filed at 17 0600   Gross per 24 hour   Intake          1269.65 ml   Output             3520 ml   Net         -2250.35 ml     Hemodynamics:   PAP:   CO:     Wedge:   CI:     CVP:  CVP (mmHg): 8 mmHg (08/02/17 0800) SVR:       PVR:       Ventilator Settings:  Mode Rate Tidal Volume Pressure FiO2 PEEP   CPAP   450 ml  5 cm H2O 40 % 6 cm H20     Peak airway pressure: 12 cm H2O    Minute ventilation: 9.88 l/min       Physical Exam   Constitutional: He appears ill. He is sedated and intubated. HENT:   Head: Normocephalic and atraumatic. Eyes: No scleral icterus. Cardiovascular: Normal rate and regular rhythm. No murmur heard. Pulmonary/Chest: He is intubated. No respiratory distress. He has no wheezes. He has no rales. Abdominal: He exhibits distension. Bowel sounds are absent. Postop abdomen, sq emphysema resolved   Musculoskeletal: He exhibits edema. Skin: Skin is warm and dry.      Data:     Current Facility-Administered Medications   Medication Dose Route Frequency    haloperidol lactate (HALDOL) injection 3 mg  3 mg IntraVENous Q6H    potassium phosphate 30 mmol in 0.9% sodium chloride 500 mL infusion   IntraVENous ONCE    TPN ADULT-CENTRAL AA 5% D20%-MVI W/ VIT K-RCH   IntraVENous CONTINUOUS    nitroglycerin (NITROBID) 2 % ointment 1 Inch  1 Inch Topical Q6H    TPN ADULT-CENTRAL AA 5% D20%-MVI W/ VIT K-RCH   IntraVENous CONTINUOUS    fat emulsion 20% (LIPOSYN, INTRALIPID) infusion 250 mL  250 mL IntraVENous Q MON, WED & FRI    aspirin (ASPIRIN) tablet 325 mg  325 mg Oral DAILY    insulin lispro (HUMALOG) injection   SubCUTAneous Q6H    enoxaparin (LOVENOX) injection 40 mg  40 mg SubCUTAneous Q24H    levalbuterol (XOPENEX) nebulizer soln 0.63 mg/3 mL  0.63 mg Nebulization Q4H RT    famotidine (PF) (PEPCID) 20 mg in sodium chloride 0.9 % 10 mL injection  20 mg IntraVENous Q12H    dilTIAZem (CARDIZEM) 100 mg in 0.9% sodium chloride (MBP/ADV) 100 mL infusion  0-15 mg/hr IntraVENous TITRATE    furosemide (LASIX) injection 40 mg  40 mg IntraVENous DAILY    sodium chloride (NS) flush 5-10 mL  5-10 mL IntraVENous Q8H    0.9% sodium chloride infusion  25 mL/hr IntraVENous CONTINUOUS    sodium chloride (NS) flush 10 mL  10 mL InterCATHeter Q24H    sodium chloride (NS) flush 10-40 mL  10-40 mL InterCATHeter Q8H    sodium chloride (NS) flush 20 mL  20 mL InterCATHeter Q24H                Labs:  Recent Labs      08/04/17   0415  08/03/17   0405   WBC  13.8*  14.3*   HGB  11.4*  11.7*   HCT  34.7*  33.7*   PLT  275  286     Recent Labs      08/04/17   0415  08/03/17   0405   NA  145  146*   K  3.7  3.4*   CL  108  110*   CO2  33*  29   GLU  192*  141*   BUN  30*  31*   CREA  0.93  0.98   CA  7.0*  7.6*   MG  2.1  1.8   PHOS  2.4*  2.4*   ALB  1.8*  1.8*   TBILI  0.8  1.0   SGOT  35  35   ALT  36  30     Recent Labs      08/02/17   1017   PH  7.48*   PCO2  31*   PO2  84   HCO3  22     Imaging:  I have personally reviewed the patients radiographs and have reviewed the reports:  No change        Total critical care time exclusive of procedures: 35 minutes  Deanne Nevarez MD

## 2017-08-05 NOTE — PROGRESS NOTES
Surgery      Patient had an episode of coughing this afternoon after which the patient had a large amount of drainage and it was then noted that the patient's wound had dehisced and there is small bowel coming out of the wound. Will need to return to the OR for wound exploration and closure of wound if possible or possible placement of a wound vac. Spoke with family members and nursing staff obtaining phone consent for procedure. OR has been called. Preeti Cohen.  Ai Arora MD, [facility]

## 2017-08-05 NOTE — PROGRESS NOTES
0700:  Bedside and Verbal shift change report given to Stone(oncoming nurse) by Arpit Griffin (offgoing nurse). Report included the following information SBAR, Kardex, Procedure Summary, Intake/Output, MAR, Recent Results and Cardiac Rhythm SR.   0800:  Assessment. Alert, denies pain, bilateral wrist and mitt restraints on, TORRES, SR on CM; decreased 4 LPM NC to 3 LPM NC; lungs clear to auscultation; return demonstration of incentive spirometer with ability to get up to 700, suprapubic wick draining clear yellow urine; midline abdominal incision dsg c/d/i; hypoactive bowel sounds, NGT to LWS with brown drainage. 1000:  Restless. FLACC 5. Repositioned and administered 50 mcg Fentanyl IV.    1100:  Resting comfortably with eyes closed. Pain reassessment. FLACC 0.  1200:  Reassessment. Awake. Grandson at bedside. 1300:  Periods of rest with eyes closed. 1450:  Periods of strong coughing. FLACC 4. Administered 50 mcg Fentanyl IV for cough. Repositioned and encouraged rest.   1530: Resting with eyes closed. Pain reassessment FLACC 0.   1600:  Reassessment. Provided NT suctioning of thick tan secretions. Repositioned and provided back care. 1713: Informed Dr. Delmis Iraheta of patient response following administration of Fentanyl X2 today for pain resulting in eyes rolling back and not tracking. Order:  Discontinue fentanyl; Dilaudid 0.1 mg every 4 hours PRN for pain. 1745: Observed dehiscence of abdominal wound with small amount of bowel outside of incision. Informed Dr. Radha Ley, via phone. Per Dr. Emmanuel Soliman order applied soaked betadine dsg to site with soaked NS dsg over betadine dressing. Dr. Radha Ley at bedside to assess. Informed patient's family:  Arpit Griffin, daughter; Jaclyn Harriss, daughter; and Luda Bebeto, spouse. Dr. Radha Ley spoke with Arpit Griffin and provided detailed description of current status and plan to close wound with return to OR. Luda Bebeto, patient's spouse, consented by telephone to procedure. 1838:   To OR  1900:  Bedside and Verbal shift change report given to Concha Brown (oncoming nurse) by Sara May (offgoing nurse). Report included the following information SBAR, Kardex, Intake/Output, MAR and Recent Results.

## 2017-08-05 NOTE — PROGRESS NOTES
Assessment/Plan:   Fluid through lower abdominal wound - not urine leak     AF with RVR - resolved     NSTMI - cards help appreciated.      Post-Op Ileus - NGT/IVF/NPO awaiting bowel function. 600 NGT. No BM      Bladder perforation s/p repair - 4652 UOP. Recorded as wick, but nothing recorded under SPT. ARMEN  Same at 490ccs. I suspect this is transudate as part of overall anasarca picture. Presumably \"free air\" was from partial intraperitoneal bladder rupture, and transgression of air/fluid with cath manipulation given negative exploration and complete bowel run (small/large)      Elevated creatinine - resolved, Creat 0.93     Enterococcal UTI - Ampicillin, afebrile      Post-op Anemia - resolved s/p transfusion, do not suspect active bleeding. Wound examined, no infection  With pressure, copious serous fluid drains from lower incision  periumbical a little blood, a touch of exposed fat.     Fluid for creatinine yesterday negative for urine leak (creat 1.08), will also send ARMEN output which was no urine leak (1.06)

## 2017-08-05 NOTE — PROGRESS NOTES
Surgery      Notes reviewed    Case discussed with nurse, incision unchanged    Postop ileus, started on Reglan    Nothing to add    Cont present RX          Tod Bauer MD, [facility]

## 2017-08-06 NOTE — BRIEF OP NOTE
BRIEF OPERATIVE NOTE    Date of Procedure: 8/5/2017   Preoperative Diagnosis: wound dehiscence  Postoperative Diagnosis: Abdominal wound dehiscence    Procedure(s):  LAPAROTOMY EXPLORATORY, wound closure for dehiscence with retention sutures  Surgeon(s) and Role:     * Soo Jc MD - Primary         Assistant Staff:       Surgical Staff:  Circ-1: Shiva Riddle RN  Circ-Relief: Sachinfadumo Miller RN  Scrub Tech-1: Rebecca Winston  Scrub Tech-Relief: Lynette Soto  Surg Asst-1: Gordon Braga  Surg Asst-Relief: Inder Ojeda  Event Time In   Incision Start 1923   Incision Close 2004     Anesthesia: General   Estimated Blood Loss: min  Specimens: none  Findings:midline fascia wound dehisscense  Complications: none  Implants: * No implants in log [facility] Chelsea Castillo, [address]

## 2017-08-06 NOTE — OP NOTES
[address]   OP NOTE       Name:  Charlene Faust   MR#:  [id number]   :  1928   Account #:  [de-identified]    Surgery Date:  2017   Date of Adm:  2017       PREOPERATIVE DIAGNOSIS: Midline abdominal incision fascial   wound dehiscence. POSTOPERATIVE DIAGNOSIS: Midline abdominal incision fascial   wound dehiscence. PROCEDURES PERFORMED: Exploratory laparotomy with closure of   midline incision using retention sutures. SURGEON: Ronda León. Kae Braun MD.    ANESTHESIA: General endotracheal tube. ESTIMATED BLOOD LOSS: Minimal.    SPECIMENS REMOVED: None. COMPLICATIONS: None. IMPLANTS: None. INDICATIONS FOR PROCEDURE: This is an 80-year-old white male   who had an exploratory laparotomy approximately 8 days ago, who   was noted to have some drainage from his midline incision over the   past 2 days, and earlier today had an episode of coughing at which   time it was noted that his wound completely dehisced with small bowel   coming out at the incision site. He comes to the operating room for   exploratory laparotomy and closure of the midline incision. FINDINGS: At the time of surgery, the patient is found to have small   bowel, having come out of his midline incision lying on the abdominal   wall. The mid portion of the incision is open from the standpoint of the   skin the underlying fascia is essentially completely open except for a   short segment at the cephalad aspect of the incision, where the   sutures are intact. Beyond this, the suture had torn through the fascia   around both sides, except for where it appears 2 sutures were tied   together just below the level of the umbilicus. Suture material was   removed. The abdomen is explored, irrigated with antibiotic solution.    The NG tube is repositioned and advanced for better positioning in the   stomach and then the incision is closed, omentum was brought down   over the midline before closure and 2 retention sutures are placed. DESCRIPTION OF PROCEDURE: With the patient in the supine   position under adequate general anesthesia, the abdomen was   prepped and draped in the usual fashion. Initially, the skin clips are   removed from the skin at the cephalad and inferior aspects of the   incision, and then the staples that have opened up in the mid portion of   the wound are also removed. At this point in time, the small bowel is   reduced back into the abdominal cavity and it is seen that the midline   closure of the fascia broke down and essentially the upper portion of   the fascial closure was intact for approximately 5 cm and then the   remaining fascia to just below the umbilicus has torn on both sides. There is a knot at this level where the fascia is closed together and   then inferior to this the fascia is also torn apart. Old suture material   was then removed at this point in time. We then explored   the abdominal cavity. The NG tube is repositioned further into the   stomach and decompresses the stomach better at this point in time. Irrigation of all 4 quadrants of the abdomen is performed with irrigation   solution. The bowel was returned to its appropriate location and the   patient has a fair amount of omentum which was brought down over   the midline. At this point in time, we placed 2 #2 nylon sutures for   retention sutures at approximately 1/3 from the cephalad and inferior   aspects of the incision. We then reapproximated the midline fascia   using a running PDS from each end. The PDS was placed in a far-far,   near-near fashion along the fascia to approximate. When we come   to our retention sutures a bolster was placed on the skin side of the   retention sutures and these are tied down at that juncture of the   closure. The remaining mid portion of the fascia is then closed and our   sutures are tied down together.  At this point in time, several 2-0 nylons   are used to line up our umbilicus and skin edges and then the skin is   approximated using skin clips. Sterile dressings are applied. The   patient had his suprapubic tube left in place as well as the ARMEN drain,   which goes down into the pelvis. The patient is transferred to the   recovery area in stable condition having tolerated the procedure with   needle, sponge and instrument counts being reported as correct He   will also have a binder applied.         MD KRISTI Means / ANGIE   D:  08/05/2017   20:14   T:  08/06/2017   08:00   Job #:  [id number]

## 2017-08-06 NOTE — PERIOP NOTES
TRANSFER - OUT REPORT:    Verbal report given to MELISSA Vargas(name) on [address]  being transferred to CCU 2524(unit) for routine post - op       Report consisted of patients Situation, Background, Assessment and   Recommendations(SBAR). Information from the following report(s) SBAR, Kardex, OR Summary, Intake/Output, MAR and Recent Results was reviewed with the receiving nurse. Opportunity for questions and clarification was provided.       Patient transported with:   Monitor  O2 @ % liters  Registered Nurse

## 2017-08-06 NOTE — ANESTHESIA POSTPROCEDURE EVALUATION
Post-Anesthesia Evaluation and Assessment    Patient: Greg Zavala MRN: [id number]  SSN: [id number]    YOB: 1928  Age: 80 y.o. Sex: male       Cardiovascular Function/Vital Signs  Visit Vitals    /61    Pulse 69    Temp 36.7 °C (98.1 °F)    Resp 12    Ht 5' 10\" (1.778 m)    Wt 99.7 kg (219 lb 12.8 oz)    SpO2 100%    BMI 31.54 kg/m2       Patient is status post general anesthesia for Procedure(s):  LAPAROTOMY EXPLORATORY, wound closure for dehiscence. Nausea/Vomiting: None    Postoperative hydration reviewed and adequate. Pain:  Pain Scale 1: Behavioral Pain Scale (BPS) (08/05/17 2040)  Pain Intensity 1: 3 (08/05/17 2040)   Managed    Neurological Status:   Neuro (WDL): Exceptions to WDL (08/05/17 2027)  Neuro  Neurologic State: Pharmacologically paralyzed; Pharmacologically induced (comment) (Vec given in OR; propofol gtt) (08/05/17 2027)  Orientation Level: Unable to verbalize (08/05/17 2027)  Cognition: No command following (08/05/17 2027)  Assessment L Pupil: Deferred (08/05/17 0800)  Size L Pupil (mm): 3 (08/04/17 1954)  Assessment R Pupil: Deferred (08/05/17 0800)  Size R Pupil (mm): 3 (08/04/17 1954)  LUE Motor Response: Nonpurposeful (08/05/17 1845)  LLE Motor Response: Nonpurposeful (08/05/17 1845)  RUE Motor Response: Nonpurposeful (08/05/17 1845)  RLE Motor Response: Nonpurposeful (08/05/17 1845)   At baseline    Mental Status and Level of Consciousness: intubated and sedated. Pulmonary Status:   O2 Device: Endotracheal tube (08/05/17 2040)   Adequate oxygenation and airway patent    Complications related to anesthesia: None-pt left intubated due to high risk of decompensation, intermittent need for vasopressors, preoperative stupor/encephalopathy and decreased ability to protect his airway with copious NG suction/aspiration risk. Post-anesthesia assessment completed.      Signed By: Dean Bernstein MD     August 5, 2017

## 2017-08-06 NOTE — PROGRESS NOTES
Surgery      POD #! From Closure of wound dehiscence    POD#9 from initial 2 procedures    Intubated    Multiple notes reviewed. Intensivist management appreciated. Nothing to add at this time    Dr Mumtaz Alberto will resume surgical management tomorrow AM      Jason Brady MD, [facility]

## 2017-08-06 NOTE — PROGRESS NOTES
0700 Bedside report received from [facility]. 0800 Assessment completed. Patient opens eyes to voice, decreased commands following noted. Patient currently intubated on ventilator for airway support. Abdominal dressing intact with no drainage noted, abdominal binder intact. Neosynephrine IV infusing at 15mcg/min and being titrated to keep map>65. Propofol IV infusing at 5mcg/kg/min. Suprapubic catheter intact draining edwin colored urine. Rosales catheter intact with no urine noted. No signs of pain or distress noted at present. Will closely monitor. 0410 Neosynephrine IV decreased to 10mcg/min due to /47, MAP 69. Will closely monitor. 0593 Neosynephrine IV stopped, /60, MAP 82.    0940 Dr. German Aguilar in to see and assess patient. 1282 Propofol IV increased to 10mcg/kg/min due to patient becoming restless. 1200 No change from previous assessment. Patient calm at present. Abdominal dressing intact with no signs of bleeding noted. Will closely monitor. 1355 Patient noted to be restless and when asked if he was in pain, patient nodded head yes. Dilaudid 0.1mg IV PRN given for pain management and comfort. Will closely monitor. 1500 Dr. Jessica Marroquin in to see and assess patient. Patient resting comfortably in bed, no signs of restlessness noted. All needs met. 1600 Assessment unchanged. Abdominal dressing intact, no bleeding noted. All needs met. 1915 Bedside report given to [facility].

## 2017-08-06 NOTE — PROGRESS NOTES
2103  TRANSFER - IN REPORT:    Verbal report received from [facility] (name) on [place]  being received from PACU (unit) for routine post - op      Report consisted of patients Situation, Background, Assessment and   Recommendations(SBAR). Information from the following report(s) OR Summary, Intake/Output, Med Rec Status and Cardiac Rhythm NSR was reviewed with the receiving nurse. Opportunity for questions and clarification was provided. Assessment completed upon patients arrival to unit and care assumed. 2120  Dr. Shaun Sim notified of patient status and vent settings orders received to get an ABG on arrival and order for no SBT in the AM 8/6.     2134  Patient arrived to CCU and assessed. Vented and restrained. Patient opened eyes with stimulation and is now breathing over the vent at times. Wiggled toes and tried to squeeze hands. Right lower abdomen had some blood running out from under dressing, very scant amount, reinforced dressing. Abdomen assymetrical, distended, some swelling. BS hypoactive and distant. Abdominal binder in place. 2200  Patient sister and friend in room and updated on patient current status. 2211  Dr. Shaun Sim paged and given current ABG, current UO, and VS along with patient's current fluids and gtts. Orders received to wean FIO2 as long as patient's O2 sats are greater than 93%, keep on current vent settings and readjust in AM, ok to do SAT and reorient patient, no SBT 8/6. X ray also ordered for the AM.     2230  Andre had to be started patient's SBP in the 80s. Maps in the 46s. NO UO out of wick, low UO so far from suprapubic cath. Will continue to monitor. 2340  Patient setting off vent, RR 20s-30s, trying to move arms, nodded appropriately to questions, nodded yes that his belly hurts and he would like pain medication. PRN dilaudid given. 0016  , 2 units of insulin given. Dilaudid seemed to have helped.      0100  Reported low UO of 70 for the past 3 hours, edwin urine to Dr. Michelle Brady along with VS and fluids running. Orders received for a 1 liter NS bolus to run over 2 hours. 0120  Bolus started. 0200  Pt reassessed, no changes. Still titrating josh to keep MAP greater than 65. Patient gets anxious when alert. Abdomen is tender, distended, dressing CDI, BS still distant and hypoactive. 0330  Bolus finished, UO seems to be a little better will continue to monitor. 0400  Labs drawn. 0600  Sedation is being decreased, patient still resting and complying with ventilation even after turning. , 2 units of coverage given    275 UO for my shift, 130 ARMEN    0700  Patient woke up to voice and able to follow simple commands. Bedside and Verbal shift change report given to [address] [place] [address] (oncoming nurse) by Jany Joseph RN (offgoing nurse). Report included the following information SBAR, Kardex, Intake/Output, MAR, Med Rec Status and Cardiac Rhythm NSR.

## 2017-08-06 NOTE — PROGRESS NOTES
[facility]  Pulmonary, Critical Care, and Sleep Medicine    Name: Viraj Sue MRN: [id number]   : 1928 Hospital: [facility]    Date: 2017            IMPRESSION:   · Postop respiratory failure back on vent  · Midline abdominal incision fascial wound dehiscence 17 Eploratory laparotomy with closure of midline incision using retention sutures per Dr. Khushbu Gonzales  · Encephalopathy- head CT ok ; likely from dilaudid. Family aware but feels pt needs pain meds; now that pain better, perhaps waking up some? ? Not polite in evening and night. ? Dementia? · Complicated Enterococcal UTI- sensitive to Amp  · ileus  · CAD with recent stents per   · NSTEMI demand ischemia but now with AF RVR  · AF RVR on Iv diltiazem  · HTN- more than he had at home? ?  · Echo earlier this year LVEF 45%  · Anasarca and volume overload?  Third spacing- good response to diuretics  · S/P removal of bladder stone and repair of perforated bladder  · S/P subsequent ex-lap due to extensive sq emphysema in abdomen/retroperitoneum - no sign of bowel perforation  · Shock better, now hypertensive  · Hypocalcemia  · Severe hypoalbuminemia - catabolic  · hypophosphatemia  · Acute renal failure  · \"pneumothorax\" due to dissection of air from retroperitoneum cephalad, different physiology or behavior of typical pneumothorax   · CAD      PLAN:   · Appreciate Cardiology's help  · renew TPN  · Leave on vent today  · Not sure if pt would benefit from a binder before getting up   · Analgesia prn  · Haldol scheduled  · Unable to use oral meds yet, would have liked to try melatonin  · IV reglan- not great choice but want to help bowels recover  · replete lytes  · PICC  · IV ampicillin- finish 7 days course,   · Gentle diuresis  · antihypertensives  · Pain control  · No need for chest tubes for this type of pneumothorax, should resolve spontaneously, but will monitor  · DVT/GI prophylaxis  · Patient requiring restraints due to threat of injury to self, interference with medical devices. · Will be available to assist in medical management while in the CCU pending disposition     Subjective/Interval History:   I have reviewed the flowsheet and previous days notes. The patient is unable to give any meaningful history or review of systems because the patient is:     Nursing notes reviewed    8/6 on vent overnight after wound closure. On TPN    8/5 seems to be sundowning and striking out at nursing. Still with high NG output. Still on IV pain meds. TPN. No pressors. HTn has been tough to control. By evening pt found to have wound dehiscence and taken to OR for closure    8/4 agitated overnight and now restrained. Easily awakened and in no pain. No SOB. No nausea. TPN    8/3 will arouse some but still sleepy. NG output still dark. Hgb slowly drifting down    8/2 tachypnea. Somnolent. 3:42 PM  Head Ct scan - atrophic changes only. Dark NG output. Will restart Pepcid, may need PPI. 8/1  Two PRBCs given over night > HGB 8.5 > 12.2, HTC 25.8 > 36.2, Lasix 20 mg between the Packs given > polyuria total 3 L overnight, pain medication given with each positions, BP still around 150-176 mmHg despite of hydralazine PRN Q 6 hr, Pain med, carvedilol, new A Fib in morning > Diltiazem 10 mg IV once given HR been around , BP stable,     AFRVR now. SBP > 150 and CVP 10-12. Anasraca. Pt retired . Spoke with daughter at bedside. They would like Dr. Yovani Sparrow to see pt. Repeat troponin higher. Pt moaning and abdomen still distended and sore.  NGT in place      Review of Systems   Unable to perform ROS: Mental status change     Objective:   Vital Signs:    Visit Vitals    /63    Pulse 74    Temp 97.8 °F (36.6 °C)    Resp 18    Ht 5' 10\" (1.778 m)    Wt 99.4 kg (219 lb 2.2 oz)    SpO2 100%    BMI 31.44 kg/m2       O2 Device: Endotracheal tube, Ventilator   O2 Flow Rate (L/min): 3 l/min Temp (24hrs), Av.1 °F (36.7 °C), Min:97.4 °F (36.3 °C), Max:99.5 °F (37.5 °C)       Intake/Output:   Last shift:      701 - 1900  In: 274.7 [I.V.:274.7]  Out: 490 [Urine:380; Drains:10]  Last 3 shifts: 1901 - 700  In: 4846.5 [I.V.:4696.5]  Out: 0067 [Urine:2814; Drains:340]    Intake/Output Summary (Last 24 hours) at 17 1056  Last data filed at 17 1000   Gross per 24 hour   Intake          3928.16 ml   Output             2954 ml   Net           974.16 ml     Hemodynamics:   PAP:   CO:     Wedge:   CI:     CVP:  CVP (mmHg): 8 mmHg (17 08) SVR:       PVR:       Ventilator Settings:  Mode Rate Tidal Volume Pressure FiO2 PEEP   SIMV   550 ml  10 cm H2O 40 % 6 cm H20     Peak airway pressure: 17 cm H2O    Minute ventilation: 11.4 l/min       Physical Exam   Constitutional: He appears ill. He is sedated and intubated. HENT:   Head: Normocephalic and atraumatic. Eyes: No scleral icterus. Cardiovascular: Normal rate and regular rhythm. No murmur heard. Pulmonary/Chest: He is intubated. No respiratory distress. He has no wheezes. He has no rales. Abdominal: He exhibits distension. Bowel sounds are absent. Postop abdomen, sq emphysema resolved   Musculoskeletal: He exhibits edema. Skin: Skin is warm and dry.      Data:     Current Facility-Administered Medications   Medication Dose Route Frequency    haloperidol lactate (HALDOL) injection 3 mg  3 mg IntraVENous Q6H    TPN ADULT-CENTRAL AA 5% D20%-MVI W/ VIT K-RCH   IntraVENous CONTINUOUS    metoclopramide HCl (REGLAN) injection 5 mg  5 mg IntraVENous Q6H    aspirin (ASA) suppository 300 mg  300 mg Rectal DAILY    labetalol (NORMODYNE;TRANDATE) 20 mg/4 mL (5 mg/mL) injection 10 mg  10 mg IntraVENous Q6H    propofol (DIPRIVAN) infusion  5-50 mcg/kg/min IntraVENous TITRATE    PHENYLephrine (NEOSYNEPHRINE) 30,000 mcg in 0.9% sodium chloride 250 mL infusion   mcg/min IntraVENous TITRATE    fat emulsion 20% (LIPOSYN, INTRALIPID) infusion 250 mL  250 mL IntraVENous Q MON, WED & FRI    insulin lispro (HUMALOG) injection   SubCUTAneous Q6H    enoxaparin (LOVENOX) injection 40 mg  40 mg SubCUTAneous Q24H    levalbuterol (XOPENEX) nebulizer soln 0.63 mg/3 mL  0.63 mg Nebulization Q4H RT    famotidine (PF) (PEPCID) 20 mg in sodium chloride 0.9 % 10 mL injection  20 mg IntraVENous Q12H    dilTIAZem (CARDIZEM) 100 mg in 0.9% sodium chloride (MBP/ADV) 100 mL infusion  0-15 mg/hr IntraVENous TITRATE    furosemide (LASIX) injection 40 mg  40 mg IntraVENous DAILY    sodium chloride (NS) flush 5-10 mL  5-10 mL IntraVENous Q8H    0.9% sodium chloride infusion  25 mL/hr IntraVENous CONTINUOUS    sodium chloride (NS) flush 10-40 mL  10-40 mL InterCATHeter Q8H                Labs:  Recent Labs      08/06/17   0406  08/04/17   0415   WBC  15.4*  13.8*   HGB  11.0*  11.4*   HCT  34.1*  34.7*   PLT  317  275     Recent Labs      08/06/17   0406  08/04/17   0415   NA  144  145   K  3.7  3.7   CL  108  108   CO2  29  33*   GLU  146*  192*   BUN  33*  30*   CREA  1.02  0.93   CA  6.8*  7.0*   MG  2.1  2.1   PHOS  3.6  2.4*   ALB   --   1.8*   TBILI   --   0.8   SGOT   --   35   ALT   --   36     Recent Labs      08/05/17   2154   PH  7.42   PCO2  51*   PO2  166*   HCO3  32*   FIO2  100     Imaging:  I have personally reviewed the patients radiographs and have reviewed the reports:  No change        Total critical care time exclusive of procedures: 35 minutes  Cecelia Wheatley MD

## 2017-08-06 NOTE — PROGRESS NOTES
CARDIOLOGY Progress Note    Patient ID:  Patient: Petra Suazo  MRN: [id number]  Age: 80 y.o.  : 1928    Date of  Admission: 2017  6:19 PM   PCP:  Stpehane Luna MD   Primary cardiologist:  Maria Elena Reyes MD    Assessment: 1. Atrial fibrillation with RVR, back to sinus with PAC's. 2. Positive troponin, probably type 2 NSTEMI (supply-demand mismatch resulting in myocardial necrosis) given the history. 3. CAD with chronically and subtotally-occluded left circumflex artery stented 5589 (complicated procedure) by Dr. Henrry Freitas. A lexiscan nuclear stress test done before this procedure suggested lateral ischemia in that vessel's distribution. 4. Hypertension. 5. Bladder perforation s/p repair. 6. Postop ileus. 7. Postop anemia s/p prbc transfusion. 8. Not on dual antiplatelet therapy with recent surgeries. Plan:     1. Currently NG tube to suction  2. Intubated due to dihiscence of surgical site  3. Cont asa suppository, 3 HORACIO stents 2017  4. Resume plavix when ok surgically  5. Was on josh last night, bp better today, resume labetolol this evening or tomorrow only if be tolerates. [x]       High complexity decision making was performed in this patient at high risk for decompensation with multiple organ involvement. Petra Suazo is a 80 y.o. male with a history of surgery, some postoperative atrial fibrillation. He had recent stenting of his LCX artery, but has been off antiplatelet therapy in the postoperative setting for a while. Starting ASA on . Cardiac catheterization 2017 prior to stenting of left circumflex:  LEFT MAIN: Large vessel with minor luminal irregularities.      LAD: A medium-sized vessel with   scattered plaque of 20%-30% throughout its course.  Two small mid   diagonals were noted and multiple trivial diagonal vessels.      RAMUS: Ramus intermedius is a small vessel with no focal stenosis   that provides perfusion to the anterolateral wall.      CIRCUMFLEX: Moderate-sized vessel that is subtotally occluded in its   proximal and mid portion; there were some faint ANDERSON 1 flow distally   that maybe collateralized.       RCA: Medium-sized dominant vessel giving   rise to small PDA and posterolateral branch vessels; 30% eccentric   mid stenosis is noted, with some systolic compression.       LV: Left ventriculography in MIRANDA projection   reveals no focal wall motion abnormality. Ejection fraction of 55% to   60%. Mild-to-moderate MR. No aortic stenosis on pullback. EDP of 14.     Abdominal aortography in AP projection reveals no renal artery   stenosis. There is scattered plaque in the abdominal aorta with no   focal stenosis or aneurysm. Past Medical History:   Diagnosis Date    Hypertension     Prostate enlargement         Past Surgical History:   Procedure Laterality Date    ABDOMEN SURGERY PROC UNLISTED      hernia repair    CARDIAC SURG PROCEDURE UNLIST         Social History   Substance Use Topics    Smoking status: Never Smoker    Smokeless tobacco: Never Used    Alcohol use No        History reviewed. No pertinent family history.      Allergies   Allergen Reactions    Metoprolol Nausea and Vomiting          Current Facility-Administered Medications   Medication Dose Route Frequency    haloperidol lactate (HALDOL) injection 3 mg  3 mg IntraVENous Q6H    TPN ADULT-CENTRAL AA 5% D20%-MVI W/ VIT K-RCH   IntraVENous CONTINUOUS    metoclopramide HCl (REGLAN) injection 5 mg  5 mg IntraVENous Q6H    aspirin (ASA) suppository 300 mg  300 mg Rectal DAILY    labetalol (NORMODYNE;TRANDATE) 20 mg/4 mL (5 mg/mL) injection 10 mg  10 mg IntraVENous Q6H    HYDROmorphone (PF) (DILAUDID) injection 0.1 mg  0.1 mg IntraVENous Q4H PRN    propofol (DIPRIVAN) infusion  5-50 mcg/kg/min IntraVENous TITRATE    PHENYLephrine (NEOSYNEPHRINE) 30,000 mcg in 0.9% sodium chloride 250 mL infusion   mcg/min IntraVENous TITRATE    PHENYLephrine (NEOSYNEPHRINE) 10 mg/mL injection        hydrALAZINE (APRESOLINE) 20 mg/mL injection 20 mg  20 mg IntraVENous Q4H PRN    nitroglycerin (NITROBID) 2 % ointment 1 Inch  1 Inch Topical Q6H    labetalol (NORMODYNE;TRANDATE) injection 20 mg  20 mg IntraVENous Q4H PRN    fat emulsion 20% (LIPOSYN, INTRALIPID) infusion 250 mL  250 mL IntraVENous Q MON, WED & FRI    insulin lispro (HUMALOG) injection   SubCUTAneous Q6H    glucose chewable tablet 16 g  4 Tab Oral PRN    dextrose (D50W) injection syrg 12.5-25 g  12.5-25 g IntraVENous PRN    glucagon (GLUCAGEN) injection 1 mg  1 mg IntraMUSCular PRN    enoxaparin (LOVENOX) injection 40 mg  40 mg SubCUTAneous Q24H    levalbuterol (XOPENEX) nebulizer soln 0.63 mg/3 mL  0.63 mg Nebulization Q4H RT    famotidine (PF) (PEPCID) 20 mg in sodium chloride 0.9 % 10 mL injection  20 mg IntraVENous Q12H    dilTIAZem (CARDIZEM) 100 mg in 0.9% sodium chloride (MBP/ADV) 100 mL infusion  0-15 mg/hr IntraVENous TITRATE    furosemide (LASIX) injection 40 mg  40 mg IntraVENous DAILY    sodium chloride (NS) flush 5-10 mL  5-10 mL IntraVENous Q8H    ondansetron (ZOFRAN) injection 4 mg  4 mg IntraVENous Q4H PRN    0.9% sodium chloride infusion  25 mL/hr IntraVENous CONTINUOUS    sodium chloride (NS) flush 10 mL  10 mL InterCATHeter PRN    sodium chloride (NS) flush 10-40 mL  10-40 mL InterCATHeter Q8H    acetaminophen (TYLENOL) solution 650 mg  650 mg Per NG tube Q4H PRN       Review of Symptoms:  Patient cannot communicate.   General: negative for fever, chills, sweats, weakness, weight loss   Eyes: negative for blurred vision, eye pain, loss of vision, diplopia   Ear Nose and Throat: negative for rhinorrhea, pharyngitis, otalgia, tinnitus, speech or swallowing difficulties   Respiratory: negative for SOB, cough, sputum production, wheezing, CLARKE, pleuritic pain   Cardiology: negative for chest pain, palpitations, orthopnea, PND, edema, syncope   Gastrointestinal: negative for abdominal pain, N/V, dysphagia, change in bowel habits, bleeding   Genitourinary: negative for frequency, urgency, dysuria, hematuria, incontinence   Muskuloskeletal : negative for arthralgia, myalgia   Hematology: negative for easy bruising, bleeding, lymphadenopathy   Dermatological: negative for rash, ulceration, mole change, new lesion   Endocrine: negative for hot flashes or polydipsia   Neurological: negative for headache, dizziness, confusion, focal weakness, paresthesia, memory loss, gait disturbance   Psychological: negative for anxiety, depression, agitation       Objective:      Physical Exam:  Temp (24hrs), Av.1 °F (36.7 °C), Min:97.4 °F (36.3 °C), Max:99.5 °F (37.5 °C)    Patient Vitals for the past 8 hrs:   Pulse   17 0900 67   17 0800 67   17 0717 69   17 0700 65   17 0600 65   17 0500 65   17 0408 63   17 0400 65   17 0353 60   17 0300 70   17 0206 67    Patient Vitals for the past 8 hrs:   Resp   17 0900 16   17 0800 15   17 0717 22   17 0700 16   17 0600 14   17 0500 15   17 0400 15   17 0353 12   17 0300 19   17 0206 18    Patient Vitals for the past 8 hrs:   BP   17 0900 130/52   17 0800 132/47   17 0700 130/52   17 0600 130/47   17 0500 112/53   17 0408 131/51   17 0400 112/53   17 0300 133/60   17 0206 130/60          Intake/Output Summary (Last 24 hours) at 17 0935  Last data filed at 17 0900   Gross per 24 hour   Intake          3986.26 ml   Output             3029 ml   Net           957.26 ml       Nondiaphoretic, not in acute distress, NG tube in place. Supple neck. RIJ central line. No scleral icterus, mucous membranes moist, conjuctivae pink, no xanthelasma. +deep respirations, clear to auscultation bilaterally anteriorly, symmetric air movement.   Irregular rate and rhythm, no murmur, pericardial rub, knock, or gallop. No JVD but +peripheral edema. No carotid bruit. Palpable radial and DP pulses bilaterally. Abdomen not examined. Extremities without cyanosis or clubbing. Muscle tone and bulk normal.  Skin warm and dry. No rashes or ulcers. Neuro grossly nonfocal.  No tremor. Lethargic, but opens eyes on my command. CARDIOGRAPHICS and STUDIES, I reviewed:    Telemetry:  Sinus with frequent PAC's. ECG:  No recent study. Last one 7/20/2017 showed sinus rhythm with first degree AV block. Echo 7/20/2017:  LEFT VENTRICLE: Size was at the upper limits of normal. Systolic function  was mildly reduced. Ejection fraction was estimated in the range of 45 %  to 50 %. No obvious wall motion abnormalities identified in the views  obtained. Wall thickness was mildly increased. Hypertrophy was noted. RIGHT VENTRICLE: The size was normal. Systolic function was normal. Wall  thickness was normal.    LEFT ATRIUM: The atrium was mildly dilated. RIGHT ATRIUM: The atrium was mildly dilated. MITRAL VALVE: Normal valve structure. There was mild thickening. There was  normal leaflet separation. DOPPLER: There was no evidence for stenosis. There was mild to moderate regurgitation. AORTIC VALVE: The valve was probably trileaflet. Leaflets exhibited mildly  increased thickness and normal cuspal separation. DOPPLER: There was no  stenosis. There was trivial regurgitation. TRICUSPID VALVE: Normal valve structure. There was normal leaflet  separation. DOPPLER: There was no evidence for tricuspid stenosis. There  was no significant regurgitation. PULMONIC VALVE: Not well visualized, but normal Doppler findings. DOPPLER:  There was mild regurgitation. AORTA: The root exhibited normal size. PERICARDIUM: Insignificant pericardial effusion and/or pericardial fat was  present. Labs:  No results for input(s): CPK, CKMB, CKNDX, TROIQ in the last 72 hours.     No lab exists for component: CPKMB  Lab Results   Component Value Date/Time    Cholesterol, total 178 07/20/2017 12:28 PM    HDL Cholesterol 48 07/20/2017 12:28 PM    LDL, calculated 117.8 07/20/2017 12:28 PM    Triglyceride 61 07/20/2017 12:28 PM    CHOL/HDL Ratio 3.7 07/20/2017 12:28 PM     No results for input(s): INR, PTP, APTT in the last 72 hours.     No lab exists for component: Kehinde Bring   Recent Labs      08/06/17   0406  08/04/17   0415   NA  144  145   K  3.7  3.7   CL  108  108   CO2  29  33*   BUN  33*  30*   CREA  1.02  0.93   GLU  146*  192*   PHOS  3.6  2.4*   CA  6.8*  7.0*   ALB   --   1.8*   WBC  15.4*  13.8*   HGB  11.0*  11.4*   HCT  34.1*  34.7*   PLT  317  275     Recent Labs      08/04/17   0415   SGOT  35   AP  56   TP  5.7*   ALB  1.8*   GLOB  3.9     No components found for: [place]  Recent Labs      08/05/17   2154   PH  7.42   PCO2  51*   PO2  166*           Rhea Subramanian MD  8/6/2017

## 2017-08-06 NOTE — PROGRESS NOTES
Progress Note    Patient: Albino Manzano MRN: [id number]  SSN: [id number]    YOB: 1928  Age: 80 y.o. Sex: male      Admit Date: 7/27/2017    LOS: 9 days     Subjective:     Last night went to OR for wound dehiscence which was repaired last night. PT still intubated. Wick draining clear yellow urine. Objective:     Vitals:    08/06/17 0700 08/06/17 0717 08/06/17 0800 08/06/17 0900   BP: 130/52  132/47 130/52   Pulse: 65 69 67 67   Resp: 16 22 15 16   Temp:   97.8 °F (36.6 °C)    SpO2: 100% 100% 99% 98%   Weight:       Height:            Intake and Output:  Current Shift: 08/06 0701 - 08/06 1900  In: 184.8 [I.V.:184.8]  Out: 290 [Urine:180; Drains:10]  Last three shifts: 08/04 1901 - 08/06 0700  In: 4846.5 [I.V.:4696.5]  Out: 9081 [Urine:2814; Drains:340]    Physical Exam:   GENERAL: appears stated age, sedated and intubated  ABDOMEN: with dressing and abd binder. raissa drains serous fluid. UROLOGIC: wick draining clear yellow urine    Lab/Data Review:  CMP:   Lab Results   Component Value Date/Time     08/06/2017 04:06 AM    K 3.7 08/06/2017 04:06 AM     08/06/2017 04:06 AM    CO2 29 08/06/2017 04:06 AM    AGAP 7 08/06/2017 04:06 AM     (H) 08/06/2017 04:06 AM    BUN 33 (H) 08/06/2017 04:06 AM    CREA 1.02 08/06/2017 04:06 AM    GFRAA >60 08/06/2017 04:06 AM    GFRNA >60 08/06/2017 04:06 AM    CA 6.8 (L) 08/06/2017 04:06 AM    MG 2.1 08/06/2017 04:06 AM    PHOS 3.6 08/06/2017 04:06 AM     CBC:   Lab Results   Component Value Date/Time    WBC 15.4 (H) 08/06/2017 04:06 AM    HGB 11.0 (L) 08/06/2017 04:06 AM    HCT 34.1 (L) 08/06/2017 04:06 AM     08/06/2017 04:06 AM            Assessment:     Active Problems:    Bladder injury (7/28/2017)        Plan:     Appreciate general surgery management.     Fluid through lower abdominal wound - not urine leak, will hopefully improve with dehiscence repair .      AF with RVR - resolved      NSTMI - cards help appreciated.      Post-Op Ileus - NGT/IVF/NPO awaiting bowel function. No BM. NG 600ml      Bladder perforation s/p repair - good uop 2079ml . ARMEN  decrease to 240ml this is transudate as part of overall anasarca picture. Presumably \"free air\" was from partial intraperitoneal bladder rupture, and transgression of air/fluid with cath manipulation given negative exploration and complete bowel run (small/large)      Elevated creatinine - resolved, Creat 1.0      Enterococcal UTI -resolved. finish abx course.      Post-op Anemia - resolved s/p transfusion, do not suspect active bleeding. hbg stable at 11.     Signed By: Jerri Torres MD     August 6, 2017

## 2017-08-07 NOTE — PROGRESS NOTES
Cardiology Progress Note  8/7/2017    Admit Date: 7/27/2017  Admit Diagnosis: bladder clots  Bladder injury  perperated viscous  wound dehiscence  CC:  None currently  Cardiac Assessment/Plan:   CAD: Circ HORACIO 7/20; Ef 45-50% after PCI. On asa only since  surgery: restart plavix when able from surgical standpoint. HTN: On IV labetalol; Change to coreg/losartan when taking PO again. P Afib: NSR currently; If recurrent, would use amio. Volume status: no CHF on CXR; low O2 requirement, Cr up to 1.3: will hold lasix this am.    Resp failure, post-op bladder repair/stone extraction/explor lap/dehis, anemia, ID, Dispo: per primary team and consultants. For other plans, see orders. High complexity decision making was performed  X Yes   High-risk of decompensation with multiple organ involvement X Yes   Hospital problem list   Active Hospital Problems    Diagnosis Date Noted    Bladder injury 07/28/2017        Cardiac catheterization 7/20/2017 prior to stenting of left circumflex:  LEFT MAIN: Large vessel with minor luminal irregularities. LAD: A medium-sized vessel with   scattered plaque of 20%-30% throughout its course. Two small mid   diagonals were noted and multiple trivial diagonal vessels. RAMUS: Ramus intermedius is a small vessel with no focal stenosis   that provides perfusion to the anterolateral wall. CIRCUMFLEX: Moderate-sized vessel that is subtotally occluded in its   proximal and mid portion; there were some faint ANDERSON 1 flow distally   that maybe collateralized.    RCA: Medium-sized dominant vessel giving   rise to small PDA and posterolateral branch vessels; 30% eccentric   mid stenosis is noted, with some systolic compression.    LV: Left ventriculography in MIRANDA projection   reveals no focal wall motion abnormality. Ejection fraction of 55% to   60%. Mild-to-moderate MR. No aortic stenosis on pullback. EDP of 14.   Abdominal aortography in AP projection reveals no renal artery   stenosis. There is scattered plaque in the abdominal aorta with no   focal stenosis or aneurysm. Subjective:  Patient reports  [x]   nothing; unable to communicate    [x]   intubated   Chest pain  none  consistent with:  Non-cardiac CP         Atypical CP     None now  On going  Anginal CP     Dyspnea  none  at rest  with exertion         improved  unchanged  worse              PND  none  overnight       Orthopnea  none  improved  unchanged  worse   Presyncope  none  improved  unchanged  worse   Ambulated in hallway without symptoms   Yes   Ambulated in room without symptoms  Yes     ROS Hematuria:  Yes X No Dysuria:  Yes X No                (2+  other systems) Cough: Yes X No Sputum: Yes X No                 BRBPR:  Yes X No Melena: Yes X No   No change in family and social history from H&P/Consult note.   Objective:    Physical Exam:  24 hr VS reviewed, overall VSSAF  Temp (24hrs), Av.2 °F (37.3 °C), Min:97.8 °F (36.6 °C), Max:100.5 °F (38.1 °C)    Patient Vitals for the past 8 hrs:   Pulse   17 0800 73   17 0730 67   17 0700 61   17 0640 66   17 0615 70   17 0606 69   17 0511 75   17 0510 (!) 59   17 0500 63   17 0412 69   17 0400 64   17 0300 64   17 0200 66   17 0100 63   17 0026 80    Patient Vitals for the past 8 hrs:   Resp   17 0800 17   17 0730 16   17 0700 15   17 0640 16   17 0615 17   17 0606 15   17 0500 15   17 0412 14   17 0400 15   17 0300 16   17 0200 16   17 0100 17    Patient Vitals for the past 8 hrs:   BP   17 0800 171/67   17 0730 153/60   17 0700 152/55   08/07/17 0606 166/62   17 0511 166/62   17 0500 142/47   17 0400 144/45   17 0300 137/50   17 0200 (!) 143/38   17 0100 119/44   17 0026 132/46        Intake/Output Summary (Last 24 hours) at 08/07/17 6039  Last data filed at 08/07/17 0700   Gross per 24 hour   Intake          2142.36 ml   Output             2158 ml   Net           -15.64 ml     General: x WD,WN  Elderly  Cachetic x NAD     Agitated  Lethargic  Arousable  Obtunded    x Sedated  On Bipap x Intubated                ENT/Palate: X intubated  Dry MM x anicteric                Respiratory: X CTA  Nl resp effort  Increased effort  No significant change     rhonchi  rales  improved  worse              Cardiovasc: X RRR  IRRR X Nl S1, S2 x No rub     No murmur X No new murmur  Murmur c/w: x No gallop    x No edema  BLE edema:+  RLE edema:+  LLE edema:+     Edema less  Edema more  Edema same  Edema worse    X Nl JVP  Elevated JVP  JVP same  JVP worse    X Carotid wnl x abd aorta not palpated X no peripheral emboli noted                GI: X Post surgical x Min distended X BS not present X No organo- megaly noted              Skin: X Warm, dry  Cold extremites                  Neuro:  A/O  Grossly non- focal  Obtunded x Sedated     Lethargic  Arousable x intubated       cath site intact w/o hematoma or new bruit; distal pulse unchanged  Yes   Data Review:     Telemetry independently reviewed x sinus  chronic afib  parox afib  NSVT     ECG independently reviewed  NSR  afib  no significant changes  NSST-Tw chgs   x no new ECG provided for review   Lab results reviewed as noted below. Current medications reviewed as noted below. Recent Labs      08/05/17   2154   PH  7.42   PCO2  51*   PO2  166*     No results for input(s): CPK, CKMB in the last 72 hours.     No lab exists for component: TROPONINI  Recent Labs      08/07/17   0424  08/06/17   0406   NA  138  144   K  3.9  3.7   CL  105  108   CO2  28  29   BUN  44*  33*   CREA  1.31*  1.02   GLU  188*  146*   PHOS  3.1  3.6   CA  7.3*  6.8*   ALB  1.6*   --    WBC  16.1*  15.4*   HGB  11.3*  11.0*   HCT  35.2*  34.1*   PLT  289  317     Recent Labs      08/07/17   0424   SGOT  54*   AP  62   TBILI  1.4*   TP  5.8*   ALB  1.6*   GLOB  4.2*     No results for input(s): INR, PTP, APTT in the last 72 hours. No lab exists for component: INREXT   No results for input(s): FE, TIBC, PSAT, FERR in the last 72 hours.    Lab Results   Component Value Date/Time    Glucose (POC) 178 08/07/2017 06:18 AM    Glucose (POC) 158 08/06/2017 11:39 PM    Glucose (POC) 166 08/06/2017 04:53 PM    Glucose (POC) 191 08/06/2017 11:13 AM    Glucose (POC) 149 08/06/2017 06:15 AM       Current Facility-Administered Medications   Medication Dose Route Frequency    TPN ADULT-CENTRAL AA 5% D20%-MVI W/ VIT K-RCH   IntraVENous CONTINUOUS    haloperidol lactate (HALDOL) injection 3 mg  3 mg IntraVENous Q6H    metoclopramide HCl (REGLAN) injection 5 mg  5 mg IntraVENous Q6H    aspirin (ASA) suppository 300 mg  300 mg Rectal DAILY    labetalol (NORMODYNE;TRANDATE) 20 mg/4 mL (5 mg/mL) injection 10 mg  10 mg IntraVENous Q6H    HYDROmorphone (PF) (DILAUDID) injection 0.1 mg  0.1 mg IntraVENous Q4H PRN    propofol (DIPRIVAN) infusion  5-50 mcg/kg/min IntraVENous TITRATE    PHENYLephrine (NEOSYNEPHRINE) 30,000 mcg in 0.9% sodium chloride 250 mL infusion   mcg/min IntraVENous TITRATE    hydrALAZINE (APRESOLINE) 20 mg/mL injection 20 mg  20 mg IntraVENous Q4H PRN    labetalol (NORMODYNE;TRANDATE) injection 20 mg  20 mg IntraVENous Q4H PRN    fat emulsion 20% (LIPOSYN, INTRALIPID) infusion 250 mL  250 mL IntraVENous Q MON, WED & FRI    insulin lispro (HUMALOG) injection   SubCUTAneous Q6H    glucose chewable tablet 16 g  4 Tab Oral PRN    dextrose (D50W) injection syrg 12.5-25 g  12.5-25 g IntraVENous PRN    glucagon (GLUCAGEN) injection 1 mg  1 mg IntraMUSCular PRN    enoxaparin (LOVENOX) injection 40 mg  40 mg SubCUTAneous Q24H    levalbuterol (XOPENEX) nebulizer soln 0.63 mg/3 mL  0.63 mg Nebulization Q4H RT    famotidine (PF) (PEPCID) 20 mg in sodium chloride 0.9 % 10 mL injection  20 mg IntraVENous Q12H    dilTIAZem (CARDIZEM) 100 mg in 0.9% sodium chloride (MBP/ADV) 100 mL infusion  0-15 mg/hr IntraVENous TITRATE    furosemide (LASIX) injection 40 mg  40 mg IntraVENous DAILY    sodium chloride (NS) flush 5-10 mL  5-10 mL IntraVENous Q8H    ondansetron (ZOFRAN) injection 4 mg  4 mg IntraVENous Q4H PRN    0.9% sodium chloride infusion  25 mL/hr IntraVENous CONTINUOUS    sodium chloride (NS) flush 10 mL  10 mL InterCATHeter PRN    sodium chloride (NS) flush 10-40 mL  10-40 mL InterCATHeter Q8H    acetaminophen (TYLENOL) solution 650 mg  650 mg Per NG tube Q4H PRN         Yahaira Rivera MD

## 2017-08-07 NOTE — PROGRESS NOTES
08/07/17 0615   Patient Observations   Pulse (Heart Rate) 70   Resp Rate 17   O2 Sat (%) 100 %   Vent Settings   FIO2 (%) 40 %   Pressure Support (cm H2O) 5 cm H2O   PEEP/VENT (cm H2O) 6 cm H20   ABCDE Bundle   SBT Safety Screen Passed Yes   SBT Trial Passed Yes   Weaning Parameters   Spontaneous Breathing Trial Complete Yes   Resp Rate Observed 17   Ve 7.7      RSBI 34   Vent Method/Mode   Ventilation Method Conventional   Ventilator  Mode Spontaneous

## 2017-08-07 NOTE — PROGRESS NOTES
Attended Interdisciplinary rounds in Critical Care Unit, where patient care was discussed. Visit by: Garland Rose. Jonathan Mcclure.  Marshal Mendoza MA, [address]

## 2017-08-07 NOTE — INTERDISCIPLINARY ROUNDS
Interdisciplinary team rounds were held 8/7/2017 with the following team members:Care Management, Diabetes Treatment Specialist, Nursing, Nutrition, Palliative, Pastoral Care, Pharmacy, Physician and Respiratory Therapy. Plan of care discussed. See clinical pathway and/or care plan for interventions and desired outcomes.

## 2017-08-07 NOTE — PROGRESS NOTES
Admit Date: 2017    POD 2 Days Post-Op    Procedure:  Procedure(s):  LAPAROTOMY EXPLORATORY, wound closure for dehiscence    Subjective:     Patient has no new complaints. Just extubated this am.    Objective:     Blood pressure 145/54, pulse 63, temperature 99.1 °F (37.3 °C), resp. rate 16, height 5' 10\" (1.778 m), weight 220 lb 0.3 oz (99.8 kg), SpO2 100 %. Temp (24hrs), Av.2 °F (37.3 °C), Min:97.8 °F (36.6 °C), Max:100.5 °F (38.1 °C)      Physical Exam:  GENERAL: alert, cooperative, no distress, appears stated age, LUNG: clear to auscultation bilaterally, HEART: regular rate and rhythm, ABDOMEN: soft, non-tender. Bowel sounds absent.  Wound c/d/i with retention sutures in place, EXTREMITIES:  extremities normal, atraumatic, no cyanosis or edema    Labs:   Recent Results (from the past 24 hour(s))   GLUCOSE, POC    Collection Time: 17 11:13 AM   Result Value Ref Range    Glucose (POC) 191 (H) 65 - 100 mg/dL    Performed by Tahira CARDOSO, POC    Collection Time: 17  4:53 PM   Result Value Ref Range    Glucose (POC) 166 (H) 65 - 100 mg/dL    Performed by Beau Travis, POC    Collection Time: 17 11:39 PM   Result Value Ref Range    Glucose (POC) 158 (H) 65 - 100 mg/dL    Performed by Concha Espana    CBC W/O DIFF    Collection Time: 17  4:24 AM   Result Value Ref Range    WBC 16.1 (H) 4.1 - 11.1 K/uL    RBC 3.83 (L) 4.10 - 5.70 M/uL    HGB 11.3 (L) 12.1 - 17.0 g/dL    HCT 35.2 (L) 36.6 - 50.3 %    MCV 91.9 80.0 - 99.0 FL    MCH 29.5 26.0 - 34.0 PG    MCHC 32.1 30.0 - 36.5 g/dL    RDW 15.3 (H) 11.5 - 14.5 %    PLATELET 165 622 - 858 K/uL   METABOLIC PANEL, COMPREHENSIVE    Collection Time: 17  4:24 AM   Result Value Ref Range    Sodium 138 136 - 145 mmol/L    Potassium 3.9 3.5 - 5.1 mmol/L    Chloride 105 97 - 108 mmol/L    CO2 28 21 - 32 mmol/L    Anion gap 5 5 - 15 mmol/L    Glucose 188 (H) 65 - 100 mg/dL    BUN 44 (H) 6 - 20 MG/DL    Creatinine 1.31 (H) 0.70 - 1.30 MG/DL    BUN/Creatinine ratio 34 (H) 12 - 20      GFR est AA >60 >60 ml/min/1.73m2    GFR est non-AA 52 (L) >60 ml/min/1.73m2    Calcium 7.3 (L) 8.5 - 10.1 MG/DL    Bilirubin, total 1.4 (H) 0.2 - 1.0 MG/DL    ALT (SGPT) 44 12 - 78 U/L    AST (SGOT) 54 (H) 15 - 37 U/L    Alk. phosphatase 62 45 - 117 U/L    Protein, total 5.8 (L) 6.4 - 8.2 g/dL    Albumin 1.6 (L) 3.5 - 5.0 g/dL    Globulin 4.2 (H) 2.0 - 4.0 g/dL    A-G Ratio 0.4 (L) 1.1 - 2.2     MAGNESIUM    Collection Time: 08/07/17  4:24 AM   Result Value Ref Range    Magnesium 2.4 1.6 - 2.4 mg/dL   PHOSPHORUS    Collection Time: 08/07/17  4:24 AM   Result Value Ref Range    Phosphorus 3.1 2.6 - 4.7 MG/DL   GLUCOSE, POC    Collection Time: 08/07/17  6:18 AM   Result Value Ref Range    Glucose (POC) 178 (H) 65 - 100 mg/dL    Performed by Aldo Chase    BLOOD GAS, ARTERIAL    Collection Time: 08/07/17  8:25 AM   Result Value Ref Range    pH 7.49 (H) 7.35 - 7.45      PCO2 38 35.0 - 45.0 mmHg    PO2 86 80 - 100 mmHg    O2 SAT 97 92 - 97 %    BICARBONATE 28 (H) 22 - 26 mmol/L    BASE EXCESS 4.9 mmol/L    O2 METHOD VENTILATOR      FIO2 30 %    MODE CPAP      Tidal volume 550      SPONTANEOUS RATE 16.0      PRESSURE SUPPORT 5.0      EPAP/CPAP/PEEP 6.0      Sample source ARTERIAL      SITE RIGHT RADIAL      KAREN'S TEST YES         Data Review images and reports reviewed    Assessment:     Active Problems:    Bladder injury (7/28/2017)        Plan/Recommendations/Medical Decision Making:     Continue present treatment   NPO until return of bowel function  TPN  Ongoing management per primary team.    Esequiel Starr.  Luke Hernandez MD, [facility]

## 2017-08-07 NOTE — PROGRESS NOTES
Spiritual Care Assessment/Progress Notes    Markel Palma [id number]  [id number]    7/22/1928  80 y.o.  male    Patient Telephone Number: [phone] (home)   Jewish Affiliation: [facility]   Language: English   Extended Emergency Contact Information  Primary Emergency Contact: Terese Scott  Address: [address] [place] [address],[address] [facility], [address] [place] [address] Phone: [phone]  Relation: Spouse   Patient Active Problem List    Diagnosis Date Noted    Bladder injury 07/28/2017    Unstable angina ([address] 07/20/2017        Date: 8/7/2017       Level of Jewish/Spiritual Activity:  [x]         Involved in irasema tradition/spiritual practice    []         Not involved in irasema tradition/spiritual practice  [x]         Spiritually oriented    []         Claims no spiritual orientation    []         seeking spiritual identity  []         Feels alienated from Voodoo practice/tradition  []         Feels angry about Voodoo practice/tradition  [x]         Spirituality/Voodoo tradition is a resource for coping at this time.   []         Not able to assess due to medical condition    Services Provided Today:  []         crisis intervention    []         reading Scriptures  [x]         spiritual assessment    []         prayer  []         empathic listening/emotional support  []         rites and rituals (cite in comments)  []         life review     [x]         Voodoo support  []         theological development   []         advocacy  []         ethical dialog     []         blessing  []         bereavement support    [x]         support to family  []         anticipatory grief support   []         help with AMD  []         spiritual guidance    []         meditation      Spiritual Care Needs  [x]         Emotional Support  [x]         Spiritual/Jewish Care  []         Loss/Adjustment  []         Advocacy/Referral                /Ethics  []         No needs expressed at               this time  []         Other: (note in               comments)  Spiritual Care Plan  []         Follow up visits with               pt/family  []         Provide materials  []         Schedule sacraments  []         Contact Community               Clergy  [x]         Follow up as needed  []         Other: (note in               comments)     Comments:  Initial visit with Mr. Neymar Kruger and his wife. Mr. 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 Offered presence and supportive listening to wife who shared how difficult and trying the past few weeks have been and how abruptly their lives have changed. Supportive listening offered. Rev. Neymar Kruger was awake and desired to have some ice; RN said he could not have some at this time but offered wet swabs for patient. Assured pt and wife of prayers and pastoral care support. Pastoral care is available to continue to follow. Please page as needed/desired. [phone]. Visit by: Wyatt Doyle. Cora Martinez D.Min, MA, [facility]    Lead  Profession Development & Advancement

## 2017-08-07 NOTE — PROGRESS NOTES
[facility]  Pulmonary, Critical Care, and Sleep Medicine    Name: Jaquan King MRN: [id number]   : 1928 Hospital: [facility]    Date: 2017            IMPRESSION:   · Postop respiratory failure back on vent  · Midline abdominal incision fascial wound dehiscence 17 Eploratory laparotomy with closure of midline incision using retention sutures per Dr. Qamar Sen  · Encephalopathy- head CT ok ; likely from dilaudid. Family aware but feels pt needs pain meds; now that pain better, perhaps waking up some? ? Not polite in evening and night. ? Dementia? · Complicated Enterococcal UTI- sensitive to Amp  · ileus  · CAD with recent stents per   · NSTEMI demand ischemia but now with AF RVR  · AF RVR on Iv diltiazem  · HTN- more than he had at home? ?  · Echo earlier this year LVEF 45%  · Anasarca and volume overload?  Third spacing- good response to diuretics  · S/P removal of bladder stone and repair of perforated bladder  · S/P subsequent ex-lap due to extensive sq emphysema in abdomen/retroperitoneum - no sign of bowel perforation  · Shock better, now hypertensive  · Hypocalcemia  · Severe hypoalbuminemia - catabolic  · hypophosphatemia  · Acute renal failure  · \"pneumothorax\" due to dissection of air from retroperitoneum cephalad, different physiology or behavior of typical pneumothorax   · CAD      PLAN:   · Appreciate Cardiology's help  · renew TPN  · Leave on vent today  · Not sure if pt would benefit from a binder before getting up   · Analgesia prn  · Haldol scheduled  · Unable to use oral meds yet, would have liked to try melatonin  · IV reglan- not great choice but want to help bowels recover  · replete lytes  · PICC  · IV ampicillin- finish 7 days course,   · Gentle diuresis  · antihypertensives  · Pain control  · No need for chest tubes for this type of pneumothorax, should resolve spontaneously, but will monitor  · DVT/GI prophylaxis  · Patient requiring restraints due to threat of injury to self, interference with medical devices. · Will be available to assist in medical management while in the CCU pending disposition     Subjective/Interval History:   I have reviewed the flowsheet and previous days notes. The patient is unable to give any meaningful history or review of systems because the patient is:     Nursing notes reviewed    8/6 on vent overnight after wound closure. On TPN    8/5 seems to be sundowning and striking out at nursing. Still with high NG output. Still on IV pain meds. TPN. No pressors. HTn has been tough to control. By evening pt found to have wound dehiscence and taken to OR for closure    8/4 agitated overnight and now restrained. Easily awakened and in no pain. No SOB. No nausea. TPN    8/3 will arouse some but still sleepy. NG output still dark. Hgb slowly drifting down    8/2 tachypnea. Somnolent. 3:42 PM  Head Ct scan - atrophic changes only. Dark NG output. Will restart Pepcid, may need PPI. 8/1  Two PRBCs given over night > HGB 8.5 > 12.2, HTC 25.8 > 36.2, Lasix 20 mg between the Packs given > polyuria total 3 L overnight, pain medication given with each positions, BP still around 150-176 mmHg despite of hydralazine PRN Q 6 hr, Pain med, carvedilol, new A Fib in morning > Diltiazem 10 mg IV once given HR been around , BP stable,     AFRVR now. SBP > 150 and CVP 10-12. Anasraca. Pt retired . Spoke with daughter at bedside. They would like Dr. Michoacano Connor to see pt. Repeat troponin higher. Pt moaning and abdomen still distended and sore.  NGT in place      Review of Systems   Unable to perform ROS: Mental status change     Objective:   Vital Signs:    Visit Vitals    /55    Pulse 61    Temp 99.2 °F (37.3 °C)    Resp 15    Ht 5' 10\" (1.778 m)    Wt 99.8 kg (220 lb 0.3 oz)    SpO2 100%    BMI 31.57 kg/m2       O2 Device: Endotracheal tube, Ventilator   O2 Flow Rate (L/min): 3 l/min Temp (24hrs), Av.2 °F (37.3 °C), Min:97.8 °F (36.6 °C), Max:100.5 °F (38.1 °C)       Intake/Output:   Last shift:         Last 3 shifts: 1901 - 700  In: 5066.4 [I.V.:4896.4]  Out: 9853 [Urine:2047; Drains:225]    Intake/Output Summary (Last 24 hours) at 17  Last data filed at 17   Gross per 24 hour   Intake          2142.36 ml   Output             2158 ml   Net           -15.64 ml     Hemodynamics:   PAP:   CO:     Wedge:   CI:     CVP:  CVP (mmHg): 8 mmHg (17) SVR:       PVR:       Ventilator Settings:  Mode Rate Tidal Volume Pressure FiO2 PEEP   Spontaneous   550 ml  5 cm H2O 40 % 6 cm H20     Peak airway pressure: 12 cm H2O    Minute ventilation: 8.79 l/min       Physical Exam   Constitutional: He does not appear ill. He is intubated. HENT:   Head: Normocephalic and atraumatic. Eyes: No scleral icterus. Cardiovascular: Normal rate and regular rhythm. No murmur heard. Pulmonary/Chest: No accessory muscle usage. He is intubated. No respiratory distress. He has no wheezes. He has no rhonchi. He has no rales. Abdominal: He exhibits distension. Bowel sounds are absent. Postop abdomen, sq emphysema resolved   Musculoskeletal: He exhibits edema. Neurological: He is alert. Skin: Skin is warm and dry.      Data:     Current Facility-Administered Medications   Medication Dose Route Frequency    TPN ADULT-CENTRAL AA 5% D20%-MVI W/ VIT K-RCH   IntraVENous CONTINUOUS    haloperidol lactate (HALDOL) injection 3 mg  3 mg IntraVENous Q6H    metoclopramide HCl (REGLAN) injection 5 mg  5 mg IntraVENous Q6H    aspirin (ASA) suppository 300 mg  300 mg Rectal DAILY    labetalol (NORMODYNE;TRANDATE) 20 mg/4 mL (5 mg/mL) injection 10 mg  10 mg IntraVENous Q6H    propofol (DIPRIVAN) infusion  5-50 mcg/kg/min IntraVENous TITRATE    PHENYLephrine (NEOSYNEPHRINE) 30,000 mcg in 0.9% sodium chloride 250 mL infusion   mcg/min IntraVENous TITRATE    fat emulsion 20% (LIPOSYN, INTRALIPID) infusion 250 mL  250 mL IntraVENous Q MON, WED & FRI    insulin lispro (HUMALOG) injection   SubCUTAneous Q6H    enoxaparin (LOVENOX) injection 40 mg  40 mg SubCUTAneous Q24H    levalbuterol (XOPENEX) nebulizer soln 0.63 mg/3 mL  0.63 mg Nebulization Q4H RT    famotidine (PF) (PEPCID) 20 mg in sodium chloride 0.9 % 10 mL injection  20 mg IntraVENous Q12H    dilTIAZem (CARDIZEM) 100 mg in 0.9% sodium chloride (MBP/ADV) 100 mL infusion  0-15 mg/hr IntraVENous TITRATE    furosemide (LASIX) injection 40 mg  40 mg IntraVENous DAILY    sodium chloride (NS) flush 5-10 mL  5-10 mL IntraVENous Q8H    0.9% sodium chloride infusion  25 mL/hr IntraVENous CONTINUOUS    sodium chloride (NS) flush 10-40 mL  10-40 mL InterCATHeter Q8H                Labs:  Recent Labs      08/07/17   0424  08/06/17   0406   WBC  16.1*  15.4*   HGB  11.3*  11.0*   HCT  35.2*  34.1*   PLT  289  317     Recent Labs      08/07/17   0424  08/06/17   0406   NA  138  144   K  3.9  3.7   CL  105  108   CO2  28  29   GLU  188*  146*   BUN  44*  33*   CREA  1.31*  1.02   CA  7.3*  6.8*   MG  2.4  2.1   PHOS  3.1  3.6   ALB  1.6*   --    TBILI  1.4*   --    SGOT  54*   --    ALT  44   --      Recent Labs      08/05/17   2154   PH  7.42   PCO2  51*   PO2  166*   HCO3  32*   FIO2  100     Imaging:  I have personally reviewed the patients radiographs and have reviewed the reports:  8-7-17: basilar atx.          Total critical care time exclusive of procedures: 35 minutes  Brianne Briceño MD

## 2017-08-07 NOTE — PROGRESS NOTES
0700 report received 0800 assessed pt 0845 ABG completed pt lethargic although wakes up and follows commands 1000 pt extubated daughter updated on care 1030 call placed to dr Apolinar Emanuel to clarify NPO status if med's can be given so that appropriate measures can be in place to prevent pain 1200 assessed 1600 assessed 1930 tylenol for pain report to nurse

## 2017-08-07 NOTE — PROGRESS NOTES
Progress Note    Patient: Gloria Cabral MRN: [id number]  SSN: [id number]    YOB: 1928 Age: 80 y.o. Sex: male   Height: Height: 5' 10\" (177.8 cm) Weight: Weight: 99.8 kg (220 lb 0.3 oz) BMI: Body mass index is 31.57 kg/(m^2). PCP: Annika Mathew MD [phone]   Contact:  Primary Emergency Contact: Jak Hines Phone: [phone] [place] [address] Day: 15 - Admitted 2017  6:19 PM by Daren Brooks MD Surgeon(s):  Johnnie Tijerina MD 2 Days Post-Op Procedure(s):  LAPAROTOMY EXPLORATORY, wound closure for dehiscence           Assessment/Plan:     Status post dehiscence wound closure with retention sutures by general surgery over the weekend. Ileus/nothing by mouth/TPN continues. Cardiology asking for antiplatelet agents when appropriate postop. General surgery to weigh in on this. Current drain (65 mL per 24 hours, negative drain creatinine was (unneeded by urology at this point and could be removed if general surgery agrees. Imaging: N/A   Exam:    ROS:  Denies Chest Pain, SOB         ID: Temp (24hrs), Av.2 °F (37.3 °C), Min:97.8 °F (36.6 °C), Max:100.5 °F (38.1 °C)    2017: WBC 6.7 K/uL (Ref range: 4.1 - 11.1 K/uL)  2017: WBC 10.2 K/uL (Ref range: 4.1 - 11.1 K/uL)  2017: WBC 20.4 K/uL* (Ref range: 4.1 - 11.1 K/uL)  2017: WBC 20.6 K/uL* (Ref range: 4.1 - 11.1 K/uL)  2017: WBC 18.5 K/uL* (Ref range: 4.1 - 11.1 K/uL)  2017: WBC 15.1 K/uL* (Ref range: 4.1 - 11.1 K/uL)  2017: WBC 15.6 K/uL* (Ref range: 4.1 - 11.1 K/uL)  2017: WBC 15.4 K/uL* (Ref range: 4.1 - 11.1 K/uL)  8/3/2017: WBC 14.3 K/uL* (Ref range: 4.1 - 11.1 K/uL)  2017: WBC 13.8 K/uL* (Ref range: 4.1 - 11.1 K/uL)  2017: WBC 15.4 K/uL* (Ref range: 4.1 - 11.1 K/uL)  2017: WBC 16.1 K/uL* (Ref range: 4.1 - 11.1 K/uL)  Current Antimicrobial Therapy     None        Cultures:    All Micro Results     None         Pulm: Nasal cannula 5 l/min 100 %     IS: 700 ml of predicted 0 ml Unable Patient tolerated, Instructed   GI: Intake: DIET NPO  DIET ONE TIME MESSAGE  TPN ADULT-CENTRAL AA 5% D20%-MVI W/ VIT K-RCH  TPN ADULT-CENTRAL AA 5% D20%-MVI W/ VIT K-RCH   Appetite: NPO          Abdominal Assessment: Asymmetrical  Bowel Sounds: Distant, Hypoactive     No data found. Pain: 3/10 Aching - Abdomen - Mid         Current Analgesic Therapy (168h ago through future)    Ordered     Start Stop    08/05/17 1715  HYDROmorphone (PF) (DILAUDID) injection 0.1 mg  0.1 mg,   IntraVENous,   EVERY 4 HOURS AS NEEDED      08/05/17 1714 --    08/05/17 0930  aspirin (ASA) suppository 300 mg  300 mg,   Rectal,   DAILY      08/05/17 1100 --    07/29/17 2152  acetaminophen (TYLENOL) solution 650 mg  650 mg,   Per NG tube,   EVERY 4 HOURS AS NEEDED      07/29/17 2150 --         : Aaron-Yoo Drain 07/28/17 Abdomen-Output (ml): 10 ml  Suprapubic cath - Urinary Catheter 07/28/17 3- way; [facility]-Status: Patent  Urinary Catheter 07/28/17 Other (Comment)-Status: Draining;Patent      7/21/2017: Creatinine 1.13 MG/DL (Ref range: 0.70 - 1.30 MG/DL)  7/27/2017: Creatinine 1.15 MG/DL (Ref range: 0.70 - 1.30 MG/DL)  7/28/2017: Creatinine 1.21 MG/DL (Ref range: 0.70 - 1.30 MG/DL)  7/29/2017: Creatinine 1.36 MG/DL* (Ref range: 0.70 - 1.30 MG/DL)  7/30/2017: Creatinine 1.58 MG/DL* (Ref range: 0.70 - 1.30 MG/DL)  7/31/2017: Creatinine 1.64 MG/DL* (Ref range: 0.70 - 1.30 MG/DL)  8/1/2017: Creatinine 1.27 MG/DL (Ref range: 0.70 - 1.30 MG/DL)  8/2/2017: Creatinine 1.16 MG/DL (Ref range: 0.70 - 1.30 MG/DL)  8/3/2017: Creatinine 0.98 MG/DL (Ref range: 0.70 - 1.30 MG/DL)  8/4/2017: Creatinine 0.93 MG/DL (Ref range: 0.70 - 1.30 MG/DL)  8/6/2017: Creatinine 1.02 MG/DL (Ref range: 0.70 - 1.30 MG/DL)  8/7/2017: Creatinine 1.31 MG/DL* (Ref range: 0.70 - 1.30 MG/DL)  Recent Labs      08/04/17   2211  08/03/17   0949   FCREA  1.06  1.08      Lines: Peripheral IV 08/03/17 Right (Active)   Site Assessment Clean, dry, & intact 8/7/2017  3:00 AM   Phlebitis Assessment 0 8/7/2017  3:00 AM   Infiltration Assessment 0 8/7/2017  3:00 AM   Dressing Status Clean, dry, & intact 8/7/2017  3:00 AM   Dressing Type Transparent;Tape 8/7/2017  3:00 AM   Hub Color/Line Status Blue;Capped 8/7/2017  3:00 AM   Action Taken Open ports on tubing capped 8/6/2017  4:00 PM   Alcohol Cap Used Yes 8/7/2017  3:00 AM       Peripheral IV 08/05/17 Right Hand (Active)   Site Assessment Clean, dry, & intact 8/7/2017  3:00 AM   Phlebitis Assessment 0 8/7/2017  3:00 AM   Infiltration Assessment 0 8/7/2017  3:00 AM   Dressing Status Clean, dry, & intact 8/7/2017  3:00 AM   Dressing Type Transparent;Tape 8/7/2017  3:00 AM   Hub Color/Line Status Pink;Capped 8/7/2017  3:00 AM   Action Taken Open ports on tubing capped 8/6/2017  4:00 PM   Alcohol Cap Used Yes 8/7/2017  3:00 AM     PICC Double Lumen 78/76/43 Basilic;Left (Active)   Central Line Being Utilized Yes 8/7/2017  3:00 AM   Criteria for Appropriate Use Limited/no vessel suitable for conventional peripheral access 8/7/2017  3:00 AM   Site Assessment Clean, dry, & intact 8/7/2017  3:00 AM   Phlebitis Assessment 0 8/7/2017  3:00 AM   Infiltration Assessment 0 8/7/2017  3:00 AM   Arm Circumference (cm) 33 cm 8/2/2017 10:47 AM   Date of Last Dressing Change 08/02/17 8/7/2017  3:00 AM   Dressing Status Clean, dry, & intact 8/7/2017  3:00 AM   Action Taken Open ports on tubing capped 8/6/2017  4:00 PM   External Catheter Length (cm) 0 centimeters 8/3/2017  9:32 AM   Dressing Type Disk with Chlorhexadine gluconate (CHG); Transparent 8/7/2017  3:00 AM   Hub Color/Line Status Red; Infusing 8/7/2017  3:00 AM   Positive Blood Return (Site #1) Yes 8/7/2017  3:00 AM   Hub Color/Line Status Purple; Infusing 8/7/2017  3:00 AM   Positive Blood Return (Site #2) Yes 8/7/2017  3:00 AM   Alcohol Cap Used Yes 8/7/2017  3:00 AM                Vitals: O2 Device: Nasal cannula O2 Flow Rate (L/min): 5 l/min  Patient Vitals for the past 24 hrs:   BP Temp Pulse Resp SpO2 Weight   08/07/17 1011 - - - - 100 % -   08/07/17 0900 145/54 - 63 16 100 % -   08/07/17 0830 134/48 - (!) 57 16 100 % -   08/07/17 0815 - - 62 16 100 % -   08/07/17 0807 - 99.1 °F (37.3 °C) - - - -   08/07/17 0800 171/67 - 73 17 100 % -   08/07/17 0730 153/60 - 67 16 100 % -   08/07/17 0700 152/55 - 61 15 100 % -   08/07/17 0640 - - 66 16 100 % -   08/07/17 0615 - - 70 17 100 % -   08/07/17 0606 166/62 - 69 15 100 % -   08/07/17 0511 166/62 - 75 - - -   08/07/17 0510 - - (!) 59 - - -   08/07/17 0500 142/47 - 63 15 100 % -   08/07/17 0412 - - 69 14 100 % -   08/07/17 0400 144/45 - 64 15 100 % -   08/07/17 0300 137/50 99.2 °F (37.3 °C) 64 16 100 % 99.8 kg (220 lb 0.3 oz)   08/07/17 0200 (!) 143/38 99.7 °F (37.6 °C) 66 16 100 % -   08/07/17 0100 119/44 - 63 17 99 % -   08/07/17 0026 132/46 - 80 - - -   08/07/17 0000 138/48 - 68 18 100 % -   08/06/17 2354 - - 70 18 100 % -   08/06/17 2302 135/53 (!) 100.5 °F (38.1 °C) 68 17 100 % -   08/06/17 2200 116/49 - 63 17 100 % -   08/06/17 2100 138/59 - 71 23 100 % -   08/06/17 2030 122/47 - 71 17 100 % -   08/06/17 2000 141/47 - 72 16 100 % -   08/06/17 1956 - - 72 17 99 % -   08/06/17 1943 151/54 - 69 - - -   08/06/17 1900 128/45 99.8 °F (37.7 °C) 65 16 100 % -   08/06/17 1830 128/42 - 65 16 100 % -   08/06/17 1800 135/52 - 67 16 100 % -   08/06/17 1700 123/45 - 66 15 100 % -   08/06/17 1600 132/49 97.8 °F (36.6 °C) 68 15 100 % -   08/06/17 1530 124/48 - 64 13 100 % -   08/06/17 1505 - - 65 16 100 % -   08/06/17 1500 104/40 - 64 15 100 % -   08/06/17 1400 130/45 - 67 14 99 % -   08/06/17 1300 123/44 - 66 18 100 % -   08/06/17 1200 153/60 98 °F (36.7 °C) 70 19 100 % -   08/06/17 1113 - - 67 16 100 % -   08/06/17 1100 165/67 - 76 20 100 % -      I&O's:    Date 08/06/17 0700 - 08/07/17 0659 08/07/17 0700 - 08/08/17 0659   Shift 0231-7119 7432-8410 24 Hour Total 7796-5092 3916-1758 24 Hour Total   I  N  T  A  K  E   I.V.  (mL/kg/hr) 1323.1  (1.1) 798.7  (0.7) 2121.7  (0.9) 340.1  340.1      Diprivan Volume 87 51.7 138.7 8.1  8.1      Phenylephrine Volume 43.6  43.6         Volume (TPN ADULT-CENTRAL AA 5% D20%-MVI W/ VIT K-RCH) 1162  1162         Volume (TPN ADULT-CENTRAL AA 5% D20%-MVI W/ VIT K-RCH) 30.4 747 777.4 332  332    Other    0  0      Irrigation Volume Input (mL) (Urinary Catheter 07/28/17 3- way; [facility])    0  0    NG/GT  80 80         Water Flush Volume (mL) (Nasogastric Tube 07/28/17)  40 40         Medication Volume (Nasogastric Tube 07/28/17)  40 40       Shift Total  (mL/kg) 1323.1  (13.3) 878.7  (8.8) 2201.7  (22.1) 340.1  (3.4)  340.1  (3.4)   O  U  T  P  U  T   Urine  (mL/kg/hr) 1140  (1) 553  (0.5) 1693  (0.7) 10  10      Urine Output (mL) (Urinary Catheter 07/28/17 3- way; [facility]) 2215 028 0595 10  10      Urine Output (mL) (Urinary Catheter 07/28/17 Other (Comment)) 0 430 430       Emesis/NG output 200 370 570         Output (ml) (Nasogastric Tube 07/28/17) 200 370 570       Drains 30 35 65         Output (ml) (Aaron-Yoo Drain 07/28/17 Abdomen) 30 35 65       Shift Total  (mL/kg) 1370  (13.8) 958  (9.6) 2328  (23.3) 10  (0.1)  10  (0.1)   NET -46.9 -79.3 -126.3 330.1  330.1   Weight (kg) 99.4 99.8 99.8 99.8 99.8 99.8       Meds:    Current Facility-Administered Medications:     TPN ADULT-CENTRAL AA 5% D20%-MVI W/ VIT K-RCH, , IntraVENous, CONTINUOUS, Christopher Shaffer MD    TPN ADULT-CENTRAL AA 5% D20%-MVI W/ VIT K-RCH, , IntraVENous, CONTINUOUS, Karyna Fernández MD, Last Rate: 83 mL/hr at 08/06/17 1738    haloperidol lactate (HALDOL) injection 3 mg, 3 mg, IntraVENous, Q6H, Karyna Fernández MD, Stopped at 08/06/17 0035    metoclopramide HCl (REGLAN) injection 5 mg, 5 mg, IntraVENous, Q6H, Karyna Fernández MD, 5 mg at 08/07/17 0510    aspirin (ASA) suppository 300 mg, 300 mg, Rectal, DAILY, Romario Kwok MD, 300 mg at 08/07/17 0917    labetalol (NORMODYNE;TRANDATE) 20 mg/4 mL (5 mg/mL) injection 10 mg, 10 mg, IntraVENous, Jenny Jessica MD, 10 mg at 08/07/17 0614    HYDROmorphone (PF) (DILAUDID) injection 0.1 mg, 0.1 mg, IntraVENous, Q4H PRN, Casper Win MD, 0.1 mg at 08/07/17 0304    PHENYLephrine (NEOSYNEPHRINE) 30,000 mcg in 0.9% sodium chloride 250 mL infusion,  mcg/min, IntraVENous, TITRATE, Jovon Pillai MD, Stopped at 08/06/17 9555    hydrALAZINE (APRESOLINE) 20 mg/mL injection 20 mg, 20 mg, IntraVENous, Q4H PRN, Casper Win MD, 20 mg at 08/05/17 0418    labetalol (NORMODYNE;TRANDATE) injection 20 mg, 20 mg, IntraVENous, Q4H PRN, Casper Win MD, 10 mg at 08/06/17 1943    fat emulsion 20% (LIPOSYN, INTRALIPID) infusion 250 mL, 250 mL, IntraVENous, Q MON, WED & FRI, Casper Win MD, 250 mL at 08/04/17 1745    insulin lispro (HUMALOG) injection, , SubCUTAneous, Q6H, Casper Win MD, 2 Units at 08/07/17 9158    glucose chewable tablet 16 g, 4 Tab, Oral, PRN, Casper Win MD    dextrose (D50W) injection syrg 12.5-25 g, 12.5-25 g, IntraVENous, PRN, Casper Win MD    glucagon (GLUCAGEN) injection 1 mg, 1 mg, IntraMUSCular, PRN, Casper Win MD    enoxaparin (LOVENOX) injection 40 mg, 40 mg, SubCUTAneous, Q24H, Casper Win MD, 40 mg at 08/07/17 0917    levalbuterol (XOPENEX) nebulizer soln 0.63 mg/3 mL, 0.63 mg, Nebulization, Q4H RT, Casper Win MD, 0.63 mg at 08/07/17 0814    famotidine (PF) (PEPCID) 20 mg in sodium chloride 0.9 % 10 mL injection, 20 mg, IntraVENous, Q12H, Casper Win MD, 20 mg at 08/07/17 0917    sodium chloride (NS) flush 5-10 mL, 5-10 mL, IntraVENous, Q8H, Martha Nascimento MD, 10 mL at 08/07/17 0513    ondansetron (ZOFRAN) injection 4 mg, 4 mg, IntraVENous, Q4H PRN, Martha Nascimento MD    0.9% sodium chloride infusion, 25 mL/hr, IntraVENous, CONTINUOUS, Casper Win MD, Last Rate: 25 mL/hr at 08/07/17 0103, 25 mL/hr at 08/07/17 0103    sodium chloride (NS) flush 10 mL, 10 mL, InterCATHeter, PRN, Martha Nascimento MD, 10 mL at 08/03/17 2008    sodium chloride (NS) flush 10-40 mL, 10-40 mL, InterCATHeter, Q8H, Steve Patrick MD, 10 mL at 08/07/17 0513    acetaminophen (TYLENOL) solution 650 mg, 650 mg, Per NG tube, Q4H PRN, Matheus Key MD, 650 mg at 08/07/17 4808   Labs: Recent Labs      08/07/17   0424  08/06/17   0406   WBC  16.1*  15.4*   HGB  11.3*  11.0*   HCT  35.2*  34.1*   PLT  289  317     Recent Labs      08/07/17   0424  08/06/17   0406   NA  138  144   K  3.9  3.7   CL  105  108   CO2  28  29   GLU  188*  146*   BUN  44*  33*   CREA  1.31*  1.02   CA  7.3*  6.8*   MG  2.4  2.1   PHOS  3.1  3.6             Signed By: Carrington Naylor MD - August 7, 2017

## 2017-08-07 NOTE — PROGRESS NOTES
Hospitalist Progress Note    NAME: Isaac Waggoner   :  1928   MRN:  [id number]       Interim Hospital Summary: 80 y.o. male whom presented on 2017 with      Assessment / Plan:  Postop respiratory failure re-intubated after surgery for wound dehiscence  S/P removal of bladder stone and repair of perforated bladder  S/P subsequent ex-lap due to extensive sq emphysema in abdomen/retroperitoneum - no sign of bowel perforation. Wound Dehiscence s/p ex lap, has abdominal binder  Shock improved  LINDSEY likely 2/2 to hypotension, monitor   \"pneumothorax\" due to dissection of air from retroperitoneum cephalad, different physiology or behavior of typical pneumothorax -conservative management  CAD afib with RVR- off cardizem gtt , now NSR  Anemia, post op s/p PRBC  UTI with enterococcus on ampicillin  Electrolyte disturbance replete as indicated  HTN labetalol  Encephalopathy ? Medication/infectious etiology, on IV abx for UTI , off sedation, opening eyes to vocal stimuli  On TPN, Paralytic ileus, NGT  Critically ill  Code status: FULL code  Prophylaxis: per primary team  Recommended Disposition: per primary team-      Subjective:     Chief Complaint / Reason for Physician Visit intubated, opens eyes to vocal stimuli   Discussed with RN events overnight. Review of Systems:  Symptom Y/N Comments  Symptom Y/N Comments   Fever/Chills    Chest Pain     Poor Appetite    Edema     Cough    Abdominal Pain     Sputum    Joint Pain     SOB/CLARKE    Pruritis/Rash     Nausea/vomit    Tolerating PT/OT     Diarrhea    Tolerating Diet     Constipation    Other       Could NOT obtain due to: intubated     Objective:     VITALS:   Last 24hrs VS reviewed since prior progress note.  Most recent are:  Patient Vitals for the past 24 hrs:   Temp Pulse Resp BP SpO2   17 1124 - - - - 98 %   17 1011 - - - - 100 %   17 0900 - 63 16 145/54 100 %   17 0830 - (!) 57 16 134/48 100 %   17 0815 - 62 16 - 100 % 08/07/17 0807 99.1 °F (37.3 °C) - - - -   08/07/17 0800 - 73 17 171/67 100 %   08/07/17 0730 - 67 16 153/60 100 %   08/07/17 0700 - 61 15 152/55 100 %   08/07/17 0640 - 66 16 - 100 %   08/07/17 0615 - 70 17 - 100 %   08/07/17 0606 - 69 15 166/62 100 %   08/07/17 0511 - 75 - 166/62 -   08/07/17 0510 - (!) 59 - - -   08/07/17 0500 - 63 15 142/47 100 %   08/07/17 0412 - 69 14 - 100 %   08/07/17 0400 - 64 15 144/45 100 %   08/07/17 0300 99.2 °F (37.3 °C) 64 16 137/50 100 %   08/07/17 0200 99.7 °F (37.6 °C) 66 16 (!) 143/38 100 %   08/07/17 0100 - 63 17 119/44 99 %   08/07/17 0026 - 80 - 132/46 -   08/07/17 0000 - 68 18 138/48 100 %   08/06/17 2354 - 70 18 - 100 %   08/06/17 2302 (!) 100.5 °F (38.1 °C) 68 17 135/53 100 %   08/06/17 2200 - 63 17 116/49 100 %   08/06/17 2100 - 71 23 138/59 100 %   08/06/17 2030 - 71 17 122/47 100 %   08/06/17 2000 - 72 16 141/47 100 %   08/06/17 1956 - 72 17 - 99 %   08/06/17 1943 - 69 - 151/54 -   08/06/17 1900 99.8 °F (37.7 °C) 65 16 128/45 100 %   08/06/17 1830 - 65 16 128/42 100 %   08/06/17 1800 - 67 16 135/52 100 %   08/06/17 1700 - 66 15 123/45 100 %   08/06/17 1600 97.8 °F (36.6 °C) 68 15 132/49 100 %   08/06/17 1530 - 64 13 124/48 100 %   08/06/17 1505 - 65 16 - 100 %   08/06/17 1500 - 64 15 104/40 100 %   08/06/17 1400 - 67 14 130/45 99 %       Intake/Output Summary (Last 24 hours) at 08/07/17 1327  Last data filed at 08/07/17 1307   Gross per 24 hour   Intake          2361.89 ml   Output             1938 ml   Net           423.89 ml        PHYSICAL EXAM:  General:  nad, intubated  EENT:  EOMI. Anicteric sclerae. MMM  Resp:  decrease BS BL, no wheezing or rales.   No accessory muscle use  CV:  Regular  rhythm,  + jose pedal and UE edema  GI:  Soft, +distended, Non tender.  -Bowel sounds, binder  Neurologic:  Limited as encepahopathic    Reviewed most current lab test results and cultures  YES  Reviewed most current radiology test results   YES  Review and summation of old records today    NO  Reviewed patient's current orders and MAR    YES  PMH/SH reviewed - no change compared to H&P  ________________________________________________________________________  Care Plan discussed with:    Comments   Patient     Family      RN x    Care Manager     Consultant                        Multidiciplinary team rounds were held today with , nursing, pharmacist and clinical coordinator. Patient's plan of care was discussed; medications were reviewed and discharge planning was addressed. ________________________________________________________________________  Total NON critical care TIME:  25  Minutes    Total CRITICAL CARE TIME Spent:   Minutes non procedure based      Comments   >50% of visit spent in counseling and coordination of care     ________________________________________________________________________  Pecolia MD Patria     Procedures: see electronic medical records for all procedures/Xrays and details which were not copied into this note but were reviewed prior to creation of Plan. LABS:  I reviewed today's most current labs and imaging studies.   Pertinent labs include:  Recent Labs      08/07/17 0424  08/06/17   0406   WBC  16.1*  15.4*   HGB  11.3*  11.0*   HCT  35.2*  34.1*   PLT  289  317     Recent Labs      08/07/17 0424  08/06/17   0406   NA  138  144   K  3.9  3.7   CL  105  108   CO2  28  29   GLU  188*  146*   BUN  44*  33*   CREA  1.31*  1.02   CA  7.3*  6.8*   MG  2.4  2.1   PHOS  3.1  3.6   ALB  1.6*   --    TBILI  1.4*   --    SGOT  54*   --    ALT  44   --        Signed: Ladonna España MD

## 2017-08-08 NOTE — PROGRESS NOTES
Cardiology Progress Note  8/8/2017    Admit Date: 7/27/2017  Admit Diagnosis: bladder clots  Bladder injury  perperated viscous  wound dehiscence  CC:  None currently  Cardiac Assessment/Plan:   CAD: Circ HORACIO 7/20; Ef 45-50% after PCI. On asa only since  surgery: restart plavix when able from surgical standpoint. HTN: On IV labetalol; Change to coreg/losartan when taking PO again or can use NG for meds. P Afib: NSR currently; If recurrent, would use amio. Volume status: 8/7: no CHF on CXR; low O2 requirement, Cr up to 1.3: held lasix. Resp failure, post-op bladder repair/stone extraction/explor lap/dehis, anemia, ID, Dispo: per primary team and consultants. For other plans, see orders. 8/8: Extubated;  slower to respond than usual but no complaints. Cr normalized. No new cardiac recs. High complexity decision making was performed  X Yes   High-risk of decompensation with multiple organ involvement X Yes   Hospital problem list   Active Hospital Problems    Diagnosis Date Noted    Bladder injury 07/28/2017        Cardiac catheterization 7/20/2017 prior to stenting of left circumflex:  LEFT MAIN: Large vessel with minor luminal irregularities. LAD: A medium-sized vessel with   scattered plaque of 20%-30% throughout its course. Two small mid   diagonals were noted and multiple trivial diagonal vessels. RAMUS: Ramus intermedius is a small vessel with no focal stenosis   that provides perfusion to the anterolateral wall. CIRCUMFLEX: Moderate-sized vessel that is subtotally occluded in its   proximal and mid portion; there were some faint ANDERSON 1 flow distally   that maybe collateralized.    RCA: Medium-sized dominant vessel giving   rise to small PDA and posterolateral branch vessels; 30% eccentric   mid stenosis is noted, with some systolic compression.    LV: Left ventriculography in MIRANDA projection   reveals no focal wall motion abnormality.  Ejection fraction of 55% to   60%. Mild-to-moderate MR. No aortic stenosis on pullback. EDP of 14. Abdominal aortography in AP projection reveals no renal artery   stenosis. There is scattered plaque in the abdominal aorta with no   focal stenosis or aneurysm. Subjective:  Patient reports  []   nothing; unable to communicate    []   intubated   Chest pain x none  consistent with:  Non-cardiac CP         Atypical CP     None now  On going  Anginal CP     Dyspnea x none  at rest  with exertion         improved  unchanged  worse              PND x none  overnight       Orthopnea x none  improved  unchanged  worse   Presyncope x none  improved  unchanged  worse   Ambulated in hallway without symptoms   Yes   Ambulated in room without symptoms  Yes     ROS Hematuria:  Yes X No Dysuria:  Yes X No                (2+  other systems) Cough: Yes X No Sputum: Yes X No                 BRBPR:  Yes X No Melena: Yes X No   No change in family and social history from H&P/Consult note.   Objective:    Physical Exam:  24 hr VS reviewed, overall VSSAF  Temp (24hrs), Av.3 °F (36.8 °C), Min:98 °F (36.7 °C), Max:98.4 °F (36.9 °C)    Patient Vitals for the past 8 hrs:   Pulse   17 0800 77   17 0730 80   17 0700 79   17 0600 79   17 0500 86   17 0402 89   17 0300 78   17 0200 76    Patient Vitals for the past 8 hrs:   Resp   17 0800 15   17 0730 17   17 0700 15   17 0600 17   17 0500 18   17 0402 23   17 0300 17   17 0200 18    Patient Vitals for the past 8 hrs:   BP   17 0800 143/63   17 0730 139/53   17 0700 136/54   17 0600 152/53   17 0500 149/61   17 0402 143/57   17 0300 175/60   17 0200 152/60          Intake/Output Summary (Last 24 hours) at 17 0901  Last data filed at 17 0815   Gross per 24 hour   Intake          2078.97 ml Output             2780 ml   Net          -701.03 ml     General: x WD,WN x Elderly  Cachetic x NAD     Agitated x Lethargic  Arousable  Obtunded     Sedated  On Bipap  Intubated                ENT/Palate:  intubated x Dry MM x anicteric x NG in              Respiratory: X CTA  Nl resp effort  Increased effort  No significant change     rhonchi  rales  improved  worse              Cardiovasc: X RRR  IRRR X Nl S1, S2 x No rub     No murmur X No new murmur  Murmur c/w: x No gallop    x No edema  BLE edema:+  RLE edema:+  LLE edema:+     Edema less  Edema more  Edema same  Edema worse    X Nl JVP  Elevated JVP  JVP same  JVP worse    X Carotid wnl x abd aorta not palpated X no peripheral emboli noted                GI: X Post surgical x Min distended X BS not present X No organo- megaly noted              Skin: X Warm, dry  Cold extremites                  Neuro:  A/O x Grossly non- focal  Obtunded  Sedated    x Lethargic x Arousable to voice  intubated       cath site intact w/o hematoma or new bruit; distal pulse unchanged  Yes   Data Review:     Telemetry independently reviewed x sinus  chronic afib  parox afib  NSVT     ECG independently reviewed  NSR  afib  no significant changes  NSST-Tw chgs   x no new ECG provided for review   Lab results reviewed as noted below. Current medications reviewed as noted below. Recent Labs      08/07/17   0825  08/05/17   2154   PH  7.49*  7.42   PCO2  38  51*   PO2  86  166*     No results for input(s): CPK, CKMB in the last 72 hours.     No lab exists for component: TROPONINI  Recent Labs      08/08/17   0331  08/07/17   0424  08/06/17   0406   NA  138  138  144   K  3.9  3.9  3.7   CL  104  105  108   CO2  29  28  29   BUN  33*  44*  33*   CREA  0.96  1.31*  1.02   GLU  175*  188*  146*   PHOS  2.4*  3.1  3.6   CA  7.5*  7.3*  6.8*   ALB  1.6*  1.6*   --    WBC  14.2*  16.1*  15.4*   HGB  11.0*  11.3*  11.0*   HCT  33.9*  35.2*  34.1*   PLT  309  289  317     Recent Labs 08/08/17   0331  08/07/17   0424   SGOT  75*  54*   AP  69  62   TBILI  1.1*  1.4*   TP  5.9*  5.8*   ALB  1.6*  1.6*   GLOB  4.3*  4.2*     No results for input(s): INR, PTP, APTT in the last 72 hours. No lab exists for component: INREXT, INREXT   No results for input(s): FE, TIBC, PSAT, FERR in the last 72 hours.    Lab Results   Component Value Date/Time    Glucose (POC) 154 08/08/2017 05:24 AM    Glucose (POC) 160 08/07/2017 11:56 PM    Glucose (POC) 169 08/07/2017 05:35 PM    Glucose (POC) 153 08/07/2017 05:34 PM    Glucose (POC) 187 08/07/2017 11:33 AM       Current Facility-Administered Medications   Medication Dose Route Frequency    TPN ADULT-CENTRAL AA 5% D20%-MVI W/ VIT K-RCH   IntraVENous CONTINUOUS    acetaminophen (TYLENOL) solution 975 mg  975 mg Oral Q12H PRN    Or    acetaminophen (OFIRMEV) infusion 1,000 mg  1,000 mg IntraVENous Q12H PRN    haloperidol lactate (HALDOL) injection 3 mg  3 mg IntraVENous Q6H    metoclopramide HCl (REGLAN) injection 5 mg  5 mg IntraVENous Q6H    aspirin (ASA) suppository 300 mg  300 mg Rectal DAILY    labetalol (NORMODYNE;TRANDATE) injection 10 mg  10 mg IntraVENous Q6H    PHENYLephrine (NEOSYNEPHRINE) 30,000 mcg in 0.9% sodium chloride 250 mL infusion   mcg/min IntraVENous TITRATE    hydrALAZINE (APRESOLINE) 20 mg/mL injection 20 mg  20 mg IntraVENous Q4H PRN    labetalol (NORMODYNE;TRANDATE) injection 20 mg  20 mg IntraVENous Q4H PRN    fat emulsion 20% (LIPOSYN, INTRALIPID) infusion 250 mL  250 mL IntraVENous Q MON, WED & FRI    insulin lispro (HUMALOG) injection   SubCUTAneous Q6H    glucose chewable tablet 16 g  4 Tab Oral PRN    dextrose (D50W) injection syrg 12.5-25 g  12.5-25 g IntraVENous PRN    glucagon (GLUCAGEN) injection 1 mg  1 mg IntraMUSCular PRN    enoxaparin (LOVENOX) injection 40 mg  40 mg SubCUTAneous Q24H    levalbuterol (XOPENEX) nebulizer soln 0.63 mg/3 mL  0.63 mg Nebulization Q4H RT    famotidine (PF) (PEPCID) 20 mg in sodium chloride 0.9 % 10 mL injection  20 mg IntraVENous Q12H    sodium chloride (NS) flush 5-10 mL  5-10 mL IntraVENous Q8H    ondansetron (ZOFRAN) injection 4 mg  4 mg IntraVENous Q4H PRN    0.9% sodium chloride infusion  25 mL/hr IntraVENous CONTINUOUS    sodium chloride (NS) flush 10 mL  10 mL InterCATHeter PRN    sodium chloride (NS) flush 10-40 mL  10-40 mL Maxime Paz MD

## 2017-08-08 NOTE — PROGRESS NOTES
Hospitalist Progress Note    NAME: Samuel Coyle   :  1928   MRN:  [id number]       Interim Hospital Summary: 80 y.o. male whom presented on 2017 with      Assessment / Plan:  Postop respiratory failure re-intubated after surgery for wound dehiscence, s/p extubation   S/P removal of bladder stone and repair of perforated bladder  S/P subsequent ex-lap due to extensive sq emphysema in abdomen/retroperitoneum - no sign of bowel perforation. Wound Dehiscence s/p ex lap, has abdominal binder  Shock improved  LINDSEY likely 2/2 to hypotension, monitor   \"pneumothorax\" due to dissection of air from retroperitoneum cephalad, different physiology or behavior of typical pneumothorax -conservative management  CAD, PAF with RVR- off cardizem gtt , now NSR  Anemia, post op s/p PRBC  UTI with enterococcus on ampicillin  Electrolyte disturbance replete as indicated  HTN labetalol  Encephalopathy ? Medication/infectious etiology, on IV abx for UTI , off sedation, opening eyes to vocal stimuli  On TPN, Paralytic ileus, NGT, with + BS  Critically ill  Code status: FULL code  Prophylaxis: per primary team  Recommended Disposition: per primary team-      Subjective:     Chief Complaint / Reason for Physician Visit intubated, opens eyes to vocal stimuli, answering questions   Discussed with RN events overnight. Review of Systems:  Symptom Y/N Comments  Symptom Y/N Comments   Fever/Chills    Chest Pain     Poor Appetite    Edema     Cough    Abdominal Pain     Sputum    Joint Pain     SOB/CLARKE    Pruritis/Rash     Nausea/vomit    Tolerating PT/OT     Diarrhea    Tolerating Diet     Constipation    Other  No pain     Could NOT obtain due to: encephalopathy     Objective:     VITALS:   Last 24hrs VS reviewed since prior progress note.  Most recent are:  Patient Vitals for the past 24 hrs:   Temp Pulse Resp BP SpO2   17 1307 98.1 °F (36.7 °C) - - - -   17 1300 - 70 19 (!) 155/98 100 %   17 1224 - 66 16 147/56 100 %   08/08/17 1121 - 72 17 159/57 99 %   08/08/17 1119 - 72 - 159/57 -   08/08/17 1006 - 69 15 149/50 97 %   08/08/17 0930 - 70 14 144/52 98 %   08/08/17 0900 - 76 19 147/44 98 %   08/08/17 0815 98.2 °F (36.8 °C) - - - -   08/08/17 0807 - - - - 100 %   08/08/17 0800 - 77 15 143/63 100 %   08/08/17 0756 - - - - 100 %   08/08/17 0730 - 80 17 139/53 99 %   08/08/17 0700 - 79 15 136/54 100 %   08/08/17 0600 - 79 17 152/53 100 %   08/08/17 0500 - 86 18 149/61 100 %   08/08/17 0402 98.4 °F (36.9 °C) 89 23 143/57 99 %   08/08/17 0300 - 78 17 175/60 99 %   08/08/17 0200 - 76 18 152/60 97 %   08/08/17 0100 - 72 14 153/58 100 %   08/08/17 0030 - 75 16 144/58 100 %   08/08/17 0001 98 °F (36.7 °C) 68 15 138/51 99 %   08/07/17 2300 - 81 19 160/62 100 %   08/07/17 2200 - 84 18 155/61 99 %   08/07/17 2110 - - - - 100 %   08/07/17 2100 - 78 17 145/49 100 %   08/07/17 2000 - 79 15 144/50 100 %   08/07/17 1930 98.4 °F (36.9 °C) 83 19 132/44 98 %   08/07/17 1900 - 89 22 151/49 100 %   08/07/17 1508 - - - - 93 %       Intake/Output Summary (Last 24 hours) at 08/08/17 1411  Last data filed at 08/08/17 1315   Gross per 24 hour   Intake          2323.04 ml   Output             2855 ml   Net          -531.96 ml        PHYSICAL EXAM:  General:  nad, opening eyes, answering some questions, sluggish  EENT:  EOMI. Anicteric sclerae. MMM  Resp:  decrease BS BL, no wheezing or rales.   No accessory muscle use  CV:  Regular  rhythm,  + jose pedal and UE edema  GI:  Soft, +distended, Non tender.  +Bowel sounds, binder  Neurologic:  Limited as encepahopathic    Reviewed most current lab test results and cultures  YES  Reviewed most current radiology test results   YES  Review and summation of old records today    NO  Reviewed patient's current orders and MAR    YES  PMH/SH reviewed - no change compared to H&P  ________________________________________________________________________  Care Plan discussed with:    Comments   Patient Family      RN x    Care Manager     Consultant                        Multidiciplinary team rounds were held today with , nursing, pharmacist and clinical coordinator. Patient's plan of care was discussed; medications were reviewed and discharge planning was addressed. ________________________________________________________________________  Total NON critical care TIME:  25  Minutes    Total CRITICAL CARE TIME Spent:   Minutes non procedure based      Comments   >50% of visit spent in counseling and coordination of care     ________________________________________________________________________  Domenica Hutchison MD     Procedures: see electronic medical records for all procedures/Xrays and details which were not copied into this note but were reviewed prior to creation of Plan. LABS:  I reviewed today's most current labs and imaging studies.   Pertinent labs include:  Recent Labs      08/08/17   0331  08/07/17   0424  08/06/17   0406   WBC  14.2*  16.1*  15.4*   HGB  11.0*  11.3*  11.0*   HCT  33.9*  35.2*  34.1*   PLT  309  289  317     Recent Labs      08/08/17   0331  08/07/17   0424  08/06/17   0406   NA  138  138  144   K  3.9  3.9  3.7   CL  104  105  108   CO2  29  28  29   GLU  175*  188*  146*   BUN  33*  44*  33*   CREA  0.96  1.31*  1.02   CA  7.5*  7.3*  6.8*   MG  2.5*  2.4  2.1   PHOS  2.4*  3.1  3.6   ALB  1.6*  1.6*   --    TBILI  1.1*  1.4*   --    SGOT  75*  54*   --    ALT  61  44   --        Signed: Domenica Hutchison MD

## 2017-08-08 NOTE — PROGRESS NOTES
Interdisciplinary team rounds were held  8/8/2017 with the following team members:Care Management, Diabetes Treatment Specialist, Nursing, Nutrition, Pharmacy, Physician, Respiratory Therapy and Clinical Coordinator    Plan of care discussed. Goal: See MD orders and progress notes for further  interventions and desired outcomes.

## 2017-08-08 NOTE — PROGRESS NOTES
1900: Bedside and verbal report received from Adelaide Valladares, [address]: Assessment completed. Patient alert and following commands. NSR. Lungs diminished bilaterally. 2330: Daughter called for updated. 0000: Reassesssment completed. No changes noted. 0400: Reassessment completed. No changes noted. 0700: Bedside and verbal report given to Joyce Rodriguez RN.

## 2017-08-08 NOTE — PROGRESS NOTES
Progress Note    Patient: Samuel Coyle MRN: [id number]  SSN: [id number]    YOB: 1928 Age: 80 y.o. Sex: male   Height: Height: 5' 10\" (177.8 cm) Weight: Weight: 96.5 kg (212 lb 11.9 oz) BMI: Body mass index is 30.53 kg/(m^2).    Emergency  Contact:  Primary Emergency Contact: Dean Galicia, Home Phone: [phone]   PCP:   Robb Venegas MD [phone]     Hospital Day: 15 - Admitted 2017  6:19 PM by Pat Rivera MD - Full Code  Active Hospital Problems    Diagnosis Date Noted    Bladder injury 2017    3 Days Post-Op Procedure(s):  LAPAROTOMY EXPLORATORY, wound closure for dehiscence  Surgeon(s):  Jim Rey MD         Assessment/Plan:     · ARMEN out 10cc/24 -> remove  · Additional Antiplatlet requested by cards, at discretion general surgery  · Ileus/NPO/TPN    When ready for natasha transfer let me know, will follow     Subjective: extubated   Imaging: N/A   Exam:    ROS:  Denies Chest Pain, SOB         Labs: Recent Labs      17   0331  17   0424  17   0406   WBC  14.2*  16.1*  15.4*   HGB  11.0*  11.3*  11.0*   HCT  33.9*  35.2*  34.1*   PLT  309  289  317     Recent Labs      17   0331  17   0424  17   0406   NA  138  138  144   K  3.9  3.9  3.7   CL  104  105  108   CO2  29  28  29   GLU  175*  188*  146*   BUN  33*  44*  33*   CREA  0.96  1.31*  1.02   CA  7.5*  7.3*  6.8*   MG  2.5*  2.4  2.1   PHOS  2.4*  3.1  3.6      ID: Temp (24hrs), Av.3 °F (36.8 °C), Min:98 °F (36.7 °C), Max:98.4 °F (36.9 °C)    2017: WBC 6.7 K/uL (Ref range: 4.1 - 11.1 K/uL)  2017: WBC 10.2 K/uL (Ref range: 4.1 - 11.1 K/uL)  2017: WBC 20.4 K/uL* (Ref range: 4.1 - 11.1 K/uL)  2017: WBC 20.6 K/uL* (Ref range: 4.1 - 11.1 K/uL)  2017: WBC 18.5 K/uL* (Ref range: 4.1 - 11.1 K/uL)  2017: WBC 15.1 K/uL* (Ref range: 4.1 - 11.1 K/uL)  2017: WBC 15.6 K/uL* (Ref range: 4.1 - 11.1 K/uL)  2017: WBC 15.4 K/uL* (Ref range: 4.1 - 11.1 K/uL)  8/3/2017: WBC 14.3 K/uL* (Ref range: 4.1 - 11.1 K/uL)  8/4/2017: WBC 13.8 K/uL* (Ref range: 4.1 - 11.1 K/uL)  8/6/2017: WBC 15.4 K/uL* (Ref range: 4.1 - 11.1 K/uL)  8/7/2017: WBC 16.1 K/uL* (Ref range: 4.1 - 11.1 K/uL)  8/8/2017: WBC 14.2 K/uL* (Ref range: 4.1 - 11.1 K/uL)  Current Antimicrobial Therapy     None        Cultures: All Micro Results     None         GI: Intake: DIET NPO  DIET ONE TIME MESSAGE  TPN ADULT-CENTRAL AA 5% D20%-MVI W/ VIT K-RCH   Appetite: NPO       I.V.: 250 mL  Abdominal Assessment: Asymmetrical, Distended, Not passing flatus, Semi-soft, Tender  Bowel Sounds: Hypoactive  Last Bowel Movement Date:  (unknown) No data found. Pain: 2/10 Aching - Abdomen - Mid      Current Analgesic Therapy (168h ago through future)    Ordered     Start Stop    08/07/17 1420  acetaminophen (TYLENOL) solution 975 mg  975 mg,   Oral,   EVERY 12 HOURS AS NEEDED      08/07/17 1418 --    08/07/17 1420  acetaminophen (OFIRMEV) infusion 1,000 mg  1,000 mg,   IntraVENous,   EVERY 12 HOURS AS NEEDED      08/07/17 1418 --    08/05/17 0930  aspirin (ASA) suppository 300 mg  300 mg,   Rectal,   DAILY      08/05/17 1100 --         : [REMOVED] [place] Drain (Abdominal Drainage) 07/28/17-Output (ml): 75 ml (bag emptied)  Aaron-Yoo Drain 07/28/17 Abdomen-Output (ml): 10 ml  [facility];Suprapubic cath - Urinary Catheter 07/28/17 3- way; [facility]-Status: Draining;Patent  Urinary Catheter 07/28/17 Other (Comment)-Status: Draining;Patent    7/21/2017: Creatinine 1.13 MG/DL (Ref range: 0.70 - 1.30 MG/DL)  7/27/2017: Creatinine 1.15 MG/DL (Ref range: 0.70 - 1.30 MG/DL)  7/28/2017: Creatinine 1.21 MG/DL (Ref range: 0.70 - 1.30 MG/DL)  7/29/2017: Creatinine 1.36 MG/DL* (Ref range: 0.70 - 1.30 MG/DL)  7/30/2017: Creatinine 1.58 MG/DL* (Ref range: 0.70 - 1.30 MG/DL)  7/31/2017: Creatinine 1.64 MG/DL* (Ref range: 0.70 - 1.30 MG/DL)  8/1/2017: Creatinine 1.27 MG/DL (Ref range: 0.70 - 1.30 MG/DL)  8/2/2017: Creatinine 1.16 MG/DL (Ref range: 0.70 - 1.30 MG/DL)  8/3/2017: Creatinine 0.98 MG/DL (Ref range: 0.70 - 1.30 MG/DL)  8/4/2017: Creatinine 0.93 MG/DL (Ref range: 0.70 - 1.30 MG/DL)  8/6/2017: Creatinine 1.02 MG/DL (Ref range: 0.70 - 1.30 MG/DL)  8/7/2017: Creatinine 1.31 MG/DL* (Ref range: 0.70 - 1.30 MG/DL)  8/8/2017: Creatinine 0.96 MG/DL (Ref range: 0.70 - 1.30 MG/DL)       Vitals: O2 Device: Room air @ O2 Flow Rate (L/min): 4 l/min  Patient Vitals for the past 24 hrs:   BP Temp Pulse Resp SpO2 Weight   08/08/17 0815 - 98.2 °F (36.8 °C) - - - -   08/08/17 0807 - - - - 100 % -   08/08/17 0800 143/63 - 77 15 100 % -   08/08/17 0756 - - - - 100 % -   08/08/17 0730 139/53 - 80 17 99 % -   08/08/17 0700 136/54 - 79 15 100 % -   08/08/17 0600 152/53 - 79 17 100 % -   08/08/17 0500 149/61 - 86 18 100 % -   08/08/17 0412 - - - - - 96.5 kg (212 lb 11.9 oz)   08/08/17 0402 143/57 98.4 °F (36.9 °C) 89 23 99 % -   08/08/17 0300 175/60 - 78 17 99 % -   08/08/17 0200 152/60 - 76 18 97 % -   08/08/17 0100 153/58 - 72 14 100 % -   08/08/17 0030 144/58 - 75 16 100 % -   08/08/17 0001 138/51 98 °F (36.7 °C) 68 15 99 % -   08/07/17 2300 160/62 - 81 19 100 % -   08/07/17 2200 155/61 - 84 18 99 % -   08/07/17 2110 - - - - 100 % -   08/07/17 2100 145/49 - 78 17 100 % -   08/07/17 2000 144/50 - 79 15 100 % -   08/07/17 1930 132/44 98.4 °F (36.9 °C) 83 19 98 % -   08/07/17 1900 151/49 - 89 22 100 % -   08/07/17 1508 - - - - 93 % -   08/07/17 1124 - - - - 98 % -   08/07/17 1011 - - - - 100 % -   08/07/17 0900 145/54 - 63 16 100 % -      I&O's:    Date 08/07/17 0700 - 08/08/17 0659 08/08/17 0700 - 08/09/17 0659   Shift 0772-2848 0409-9485 24 Hour Total 3298-4610 5252-6624 24 Hour Total   I  N  T  A  K  E   I.V.  (mL/kg/hr) 1212  (1) 1007.1  (0.9) 2219.1  (1) 200  200      Diprivan Volume 8.1  8.1         Volume (acetaminophen (OFIRMEV) infusion 1,000 mg) 100  100         Volume (acetaminophen (OFIRMEV) infusion 1,000 mg) 0  0 200  200      Volume (TPN ADULT-CENTRAL AA 5% D20%-MVI W/ VIT K-RCH) 1103.9  1103.9         Volume (TPN ADULT-CENTRAL AA 5% D20%-MVI W/ VIT K-RCH) 0 1007. 1 1007.1       Other 0  0         Irrigation Volume Input (mL) (Urinary Catheter 07/28/17 3- way; [facility]) 0  0       NG/GT 60  60         Medication Volume (Nasogastric Tube 07/28/17) 30  30         Intake (ml) (Nasogastric Tube 07/28/17) 30  30       Shift Total  (mL/kg) 1272  (12.7) 1007.1  (10.4) 2279.1  (23.6) 200  (2.1)  200  (2.1)   O  U  T  P  U  T   Urine  (mL/kg/hr) 560  (0.5) 1015  (0.9) 1575  (0.7) 155  155      Urine Output (mL) (Urinary Catheter 07/28/17 3- way; [facility]) 560 105 665 120  120      Urine Output (mL) (Urinary Catheter 07/28/17 Other (Comment))  910 910 35  35    Emesis/NG output          Output (ml) (Nasogastric Tube 07/28/17)        Drains  10 10         Output (ml) (Aaron-Yoo Drain 07/28/17 Abdomen)  10 10       Shift Total  (mL/kg) 1160  (11.6) 1675  (17.4) 2835  (29.4) 155  (1.6)  155  (1.6)    -667.9 -555. 9 45  45   Weight (kg) 99.8 96.5 96.5 96.5 96.5 96.5       Meds:    Current Facility-Administered Medications:     TPN ADULT-CENTRAL AA 5% D20%-MVI W/ VIT K-RCH, , IntraVENous, CONTINUOUS, Christopher Shaffer MD, Last Rate: 83 mL/hr at 08/07/17 1752    acetaminophen (TYLENOL) solution 975 mg, 975 mg, Oral, Q12H PRN **OR** acetaminophen (OFIRMEV) infusion 1,000 mg, 1,000 mg, IntraVENous, Q12H PRN, Christopher Shaffer MD, Last Rate: 400 mL/hr at 08/08/17 0549, 1,000 mg at 08/08/17 0549    haloperidol lactate (HALDOL) injection 3 mg, 3 mg, IntraVENous, Q6H, Karyna Fernández MD, Stopped at 08/06/17 0035    metoclopramide HCl (REGLAN) injection 5 mg, 5 mg, IntraVENous, Q6H, Karyna Fernández MD, 5 mg at 08/08/17 0540    aspirin (ASA) suppository 300 mg, 300 mg, Rectal, DAILY, Romario Kwok MD, 300 mg at 08/08/17 0824    labetalol (NORMODYNE;TRANDATE) injection 10 mg, 10 mg, IntraVENous, Q6H, Bing Barry MD, 10 mg at 08/08/17 0542    PHENYLephrine (NEOSYNEPHRINE) 30,000 mcg in 0.9% sodium chloride 250 mL infusion,  mcg/min, IntraVENous, TITRATE, Beatris Montaño MD, Stopped at 08/06/17 9219    hydrALAZINE (APRESOLINE) 20 mg/mL injection 20 mg, 20 mg, IntraVENous, Q4H PRN, Clara Zaidi MD, 20 mg at 08/08/17 0336    labetalol (NORMODYNE;TRANDATE) injection 20 mg, 20 mg, IntraVENous, Q4H PRN, Clara Zaidi MD, 10 mg at 08/06/17 1943    fat emulsion 20% (LIPOSYN, INTRALIPID) infusion 250 mL, 250 mL, IntraVENous, Q MON, WED & FRI, Clara Zaidi MD, 250 mL at 08/07/17 1738    insulin lispro (HUMALOG) injection, , SubCUTAneous, Q6H, Clara Zaidi MD, 2 Units at 08/08/17 0537    glucose chewable tablet 16 g, 4 Tab, Oral, PRN, Clara Zaidi MD    dextrose (D50W) injection syrg 12.5-25 g, 12.5-25 g, IntraVENous, PRN, Clara Zaidi MD    glucagon (GLUCAGEN) injection 1 mg, 1 mg, IntraMUSCular, PRN, Clara Zaidi MD    enoxaparin (LOVENOX) injection 40 mg, 40 mg, SubCUTAneous, Q24H, Clara Zaidi MD, 40 mg at 08/07/17 0917    levalbuterol (XOPENEX) nebulizer soln 0.63 mg/3 mL, 0.63 mg, Nebulization, Q4H RT, Clara Zaidi MD, 0.63 mg at 08/08/17 0805    famotidine (PF) (PEPCID) 20 mg in sodium chloride 0.9 % 10 mL injection, 20 mg, IntraVENous, Q12H, Clara Zaidi MD, 20 mg at 08/08/17 0824    sodium chloride (NS) flush 5-10 mL, 5-10 mL, IntraVENous, Q8H, Maxim Ravi MD, 10 mL at 08/08/17 0540    ondansetron (ZOFRAN) injection 4 mg, 4 mg, IntraVENous, Q4H PRN, Maxim Ravi MD    0.9% sodium chloride infusion, 25 mL/hr, IntraVENous, CONTINUOUS, Clara Zaidi MD, Last Rate: 25 mL/hr at 08/07/17 0103, 25 mL/hr at 08/07/17 0103    sodium chloride (NS) flush 10 mL, 10 mL, InterCATHeter, PRN, Maxim Ravi MD, 10 mL at 08/03/17 2008    sodium chloride (NS) flush 10-40 mL, 10-40 mL, InterCATHeter, Q8H, Maxim Ravi MD, 10 mL at 08/08/17 1051          Signed By: Missy Elam MD - August 8, 2017

## 2017-08-08 NOTE — PROGRESS NOTES
Following pt's progress. Pt underwent exploratory laparotomy with closure of midline incision with retention sutures for wound dehiscence on 8/6. Pt was extubated post-op on 8/7 and is now on RA. TPN until bowel function returns. New PT/OT orders - CM will follow for recommendations - suspect pt may need Inpt Rehab or SNF.     Regino Mcnamara MSW

## 2017-08-08 NOTE — PROGRESS NOTES
[facility]  Pulmonary, Critical Care, and Sleep Medicine    Name: Jamaica Garcia MRN: [id number]   : 1928 Hospital: [facility]   Date: 2017            IMPRESSION:   · Postop respiratory failure, Extubated on 8-7. Now on NC oxygen. · Midline abdominal incision fascial wound dehiscence 17 Eploratory laparotomy with closure of midline incision using retention sutures per Dr. Maureen Stearns following. · Bladder injury, Uro following. · Encephalopathy-seems better. · Complicated Enterococcal UTI- sensitive to Amp  · Ileus-NPO for now. · CAD with recent stents per   · NSTEMI demand ischemia but now with AF RVR  · AF RVR on Iv diltiazem  · HTN- more than he had at home? ?  · Echo earlier this year LVEF 45%  · Anasarca and volume overload? Third spacing- good response to diuretics  · S/P removal of bladder stone and repair of perforated bladder  · S/P subsequent ex-lap due to extensive sq emphysema in abdomen/retroperitoneum - no sign of bowel perforation  · Shock better, now hypertensive  · Hypocalcemia  · Severe hypoalbuminemia - catabolic  · hypophosphatemia  · Acute renal failure  · CAD  · Appears medically stable, Pt has very minimal reserve. PLAN:   · Appears stable for transfer to Surgery or Medical with Tele. Will get input from everyone else as well. · renew TPN  · Not sure if pt would benefit from a binder before getting up   · Analgesia prn  · Haldol scheduled  · IV reglan- not great choice but want to help bowels recover  · replete lytes  · PICC  · IV ampicillin- finish 7 days course,   · Gentle diuresis  · antihypertensives  · Pain control  · No need for chest tubes for this type of pneumothorax, should resolve spontaneously, but will monitor  · DVT/GI prophylaxis  · Patient requiring restraints due to threat of injury to self, interference with medical devices.    · Will be available to assist in medical management while in the CCU pending disposition     Subjective/Interval History:   I have reviewed the flowsheet and previous days notes. The patient is unable to give any meaningful history or review of systems because the patient is:     Nursing notes reviewed    17: Pt has no acute complaints. NO chest pain, no back pain. Has NG tube in place. Still with moderate output.  on vent overnight after wound closure. On TPN     seems to be sundowning and striking out at nursing. Still with high NG output. Still on IV pain meds. TPN. No pressors. HTn has been tough to control. By evening pt found to have wound dehiscence and taken to OR for closure     agitated overnight and now restrained. Easily awakened and in no pain. No SOB. No nausea. TPN    8/3 will arouse some but still sleepy. NG output still dark. Hgb slowly drifting down     tachypnea. Somnolent. 3:42 PM  Head Ct scan - atrophic changes only. Dark NG output. Will restart Pepcid, may need PPI.   Two PRBCs given over night > HGB 8.5 > 12.2, HTC 25.8 > 36.2, Lasix 20 mg between the Packs given > polyuria total 3 L overnight, pain medication given with each positions, BP still around 150-176 mmHg despite of hydralazine PRN Q 6 hr, Pain med, carvedilol, new A Fib in morning > Diltiazem 10 mg IV once given HR been around , BP stable,     AFRVR now. SBP > 150 and CVP 10-12. Anasraca. Pt retired . Spoke with daughter at bedside. They would like Dr. Jarret Lr to see pt. Repeat troponin higher. Pt moaning and abdomen still distended and sore.  NGT in place      Review of Systems   Unable to perform ROS: Mental status change     Objective:   Vital Signs:    Visit Vitals    /63    Pulse 77    Temp 98.2 °F (36.8 °C)    Resp 15    Ht 5' 10\" (1.778 m)    Wt 96.5 kg (212 lb 11.9 oz)    SpO2 100%    BMI 30.53 kg/m2       O2 Device: Room air   O2 Flow Rate (L/min): 4 l/min   Temp (24hrs), Av.3 °F (36.8 °C), Min:98 °F (36.7 °C), Max:98.4 °F (36.9 °C) Intake/Output:   Last shift:      08/08 0701 - 08/08 1900  In: 200 [I.V.:200]  Out: 155 [Urine:155]  Last 3 shifts: 08/06 1901 - 08/08 0700  In: 3068.8 [I.V.:2928.8]  Out: 9019 [YQTZK:1960; Drains:45]    Intake/Output Summary (Last 24 hours) at 08/08/17 0918  Last data filed at 08/08/17 0815   Gross per 24 hour   Intake          2078.97 ml   Output             2780 ml   Net          -701.03 ml     Hemodynamics:   PAP:   CO:     Wedge:   CI:     CVP:  CVP (mmHg): 8 mmHg (08/02/17 0800) SVR:       PVR:       Ventilator Settings:  Mode Rate Tidal Volume Pressure FiO2 PEEP   Spontaneous   550 ml  5 cm H2O 30 % 6 cm H20     Peak airway pressure: 12 cm H2O    Minute ventilation: 8.4 l/min       Physical Exam   Constitutional: He does not appear ill. HENT:   Head: Normocephalic and atraumatic. Eyes: No scleral icterus. Cardiovascular: Normal rate and regular rhythm. No murmur heard. Pulmonary/Chest: No accessory muscle usage. No respiratory distress. He has no wheezes. He has no rhonchi. He has no rales. Decreased BS in bases. Abdominal: He exhibits distension. Bowel sounds are absent. Postop abdomen, sq emphysema resolved   Musculoskeletal: He exhibits edema. Neurological: He is alert. Skin: Skin is warm and dry.      Data:     Current Facility-Administered Medications   Medication Dose Route Frequency    potassium phosphate 15 mmol in 0.9% sodium chloride 250 mL infusion   IntraVENous ONCE    TPN ADULT-CENTRAL AA 5% D20%-MVI W/ VIT K-RCH   IntraVENous CONTINUOUS    TPN ADULT-CENTRAL AA 5% D20%-MVI W/ VIT K-RCH   IntraVENous CONTINUOUS    haloperidol lactate (HALDOL) injection 3 mg  3 mg IntraVENous Q6H    metoclopramide HCl (REGLAN) injection 5 mg  5 mg IntraVENous Q6H    aspirin (ASA) suppository 300 mg  300 mg Rectal DAILY    labetalol (NORMODYNE;TRANDATE) injection 10 mg  10 mg IntraVENous Q6H    PHENYLephrine (NEOSYNEPHRINE) 30,000 mcg in 0.9% sodium chloride 250 mL infusion   mcg/min IntraVENous TITRATE    fat emulsion 20% (LIPOSYN, INTRALIPID) infusion 250 mL  250 mL IntraVENous Q MON, WED & FRI    insulin lispro (HUMALOG) injection   SubCUTAneous Q6H    enoxaparin (LOVENOX) injection 40 mg  40 mg SubCUTAneous Q24H    levalbuterol (XOPENEX) nebulizer soln 0.63 mg/3 mL  0.63 mg Nebulization Q4H RT    famotidine (PF) (PEPCID) 20 mg in sodium chloride 0.9 % 10 mL injection  20 mg IntraVENous Q12H    sodium chloride (NS) flush 5-10 mL  5-10 mL IntraVENous Q8H    0.9% sodium chloride infusion  25 mL/hr IntraVENous CONTINUOUS    sodium chloride (NS) flush 10-40 mL  10-40 mL InterCATHeter Q8H                Labs:  Recent Labs      08/08/17   0331 08/07/17   0424  08/06/17   0406   WBC  14.2*  16.1*  15.4*   HGB  11.0*  11.3*  11.0*   HCT  33.9*  35.2*  34.1*   PLT  309  289  317     Recent Labs      08/08/17   0331  08/07/17   0424  08/06/17   0406   NA  138  138  144   K  3.9  3.9  3.7   CL  104  105  108   CO2  29  28  29   GLU  175*  188*  146*   BUN  33*  44*  33*   CREA  0.96  1.31*  1.02   CA  7.5*  7.3*  6.8*   MG  2.5*  2.4  2.1   PHOS  2.4*  3.1  3.6   ALB  1.6*  1.6*   --    TBILI  1.1*  1.4*   --    SGOT  75*  54*   --    ALT  61  44   --      Recent Labs      08/07/17   0825  08/05/17   2154   PH  7.49*  7.42   PCO2  38  51*   PO2  86  166*   HCO3  28*  32*   FIO2  30  100     Imaging:  I have personally reviewed the patients radiographs and have reviewed the reports:  8-7-17: basilar atx.          Total critical care time exclusive of procedures: 35 minutes  Galindo Ann MD

## 2017-08-08 NOTE — PROGRESS NOTES
Nutrition Assessment:    RECOMMENDATIONS:   Continue TPN as ordered  Advance diet as medically able per surgeon    ASSESSMENT:   Chart reviewed, case discussed during CCU rounds. Pt extubated yesterday. He is s/p wound dehiscence + ex lap and closure on 8/5. NGT remains in place with 1250mL OP yesterday. TPN running at recommended goal rate which is adequate to meet 100% kcal and protein needs. Still no BM and not much in the way of bowel sounds. Labs reviewed, electrolytes are stable. Dietitians Intervention(s)/Plan(s): Advance diet as able per surgeon, Continue TPN  SUBJECTIVE/OBJECTIVE:     Diet Order: NPO, Other (comment) (TPN: D20, 5% AA @ 83mL/h (provides 1967kcals/100gPro) )  % Eaten:  No data found. to suction  at   flush with       via NG Tube   Residuals: 1250 mL    Pertinent Medications:ampicillin, CaGluconate, pepcid, humalog, reglan, KPhos; [email or web address]). Chemistries:  Lab Results   Component Value Date/Time    Sodium 138 08/08/2017 03:31 AM    Potassium 3.9 08/08/2017 03:31 AM    Chloride 104 08/08/2017 03:31 AM    CO2 29 08/08/2017 03:31 AM    Anion gap 5 08/08/2017 03:31 AM    Glucose 175 08/08/2017 03:31 AM    BUN 33 08/08/2017 03:31 AM    Creatinine 0.96 08/08/2017 03:31 AM    BUN/Creatinine ratio 34 08/08/2017 03:31 AM    GFR est AA >60 08/08/2017 03:31 AM    GFR est non-AA >60 08/08/2017 03:31 AM    Calcium 7.5 08/08/2017 03:31 AM    Albumin 1.6 08/08/2017 03:31 AM      Anthropometrics: Height: 5' 10\" (177.8 cm) Weight: 96.5 kg (212 lb 11.9 oz)    IBW (%IBW):   ( ) UBW (%UBW):   (  %)    BMI: Body mass index is 30.53 kg/(m^2). This BMI is indicative of:  []Underweight   []Normal   []Overweight   [x] Obesity   [] Extreme Obesity (BMI>40)  Estimated Nutrition Needs (Based on): 1963 Kcals/day (MSJ 1636 x 1.2) , 85 g (0.8gPro/kg) Protein  Carbohydrate:  At Least 130 g/day  Fluids: 2000 mL/day    Last BM: PTA   []Active     []Hyperactive  [x]Hypoactive       [] Absent   BS  Skin: [] Intact   [x] Incision  [] Breakdown   [] DTI   [] Tears/Excoriation/Abrasion  []Edema [] Other: Wt Readings from Last 30 Encounters:   08/08/17 96.5 kg (212 lb 11.9 oz)   07/26/17 95.8 kg (211 lb 3.2 oz)   03/26/17 97.7 kg (215 lb 6.2 oz)   11/03/16 98.6 kg (217 lb 6 oz)   03/17/15 97.2 kg (214 lb 4.8 oz)   10/02/14 90.7 kg (200 lb)   05/16/13 96.9 kg (213 lb 10 oz)   02/10/13 99.5 kg (219 lb 5.7 oz)   04/02/12 96.1 kg (211 lb 13.8 oz)   03/13/11 96.8 kg (213 lb 6.5 oz)      NUTRITION DIAGNOSES:   Problem:  Altered GI function      Etiology: related to postop ileus     Signs/Symptoms: as evidenced by NGT to suction, pt NPO. Previous dx re: altered GI function continues, pt remains NPO and on TPN. NUTRITION INTERVENTIONS:  Meals/Snacks: Other (advance diet as medically able per surgeon) Enteral/Parenteral Nutrition: Other (Continue TPN as ordered)                GOAL:   Pt will tolerate TPN @ goal rate with stable electrolytes in 2-4 days.      NUTRITION MONITORING AND EVALUATION   Previous Goal: Pt will tolerate TPN @ goal rate with stable electrolytes in 2-4 days  Previous Goal Met: Yes   Previous Recommendations Implemented: Yes   Cultural, Rastafari, or Ethnic Dietary Needs: None   LEARNING NEEDS (Diet, Food/Nutrient-Drug Interaction):    [x] None Identified   [] Identified and Education Provided/Documented   [] Identified and Pt declined/was not appropriate      [x] Interdisciplinary Care Plan Reviewed/Documented    [x] Participated in Discharge Planning: Unable to determine    [x] Interdisciplinary Rounds     NUTRITION RISK:    [x] High              [] Moderate           []  Low  []  Minimal/Uncompromised      Obadiah Signs, [address], [address] [place] [address]   Pager [phone]  Weekend Pager [phone]

## 2017-08-09 NOTE — PROGRESS NOTES
Hospitalist Progress Note    NAME: Chuck Tyler   :  1928   MRN:  [id number]       Interim Hospital Summary: 80 y.o. male whom presented on 2017 with      Assessment / Plan:  Postop respiratory failure re-intubated after surgery for wound dehiscence, s/p extubation   S/P removal of bladder stone and repair of perforated bladder  S/P subsequent ex-lap due to extensive sq emphysema in abdomen/retroperitoneum - no sign of bowel perforation. Wound Dehiscence s/p ex lap, has abdominal binder  Shock improved  LINDSEY likely 2/2 to hypotension, improving   \"pneumothorax\" due to dissection of air from retroperitoneum cephalad, different physiology or behavior of typical pneumothorax -conservative management  CAD, PAF with RVR- off cardizem gtt , now NSR  Anemia, post op s/p PRBC  UTI with enterococcus on ampicillin  Electrolyte disturbance replete as indicated  HTN labetalol  Encephalopathy ? Medication/infectious etiology, on IV abx for UTI , off sedation, more alert. On TPN, Paralytic ileus, NGT with drainage, with + BS, reglan  Critically ill  Code status: FULL code  Prophylaxis: per primary team  Recommended Disposition: per primary team-      Subjective:     Chief Complaint / Reason for Physician Visit intubated, opens eyes to vocal stimuli, answering questions   Discussed with RN events overnight. Review of Systems:  Symptom Y/N Comments  Symptom Y/N Comments   Fever/Chills    Chest Pain     Poor Appetite    Edema     Cough    Abdominal Pain     Sputum    Joint Pain     SOB/CLARKE    Pruritis/Rash     Nausea/vomit    Tolerating PT/OT     Diarrhea    Tolerating Diet     Constipation    Other  No pain     Could NOT obtain due to: encephalopathy     Objective:     VITALS:   Last 24hrs VS reviewed since prior progress note.  Most recent are:  Patient Vitals for the past 24 hrs:   Temp Pulse Resp BP SpO2   17 1125 - 66 17 154/59 100 %   17 1115 97.5 °F (36.4 °C) 63 17 120/49 98 %   17 1108 - 71 - 161/58 -   08/09/17 1100 - 70 15 161/58 99 %   08/09/17 1030 - 79 15 160/60 100 %   08/09/17 1000 - 68 19 146/50 100 %   08/09/17 0930 - 66 14 151/48 98 %   08/09/17 0903 - 71 - 181/61 -   08/09/17 0900 - 70 17 181/61 99 %   08/09/17 0800 97.9 °F (36.6 °C) 66 16 164/58 100 %   08/09/17 0701 - 65 17 - 100 %   08/09/17 0700 - 63 16 149/58 100 %   08/09/17 0600 - 65 18 147/63 100 %   08/09/17 0517 - 71 - 164/60 -   08/09/17 0400 - 66 16 151/64 100 %   08/09/17 0322 97.7 °F (36.5 °C) 67 19 149/63 100 %   08/09/17 0300 - 64 17 151/56 100 %   08/09/17 0200 - 65 17 143/49 100 %   08/09/17 0100 - 74 19 155/54 100 %   08/09/17 0019 - 73 - 150/60 -   08/09/17 0000 - 71 23 (!) 128/29 100 %   08/08/17 2300 97.8 °F (36.6 °C) 66 18 150/56 98 %   08/08/17 2221 - 71 - (!) 158/20 -   08/08/17 2200 - 71 18 158/61 99 %   08/08/17 2100 - 72 20 161/55 100 %   08/08/17 2000 98.1 °F (36.7 °C) 67 16 148/53 100 %   08/08/17 1900 - 69 17 142/54 100 %   08/08/17 1800 - 67 16 135/55 100 %   08/08/17 1741 - 75 - 150/52 -   08/08/17 1700 - 76 18 150/52 100 %   08/08/17 1600 - 76 21 143/53 100 %   08/08/17 1555 98.4 °F (36.9 °C) - - - -   08/08/17 1513 - 69 - 162/60 -   08/08/17 1500 - 70 17 162/60 100 %   08/08/17 1307 98.1 °F (36.7 °C) - - - -   08/08/17 1300 - 70 19 (!) 155/98 100 %   08/08/17 1224 - 66 16 147/56 100 %       Intake/Output Summary (Last 24 hours) at 08/09/17 1156  Last data filed at 08/09/17 1114   Gross per 24 hour   Intake          3320.74 ml   Output             3067 ml   Net           253.74 ml        PHYSICAL EXAM:  General:  nad, opening eyes, answering questions, sluggish  EENT:  EOMI. Anicteric sclerae. MMM  Resp:  decrease BS BL, no wheezing or rales.   No accessory muscle use  CV:  Regular  rhythm,  + jose pedal and UE edema decreasing  GI:  Soft, +distended, Non tender.  +Bowel sounds, binder  Neurologic:  Limited as encepahopathic    Reviewed most current lab test results and cultures  YES  Reviewed most current radiology test results   YES  Review and summation of old records today    NO  Reviewed patient's current orders and MAR    YES  PMH/SH reviewed - no change compared to H&P  ________________________________________________________________________  Care Plan discussed with:    Comments   Patient     Family      RN x    Care Manager     Consultant                        Multidiciplinary team rounds were held today with , nursing, pharmacist and clinical coordinator. Patient's plan of care was discussed; medications were reviewed and discharge planning was addressed. ________________________________________________________________________  Total NON critical care TIME:  25  Minutes    Total CRITICAL CARE TIME Spent:   Minutes non procedure based      Comments   >50% of visit spent in counseling and coordination of care     ________________________________________________________________________  Antwon Conley MD     Procedures: see electronic medical records for all procedures/Xrays and details which were not copied into this note but were reviewed prior to creation of Plan. LABS:  I reviewed today's most current labs and imaging studies.   Pertinent labs include:  Recent Labs      08/09/17   0346  08/08/17   0331  08/07/17   0424   WBC  11.8*  14.2*  16.1*   HGB  10.9*  11.0*  11.3*   HCT  33.2*  33.9*  35.2*   PLT  329  309  289     Recent Labs      08/09/17   0346  08/08/17   0331  08/07/17   0424   NA  137  138  138   K  4.4  3.9  3.9   CL  104  104  105   CO2  29  29  28   GLU  176*  175*  188*   BUN  36*  33*  44*   CREA  1.02  0.96  1.31*   CA  7.9*  7.5*  7.3*   MG  2.2  2.5*  2.4   PHOS  3.2  2.4*  3.1   ALB  1.7*  1.6*  1.6*   TBILI  0.9  1.1*  1.4*   SGOT  72*  75*  54*   ALT  71  61  44       Signed: Antwon Conley MD

## 2017-08-09 NOTE — PROGRESS NOTES
2000  Patient assessed. Daughters at bedside. VSS. Afebrile. NSR with occasional PVCs. Patient alert to person and says he knows he is in the hospital. Follows commands. Denies pain but tender on exam and yells when turned. Lungs a little coarse in the upper lobes, diminished in the bases, pt trying to cough but it is weak and non productive. Skin intact, midline abdominal incision CDI. Pulses palpable. 1+ generalized edema, pitting sacrum and BLE.     2230  Patient restless, now oriented to place, says he is not in pain, just trying to get comfortable. Explained use of NGT and importance of not pulling on it. PRN hydralazine given, .     2240  NGT pulled out a few cm by patient, put back to 65 and taped, air bolus to check placement heard. 2258  Paged Dr. Theo Martines, Patient seems to be restless and possibly in pain. Ok to given tylenol early, will try and stay away from narcotics if possible, pt is sensitive. 2300  Tylenol given. 0000  Patient repositioned on his back per request, CLRT turned on. Patient seems to be resting minimally better since the tylenol will continue to monitor, he denies pain, but grimaces with turning and coughing. VSS. Afebrile. Patient is having a hard time coughing up secretions, he gets them up at times but swallows them back down; RN tried to help him with splinting.     0120  Patient states, \" My belly hurts. \"  Dr. Theo Martinse paged. 2922  Dr. Theo Martines paged, informed pt still in pain, orders received for a one time dose of toradol. 0144  Toradol given. Patient seems to be resting better after toradol, slept on and off for about 2 hours. 0330  Patient pulled out NGT.     0400  NGT replaced with some difficulty, 2 attempts (blood noted in drainage cannister). 18 F, 65.     0430  Patient bathed. Wound care performed. 9305  KUB performed. NGT in stomach. Patient is confused.  He knows he isn't at home but doesn't always answer the place correctly, he does not know the date or his situation. RN finds him talking to himself at times but it is not understandable. Patient is in pain, even though he frequently denies this, he yells out and grimaces often. Bedside and Verbal shift change report given to Mei Salazar (oncoming nurse) by Natalia Heard RN (offgoing nurse). Report included the following information SBAR, Kardex, Intake/Output, MAR, Recent Results, Med Rec Status and Cardiac Rhythm NSR.

## 2017-08-09 NOTE — PROGRESS NOTES
Problem: Mobility Impaired (Adult and Pediatric)  Goal: *Acute Goals and Plan of Care (Insert Text)  Physical Therapy Goals  Initiated 8/9/2017  1. Patient will move from supine to sit and sit to supine , scoot up and down and roll side to side in bed with moderate assistance within 7 day(s). 2. Patient will transfer from bed to chair and chair to bed with maximal assistance using the least restrictive device within 7 day(s). 3. Patient will perform sit to stand with maximal assistance within 7 day(s). 4. Patient will tolerate sitting EOB x 5 minutes without external support in preparation for standing task within 7 days. PHYSICAL THERAPY EVALUATION  Patient: Jermaine Santos (32 y.o. male)  Date: 8/9/2017  Primary Diagnosis: bladder clots  Bladder injury  perperated viscous  wound dehiscence  Procedure(s) (LRB):  LAPAROTOMY EXPLORATORY, wound closure for dehiscence (N/A) 4 Days Post-Op   Precautions:    (abdominal binder)      ASSESSMENT :  Based on the objective data described below, the patient presents with decreased strength, impaired sitting balance, decreased functional mobility, and increased abdominal pain s/p cystoscopy and I and D POD 4. PTA per family, patient was independent with all aspects of functional mobility and ADLs. He is a  and lives with his wife with a supportive family. Currently, patient is disoriented to place and time. Patient required total A for rolling and bed mobility. Patient with increase abdominal pain with mobility although able to brace with pillow and abdominal binder on. Patient with impaired sitting balance on EOB although able to statically sit without external support with BUE propped on bed. Patient with posterior lean, although able to correct with verbal cues. Attempted to stand with total A x 2 and HHA although unable to clear surface despite assistance and total manual cues. Patient returned to supine with total A x 2 at the conclusion of PT evaluation. Patient put in the chair position at the conclusion of PT evaluation with VSS on RA. Patient will benefit from rehab vs. TBD pending progress with therapy and medical progression. Patient will benefit from skilled intervention to address the above impairments. Patients rehabilitation potential is considered to be Good  Factors which may influence rehabilitation potential include:   [ ]         None noted  [X]         Mental ability/status  [X]         Medical condition  [ ]         Home/family situation and support systems  [ ]         Safety awareness  [X]         Pain tolerance/management  [ ]         Other:        PLAN :  Recommendations and Planned Interventions:  [X]           Bed Mobility Training             [X]    Neuromuscular Re-Education  [X]           Transfer Training                   [ ]    Orthotic/Prosthetic Training  [ ]           Gait Training                         [ ]    Modalities  [X]           Therapeutic Exercises           [ ]    Edema Management/Control  [X]           Therapeutic Activities            [X]    Patient and Family Training/Education  [ ]           Other (comment):     Frequency/Duration: Patient will be followed by physical therapy  5 times a week to address goals. Discharge Recommendations: Rehab vs To Be Determined  Further Equipment Recommendations for Discharge: TBD       SUBJECTIVE:   Patient stated I'm okay.       OBJECTIVE DATA SUMMARY:   HISTORY:    Past Medical History:   Diagnosis Date    Hypertension      Prostate enlargement       Past Surgical History:   Procedure Laterality Date    ABDOMEN SURGERY PROC UNLISTED         hernia repair    CARDIAC SURG PROCEDURE UNLIST         Prior Level of Function/Home Situation: per family, patient was independent with all aspects of functional mobility and ADLs. He is a  and lives with his wife with a supportive family.   Personal factors and/or comorbidities impacting plan of care: confusion, increased pain Home Situation  Home Environment: Private residence  # Steps to Enter: 4  One/Two Story Residence: Two story  # of Interior Steps: 13  Height of Each Step (in): 5 inches  Interior Rails: Right  Lift Chair Available: No  Living Alone: No  Support Systems: Spouse/Significant Other/Partner, Child(diony)  Patient Expects to be Discharged to[de-identified] Private residence  Current DME Used/Available at Home: [employer] [facility]     EXAMINATION/PRESENTATION/DECISION MAKING:   Critical Behavior:  Neurologic State: Confused, Drowsy  Orientation Level: Disoriented to time, Disoriented to place, Oriented to situation, Oriented to person  Cognition: Follows commands, Poor safety awareness, Recognition of people/places, Decreased attention/concentration     Hearing: Auditory  Auditory Impairment: None  Skin:  Abdominal dressing  Edema: none  Range Of Motion:  AROM: Generally decreased, functional           PROM: Within functional limits           Strength:    Strength: Generally decreased, functional                    Tone & Sensation:   Tone: Normal              Sensation: Intact               Coordination:  Coordination: Within functional limits  Vision:      Functional Mobility:  Bed Mobility:  Rolling: Total assistance  Supine to Sit: Total assistance  Sit to Supine: Total assistance  Scooting: Total assistance  Transfers:  Sit to Stand: Total assistance  Stand to Sit: Total assistance                       Balance:   Sitting: Impaired; Without support  Sitting - Static: Fair (occasional)  Sitting - Dynamic: Fair (occasional) (requires BUE support on EOB)  Standing:  (attempted, unable to clear surface)  Ambulation/Gait Training:              Gait Description (WDL):  (unable to stand)                                                                Stairs:                           Therapeutic Exercises:         Functional Measure:  Barthel Index:      Bathin  Bladder: 0  Bowels: 0  Groomin  Dressin  Feedin  Mobility: 0  Stairs: 0  Toilet Use: 0  Transfer (Bed to Chair and Back): 0  Total: 0         Barthel and G-code impairment scale:  Percentage of impairment CH  0% CI  1-19% CJ  20-39% CK  40-59% CL  60-79% CM  80-99% CN  100%   Barthel Score 0-100 100 99-80 79-60 59-40 20-39 1-19    0   Barthel Score 0-20 20 17-19 13-16 9-12 5-8 1-4 0      The Barthel ADL Index: Guidelines  1. The index should be used as a record of what a patient does, not as a record of what a patient could do. 2. The main aim is to establish degree of independence from any help, physical or verbal, however minor and for whatever reason. 3. The need for supervision renders the patient not independent. 4. A patient's performance should be established using the best available evidence. Asking the patient, friends/relatives and nurses are the usual sources, but direct observation and common sense are also important. However direct testing is not needed. 5. Usually the patient's performance over the preceding 24-48 hours is important, but occasionally longer periods will be relevant. 6. Middle categories imply that the patient supplies over 50 per cent of the effort. 7. Use of aids to be independent is allowed. Jena Purdy., Barthel, D.W. (3837). Functional evaluation: the Barthel Index. [address] [facility] (14)2. DEXTER Bravo, Gareth Jamison., Lenord Shone., [place], [address] [place] [address] (1999). Measuring the change indisability after inpatient rehabilitation; comparison of the responsiveness of the Barthel Index and Functional [place] Measure. Journal of Neurology, Neurosurgery, and Psychiatry, 66(4), 247-960. Corine Hernández, N.J.A, ROMY JensenJ.M, & Sara Sanders MRhiannaA. (2004.) Assessment of post-stroke quality of life in cost-effectiveness studies: The usefulness of the Barthel Index and the EuroQoL-5D. Quality of Life Research, 13, 219-69            G codes:   In compliance with CMSs Claims Based Outcome Reporting, the following G-code set was chosen for this patient based on their primary functional limitation being treated: The outcome measure chosen to determine the severity of the functional limitation was the Barthel with a score of 0/100 which was correlated with the impairment scale. · Mobility - Walking and Moving Around:               - CURRENT STATUS:    CN - 100% impaired, limited or restricted               - GOAL STATUS:           CM - 80%-99% impaired, limited or restricted               - D/C STATUS:                       ---------------To be determined---------------      Physical Therapy Evaluation Charge Determination   History Examination Presentation Decision-Making   HIGH Complexity :3+ comorbidities / personal factors will impact the outcome/ POC  HIGH Complexity : 4+ Standardized tests and measures addressing body structure, function, activity limitation and / or participation in recreation  HIGH Complexity : Unstable and unpredictable characteristics  Other outcome measures Barthel  HIGH       Based on the above components, the patient evaluation is determined to be of the following complexity level: HIGH      Pain:  Pain Scale 1: FLACC  Pain Intensity 1: 0  Pain Location 1: Abdomen  Pain Orientation 1: Mid  Pain Description 1: Sore  Pain Intervention(s) 1: Medication (see MAR)  Activity Tolerance:   Fair, VSS on RA. Please refer to the flowsheet for vital signs taken during this treatment. After treatment:   [ ]         Patient left in no apparent distress sitting up in chair  [X]         Patient left in no apparent distress in bed in chair position  [X]         Call bell left within reach  [X]         Nursing notified  [X]         Caregiver present  [ ]         Bed alarm activated      COMMUNICATION/EDUCATION:   The patients plan of care was discussed with: Occupational Therapist, Registered Nurse and .   [X]         Fall prevention education was provided and the patient/caregiver indicated understanding. [X]         Patient/family have participated as able in goal setting and plan of care. [X]         Patient/family agree to work toward stated goals and plan of care. [ ]         Patient understands intent and goals of therapy, but is neutral about his/her participation. [ ]         Patient is unable to participate in goal setting and plan of care.      Thank you for this referral.  Jazmin Borjas, PT, DPT   Time Calculation: 31 mins

## 2017-08-09 NOTE — PROGRESS NOTES
Admit Date: 2017    POD 4 Days Post-Op    Procedure:  Procedure(s):  LAPAROTOMY EXPLORATORY, wound closure for dehiscence    Subjective:     Patient has no new complaints. No bowel function yet    Objective:     Blood pressure 154/59, pulse 66, temperature 97.5 °F (36.4 °C), resp. rate 17, height 5' 10\" (1.778 m), weight 211 lb 6.7 oz (95.9 kg), SpO2 100 %. Temp (24hrs), Av.9 °F (36.6 °C), Min:97.5 °F (36.4 °C), Max:98.4 °F (36.9 °C)      Physical Exam:  GENERAL: alert, cooperative, no distress, appears stated age, LUNG: clear to auscultation bilaterally, HEART: regular rate and rhythm, ABDOMEN: soft, non-tender. Bowel sounds absent. Wound c/d/i with retention sutures in place, EXTREMITIES:  extremities normal, atraumatic, no cyanosis or edema    Labs:   Recent Results (from the past 24 hour(s))   GLUCOSE, POC    Collection Time: 17  5:40 PM   Result Value Ref Range    Glucose (POC) 117 (H) 65 - 100 mg/dL    Performed by Gareth Gant    GLUCOSE, POC    Collection Time: 17 12:03 AM   Result Value Ref Range    Glucose (POC) 154 (H) 65 - 100 mg/dL    Performed by Ricardo Cervantes    CBC WITH AUTOMATED DIFF    Collection Time: 17  3:46 AM   Result Value Ref Range    WBC 11.8 (H) 4.1 - 11.1 K/uL    RBC 3.66 (L) 4.10 - 5.70 M/uL    HGB 10.9 (L) 12.1 - 17.0 g/dL    HCT 33.2 (L) 36.6 - 50.3 %    MCV 90.7 80.0 - 99.0 FL    MCH 29.8 26.0 - 34.0 PG    MCHC 32.8 30.0 - 36.5 g/dL    RDW 14.9 (H) 11.5 - 14.5 %    PLATELET 244 005 - 850 K/uL    NEUTROPHILS 74 32 - 75 %    LYMPHOCYTES 15 12 - 49 %    MONOCYTES 5 5 - 13 %    EOSINOPHILS 5 0 - 7 %    BASOPHILS 1 0 - 1 %    ABS. NEUTROPHILS 8.8 (H) 1.8 - 8.0 K/UL    ABS. LYMPHOCYTES 1.7 0.8 - 3.5 K/UL    ABS. MONOCYTES 0.6 0.0 - 1.0 K/UL    ABS. EOSINOPHILS 0.6 (H) 0.0 - 0.4 K/UL    ABS.  BASOPHILS 0.1 0.0 - 0.1 K/UL   METABOLIC PANEL, COMPREHENSIVE    Collection Time: 17  3:46 AM   Result Value Ref Range    Sodium 137 136 - 145 mmol/L    Potassium 4.4 3.5 - 5.1 mmol/L    Chloride 104 97 - 108 mmol/L    CO2 29 21 - 32 mmol/L    Anion gap 4 (L) 5 - 15 mmol/L    Glucose 176 (H) 65 - 100 mg/dL    BUN 36 (H) 6 - 20 MG/DL    Creatinine 1.02 0.70 - 1.30 MG/DL    BUN/Creatinine ratio 35 (H) 12 - 20      GFR est AA >60 >60 ml/min/1.73m2    GFR est non-AA >60 >60 ml/min/1.73m2    Calcium 7.9 (L) 8.5 - 10.1 MG/DL    Bilirubin, total 0.9 0.2 - 1.0 MG/DL    ALT (SGPT) 71 12 - 78 U/L    AST (SGOT) 72 (H) 15 - 37 U/L    Alk. phosphatase 83 45 - 117 U/L    Protein, total 6.1 (L) 6.4 - 8.2 g/dL    Albumin 1.7 (L) 3.5 - 5.0 g/dL    Globulin 4.4 (H) 2.0 - 4.0 g/dL    A-G Ratio 0.4 (L) 1.1 - 2.2     MAGNESIUM    Collection Time: 08/09/17  3:46 AM   Result Value Ref Range    Magnesium 2.2 1.6 - 2.4 mg/dL   PHOSPHORUS    Collection Time: 08/09/17  3:46 AM   Result Value Ref Range    Phosphorus 3.2 2.6 - 4.7 MG/DL   GLUCOSE, POC    Collection Time: 08/09/17  6:03 AM   Result Value Ref Range    Glucose (POC) 168 (H) 65 - 100 mg/dL    Performed by Shai Camera        Data Review images and reports reviewed    Assessment:     Active Problems:    Bladder injury (7/28/2017)        Plan/Recommendations/Medical Decision Making:     Continue present treatment   NPO until return of bowel function, NGT in place. TPN  Ongoing management per primary team.  Viktoria Slimmer to mobilize with PT    Emily Valadez MD, [facility]

## 2017-08-09 NOTE — PROGRESS NOTES
PT note:    Chart reviewed and spoke with nursing. Orders received from intensivist.  Noted pt had large disadhesion of incision with repair. Pt does have abdominal binder. Therapy will need clarification for mobility from surgeon prior to activity. Nursing reports she will contact surgeon for clarification.       Fahad Mar, PT, DPT

## 2017-08-09 NOTE — PROGRESS NOTES
1012: spoke to Dr. Rosa Weiss to clarify PT/OT eval and ok for OOB. Updated MD on patient condition and plans to transfer to floor. Dr. Rosa Weiss ok with OOB and patient working with PT/OT. MD ok with transfer orders. Will notify PT/OT. 1130: PT/OT at bedside for eval. Pt tolerating well. VSS. Received order for fentanyl ivp x1 dose to post medicate for pain    1544: attempted to call report to bed 2176 on Gen Surg. The RN is in a code blue rurrently on that unit. Unit states the RN will call CCU back. Gave the number ext [phone] to reach me back at.    1700: pt transferred to Gen Surg on Tele. Bedside report given to [facility], RN. Pt transported with personal belongs, meds, and glasses on pt. NGT to suction. Midline abdominal incision assessed together and dressing changed per orders at bedside. Foam dressing intact to sacrum. TPN new bag to be started at 83ml/hr. PICC 2L patent. Family notified of new room number 2178.

## 2017-08-09 NOTE — PROGRESS NOTES
[facility]  Pulmonary, Critical Care, and Sleep Medicine    Name: Keith Matamoros MRN: [id number]   : 1928 Hospital: [facility]   Date: 2017            IMPRESSION:   · Postop respiratory failure, Extubated on 8-7. Now on NC oxygen. · Midline abdominal incision fascial wound dehiscence 17 Eploratory laparotomy with closure of midline incision using retention sutures per Dr. Mukesh Pitt following. · Bladder injury, Uro following. · Encephalopathy-seems better. · Complicated Enterococcal UTI- sensitive to Amp  · Ileus-NPO for now. · CAD with recent stents per   · NSTEMI demand ischemia but now with AF RVR  · AF RVR on Iv diltiazem  · HTN- more than he had at home? ?  · Echo earlier this year LVEF 45%  · Anasarca and volume overload? Third spacing- good response to diuretics  · S/P removal of bladder stone and repair of perforated bladder  · S/P subsequent ex-lap due to extensive sq emphysema in abdomen/retroperitoneum - no sign of bowel perforation  · Shock better, now hypertensive  · Hypocalcemia  · Severe hypoalbuminemia - catabolic  · hypophosphatemia  · Acute renal failure  · CAD  · Appears medically stable, Pt has very minimal reserve. PLAN:   · Appears stable for transfer to Surgery or Medical with Tele. Will get input from everyone else as well. · renew TPN  · Not sure if pt would benefit from a binder before getting up   · Analgesia prn  · Haldol scheduled  · IV reglan- not great choice but want to help bowels recover  · replete lytes  · PICC  · IV ampicillin- finish 7 days course,   · Gentle diuresis  · antihypertensives  · Pain control  · No need for chest tubes for this type of pneumothorax, should resolve spontaneously, but will monitor  · DVT/GI prophylaxis  · Patient requiring restraints due to threat of injury to self, interference with medical devices.    · Will be available to assist in medical management while in the CCU pending disposition     Subjective/Interval History:   I have reviewed the flowsheet and previous days notes. The patient is unable to give any meaningful history or review of systems because the patient is:     Nursing notes reviewed    8-9-17: NO acute issues. Seems stable for transfer to Surgery with Tele.     8-8-17: Pt has no acute complaints. NO chest pain, no back pain. Has NG tube in place. Still with moderate output. 8/6 on vent overnight after wound closure. On TPN    8/5 seems to be sundowning and striking out at nursing. Still with high NG output. Still on IV pain meds. TPN. No pressors. HTn has been tough to control. By evening pt found to have wound dehiscence and taken to OR for closure    8/4 agitated overnight and now restrained. Easily awakened and in no pain. No SOB. No nausea. TPN    8/3 will arouse some but still sleepy. NG output still dark. Hgb slowly drifting down    8/2 tachypnea. Somnolent. 3:42 PM  Head Ct scan - atrophic changes only. Dark NG output. Will restart Pepcid, may need PPI. 8/1  Two PRBCs given over night > HGB 8.5 > 12.2, HTC 25.8 > 36.2, Lasix 20 mg between the Packs given > polyuria total 3 L overnight, pain medication given with each positions, BP still around 150-176 mmHg despite of hydralazine PRN Q 6 hr, Pain med, carvedilol, new A Fib in morning > Diltiazem 10 mg IV once given HR been around , BP stable,     AFRVR now. SBP > 150 and CVP 10-12. Anasraca. Pt retired . Spoke with daughter at bedside. They would like Dr. Guevara Bolton to see pt. Repeat troponin higher. Pt moaning and abdomen still distended and sore.  NGT in place      Review of Systems   Unable to perform ROS: Mental status change     Objective:   Vital Signs:    Visit Vitals    /61    Pulse 71    Temp 97.9 °F (36.6 °C)    Resp 17    Ht 5' 10\" (1.778 m)    Wt 95.9 kg (211 lb 6.7 oz)    SpO2 99%    BMI 30.34 kg/m2       O2 Device: Room air   O2 Flow Rate (L/min): 4 l/min   Temp (24hrs), Av °F (36.7 °C), Min:97.7 °F (36.5 °C), Max:98.4 °F (36.9 °C)       Intake/Output:   Last shift:      701 - 1900  In: -   Out: 300 [Urine:100]  Last 3 shifts: 1901 - 700  In: 4076.4 [I.V.:4076.4]  Out: 1379 [Urine:2717; Drains:25]    Intake/Output Summary (Last 24 hours) at 17  Last data filed at 17   Gross per 24 hour   Intake          2869.37 ml   Output             2862 ml   Net             7.37 ml     Hemodynamics:   PAP:   CO:     Wedge:   CI:     CVP:  CVP (mmHg): 8 mmHg (17) SVR:       PVR:       Ventilator Settings:  Mode Rate Tidal Volume Pressure FiO2 PEEP   Spontaneous   550 ml  5 cm H2O 30 % 6 cm H20     Peak airway pressure: 12 cm H2O    Minute ventilation: 8.4 l/min       Physical Exam   Constitutional: He does not appear ill. HENT:   Head: Normocephalic and atraumatic. Eyes: No scleral icterus. Cardiovascular: Normal rate and regular rhythm. No murmur heard. Pulmonary/Chest: No accessory muscle usage. No respiratory distress. He has no wheezes. He has no rhonchi. He has no rales. Decreased BS in bases. Abdominal: He exhibits distension. Bowel sounds are absent. Postop abdomen, sq emphysema resolved   Musculoskeletal: He exhibits edema. Neurological: He is alert. Skin: Skin is warm and dry.      Data:     Current Facility-Administered Medications   Medication Dose Route Frequency    nitroglycerin (NITROBID) 2 % ointment 1 Inch  1 Inch Topical BID    TPN ADULT-CENTRAL AA 5% D20%-MVI W/ VIT K-RCH   IntraVENous CONTINUOUS    TPN ADULT-CENTRAL AA 5% D20%-MVI W/ VIT K-RCH   IntraVENous CONTINUOUS    haloperidol lactate (HALDOL) injection 3 mg  3 mg IntraVENous Q6H    metoclopramide HCl (REGLAN) injection 5 mg  5 mg IntraVENous Q6H    aspirin (ASA) suppository 300 mg  300 mg Rectal DAILY    labetalol (NORMODYNE;TRANDATE) injection 10 mg  10 mg IntraVENous Q6H    fat emulsion 20% (LIPOSYN, INTRALIPID) infusion 250 mL  250 mL IntraVENous Q MON, WED & FRI    insulin lispro (HUMALOG) injection   SubCUTAneous Q6H    enoxaparin (LOVENOX) injection 40 mg  40 mg SubCUTAneous Q24H    famotidine (PF) (PEPCID) 20 mg in sodium chloride 0.9 % 10 mL injection  20 mg IntraVENous Q12H    sodium chloride (NS) flush 5-10 mL  5-10 mL IntraVENous Q8H    0.9% sodium chloride infusion  25 mL/hr IntraVENous CONTINUOUS    sodium chloride (NS) flush 10-40 mL  10-40 mL InterCATHeter Q8H                Labs:  Recent Labs      08/09/17   0346  08/08/17   0331  08/07/17   0424   WBC  11.8*  14.2*  16.1*   HGB  10.9*  11.0*  11.3*   HCT  33.2*  33.9*  35.2*   PLT  329  309  289     Recent Labs      08/09/17   0346  08/08/17   0331  08/07/17   0424   NA  137  138  138   K  4.4  3.9  3.9   CL  104  104  105   CO2  29  29  28   GLU  176*  175*  188*   BUN  36*  33*  44*   CREA  1.02  0.96  1.31*   CA  7.9*  7.5*  7.3*   MG  2.2  2.5*  2.4   PHOS  3.2  2.4*  3.1   ALB  1.7*  1.6*  1.6*   TBILI  0.9  1.1*  1.4*   SGOT  72*  75*  54*   ALT  71  61  44     Recent Labs      08/07/17   0825   PH  7.49*   PCO2  38   PO2  86   HCO3  28*   FIO2  30     Imaging:  I have personally reviewed the patients radiographs and have reviewed the reports:  8-7-17: basilar atx.          Total critical care time exclusive of procedures: 35 minutes  Alana Saleh MD

## 2017-08-09 NOTE — PROGRESS NOTES
Cardiology Progress Note  8/9/2017    Admit Date: 7/27/2017  Admit Diagnosis: bladder clots  Bladder injury  perperated viscous  wound dehiscence  CC:  None currently  Cardiac Assessment/Plan:   CAD: Circ HORACIO 7/20; Ef 45-50% after PCI. On asa only since  surgery: restart plavix when able from surgical standpoint. HTN: On IV labetalol and prn hydralazine; added standing NTG paste. Change to coreg/losartan when taking PO again or can use NG for meds. P Afib: NSR currently; If recurrent, would use amio. Volume status: 8/7: no CHF on CXR; low O2 requirement, Cr up to 1.3: held lasix. Resp failure, post-op bladder repair/stone extraction/explor lap/dehis, anemia, ID, Dispo: per primary team and consultants. For other plans, see orders. 8/8: Extubated;  slower to respond than usual but no complaints. Cr normalized. No new cardiac recs. 8/9: More alert; no complaints. Added NTG paste as above for BPs. High complexity decision making was performed  X Yes   High-risk of decompensation with multiple organ involvement X Yes   Hospital problem list   Active Hospital Problems    Diagnosis Date Noted    Bladder injury 07/28/2017        Cardiac catheterization 7/20/2017 prior to stenting of left circumflex:  LEFT MAIN: Large vessel with minor luminal irregularities. LAD: A medium-sized vessel with   scattered plaque of 20%-30% throughout its course. Two small mid   diagonals were noted and multiple trivial diagonal vessels. RAMUS: Ramus intermedius is a small vessel with no focal stenosis   that provides perfusion to the anterolateral wall.    CIRCUMFLEX: Moderate-sized vessel that is subtotally occluded in its   proximal and mid portion; there were some faint ANDERSON 1 flow distally   that maybe collateralized.    RCA: Medium-sized dominant vessel giving   rise to small PDA and posterolateral branch vessels; 30% eccentric   mid stenosis is noted, with some systolic compression.    LV: Left ventriculography in MIRANDA projection   reveals no focal wall motion abnormality. Ejection fraction of 55% to   60%. Mild-to-moderate MR. No aortic stenosis on pullback. EDP of 14. Abdominal aortography in AP projection reveals no renal artery   stenosis. There is scattered plaque in the abdominal aorta with no   focal stenosis or aneurysm. Subjective:  Patient reports  []   nothing; unable to communicate    []   intubated   Chest pain x none  consistent with:  Non-cardiac CP         Atypical CP     None now  On going  Anginal CP     Dyspnea x none  at rest  with exertion         improved  unchanged  worse              PND x none  overnight       Orthopnea x none  improved  unchanged  worse   Presyncope x none  improved  unchanged  worse   Ambulated in hallway without symptoms   Yes   Ambulated in room without symptoms  Yes     ROS Hematuria:  Yes X No Dysuria:  Yes X No                (2+  other systems) Cough: Yes X No Sputum: Yes X No                 BRBPR:  Yes X No Melena: Yes X No   No change in family and social history from H&P/Consult note.   Objective:    Physical Exam:  24 hr VS reviewed, overall VSSAF  Temp (24hrs), Av °F (36.7 °C), Min:97.7 °F (36.5 °C), Max:98.4 °F (36.9 °C)    Patient Vitals for the past 8 hrs:   Pulse   17 0701 65   17 0700 63   17 0600 65   17 0517 71   17 0400 66   17 0322 67   17 0300 64   17 0200 65   17 0100 74    Patient Vitals for the past 8 hrs:   Resp   17 0701 17   17 0700 16   17 0600 18   17 0400 16   17 0322 19   17 0300 17   17 0200 17   17 0100 19    Patient Vitals for the past 8 hrs:   BP   17 0700 149/58   17 0600 147/63   17 0517 164/60   17 0400 151/64   17 0322 149/63   17 0300 151/56   17 0200 143/49   17 0100 155/54 Intake/Output Summary (Last 24 hours) at 08/09/17 0834  Last data filed at 08/09/17 0700   Gross per 24 hour   Intake          2869.37 ml   Output             2562 ml   Net           307.37 ml     General: x WD,WN x Elderly  Cachetic x NAD     Agitated  Lethargic  Arousable  Obtunded     Sedated  On Bipap  Intubated                ENT/Palate:  intubated x Dry MM x anicteric x NG in              Respiratory: X CTA  Nl resp effort  Increased effort  No significant change     rhonchi  rales  improved  worse              Cardiovasc: X RRR  IRRR X Nl S1, S2 x No rub     No murmur X No new murmur  Murmur c/w: x No gallop    x No edema  BLE edema:+  RLE edema:+  LLE edema:+     Edema less  Edema more  Edema same  Edema worse    X Nl JVP  Elevated JVP  JVP same  JVP worse    X Carotid wnl x abd aorta not palpated X no peripheral emboli noted                GI: X Post surgical x Min distended X BS not present X No organo- megaly noted              Skin: X Warm, dry  Cold extremites                  Neuro: x A/O x Grossly non- focal  Obtunded  Sedated     Lethargic  Arousable to voice  intubated       cath site intact w/o hematoma or new bruit; distal pulse unchanged  Yes   Data Review:     Telemetry independently reviewed x sinus  chronic afib x 1 triplet  NSVT     ECG independently reviewed  NSR  afib  no significant changes  NSST-Tw chgs   x no new ECG provided for review   Lab results reviewed as noted below. Current medications reviewed as noted below. Recent Labs      08/07/17   0825   PH  7.49*   PCO2  38   PO2  86     No results for input(s): CPK, CKMB in the last 72 hours.     No lab exists for component: TROPONINI  Recent Labs      08/09/17   0346  08/08/17   0331  08/07/17   0424   NA  137  138  138   K  4.4  3.9  3.9   CL  104  104  105   CO2  29  29  28   BUN  36*  33*  44*   CREA  1.02  0.96  1.31*   GLU  176*  175*  188*   PHOS  3.2  2.4*  3.1   CA  7.9*  7.5*  7.3*   ALB  1.7*  1.6*  1.6*   WBC  11.8* 14.2*  16.1*   HGB  10.9*  11.0*  11.3*   HCT  33.2*  33.9*  35.2*   PLT  329  309  289     Recent Labs      08/09/17   0346  08/08/17   0331  08/07/17   0424   SGOT  72*  75*  54*   AP  83  69  62   TBILI  0.9  1.1*  1.4*   TP  6.1*  5.9*  5.8*   ALB  1.7*  1.6*  1.6*   GLOB  4.4*  4.3*  4.2*     No results for input(s): INR, PTP, APTT in the last 72 hours. No lab exists for component: INREXT, INREXT   No results for input(s): FE, TIBC, PSAT, FERR in the last 72 hours.    Lab Results   Component Value Date/Time    Glucose (POC) 168 08/09/2017 06:03 AM    Glucose (POC) 154 08/09/2017 12:03 AM    Glucose (POC) 117 08/08/2017 05:40 PM    Glucose (POC) 162 08/08/2017 11:28 AM    Glucose (POC) 154 08/08/2017 05:24 AM       Current Facility-Administered Medications   Medication Dose Route Frequency    nitroglycerin (NITROBID) 2 % ointment 1 Inch  1 Inch Topical BID    TPN ADULT-CENTRAL AA 5% D20%-MVI W/ VIT K-RCH   IntraVENous CONTINUOUS    levalbuterol (XOPENEX) nebulizer soln 0.63 mg/3 mL  0.63 mg Nebulization Q6H PRN    acetaminophen (TYLENOL) solution 975 mg  975 mg Oral Q12H PRN    Or    acetaminophen (OFIRMEV) infusion 1,000 mg  1,000 mg IntraVENous Q12H PRN    haloperidol lactate (HALDOL) injection 3 mg  3 mg IntraVENous Q6H    metoclopramide HCl (REGLAN) injection 5 mg  5 mg IntraVENous Q6H    aspirin (ASA) suppository 300 mg  300 mg Rectal DAILY    labetalol (NORMODYNE;TRANDATE) injection 10 mg  10 mg IntraVENous Q6H    hydrALAZINE (APRESOLINE) 20 mg/mL injection 20 mg  20 mg IntraVENous Q4H PRN    labetalol (NORMODYNE;TRANDATE) injection 20 mg  20 mg IntraVENous Q4H PRN    fat emulsion 20% (LIPOSYN, INTRALIPID) infusion 250 mL  250 mL IntraVENous Q MON, WED & FRI    insulin lispro (HUMALOG) injection   SubCUTAneous Q6H    glucose chewable tablet 16 g  4 Tab Oral PRN    dextrose (D50W) injection syrg 12.5-25 g  12.5-25 g IntraVENous PRN    glucagon (GLUCAGEN) injection 1 mg  1 mg IntraMUSCular PRN    enoxaparin (LOVENOX) injection 40 mg  40 mg SubCUTAneous Q24H    famotidine (PF) (PEPCID) 20 mg in sodium chloride 0.9 % 10 mL injection  20 mg IntraVENous Q12H    sodium chloride (NS) flush 5-10 mL  5-10 mL IntraVENous Q8H    ondansetron (ZOFRAN) injection 4 mg  4 mg IntraVENous Q4H PRN    0.9% sodium chloride infusion  25 mL/hr IntraVENous CONTINUOUS    sodium chloride (NS) flush 10 mL  10 mL InterCATHeter PRN    sodium chloride (NS) flush 10-40 mL  10-40 mL Frutoso MD Reina

## 2017-08-09 NOTE — PROGRESS NOTES
Orders received from intensivist.  Noted pt had large disadhesion of incision with repair. Pt does have abdominal binder. Therapy will need clarification for mobility from surgeon prior to activity. Spoke with nursing and she will contact surgeon for clarification.

## 2017-08-09 NOTE — PROGRESS NOTES
Attended Interdisciplinary rounds in Critical Care Unit, where patient care was discussed. Visit by: Cookie Key. Min Rosa.  Jen Sue MA, [address]

## 2017-08-09 NOTE — PROGRESS NOTES
Interdisciplinary team rounds were held  8/9/2017 with the following team members: Diabetes Treatment Specialist, Nursing, Nutrition, Pastoral Care, Pharmacy, Physician and Clinical Coordinator. Plan of care discussed. Goal: See MD orders and progress notes for further  interventions and desired outcomes.

## 2017-08-09 NOTE — PROGRESS NOTES
Follow up visit with Mrs. Jacob Harrell and her niece who is visiting. Assured wife of continued prayers and support. 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 Assured family of care. Pastoral care will continue to follow as needed. [phone]. Visit by: Naz Kumar. [facility].  Deana Martinez MA, [facility]    Lead  Profession Development & Advancement

## 2017-08-09 NOTE — PROGRESS NOTES
PICC line dressing changed per protocol by Katherine Ferreira RN . PICC site and dressing clean, dry and intact. Positive blood return from each port. Flushed with 20 ml of normal saline to each port. All end caps changed. New bio patch and stat lock applied. No complications noted. Connor Pardo RN. BSN. GISEL. CMSRN. PICC Nurse.  Vascular Access Team

## 2017-08-09 NOTE — PROGRESS NOTES
Problem: Self Care Deficits Care Plan (Adult)  Goal: *Acute Goals and Plan of Care (Insert Text)  Occupational Therapy Goals:  Initiated 8/9/2017  1. Patient will perform washing of face in supported sitting or at bed level with HOB elevated with max assist within 7 days. 2. Patient will tolerate 5 minutes of active exercise for increased strength with ADLS within 7 days. 3. Patient will sit edge of bed without support with good dynamic balance in prep for functional tasks within 7 days. 4. Patient will perform sit to stand with max assist in prep for functional ADL transfers within 7 days. OCCUPATIONAL THERAPY EVALUATION  Patient: Sachinfadumo Araujo (41 y.o. male)  Date: 8/9/2017  Primary Diagnosis: bladder clots  Bladder injury  perperated viscous  wound dehiscence  Procedure(s) (LRB):  LAPAROTOMY EXPLORATORY, wound closure for dehiscence (N/A) 4 Days Post-Op   Precautions:   (abdominal binder)      ASSESSMENT :  Based on the objective data described below, the patient presents with general weakness and inability to functionally using UE during tasks (lift arms off bed). He was able to grasp and release and support self with BUE seated edge of bed however. Full support of BUE seated edge of bed with CGA to min assist once balance achieved. Slow processing of request and decreased command following this session. Total assist for bed mobility and ADLS this session. Unable stand today with 2 attempts. Stable vitals during session. Pt had abdominal binder on to support wound and has a suprapubic catheter and penile catheter. Prior to admit pt was exercising and is a . Returned pt to supine in bed in chair position at end of session with stable vitals. BUE supported with pillows for comfort. Patient will benefit from skilled intervention to address the above impairments.   Patients rehabilitation potential is considered to be Fair  Factors which may influence rehabilitation potential include:   [ ]             None noted  [ ]             Mental ability/status  [ ]             Medical condition  [ ]             Home/family situation and support systems  [ ]             Safety awareness  [X]             Pain tolerance/management  [ ]             Other:        PLAN :  Recommendations and Planned Interventions:  [X]               Self Care Training                  [X]        Therapeutic Activities  [X]               Functional Mobility Training    [ ]        Cognitive Retraining  [X]               Therapeutic Exercises           [X]        Endurance Activities  [X]               Balance Training                   [ ]        Neuromuscular Re-Education  [ ]               Visual/Perceptual Training     [X]   Home Safety Training  [X]               Patient Education                 [X]        Family Training/Education  [ ]               Other (comment):     Frequency/Duration: Patient will be followed by occupational therapy 5 times a week to address goals. Discharge Recommendations: [id number] (pending progress)  Further Equipment Recommendations for Discharge: TBD       SUBJECTIVE:   Patient stated Oh.       OBJECTIVE DATA SUMMARY:   HISTORY:   Past Medical History:   Diagnosis Date    Hypertension      Prostate enlargement       Past Surgical History:   Procedure Laterality Date    ABDOMEN SURGERY PROC UNLISTED         hernia repair    CARDIAC SURG PROCEDURE UNLIST            Prior Level of Function/Home Situation: rides recumbent bike at home; works as a ; performed all ADLs and IADLS without assist; suction grab bars in shower; was not using assist devices prior to first admit to hospital but had balance deficits and touched objects while walking; was using RW just prior to admit  Expanded or extensive additional review of patient history:      Home Situation  Home Environment: Private residence  # Steps to Enter: 4  One/Two Story Residence: Two story  # of Interior Steps: 13  Height of Each Step (in): 5 inches  Interior Rails: Right  Lift Chair Available: No  Living Alone: No  Support Systems: Spouse/Significant Other/Partner, Child(diony)  Patient Expects to be Discharged to[de-identified] Private residence  Current DME Used/Available at Home: [employer]  [X]  Right hand dominant             [ ]  Left hand dominant     EXAMINATION OF PERFORMANCE DEFICITS:  Cognitive/Behavioral Status:  Neurologic State: Drowsy  Orientation Level: Oriented to person;Oriented to place; Disoriented to time  Cognition: Decreased command following;Decreased attention/concentration  Perception: Appears intact  Perseveration: No perseveration noted  Safety/Judgement: Fall prevention     Skin: large abdominal incision           Hearing: Auditory  Auditory Impairment: None     Vision/Perceptual:            Grossly intact; wears bifocals                    Corrective Lenses: Glasses (bifocals)     Range of Motion:     AROM: Generally decreased, functional (BLE; non functional BUE shoulders/elbows; good grasp Bliater)  PROM: Within functional limits                       Strength:     Strength: Grossly decreased, non-functional (BUE; generally decreased LE (unable to stand)                 Coordination:  Coordination: Grossly decreased, non-functional (BUE)  Fine Motor Skills-Upper: Left Impaired;Right Impaired    Gross Motor Skills-Upper: Left Impaired;Right Impaired     Tone & Sensation:     Tone: Normal  Sensation: Intact                       Balance:  Sitting: Impaired; Without support  Sitting - Static: Fair (occasional)  Sitting - Dynamic: Fair (occasional) (requires BUE support on EOB)  Standing:  (attempted, unable to clear surface)     Functional Mobility and Transfers for ADLs:  Bed Mobility:  Rolling: Total assistance  Supine to Sit: Total assistance  Sit to Supine: Total assistance  Scooting: Total assistance     Transfers:  Sit to Stand:  Total assistance  Stand to Sit: Total assistance  Bed to Chair:  (unable this date)     ADL Assessment:  Feeding: Total assistance (and is NPO)     Oral Facial Hygiene/Grooming: Total assistance     Bathing: Total assistance     Upper Body Dressing: Total assistance     Lower Body Dressing: Total assistance     Toileting: Total assistance                    Therapeutic Exercise:  AAROM/PROM BUE and seated balance edge of bed unable to comet to standing  Functional Measure:  Barthel Index:      Bathin  Bladder: 0  Bowels: 0  Groomin  Dressin  Feedin  Mobility: 0  Stairs: 0  Toilet Use: 0  Transfer (Bed to Chair and Back): 0  Total: 0         Barthel and G-code impairment scale:  Percentage of impairment CH  0% CI  1-19% CJ  20-39% CK  40-59% CL  60-79% CM  80-99% CN  100%   Barthel Score 0-100 100 99-80 79-60 59-40 20-39 1-19    0   Barthel Score 0-20 20 17-19 13-16 9-12 5-8 1-4 0      The Barthel ADL Index: Guidelines  1. The index should be used as a record of what a patient does, not as a record of what a patient could do. 2. The main aim is to establish degree of independence from any help, physical or verbal, however minor and for whatever reason. 3. The need for supervision renders the patient not independent. 4. A patient's performance should be established using the best available evidence. Asking the patient, friends/relatives and nurses are the usual sources, but direct observation and common sense are also important. However direct testing is not needed. 5. Usually the patient's performance over the preceding 24-48 hours is important, but occasionally longer periods will be relevant. 6. Middle categories imply that the patient supplies over 50 per cent of the effort. 7. Use of aids to be independent is allowed. Gina Ponce., Barthel, D.W. (7392). Functional evaluation: the Barthel Index. [facility] (14)2. DEXTER Fay, Leno Cisneros., Buckreyna Louie., [place], [address] [place] [address] Ave ().  Measuring the change indisability after inpatient rehabilitation; comparison of the responsiveness of the Barthel Index and Functional Stephenson Measure. Journal of Neurology, Neurosurgery, and Psychiatry, 66(4), 192-470. TESSIE Quesada, SATYA Jensen, & Lisset Chase M.A. (2004.) Assessment of post-stroke quality of life in cost-effectiveness studies: The usefulness of the Barthel Index and the EuroQoL-5D. Quality of Life Research, 13, 146-51            G codes: In compliance with CMSs Claims Based Outcome Reporting, the following G-code set was chosen for this patient based on their primary functional limitation being treated: The outcome measure chosen to determine the severity of the functional limitation was the barthel with a score of 0/100 which was correlated with the impairment scale. · Self Care:               - CURRENT STATUS:    CN - 100% impaired, limited or restricted               - GOAL STATUS:           CM - 80%-99% impaired, limited or restricted               - D/C STATUS:                       ---------------To be determined---------------      Occupational Therapy Evaluation Charge Determination   History Examination Decision-Making   MEDIUM Complexity : Expanded review of history including physical, cognitive and psychosocial  history  MEDIUM Complexity : 3-5 performance deficits relating to physical, cognitive , or psychosocial skils that result in activity limitations and / or participation restrictions MEDIUM Complexity : Patient may present with comorbidities that affect occupational performnce.  Miniml to moderate modification of tasks or assistance (eg, physical or verbal ) with assesment(s) is necessary to enable patient to complete evaluation       Based on the above components, the patient evaluation is determined to be of the following complexity level: MEDIUM  Pain:  Pain Scale 1: FLACC  Pain Intensity 1: 0              Activity Tolerance:      Please refer to the flowsheet for vital signs taken during this treatment. After treatment:   [X] Patient left in no apparent distress sitting up in chair position in bed  [ ] Patient left in no apparent distress in bed  [X] Call bell left within reach  [X] Nursing notified  [X] Caregiver present  [ ] Bed alarm activated      COMMUNICATION/EDUCATION:   The patients plan of care was discussed with: Physical Therapist, Registered Nurse and patient. [ ] Home safety education was provided and the patient/caregiver indicated understanding. [X] family have participated as able in goal setting and plan of care. [X] family agree to work toward stated goals and plan of care. [ ] Patient understands intent and goals of therapy, but is neutral about his/her participation. [X] Patient is unable to participate in goal setting and plan of care. This patients plan of care is appropriate for delegation to [facility].      Thank you for this referral.  Ritchie Goodell, OTR/L  Time Calculation: 30 mins

## 2017-08-10 NOTE — PROGRESS NOTES
Hospitalist Progress Note    NAME: Kailey Ferrera   :  1928   MRN:  [id number]       Interim Hospital Summary: 80 y.o. male whom presented on 2017 with      Assessment / Plan:  Postop respiratory failure re-intubated after surgery for wound dehiscence, s/p extubation   S/P removal of bladder stone and repair of perforated bladder  S/P subsequent ex-lap due to extensive sq emphysema in abdomen/retroperitoneum - no sign of bowel perforation. Wound Dehiscence s/p ex lap, has abdominal binder  Shock improved  LINDSEY likely 2/2 to hypotension, improving   \"pneumothorax\" due to dissection of air from retroperitoneum cephalad, different physiology or behavior of typical pneumothorax -conservative management  CAD, PAF with RVR- off cardizem gtt , now NSR, restart plavix once cleraed by surgery  Anemia, post op s/p PRBC  UTI with enterococcus on ampicillin  Electrolyte disturbance replete as indicated  HTN labetalol, nitrobid  Encephalopathy ? Medication/infectious etiology, on IV abx for UTI, more alert. On TPN, Paralytic ileus, NGT rwith cont drainage ? hematemesis, reglan, KUB no bowel distension, will request GI eval    Code status: FULL code  Prophylaxis: per primary team  Recommended Disposition: per primary team-      Subjective:     Chief Complaint / Reason for Physician Visit intubated,   Opens eyes to vocal stimuli, answering questions, no pain, feeling fine   Discussed with RN events overnight. Review of Systems:  Symptom Y/N Comments  Symptom Y/N Comments   Fever/Chills    Chest Pain     Poor Appetite    Edema     Cough    Abdominal Pain     Sputum    Joint Pain     SOB/CLARKE    Pruritis/Rash     Nausea/vomit    Tolerating PT/OT     Diarrhea    Tolerating Diet     Constipation    Other  No pain     Could NOT obtain due to: encephalopathy     Objective:     VITALS:   Last 24hrs VS reviewed since prior progress note.  Most recent are:  Patient Vitals for the past 24 hrs:   Temp Pulse Resp BP SpO2   08/10/17 1018 99.1 °F (37.3 °C) 73 22 155/72 92 %   08/10/17 0749 - 74 - 153/64 -   08/10/17 0315 98.2 °F (36.8 °C) 86 16 136/64 98 %   08/10/17 0013 98 °F (36.7 °C) 76 19 147/66 96 %   08/09/17 2045 97.8 °F (36.6 °C) 74 17 134/64 98 %   08/09/17 1714 97.1 °F (36.2 °C) 78 16 162/60 96 %   08/09/17 1614 - - - 167/65 -   08/09/17 1330 - 65 14 139/66 96 %   08/09/17 1300 - 68 19 153/53 100 %   08/09/17 1230 - 64 17 143/54 100 %       Intake/Output Summary (Last 24 hours) at 08/10/17 1218  Last data filed at 08/10/17 1034   Gross per 24 hour   Intake          1705.66 ml   Output             2415 ml   Net          -709.34 ml        PHYSICAL EXAM:  General:  nad, opening eyes, answering questions, sluggish  EENT:  EOMI. Anicteric sclerae. MMM  Resp:  decrease BS BL, no wheezing or rales. No accessory muscle use  CV:  Regular  rhythm,  + jose pedal and UE edema improving  GI:  Soft, + Non tender. diminished Bowel sounds, binder  Neurologic:  Limited as encepahopathic    Reviewed most current lab test results and cultures  YES  Reviewed most current radiology test results   YES  Review and summation of old records today    NO  Reviewed patient's current orders and MAR    YES  PMH/ reviewed - no change compared to H&P  ________________________________________________________________________  Care Plan discussed with:    Comments   Patient     Family      RN x    Care Manager     Consultant                        Multidiciplinary team rounds were held today with , nursing, pharmacist and clinical coordinator. Patient's plan of care was discussed; medications were reviewed and discharge planning was addressed.      ________________________________________________________________________  Total NON critical care TIME:  25  Minutes    Total CRITICAL CARE TIME Spent:   Minutes non procedure based      Comments   >50% of visit spent in counseling and coordination of care ________________________________________________________________________  Roby Jeffries MD     Procedures: see electronic medical records for all procedures/Xrays and details which were not copied into this note but were reviewed prior to creation of Plan. LABS:  I reviewed today's most current labs and imaging studies.   Pertinent labs include:  Recent Labs      08/10/17   0223  08/09/17   0346  08/08/17   0331   WBC  10.9  11.8*  14.2*   HGB  10.7*  10.9*  11.0*   HCT  31.6*  33.2*  33.9*   PLT  340  329  309     Recent Labs      08/10/17   0223  08/09/17   0346  08/08/17   0331   NA  136  137  138   K  4.5  4.4  3.9   CL  103  104  104   CO2  28  29  29   GLU  162*  176*  175*   BUN  36*  36*  33*   CREA  1.05  1.02  0.96   CA  7.6*  7.9*  7.5*   MG  2.0  2.2  2.5*   PHOS  3.1  3.2  2.4*   ALB  1.7*  1.7*  1.6*   TBILI  0.7  0.9  1.1*   SGOT  60*  72*  75*   ALT  67  71  61       Signed: Roby Jeffries MD

## 2017-08-10 NOTE — PROGRESS NOTES
Admit Date: 2017    POD 5 Days Post-Op    Procedure:  Procedure(s):  LAPAROTOMY EXPLORATORY, wound closure for dehiscence    Subjective:     Patient has no new complaints. No bowel function yet, stil with high NG o/p    Objective:     Blood pressure 153/64, pulse 74, temperature 98.2 °F (36.8 °C), resp. rate 16, height 5' 10\" (1.778 m), weight 211 lb 6.7 oz (95.9 kg), SpO2 98 %. Temp (24hrs), Av.7 °F (36.5 °C), Min:97.1 °F (36.2 °C), Max:98.2 °F (36.8 °C)      Physical Exam:  GENERAL: alert, cooperative, no distress, appears stated age, LUNG: clear to auscultation bilaterally, HEART: regular rate and rhythm, ABDOMEN: soft, non-tender. Bowel sounds absent. Wound c/d/i with retention sutures in place, EXTREMITIES:  extremities normal, atraumatic, no cyanosis or edema    Labs:   Recent Results (from the past 24 hour(s))   GLUCOSE, POC    Collection Time: 17  5:22 PM   Result Value Ref Range    Glucose (POC) 104 (H) 65 - 100 mg/dL    Performed by Mick Cisse (PCT)    GLUCOSE, POC    Collection Time: 08/10/17 12:11 AM   Result Value Ref Range    Glucose (POC) 194 (H) 65 - 100 mg/dL    Performed by Ladonna Menard    METABOLIC PANEL, COMPREHENSIVE    Collection Time: 08/10/17  2:23 AM   Result Value Ref Range    Sodium 136 136 - 145 mmol/L    Potassium 4.5 3.5 - 5.1 mmol/L    Chloride 103 97 - 108 mmol/L    CO2 28 21 - 32 mmol/L    Anion gap 5 5 - 15 mmol/L    Glucose 162 (H) 65 - 100 mg/dL    BUN 36 (H) 6 - 20 MG/DL    Creatinine 1.05 0.70 - 1.30 MG/DL    BUN/Creatinine ratio 34 (H) 12 - 20      GFR est AA >60 >60 ml/min/1.73m2    GFR est non-AA >60 >60 ml/min/1.73m2    Calcium 7.6 (L) 8.5 - 10.1 MG/DL    Bilirubin, total 0.7 0.2 - 1.0 MG/DL    ALT (SGPT) 67 12 - 78 U/L    AST (SGOT) 60 (H) 15 - 37 U/L    Alk.  phosphatase 90 45 - 117 U/L    Protein, total 6.3 (L) 6.4 - 8.2 g/dL    Albumin 1.7 (L) 3.5 - 5.0 g/dL    Globulin 4.6 (H) 2.0 - 4.0 g/dL    A-G Ratio 0.4 (L) 1.1 - 2.2     MAGNESIUM Collection Time: 08/10/17  2:23 AM   Result Value Ref Range    Magnesium 2.0 1.6 - 2.4 mg/dL   PHOSPHORUS    Collection Time: 08/10/17  2:23 AM   Result Value Ref Range    Phosphorus 3.1 2.6 - 4.7 MG/DL   CBC W/O DIFF    Collection Time: 08/10/17  2:23 AM   Result Value Ref Range    WBC 10.9 4.1 - 11.1 K/uL    RBC 3.45 (L) 4.10 - 5.70 M/uL    HGB 10.7 (L) 12.1 - 17.0 g/dL    HCT 31.6 (L) 36.6 - 50.3 %    MCV 91.6 80.0 - 99.0 FL    MCH 31.0 26.0 - 34.0 PG    MCHC 33.9 30.0 - 36.5 g/dL    RDW 14.8 (H) 11.5 - 14.5 %    PLATELET 815 483 - 044 K/uL       Data Review images and reports reviewed    Assessment:     Active Problems:    Bladder injury (7/28/2017)        Plan/Recommendations/Medical Decision Making:     Continue present treatment   NPO until return of bowel function, NGT in place. TPN  Ongoing management per primary team.  Everardo Ely to mobilize with PT    Tootie Gallagher MD, [facility]

## 2017-08-10 NOTE — PROGRESS NOTES
Since Pt is a Readmit the CCU CM will be following this Pt. Please contact [phone] for any CM needs. Pt transitioned to Gen Surg Room 2174 8/9/17 at 5 PM.  Therapy was able to work with Pt yesterday and it looks like pending how Pt progresses with therapy Pt may be a candidate for Inpatient Rehab or SNF services. Pt has [place] and will require authorization for both services. Please let CM know if you would like us to talk to Pt about those services and see if Pt is open to rehab. [facility] sometimes will not authorize inpatient rehab but will cover SNF so the early we get started the better. CM will continue to monitor discharge plan.       Chuck Petit CM  Covering for [facility] [employer], CCU CM  Ext [phone]

## 2017-08-10 NOTE — PROGRESS NOTES
On floor  NGT (1000cc/24 hrs bilious)/NPO/TPN - asks for ice cream, no BM  Ampicillin UTI  ARMEN out    Awaiting resolution of ileus  Goog UOp via SP/wick primarily SP

## 2017-08-10 NOTE — CONSULTS
Gastroenterology Consultation Note  Dr. Lewis Butler    NAME: Concepcion Gallegos : 1928 MRN: [id number]   PCP: Kenisha Rinaldi MD   Primary Gastroenterologist: Dr. Sheridan Kwok    Date/Time:  8/10/2017 3:52 PM  Subjective:   REASON FOR CONSULT:      Concepcion Gallegos is a 80 y.o.  male who I was asked to see for abdominal distention and NG output. He has a very complex recent surgery history being followed by Dr. Marycarmen León and Dr. Darinel Hull: urology and gen surg. Initial surgery 17 for cystoscopy with resultant large bladder perforation requiring Ex Lap and repair of bladder perf. Subsequently had wound dehiscence and also some emphysematous appearing stomach at surgery and has remained with NG tube in place with some darker colored outputs. His Hgb is stable around 11 and has not had any BMs and has been continued on TPN. KUB today without bowel distention. He has been seen by Dr. 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 He is currently on famotidine 20 mg BID. Past Medical History:   Diagnosis Date    Hypertension     Prostate enlargement       Past Surgical History:   Procedure Laterality Date    ABDOMEN SURGERY PROC UNLISTED      hernia repair    CARDIAC SURG PROCEDURE UNLIST       Social History   Substance Use Topics    Smoking status: Never Smoker    Smokeless tobacco: Never Used    Alcohol use No      History reviewed. No pertinent family history. Allergies   Allergen Reactions    Metoprolol Nausea and Vomiting      Home Medications:  Prior to Admission Medications   Prescriptions Last Dose Informant Patient Reported? Taking? Arginine HCl, L-Arginine, 1,000 mg tab 2017 at Unknown time  Yes Yes   Sig: Take 1,000 mg by mouth daily. IRON, FERROUS SULFATE, PO 2017 at Unknown time  Yes Yes   Sig: Take 30 mg by mouth daily.    Lactobacillus acidophilus (ACIDOPHILUS) cap 2017 at Unknown time  Yes Yes alpha lipoic acid 200 mg cap 2017 at Unknown time  Yes Yes   Sig: Take 200 mg by mouth daily. ascorbic acid, vitamin C, (VITAMIN C WITH DAVID HIPS) 1,000 mg tablet 2017 at Unknown time  Yes Yes   Sig: Take 1,000 mg by mouth daily. aspirin 81 mg chewable tablet 2017 at Unknown time  No Yes   Sig: Take 1 Tab by mouth daily. atorvastatin (LIPITOR) 20 mg tablet 2017 at Unknown time  No Yes   Sig: Take 1 Tab by mouth nightly. carvedilol (COREG) 3.125 mg tablet 2017 at Unknown time  No Yes   Sig: Take 1 Tab by mouth two (2) times daily (with meals). cholecalciferol, vitamin D3, (VITAMIN D3) 2,000 unit tab 2017 at Unknown time  Yes Yes   Sig: Take 2,000 Units by mouth daily. ciprofloxacin HCl (CIPRO) 500 mg tablet 2017 at Unknown time  No Yes   Sig: Take 1 Tab by mouth daily for 6 days. clopidogrel (PLAVIX) 75 mg tab 2017 at Unknown time  No Yes   Sig: Take 1 Tab by mouth daily. co-enzyme Q-10 (CO Q-10) 100 mg capsule 2017 at Unknown time  Yes Yes   Sig: Take 300 mg by mouth daily. cyanocobalamin, vitamin B-12, (VITAMIN B-12) 5,000 mcg subl 2017 at Unknown time  Yes Yes   Si,000 mg by SubLINGual route daily. finasteride (PROSCAR) 5 mg tablet 2017 at Unknown time  No Yes   Sig: Take 1 Tab by mouth daily. losartan (COZAAR) 50 mg tablet 2017 at Unknown time  No Yes   Sig: Take 1 Tab by mouth daily. magnesium oxide (MAG-OX) 400 mg tablet 2017 at Unknown time  Yes Yes   Sig: Take 400 mg by mouth daily. multivitamin (ONE A DAY) tablet 2017 at Unknown time  Yes Yes   Sig: Take 1 Tab by mouth daily. nystatin (MYCOSTATIN) powder 2017 at Unknown time  No Yes   Sig: Apply  to affected area two (2) times a day for 7 days. oxybutynin chloride XL (DITROPAN XL) 15 mg CR tablet 2017 at Unknown time  Yes Yes   Sig: Take 15 mg by mouth daily.       Facility-Administered Medications: None     Hospital medications:  Current Facility-Administered Medications   Medication Dose Route Frequency    TPN ADULT-CENTRAL AA 5% D20%-MVI W/ VIT K-RCH   IntraVENous CONTINUOUS    nitroglycerin (NITROBID) 2 % ointment 1 Inch  1 Inch Topical BID    TPN ADULT-CENTRAL AA 5% D20%-MVI W/ VIT K-RCH   IntraVENous CONTINUOUS    levalbuterol (XOPENEX) nebulizer soln 0.63 mg/3 mL  0.63 mg Nebulization Q6H PRN    acetaminophen (TYLENOL) solution 975 mg  975 mg Oral Q12H PRN    Or    acetaminophen (OFIRMEV) infusion 1,000 mg  1,000 mg IntraVENous Q12H PRN    haloperidol lactate (HALDOL) injection 3 mg  3 mg IntraVENous Q6H    metoclopramide HCl (REGLAN) injection 5 mg  5 mg IntraVENous Q6H    aspirin (ASA) suppository 300 mg  300 mg Rectal DAILY    labetalol (NORMODYNE;TRANDATE) injection 10 mg  10 mg IntraVENous Q6H    hydrALAZINE (APRESOLINE) 20 mg/mL injection 20 mg  20 mg IntraVENous Q4H PRN    labetalol (NORMODYNE;TRANDATE) injection 20 mg  20 mg IntraVENous Q4H PRN    fat emulsion 20% (LIPOSYN, INTRALIPID) infusion 250 mL  250 mL IntraVENous Q MON, WED & FRI    insulin lispro (HUMALOG) injection   SubCUTAneous Q6H    glucose chewable tablet 16 g  4 Tab Oral PRN    dextrose (D50W) injection syrg 12.5-25 g  12.5-25 g IntraVENous PRN    glucagon (GLUCAGEN) injection 1 mg  1 mg IntraMUSCular PRN    enoxaparin (LOVENOX) injection 40 mg  40 mg SubCUTAneous Q24H    famotidine (PF) (PEPCID) 20 mg in sodium chloride 0.9 % 10 mL injection  20 mg IntraVENous Q12H    sodium chloride (NS) flush 5-10 mL  5-10 mL IntraVENous Q8H    ondansetron (ZOFRAN) injection 4 mg  4 mg IntraVENous Q4H PRN    0.9% sodium chloride infusion  25 mL/hr IntraVENous CONTINUOUS    sodium chloride (NS) flush 10 mL  10 mL InterCATHeter PRN    sodium chloride (NS) flush 10-40 mL  10-40 mL InterCATHeter Q8H     REVIEW OF SYSTEMS:      Review of Systems - History obtained from chart review and the patient  General ROS: positive for  - fatigue  Psychological ROS: positive for - anxiety and depression  Hematological and Lymphatic ROS: positive for - fatigue  Respiratory ROS: negative  Cardiovascular ROS: no chest pain or dyspnea on exertion  Gastrointestinal ROS: see HPI  Genito-Urinary ROS: positive for - blood in urine   Musculoskeletal ROS: negative  Neurological ROS: no TIA or stroke symptoms  Dermatological ROS: negative    Objective:   VITALS:    Visit Vitals    /71 (BP 1 Location: Right arm, BP Patient Position: At rest)    Pulse 61    Temp 97.4 °F (36.3 °C)    Resp 18    Ht 5' 10\" (1.778 m)    Wt 95.9 kg (211 lb 6.7 oz)    SpO2 94%    BMI 30.34 kg/m2     Temp (24hrs), Av.8 °F (36.6 °C), Min:97.1 °F (36.2 °C), Max:99.1 °F (37.3 °C)    PHYSICAL EXAM:   General:    Alert, cooperative, elderly WM with NGT in place in no distress, appears stated age. Head:   Normocephalic, without obvious abnormality, atraumatic. Eyes:   Conjunctivae clear, anicteric sclerae. Nose:  Nares normal. No drainage or sinus tenderness. Throat:    Lips, mucosa, and tongue normal.  No Thrush  Neck:  Supple, symmetrical,  no adenopathy, thyroid: non tender  Back:    Symmetric,  No CVA tenderness. Lungs:   CTA bilaterally. No wheezing/rhonchi/rales. Heart:   Regular rate and rhythm,  no murmur, rub or gallop. Abdomen:   Abdominal binder in place and there is a distended abdomen with midline clean dressing in place,  Tender to light palpation, BS decreased  Rectal:  deferred  Extremities: No cyanosis. No edema. No clubbing  Skin:     Texture, turgor normal. No rashes/lesions/jaundice  Lymph: Cervical, supraclavicular normal.  Psych:  Good insight. Not depressed. Not anxious or agitated. Neurologic: No facial asymmetry. No aphasia or slurred speech. Normal strength, A/O X 3.      LAB DATA REVIEWED:    Lab Results   Component Value Date/Time    WBC 10.9 08/10/2017 02:23 AM    HGB 10.7 08/10/2017 02:23 AM    HCT 31.6 08/10/2017 02:23 AM    PLATELET 363  02:23 AM    MCV 91.6 08/10/2017 02:23 AM     Lab Results   Component Value Date/Time    ALT (SGPT) 67 08/10/2017 02:23 AM    AST (SGOT) 60 08/10/2017 02:23 AM    Alk. phosphatase 90 08/10/2017 02:23 AM    Bilirubin, direct 0.2 07/29/2017 05:52 AM    Bilirubin, total 0.7 08/10/2017 02:23 AM     Lab Results   Component Value Date/Time    Sodium 136 08/10/2017 02:23 AM    Potassium 4.5 08/10/2017 02:23 AM    Chloride 103 08/10/2017 02:23 AM    CO2 28 08/10/2017 02:23 AM    Anion gap 5 08/10/2017 02:23 AM    Glucose 162 08/10/2017 02:23 AM    BUN 36 08/10/2017 02:23 AM    Creatinine 1.05 08/10/2017 02:23 AM    BUN/Creatinine ratio 34 08/10/2017 02:23 AM    GFR est AA >60 08/10/2017 02:23 AM    GFR est non-AA >60 08/10/2017 02:23 AM    Calcium 7.6 08/10/2017 02:23 AM       Impression:  Iatrogenic bladder injury with urostomy in place  Wound dehiscence  Dark NG output  H/O esophagitis and esophageal stricture  Ileus     Plan:  Patient not having a significant GI bleed based on not producing any melena and his Hgb has not changed significantly. The NG is still in place and being managed by surgery and I suspect if he vomits without it at risk for dehiscence so would recommend NG remain in per surgery service. I would keep on TPN and for now IV H2 blocker since IV Protonix is in critical shortage.   I will ask Dr. Darshana Hurtado to f/u again in am.       ___________________________________________________  Care Plan discussed with:    [x]    Patient   []    Family   [x]    Nursing   []    Attending   ___________________________________________________  GI: Fallon Saleh MD

## 2017-08-10 NOTE — PROGRESS NOTES
Interdisciplinary Rounds were completed on this patient. Rounds included nursing, clinical care leader, pharmacy, and case management. Patient was resting in bed. Patient had the following concerns: none. Goals for the day will include: mobilize and PT/OT.

## 2017-08-10 NOTE — PROGRESS NOTES
Cardiology Progress Note  8/10/2017     Admit Date: 2017  Admit Diagnosis: bladder clots  Bladder injury  perperated viscous  wound dehiscence  CC: none currently  Cardiac Assessment/Plan:   CAD: Circ HORACIO ; Ef 45-50% after PCI. On asa only since  surgery: restart plavix when able from surgical standpoint.      HTN: On IV labetalol and prn hydralazine; added standing NTG paste. Change to coreg/losartan when taking PO again or can use NG for meds.     P Afib: transiently on dilt gtt: NSR currently; If recurrent, would use amio.     Volume status: : no CHF on CXR; low O2 requirement, Cr up to 1.3: held lasix.     Resp failure, post-op bladder repair/stone extraction/explor lap/dehis, anemia, ID, Dispo: per primary team and consultants. For other plans, see orders.     : Extubated;  slower to respond than usual but no complaints. Cr normalized. No new cardiac recs.     : More alert; no complaints. Added NTG paste as above for BPs.    8/10: No complaints; Cont NSR; still NPO. Bloody NG but not malik blood: stable Hg. No new cardiac recs. For other plans, see orders.   Hospital problem list   Active Hospital Problems    Diagnosis Date Noted    Bladder injury 2017        Subjective: David Blizzard reports   Chest pain X none  consistent with:  Non-cardiac CP         Atypical CP     None now  On going  Anginal CP     Dyspnea X none  at rest  with exertion         improved  unchanged  worse              PND X none  overnight       Orthopnea X none  improved  unchanged  worse   Presyncope X none  improved  unchanged  worse     Ambulated in hallway without symptoms   Yes   Ambulated in room without symptoms  Yes   Objective:    Physical Exam:  Overall VSSAF;    Visit Vitals    /64    Pulse 74    Temp 98.2 °F (36.8 °C)    Resp 16    Ht 5' 10\" (1.778 m)    Wt 95.9 kg (211 lb 6.7 oz)    SpO2 98%    BMI 30.34 kg/m2     Temp (24hrs), Av.7 °F (36.5 °C), Min:97.1 °F (36.2 °C), Max:98.2 °F (36.8 °C)    Patient Vitals for the past 8 hrs:   Pulse   08/10/17 0749 74   08/10/17 0315 86    Patient Vitals for the past 8 hrs:   Resp   08/10/17 0315 16    Patient Vitals for the past 8 hrs:   BP   08/10/17 0749 153/64   08/10/17 0315 136/64        Intake/Output Summary (Last 24 hours) at 08/10/17 0858  Last data filed at 08/10/17 0730   Gross per 24 hour   Intake          1859.61 ml   Output             2210 ml   Net          -350.39 ml     General Appearance: Well developed, well nourished, no acute distress. Ears/Nose/Mouth/Throat:   Dry MM; anicteric. NG in.     JVP: WNL   Resp:   clear to auscultation bilaterally. Nl resp effort. Cardiovascular:  RRR, S1, S2 normal, no new murmur. No gallop or rub. Abdomen:   Post-surgical, bowel sounds are not present. Extremities: No edema bilaterally. Skin:  Neuro: Warm and dry. A/O x3, grossly nonfocal   cath site intact w/o hematoma or new bruit; distal pulse unchanged  Yes   Data Review:     Telemetry independently reviewed x sinus  chronic afib  parox afib  NSVT     ECG independently reviewed  NSR  afib  no significant changes  NSST-Tw chgs   x no new ECG provided for review   Lab results reviewed as noted below. Current medications reviewed as noted below. No results for input(s): PH, PCO2, PO2 in the last 72 hours. No results for input(s): CPK, CKMB, CKNDX, TROIQ in the last 72 hours.   Recent Labs      08/10/17   0223  08/09/17   0346  08/08/17   0331   NA  136  137  138   K  4.5  4.4  3.9   CL  103  104  104   CO2  28  29  29   BUN  36*  36*  33*   CREA  1.05  1.02  0.96   GLU  162*  176*  175*   PHOS  3.1  3.2  2.4*   CA  7.6*  7.9*  7.5*   ALB  1.7*  1.7*  1.6*   WBC  10.9  11.8*  14.2*   HGB  10.7*  10.9*  11.0*   HCT  31.6*  33.2*  33.9*   PLT  340  329  309     Lab Results   Component Value Date/Time    Cholesterol, total 178 07/20/2017 12:28 PM    HDL Cholesterol 48 07/20/2017 12:28 PM    LDL, calculated 117.8 07/20/2017 12:28 PM    Triglyceride 61 07/20/2017 12:28 PM    CHOL/HDL Ratio 3.7 07/20/2017 12:28 PM     Recent Labs      08/10/17   0223  08/09/17   0346  08/08/17   0331   SGOT  60*  72*  75*   AP  90  83  69   TP  6.3*  6.1*  5.9*   ALB  1.7*  1.7*  1.6*   GLOB  4.6*  4.4*  4.3*     No results for input(s): INR, PTP, APTT in the last 72 hours.     No lab exists for component: INREXT   No components found for: GLPOC    Current Facility-Administered Medications   Medication Dose Route Frequency    nitroglycerin (NITROBID) 2 % ointment 1 Inch  1 Inch Topical BID    TPN ADULT-CENTRAL AA 5% D20%-MVI W/ VIT K-RCH   IntraVENous CONTINUOUS    levalbuterol (XOPENEX) nebulizer soln 0.63 mg/3 mL  0.63 mg Nebulization Q6H PRN    acetaminophen (TYLENOL) solution 975 mg  975 mg Oral Q12H PRN    Or    acetaminophen (OFIRMEV) infusion 1,000 mg  1,000 mg IntraVENous Q12H PRN    haloperidol lactate (HALDOL) injection 3 mg  3 mg IntraVENous Q6H    metoclopramide HCl (REGLAN) injection 5 mg  5 mg IntraVENous Q6H    aspirin (ASA) suppository 300 mg  300 mg Rectal DAILY    labetalol (NORMODYNE;TRANDATE) injection 10 mg  10 mg IntraVENous Q6H    hydrALAZINE (APRESOLINE) 20 mg/mL injection 20 mg  20 mg IntraVENous Q4H PRN    labetalol (NORMODYNE;TRANDATE) injection 20 mg  20 mg IntraVENous Q4H PRN    fat emulsion 20% (LIPOSYN, INTRALIPID) infusion 250 mL  250 mL IntraVENous Q MON, WED & FRI    insulin lispro (HUMALOG) injection   SubCUTAneous Q6H    glucose chewable tablet 16 g  4 Tab Oral PRN    dextrose (D50W) injection syrg 12.5-25 g  12.5-25 g IntraVENous PRN    glucagon (GLUCAGEN) injection 1 mg  1 mg IntraMUSCular PRN    enoxaparin (LOVENOX) injection 40 mg  40 mg SubCUTAneous Q24H    famotidine (PF) (PEPCID) 20 mg in sodium chloride 0.9 % 10 mL injection  20 mg IntraVENous Q12H    sodium chloride (NS) flush 5-10 mL  5-10 mL IntraVENous Q8H    ondansetron (ZOFRAN) injection 4 mg  4 mg IntraVENous Q4H PRN  0.9% sodium chloride infusion  25 mL/hr IntraVENous CONTINUOUS    sodium chloride (NS) flush 10 mL  10 mL InterCATHeter PRN    sodium chloride (NS) flush 10-40 mL  10-40 mL InterCATHeter Frances Jo MD

## 2017-08-11 NOTE — PROGRESS NOTES
Attempted to see pt this pm and pt is off floor for CT. Spoke with nursing and pt is very lethargic. Will defer tx today and follow up as able.

## 2017-08-11 NOTE — PROGRESS NOTES
Nurse dicussed with Dr Suzie Chawla, Dr Solitario Ho and Dr. Sherie Moss patients lethargy and confusion, Dr Solitario Ho changed reglan to q8h and ordered KUB to assess if we can dc NGT and reglan and ordered head CT per Dr Rosalia Beltrán to assess lethargy and confusion

## 2017-08-11 NOTE — PROGRESS NOTES
Patient confused. Wife and daughter are here with him. Does not appear to be in significant pain.     VSS/AF  UO excellent (mostly via SP tube)  NGT: 250 last shift    Plan:  -KUB to monitor ileus  -agree with head CT for mental status change and cessation of reglan  -clamp NG tube and check residual, can d/c NGT if residual is low and KUB shows improved ileus  -appreciate consulting physicians' help and input

## 2017-08-11 NOTE — PROGRESS NOTES
GI note:    KUB shows no ileus. Fecal stasis (mild) is noted. Will change Reglan to IV q 8 hrs prn   Clamp NG tube,  Fleet enema GA. D/w RN-Jovita.     HAILEY Hernandez MD

## 2017-08-11 NOTE — PROGRESS NOTES
Hospitalist Progress Note    NAME: Albino Manzano   :  1928   MRN:  [id number]       Interim Hospital Summary: 80 y.o. male whom presented on 2017 with      Assessment / Plan:  Postop respiratory failure s/p extubation   S/P removal of bladder stone and repair of perforated bladder  S/P subsequent ex-lap due to extensive sq emphysema in abdomen/retroperitoneum - no sign of bowel perforation. Wound Dehiscence s/p ex lap, has abdominal binder  Shock improved  LINDSEY  improving   \"pneumothorax\" due to dissection of air from retroperitoneum cephalad, different physiology or behavior of typical pneumothorax -conservative management  CAD, PAF with RVR- off cardizem gtt , now NSR, restart plavix once cleared by surgery  Anemia, post op s/p PRBC  UTI with enterococcus on ampicillin  Electrolyte disturbance replete as indicated  HTN labetalol, nitrobid  Encephalopathy ? Medication/infectious etiology, on IV abx for UTI, more alert. On TPN, Paralytic ileus, NGT with cont drainage ? hematemesis, Hb stable, appreciate GI consult, reglan, KUB no bowel distension    Code status: FULL code  Prophylaxis: per primary team  Recommended Disposition: per primary team-      Subjective:     Chief Complaint / Reason for Physician Visit intubated,    answering questions, no pain, feeling fine   Discussed with RN events overnight. Review of Systems:  Symptom Y/N Comments  Symptom Y/N Comments   Fever/Chills    Chest Pain     Poor Appetite    Edema     Cough    Abdominal Pain     Sputum    Joint Pain     SOB/CLARKE    Pruritis/Rash     Nausea/vomit    Tolerating PT/OT     Diarrhea    Tolerating Diet     Constipation    Other       Could NOT obtain due to: encephalopathy     Objective:     VITALS:   Last 24hrs VS reviewed since prior progress note.  Most recent are:  Patient Vitals for the past 24 hrs:   Temp Pulse Resp BP SpO2   17 0555 98 °F (36.7 °C) 78 19 168/79 96 %   08/10/17 2328 98.4 °F (36.9 °C) 76 16 156/81 94 %   08/10/17 2029 97.7 °F (36.5 °C) 68 18 151/74 96 %   08/10/17 1445 97.4 °F (36.3 °C) 61 18 143/71 94 %   08/10/17 1223 97.3 °F (36.3 °C) 70 18 144/67 96 %   08/10/17 1018 99.1 °F (37.3 °C) 73 22 155/72 92 %       Intake/Output Summary (Last 24 hours) at 08/11/17 0903  Last data filed at 08/11/17 0653   Gross per 24 hour   Intake           926.84 ml   Output             2895 ml   Net         -1968.16 ml        PHYSICAL EXAM:  General:  nad, opening eyes, answering questions, sluggish  EENT:  EOMI. Anicteric sclerae. MMM  Resp:  decrease BS BL, no wheezing or rales. No accessory muscle use  CV:  Regular  rhythm,  + jose pedal and UE edema improving  GI:  Soft, + Non tender. diminished Bowel sounds, binder  Neurologic:  Limited as encepahopathic    Reviewed most current lab test results and cultures  YES  Reviewed most current radiology test results   YES  Review and summation of old records today    NO  Reviewed patient's current orders and MAR    YES  PMH/SH reviewed - no change compared to H&P  ________________________________________________________________________  Care Plan discussed with:    Comments   Patient x    Family      RN x    Care Manager     Consultant                        Multidiciplinary team rounds were held today with , nursing, pharmacist and clinical coordinator. Patient's plan of care was discussed; medications were reviewed and discharge planning was addressed.      ________________________________________________________________________  Total NON critical care TIME:  25  Minutes    Total CRITICAL CARE TIME Spent:   Minutes non procedure based      Comments   >50% of visit spent in counseling and coordination of care     ________________________________________________________________________  Mike Rogers MD     Procedures: see electronic medical records for all procedures/Xrays and details which were not copied into this note but were reviewed prior to creation of Plan.      LABS:  I reviewed today's most current labs and imaging studies.   Pertinent labs include:  Recent Labs      08/10/17   0223  08/09/17   0346   WBC  10.9  11.8*   HGB  10.7*  10.9*   HCT  31.6*  33.2*   PLT  340  329     Recent Labs      08/10/17   0223  08/09/17   0346   NA  136  137   K  4.5  4.4   CL  103  104   CO2  28  29   GLU  162*  176*   BUN  36*  36*   CREA  1.05  1.02   CA  7.6*  7.9*   MG  2.0  2.2   PHOS  3.1  3.2   ALB  1.7*  1.7*   TBILI  0.7  0.9   SGOT  60*  72*   ALT  67  71       Signed: Naveen Nevarez MD

## 2017-08-11 NOTE — PROGRESS NOTES
OTnote: Attempted to see pt this pm and pt is off floor for CT. Spoke with nursing and pt is very lethargic.   Will defer tx today and follow up as able.

## 2017-08-11 NOTE — PROGRESS NOTES
Interdisciplinary Rounds were completed on this patient. Rounds included nursing, clinical care leader, pharmacy, and case management. Patient was getting bathed. Goals for the day will include: adjust meds  and mobilize. Patient lethargic - nurse to follow up with MD about adjusting medications.

## 2017-08-11 NOTE — PROGRESS NOTES
Cardiology Progress Note  8/11/2017     Admit Date: 7/27/2017  Admit Diagnosis: bladder clots  Bladder injury  perperated viscous  wound dehiscence  CC: none currently  Cardiac Assessment/Plan:   CAD: Circ HORACIO 7/20; Ef 45-50% after PCI. On asa only since  surgery: restart plavix when able from surgical standpoint.      HTN: On IV labetalol and prn hydralazine; added standing NTG paste. Change to coreg/losartan when taking PO again or can use NG for meds.     P Afib: transiently on dilt gtt: NSR currently; If recurrent, would use amio.     Volume status: 8/7: no CHF on CXR; low O2 requirement, Cr up to 1.3: held lasix.     Resp failure, post-op bladder repair/stone extraction/explor lap/dehis, anemia, ID, Dispo: per primary team and consultants. For other plans, see orders.     8/8: Extubated;  slower to respond than usual but no complaints. Cr normalized. No new cardiac recs.     8/9: More alert; no complaints. Added NTG paste as above for BPs.    8/10: No complaints; Cont NSR; still NPO. Bloody NG but not malik blood: stable Hg.    8/11: stable cardiac status; Cont NSR; More lethargic this am; No new cardiac recs. No active cardiac issues. We are available over weekend as needed. Please call if questions/issues. Thanks. For other plans, see orders.   Hospital problem list   Active Hospital Problems    Diagnosis Date Noted    Bladder injury 07/28/2017        Subjective: Steffen Benites reports   Chest pain X none  consistent with:  Non-cardiac CP         Atypical CP     None now  On going  Anginal CP     Dyspnea X none  at rest  with exertion         improved  unchanged  worse              PND X none  overnight       Orthopnea X none  improved  unchanged  worse   Presyncope X none  improved  unchanged  worse     Ambulated in hallway without symptoms   Yes   Ambulated in room without symptoms  Yes   Objective:    Physical Exam:  Overall VSSAF;    Visit Vitals    /79    Pulse 78    Temp 98 °F (36.7 °C)    Resp 19    Ht 5' 10\" (1.778 m)    Wt 95.9 kg (211 lb 6.7 oz)    SpO2 96%    BMI 30.34 kg/m2     Temp (24hrs), Av °F (36.7 °C), Min:97.3 °F (36.3 °C), Max:99.1 °F (37.3 °C)    Patient Vitals for the past 8 hrs:   Pulse   17 0555 78    Patient Vitals for the past 8 hrs:   Resp   17 0555 19    Patient Vitals for the past 8 hrs:   BP   17 0555 168/79          Intake/Output Summary (Last 24 hours) at 17  Last data filed at 17 0653   Gross per 24 hour   Intake           926.84 ml   Output             2895 ml   Net         -1968.16 ml     General Appearance: Well developed, well nourished, no acute distress. Ears/Nose/Mouth/Throat:   Dry MM; anicteric. NG in.     JVP: WNL   Resp:   clear to auscultation bilaterally. Nl resp effort. Cardiovascular:  RRR, S1, S2 normal, no new murmur. No gallop or rub. Abdomen:   Post-surgical, bowel sounds are not present. Extremities: No edema bilaterally. Skin:  Neuro: Warm and dry. Lethargic; arousable, grossly nonfocal   cath site intact w/o hematoma or new bruit; distal pulse unchanged  Yes   Data Review:     Telemetry independently reviewed x sinus  chronic afib  parox afib  NSVT     ECG independently reviewed  NSR  afib  no significant changes  NSST-Tw chgs   x no new ECG provided for review   Lab results reviewed as noted below. Current medications reviewed as noted below. No results for input(s): PH, PCO2, PO2 in the last 72 hours. No results for input(s): CPK, CKMB, CKNDX, TROIQ in the last 72 hours.   Recent Labs      08/10/17   0223  17   0346   NA  136  137   K  4.5  4.4   CL  103  104   CO2  28  29   BUN  36*  36*   CREA  1.05  1.02   GLU  162*  176*   PHOS  3.1  3.2   CA  7.6*  7.9*   ALB  1.7*  1.7*   WBC  10.9  11.8*   HGB  10.7*  10.9*   HCT  31.6*  33.2*   PLT  340  329     Lab Results   Component Value Date/Time    Cholesterol, total 178 2017 12:28 PM    HDL Cholesterol 48 07/20/2017 12:28 PM    LDL, calculated 117.8 07/20/2017 12:28 PM    Triglyceride 61 07/20/2017 12:28 PM    CHOL/HDL Ratio 3.7 07/20/2017 12:28 PM     Recent Labs      08/10/17   0223  08/09/17   0346   SGOT  60*  72*   AP  90  83   TP  6.3*  6.1*   ALB  1.7*  1.7*   GLOB  4.6*  4.4*     No results for input(s): INR, PTP, APTT in the last 72 hours.     No lab exists for component: INREXT, INREXT   No components found for: GLPOC    Current Facility-Administered Medications   Medication Dose Route Frequency    TPN ADULT-CENTRAL AA 5% D20%-MVI W/ VIT K-RCH   IntraVENous CONTINUOUS    nitroglycerin (NITROBID) 2 % ointment 1 Inch  1 Inch Topical BID    levalbuterol (XOPENEX) nebulizer soln 0.63 mg/3 mL  0.63 mg Nebulization Q6H PRN    acetaminophen (TYLENOL) solution 975 mg  975 mg Oral Q12H PRN    Or    acetaminophen (OFIRMEV) infusion 1,000 mg  1,000 mg IntraVENous Q12H PRN    haloperidol lactate (HALDOL) injection 3 mg  3 mg IntraVENous Q6H    metoclopramide HCl (REGLAN) injection 5 mg  5 mg IntraVENous Q6H    aspirin (ASA) suppository 300 mg  300 mg Rectal DAILY    labetalol (NORMODYNE;TRANDATE) injection 10 mg  10 mg IntraVENous Q6H    hydrALAZINE (APRESOLINE) 20 mg/mL injection 20 mg  20 mg IntraVENous Q4H PRN    labetalol (NORMODYNE;TRANDATE) injection 20 mg  20 mg IntraVENous Q4H PRN    fat emulsion 20% (LIPOSYN, INTRALIPID) infusion 250 mL  250 mL IntraVENous Q MON, WED & FRI    insulin lispro (HUMALOG) injection   SubCUTAneous Q6H    glucose chewable tablet 16 g  4 Tab Oral PRN    dextrose (D50W) injection syrg 12.5-25 g  12.5-25 g IntraVENous PRN    glucagon (GLUCAGEN) injection 1 mg  1 mg IntraMUSCular PRN    enoxaparin (LOVENOX) injection 40 mg  40 mg SubCUTAneous Q24H    famotidine (PF) (PEPCID) 20 mg in sodium chloride 0.9 % 10 mL injection  20 mg IntraVENous Q12H    sodium chloride (NS) flush 5-10 mL  5-10 mL IntraVENous Q8H    ondansetron (ZOFRAN) injection 4 mg  4 mg IntraVENous Q4H PRN    0.9% sodium chloride infusion  25 mL/hr IntraVENous CONTINUOUS    sodium chloride (NS) flush 10 mL  10 mL InterCATHeter PRN    sodium chloride (NS) flush 10-40 mL  10-40 mL InterCATHeter Anjelica Milligan MD

## 2017-08-11 NOTE — PROGRESS NOTES
Nutrition Assessment:    RECOMMENDATIONS:   Continue TPN as ordered for now  Advance diet as medically able per GI/surgeon    ASSESSMENT:   Chart reviewed. Pt remains NPO and on TPN. NGT to suction, still with high OP (900mL yesterday). KUB ordered today to assess, GI now following. No BM since 7/28. TPN running at recommended goal rate which is adequate to meet 100% estimated needs. Pt is confused and lethargic. Electrolytes are stable. BG fairly well controlled considering TPN (146-156). Dietitians Intervention(s)/Plan(s): Monitor plan of care, continue TPN for now. SUBJECTIVE/OBJECTIVE:   Pt confused   Diet Order: NPO, Other (comment) (TPN: D20, 5% AA @ 83mL/h + 20% lipids 3x week (provides 1967kcals/100gPro) )  % Eaten:  Patient Vitals for the past 72 hrs:   % Diet Eaten   08/10/17 1831 0 %   08/10/17 1034 0 %     to suction  at   flush with       via NG Tube   Residuals: 900 mL    Pertinent Medications:omnipne, pepcid, humalog, reglan. Chemistries:  Lab Results   Component Value Date/Time    Sodium 136 08/10/2017 02:23 AM    Potassium 4.5 08/10/2017 02:23 AM    Chloride 103 08/10/2017 02:23 AM    CO2 28 08/10/2017 02:23 AM    Anion gap 5 08/10/2017 02:23 AM    Glucose 162 08/10/2017 02:23 AM    BUN 36 08/10/2017 02:23 AM    Creatinine 1.05 08/10/2017 02:23 AM    BUN/Creatinine ratio 34 08/10/2017 02:23 AM    GFR est AA >60 08/10/2017 02:23 AM    GFR est non-AA >60 08/10/2017 02:23 AM    Calcium 7.6 08/10/2017 02:23 AM    Albumin 1.7 08/10/2017 02:23 AM      Anthropometrics: Height: 5' 10\" (177.8 cm) Weight: 95.9 kg (211 lb 6.7 oz)    IBW (%IBW):   ( ) UBW (%UBW):   (  %)    BMI: Body mass index is 30.34 kg/(m^2). This BMI is indicative of:  []Underweight   []Normal   []Overweight   [x] Obesity   [] Extreme Obesity (BMI>40)  Estimated Nutrition Needs (Based on): 1963 Kcals/day (MSJ 1636 x 1.2) , 85 g (0.8gPro/kg) Protein  Carbohydrate:  At Least 130 g/day  Fluids: 2000 mL/day    Last BM: 7/28 []Active     []Hyperactive  [x]Hypoactive       [] Absent   BS  Skin:    [] Intact   [x] Incision  [] Breakdown   [] DTI   [] Tears/Excoriation/Abrasion  [x]Edema(+2-generalized; +1-LUE; +2-BLE + RUE) [] Other: Wt Readings from Last 30 Encounters:   08/10/17 95.9 kg (211 lb 6.7 oz)   07/26/17 95.8 kg (211 lb 3.2 oz)   03/26/17 97.7 kg (215 lb 6.2 oz)   11/03/16 98.6 kg (217 lb 6 oz)   03/17/15 97.2 kg (214 lb 4.8 oz)   10/02/14 90.7 kg (200 lb)   05/16/13 96.9 kg (213 lb 10 oz)   02/10/13 99.5 kg (219 lb 5.7 oz)   04/02/12 96.1 kg (211 lb 13.8 oz)   03/13/11 96.8 kg (213 lb 6.5 oz)      NUTRITION DIAGNOSES:   Problem:  Altered GI function      Etiology: related to postop ileus     Signs/Symptoms: as evidenced by NGT to suction, pt NPO. Previous dx re: altered GI function continues, pt remains NPO with high NGT OP and is on TPN. NUTRITION INTERVENTIONS:  Meals/Snacks: Other (Advance diet as medically able per GI/surgeon) Enteral/Parenteral Nutrition: Other (Continue TPN as ordered for now )                GOAL:   Pt will tolerate TPN @ goal rate with stable electrolytes in 2-4 days.      NUTRITION MONITORING AND EVALUATION   Previous Goal: Pt will tolerate TPN @ goal rate with stable electrolytes in 2-4 days   Previous Goal Met: Yes   Previous Recommendations Implemented: Yes   Cultural, Mormon, or Ethnic Dietary Needs: None   LEARNING NEEDS (Diet, Food/Nutrient-Drug Interaction):    [x] None Identified   [] Identified and Education Provided/Documented   [] Identified and Pt declined/was not appropriate      [x] Interdisciplinary Care Plan Reviewed/Documented    [x] Participated in Discharge Planning: Unable to determine    [] Interdisciplinary Rounds     NUTRITION RISK:    [x] High              [] Moderate           []  Low  []  Minimal/Uncompromised      Per Garcia, [address]  Pager [phone]  Weekend Pager [phone]

## 2017-08-12 NOTE — PROGRESS NOTES
RRT called due to decreased LOC, patient responds to touch/pain, slurred speech, unable to swallow liquids, not following commands. This was noted by the overnight nurse and the first nurse for this AM. This rapid was called for progressive worsening of these symptoms which are far from his baseline. Patient has not received narcotics or any other sedating meds in last 24 hours. Current primary nurse Jelly Laboy RN reports Thursday patient was awake, talking, and following commands. VSS but nursing uncomfortable with LOC.

## 2017-08-12 NOTE — PROGRESS NOTES
Admit Date: 2017    POD 7 Days Post-Op    Procedure:  Procedure(s):  LAPAROTOMY EXPLORATORY, wound closure for dehiscence    Subjective:     Patient is sleeping    Objective:     Blood pressure 151/63, pulse 80, temperature 97.6 °F (36.4 °C), resp. rate 22, height 5' 10\" (1.778 m), weight 97.7 kg (215 lb 6.2 oz), SpO2 96 %.     Temp (24hrs), Av.3 °F (36.8 °C), Min:97.5 °F (36.4 °C), Max:99.1 °F (37.3 °C)      Physical Exam:  GENERAL: no distress, appears stated age, ABDOMEN: SPT in place draining well, mild erythema around the tube, no fluctuance     Labs:   Recent Results (from the past 48 hour(s))   GLUCOSE, POC    Collection Time: 08/10/17 12:25 PM   Result Value Ref Range    Glucose (POC) 183 (H) 65 - 100 mg/dL    Performed by Antonia Toscano (PCT)    GLUCOSE, POC    Collection Time: 08/10/17  5:48 PM   Result Value Ref Range    Glucose (POC) 158 (H) 65 - 100 mg/dL    Performed by Antonia Toscano (PCT)    GLUCOSE, POC    Collection Time: 08/10/17 11:27 PM   Result Value Ref Range    Glucose (POC) 146 (H) 65 - 100 mg/dL    Performed by Gerri Alvarez    GLUCOSE, POC    Collection Time: 17  5:57 AM   Result Value Ref Range    Glucose (POC) 156 (H) 65 - 100 mg/dL    Performed by [id number], POC    Collection Time: 17 12:03 PM   Result Value Ref Range    Glucose (POC) 153 (H) 65 - 100 mg/dL    Performed by Koki Varner (PCT)    GLUCOSE, POC    Collection Time: 17  5:38 PM   Result Value Ref Range    Glucose (POC) 159 (H) 65 - 100 mg/dL    Performed by Alex Blackburn    GLUCOSE, POC    Collection Time: 17 11:40 PM   Result Value Ref Range    Glucose (POC) 162 (H) 65 - 100 mg/dL    Performed by Neil Callejas    GLUCOSE, POC    Collection Time: 17  5:36 AM   Result Value Ref Range    Glucose (POC) 190 (H) 65 - 100 mg/dL    Performed by Neil Callejas        Data Review images and reports reviewed    Assessment:     Active Problems:    Bladder injury (7/28/2017)        Plan/Recommendations/Medical Decision Making:     Diet per GS   Rehab soon

## 2017-08-12 NOTE — PROGRESS NOTES
Admit Date: 2017    POD 7 Days Post-Op    Procedure:  Procedure(s):  LAPAROTOMY EXPLORATORY, wound closure for dehiscence    Subjective:     Patient has no new complaints. NGT now out, pt tolerating well. Objective:     Blood pressure 151/63, pulse 80, temperature 97.6 °F (36.4 °C), resp. rate 22, height 5' 10\" (1.778 m), weight 215 lb 6.2 oz (97.7 kg), SpO2 96 %. Temp (24hrs), Av.3 °F (36.8 °C), Min:97.5 °F (36.4 °C), Max:99.1 °F (37.3 °C)      Physical Exam:  GENERAL: alert, cooperative, no distress, appears stated age, LUNG: clear to auscultation bilaterally, HEART: regular rate and rhythm, ABDOMEN: soft, non-tender. Bowel sounds absent. Wound c/d/i with retention sutures in place, EXTREMITIES:  extremities normal, atraumatic, no cyanosis or edema    Labs:   Recent Results (from the past 24 hour(s))   GLUCOSE, POC    Collection Time: 17 12:03 PM   Result Value Ref Range    Glucose (POC) 153 (H) 65 - 100 mg/dL    Performed by Sandrita Galvez (PCT)    GLUCOSE, POC    Collection Time: 17  5:38 PM   Result Value Ref Range    Glucose (POC) 159 (H) 65 - 100 mg/dL    Performed by Evorn Miracle    GLUCOSE, POC    Collection Time: 17 11:40 PM   Result Value Ref Range    Glucose (POC) 162 (H) 65 - 100 mg/dL    Performed by Navid Linekaren    GLUCOSE, POC    Collection Time: 17  5:36 AM   Result Value Ref Range    Glucose (POC) 190 (H) 65 - 100 mg/dL    Performed by Navid Linen        Data Review images and reports reviewed    Assessment:     Active Problems:    Bladder injury (2017)        Plan/Recommendations/Medical Decision Making:     Continue present treatment   Pt remains on TPN  Will try clears today  Ongoing management per primary team.  José Antonio Hoyt to mobilize with PT    Thierry Anne MD, [facility]

## 2017-08-12 NOTE — PROGRESS NOTES
Rapid response called on patient due to decreased mental status. Patient has changed room and has a new nurse. Per nurse that has cared for patient all morning, no change in mental status. . Patients wife at bedside and notes no change in mental status. Patient responds verbally when asked his name. Slight facial droop noted on left lower lip, but morning nurse reports this as no change. 1 Dr. Chuck Rowe at bedside and per reviewing chart noted no change in patients mental status. 1315 Rapid response call cancelled.

## 2017-08-12 NOTE — PROGRESS NOTES
Gastroenterology Progress Note    8/12/2017    Admit Date: 7/27/2017    Subjective: Follow up for: abd  distension      Less confused today. Opens eyes but still groggy. NG was removed. Patient was seen in rounds by me today. No family at bedside today.       Current Facility-Administered Medications   Medication Dose Route Frequency    TPN ADULT-CENTRAL AA 5% D20%-MVI W/ VIT K-RCH   IntraVENous CONTINUOUS    metoclopramide HCl (REGLAN) injection 5 mg  5 mg IntraVENous Q8H PRN    nitroglycerin (NITROBID) 2 % ointment 1 Inch  1 Inch Topical BID    levalbuterol (XOPENEX) nebulizer soln 0.63 mg/3 mL  0.63 mg Nebulization Q6H PRN    acetaminophen (TYLENOL) solution 975 mg  975 mg Oral Q12H PRN    Or    acetaminophen (OFIRMEV) infusion 1,000 mg  1,000 mg IntraVENous Q12H PRN    aspirin (ASA) suppository 300 mg  300 mg Rectal DAILY    labetalol (NORMODYNE;TRANDATE) injection 10 mg  10 mg IntraVENous Q6H    hydrALAZINE (APRESOLINE) 20 mg/mL injection 20 mg  20 mg IntraVENous Q4H PRN    labetalol (NORMODYNE;TRANDATE) injection 20 mg  20 mg IntraVENous Q4H PRN    fat emulsion 20% (LIPOSYN, INTRALIPID) infusion 250 mL  250 mL IntraVENous Q MON, WED & FRI    insulin lispro (HUMALOG) injection   SubCUTAneous Q6H    glucose chewable tablet 16 g  4 Tab Oral PRN    dextrose (D50W) injection syrg 12.5-25 g  12.5-25 g IntraVENous PRN    glucagon (GLUCAGEN) injection 1 mg  1 mg IntraMUSCular PRN    enoxaparin (LOVENOX) injection 40 mg  40 mg SubCUTAneous Q24H    famotidine (PF) (PEPCID) 20 mg in sodium chloride 0.9 % 10 mL injection  20 mg IntraVENous Q12H    sodium chloride (NS) flush 5-10 mL  5-10 mL IntraVENous Q8H    ondansetron (ZOFRAN) injection 4 mg  4 mg IntraVENous Q4H PRN    0.9% sodium chloride infusion  25 mL/hr IntraVENous CONTINUOUS    sodium chloride (NS) flush 10 mL  10 mL InterCATHeter PRN    sodium chloride (NS) flush 10-40 mL  10-40 mL InterCATHeter Q8H        Objective: Blood pressure 151/63, pulse 80, temperature 97.6 °F (36.4 °C), resp. rate 22, height 5' 10\" (1.778 m), weight 97.7 kg (215 lb 6.2 oz), SpO2 96 %. 08/10 1901 - 08/12 0700  In: 1759.6 [I.V.:1759.6]  Out: 3696 [Urine:3425]        Physical Examination:       General:Sleepy but wakes up  HEENT:  EOMI,  Chest:  CTA,   Heart: S1, S2, RRR  GI: Soft, bandaged,distended. Dec BS,      Data Review    Recent Results (from the past 24 hour(s))   GLUCOSE, POC    Collection Time: 08/11/17 12:03 PM   Result Value Ref Range    Glucose (POC) 153 (H) 65 - 100 mg/dL    Performed by Katelyn Perales (PCT)    GLUCOSE, POC    Collection Time: 08/11/17  5:38 PM   Result Value Ref Range    Glucose (POC) 159 (H) 65 - 100 mg/dL    Performed by [address] [place], [place] [address]vd, POC    Collection Time: 08/11/17 11:40 PM   Result Value Ref Range    Glucose (POC) 162 (H) 65 - 100 mg/dL    Performed by Kristel Montalvo    GLUCOSE, POC    Collection Time: 08/12/17  5:36 AM   Result Value Ref Range    Glucose (POC) 190 (H) 65 - 100 mg/dL    Performed by Kristel Montalvo      Recent Labs      08/10/17   0223   WBC  10.9   HGB  10.7*   HCT  31.6*   PLT  340     Recent Labs      08/10/17   0223   NA  136   K  4.5   CL  103   CO2  28   BUN  36*   CREA  1.05   GLU  162*   CA  7.6*   MG  2.0   PHOS  3.1     Recent Labs      08/10/17   0223   SGOT  60*   AP  90   TP  6.3*   ALB  1.7*   GLOB  4.6*     No results for input(s): INR, PTP, APTT in the last 72 hours. No lab exists for component: INREXT, INREXT   No results for input(s): FE, TIBC, PSAT, FERR in the last 72 hours. No results found for: FOL, RBCF   No results for input(s): PH, PCO2, PO2 in the last 72 hours. No results for input(s): CPK, CKNDX, TROIQ in the last 72 hours.     No lab exists for component: CPKMB  Lab Results   Component Value Date/Time    Cholesterol, total 178 07/20/2017 12:28 PM    HDL Cholesterol 48 07/20/2017 12:28 PM    LDL, calculated 117.8 07/20/2017 12:28 PM Triglyceride 61 07/20/2017 12:28 PM    CHOL/HDL Ratio 3.7 07/20/2017 12:28 PM     No components found for: [place]  Lab Results   Component Value Date/Time    Color DARK YELLOW 07/26/2017 11:20 AM    Appearance TURBID 07/26/2017 11:20 AM    Specific gravity 1.012 07/26/2017 11:20 AM    Specific gravity 1.025 07/21/2017 08:24 AM    pH (UA) 5.5 07/26/2017 11:20 AM    Protein 100 07/26/2017 11:20 AM    Glucose NEGATIVE  07/26/2017 11:20 AM    Ketone TRACE 07/26/2017 11:20 AM    Bilirubin NEGATIVE  07/26/2017 11:20 AM    Urobilinogen 1.0 07/26/2017 11:20 AM    Nitrites NEGATIVE  07/26/2017 11:20 AM    Leukocyte Esterase MODERATE 07/26/2017 11:20 AM    Epithelial cells FEW 07/26/2017 11:20 AM    Bacteria 3+ 07/26/2017 11:20 AM    WBC  07/26/2017 11:20 AM    RBC >100 07/26/2017 11:20 AM        ROS: -CP, SOB, Dysuria, palpitations, cough. Assessment:  Increased NG output  Confusion    Active Problems:    Bladder injury (7/28/2017)             Plan/Discussion:     1. KUB shows no ileus. NGT was removed. 2. No indication for EGD at this juncture. 3. Use reglan prn only. CT head with no acute changes. 4. Agree with CLD trial.          Signed By: Jorge Luis Agee MD    8/12/2017  11:38 AM

## 2017-08-12 NOTE — ROUTINE PROCESS
End of Shift Nursing Note    Bedside shift change report given to Johnnie Angela (oncoming nurse) by Linette Floyd RN (offgoing nurse). Report included the following information SBAR, Kardex, Intake/Output, MAR, Recent Results and Cardiac Rhythm SR with PVC. Significant changes during shift:    Pt continues to be lethargic during shift. Non-emergent issues for physician to address:   Check pt mouth, possible thrush? Number times ambulated in hallway past shift: 0        Number of times OOB to chair past shift: 0        Vital Signs:    Temp: 97.6 °F (36.4 °C)     Pulse (Heart Rate): 80     BP: 151/63     Resp Rate: 22     O2 Sat (%): 96 %    Lines & Drains:     Urinary Catheter? Yes   Placement Date: 7/28/2017   Medical Necessity: yes  Central Line? No     PICC Line? Yes   Placement Date: 8/2/17   Medical Necessity: yes    NG tube [] in [] removed [x] not applicable   Drains [] in [] removed [x] not applicable     Skin Integrity:      Wounds: yes   Dressings Present: yes    Wound Concerns: no      GI:    Current diet:  DIET NPO  TPN ADULT-CENTRAL AA 5% D20%-MVI W/ VIT K-RCH    Nausea: NO  Vomiting: NO  Bowel Sounds: YES  Flatus: YES  Last Bowel Movement: yesterday   Appearance: smear    Respiratory:  Supplemental O2: No         Incentive Spirometer: NO    Coughing and Deep Breathing: YES  Oral Care: YES  Understanding (patient/family education): YES   Getting out of bed: YES  Head of bed elevation: YES    Patient Safety:    Falls Score: 3  Bed Alarm On? No  Sitter? No      Opportunity for questions and clarification was given to oncoming nurse. Patient bed is in high fowlers position, side rails are up x 3, door & observation blinds open as needed, call bell within reach and patient not in distress.     Terese Wharton

## 2017-08-12 NOTE — PROGRESS NOTES
Hospitalist Progress Note    NAME: Markel Palma   :  1928   MRN:  [id number]       Interim Hospital Summary: 80 y.o. male whom presented on 2017 with      Assessment / Plan:  Postop respiratory failure s/p extubation , now on RA  S/P removal of bladder stone and repair of perforated bladder  S/P subsequent ex-lap due to extensive sq emphysema in abdomen/retroperitoneum - no sign of bowel perforation. Wound Dehiscence s/p ex lap, has abdominal binder  Shock improved  LINDSEY  improving   \"pneumothorax\" due to dissection of air from retroperitoneum cephalad, different physiology or behavior of typical pneumothorax -conservative management  CAD, PAF with RVR- off cardizem gtt , now NSR, restart plavix once cleared by surgery  Anemia, post op s/p PRBC  UTI with enterococcus,  Completed ampicillin course  Electrolyte disturbance replete as indicated, on tpn, diet started  HTN labetalol, nitrobid, switch to Coreg/losaratan once starts po intake  Encephalopathy likely secondary to haldol, CT head negative. On TPN, Paralytic ileus improved, NGT removed, started on CLD, no po intake yet. appreciate GI consult, reglan only prn, KUB no bowel distension  Oral candidiasis, start nystatin    Code status: FULL code  Prophylaxis: per primary team  Recommended Disposition: per primary team-      Subjective:     Chief Complaint / Reason for Physician Visit intubated,    answering questions, no pain   Discussed with RN events overnight. Review of Systems:  Symptom Y/N Comments  Symptom Y/N Comments   Fever/Chills    Chest Pain     Poor Appetite    Edema     Cough    Abdominal Pain     Sputum    Joint Pain     SOB/CLARKE    Pruritis/Rash     Nausea/vomit    Tolerating PT/OT     Diarrhea    Tolerating Diet     Constipation    Other       Could NOT obtain due to: encephalopathy     Objective:     VITALS:   Last 24hrs VS reviewed since prior progress note.  Most recent are:  Patient Vitals for the past 24 hrs:   Temp Pulse Resp BP SpO2   08/12/17 0355 97.6 °F (36.4 °C) 80 22 151/63 96 %   08/11/17 2324 99.1 °F (37.3 °C) 80 22 164/70 94 %   08/11/17 2016 98.9 °F (37.2 °C) 80 22 (!) 183/93 95 %   08/11/17 1803 - - - 158/70 -   08/11/17 1514 97.5 °F (36.4 °C) 79 22 190/86 93 %   08/11/17 1152 98.3 °F (36.8 °C) 70 20 146/56 95 %       Intake/Output Summary (Last 24 hours) at 08/12/17 1053  Last data filed at 08/12/17 0400   Gross per 24 hour   Intake           1759.6 ml   Output             2475 ml   Net           -715.4 ml        PHYSICAL EXAM:  General:  nad, opening eyes, answering questions, still sluggish  EENT:  EOMI. Anicteric sclerae. MMM  Resp:  decrease BS BL, no wheezing or rales. No accessory muscle use  CV:  Regular  rhythm,  + jose pedal and UE edema improving  GI:  Soft, + Non tender. diminished Bowel sounds, binder  Neurologic:  Limited as encepahopathic    Reviewed most current lab test results and cultures  YES  Reviewed most current radiology test results   YES  Review and summation of old records today    NO  Reviewed patient's current orders and MAR    YES  PMH/ reviewed - no change compared to H&P  ________________________________________________________________________  Care Plan discussed with:    Comments   Patient x    Family      RN x    Care Manager     Consultant                        Multidiciplinary team rounds were held today with , nursing, pharmacist and clinical coordinator. Patient's plan of care was discussed; medications were reviewed and discharge planning was addressed.      ________________________________________________________________________  Total NON critical care TIME:  25  Minutes    Total CRITICAL CARE TIME Spent:   Minutes non procedure based      Comments   >50% of visit spent in counseling and coordination of care     ________________________________________________________________________  Bhavik Wheatley MD     Procedures: see electronic medical records for all procedures/Xrays and details which were not copied into this note but were reviewed prior to creation of Plan. LABS:  I reviewed today's most current labs and imaging studies.   Pertinent labs include:  Recent Labs      08/10/17   0223   WBC  10.9   HGB  10.7*   HCT  31.6*   PLT  340     Recent Labs      08/10/17   0223   NA  136   K  4.5   CL  103   CO2  28   GLU  162*   BUN  36*   CREA  1.05   CA  7.6*   MG  2.0   PHOS  3.1   ALB  1.7*   TBILI  0.7   SGOT  60*   ALT  67       Signed: Maite Puente MD

## 2017-08-12 NOTE — PROGRESS NOTES
Bedside report received from Angélica Stanton RN . Daughter and pt's wife in room. Upon assessment, pt was only responsive to painful stimuli. Periodically would open eyes and look around, but would not verbally respond when asked a question. Pt changed to clear liquid diet this AM. He was unable to suck through straw. When given a small amount of liquid in mouth pt was not able to swallow and his juice came out of the side of his mouth. RN informed family not to give him anymore to eat and drink until after a speech consult. Respirations 25 and labored. Pt much weaker in right side compared to left. Pt had not received any pain medication or sedatives in over 24 hours. Pt daughter stated, Divina To dad is getting worse, this was not him a few days ago. \" I had admitted this pt from the CCU Thursday. Pt was alert and talking to me then. Discussed the changes between the two days with the charge nurse. Both RN's agreed that RR was the best option. RRT called. MD assessed pt and order pt to be transferred to PCU.

## 2017-08-12 NOTE — PROGRESS NOTES
TRANSFER - IN REPORT:    Verbal report received from Greta Steen RN (name) on David Blizzard  being received from General Surgery (unit) for routine progression of care      Report consisted of patients Situation, Background, Assessment and   Recommendations(SBAR). Information from the following report(s) SBAR, Kardex, ED Summary, OR Summary, Procedure Summary, Intake/Output, MAR, Recent Results, Med Rec Status, Cardiac Rhythm NSR with PVC's and Alarm Parameters  was reviewed with the receiving nurse. Opportunity for questions and clarification was provided. Assessment completed upon patients arrival to unit and care assumed.        1425--Primary Nurse Quique Gaines RN and Nils Vásquez RN performed a dual skin assessment on this patient Impairment noted- see wound doc flow sheet  Kong score is 13

## 2017-08-12 NOTE — PROGRESS NOTES
Spiritual Care Assessment/Progress Notes    David Lopez [id number]  [id number]    7/22/1928  80 y.o.  male    Patient Telephone Number: [phone] (home)   Oriental orthodox Affiliation: Jami Armijo   Language: English   Extended Emergency Contact Information  Primary Emergency Contact: Homer Corrales  Address: [address] [place] [address] [address] [place] [address] Phone: [phone]  Relation: Spouse   Patient Active Problem List    Diagnosis Date Noted    Bladder injury 07/28/2017    Unstable angina ([address] 07/20/2017        Date: 8/12/2017       Level of Oriental orthodox/Spiritual Activity:  [x]         Involved in irasema tradition/spiritual practice    []         Not involved in irasema tradition/spiritual practice  [x]         Spiritually oriented    []         Claims no spiritual orientation    []         seeking spiritual identity  []         Feels alienated from Yarsani practice/tradition  []         Feels angry about Yarsani practice/tradition  [x]         Spirituality/Yarsani tradition is a resource for coping at this time.   []         Not able to assess due to medical condition    Services Provided Today:  []         crisis intervention    []         reading Scriptures  [x]         spiritual assessment    [x]         prayer  [x]         empathic listening/emotional support  []         rites and rituals (cite in comments)  []         life review     []         Yarsani support  []         theological development    []         advocacy  []         ethical dialog     []         blessing  []         bereavement support    [x]         support to family  []         anticipatory grief support   []         help with AMD  []         spiritual guidance    []         meditation      Spiritual Care Needs  [x]         Emotional Support  [x]         Spiritual/Oriental orthodox Care  []         Loss/Adjustment  []         Advocacy/Referral                /Ethics  []         No needs expressed at               this time  []         Other: (note in               comments)  Spiritual Care Plan  []         Follow up visits with               pt/family  []         Provide materials  []         Schedule sacraments  []         Contact Community               Clergy  [x]         Follow up as needed  []         Other: (note in               comments)     Comments:  Responded to RRT on Gen Surg. Patient's wife Yoko Olivera and daughter, Thuan Ann were present. Provided support to family as clinical staff worked with patient. Thuan Ann shared her father is a [facility] . There is one other daughter. Thuan Ann appeared a little rattled by events that occurred. When able, went to patient's bedside, spoke with him and offered a brief prayer. Assured family of continued prayerful support.   India Padilla, VIVEK, [facility], [address] [place] [address] [phone]     Paging Service  [phone])

## 2017-08-13 NOTE — PROGRESS NOTES
Admit Date: 2017    POD 8 Days Post-Op    Procedure:  Procedure(s):  LAPAROTOMY EXPLORATORY, wound closure for dehiscence    Subjective:     Patient less alert. Failed trial of clear liquids yesterday. Now with several BMs. Objective:     Blood pressure 166/62, pulse 77, temperature 98.3 °F (36.8 °C), resp. rate 22, height 5' 10\" (1.778 m), weight 216 lb 7.9 oz (98.2 kg), SpO2 98 %. Temp (24hrs), Av.4 °F (36.9 °C), Min:97.7 °F (36.5 °C), Max:99.6 °F (37.6 °C)      Physical Exam:  GENERAL: alert, cooperative, no distress, appears stated age, LUNG: clear to auscultation bilaterally, HEART: regular rate and rhythm, ABDOMEN: soft, non-tender. Bowel sounds absent.  Wound c/d/i with retention sutures in place, EXTREMITIES:  extremities normal, atraumatic, no cyanosis or edema    Labs:   Recent Results (from the past 24 hour(s))   GLUCOSE, POC    Collection Time: 17 11:18 AM   Result Value Ref Range    Glucose (POC) 167 (H) 65 - 100 mg/dL    Performed by Bibiana Ordonez, POC    Collection Time: 17 12:52 PM   Result Value Ref Range    Glucose (POC) 143 (H) 65 - 100 mg/dL    Performed by Lewis Huang    PHOSPHORUS    Collection Time: 17  1:08 PM   Result Value Ref Range    Phosphorus 3.6 2.6 - 4.7 MG/DL   GLUCOSE, POC    Collection Time: 17  5:17 PM   Result Value Ref Range    Glucose (POC) 157 (H) 65 - 100 mg/dL    Performed by Benson Ramachandran    GLUCOSE, POC    Collection Time: 17 12:36 AM   Result Value Ref Range    Glucose (POC) 141 (H) 65 - 100 mg/dL    Performed by Lety Wilhelm    CBC WITH AUTOMATED DIFF    Collection Time: 17  5:28 AM   Result Value Ref Range    WBC 9.2 4.1 - 11.1 K/uL    RBC 3.43 (L) 4.10 - 5.70 M/uL    HGB 10.1 (L) 12.1 - 17.0 g/dL    HCT 30.5 (L) 36.6 - 50.3 %    MCV 88.9 80.0 - 99.0 FL    MCH 29.4 26.0 - 34.0 PG    MCHC 33.1 30.0 - 36.5 g/dL    RDW 14.4 11.5 - 14.5 %    PLATELET 810 986 - 460 K/uL    NEUTROPHILS 68 32 - 75 % LYMPHOCYTES 19 12 - 49 %    MONOCYTES 6 5 - 13 %    EOSINOPHILS 6 0 - 7 %    BASOPHILS 1 0 - 1 %    ABS. NEUTROPHILS 6.1 1.8 - 8.0 K/UL    ABS. LYMPHOCYTES 1.8 0.8 - 3.5 K/UL    ABS. MONOCYTES 0.6 0.0 - 1.0 K/UL    ABS. EOSINOPHILS 0.6 (H) 0.0 - 0.4 K/UL    ABS. BASOPHILS 0.1 0.0 - 0.1 K/UL   METABOLIC PANEL, COMPREHENSIVE    Collection Time: 08/13/17  5:28 AM   Result Value Ref Range    Sodium 132 (L) 136 - 145 mmol/L    Potassium 4.3 3.5 - 5.1 mmol/L    Chloride 101 97 - 108 mmol/L    CO2 25 21 - 32 mmol/L    Anion gap 6 5 - 15 mmol/L    Glucose 147 (H) 65 - 100 mg/dL    BUN 31 (H) 6 - 20 MG/DL    Creatinine 0.83 0.70 - 1.30 MG/DL    BUN/Creatinine ratio 37 (H) 12 - 20      GFR est AA >60 >60 ml/min/1.73m2    GFR est non-AA >60 >60 ml/min/1.73m2    Calcium 8.2 (L) 8.5 - 10.1 MG/DL    Bilirubin, total 0.6 0.2 - 1.0 MG/DL    ALT (SGPT) 76 12 - 78 U/L    AST (SGOT) 70 (H) 15 - 37 U/L    Alk. phosphatase 92 45 - 117 U/L    Protein, total 6.7 6.4 - 8.2 g/dL    Albumin 2.0 (L) 3.5 - 5.0 g/dL    Globulin 4.7 (H) 2.0 - 4.0 g/dL    A-G Ratio 0.4 (L) 1.1 - 2.2     MAGNESIUM    Collection Time: 08/13/17  5:28 AM   Result Value Ref Range    Magnesium 1.8 1.6 - 2.4 mg/dL   PHOSPHORUS    Collection Time: 08/13/17  5:28 AM   Result Value Ref Range    Phosphorus 3.2 2.6 - 4.7 MG/DL   NUCLEATED RBC    Collection Time: 08/13/17  5:28 AM   Result Value Ref Range    NRBC 0.0 0  WBC    ABSOLUTE NRBC 0.00 0.00 - 0.01 K/uL   GLUCOSE, POC    Collection Time: 08/13/17  5:37 AM   Result Value Ref Range    Glucose (POC) 172 (H) 65 - 100 mg/dL    Performed by Shantell Louie        Data Review images and reports reviewed    Assessment:     Active Problems:    Bladder injury (7/28/2017)        Plan/Recommendations/Medical Decision Making:     Continue present treatment   Pt remains on TPN  SLP eval tomorrow  Ongoing management per primary team.  Stable from gen surgery standpoint    Tootie Gallagher MD, [facility]

## 2017-08-13 NOTE — PROGRESS NOTES
Rapid response note:    Nursing staff reported that the patient was not responsive. I saw the pt surrounded by nursing personnel. His wife and daughter were in the room. History and brief hospital data obtained from the nursing personnel. Chart and recent events reviewed. Medications reviewed. The pt has had a rather complex hospital course. He was found to have waxing and waning mentation - classified by hospitalists as encephalopathy. He has been on TPN for some time due to his GI issues. His code status noted FULL code. Examination:   Pt responds to verbal address of his name with moaning and trying to open eyes. No deviation noted. Vitals noted. Resp: CTA with few scattered wheeze. No sign of PnTX noted. CVS : S1 and S2 normal but distant due to body habitus. Pedal pulse barely palpable. ABD: Soft, BS not heard. EXT obese and edematous - not new according to his daughter   NEURO: Pt is most likely encephalopathic with blunted mentation, duration is not clear, I didn't find any any focal or lateralizing neurological deficit on gross bedside examination. A/P :   Vacillating encephalopathy (worsening)  - we need to evaluate for metabolic derangement especially phosphate level. For the sake of closer care, since this pt is requiring significant amount of nursing care, and he remains at high risk of deterioration, we will transfer the pt to PCU   Case discussed extensively with nursing supervisor and pt's family at his bedside.    We will make him NPO for now - due to deterioration of mental status     Total critical care time including face to face meeting with pt and family and care coordination approximately 31 minutes     Rafael Ruiz MD MPH FACP   [delayed entry 8/13/2017 ]      __________________________________________________________     FOR SOUND PHYSICIAN ADMINISTRATIVE USE ONLY     MDM  Number of Diagnoses or Management Options  Diagnosis management comments: Acute encephalopathy on top of chronic mentation changes. - established worsening.         Amount and/or Complexity of Data Reviewed  Clinical lab tests: reviewed and ordered  Obtain history from someone other than the patient: yes  Review and summarize past medical records: yes    Risk of Complications, Morbidity, and/or Mortality  Presenting problems: high  Diagnostic procedures: high  Management options: high  General comments: Transfer to PCU  High risk of deterioration     Critical Care  Total time providing critical care: 30-74 minutes      MDM SF  LOW  MOD HIGH   Problem point     4   Data Point    3    Risk Level     High       Critical Care ~35 min    Juan Luna MD MPH FACP   10:17 AM  8/13/2017

## 2017-08-13 NOTE — PROGRESS NOTES
Gastroenterology Progress Note    8/13/2017    Admit Date: 7/27/2017    Subjective: Follow up for: abd  distension      Events noted. Confused. Responds to some questions but is not appropriate. Failed a CLD per RN. NPO now.       Current Facility-Administered Medications   Medication Dose Route Frequency    TPN ADULT-CENTRAL AA 5% D20%-MVI W/ VIT K-RCH   IntraVENous CONTINUOUS    zinc oxide-cod liver oil (DESITIN) 40 % paste   Topical PRN    metoclopramide HCl (REGLAN) injection 5 mg  5 mg IntraVENous Q8H PRN    nitroglycerin (NITROBID) 2 % ointment 1 Inch  1 Inch Topical BID    levalbuterol (XOPENEX) nebulizer soln 0.63 mg/3 mL  0.63 mg Nebulization Q6H PRN    acetaminophen (TYLENOL) solution 975 mg  975 mg Oral Q12H PRN    Or    acetaminophen (OFIRMEV) infusion 1,000 mg  1,000 mg IntraVENous Q12H PRN    aspirin (ASA) suppository 300 mg  300 mg Rectal DAILY    labetalol (NORMODYNE;TRANDATE) injection 10 mg  10 mg IntraVENous Q6H    hydrALAZINE (APRESOLINE) 20 mg/mL injection 20 mg  20 mg IntraVENous Q4H PRN    labetalol (NORMODYNE;TRANDATE) injection 20 mg  20 mg IntraVENous Q4H PRN    fat emulsion 20% (LIPOSYN, INTRALIPID) infusion 250 mL  250 mL IntraVENous Q MON, WED & FRI    insulin lispro (HUMALOG) injection   SubCUTAneous Q6H    glucose chewable tablet 16 g  4 Tab Oral PRN    dextrose (D50W) injection syrg 12.5-25 g  12.5-25 g IntraVENous PRN    glucagon (GLUCAGEN) injection 1 mg  1 mg IntraMUSCular PRN    enoxaparin (LOVENOX) injection 40 mg  40 mg SubCUTAneous Q24H    famotidine (PF) (PEPCID) 20 mg in sodium chloride 0.9 % 10 mL injection  20 mg IntraVENous Q12H    sodium chloride (NS) flush 5-10 mL  5-10 mL IntraVENous Q8H    ondansetron (ZOFRAN) injection 4 mg  4 mg IntraVENous Q4H PRN    0.9% sodium chloride infusion  25 mL/hr IntraVENous CONTINUOUS    sodium chloride (NS) flush 10 mL  10 mL InterCATHeter PRN    sodium chloride (NS) flush 10-40 mL  10-40 mL InterCATHeter Q8H        Objective:     Blood pressure 166/62, pulse 77, temperature 98.3 °F (36.8 °C), resp. rate 22, height 5' 10\" (1.778 m), weight 98.2 kg (216 lb 7.9 oz), SpO2 98 %.    08/13 0701 - 08/13 1900  In: 167.4 [I.V.:167.4]  Out: 195 [PFEZT:902]    08/11 1901 - 08/13 0700  In: 3175.1 [I.V.:3175.1]  Out: 3064 [Urine:3170]        Physical Examination:       General:Confused. Dry mouth  HEENT:  EOMI,  Chest:  CTA,   Heart: S1, S2, RRR  GI: Soft, bandaged,distended. Dec BS,      Data Review    Recent Results (from the past 24 hour(s))   GLUCOSE, POC    Collection Time: 08/12/17 11:18 AM   Result Value Ref Range    Glucose (POC) 167 (H) 65 - 100 mg/dL    Performed by German Miranda, POC    Collection Time: 08/12/17 12:52 PM   Result Value Ref Range    Glucose (POC) 143 (H) 65 - 100 mg/dL    Performed by Marii Benavides    PHOSPHORUS    Collection Time: 08/12/17  1:08 PM   Result Value Ref Range    Phosphorus 3.6 2.6 - 4.7 MG/DL   GLUCOSE, POC    Collection Time: 08/12/17  5:17 PM   Result Value Ref Range    Glucose (POC) 157 (H) 65 - 100 mg/dL    Performed by Jorge Pérez    GLUCOSE, POC    Collection Time: 08/13/17 12:36 AM   Result Value Ref Range    Glucose (POC) 141 (H) 65 - 100 mg/dL    Performed by Army Voss    CBC WITH AUTOMATED DIFF    Collection Time: 08/13/17  5:28 AM   Result Value Ref Range    WBC 9.2 4.1 - 11.1 K/uL    RBC 3.43 (L) 4.10 - 5.70 M/uL    HGB 10.1 (L) 12.1 - 17.0 g/dL    HCT 30.5 (L) 36.6 - 50.3 %    MCV 88.9 80.0 - 99.0 FL    MCH 29.4 26.0 - 34.0 PG    MCHC 33.1 30.0 - 36.5 g/dL    RDW 14.4 11.5 - 14.5 %    PLATELET 860 824 - 184 K/uL    NEUTROPHILS 68 32 - 75 %    LYMPHOCYTES 19 12 - 49 %    MONOCYTES 6 5 - 13 %    EOSINOPHILS 6 0 - 7 %    BASOPHILS 1 0 - 1 %    ABS. NEUTROPHILS 6.1 1.8 - 8.0 K/UL    ABS. LYMPHOCYTES 1.8 0.8 - 3.5 K/UL    ABS. MONOCYTES 0.6 0.0 - 1.0 K/UL    ABS. EOSINOPHILS 0.6 (H) 0.0 - 0.4 K/UL    ABS.  BASOPHILS 0.1 0.0 - 0.1 K/UL   METABOLIC PANEL, COMPREHENSIVE    Collection Time: 08/13/17  5:28 AM   Result Value Ref Range    Sodium 132 (L) 136 - 145 mmol/L    Potassium 4.3 3.5 - 5.1 mmol/L    Chloride 101 97 - 108 mmol/L    CO2 25 21 - 32 mmol/L    Anion gap 6 5 - 15 mmol/L    Glucose 147 (H) 65 - 100 mg/dL    BUN 31 (H) 6 - 20 MG/DL    Creatinine 0.83 0.70 - 1.30 MG/DL    BUN/Creatinine ratio 37 (H) 12 - 20      GFR est AA >60 >60 ml/min/1.73m2    GFR est non-AA >60 >60 ml/min/1.73m2    Calcium 8.2 (L) 8.5 - 10.1 MG/DL    Bilirubin, total 0.6 0.2 - 1.0 MG/DL    ALT (SGPT) 76 12 - 78 U/L    AST (SGOT) 70 (H) 15 - 37 U/L    Alk. phosphatase 92 45 - 117 U/L    Protein, total 6.7 6.4 - 8.2 g/dL    Albumin 2.0 (L) 3.5 - 5.0 g/dL    Globulin 4.7 (H) 2.0 - 4.0 g/dL    A-G Ratio 0.4 (L) 1.1 - 2.2     MAGNESIUM    Collection Time: 08/13/17  5:28 AM   Result Value Ref Range    Magnesium 1.8 1.6 - 2.4 mg/dL   PHOSPHORUS    Collection Time: 08/13/17  5:28 AM   Result Value Ref Range    Phosphorus 3.2 2.6 - 4.7 MG/DL   NUCLEATED RBC    Collection Time: 08/13/17  5:28 AM   Result Value Ref Range    NRBC 0.0 0  WBC    ABSOLUTE NRBC 0.00 0.00 - 0.01 K/uL   GLUCOSE, POC    Collection Time: 08/13/17  5:37 AM   Result Value Ref Range    Glucose (POC) 172 (H) 65 - 100 mg/dL    Performed by Charlene Bella      Recent Labs      08/13/17 0528   WBC  9.2   HGB  10.1*   HCT  30.5*   PLT  357     Recent Labs      08/13/17 0528 08/12/17   1308   NA  132*   --    K  4.3   --    CL  101   --    CO2  25   --    BUN  31*   --    CREA  0.83   --    GLU  147*   --    CA  8.2*   --    MG  1.8   --    PHOS  3.2  3.6     Recent Labs      08/13/17   0528   SGOT  70*   AP  92   TP  6.7   ALB  2.0*   GLOB  4.7*     No results for input(s): INR, PTP, APTT in the last 72 hours. No lab exists for component: INREXT, INREXT   No results for input(s): FE, TIBC, PSAT, FERR in the last 72 hours.    No results found for: FOL, RBCF   No results for input(s): PH, PCO2, PO2 in the last 72 hours. No results for input(s): CPK, CKNDX, TROIQ in the last 72 hours. No lab exists for component: CPKMB  Lab Results   Component Value Date/Time    Cholesterol, total 178 07/20/2017 12:28 PM    HDL Cholesterol 48 07/20/2017 12:28 PM    LDL, calculated 117.8 07/20/2017 12:28 PM    Triglyceride 61 07/20/2017 12:28 PM    CHOL/HDL Ratio 3.7 07/20/2017 12:28 PM     No components found for: [place]  Lab Results   Component Value Date/Time    Color DARK YELLOW 07/26/2017 11:20 AM    Appearance TURBID 07/26/2017 11:20 AM    Specific gravity 1.012 07/26/2017 11:20 AM    Specific gravity 1.025 07/21/2017 08:24 AM    pH (UA) 5.5 07/26/2017 11:20 AM    Protein 100 07/26/2017 11:20 AM    Glucose NEGATIVE  07/26/2017 11:20 AM    Ketone TRACE 07/26/2017 11:20 AM    Bilirubin NEGATIVE  07/26/2017 11:20 AM    Urobilinogen 1.0 07/26/2017 11:20 AM    Nitrites NEGATIVE  07/26/2017 11:20 AM    Leukocyte Esterase MODERATE 07/26/2017 11:20 AM    Epithelial cells FEW 07/26/2017 11:20 AM    Bacteria 3+ 07/26/2017 11:20 AM    WBC  07/26/2017 11:20 AM    RBC >100 07/26/2017 11:20 AM        ROS: -CP, SOB, Dysuria, palpitations, cough. Assessment:  Feeding difficulties  Confusion    Active Problems:    Bladder injury (7/28/2017)             Plan/Discussion:     1. KUB shows no ileus. NGT was removed. No impressive GI distention on exam  2. SLP evaluation for Monday is planned as he failed a feeding trial.  3. CT head with no acute changes noted. 4. Discussed with RN at bedside(Marciano)  5. Confusion work up; as per hospitalist team.          Signed By: Henok Crump.  Kelly Martinez MD    8/13/2017  11:38 AM

## 2017-08-13 NOTE — PROGRESS NOTES
PCU SHIFT NURSING NOTE      Bedside and Verbal shift change report given to Luz Elena Prado RN (oncoming nurse) by Pepe Forman (offgoing nurse). Report included the following information SBAR, Kardex, ED Summary, OR Summary, Procedure Summary, Intake/Output, MAR, Recent Results, Med Rec Status, Cardiac Rhythm NSR and Alarm Parameters . Shift Summary:         Admission Date 7/27/2017   Admission Diagnosis bladder clots  Bladder injury  perperated viscous  wound dehiscence   Consults IP CONSULT TO UROLOGY  IP CONSULT TO GASTROENTEROLOGY  IP CONSULT TO CARDIOLOGY        Consults   [x]PT   [x]OT   [x]Speech   [x]Case Management      [] Palliative      Cardiac Monitoring Order   [x]Yes   []No     IV drips   []Yes    Drip:                            Dose:  Drip:                            Dose:  Drip:                            Dose:   [x]No     GI Prophylaxis   [x]Yes   []No         DVT Prophylaxis   SCDs:  Sequential Compression Device: Bilateral          Ash stockings:  Graduated Compression Stockings: Bilateral      [x] Medication   []Contraindicated   []None      Activity Level Activity Level: Bed Rest     Activity Assistance: Complete care   Purposeful Rounding every 1-2 hour? [x]Yes   Mcdonald Score  Total Score: 3   Bed Alarm (If score 3 or >)   [x]Yes   [] Refused (See signed refusal form in chart)   Kong Score  Kong Score: 12   Kong Score (if score 14 or less)   [x]PMT consult   [x]Wound Care consult      [x]Specialty bed   [x] Nutrition consult          Needs prior to discharge:   Home O2 required:    []Yes   [x]No    If yes, how much O2 required?     Other:    Last Bowel Movement: Last Bowel Movement Date: 08/12/17      Influenza Vaccine Received Flu Vaccine for Current Season (usually Sept-March): Not Flu Season        Pneumonia Vaccine           Diet Active Orders   Diet    DIET NPO      LDAs         PICC Double Lumen 94/11/67 Basilic;Left (Active)   [employer] Being Utilized Yes 8/13/2017 7:25 AM   Criteria for Appropriate Use Total parenteral nutrition 8/13/2017  7:25 AM   Site Assessment Clean, dry, & intact 8/13/2017  7:25 AM   Phlebitis Assessment 0 8/13/2017  7:25 AM   Infiltration Assessment 0 8/13/2017  7:25 AM   Arm Circumference (cm) 33 cm 8/2/2017 10:47 AM   Date of Last Dressing Change 08/09/17 8/13/2017  7:25 AM   Dressing Status Clean, dry, & intact 8/13/2017  7:25 AM   Action Taken Open ports on tubing capped 8/13/2017  7:25 AM   External Catheter Length (cm) 0 centimeters 8/10/2017  7:30 AM   Dressing Type Disk with Chlorhexadine gluconate (CHG); Transparent 8/13/2017  7:25 AM   Hub Color/Line Status Red;Capped;Flushed 8/13/2017  7:25 AM   Positive Blood Return (Site #1) Yes 8/13/2017  7:25 AM   Hub Color/Line Status Purple; Infusing;Flushed 8/13/2017  7:25 AM   Positive Blood Return (Site #2) Yes 8/13/2017  7:25 AM   Alcohol Cap Used Yes 8/13/2017  7:25 AM                            Urinary Catheter [REMOVED] Urinary Catheter 07/27/17 2- way-Criteria for Appropriate Use: Obstruction/retention  Urinary Catheter 07/28/17 3- way; [place]-Criteria for Appropriate Use: Medically/surgically unstable, Surgical procedure  Urinary Catheter 07/28/17 Other (Comment)-Criteria for Appropriate Use: Medically/surgically unstable, Surgical procedure  [REMOVED] Urinary Catheter 07/26/17 2- way-Criteria for Appropriate Use: Obstruction/retention    Intake & Output   Date 08/12/17 0700 - 08/13/17 0659 08/13/17 0700 - 08/14/17 0659   Shift 7964-8018 0988-2249 24 Hour Total 3587-9925 4498-0153 24 Hour Total   I  N  T  A  K  E   I.V.  (mL/kg/hr) 2146.9  (1.8) 1028.1  (0.9) 3175.1  (1.4) 84.4  84.4      I.V.  10 10         Volume (TPN ADULT-CENTRAL AA 5% D20%-MVI W/ VIT K-RCH) 2076.4  2076. 4         Volume (TPN ADULT-CENTRAL AA 5% D20%-MVI W/ VIT K-RCH) 70.6 1018. 1 1088.7 84.4  84.4    Shift Total  (mL/kg) 2146.9  (22.1) 1028.1  (10.6) 3175.1  (32.6) 84.4  (0.9)  84.4  (0.9)   O  U  T  P  U  T Urine  (mL/kg/hr) 1100  (0.9) 1145  (1) 2245  (1) 195  195      Urine Output (mL) (Urinary Catheter 07/28/17 3- way; [place]) 350 345 695 70  70      Urine Output (mL) (Urinary Catheter 07/28/17 Other (Comment))  125  125    Stool            Stool Occurrence(s)  2 x 2 x 1 x  1 x    Shift Total  (mL/kg) 1100  (11.3) 1145  (11.8) 2245  (23.1) 195  (2)  195  (2)   NET 1046.9 -116.9 930.1 -110.6  -110.6   Weight (kg) 97.3 97.3 97.3 98.2 98.2 98.2         Readmission Risk Assessment Tool Score Medium Risk            17       Total Score        3 Has Seen PCP in Last 6 Months (Yes=3, No=0)    2 . Living with Significant Other. Assisted Living. LTAC. SNF. or   Rehab    3 Patient Length of Stay (>5 days = 3)    4 IP Visits Last 12 Months (1-3=4, 4=9, >4=11)    5 Pt.  Coverage (Medicare=5 , Medicaid, or Self-Pay=4)        Criteria that do not apply:    Charlson Comorbidity Score (Age + Comorbid Conditions)       Expected Length of Stay 5d 12h   Actual Length of Stay 16

## 2017-08-13 NOTE — PROGRESS NOTES
Admit Date: 2017    POD 8 Days Post-Op    Procedure:  Procedure(s):  LAPAROTOMY EXPLORATORY, wound closure for dehiscence    Subjective:     Patient not oriented  Objective:     Blood pressure 166/62, pulse 77, temperature 98.3 °F (36.8 °C), resp. rate 22, height 5' 10\" (1.778 m), weight 98.2 kg (216 lb 7.9 oz), SpO2 98 %.     Temp (24hrs), Av.4 °F (36.9 °C), Min:97.7 °F (36.5 °C), Max:99.6 °F (37.6 °C)      Physical Exam:  GENERAL: confused, ABDOMEN: abnormal findings: soft, mild unchanged erythema at [facility] site, no fluctuance, SPT and Rosales draining well    Labs:   Recent Results (from the past 48 hour(s))   GLUCOSE, POC    Collection Time: 17 12:03 PM   Result Value Ref Range    Glucose (POC) 153 (H) 65 - 100 mg/dL    Performed by Katelyn Perales (PCT)    GLUCOSE, POC    Collection Time: 17  5:38 PM   Result Value Ref Range    Glucose (POC) 159 (H) 65 - 100 mg/dL    Performed by Josr Thorne    GLUCOSE, POC    Collection Time: 17 11:40 PM   Result Value Ref Range    Glucose (POC) 162 (H) 65 - 100 mg/dL    Performed by Kristel Montalvo    GLUCOSE, POC    Collection Time: 17  5:36 AM   Result Value Ref Range    Glucose (POC) 190 (H) 65 - 100 mg/dL    Performed by Kristel Montalvo    GLUCOSE, POC    Collection Time: 17 11:18 AM   Result Value Ref Range    Glucose (POC) 167 (H) 65 - 100 mg/dL    Performed by Rohini Espana, POC    Collection Time: 17 12:52 PM   Result Value Ref Range    Glucose (POC) 143 (H) 65 - 100 mg/dL    Performed by Suraj Solomon    PHOSPHORUS    Collection Time: 17  1:08 PM   Result Value Ref Range    Phosphorus 3.6 2.6 - 4.7 MG/DL   GLUCOSE, POC    Collection Time: 17  5:17 PM   Result Value Ref Range    Glucose (POC) 157 (H) 65 - 100 mg/dL    Performed by [address] [place] [address], POC    Collection Time: 17 12:36 AM   Result Value Ref Range    Glucose (POC) 141 (H) 65 - 100 mg/dL    Performed by Zeina Sick    CBC WITH AUTOMATED DIFF    Collection Time: 08/13/17  5:28 AM   Result Value Ref Range    WBC 9.2 4.1 - 11.1 K/uL    RBC 3.43 (L) 4.10 - 5.70 M/uL    HGB 10.1 (L) 12.1 - 17.0 g/dL    HCT 30.5 (L) 36.6 - 50.3 %    MCV 88.9 80.0 - 99.0 FL    MCH 29.4 26.0 - 34.0 PG    MCHC 33.1 30.0 - 36.5 g/dL    RDW 14.4 11.5 - 14.5 %    PLATELET 774 324 - 654 K/uL    NEUTROPHILS 68 32 - 75 %    LYMPHOCYTES 19 12 - 49 %    MONOCYTES 6 5 - 13 %    EOSINOPHILS 6 0 - 7 %    BASOPHILS 1 0 - 1 %    ABS. NEUTROPHILS 6.1 1.8 - 8.0 K/UL    ABS. LYMPHOCYTES 1.8 0.8 - 3.5 K/UL    ABS. MONOCYTES 0.6 0.0 - 1.0 K/UL    ABS. EOSINOPHILS 0.6 (H) 0.0 - 0.4 K/UL    ABS. BASOPHILS 0.1 0.0 - 0.1 K/UL   METABOLIC PANEL, COMPREHENSIVE    Collection Time: 08/13/17  5:28 AM   Result Value Ref Range    Sodium 132 (L) 136 - 145 mmol/L    Potassium 4.3 3.5 - 5.1 mmol/L    Chloride 101 97 - 108 mmol/L    CO2 25 21 - 32 mmol/L    Anion gap 6 5 - 15 mmol/L    Glucose 147 (H) 65 - 100 mg/dL    BUN 31 (H) 6 - 20 MG/DL    Creatinine 0.83 0.70 - 1.30 MG/DL    BUN/Creatinine ratio 37 (H) 12 - 20      GFR est AA >60 >60 ml/min/1.73m2    GFR est non-AA >60 >60 ml/min/1.73m2    Calcium 8.2 (L) 8.5 - 10.1 MG/DL    Bilirubin, total 0.6 0.2 - 1.0 MG/DL    ALT (SGPT) 76 12 - 78 U/L    AST (SGOT) 70 (H) 15 - 37 U/L    Alk.  phosphatase 92 45 - 117 U/L    Protein, total 6.7 6.4 - 8.2 g/dL    Albumin 2.0 (L) 3.5 - 5.0 g/dL    Globulin 4.7 (H) 2.0 - 4.0 g/dL    A-G Ratio 0.4 (L) 1.1 - 2.2     MAGNESIUM    Collection Time: 08/13/17  5:28 AM   Result Value Ref Range    Magnesium 1.8 1.6 - 2.4 mg/dL   PHOSPHORUS    Collection Time: 08/13/17  5:28 AM   Result Value Ref Range    Phosphorus 3.2 2.6 - 4.7 MG/DL   NUCLEATED RBC    Collection Time: 08/13/17  5:28 AM   Result Value Ref Range    NRBC 0.0 0  WBC    ABSOLUTE NRBC 0.00 0.00 - 0.01 K/uL   GLUCOSE, POC    Collection Time: 08/13/17  5:37 AM   Result Value Ref Range    Glucose (POC) 172 (H) 65 - 100 mg/dL    Performed by [employer] Clayton Sanchez        Data Review images and reports reviewed    Assessment:     Active Problems:    Bladder injury (7/28/2017)        Plan/Recommendations/Medical Decision Making:     COntinue wick and SPT for complete bladder decompression

## 2017-08-13 NOTE — PROGRESS NOTES
0700   Patient received last evening at 1900 hours; patient on room air; opens eyes spontaneously, moves all extremities, purposeful; will not obey; patient states his name only; speech garbled/slurred; patient basically non-verbal except for stating his name and occasional words; patient stiff/rigid; patient in sinus rhythm; patient has left upper arm PICC line infusing TPN at 83 cc/hr; patient is NPO; patient had 3 BM's this shift; mid-line abd incisions intact; abd binder intact; wick catheter drained 345 cc of edwin color urine and suprapubic catheter drained 800 cc of edwin color urine this shift; patient given prn dose of hydralazine one time (10 mg) iv for elevated systolic ;  lab done; patient's family in to visit last evening and went home for the night; lab done; report given to Grand kapil RN.

## 2017-08-13 NOTE — PROGRESS NOTES
Hospitalist Progress Note    NAME: Erik Guy   :  1928   MRN:  [id number]       Interim Hospital Summary: 80 y.o. male whom presented on 2017 with      Assessment / Plan:  Postop respiratory failure s/p extubation , now on RA    S/P removal of bladder stone and repair of perforated bladder  S/P subsequent ex-lap due to extensive sq emphysema in abdomen/retroperitoneum - no sign of bowel perforation. Wound Dehiscence s/p ex lap, has abdominal binder    Shock improved  LINDSEY  improving   \"pneumothorax\" due to dissection of air from retroperitoneum cephalad, different physiology or behavior of typical pneumothorax -conservative management  CAD, PAF with RVR- off cardizem gtt , now NSR, restart plavix once cleared by surgery  Anemia, post op s/p PRBC  H/h 10/30  UTI with enterococcus,  Completed ampicillin course  Electrolyte disturbance replete as indicated, on tpn  HTN labetalol, nitrobid, switch to Coreg/losaratan once starts po intake  Encephalopathy likely secondary to haldol, CT head negative. Avoid sedatives  Check  Las in am/xray  On TPN, Paralytic ileus improved, NGT removed, npo  SLP eval tomorrow  Oral candidiasis, start nystatin     Code status: FULL code   Prophylaxis: per primary team  Recommended Disposition: per primary team-           Subjective:     Chief Complaint / Reason for Physician Visit : confused wick draining   Failed trial of clear liquids yesterday   Discussed with RN events overnight. Review of Systems:  Symptom Y/N Comments  Symptom Y/N Comments   Fever/Chills    Chest Pain     Poor Appetite    Edema     Cough    Abdominal Pain     Sputum    Joint Pain     SOB/CLARKE    Pruritis/Rash     Nausea/vomit    Tolerating PT/OT     Diarrhea    Tolerating Diet     Constipation    Other       Could NOT obtain due to:      Objective:     VITALS:   Last 24hrs VS reviewed since prior progress note.  Most recent are:  Patient Vitals for the past 24 hrs:   Temp Pulse Resp BP SpO2   08/13/17 0725 98.3 °F (36.8 °C) 77 22 166/62 98 %   08/13/17 0510 - 73 - 144/70 -   08/13/17 0500 - 71 - - 97 %   08/13/17 0400 - 77 - - 97 %   08/13/17 0358 - 78 - 162/77 96 %   08/13/17 0355 97.7 °F (36.5 °C) 82 - - 97 %   08/13/17 0300 - 80 - - 98 %   08/13/17 0227 - 74 - 153/67 98 %   08/13/17 0200 - 71 - - 97 %   08/13/17 0100 - 72 - - 96 %   08/13/17 0043 - 72 - 135/70 -   08/13/17 0034 - 75 - 135/70 95 %   08/13/17 0000 99.6 °F (37.6 °C) 74 - - 97 %   08/12/17 2300 - 79 - - 98 %   08/12/17 2214 - 73 - 154/87 -   08/12/17 2200 - 68 - - 95 %   08/12/17 2149 - 73 24 154/87 97 %   08/12/17 2100 - 73 - - -   08/12/17 2057 - 72 - - 98 %   08/12/17 2027 98.4 °F (36.9 °C) 69 24 155/56 97 %   08/12/17 2000 - 70 - - 98 %   08/12/17 1900 - 68 - - 98 %   08/12/17 1843 98.2 °F (36.8 °C) 62 20 128/51 96 %   08/12/17 1738 98 °F (36.7 °C) 74 20 178/75 95 %   08/12/17 1422 98.1 °F (36.7 °C) 70 20 147/61 96 %   08/12/17 1253 - 74 24 152/75 95 %   08/12/17 1114 98.9 °F (37.2 °C) 67 20 163/65 97 %       Intake/Output Summary (Last 24 hours) at 08/13/17 1003  Last data filed at 08/13/17 0900   Gross per 24 hour   Intake          3342.47 ml   Output             2440 ml   Net           902.47 ml        PHYSICAL EXAM:  General: WD, WN. no acute distress    EENT:  EOMI. Anicteric sclerae. MMM  Resp:  CTA bilaterally, no wheezing or rales. No accessory muscle use  CV:  Regular  rhythm,  No edema  GI:  Soft,obese  distended, Non tender.  +Bowel sounds  Neurologic:  Alert and oriented X 3, normal speech,   Psych:           Not anxious nor agitated  Skin:  No rashes.   No jaundice    Reviewed most current lab test results and cultures  YES  Reviewed most current radiology test results   YES  Review and summation of old records today    NO  Reviewed patient's current orders and MAR    YES  PMH/SH reviewed - no change compared to H&P  ________________________________________________________________________  Care Plan discussed with:    Comments   Patient x    Family      RN x    Care Manager     Consultant                        Multidiciplinary team rounds were held today with , nursing, pharmacist and clinical coordinator. Patient's plan of care was discussed; medications were reviewed and discharge planning was addressed. ________________________________________________________________________  Total NON critical care TIME:  30  Minutes    Total CRITICAL CARE TIME Spent:   Minutes non procedure based      Comments   >50% of visit spent in counseling and coordination of care x    ________________________________________________________________________  Landy Zaldivar MD     Procedures: see electronic medical records for all procedures/Xrays and details which were not copied into this note but were reviewed prior to creation of Plan. LABS:  I reviewed today's most current labs and imaging studies.   Pertinent labs include:  Recent Labs      08/13/17 0528   WBC  9.2   HGB  10.1*   HCT  30.5*   PLT  357     Recent Labs      08/13/17 0528  08/12/17   1308   NA  132*   --    K  4.3   --    CL  101   --    CO2  25   --    GLU  147*   --    BUN  31*   --    CREA  0.83   --    CA  8.2*   --    MG  1.8   --    PHOS  3.2  3.6   ALB  2.0*   --    TBILI  0.6   --    SGOT  70*   --    ALT  76   --        Signed: Landy Zaldivar MD

## 2017-08-14 NOTE — PROGRESS NOTES
Problem: Dysphagia (Adult)  Goal: *Acute Goals and Plan of Care (Insert Text)  Speech pathology goals intitiated 8/14/2017   1. Patient will tolerate sips of clear liquids free of s/s aspiration within 7 days  2. Patient will demonstrate timely and complete posterior propulsion with purees within 7 days  3. Patient will participate in solid trials if mentation allows within 7 days   [address] EVALUATION  Patient: Gloria Cabral (50 y.o. male)  Date: 8/14/2017  Primary Diagnosis: bladder clots  Bladder injury  perperated viscous  wound dehiscence  Procedure(s) (LRB):  LAPAROTOMY EXPLORATORY, wound closure for dehiscence (N/A) 9 Days Post-Op   Precautions:   (abdominal binder)      ASSESSMENT :  Based on the objective data described below, the patient presents with moderate oral and mild pharyngeal dysphagia. Oral dysphagia characterized by prolonged bolus manipulation with perseverative chew with applesauce and incomplete oral clearance. Mild lingual residue with applesauce that did clear with liquid wash. Pharyngeal swallow iniaition suspected to be mildly delayed and hyolaryngeal elevation/excursion reduced via palpation. No overt s/s aspiration with cup/straw sips of thin or puree. Patient limited by significant confusion. Recommend only offering sips of clear liquids if patient fully alert. Patient will benefit from skilled intervention to address the above impairments. Patients rehabilitation potential is considered to be Good  Factors which may influence rehabilitation potential include:   [ ]            None noted  [X]            Mental ability/status  [ ]            Medical condition  [ ]            Home/family situation and support systems  [ ]            Safety awareness  [ ]            Pain tolerance/management  [ ]            Other:        PLAN :  Recommendations and Planned Interventions:  NPO except sips of liquids.  Patient able to hold cup with R hand or does fine with straw. Fully upright with all PO  Hold if too lethargic or confused  slp will f/u tomorrow  Continue alternative med route     Frequency/Duration: Patient will be followed by speech-language pathology 3 times a week to address goals. Discharge Recommendations: To Be Determined       SUBJECTIVE:   Patient stated Otto Dickerson. OBJECTIVE:       Past Medical History:   Diagnosis Date    Hypertension      Prostate enlargement       Past Surgical History:   Procedure Laterality Date    ABDOMEN SURGERY PROC UNLISTED         hernia repair    CARDIAC SURG PROCEDURE UNLIST         Prior Level of Function/Home Situation:   Home Situation  Home Environment: Private residence  # Steps to Enter: 4  One/Two Story Residence: Two story  # of Interior Steps: 13  Height of Each Step (in): 5 inches  Interior Rails: Right  Lift Chair Available: No  Living Alone: No  Support Systems: Spouse/Significant Other/Partner, Child(diony)  Patient Expects to be Discharged to[de-identified] Private residence  Current DME Used/Available at Home: Karalee Padmaja, straight  Diet prior to admission: regular/thin  Current Diet:  npo   Cognitive and Communication Status:  Neurologic State: Alert, Confused  Orientation Level: Oriented to person (oriented to \"Roby\")  Cognition: Decreased command following  Perception: Appears intact  Perseveration: No perseveration noted  Safety/Judgement: Decreased awareness of environment  Oral Assessment:  Oral Assessment  Labial: No impairment  Dentition: Natural  Oral Hygiene:  (patches of peeling skin on tongue)  Lingual: No impairment  Velum: Unable to visualize  Mandible: No impairment  P.O. Trials:  Patient Position:  (upright in bed)  Vocal quality prior to P.O.: No impairment  Consistency Presented: Thin liquid;Puree  How Presented: Straw;Cup/sip; Self-fed/presented;SLP-fed/presented;Spoon     Bolus Acceptance: No impairment  Bolus Formation/Control: Impaired  Type of Impairment: Delayed  Propulsion: Delayed (# of seconds)  Oral Residue: 10-50% of bolus; Lingual (with puree)  Initiation of Swallow: Delayed (# of seconds)  Laryngeal Elevation: Decreased  Aspiration Signs/Symptoms: None  Pharyngeal Phase Characteristics: Double swallow  Effective Modifications: None           Oral Phase Severity: Moderate  Pharyngeal Phase Severity : Mild     NOMS:   The NOMS functional outcome measure was used to quantify this patient's level of swallowing impairment. Based on the NOMS, the patient was determined to be at level 2 for swallow function      G Codes: In compliance with CMSs Claims Based Outcome Reporting, the following G-code set was chosen for this patient based the use of the NOMS functional outcome to quantify this patient's level of swallowing impairment. Using the NOMS, the patient was determined to be at level 2 for swallow function which correlates with the CM= 80-99% level of severity. Based on the objective assessment provided within this note, the current, goal, and discharge g-codes are as follows:     Swallow  Swallowing:   Swallow Current Status CM= 80-99%   Swallow Goal Status CJ= 20-39%         NOMS Swallowing Levels:  Level 1 (CN): NPO  Level 2 (CM): NPO but takes consistency in therapy  Level 3 (CL): Takes less than 50% of nutrition p.o. and continues with nonoral feedings; and/or safe with mod cues; and/or max diet restriction  Level 4 (CK): Safe swallow but needs mod cues; and/or mod diet restriction; and/or still requires some nonoral feeding/supplements  Level 5 (CJ): Safe swallow with min diet restriction; and/or needs min cues  Level 6 (CI): Independent with p.o.; rare cues; usually self cues; may need to avoid some foods or needs extra time  Level 7 ([address] [place] [address]): Independent for all p.o.  AD. (2003). National Outcomes Measurement System (NOMS): Adult Speech-Language Pathology User's Guide.            Pain:  Pain Scale 1: Numeric (0 - 10)  Pain Intensity 1: 0     After treatment: [ ]            Patient left in no apparent distress sitting up in chair  [X]            Patient left in no apparent distress in bed  [X]            Call bell left within reach  [X]            Nursing notified  [ ]            Caregiver present  [ ]            Bed alarm activated      COMMUNICATION/EDUCATION:   The patients plan of care including recommendations, planned interventions, and recommended diet changes were discussed with: Registered Nurse. [ ]            Posted safety precautions in patient's room. [X]            Patient/family have participated as able in goal setting and plan of care. [X]            Patient/family agree to work toward stated goals and plan of care. [ ]            Patient understands intent and goals of therapy, but is neutral about his/her participation. [ ]            Patient is unable to participate in goal setting and plan of care.      Thank you for this referral.  Rayna Hernandez M.S. CCC-SLP  Time Calculation: 14 mins

## 2017-08-14 NOTE — PROGRESS NOTES
Cardiology Progress Note  8/14/2017     Admit Date: 7/27/2017  Admit Diagnosis: bladder clots  Bladder injury  perperated viscous  wound dehiscence  CC: none currently  Cardiac Assessment/Plan:   CAD: Circ HORACIO 7/20; Ef 45-50% after PCI. On asa only since  surgery: restart plavix when able from surgical standpoint.      HTN: On IV labetalol and prn hydralazine; added standing NTG paste. Change to coreg/losartan when taking PO again or can use NG for meds.     P Afib: transiently on dilt gtt: NSR currently; If recurrent, would use amio.     Volume status: 8/7: no CHF on CXR; low O2 requirement, Cr up to 1.3: held lasix.     Resp failure, post-op bladder repair/stone extraction/explor lap/dehis, anemia, ID, Dispo: per primary team and consultants. For other plans, see orders.     8/8: Extubated;  slower to respond than usual but no complaints. Cr normalized. No new cardiac recs.     8/9: More alert; no complaints. Added NTG paste as above for BPs.    8/10: No complaints; Cont NSR; still NPO. Bloody NG but not malik blood: stable Hg.    8/11: stable cardiac status; Cont NSR; More lethargic this am; No new cardiac recs. No active cardiac issues. We are available over weekend as needed. Please call if questions/issues. Thanks. 8/13: Events noted; Waxing/waning MS; No current complaints. Stable tele; Will check back later this week; cardiac status stable; no new recs. For other plans, see orders.   Hospital problem list   Active Hospital Problems    Diagnosis Date Noted    Bladder injury 07/28/2017        Subjective: Greg Zavala reports   Chest pain X none  consistent with:  Non-cardiac CP         Atypical CP     None now  On going  Anginal CP     Dyspnea X none  at rest  with exertion         improved  unchanged  worse              PND X none  overnight       Orthopnea X none  improved  unchanged  worse   Presyncope X none  improved  unchanged  worse     Ambulated in hallway without symptoms   Yes   Ambulated in room without symptoms  Yes   Objective:    Physical Exam:  Overall VSSAF;    Visit Vitals    /63 (BP 1 Location: Right arm, BP Patient Position: At rest;Supine)    Pulse 67    Temp 98 °F (36.7 °C)    Resp 18    Ht 5' 10\" (1.778 m)    Wt 88.9 kg (195 lb 15.8 oz)    SpO2 98%    BMI 28.12 kg/m2     Temp (24hrs), Av.3 °F (36.8 °C), Min:98 °F (36.7 °C), Max:98.5 °F (36.9 °C)    Patient Vitals for the past 8 hrs:   Pulse   17 0732 67   17 0614 71   17 0352 72    Patient Vitals for the past 8 hrs:   Resp   17 0732 18   17 0352 20    Patient Vitals for the past 8 hrs:   BP   17 0732 151/63   17 0614 161/80   17 0352 (!) 160/135          Intake/Output Summary (Last 24 hours) at 17 1108  Last data filed at 17 0352   Gross per 24 hour   Intake              747 ml   Output             2900 ml   Net            -2153 ml     General Appearance: Well developed, well nourished, no acute distress. Ears/Nose/Mouth/Throat:   Dry MM; anicteric. NG in.     JVP: WNL   Resp:   clear to auscultation bilaterally. Nl resp effort. Cardiovascular:  RRR, S1, S2 normal, no new murmur. No gallop or rub. Abdomen:   Post-surgical, ? bowel sounds present. Extremities: No edema bilaterally. Skin:  Neuro: Warm and dry. Lethargic; arousable, grossly nonfocal   cath site intact w/o hematoma or new bruit; distal pulse unchanged  Yes   Data Review:     Telemetry independently reviewed x sinus  chronic afib  parox afib  NSVT     ECG independently reviewed  NSR  afib  no significant changes  NSST-Tw chgs   x no new ECG provided for review   Lab results reviewed as noted below. Current medications reviewed as noted below. No results for input(s): PH, PCO2, PO2 in the last 72 hours. No results for input(s): CPK, CKMB, CKNDX, TROIQ in the last 72 hours.   Recent Labs      17   0416  17   0528  17   1308   NA 133*  132*   --    K  4.3  4.3   --    CL  102  101   --    CO2  26  25   --    BUN  27*  31*   --    CREA  0.79  0.83   --    GLU  152*  147*   --    PHOS   --   3.2  3.6   CA  7.8*  8.2*   --    ALB   --   2.0*   --    WBC  9.3  9.2   --    HGB  10.4*  10.1*   --    HCT  31.1*  30.5*   --    PLT  345  357   --      Lab Results   Component Value Date/Time    Cholesterol, total 178 07/20/2017 12:28 PM    HDL Cholesterol 48 07/20/2017 12:28 PM    LDL, calculated 117.8 07/20/2017 12:28 PM    Triglyceride 61 07/20/2017 12:28 PM    CHOL/HDL Ratio 3.7 07/20/2017 12:28 PM     Recent Labs      08/13/17   0528   SGOT  70*   AP  92   TP  6.7   ALB  2.0*   GLOB  4.7*     No results for input(s): INR, PTP, APTT in the last 72 hours.     No lab exists for component: INREXT, INREXT   No components found for: GLPOC    Current Facility-Administered Medications   Medication Dose Route Frequency    TPN ADULT-CENTRAL AA 5% D20%-MVI W/ VIT K-RCH   IntraVENous CONTINUOUS    zinc oxide-cod liver oil (DESITIN) 40 % paste   Topical PRN    metoclopramide HCl (REGLAN) injection 5 mg  5 mg IntraVENous Q8H PRN    nitroglycerin (NITROBID) 2 % ointment 1 Inch  1 Inch Topical BID    levalbuterol (XOPENEX) nebulizer soln 0.63 mg/3 mL  0.63 mg Nebulization Q6H PRN    acetaminophen (TYLENOL) solution 975 mg  975 mg Oral Q12H PRN    Or    acetaminophen (OFIRMEV) infusion 1,000 mg  1,000 mg IntraVENous Q12H PRN    aspirin (ASA) suppository 300 mg  300 mg Rectal DAILY    labetalol (NORMODYNE;TRANDATE) injection 10 mg  10 mg IntraVENous Q6H    hydrALAZINE (APRESOLINE) 20 mg/mL injection 20 mg  20 mg IntraVENous Q4H PRN    labetalol (NORMODYNE;TRANDATE) injection 20 mg  20 mg IntraVENous Q4H PRN    fat emulsion 20% (LIPOSYN, INTRALIPID) infusion 250 mL  250 mL IntraVENous Q MON, WED & FRI    insulin lispro (HUMALOG) injection   SubCUTAneous Q6H    glucose chewable tablet 16 g  4 Tab Oral PRN    dextrose (D50W) injection syrg 12.5-25 g 12.5-25 g IntraVENous PRN    glucagon (GLUCAGEN) injection 1 mg  1 mg IntraMUSCular PRN    enoxaparin (LOVENOX) injection 40 mg  40 mg SubCUTAneous Q24H    famotidine (PF) (PEPCID) 20 mg in sodium chloride 0.9 % 10 mL injection  20 mg IntraVENous Q12H    sodium chloride (NS) flush 5-10 mL  5-10 mL IntraVENous Q8H    ondansetron (ZOFRAN) injection 4 mg  4 mg IntraVENous Q4H PRN    0.9% sodium chloride infusion  25 mL/hr IntraVENous CONTINUOUS    sodium chloride (NS) flush 10 mL  10 mL InterCATHeter PRN    sodium chloride (NS) flush 10-40 mL  10-40 mL Hang Maria MD

## 2017-08-14 NOTE — PROGRESS NOTES
Gastroenterology Progress Note    8/14/2017    Admit Date: 7/27/2017    Subjective: Follow up for: abd  distension      More awake today. + BM per RN. No vomiting.    NPO for SLP eval.      Current Facility-Administered Medications   Medication Dose Route Frequency    TPN ADULT-CENTRAL AA 5% D20%-MVI W/ VIT K-RCH   IntraVENous CONTINUOUS    zinc oxide-cod liver oil (DESITIN) 40 % paste   Topical PRN    metoclopramide HCl (REGLAN) injection 5 mg  5 mg IntraVENous Q8H PRN    nitroglycerin (NITROBID) 2 % ointment 1 Inch  1 Inch Topical BID    levalbuterol (XOPENEX) nebulizer soln 0.63 mg/3 mL  0.63 mg Nebulization Q6H PRN    acetaminophen (TYLENOL) solution 975 mg  975 mg Oral Q12H PRN    Or    acetaminophen (OFIRMEV) infusion 1,000 mg  1,000 mg IntraVENous Q12H PRN    aspirin (ASA) suppository 300 mg  300 mg Rectal DAILY    labetalol (NORMODYNE;TRANDATE) injection 10 mg  10 mg IntraVENous Q6H    hydrALAZINE (APRESOLINE) 20 mg/mL injection 20 mg  20 mg IntraVENous Q4H PRN    labetalol (NORMODYNE;TRANDATE) injection 20 mg  20 mg IntraVENous Q4H PRN    fat emulsion 20% (LIPOSYN, INTRALIPID) infusion 250 mL  250 mL IntraVENous Q MON, WED & FRI    insulin lispro (HUMALOG) injection   SubCUTAneous Q6H    glucose chewable tablet 16 g  4 Tab Oral PRN    dextrose (D50W) injection syrg 12.5-25 g  12.5-25 g IntraVENous PRN    glucagon (GLUCAGEN) injection 1 mg  1 mg IntraMUSCular PRN    enoxaparin (LOVENOX) injection 40 mg  40 mg SubCUTAneous Q24H    famotidine (PF) (PEPCID) 20 mg in sodium chloride 0.9 % 10 mL injection  20 mg IntraVENous Q12H    sodium chloride (NS) flush 5-10 mL  5-10 mL IntraVENous Q8H    ondansetron (ZOFRAN) injection 4 mg  4 mg IntraVENous Q4H PRN    0.9% sodium chloride infusion  25 mL/hr IntraVENous CONTINUOUS    sodium chloride (NS) flush 10 mL  10 mL InterCATHeter PRN    sodium chloride (NS) flush 10-40 mL  10-40 mL InterCATHeter Q8H        Objective:     Blood pressure 151/63, pulse 67, temperature 98.3 °F (36.8 °C), resp. rate 20, height 5' 10\" (1.778 m), weight 88.9 kg (195 lb 15.8 oz), SpO2 98 %.         08/12 1901 - 08/14 0700  In: 1919 [I.V.:1919]  Out: 4240 [Urine:4240]        Physical Examination:       General:more coherent. Dry mouth  HEENT:  EOMI,  Chest:  CTA,   Heart: S1, S2, RRR  GI: Soft, bandaged,distended. Dec BS,      Data Review    Recent Results (from the past 24 hour(s))   GLUCOSE, POC    Collection Time: 08/13/17 12:13 PM   Result Value Ref Range    Glucose (POC) 159 (H) 65 - 100 mg/dL    Performed by Spike Salazar    GLUCOSE, POC    Collection Time: 08/13/17  5:29 PM   Result Value Ref Range    Glucose (POC) 159 (H) 65 - 100 mg/dL    Performed by [address], POC    Collection Time: 08/14/17 12:46 AM   Result Value Ref Range    Glucose (POC) 159 (H) 65 - 100 mg/dL    Performed by Kostas Hoffman    CBC WITH AUTOMATED DIFF    Collection Time: 08/14/17  4:16 AM   Result Value Ref Range    WBC 9.3 4.1 - 11.1 K/uL    RBC 3.46 (L) 4.10 - 5.70 M/uL    HGB 10.4 (L) 12.1 - 17.0 g/dL    HCT 31.1 (L) 36.6 - 50.3 %    MCV 89.9 80.0 - 99.0 FL    MCH 30.1 26.0 - 34.0 PG    MCHC 33.4 30.0 - 36.5 g/dL    RDW 14.2 11.5 - 14.5 %    PLATELET 769 491 - 579 K/uL    NEUTROPHILS 64 32 - 75 %    LYMPHOCYTES 23 12 - 49 %    MONOCYTES 6 5 - 13 %    EOSINOPHILS 6 0 - 7 %    BASOPHILS 1 0 - 1 %    ABS. NEUTROPHILS 6.0 1.8 - 8.0 K/UL    ABS. LYMPHOCYTES 2.2 0.8 - 3.5 K/UL    ABS. MONOCYTES 0.5 0.0 - 1.0 K/UL    ABS. EOSINOPHILS 0.5 (H) 0.0 - 0.4 K/UL    ABS.  BASOPHILS 0.1 0.0 - 0.1 K/UL   METABOLIC PANEL, BASIC    Collection Time: 08/14/17  4:16 AM   Result Value Ref Range    Sodium 133 (L) 136 - 145 mmol/L    Potassium 4.3 3.5 - 5.1 mmol/L    Chloride 102 97 - 108 mmol/L    CO2 26 21 - 32 mmol/L    Anion gap 5 5 - 15 mmol/L    Glucose 152 (H) 65 - 100 mg/dL    BUN 27 (H) 6 - 20 MG/DL    Creatinine 0.79 0.70 - 1.30 MG/DL    BUN/Creatinine ratio 34 (H) 12 - 20 GFR est AA >60 >60 ml/min/1.73m2    GFR est non-AA >60 >60 ml/min/1.73m2    Calcium 7.8 (L) 8.5 - 10.1 MG/DL   GLUCOSE, POC    Collection Time: 08/14/17  6:19 AM   Result Value Ref Range    Glucose (POC) 161 (H) 65 - 100 mg/dL    Performed by Shiv Poole      Recent Labs      08/14/17 0416 08/13/17   0528   WBC  9.3  9.2   HGB  10.4*  10.1*   HCT  31.1*  30.5*   PLT  345  357     Recent Labs      08/14/17 0416 08/13/17 0528 08/12/17   1308   NA  133*  132*   --    K  4.3  4.3   --    CL  102  101   --    CO2  26  25   --    BUN  27*  31*   --    CREA  0.79  0.83   --    GLU  152*  147*   --    CA  7.8*  8.2*   --    MG   --   1.8   --    PHOS   --   3.2  3.6     Recent Labs      08/13/17 0528   SGOT  70*   AP  92   TP  6.7   ALB  2.0*   GLOB  4.7*     No results for input(s): INR, PTP, APTT in the last 72 hours. No lab exists for component: INREXT, INREXT   No results for input(s): FE, TIBC, PSAT, FERR in the last 72 hours. No results found for: FOL, RBCF   No results for input(s): PH, PCO2, PO2 in the last 72 hours. No results for input(s): CPK, CKNDX, TROIQ in the last 72 hours.     No lab exists for component: CPKMB  Lab Results   Component Value Date/Time    Cholesterol, total 178 07/20/2017 12:28 PM    HDL Cholesterol 48 07/20/2017 12:28 PM    LDL, calculated 117.8 07/20/2017 12:28 PM    Triglyceride 61 07/20/2017 12:28 PM    CHOL/HDL Ratio 3.7 07/20/2017 12:28 PM     No components found for: [place]  Lab Results   Component Value Date/Time    Color DARK YELLOW 07/26/2017 11:20 AM    Appearance TURBID 07/26/2017 11:20 AM    Specific gravity 1.012 07/26/2017 11:20 AM    Specific gravity 1.025 07/21/2017 08:24 AM    pH (UA) 5.5 07/26/2017 11:20 AM    Protein 100 07/26/2017 11:20 AM    Glucose NEGATIVE  07/26/2017 11:20 AM    Ketone TRACE 07/26/2017 11:20 AM    Bilirubin NEGATIVE  07/26/2017 11:20 AM    Urobilinogen 1.0 07/26/2017 11:20 AM    Nitrites NEGATIVE  07/26/2017 11:20 AM    Leukocyte Esterase MODERATE 07/26/2017 11:20 AM    Epithelial cells FEW 07/26/2017 11:20 AM    Bacteria 3+ 07/26/2017 11:20 AM    WBC  07/26/2017 11:20 AM    RBC >100 07/26/2017 11:20 AM        ROS: -CP, SOB, Dysuria, palpitations, cough. Assessment:  Feeding difficulties  Confusion    Active Problems:    Bladder injury (7/28/2017)             Plan/Discussion:     ·  SLP evaluation is planned as he failed a feeding trial.  · CT head with no acute changes noted. · Discussed with RN at bedside(Marciano)            Signed By: Amado Green.  Shiv Stewart MD    8/14/2017  11:38 AM

## 2017-08-14 NOTE — PROGRESS NOTES
POD #9 from wound closure for dehiscence     Pt sleeping     For Swallowing eval.     May start plavix when taking PO      Plan pre primary care team.

## 2017-08-14 NOTE — PROGRESS NOTES
Receiving patient report from [address] [place] [address] at bedside. Patient vital signs received and assessment completed. No signs of distress noted. No complaints verbalized by patient. Oral care given. Patient repositioned. Call bell in reach. Bed in lowest position. Will continue to monitor. 1328-4609: Patient resting in bed with no signs of distress noted. Alert to name and responds to voice. Assessment completed and vital signs documented. Patient repositioned q2h and as needed for maximum comfort. Oral and wick care given twice per shift. IVF infusing with no difficulties. Will continue to monitor.

## 2017-08-14 NOTE — PROGRESS NOTES
Hospitalist Progress Note    NAME: Lester Van   :  1928   MRN:  [id number]       Interim Hospital Summary: 80 y.o. male whom presented on 2017 with      Assessment / Plan:  Encephalopathy   -unclear etiology, likely hospital delirium, unclear if patient has history of dementia  -if not improving will consider neurology evaluation  -will send ammonia, B12, TSH  -keep NPO, continue TPN  -appreciate speech evaluation    Postop respiratory failure s/p extubation , now on RA    S/P removal of bladder stone and repair of perforated bladder  S/P subsequent ex-lap due to extensive sq emphysema in abdomen/retroperitoneum - no sign of bowel perforation. Wound Dehiscence s/p ex lap, has abdominal binder    Shock resolved  LINDSEY Resolved  \"Pneumothorax\" due to dissection of air from retroperitoneum cephalad, different physiology or behavior of typical pneumothorax -conservative management    CAD, PAF with RVR- off cardizem gtt  -restart plavix when able to take PO, okay with surgery    Anemia, post op s/p PRBC  H/h 10/30    UTI with enterococcus,  Completed ampicillin course  Electrolyte disturbance replete as indicated, on tpn  HTN labetalol, nitrobid, switch to Coreg/losaratan once starts po intake    On TPN, Paralytic ileus seems resovled, NGT removed, npo  SLP eval tomorrow  Oral candidiasis, start nystatin     Code status: FULL code        Subjective:     Chief Complaint / Reason for Physician Visit : f/u encephalopathy  Patient drowsy but does wake up and answer questions falls asleep quickly      Review of Systems:  Symptom Y/N Comments  Symptom Y/N Comments   Fever/Chills    Chest Pain     Poor Appetite    Edema     Cough    Abdominal Pain     Sputum    Joint Pain     SOB/CLARKE    Pruritis/Rash     Nausea/vomit    Tolerating PT/OT     Diarrhea    Tolerating Diet     Constipation    Other       Could NOT obtain due to:      Objective:     VITALS:   Last 24hrs VS reviewed since prior progress note. Most recent are:  Patient Vitals for the past 24 hrs:   Temp Pulse Resp BP SpO2   08/14/17 1224 98.1 °F (36.7 °C) 68 18 158/59 97 %   08/14/17 1223 - 70 - 158/59 97 %   08/14/17 0732 98 °F (36.7 °C) 67 18 151/63 98 %   08/14/17 0614 - 71 - 161/80 98 %   08/14/17 0352 98.3 °F (36.8 °C) 72 20 (!) 160/135 99 %   08/14/17 0057 - 70 - 146/54 96 %   08/13/17 2359 98.5 °F (36.9 °C) 71 20 (!) 157/93 97 %   08/13/17 1950 98.3 °F (36.8 °C) 68 18 169/81 94 %   08/13/17 1517 98.1 °F (36.7 °C) 74 22 167/72 99 %       Intake/Output Summary (Last 24 hours) at 08/14/17 1415  Last data filed at 08/14/17 1400   Gross per 24 hour   Intake           1975.4 ml   Output             3325 ml   Net          -1349.6 ml        PHYSICAL EXAM:  General: Sleeping no acute distress    EENT:  EOMI. Anicteric sclerae. MMM  Resp:  CTA bilaterally, no wheezing or rales. No accessory muscle use  CV:  Regular  rhythm,  No edema  GI:  Soft,obese  distended, Non tender.  +Bowel sounds  Neurologic:  Drowsy, able to answer some questions  Psych:           Not anxious nor agitated  Skin:  No rashes. No jaundice    Reviewed most current lab test results and cultures  YES  Reviewed most current radiology test results   YES  Review and summation of old records today    NO  Reviewed patient's current orders and MAR    YES  PMH/ reviewed - no change compared to H&P  ________________________________________________________________________  Care Plan discussed with:    Comments   Patient x    Family      RN     Care Manager     Consultant                        Multidiciplinary team rounds were held today with , nursing, pharmacist and clinical coordinator. Patient's plan of care was discussed; medications were reviewed and discharge planning was addressed.      ________________________________________________________________________  Total NON critical care TIME:  20  Minutes    Total CRITICAL CARE TIME Spent:   Minutes non procedure based Comments   >50% of visit spent in counseling and coordination of care x    ________________________________________________________________________  Rosibel Yousif MD     Procedures: see electronic medical records for all procedures/Xrays and details which were not copied into this note but were reviewed prior to creation of Plan. LABS:  I reviewed today's most current labs and imaging studies.   Pertinent labs include:  Recent Labs      08/14/17 0416 08/13/17 0528   WBC  9.3  9.2   HGB  10.4*  10.1*   HCT  31.1*  30.5*   PLT  345  357     Recent Labs      08/14/17 0416 08/13/17 0528 08/12/17   1308   NA  133*  132*   --    K  4.3  4.3   --    CL  102  101   --    CO2  26  25   --    GLU  152*  147*   --    BUN  27*  31*   --    CREA  0.79  0.83   --    CA  7.8*  8.2*   --    MG   --   1.8   --    PHOS   --   3.2  3.6   ALB   --   2.0*   --    TBILI   --   0.6   --    SGOT   --   70*   --    ALT   --   76   --        Signed: Rosibel Yousif MD

## 2017-08-15 PROBLEM — I67.89 CEREBRAL MICROVASCULAR DISEASE: Status: ACTIVE | Noted: 2017-01-01

## 2017-08-15 PROBLEM — R41.3 DISTURBANCE OF MEMORY: Status: ACTIVE | Noted: 2017-01-01

## 2017-08-15 PROBLEM — R55 CONVULSIVE SYNCOPE: Status: ACTIVE | Noted: 2017-01-01

## 2017-08-15 PROBLEM — I65.23 STENOSIS OF BOTH INTERNAL CAROTID ARTERIES: Status: ACTIVE | Noted: 2017-01-01

## 2017-08-15 PROBLEM — R41.82 ALTERED MENTAL STATUS, UNSPECIFIED: Status: ACTIVE | Noted: 2017-01-01

## 2017-08-15 NOTE — PROGRESS NOTES
No complaints    Afeb, VSS    UOP good from both tubes, clear    Abdomen soft, SP site erythematous, stable    Continue present management

## 2017-08-15 NOTE — PROGRESS NOTES
Gastroenterology Progress Note    8/15/2017    Admit Date: 7/27/2017    Subjective: Follow up for: abd  distension      More awake but still confused . + large BM per RN. No vomiting.         Current Facility-Administered Medications   Medication Dose Route Frequency    TPN ADULT-CENTRAL AA 5% D20%-MVI W/ VIT K-RCH   IntraVENous CONTINUOUS    zinc oxide-cod liver oil (DESITIN) 40 % paste   Topical PRN    metoclopramide HCl (REGLAN) injection 5 mg  5 mg IntraVENous Q8H PRN    nitroglycerin (NITROBID) 2 % ointment 1 Inch  1 Inch Topical BID    levalbuterol (XOPENEX) nebulizer soln 0.63 mg/3 mL  0.63 mg Nebulization Q6H PRN    acetaminophen (TYLENOL) solution 975 mg  975 mg Oral Q12H PRN    Or    acetaminophen (OFIRMEV) infusion 1,000 mg  1,000 mg IntraVENous Q12H PRN    aspirin (ASA) suppository 300 mg  300 mg Rectal DAILY    labetalol (NORMODYNE;TRANDATE) injection 10 mg  10 mg IntraVENous Q6H    hydrALAZINE (APRESOLINE) 20 mg/mL injection 20 mg  20 mg IntraVENous Q4H PRN    labetalol (NORMODYNE;TRANDATE) injection 20 mg  20 mg IntraVENous Q4H PRN    fat emulsion 20% (LIPOSYN, INTRALIPID) infusion 250 mL  250 mL IntraVENous Q MON, WED & FRI    insulin lispro (HUMALOG) injection   SubCUTAneous Q6H    glucose chewable tablet 16 g  4 Tab Oral PRN    dextrose (D50W) injection syrg 12.5-25 g  12.5-25 g IntraVENous PRN    glucagon (GLUCAGEN) injection 1 mg  1 mg IntraMUSCular PRN    enoxaparin (LOVENOX) injection 40 mg  40 mg SubCUTAneous Q24H    famotidine (PF) (PEPCID) 20 mg in sodium chloride 0.9 % 10 mL injection  20 mg IntraVENous Q12H    sodium chloride (NS) flush 5-10 mL  5-10 mL IntraVENous Q8H    ondansetron (ZOFRAN) injection 4 mg  4 mg IntraVENous Q4H PRN    0.9% sodium chloride infusion  25 mL/hr IntraVENous CONTINUOUS    sodium chloride (NS) flush 10 mL  10 mL InterCATHeter PRN    sodium chloride (NS) flush 10-40 mL  10-40 mL InterCATHeter Q8H        Objective:     Blood pressure 152/56, pulse 66, temperature 98.4 °F (36.9 °C), resp. rate 20, height 5' 10\" (1.778 m), weight 94.2 kg (207 lb 10.8 oz), SpO2 98 %.    08/15 0701 - 08/15 1900  In: 27.7 [I.V.:27.7]  Out: 305 [Urine:305]    08/13 1901 - 08/15 0700  In: 2866.3 [I.V.:2866.3]  Out: 4000 [Urine:4000]        Physical Examination:       General:more coherent. Dry mouth  HEENT:  EOMI,  Chest:  CTA,   Heart: S1, S2, RRR  GI: Soft, bandaged,distended.  Dec BS,      Data Review    Recent Results (from the past 24 hour(s))   GLUCOSE, POC    Collection Time: 08/14/17 12:22 PM   Result Value Ref Range    Glucose (POC) 174 (H) 65 - 100 mg/dL    Performed by [address], POC    Collection Time: 08/14/17  6:05 PM   Result Value Ref Range    Glucose (POC) 151 (H) 65 - 100 mg/dL    Performed by Mally Holder (PCT)    GLUCOSE, POC    Collection Time: 08/14/17 11:13 PM   Result Value Ref Range    Glucose (POC) 156 (H) 65 - 100 mg/dL    Performed by Maribel Fitzgerald    AMMONIA    Collection Time: 08/15/17  3:38 AM   Result Value Ref Range    Ammonia 33 (H) <32 UMOL/L   VITAMIN B12    Collection Time: 08/15/17  3:38 AM   Result Value Ref Range    Vitamin B12 1046 (H) 211 - 911 pg/mL   TSH 3RD GENERATION    Collection Time: 08/15/17  3:38 AM   Result Value Ref Range    TSH 3.70 0.36 - 3.74 uIU/mL   GLUCOSE, POC    Collection Time: 08/15/17  5:00 AM   Result Value Ref Range    Glucose (POC) 160 (H) 65 - 100 mg/dL    Performed by Maribel Fitzgerald      Recent Labs      08/14/17 0416 08/13/17 0528   WBC  9.3  9.2   HGB  10.4*  10.1*   HCT  31.1*  30.5*   PLT  345  357     Recent Labs      08/14/17 0416 08/13/17 0528  08/12/17   1308   NA  133*  132*   --    K  4.3  4.3   --    CL  102  101   --    CO2  26  25   --    BUN  27*  31*   --    CREA  0.79  0.83   --    GLU  152*  147*   --    CA  7.8*  8.2*   --    MG   --   1.8   --    PHOS   --   3.2  3.6     Recent Labs      08/13/17   0528   SGOT  70*   AP  92   TP  6.7   ALB  2.0* GLOB  4.7*     No results for input(s): INR, PTP, APTT in the last 72 hours. No lab exists for component: INREXT, INREXT   No results for input(s): FE, TIBC, PSAT, FERR in the last 72 hours. No results found for: FOL, RBCF   No results for input(s): PH, PCO2, PO2 in the last 72 hours. No results for input(s): CPK, CKNDX, TROIQ in the last 72 hours. No lab exists for component: CPKMB  Lab Results   Component Value Date/Time    Cholesterol, total 178 07/20/2017 12:28 PM    HDL Cholesterol 48 07/20/2017 12:28 PM    LDL, calculated 117.8 07/20/2017 12:28 PM    Triglyceride 61 07/20/2017 12:28 PM    CHOL/HDL Ratio 3.7 07/20/2017 12:28 PM     No components found for: [place]  Lab Results   Component Value Date/Time    Color DARK YELLOW 07/26/2017 11:20 AM    Appearance TURBID 07/26/2017 11:20 AM    Specific gravity 1.012 07/26/2017 11:20 AM    Specific gravity 1.025 07/21/2017 08:24 AM    pH (UA) 5.5 07/26/2017 11:20 AM    Protein 100 07/26/2017 11:20 AM    Glucose NEGATIVE  07/26/2017 11:20 AM    Ketone TRACE 07/26/2017 11:20 AM    Bilirubin NEGATIVE  07/26/2017 11:20 AM    Urobilinogen 1.0 07/26/2017 11:20 AM    Nitrites NEGATIVE  07/26/2017 11:20 AM    Leukocyte Esterase MODERATE 07/26/2017 11:20 AM    Epithelial cells FEW 07/26/2017 11:20 AM    Bacteria 3+ 07/26/2017 11:20 AM    WBC  07/26/2017 11:20 AM    RBC >100 07/26/2017 11:20 AM        ROS: -CP, SOB, Dysuria, palpitations, cough. Assessment:  Feeding difficulties  Confusion    Active Problems:    Bladder injury (7/28/2017)             Plan/Discussion:     ·  SLP evaluation done. They suggested NPO except sips of liquids. Patient  able to hold cup with R hand or does fine with straw. Fully upright with all PO. Hold if too lethargic or confused. SLP to see today  · Discussed with RN at bedside(Marciano). · I will follow him peripherally. Signed By: Arianna Packer.  Darshana Hurtado MD    8/15/2017  11:38 AM

## 2017-08-15 NOTE — PROGRESS NOTES
Hospitalist Progress Note    NAME: Ciarra Benz   :  1928   MRN:  [id number]       Interim Hospital Summary: 80 y.o. male whom presented on 2017 with      Assessment / Plan:  Encephalopathy   - unclear etiology, likely hospital delirium, unclear if patient has history of dementia  - Head CT : No acute intracranial abnormality. Age-related volume loss unchanged  - ammonia 33, B12 1046, TSH 3.7  - neuro consult; MRI of brain ordered. Patient was working as a preacher prior to admission.  This is sudden change during this hosptaliztion    Postop respiratory failure s/p extubation , now on RA with O2 Sat 98%     S/P removal of bladder stone and repair of perforated bladder ()  - Cystoscopy with attempted clot evacuation with subsequent exploratory laparotomy with bladder stone retrieval and repair of large posterior bladder perforation on   - urology following    S/p Exp Lap () due to perforate viscus; diffuse gastric distention without bowel perforation   S/p Exp lap with closure for dehiscence with retention sutures ()  - general surgery following    Nutritional support  Difficulty with feeding  - GI following  - SLP following; keep NPO, ice chips ok per nursing when pt is awake  - continue with TPN    Shock resolved  LINDSEY Resolved  Pneumothorax due to dissection of air from retroperitoneum cephalad, different physiology or behavior of typical pneumothorax -conservative management     CAD, PAF with RVR  HTN  - off cardizem gtt  - restart plavix when able to take PO, okay with surgery   - continue with labetalol and nitrobid     Anemia, post op s/p PRBC  - H/h 10/31     UTI with enterococcus,  Completed ampicillin course  Electrolyte disturbance replete as indicated, on tpn     DVT prophylaxis: Lovenox      Code status: FULL code       Recommended Disposition: tbd   Will d/w family code status     Subjective:     Chief Complaint / Reason for Physician Visit  Pt open eyes to tactile and verbal stimuli, then closes eyes immediately. Discussed with RN events overnight. Review of Systems:  Symptom Y/N Comments  Symptom Y/N Comments   Fever/Chills    Chest Pain     Poor Appetite    Edema     Cough    Abdominal Pain     Sputum    Joint Pain     SOB/CLARKE    Pruritis/Rash     Nausea/vomit    Tolerating PT/OT     Diarrhea    Tolerating Diet     Constipation    Other       Could NOT obtain due to:      Objective:     VITALS:   Last 24hrs VS reviewed since prior progress note. Most recent are:  Patient Vitals for the past 24 hrs:   Temp Pulse Resp BP SpO2   08/15/17 1149 98.1 °F (36.7 °C) 67 18 163/69 98 %   08/15/17 0719 98.4 °F (36.9 °C) 66 20 152/56 98 %   08/15/17 0707 - 68 - 152/56 92 %   08/15/17 0523 - 65 - 124/59 90 %   08/15/17 0514 - 71 - 159/73 -   08/15/17 0324 98.8 °F (37.1 °C) 75 20 165/62 98 %   08/14/17 2325 - 64 - 138/71 100 %   08/14/17 2309 98.2 °F (36.8 °C) 77 16 160/59 99 %   08/14/17 1915 98.1 °F (36.7 °C) 73 16 143/56 96 %   08/14/17 1620 98.8 °F (37.1 °C) 68 18 155/63 96 %       Intake/Output Summary (Last 24 hours) at 08/15/17 1522  Last data filed at 08/15/17 1312   Gross per 24 hour   Intake          1738.86 ml   Output             2180 ml   Net          -441.14 ml        PHYSICAL EXAM:  General: WD, WN. cooperative, no acute distress    EENT:  EOMI. Anicteric sclerae. Dry mucous membrane  Resp:  CTA bilaterally, no wheezing or rales. No accessory muscle use  CV:  Regular  rhythm,  No edema  GI:  Soft, Non distended, Non tender.  +Bowel sounds  Neurologic:  Open eyes to tactile and verbal stimuli, not following commends  Psych:   No insight. Not anxious nor agitated  Skin:  No rashes.   No jaundice    Reviewed most current lab test results and cultures  YES  Reviewed most current radiology test results   YES  Review and summation of old records today    NO  Reviewed patient's current orders and MAR    YES  PMH/SH reviewed - no change compared to H&P  ________________________________________________________________________  Care Plan discussed with:    Comments   Patient y    Family      RN y    Care Manager     Consultant                        Multidiciplinary team rounds were held today with , nursing, pharmacist and clinical coordinator. Patient's plan of care was discussed; medications were reviewed and discharge planning was addressed. ________________________________________________________________________  Total NON critical care TIME:  30   Minutes    Total CRITICAL CARE TIME Spent:   Minutes non procedure based      Comments   >50% of visit spent in counseling and coordination of care     ________________________________________________________________________  Ani Pham NP     Procedures: see electronic medical records for all procedures/Xrays and details which were not copied into this note but were reviewed prior to creation of Plan. LABS:  I reviewed today's most current labs and imaging studies.   Pertinent labs include:  Recent Labs      08/14/17 0416 08/13/17 0528   WBC  9.3  9.2   HGB  10.4*  10.1*   HCT  31.1*  30.5*   PLT  345  357     Recent Labs      08/14/17 0416 08/13/17 0528   NA  133*  132*   K  4.3  4.3   CL  102  101   CO2  26  25   GLU  152*  147*   BUN  27*  31*   CREA  0.79  0.83   CA  7.8*  8.2*   MG   --   1.8   PHOS   --   3.2   ALB   --   2.0*   TBILI   --   0.6   SGOT   --   70*   ALT   --   76       Signed: )Lakeshia Deluca NP

## 2017-08-15 NOTE — PROGRESS NOTES
Problem: Dysphagia (Adult)  Goal: *Acute Goals and Plan of Care (Insert Text)  Speech pathology goals intitiated 8/14/2017   1. Patient will tolerate sips of clear liquids free of s/s aspiration within 7 days  2. Patient will demonstrate timely and complete posterior propulsion with purees within 7 days  3. Patient will participate in solid trials if mentation allows within 7 days   [id number] [address] TREATMENT  Patient: Greg Zavala (40 y.o. male)  Date: 8/15/2017  Diagnosis: bladder clots  Bladder injury  perperated viscous  wound dehiscence <principal problem not specified>  Procedure(s) (LRB):  LAPAROTOMY EXPLORATORY, wound closure for dehiscence (N/A) 10 Days Post-Op  Precautions: swallow  (abdominal binder)      ASSESSMENT:  Patient with slight improvement in swallow function this date, however continues with high aspiration risk related to cognitive status. Patient oriented to person only and continues with decreased recognition of food items. Patient with varying levels of alertness, initially asleep however awoke given mod stimulation. Patient maintained alertness throughout treatment, however fell back asleep immediately after PO trials completed. Patient continues with moderate oral dysphagia characterized by slow oral prep, intermittent oral holding while patient talked, delayed posterior propulsion, and moderate lingual residue with puree. Patient intermittently required verbal cues to swallow, and liquid wash to clear oral residue. Also with mild pharyngeal dysphagia characterized by delayed swallow initiation and decreased hyolaryngeal elevation/excursion. No overt s/s aspiration observed, however patient at high risk for aspiration secondary to cognitive status and level of alertness. Provided education to daughter at bedside regarding aspiration and poor recognition of food items related to cognitive status.  Daughter insistent that difficulty with PO intake related to presence of NG and resulting weakness of tongue and daughter will require reinforcement of education. Daughter reported that cognitive status is improving daily. Progression toward goals:  [ ]         Improving appropriately and progressing toward goals  [X]         Improving slowly and progressing toward goals  [ ]         Not making progress toward goals and plan of care will be adjusted       PLAN:  Recommendations and Planned Interventions:  --Continue NPO except sips of thin liquids with RN only when patient awake, alert, and accepting  --SLP to follow for daily re-evaluation of swallow function  Patient continues to benefit from skilled intervention to address the above impairments. Continue treatment per established plan of care. Discharge Recommendations:  Skilled Nursing Facility       SUBJECTIVE:   Patient stated Rosa Coupe to place orientation question. OBJECTIVE:   Cognitive and Communication Status:  Neurologic State: Alert, Confused  Orientation Level: Oriented to person, Disoriented to time, Disoriented to situation, Disoriented to place  Cognition: Decreased command following, Decreased attention/concentration  Perception: Appears intact  Perseveration: No perseveration noted  Safety/Judgement: Decreased awareness of environment, Decreased awareness of need for assistance, Decreased awareness of need for safety, Decreased insight into deficits  Dysphagia Treatment:  Oral Assessment:  Oral Assessment  Oral Hygiene: dried secretions on tongue and palate, attempted to remove with oral swab however patient resistant to oral care  P.O. Trials:  Patient Position: upright in bed  Vocal quality prior to P.O.: No impairment  Consistency Presented: Ice chips; Thin liquid;Puree  How Presented: SLP-fed/presented;Straw;Spoon     Bolus Acceptance: No impairment  Bolus Formation/Control: Impaired  Type of Impairment: Delayed; Other (comment) (oral holding)  Propulsion: Delayed (# of seconds)  Oral Residue: 10-50% of bolus; Lingual (cleared with cued liquid wash)  Initiation of Swallow: Delayed (# of seconds)  Laryngeal Elevation: Decreased  Aspiration Signs/Symptoms: None  Pharyngeal Phase Characteristics: Double swallow  Effective Modifications: Alternate liquids/solids;Straw  Cues for Modifications: Minimal                Pain:  Pain Scale 1: Numeric (0 - 10)  Pain Intensity 1: 0     After treatment:   [ ]              Patient left in no apparent distress sitting up in chair  [X]              Patient left in no apparent distress in bed  [X]              Call bell left within reach  [X]              Nursing notified  [X]              Caregiver present  [ ]              Bed alarm activated      COMMUNICATION/EDUCATION:   The patients plan of care including recommendations, planned interventions, and recommended diet changes were discussed with: Registered Nurse.         Alan Muro SLP  Time Calculation: 20 mins

## 2017-08-15 NOTE — ROUTINE PROCESS

## 2017-08-15 NOTE — PROGRESS NOTES
Supportive follow up visit on PCU. Patient appeared to be sleeping comfortably. One of his daughters was present. Provided supportive listening as she offered update on her father. She shared that family is hopeful for a good recovery and he seems to be slowly improving. She is filling in for her father at the [employer]. She also shared that there are people and churches all along the [facility] for her father. Offered assurance of continued prayer. Chaplains will follow as needed.   [facility], [facility], [facility], [address] [place] [address] [address]     Paging Service  [phone])

## 2017-08-15 NOTE — PROGRESS NOTES
Surgery      Speech eval for swallowing recommended sips of clears if alert  Enough. Discussed with nursing and not alert very often. Having BMs    Continues on TPN    Cont present RX  No acute surgical issues  ? Remove half of skin clips later in week. Joanna Castillo.  Magda Rodriguez  [id number] [address]

## 2017-08-15 NOTE — PROGRESS NOTES
Problem: Self Care Deficits Care Plan (Adult)  Goal: *Acute Goals and Plan of Care (Insert Text)  Occupational Therapy Goals:  Initiated 8/9/2017  1. Patient will perform washing of face in supported sitting or at bed level with HOB elevated with max assist within 7 days. 2. Patient will tolerate 5 minutes of active exercise for increased strength with ADLS within 7 days. 3. Patient will sit edge of bed without support with good dynamic balance in prep for functional tasks within 7 days. 4. Patient will perform sit to stand with max assist in prep for functional ADL transfers within 7 days. OCCUPATIONAL THERAPY TREATMENT  Patient: Ciarra Benz (64 y.o. male)  Date: 8/15/2017  Diagnosis: bladder clots  Bladder injury  perperated viscous  wound dehiscence <principal problem not specified>  Procedure(s) (LRB):  LAPAROTOMY EXPLORATORY, wound closure for dehiscence (N/A) 10 Days Post-Op  Precautions:  (abdominal binder)      ASSESSMENT:  Abdominal binder remains in place but is soiled. Nursing reports that they will obtain a new binder. Pt remains confused but was following commands better this session. He was only oriented to himself however. Decreased attention and repetition of request needed. Max assist of 1 to 2 to roll and to perform supine to sit. Pt did attempt to assist.  Once seated pt had posterior lean and was unable to scoot to edge of bed. Total assist to scoot to edge of bed. He was was attempting to stand on his own (was unable) and needed max cues to wait for therapist to get his BP. Therapist needed to hold his arm down in a relaxed position. Max assist x2 for sit to stand but pt was able to bear weight though BUE and was flexed. Once standing pt had loose BM and needed to return to bed as it was unsafe to attempt in standing. Total assist for returning to bed, rolling in bed and BM clean up.   Pt has made progress since eval as pt was unable to stand previous session and is now able to take hands off bed seated. He did assist with donning of new gown but needed max assist.  Continue to recommend SNF for rehab at discharge. He will need a recliner chair for OOB and kandace pad (I am unsure if he is allowed to be hoyered due to abdominal wound)     Progression toward goals:  [ ]       Improving appropriately and progressing toward goals  [X]       Improving slowly and progressing toward goals  [ ]       Not making progress toward goals and plan of care will be adjusted       PLAN:  Patient continues to benefit from skilled intervention to address the above impairments. Continue treatment per established plan of care. Discharge Recommendations:  Skilled Nursing Facility  Further Equipment Recommendations for Discharge:  Defer to SNF       SUBJECTIVE:   Patient stated Oh!       OBJECTIVE DATA SUMMARY:   Cognitive/Behavioral Status:  Neurologic State: Alert;Confused  Orientation Level: Oriented to person;Disoriented to situation  Cognition: Decreased attention/concentration;Decreased command following  Perception: Appears intact  Perseveration: Perseverates during mobility (attempting to mobilize when asked not to)  Safety/Judgement: Decreased insight into deficits; Decreased awareness of need for safety;Decreased awareness of need for assistance     Functional Mobility and Transfers for ADLs:  Bed Mobility:  Rolling: Max assist x2     Supine to Sit: Maximum assistance;Assist x2  Sit to Supine: Total assistance;Assist x2  Scooting: Total assistance;Assist x2     Transfers:  Sit to Stand: Maximum assistance;Assist x2 (bed slightly raised and RW for support)        Balance:  Sitting - Static: Fair (occasional)  Sitting - Dynamic: Fair (occasional)  Standing: Impaired  Standing - Static: Constant support;Poor (full support of walker)     ADL Intervention:     Upper Body [address]: Moderate assistance (bed level)           Toileting  Bowel Hygiene:  Total assistance (dependent)     Cognitive Retraining  Orientation Retraining: Place; Reorienting;Situation;Time  Problem Solving: Identifying the problem;General alternative solution  Safety/Judgement: Decreased insight into deficits; Decreased awareness of need for safety;Decreased awareness of need for assistance     Pain:  Pain Scale 1: Numeric (0 - 10)  Pain Intensity 1: 0              Activity Tolerance:      Please refer to the flowsheet for vital signs taken during this treatment.   After treatment:   [ ] Patient left in no apparent distress sitting up in chair  [X] Patient left in no apparent distress in bed  [X] Call bell left within reach  [X] Nursing notified  [ ] Caregiver present  [ ] Bed alarm activated      COMMUNICATION/COLLABORATION:   The patients plan of care was discussed with: Physical Therapist, Registered Nurse and patient     ABIGAIL Saldaña/L  Time Calculation: 32 mins

## 2017-08-15 NOTE — PROGRESS NOTES
PCU SHIFT NURSING NOTE      Bedside and Verbal shift change report given to Julienne Treviño RN (oncoming nurse) by Lior Robles RN (offgoing nurse). Report included the following information SBAR, Kardex, MAR, Accordion and Recent Results. Shift Summary:     0700 - Pt received resting in bed, repositioned for comfort; A & O x 1, RA. No acute distress noted. Admission Date 7/27/2017   Admission Diagnosis bladder clots  Bladder injury  perperated viscous  wound dehiscence   Consults IP CONSULT TO UROLOGY  IP CONSULT TO GASTROENTEROLOGY  IP CONSULT TO CARDIOLOGY        Consults   []PT   []OT   []Speech   []Case Management      [] Palliative      Cardiac Monitoring Order   []Yes   []No     IV drips   []Yes    Drip:                            Dose:  Drip:                            Dose:  Drip:                            Dose:   []No     GI Prophylaxis   []Yes   []No         DVT Prophylaxis   SCDs:  Sequential Compression Device: Bilateral          Ash stockings:  Graduated Compression Stockings: Bilateral      [] Medication   []Contraindicated   []None      Activity Level Activity Level: Bed Rest     Activity Assistance: Complete care   Purposeful Rounding every 1-2 hour? []Yes   Mcdonald Score  Total Score: 3   Bed Alarm (If score 3 or >)   []Yes   [] Refused (See signed refusal form in chart)   Kong Score  Kong Score: 10   Kong Score (if score 14 or less)   []PMT consult   []Wound Care consult      []Specialty bed   [] Nutrition consult          Needs prior to discharge:   Home O2 required:    []Yes   []No    If yes, how much O2 required?     Other:    Last Bowel Movement: Last Bowel Movement Date: 08/14/17      Influenza Vaccine Received Flu Vaccine for Current Season (usually Sept-March): Not Flu Season        Pneumonia Vaccine           Diet Active Orders   Diet    DIET NPO      LDAs         PICC Double Lumen 53/93/57 Basilic;Left (Active)   [employer] Being Utilized Yes 8/15/2017  7:19 AM Criteria for Appropriate Use Total parenteral nutrition 8/15/2017  7:19 AM   Site Assessment Clean, dry, & intact 8/15/2017  7:19 AM   Phlebitis Assessment 0 8/15/2017  7:19 AM   Infiltration Assessment 0 8/15/2017  7:19 AM   Arm Circumference (cm) 33 cm 8/2/2017 10:47 AM   Date of Last Dressing Change 08/09/17 8/15/2017  7:19 AM   Dressing Status Clean, dry, & intact 8/15/2017  7:19 AM   Action Taken Open ports on tubing capped 8/15/2017  7:19 AM   External Catheter Length (cm) 0 centimeters 8/10/2017  7:30 AM   Dressing Type Disk with Chlorhexadine gluconate (CHG); Transparent 8/15/2017  7:19 AM   Hub Color/Line Status Red;Capped;Flushed 8/15/2017  7:19 AM   Positive Blood Return (Site #1) Yes 8/15/2017  7:19 AM   Hub Color/Line Status Purple; Infusing;Flushed 8/15/2017  7:19 AM   Positive Blood Return (Site #2) Yes 8/15/2017  7:19 AM   Alcohol Cap Used Yes 8/15/2017  7:19 AM                            Urinary Catheter [REMOVED] Urinary Catheter 07/27/17 2- way-Criteria for Appropriate Use: Obstruction/retention  Urinary Catheter 07/28/17 3- way; [place]-Criteria for Appropriate Use: Medically/surgically unstable, Surgical procedure  Urinary Catheter 07/28/17 Other (Comment)-Criteria for Appropriate Use: Medically/surgically unstable, Surgical procedure  [REMOVED] Urinary Catheter 07/26/17 2- way-Criteria for Appropriate Use: Obstruction/retention    Intake & Output   Date 08/14/17 0700 - 08/15/17 0659 08/15/17 0700 - 08/16/17 0659   Shift 7278-7251 8719-7493 24 Hour Total 9784-5911 5268-4212 24 Hour Total   I  N  T  A  K  E   I.V.  (mL/kg/hr) 748.4  (0.7) 1056.9  (1) 1805.3  (0.8) 27.7  27.7      Volume (acetaminophen (OFIRMEV) infusion 1,000 mg) 0  0 0  0      Volume (TPN ADULT-CENTRAL AA 5% D20%-MVI W/ VIT K-RCH) 748. 4  748. 4         Volume (TPN ADULT-CENTRAL AA 5% D20%-MVI W/ VIT K-RCH)  1056. 9 1056.9 27.7  27.7    Shift Total  (mL/kg) 748.4  (8.4) 1056.9  (11.9) 1805.3  (20.3) 27.7  (0.3)  27.7  (0.3) O  U  T  P  U  T   Urine  (mL/kg/hr) 1400  (1.3) 1150  (1.1) 2550  (1.2) 305  305      Urine Output (mL) (Urinary Catheter 07/28/17 3- way; [place]) 550 300 850 30  30      Urine Output (mL) (Urinary Catheter 07/28/17 Other (Comment))  275  275    Stool            Stool Occurrence(s) 3 x  3 x 1 x  1 x    Shift Total  (mL/kg) 1400  (15.7) 1150  (12.9) 2550  (28.7) 305  (3.2)  305  (3.2)   NET -651.6 -93.1 -744.8 -277.3  -277.3   Weight (kg) 88.9 88.9 88.9 94.2 94.2 94.2         Readmission Risk Assessment Tool Score Medium Risk            17       Total Score        3 Has Seen PCP in Last 6 Months (Yes=3, No=0)    2 . Living with Significant Other. Assisted Living. LTAC. SNF. or   Rehab    3 Patient Length of Stay (>5 days = 3)    4 IP Visits Last 12 Months (1-3=4, 4=9, >4=11)    5 Pt.  Coverage (Medicare=5 , Medicaid, or Self-Pay=4)        Criteria that do not apply:    Charlson Comorbidity Score (Age + Comorbid Conditions)       Expected Length of Stay 5d 12h   Actual Length of Stay 18

## 2017-08-15 NOTE — PROGRESS NOTES
Nutrition Assessment:    INTERVENTIONS/RECOMMENDATIONS:   Meals/Snacks: General/healthful diet: Diet advancement as medically feasible per SLP/Surgery/GI  Enteral/Parenteral Nutrition:  (Continue TPN as ordered)    ASSESSMENT:   Chart reviewed; patient continues on TPN at goal rate. TPN meeting 100% of estimated kcal/protein needs. Remains NPO at this time. Attempted trial of clear liquids but patient not appropriate for PO. SLP consulted and recommending sips of clears when alert enough. If diet unable to be advanced next 2-4 days and gut remains functional, would consider NGT placement and starting enteral feedings. Hopefully mental status will improve and diet can be advanced. Will monitor progress and plan of care. Diet Order: NPO (TPN AA5% D20% @ 83 mL/hr + Lipids 3x/week; provides 1967 kcal/99.6g protein)  % Eaten:  No data found. Pertinent Medications: [x] Reviewed []Other: famotidine, humalog, labetalol   Pertinent Labs: [x]Reviewed  []Other: -[phone], Ammonia 33  Food Allergies: [x]None []Other:     Last BM: 8/15   []Active     []Hyperactive  [x]Hypoactive       [] Absent  BS  Skin:    [] Intact   [x] Incision  [] Breakdown   []Edema   []Other:    Anthropometrics: Height: 5' 10\" (177.8 cm) Weight: 94.2 kg (207 lb 10.8 oz)    IBW (%IBW):   ( ) UBW (%UBW):   (  %)    BMI: Body mass index is 29.8 kg/(m^2). This BMI is indicative of:  []Underweight   []Normal   [x]Overweight   [] Obesity   [] Extreme Obesity (BMI>40)  Last Weight Metrics:  Weight Loss Metrics 8/15/2017 7/26/2017 3/26/2017 11/3/2016 3/17/2015 10/2/2014 5/16/2013   Today's Wt 207 lb 10.8 oz 211 lb 3.2 oz 215 lb 6.2 oz 217 lb 6 oz 214 lb 4.8 oz 200 lb 213 lb 10 oz   BMI 29.8 kg/m2 30.3 kg/m2 30.91 kg/m2 31.19 kg/m2 30.75 kg/m2 28.7 kg/m2 30.65 kg/m2       Estimated Nutrition Needs (Based on): 1934 Kcals/day (BMR (1611) x 1. 2AF) , 94 g (1.0 g/kg bw) Protein  Carbohydrate:  At Least 130 g/day  Fluids: 1950 mL/day     Pt expected to meet estimated nutrient needs: [x]Yes []No    NUTRITION DIAGNOSES:   Problem:  Swallowing difficulty      Etiology: related to AMS, encephaloapthy     Signs/Symptoms: as evidenced by NPO per SLP      NUTRITION INTERVENTIONS:  Meals/Snacks: General/healthful diet Enteral/Parenteral Nutrition:  (Continue TPN as ordered)                GOAL:   patient will tolerate TPN @ goal + Diet advanced next 2-4 days    NUTRITION MONITORING AND EVALUATION   Behavioral-Environmental Outcomes: Behavior  Food/Nutrient Intake Outcomes: Enteral/parenteral nutrition intake  Physical Signs/Symptoms Outcomes: Weight/weight change, Electrolyte and renal profile, GI profile, Glucose profile    Previous Goal Met:   [x] Met              [] Progressing Towards Goal              [] Not Progressing Towards Goal   Previous Recommendations:   [x] Implemented          [] Not Implemented          [] Not Applicable    LEARNING NEEDS (Diet, Food/Nutrient-Drug Interaction):    [x] None Identified   [] Identified and Education Provided/Documented   [] Identified and Pt declined/was not appropriate     Cultural, Mu-ism, OR Ethnic Dietary Needs:    [x] None Identified   [] Identified and Addressed     [x] Interdisciplinary Care Plan Reviewed/Documented    [x] Discharge Planning: TBD pending progress    [] Participated in Interdisciplinary Rounds    NUTRITION RISK:    [x] High              [] Moderate           []  Low  []  Minimal/Uncompromised      [place]  Pager [phone]              Weekend Pager [phone]

## 2017-08-16 NOTE — PROGRESS NOTES
Problem: Mobility Impaired (Adult and Pediatric)  Goal: *Acute Goals and Plan of Care (Insert Text)  Physical Therapy Goals  Revised 8/16/2017  Previous goals continue to be appropriate    Physical Therapy Goals  Initiated 8/9/2017  1. Patient will move from supine to sit and sit to supine , scoot up and down and roll side to side in bed with moderate assistance within 7 day(s). 2. Patient will transfer from bed to chair and chair to bed with maximal assistance using the least restrictive device within 7 day(s). 3. Patient will perform sit to stand with maximal assistance within 7 day(s). 4. Patient will tolerate sitting EOB x 5 minutes without external support in preparation for standing task within 7 days. PHYSICAL THERAPY TREATMENT: WEEKLY REASSESSMENT  Patient: Zenobia Palma (82 y.o. male)  Date: 8/16/2017  Diagnosis: bladder clots  Bladder injury  perperated viscous  wound dehiscence <principal problem not specified>  Procedure(s) (LRB):  LAPAROTOMY EXPLORATORY, wound closure for dehiscence (N/A) 11 Days Post-Op  Precautions:  (abdominal binder)      ASSESSMENT:  Patient with very slow progress toward goals. Continues to be limited greatly by confusion and slow processing. Able to follow commands with additional time to complete all tasks. Today rather lethargic but wakes with stimulation. Thinks he is in [place] today despite reorientation. Currently needing maxA x 2 for supine to sit and grimaces in pain with transfer but unable to verbalize where pain located. Sits on EOB with supervision and no overt LOB. Sit to stand x 2 with maxA x 2. Difficulty coming to full stand but responds to cues stand tall and tuck bottom in. Stands approx 10 secs before needing to return to sit each trial.  TotalA x 2 to return to supine and scoot to [facility]. Recommend kandace lift to chair. Recommend SNF rehab.   Patient's progression toward goals since last assessment: slow progress secondary to confusion PLAN:  Goals have been updated based on progression since last assessment. Patient continues to benefit from skilled intervention to address the above impairments. Continue to follow the patient 5 times a week to address goals. Planned Interventions:  [X]              Bed Mobility Training             [ ]       Neuromuscular Re-Education  [X]              Transfer Training                   [ ]       Orthotic/Prosthetic Training  [X]              Gait Training                         [ ]       Modalities  [X]              Therapeutic Exercises           [ ]       Edema Management/Control  [X]              Therapeutic Activities            [X]       Patient and Family Training/Education  [ ]              Other (comment):  Discharge Recommendations: Tavo Ramos  Further Equipment Recommendations for Discharge: TBD       SUBJECTIVE:   Patient stated This is [facility] but I live in [place].       OBJECTIVE DATA SUMMARY:   Critical Behavior:  Neurologic State: Alert, Confused  Orientation Level: Oriented to person, Disoriented to place, Disoriented to situation, Disoriented to time  Cognition: Decreased attention/concentration, Poor safety awareness  Safety/Judgement: Decreased insight into deficits, Decreased awareness of need for safety, Decreased awareness of need for assistance     Strength:       grossly decreased, non-functional                    Functional Mobility Training:  Bed Mobility:  Rolling: Total assistance;Assist x2  Supine to Sit: Maximum assistance;Assist x2  Sit to Supine: Total assistance;Assist x2  Scooting:  Total assistance;Assist x2        Transfers:  Sit to Stand: Maximum assistance;Assist x2  Stand to Sit: Maximum assistance;Assist x2                             Balance:  Sitting: Impaired  Sitting - Static: Good (unsupported)  Sitting - Dynamic: Fair (occasional)  Standing: Impaired  Standing - Static: Constant support;Poor  Ambulation/Gait Training: Pain:  Pain Scale 1: Numeric (0 - 10)  Pain Intensity 1: 0              Activity Tolerance:   VSS  Please refer to the flowsheet for vital signs taken during this treatment.   After treatment:   [ ]  Patient left in no apparent distress sitting up in chair  [X]  Patient left in no apparent distress in bed  [X]  Call bell left within reach  [X]  Nursing notified  [X]  Caregiver present  [ ]  Bed alarm activated      COMMUNICATION/COLLABORATION:   The patients plan of care was discussed with: Physical Therapist, Occupational Therapist and Registered Nurse     Vj Jaeger PT, DPT   Time Calculation: 41 mins

## 2017-08-16 NOTE — PROGRESS NOTES
Palliative Medicine     Palliative team received consult for care decisions. Team will visit Pt tomorrow, 8/17. Thank you for including Palliative Medicine in Mr. Osmel Valiente care.       Ele Maurer, [address] ([id number])  Work cell: [phone]

## 2017-08-16 NOTE — PROGRESS NOTES
Called MRI to check on status. MRI checklist was not signed by family member that gave info. Family signed and refaxed info to MRI.

## 2017-08-16 NOTE — PROCEDURES
[facility]  *** FINAL REPORT ***    Name: Selina Ribera  MRN: [id number]    Inpatient  : [id number]  HIS Order #: [id number]  [id number] [facility] Visit #: [id number]  Date: 16 Aug 2017    TYPE OF TEST: Cerebrovascular Duplex    REASON FOR TEST  Cerebral ischemia    Right Carotid:-             Proximal               Mid                 Distal  cm/s  Systolic  Diastolic  Systolic  Diastolic  Systolic  Diastolic  CCA:     13.0       6.0                            79.0      10.0  Bulb:  ECA:    153.0      17.0  ICA:     88.0      14.0      105.0      22.0       69.0      15.0  ICA/CCA:  1.1       1.4    ICA Stenosis: <50%    Right Vertebral:-  Finding: Antegrade  Sys:       36.0  Dara:        0.0    Right Subclavian: Normal    Left Carotid:-            Proximal                Mid                 Distal  cm/s  Systolic  Diastolic  Systolic  Diastolic  Systolic  Diastolic  CCA:     46.3      10.0                           106.0      11.0  Bulb:  ECA:    210.0       8.0  ICA:     73.0      11.0       91.0      19.0       97.0      19.0  ICA/CCA:  0.7       1.0    ICA Stenosis: <50%    Left Vertebral:-  Finding: Antegrade  Sys:       42.0  Dara:        6.0    Left Subclavian: Normal    INTERPRETATION/FINDINGS  PROCEDURE:  Carotid Duplex Examination using B-mode, color and  spectral Doppler of the extracranial cerebrovascular arteries. 1. Bilateral <50% stenosis of the internal carotid arteries. 2. No significant stenosis in the external carotid arteries  bilaterally. 3. Antegrade flow in both vertebral arteries. 4. Normal flow in both subclavian arteries. Plaque Morphology:  1. Heterogeneous plaque in the bulb and right ICA. 2. Heterogeneous plaque in the bulb and left ICA. ADDITIONAL COMMENTS    I have personally reviewed the data relevant to the interpretation of  this  study. TECHNOLOGIST: Aleah Chua RVT  Signed: 2017 03:29 PM    PHYSICIAN: Dereje Guthrie.  Huong Wang MD  Signed: 2017 04:42 PM

## 2017-08-16 NOTE — PROGRESS NOTES
Patient has reddened area around suprapubic catheter. Dr. Alejandro Canales office made aware. Action continue to monitor.

## 2017-08-16 NOTE — PROGRESS NOTES
Problem: Dysphagia (Adult)  Goal: *Acute Goals and Plan of Care (Insert Text)  Speech pathology goals intitiated 8/14/2017   1. Patient will tolerate sips of clear liquids free of s/s aspiration within 7 days  2. Patient will demonstrate timely and complete posterior propulsion with purees within 7 days  3. Patient will participate in solid trials if mentation allows within 7 days   [id number] [facility]  Patient: Lester Van (55 y.o. male)  Date: 8/16/2017  Diagnosis: bladder clots  Bladder injury  perperated viscous  wound dehiscence <principal problem not specified>  Procedure(s) (LRB):  LAPAROTOMY EXPLORATORY, wound closure for dehiscence (N/A) 11 Days Post-Op  Precautions:   (abdominal binder)      ASSESSMENT:  Pt's mental status is slowly improving. Still confused. With ice chips, applesauce and water, he has slow oral manipulation with some loss of liquids at midline. Slow discoordinated oral phase with mild oral residue with purees. Mild swallow delay, double swallow which could be due to pharyngeal residue. Occasional cough after purees and thins. He is getting closer to a po diet. May benefit from an MBS. He has reduced attention and confusion that are interfering with po intake. Still at risk to aspirate. His oral cavity has dried crusty patches on his tongue and palate. After some liquid, it softened and could be suctioned out and cleaned off with a toothette. Asked nsg to stay on top of oral care and continue to clean his mouth and brush his teeth. Progression toward goals:  [ ]         Improving appropriately and progressing toward goals  [X]         Improving slowly and progressing toward goals  [ ]         Not making progress toward goals and plan of care will be adjusted       PLAN:  Recommendations and Planned Interventions:  Ice chips only  Patient continues to benefit from skilled intervention to address the above impairments.   Continue treatment per established plan of care. Discharge Recommendations:  None       SUBJECTIVE:   Patient stated \"[facility]\". .      OBJECTIVE:   Cognitive and Communication Status:  Neurologic State: Alert  Orientation Level: Oriented to person, Disoriented to place, Disoriented to situation (year but not month)  Cognition: Decreased attention/concentration, Decreased command following, Memory loss, Poor safety awareness  Perception: Appears intact  Perseveration: No perseveration noted  Safety/Judgement: Decreased insight into deficits, Decreased awareness of need for safety, Decreased awareness of need for assistance  Dysphagia Treatment:  Oral Assessment:     P.O. Trials:  Patient Position: upright in bed  Vocal quality prior to P.O.: No impairment  Consistency Presented: Thin liquid;Puree; Ice chips; Solid  How Presented: Cup/sip;Straw;SLP-fed/presented;Self-fed/presented     Bolus Acceptance: No impairment  Bolus Formation/Control: Impaired  Type of Impairment: Delayed  Propulsion: Delayed (# of seconds)  Oral Residue: 10-50% of bolus  Initiation of Swallow: Delayed (# of seconds)  Laryngeal Elevation: Decreased  Aspiration Signs/Symptoms: Weak cough  Pharyngeal Phase Characteristics: Double swallow              Oral Phase Severity: Moderate  Pharyngeal Phase Severity : Mild-moderate            Pain:  Pain Scale 1: Numeric (0 - 10)  Pain Intensity 1: 0     After treatment:   [ ]              Patient left in no apparent distress sitting up in chair  [X]              Patient left in no apparent distress in bed  [X]              Call bell left within reach  [X]              Nursing notified  [X]              Caregiver present  [ ]              Bed alarm activated      COMMUNICATION/EDUCATION:      The patients plan of care including recommendations, planned interventions, and recommended diet changes were discussed with: Registered Nurse. [ ]              Posted safety precautions in patient's room.      Meera Partida SLP  Time Calculation: 30 mins

## 2017-08-16 NOTE — PROGRESS NOTES
Problem: Self Care Deficits Care Plan (Adult)  Goal: *Acute Goals and Plan of Care (Insert Text)  Occupational Therapy Goals:  Routine 7 day re-eval   1. Patient will perform basic grooming seated SBA within 7 days. 2. Patient will tolerate 5 minutes of active exercise for increased strength with ADLS within 7 days. 3. Patient will sit edge of bed without support with good dynamic balance in prep for functional tasks within 7days. 4. Patient will perform sit to stand with max assist in prep for functional ADL transfers within 7 days. 5. Patient will perform UB dressing seated with SBA within 7 days. Initiated 8/9/2017  1. Patient will perform washing of face in supported sitting or at bed level with HOB elevated with max assist within 7 days. Met   2. Patient will tolerate 5 minutes of active exercise for increased strength with ADLS within 7 days. Continue   3. Patient will sit edge of bed without support with good dynamic balance in prep for functional tasks within 7days. continue  4. Patient will perform sit to stand with max assist in prep for functional ADL transfers within 7 days. continue   OCCUPATIONAL THERAPY REEVALUATION  Patient: Ciarra Benz (89 y.o. male)  Date: 8/16/2017  Diagnosis: bladder clots  Bladder injury  perperated viscous  wound dehiscence <principal problem not specified>  Procedure(s) (LRB):  LAPAROTOMY EXPLORATORY, wound closure for dehiscence (N/A) 11 Days Post-Op  Precautions:  (abdominal binder)      ASSESSMENT :  Based on the objective data described below, the patient presents with singficant confusion and was only oriented to himself (name) unable to state birthday, recognize his daughter at bedside or grasp what was going on around him. BUE strength has improved and pt was able to perform washing of face with support of elbows on walker edge of bed (unable to get pt to attend to sit EOB without support of walker. Over time balance improved to fair seated.   No initation of bed mobility or scooting when cued and total assist.  Sit to stand x2 this session squat stance with RW and max assist x2. Pt is not yet ready to take steps. Pt did meet grooming goal from eval but all other goals have not been met and will be continued. Continue to recommend SNF at discharge for rehab. Pt remains NPO and is restive to oral care due to confusion. Pt will need to be hoyered to chair. Ana 22 chair obtained for pt. Spoke with medical NP to confirm that it is ok to kandace pt due to abdominal wound. She stated that if pt had ABD binder on it should be fine but she will text the surgeon to find out. Patient will benefit from skilled intervention to address the above impairments. Patients rehabilitation potential is considered to be Fair  Factors which may influence rehabilitation potential include:   [ ]                None noted  [X]                Mental ability/status  [ ]                Medical condition  [ ]                Home/family situation and support systems  [ ]                Safety awareness  [ ]                Pain tolerance/management  [ ]                Other:        PLAN :  Recommendations and Planned Interventions:  [X]                  Self Care Training                  [X]           Therapeutic Activities  [X]                  Functional Mobility Training    [X]           Cognitive Retraining  [X]                  Therapeutic Exercises           [ ]           Endurance Activities  [X]                  Balance Training                   [ ]           Neuromuscular Re-Education  [ ]                  Visual/Perceptual Training     [ ]      Home Safety Training  [X]                  Patient Education                 [X]           Family Training/Education  [ ]                  Other (comment):     Frequency/Duration: Patient will be followed by occupational therapy 4 times a week to address goals.   Discharge Recommendations: Tavo Matos Equipment Recommendations for Discharge: TBD       SUBJECTIVE:   Patient stated I don't know.   Referring to birth date      OBJECTIVE DATA SUMMARY:   Hospital course since last seen and reason for reevaluation: transferred out of ICU to [facility] then transfer back to PCU due to RRT  Cognitive/Behavioral Status:  Neurologic State: Confused;Drowsy; Eyes open spontaneously; Lethargic  Orientation Level: Oriented to person;Disoriented to situation;Disoriented to time;Disoriented to place  Cognition: Decreased attention/concentration;Decreased command following;Memory loss;Poor safety awareness              Vision/Perceptual:    Tracking: Able to track stimulus in all quadrants w/o difficulty                           Corrective Lenses: Glasses     Range of Motion:     AROM: Generally decreased, functional                       Strength:     Strength: Generally decreased, functional              Coordination:  Coordination: Generally decreased, functional  Fine Motor Skills-Upper: Left Intact; Right Intact    Gross Motor Skills-Upper: Left Intact; Right Intact  Tone & Sensation:     Tone: Normal                          Balance:  Sitting: Impaired  Sitting - Static: Good (unsupported)  Sitting - Dynamic: Fair (occasional)  Standing: Impaired  Standing - Static: Constant support;Poor     Functional Mobility and Transfers for ADLs:  Bed Mobility:  Rolling: Total assistance;Assist x2  Supine to Sit: Maximum assistance;Assist x2  Sit to Supine: Total assistance;Assist x2  Scooting: Total assistance;Assist x2     Transfers:  Sit to Stand: Maximum assistance;Assist x2        ADL Assessment:  Feeding:  (NPO)     Oral Facial Hygiene/Grooming: Minimum assistance; Moderate assistance     Bathing: Maximum assistance; Total assistance     Upper Body Dressing: Total assistance     Lower Body Dressing: Total assistance     Toileting:  Total assistance              ADL Intervention:        Grooming  Washing Face: Contact guard assistance (seated edge of bed leaning on RW)        Cognitive Retraining  Orientation Retraining: Place; Reorienting;Situation;Time  Problem Solving: Identifying the task        Pain:  Pain Scale 1: Numeric (0 - 10)  Pain Intensity 1: 0              Activity Tolerance:      Please refer to the flowsheet for vital signs taken during this treatment. After treatment:   [ ] Patient left in no apparent distress sitting up in chair  [X] Patient left in no apparent distress in bed  [X] Call bell left within reach  [X] Nursing notified  [X] Caregiver present  [ ] Bed alarm activated      COMMUNICATION/EDUCATION:   The patients plan of care was discussed with: Physical Therapist, Registered Nurse and patient and his daughter. [ ]    Home safety education was provided and the patient/caregiver indicated understanding. [X]    Patient have participated as able in goal setting and plan of care. [X]    Patient agree to work toward stated goals and plan of care. [ ]    Patient understands intent and goals of therapy, but is neutral about his/her participation. [X]    Patient is unable to participate in goal setting and plan of care. This patients plan of care is appropriate for delegation to [facility].      Thank you for this referral.  Casie Garcia OTR/L  Time Calculation: 41 mins

## 2017-08-16 NOTE — PROGRESS NOTES
Seen for Dr. Lissette Mercedes. Elap for bladder perf, clot retention, stone. Reexplore by Gen Surg for acute abdomen. Reexplore for dehisc. Now extubated. TPN with failure to feed well. Daughter at bedside. Patient with confusion/delirium. Abd soft. SP [place] and wick clear urine. No  intervention/issues currently. Keep drains. Management of diet per Gen Surg. Delirium per Dr. Maribell Bueno Neuro.   Remainder per Hospitalist.

## 2017-08-16 NOTE — PROGRESS NOTES
MRI is aware that patient has an order to get MRI. MRI checklist completed and sent down this am.  Bianka Guardado is aware that it has not been done at this point. MRI is trying to get it this evening. Made family aware that I have been in touch with MRI x2 today to get an update on a time frame.

## 2017-08-16 NOTE — PROGRESS NOTES
Speech pathology note  Reviewed chart and discussed case with RN. Attempted to see patient for re-evaluation of swallow function, however patient too lethargic for PO trials at this time. Patient required max verbal/tactile stimulation, elevating HOB, and wet washcloth on face to open eyes. Patient oriented x1 and did not attempt to answer additional orientation questions. Patient unable to maintain alertness for >10 seconds this date. Recommend NPO while patient this lethargic, and SLP to follow up tomorrow. Discussed with daughter at bedside who is in agreement. Thank you.     Naina Bender, Mary Lou Ny., CCC-SLP

## 2017-08-16 NOTE — CONSULTS
[address]   [address] [place] [address]       Name:  Ronn Vargas   MR#:  [id number]   :  1928   Account #:  [de-identified]    Date of Consultation:  08/15/2017   Date of Adm:  2017       CHIEF COMPLAINT: Altered mental status. HISTORY OF PRESENT ILLNESS: We were requested to evaluate   this 80-year-old right-handed  male for altered mental status   as a new problem at the request of Dr. Sukhwinedr Dukes, the hospitalist, as a   new patient to us. The patient apparently was admitted on 2017   with acute abdominal pain. He had a bladder stone retrieval and a   posterior wall, bladder wall perforation. His surgery was performed on   2017. The patient had eventually ended up with septic shock   and had to be intubated. He was extubated on 2017. His mental   status is hard to ascertain because he was so sick when he came in   the hospital this time. On the records prior to this admission the patient was not   described as having major cognitive problems prior. Since this surgery   And hospitalization he had been more confused. We were asked to   evaluate for his delirium versus dementia. The patient had a CT scan   of the head that just shows atrophic changes noted. That was then   done both on  and  showing just atrophy and no acute   changes. He has got an MRI scan scheduled. His B12 level is   therapeutic, his laboratory studies are relatively unremarkable at this time. He does   not appear to be clearly having fever, infection, and he has only a   minimal headache he said in the right temporal region. The patient is a   somewhat unreliable historian at this point. He does not complain of   any focal weakness or sensory loss and seems to have a nonfocal   exam. He does not have any meningismus. PAST MEDICAL HISTORY: Pertinent for acute removal of a bladder   stone and repair of perforated bladder.  He had wound dehiscence,   status post exploratory-lap and has an abdominal binder on. He had   shock, acute kidney failure, pneumothorax. He had coronary artery   disease with paroxysmal atrial fibrillation with rapid ventricular   response, is off Cardizem now. He had anemia postop. He has   periodic paralytic ileus still, but it seems to be getting somewhat better. He was on TPN. He has oral candidiasis, treated with nystatin. He has   unstable angina. He has had a fall risk. He has dysphagia. He has had   essential hypertension. He has no other major medical problems at least on   review of the chart. SOCIAL HISTORY: Currently I think he is . He lives with his wife. He says he does not smoke, does not drink. MEDICATIONS: He is currently on medications of:   1. Aspirin 300 mg rectally once a day. 2. He is on Lovenox subcu for DVT prophylaxis. 3. He is on Pepcid 20 mg IV every day. 4. He is on Intralipid. 5. He is on Humalog insulin. 6. He is taking 10 mg of labetalol p.r.n.   7. Some Nitro-Bid ointment. 8. He is on IV fluids. 9. The patient is on basically no sedatives or pain medications at this   time. ALLERGIES: The patient has an allergy to METOPROLOL. FAMILY HISTORY: Unobtainable from the patient at this time. On   review of the chart there is no family history either. The patient is   unable to give much of a history for that. REVIEW OF SYSTEMS: On complete review of systems he is completely unreliable   And patient cannot really give a good review since he is moderately confused. Actually denies headache or meningismus or focal weakness, except   for a slight twinge he says occasionally in the right temporal area. PHYSICAL EXAMINATION:   VITAL SIGNS: The patient's neurologic examination now shows that   he has a temperature of 98 degrees. Pulse of 65. Blood pressure   140/70. His respirations were 18. NEUROLOGIC: The patient is lying in bed.  He is oriented to himself. He cannot give his age. He was able to give his date of birth. He does   not know the year. He does not know the president. He cannot name   all his children. He seems confused. Very uncertain of his history. He   seems to have a mild generalized weakness, but no clear focal   weakness noted. He does not seem to have any arm drift. His   coordination is slow, but symmetric. He responds well to pin   throughout. Double simultaneous stimulation appears intact. His visual   fields are full to gross confrontation. His pupils are slightly miotic, but   do react to light. His extraocular motility is intact. His fundi are poorly   seen. Finger-nose-finger examination shows no major dysmetria. His   muscle tone and muscle bulk look relatively normal. He is obviously   not ambulatory. He has boots that are protective on and a central line in and has abdominal   binders on. HEAD, EARS, EYES, NOSE, AND THROAT: Examination shows no   significant lesions noted and the temporal arteries not particularly   tender or enlarged. NECK: Supple, without bruits. CHEST: Sounds essentially clear to auscultation. CARDIAC: Examination shows a regular rhythm at this time. His heart   sounds somewhat distant. ABDOMEN: Hypoactive bowel sounds, especially in a binder hard to   hear much. Status post surgery. BOWEL AND BLADDER FUNCTION: He has a diaper on. MUSCULOSKELETAL: Exam shows no acute swollen or tender joints. SKIN: Shows no rashes. No neurocutaneous lesions seen. VASCULAR: Shows decreased pulses in both feet. LYMPH NODES: The patient has no lymphadenopathy on exam. No   tender nodes. SYSTEMIC: No fever, no meningismus. NEUROPSYCH: He is very confused and has an altered mental status   and maybe even dementia. Does not actually appear to be delirious at   this time. No active hallucinations. IMPRESSION: This patient is a difficult case.  He certainly has had   what sounds like a significant change in his mental status from before. There may well be some underlying dementia, but was hard to assess   that at this point. This may make him more likely to have subsequent   problems. He may have had some type of hypoxic injury from respiratory   failure when he required intubation. That probably was associated with significant respiratory distress   so probably has a   component of hypoxic encephalopathy. The patient also seems to   have had some confusion. Get MRI scan, rule out any multi-infarct-  type dementia. Rule out any other treatable cause. Check an EEG, see   how slow it is. Check carotid Doppler to make sure his cerebral blood flow is okay. His B12 level was okay. His thyroid is okay. CT of the head was okay   so far. A difficult case. Will follow closely with you. He will probably just   need a long-term rehab and will probably need some skilled nursing   facility.         MD Taisha Duff / Husam Singleton   D:  08/15/2017   20:00   T:  08/15/2017   21:55   Job #:  [id number]

## 2017-08-16 NOTE — PROGRESS NOTES
[id number] [facility]  Preliminary Report:  Carotid Duplex Scan    Right:  Mild plaque noted in the right carotid system. Right ICA velocities suggest less than 50% diameter reduction. Right vertebral artery flow is antegrade. Left:  Mild plaque noted in the left carotid system. Left ICA velocities suggest less than 50% diameter reduction. Left vertebral artery flow is antegrade. Final report to follow.

## 2017-08-16 NOTE — PROGRESS NOTES
Surgery      Notes reviewed    Patient has bowel function but is not alert enough to be evaluated by speech for swallowing. Suggest NPO unti evaluated by speech  Recommendations of diet by Speech once evaluated. Cont TPN until po nutrition possible    No acute surgical issues      Michelle Pascual. Bandar Heath MD, [facility]    Patient more alert  Spoke with speech path who will evaluate shortly    Diet per their recommendation.     Bandar Heath

## 2017-08-17 PROBLEM — F05 DELIRIUM DUE TO GENERAL MEDICAL CONDITION: Status: ACTIVE | Noted: 2017-01-01

## 2017-08-17 PROBLEM — R13.11 ORAL PHASE DYSPHAGIA: Status: ACTIVE | Noted: 2017-01-01

## 2017-08-17 NOTE — PROGRESS NOTES
Chart review. Palliative Care is scheduled to meet with patient. Pt has a new confirmed CVA via MRI (multiple punctate foci of acute infarction in the  bilateral corona radiata and centrum semiovale). PT OT recommending SNF. Patient is max assist with hoya lift. CM will follow after meeting with Palliative Care.     Jeaneth Coleman RN CM  Ext [phone]

## 2017-08-17 NOTE — PROGRESS NOTES
Surgery      OK to resume plavix      Maxim Ny MD, [facility]  [id number] [facility]

## 2017-08-17 NOTE — PROGRESS NOTES
Speech pathology  Attempted to see patient. He was sleeping, slumped down in chair. Patient briefly opened his eyes in response to verbal stimuli and sternal rub but immediately shut them. Attempted oral care but this was limited as patient kept mouth closed tightly. Will continue to follow as alertness allows.  Sudeep Beltran M.S. CCC-SLP

## 2017-08-17 NOTE — PROGRESS NOTES
have personally seen and examined the patient, reviewed pertinent labs and imaging, and discussed the plan of care with NP Nav Salomon. ?I reviewed and agree with the history, exam, assessment and plan as outlined in her note. ? case d/w in detail with neurology. Hospitalist Progress Note    NAME: Concepcion Gallegos   :  1928   MRN:  [id number]       Interim Hospital Summary: 80 y.o. male whom presented on 2017 with      Assessment / Plan:  Encephalopathy   - unclear etiology, likely hospital delirium, unclear if patient has history of dementia  - Head CT : No acute intracranial abnormality. Age-related volume loss unchanged  - ammonia 33, B12 1046, TSH 3.7  - neuro consult;   - MRI of brain:  multiple punctate foci of acute infarction in the  bilateral corona radiata and centrum semiovale. Background of mild chronic  microvascular ischemic disease.  The result was discussed with the family by Dr. Zeina Green  - EEG result pending  - Carotid duplex scan: bilateral ICA  less than 50% reduction      Postop respiratory failure s/p extubation , now on RA with O2 Sat 98%      S/P removal of bladder stone and repair of perforated bladder ()  - Cystoscopy with attempted clot evacuation with subsequent exploratory laparotomy with bladder stone retrieval and repair of large posterior bladder perforation on   - urology following      S/p Exp Lap () due to perforate viscus; diffuse gastric distention without bowel perforation   S/p Exp lap with closure for dehiscence with retention sutures ()  - general surgery following      Nutritional support  Difficulty with feeding  - GI following  - SLP following; keep NPO, ice chips ok per nursing when pt is awake  - continue with TPN; will discuss with the family about alternate tube feeding      Shock resolved  LINDSEY Resolved  Pneumothorax due to dissection of air from retroperitoneum cephalad, different physiology or behavior of typical pneumothorax -conservative management      CAD, PAF with RVR  HTN  - off cardizem gtt  - restart plavix when able to take PO, okay with surgery   - continue with labetalol and nitrobid      Anemia, post op s/p PRBC  - H/h 10.2/29. 5      UTI with enterococcus,  Completed ampicillin course  Electrolyte disturbance replete as indicated, on tpn      DVT prophylaxis: Lovenox      Code status: FULL code (Family would like to get some more answers before they talk about code status)          Recommended Disposition: SNF; max assist  Patient may transition to rehab or SNF once clear by the primary team (MIKE) and general surgery team.         Subjective:     Chief Complaint / Reason for Physician Visit  Pt is not following commends, opened eyes & smiled to tactile & voice stimuli. Discussed with RN events overnight. Review of Systems:  Symptom Y/N Comments  Symptom Y/N Comments   Fever/Chills    Chest Pain     Poor Appetite    Edema     Cough    Abdominal Pain     Sputum    Joint Pain     SOB/CLARKE    Pruritis/Rash     Nausea/vomit    Tolerating PT/OT     Diarrhea    Tolerating Diet     Constipation    Other       Could NOT obtain due to:      Objective:     VITALS:   Last 24hrs VS reviewed since prior progress note. Most recent are:  Patient Vitals for the past 24 hrs:   Temp Pulse Resp BP SpO2   08/17/17 1508 98.3 °F (36.8 °C) 69 22 157/77 99 %   08/17/17 1153 98.2 °F (36.8 °C) 69 18 153/74 98 %   08/17/17 0910 98.1 °F (36.7 °C) 76 18 (!) 158/91 98 %   08/17/17 0519 98.2 °F (36.8 °C) 76 18 166/74 95 %   08/16/17 2130 - - - - 95 %   08/16/17 2024 98.2 °F (36.8 °C) 72 18 162/79 95 %       Intake/Output Summary (Last 24 hours) at 08/17/17 1537  Last data filed at 08/17/17 1035   Gross per 24 hour   Intake          1326.62 ml   Output             2725 ml   Net         -1398.38 ml        PHYSICAL EXAM:  General: WD, WN. Opened eyes and made an eye contact then closed eyes, unable to follow commends. no acute distress    EENT:  EOMI. Anicteric sclerae. Dry mucous membrane  Resp:  CTA in apex with decreased breath sounds at bases. no wheezing or rales. No accessory muscle use  CV:  Regular  rhythm,  No edema  GI:  Soft, Non distended, Non tender.  +Bowel sounds  Neurologic:  Not following commends, opened eyes to tactile & verbal stimuli. Psych:   No nsight. Not anxious nor agitated  Skin:  MIKE aware of erythema around suprapubic catheter site,   No jaundice    Reviewed most current lab test results and cultures  YES  Reviewed most current radiology test results   YES  Review and summation of old records today    NO  Reviewed patient's current orders and MAR    YES  PMH/SH reviewed - no change compared to H&P  ________________________________________________________________________  Care Plan discussed with:    Comments   Patient y    Family  y    RN y    Care Manager y    Consultant  y MIKE, Dr. Angelo Cid                    y Multidiciplinary team rounds were held today with , nursing, pharmacist and clinical coordinator. Patient's plan of care was discussed; medications were reviewed and discharge planning was addressed. ________________________________________________________________________  Total NON critical care TIME:  30   Minutes    Total CRITICAL CARE TIME Spent:   Minutes non procedure based      Comments   >50% of visit spent in counseling and coordination of care     ________________________________________________________________________  Naseem Fisher NP     Procedures: see electronic medical records for all procedures/Xrays and details which were not copied into this note but were reviewed prior to creation of Plan. LABS:  I reviewed today's most current labs and imaging studies.   Pertinent labs include:  Recent Labs      08/16/17   0304   WBC  7.7   HGB  10.2*   HCT  29.9*   PLT  343     Recent Labs      08/17/17   0507  08/16/17   0304   NA  133*  134*   K  4.1  4.1   CL  101  104   CO2  26  26   GLU  142* 152*   BUN  22*  28*   CREA  0.75  0.78   CA  8.2*  7.7*   MG   --   1.7   PHOS   --   3.4       Signed: )Lakeshia Deluca, NP

## 2017-08-17 NOTE — PROGRESS NOTES
Neurology Progress Note    Patient ID:  Petra Suazo  [id number]  80 y.o.  7/22/1928      CHIEF COMPLAINT: Confusion    Subjective:      Patient has complaints of being confused and encephalopathic. His MRI scan of the brain is pending at this time. Because of his confusion seem to be most likely a mild hypoxic injury. Discussed with the daughter and she says he had no memory problems prior to this. We are waiting his EEG, carotid Dopplers and MRI. His CT scan was reviewed personally on the PACS system by myself and I agree with a report that is negative. Patient has no new neurological problems. Patient does complain of headache, fever, or meningismus, and is working with physical therapy and is having difficulty standing or walking.     Current Facility-Administered Medications   Medication Dose Route Frequency    TPN ADULT - CENTRAL AA 5% D20% W/ CA + ELECTROLYTES   IntraVENous CONTINUOUS    gadoteridol (PROHANCE) 279.3 mg/mL contrast solution 19 mL  19 mL IntraVENous RAD ONCE    LORazepam (ATIVAN) injection 1-2 mg  1-2 mg IntraVENous Q6H PRN    zinc oxide-cod liver oil (DESITIN) 40 % paste   Topical PRN    metoclopramide HCl (REGLAN) injection 5 mg  5 mg IntraVENous Q8H PRN    nitroglycerin (NITROBID) 2 % ointment 1 Inch  1 Inch Topical BID    levalbuterol (XOPENEX) nebulizer soln 0.63 mg/3 mL  0.63 mg Nebulization Q6H PRN    acetaminophen (TYLENOL) solution 975 mg  975 mg Oral Q12H PRN    Or    acetaminophen (OFIRMEV) infusion 1,000 mg  1,000 mg IntraVENous Q12H PRN    aspirin (ASA) suppository 300 mg  300 mg Rectal DAILY    labetalol (NORMODYNE;TRANDATE) injection 10 mg  10 mg IntraVENous Q6H    hydrALAZINE (APRESOLINE) 20 mg/mL injection 20 mg  20 mg IntraVENous Q4H PRN    labetalol (NORMODYNE;TRANDATE) injection 20 mg  20 mg IntraVENous Q4H PRN    fat emulsion 20% (LIPOSYN, INTRALIPID) infusion 250 mL  250 mL IntraVENous Q MON, WED & FRI    insulin lispro (HUMALOG) injection SubCUTAneous Q6H    glucose chewable tablet 16 g  4 Tab Oral PRN    dextrose (D50W) injection syrg 12.5-25 g  12.5-25 g IntraVENous PRN    glucagon (GLUCAGEN) injection 1 mg  1 mg IntraMUSCular PRN    enoxaparin (LOVENOX) injection 40 mg  40 mg SubCUTAneous Q24H    famotidine (PF) (PEPCID) 20 mg in sodium chloride 0.9 % 10 mL injection  20 mg IntraVENous Q12H    sodium chloride (NS) flush 5-10 mL  5-10 mL IntraVENous Q8H    ondansetron (ZOFRAN) injection 4 mg  4 mg IntraVENous Q4H PRN    0.9% sodium chloride infusion  25 mL/hr IntraVENous CONTINUOUS    sodium chloride (NS) flush 10 mL  10 mL InterCATHeter PRN    sodium chloride (NS) flush 10-40 mL  10-40 mL InterCATHeter Q8H        Past Medical History:   Diagnosis Date    Hypertension     Prostate enlargement        Past Surgical History:   Procedure Laterality Date    ABDOMEN SURGERY PROC UNLISTED      hernia repair    CARDIAC SURG PROCEDURE UNLIST         [unfilled]    Social History   Substance Use Topics    Smoking status: Never Smoker    Smokeless tobacco: Never Used    Alcohol use No       Current Facility-Administered Medications   Medication Dose Route Frequency Provider Last Rate Last Dose    TPN ADULT - CENTRAL AA 5% D20% W/ CA + ELECTROLYTES   IntraVENous CONTINUOUS Hyunsun D Ko, NP 83 mL/hr at 08/16/17 1836      gadoteridol (PROHANCE) 279.3 mg/mL contrast solution 19 mL  19 mL IntraVENous RAD ONCE Clara Miles MD        LORazepam (ATIVAN) injection 1-2 mg  1-2 mg IntraVENous Q6H PRN Mindi Jain MD        zinc oxide-cod liver oil (DESITIN) 40 % paste   Topical PRN Clara Miles MD        metoclopramide HCl (REGLAN) injection 5 mg  5 mg IntraVENous Q8H PRN Jorge Luis George MD        nitroglycerin (NITROBID) 2 % ointment 1 Inch  1 Inch Topical BID Remy Morales MD   1 Inch at 08/16/17 1844    levalbuterol (XOPENEX) nebulizer soln 0.63 mg/3 mL  0.63 mg Nebulization Q6H PRN Pranav Hyatt MD        acetaminophen (TYLENOL) solution 975 mg  975 mg Oral Q12H PRN Tyler Bain MD        Or    acetaminophen (OFIRMEV) infusion 1,000 mg  1,000 mg IntraVENous Q12H PRN Tyler Bain  mL/hr at 08/09/17 0905 1,000 mg at 08/09/17 0905    aspirin (ASA) suppository 300 mg  300 mg Rectal DAILY Romario Kwok MD   300 mg at 08/16/17 1034    labetalol (NORMODYNE;TRANDATE) injection 10 mg  10 mg IntraVENous Q6H Romario Kwok MD   10 mg at 08/16/17 1839    hydrALAZINE (APRESOLINE) 20 mg/mL injection 20 mg  20 mg IntraVENous Q4H PRN Ata Ugarte MD   10 mg at 08/12/17 2214    labetalol (NORMODYNE;TRANDATE) injection 20 mg  20 mg IntraVENous Q4H PRN Ata Ugarte MD   10 mg at 08/06/17 1943    fat emulsion 20% (LIPOSYN, INTRALIPID) infusion 250 mL  250 mL IntraVENous Q MON, WED & FRI Ata Ugarte MD   250 mL at 08/16/17 1837    insulin lispro (HUMALOG) injection   SubCUTAneous Q6H Ata Ugarte MD   Stopped at 08/16/17 1800    glucose chewable tablet 16 g  4 Tab Oral PRN Ata Ugarte MD        dextrose (D50W) injection syrg 12.5-25 g  12.5-25 g IntraVENous PRSANIA Ugarte MD        glucagon (GLUCAGEN) injection 1 mg  1 mg IntraMUSCular PRN Ata Ugarte MD        enoxaparin (LOVENOX) injection 40 mg  40 mg SubCUTAneous Q24H Ata Ugarte MD   40 mg at 08/16/17 1037    famotidine (PF) (PEPCID) 20 mg in sodium chloride 0.9 % 10 mL injection  20 mg IntraVENous Q12H Jorge Luis Vicente MD   20 mg at 08/16/17 1036    sodium chloride (NS) flush 5-10 mL  5-10 mL IntraVENous Q8H Izabela Stallworth MD   10 mL at 08/16/17 1347    ondansetron (ZOFRAN) injection 4 mg  4 mg IntraVENous Q4H PRSANIA Stallworth MD        0.9% sodium chloride infusion  25 mL/hr IntraVENous CONTINUOUS Ata Ugarte MD 25 mL/hr at 08/09/17 0430 25 mL/hr at 08/09/17 0430    sodium chloride (NS) flush 10 mL  10 mL InterCATHeter PRN Izabela Stallworth MD   10 mL at 08/13/17 1229    sodium chloride (NS) flush 10-40 mL  10-40 mL Aleksandra Cardoso MD   10 mL at 08/16/17 1347       Allergies   Allergen Reactions    Metoprolol Nausea and Vomiting       Review of Systems:    Review of systems not obtained due to patient factors.     Objective:      Objective:     Patient Vitals for the past 24 hrs:   BP Temp Pulse Resp SpO2 Weight   08/16/17 2024 162/79 98.2 °F (36.8 °C) 72 18 95 % -   08/16/17 1343 168/64 98.1 °F (36.7 °C) 70 18 98 % -   08/16/17 1035 - - 87 - - -   08/16/17 0806 175/87 98.4 °F (36.9 °C) 70 18 98 % -   08/16/17 0535 152/47 - 73 - - -   08/16/17 0528 141/52 - 65 - - -   08/16/17 0300 153/73 98.8 °F (37.1 °C) 77 18 96 % 203 lb 0.7 oz (92.1 kg)   08/15/17 2324 122/54 - 63 - - -   08/15/17 2315 159/64 98.1 °F (36.7 °C) 69 18 98 % -         Lab Review   Recent Results (from the past 24 hour(s))   GLUCOSE, POC    Collection Time: 08/15/17 11:19 PM   Result Value Ref Range    Glucose (POC) 152 (H) 65 - 100 mg/dL    Performed by Collette Bold    GLUCOSE, POC    Collection Time: 08/16/17 12:02 AM   Result Value Ref Range    Glucose (POC) 143 (H) 65 - 100 mg/dL    Performed by Paulo [facility]    METABOLIC PANEL, BASIC    Collection Time: 08/16/17  3:04 AM   Result Value Ref Range    Sodium 134 (L) 136 - 145 mmol/L    Potassium 4.1 3.5 - 5.1 mmol/L    Chloride 104 97 - 108 mmol/L    CO2 26 21 - 32 mmol/L    Anion gap 4 (L) 5 - 15 mmol/L    Glucose 152 (H) 65 - 100 mg/dL    BUN 28 (H) 6 - 20 MG/DL    Creatinine 0.78 0.70 - 1.30 MG/DL    BUN/Creatinine ratio 36 (H) 12 - 20      GFR est AA >60 >60 ml/min/1.73m2    GFR est non-AA >60 >60 ml/min/1.73m2    Calcium 7.7 (L) 8.5 - 10.1 MG/DL   PHOSPHORUS    Collection Time: 08/16/17  3:04 AM   Result Value Ref Range    Phosphorus 3.4 2.6 - 4.7 MG/DL   MAGNESIUM    Collection Time: 08/16/17  3:04 AM   Result Value Ref Range    Magnesium 1.7 1.6 - 2.4 mg/dL   CBC WITH AUTOMATED DIFF    Collection Time: 08/16/17  3:04 AM   Result Value Ref Range    WBC 7.7 4.1 - 11.1 K/uL    RBC 3.32 (L) 4.10 - 5.70 M/uL    HGB 10.2 (L) 12.1 - 17.0 g/dL    HCT 29.9 (L) 36.6 - 50.3 %    MCV 90.1 80.0 - 99.0 FL    MCH 30.7 26.0 - 34.0 PG    MCHC 34.1 30.0 - 36.5 g/dL    RDW 14.2 11.5 - 14.5 %    PLATELET 598 912 - 653 K/uL    NEUTROPHILS 56 32 - 75 %    LYMPHOCYTES 25 12 - 49 %    MONOCYTES 10 5 - 13 %    EOSINOPHILS 8 (H) 0 - 7 %    BASOPHILS 1 0 - 1 %    ABS. NEUTROPHILS 4.3 1.8 - 8.0 K/UL    ABS. LYMPHOCYTES 2.0 0.8 - 3.5 K/UL    ABS. MONOCYTES 0.8 0.0 - 1.0 K/UL    ABS. EOSINOPHILS 0.6 (H) 0.0 - 0.4 K/UL    ABS. BASOPHILS 0.1 0.0 - 0.1 K/UL   GLUCOSE, POC    Collection Time: 08/16/17  5:19 AM   Result Value Ref Range    Glucose (POC) 158 (H) 65 - 100 mg/dL    Performed by Susan Kent    GLUCOSE, POC    Collection Time: 08/16/17 11:45 AM   Result Value Ref Range    Glucose (POC) 157 (H) 65 - 100 mg/dL    Performed by Bouchra Last, POC    Collection Time: 08/16/17  6:07 PM   Result Value Ref Range    Glucose (POC) 139 (H) 65 - 100 mg/dL    Performed by Wendi John            Additional comments:I personally viewed and interpreted the patient's CT scan and labs        NEUROLOGICAL EXAM:    Appearance: The patient is well developed, well nourished, provides a incoherent history and is in no acute distress. Mental Status: Oriented to place and person and not his age, date of birth or the date the president, cognitive function and fund of knowledge is abnormal. Speech is fluent without aphasia or dysarthria. Mood and affect appropriate. Cranial Nerves:   Intact visual fields. Fundi are benign. MARILUZ, EOM's full, no nystagmus, no ptosis. Facial sensation is normal. Corneal reflexes are not tested. Facial movement is symmetric. Hearing is abnormal bilaterally. Palate is midline with normal sternocleidomastoid and trapezius muscles are normal. Tongue is midline. Neck without meningismus or bruits   Motor:  4/5 strength in upper and lower proximal and distal muscles. Normal bulk and tone. No fasciculations.    Reflexes:   Deep tendon reflexes 1+/4 and symmetrical.  No babinski or clonus present   Sensory:   Normal to touch, pinprick and temperature and vibration decreased in both feet. DSS is intact   Gait:  Not testable gait. Tremor:   No tremor noted. Cerebellar:  No cerebellar signs present. Neurovascular:  Normal heart sounds and regular rhythm, peripheral pulses decreased, and no carotid bruits. Assessment:        Assessment:       ICD-10-CM ICD-9-CM    1. Hematuria R31.9 599.70    2. Urinary retention R33.9 788.20    3. Wound dehiscence, surgical, initial encounter T81.31XA 998.32    4. Altered mental status, unspecified R41.82 780.97    5. 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 Convulsive syncope R55 780.2      780.39    7. Disturbance of memory R41.3 780.93    8. Mild hypoxic ischemic encephalopathy (HIE) P91.61 768.71    9.  Stenosis of both internal carotid arteries I65.23 433.10      433.30      Active Problems:    Bladder injury (7/28/2017)      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)      Altered mental status, unspecified (8/15/2017)      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)      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)      Convulsive syncope (8/15/2017)      Disturbance of memory (8/15/2017)        Plan:     Patient with altered mental status, most likely secondary to mild hypoxic injury when he had respiratory arrest is what I can figure out  We will check MRI scan to make sure he does not have multiple embolic phenomena or microvascular disease and check a carotid Doppler, and that was done today and that was okay  Discussed with the patient's daughter and the hospitalist today, complete workup as ordered,  We will follow carefully, he will probably need intensive rehab      Signed:  Janiya Rehman MD  8/16/2017  8:25 PM    Pedrito Young MD  [phone]

## 2017-08-17 NOTE — CONSULTS
Palliative Medicine Consult  [place]: [phone] ([phone])    Patient Name: Schuyler Muller  YOB: 1928    Date of Initial Consult: 8/17/17  Reason for Consult: care decisions  Requesting Provider: Abdi Cervantes   Primary Care Physician: Reshma Minor MD      SUMMARY:   Schuyler Muller is a 80 y.o. with a past history of s/p hospital admission 7/20/2017 at which time he had cardiac catheterization performed with stent placement and PMHx of HTN / prostate enlargement , who was admitted on 7/27/2017 from home with a diagnosis of hematuria, urinary retention. Current medical issues leading to Palliative Medicine involvement include: care decisions. Psychosocial: lives with wife in 2 story house, independent. Working as a preacher prior to admission   PALLIATIVE DIAGNOSES:   1. Pain: post op  2. Post-op respiratory failure, slow to arouse  3. UTI  4. Hematuria-resolved  5. S/p removal of bladder stone and repair of perforated bladder  6. Anasarca   7. Delirium: thought to be hospital related, head CT 8/11 shows no acute intracranial abnormality  8. Encephalopathy: per neuro, may have hypoxic component  9. Dysphagia: ? PEG  10. Care decisions       PLAN:   1. Met with pt, no family at bedside. Pt unable to provide HPI, just states \"I'm doing better. \"  2. Meet with family tomorrow at 11:30am.   3. Initial consult note routed to primary continuity provider  4.  Communicated plan of care with: Palliative IDT, RN       GOALS OF CARE / TREATMENT PREFERENCES:   [====Goals of Care====]  GOALS OF CARE:  Patient / health care proxy stated goals:     Pending      TREATMENT PREFERENCES:   Code Status: Full Code    Advance Care Planning:  Advance Care Planning 7/29/2017   Patient's Healthcare Decision Maker is: Legal Next of Jose 69   Primary Decision Maker Name Yamile Evangelista   Primary Decision Maker Phone Number [phone]   Primary Decision Maker Relationship to Patient Spouse   Confirm Advance Directive Yes, not on file       Other:    The palliative care team has discussed with patient / health care proxy about goals of care / treatment preferences for patient.  [====Goals of Care====]         HISTORY:     History obtained from: chart    CHIEF COMPLAINT: hematuria    HPI/SUBJECTIVE:    The patient is:   [] Verbal and participatory  [x] Non-participatory due to:     BIBA with c/o progressively worsening hematuria and clotting wick; Pt had cardiac stents placed 7/24/17,(was discharged the day before this visit) along with a new wick, however, wick has been clotting off, causing pt bladder pain. Pt has been drinking a lot of fluid, but very little urine output; daughter shared that pt's urine was a edwin color with improvement of sxs, however, today sxs have been getting worse. Urologist started pt on abx today after finding bacteria in his urine. In the ED, Dr. Jaxon Bonds attempted to unblock the catheter without long-lasting success, therefore, admitted pt for OR surgical intervention. 7/28: In the OR, a large bladder stone was removed and a posterior bladder perforation was repaired. Post-operatively, pt has had progressively worsening abdominal pain and distention, and abdominal CT showed extensive hydropneumoperitoneum, extensive retroperitoneal emphysema, especially on the left, and mediastinal emphysema. 7/29: exploratory lap for diffuse pneumatosis and gastric distention without evidence of bowel perforation. Extubated, afib with RVR, post-op ileus  7/31. 8/2: PICC placement, NGT to sxn for ileus. Head CT neg (pt still lethargic). Anasarca. 8/5: confused, pulling at lines. Following coughing episode, pt's wound dehisced with small bowel protruding. 8/6: required short course of vasopressor. Extubated 8/7. transferred from CCU to floor 8/10.  8/11: KUB neg for ileus, NGT removed.   8/12: RRT called due to slurred speech, inability to swallow liquids, not following commands, worsening lethargy; no opioids or sedatives in over 24 hours. Clinical Pain Assessment (nonverbal scale for severity on nonverbal patients): pt denies  [++++ Clinical Pain Assessment++++]  [++++Pain Severity++++]: Pain: 0  [++++Pain Character++++]:   [++++Pain Duration++++]:   [++++Pain Effect++++]:   [++++Pain Factors++++]:   [++++Pain Frequency++++]:   [++++Pain Location++++]:   [++++ Clinical Pain Assessment++++]  Duration: for how long has pt been experiencing pain (e.g., 2 days, 1 month, years)  Frequency: how often pain is an issue (e.g., several times per day, once every few days, constant)     FUNCTIONAL ASSESSMENT:     Palliative Performance Scale (PPS):  PPS: 30       PSYCHOSOCIAL/SPIRITUAL SCREENING:     Advance Care Planning:  Advance Care Planning 7/29/2017   Patient's Healthcare Decision Maker is: Legal Next of Jose Hermosillo   Primary Decision Maker Name Radha Duckworth   Primary Decision Maker Phone Number [phone]   Primary Decision Maker Relationship to Patient Spouse   Confirm Advance Directive Yes, not on file        Any spiritual / Spiritism concerns:  [] Yes /  [x] No    Caregiver Burnout:  [] Yes /  [] No /  [x] No Caregiver Present      Anticipatory grief assessment:   [x] Normal  / [] Maladaptive       ESAS Anxiety: Anxiety: 0    ESAS Depression: Depression: 3        REVIEW OF SYSTEMS:     Positive and pertinent negative findings in ROS are noted above in HPI. The following systems were [x] reviewed / [] unable to be reviewed as noted in HPI  Other findings are noted below. Systems: constitutional, ears/nose/mouth/throat, respiratory, gastrointestinal, genitourinary, musculoskeletal, integumentary, neurologic, psychiatric, endocrine. Positive findings noted below.   Modified ESAS Completed by: provider   Fatigue: 8 Drowsiness: 3   Depression: 3 Pain: 0   Anxiety: 0 Nausea: 0   Anorexia: 4 Dyspnea: 0     Constipation: No     Stool Occurrence(s): 1        PHYSICAL EXAM:     From RN flowsheet:  Wt Readings from Last 3 Encounters: 08/17/17 205 lb 14.6 oz (93.4 kg)   08/15/17 207 lb 10.8 oz (94.2 kg)   07/26/17 211 lb 3.2 oz (95.8 kg)     Blood pressure 157/77, pulse 69, temperature 98.3 °F (36.8 °C), resp. rate 22, height 5' 10\" (1.778 m), weight 205 lb 14.6 oz (93.4 kg), SpO2 99 %. Pain Scale 1: Numeric (0 - 10)  Pain Intensity 1: 0  Pain Onset 1:  (pain with coughing, movement)  Pain Location 1: Abdomen  Pain Orientation 1: Mid  Pain Description 1: Sore  Pain Intervention(s) 1: Medication (see MAR)  Last bowel movement, if known:     Constitutional: WN, WD, NAD, sitting in recliner at bedside  Eyes: pupils equal, anicteric  ENMT: no nasal discharge, moist mucous membranes  Cardiovascular: regular rhythm, distal pulses intact  Respiratory: breathing not labored, symmetric  Gastrointestinal: soft non-tender, +bowel sounds  Musculoskeletal: no deformity, no tenderness to palpation  Skin: warm, dry  Neurologic: following commands, moving all extremities, unable to provide history  Psychiatric: full affect           HISTORY:     Active Problems:    Bladder injury (7/28/2017)      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 No pertinent family history. History reviewed, no pertinent family history.   Social History   Substance Use Topics    Smoking status: Never Smoker    Smokeless tobacco: Never Used    Alcohol use No     Allergies   Allergen Reactions    Metoprolol Nausea and Vomiting      Current Facility-Administered Medications Medication Dose Route Frequency    [START ON 8/18/2017] enoxaparin (LOVENOX) injection 40 mg  40 mg SubCUTAneous Q24H    TPN ADULT - CENTRAL AA 5% D20% W/ CA + ELECTROLYTES   IntraVENous CONTINUOUS    alteplase (CATHFLO) 1 mg in sterile water (preservative free) 1 mL injection  1 mg InterCATHeter ONCE    TPN ADULT - CENTRAL AA 5% D20% W/ CA + ELECTROLYTES   IntraVENous CONTINUOUS    LORazepam (ATIVAN) injection 1-2 mg  1-2 mg IntraVENous Q6H PRN    zinc oxide-cod liver oil (DESITIN) 40 % paste   Topical PRN    metoclopramide HCl (REGLAN) injection 5 mg  5 mg IntraVENous Q8H PRN    nitroglycerin (NITROBID) 2 % ointment 1 Inch  1 Inch Topical BID    levalbuterol (XOPENEX) nebulizer soln 0.63 mg/3 mL  0.63 mg Nebulization Q6H PRN    acetaminophen (TYLENOL) solution 975 mg  975 mg Oral Q12H PRN    Or    acetaminophen (OFIRMEV) infusion 1,000 mg  1,000 mg IntraVENous Q12H PRN    aspirin (ASA) suppository 300 mg  300 mg Rectal DAILY    labetalol (NORMODYNE;TRANDATE) injection 10 mg  10 mg IntraVENous Q6H    hydrALAZINE (APRESOLINE) 20 mg/mL injection 20 mg  20 mg IntraVENous Q4H PRN    labetalol (NORMODYNE;TRANDATE) injection 20 mg  20 mg IntraVENous Q4H PRN    fat emulsion 20% (LIPOSYN, INTRALIPID) infusion 250 mL  250 mL IntraVENous Q MON, WED & FRI    insulin lispro (HUMALOG) injection   SubCUTAneous Q6H    glucose chewable tablet 16 g  4 Tab Oral PRN    dextrose (D50W) injection syrg 12.5-25 g  12.5-25 g IntraVENous PRN    glucagon (GLUCAGEN) injection 1 mg  1 mg IntraMUSCular PRN    famotidine (PF) (PEPCID) 20 mg in sodium chloride 0.9 % 10 mL injection  20 mg IntraVENous Q12H    sodium chloride (NS) flush 5-10 mL  5-10 mL IntraVENous Q8H    ondansetron (ZOFRAN) injection 4 mg  4 mg IntraVENous Q4H PRN    0.9% sodium chloride infusion  25 mL/hr IntraVENous CONTINUOUS    sodium chloride (NS) flush 10 mL  10 mL InterCATHeter PRN    sodium chloride (NS) flush 10-40 mL  10-40 mL InterCATHeter Q8H          LAB AND IMAGING FINDINGS:     Lab Results   Component Value Date/Time    WBC 7.7 08/16/2017 03:04 AM    HGB 10.2 08/16/2017 03:04 AM    PLATELET 259 91/56/1139 03:04 AM     Lab Results   Component Value Date/Time    Sodium 133 08/17/2017 05:07 AM    Potassium 4.1 08/17/2017 05:07 AM    Chloride 101 08/17/2017 05:07 AM    CO2 26 08/17/2017 05:07 AM    BUN 22 08/17/2017 05:07 AM    Creatinine 0.75 08/17/2017 05:07 AM    Calcium 8.2 08/17/2017 05:07 AM    Magnesium 1.7 08/16/2017 03:04 AM    Phosphorus 3.4 08/16/2017 03:04 AM      Lab Results   Component Value Date/Time    AST (SGOT) 70 08/13/2017 05:28 AM    Alk. phosphatase 92 08/13/2017 05:28 AM    Protein, total 6.7 08/13/2017 05:28 AM    Albumin 2.0 08/13/2017 05:28 AM    Globulin 4.7 08/13/2017 05:28 AM     No results found for: INR, PTMR, PTP, PT1, PT2, APTT   No results found for: IRON, FE, TIBC, IBCT, PSAT, FERR   Lab Results   Component Value Date/Time    pH 7.49 08/07/2017 08:25 AM    PCO2 38 08/07/2017 08:25 AM    PO2 86 08/07/2017 08:25 AM     No components found for: [place]   Lab Results   Component Value Date/Time     03/26/2017 08:30 AM    CK - MB 2.7 03/26/2017 08:30 AM                Total time: 55 min  Counseling / coordination time, spent as noted above: 45 min  > 50% counseling / coordination?: yes    Prolonged service was provided for  []30 min   []75 min in face to face time in the presence of the patient, spent as noted above. Time Start:   Time End:   Note: this can only be billed with 16223 (initial) or 79695 (follow up). If multiple start / stop times, list each separately.

## 2017-08-17 NOTE — PROGRESS NOTES
Patient is stable. No  issues. Drains remain patent with clear urine. Continuing dietary issues on TPN. Long-term disposition issues. Consider transfer to medical service. Plavix can be restarted from  standpoint. Gen Surg should weigh in as well.

## 2017-08-17 NOTE — PROGRESS NOTES
1730:  Initiated declot interventions of cathflo. Repositioning the patient in bed, asking patient to cough and deep breathe and raising Left arm were unsuccessful. Instilled 1mg/1ml Cathflo x red port. Patient tolerated procedure well. Primary nurse aware. Refer to [facility] and Docflow sheet for specifics. 1930:  Declot follow up. Aspirated instilled Cathflo 1mg/1 ml post 120 minute dwell. Noted + blood return x red port. 5 ml of blood wasted from red ports. Flushed with 20 ml NS. Primary RN is aware of patent ports x 1. Declot intervention was explained. Declot per Cathflo intervention was successful .      Rosales Wise, RN / Vascular Access Team

## 2017-08-17 NOTE — PROGRESS NOTES
Problem: Self Care Deficits Care Plan (Adult)  Goal: *Acute Goals and Plan of Care (Insert Text)  Occupational Therapy Goals:  Routine 7 day re-eval   1. Patient will perform basic grooming seated SBA within 7 days. 2. Patient will tolerate 5 minutes of active exercise for increased strength with ADLS within 7 days. 3. Patient will sit edge of bed without support with good dynamic balance in prep for functional tasks within 7days. 4. Patient will perform sit to stand with max assist in prep for functional ADL transfers within 7 days. 5. Patient will perform UB dressing seated with SBA within 7 days. Initiated 8/9/2017  1. Patient will perform washing of face in supported sitting or at bed level with HOB elevated with max assist within 7 days. Met   2. Patient will tolerate 5 minutes of active exercise for increased strength with ADLS within 7 days. Continue   3. Patient will sit edge of bed without support with good dynamic balance in prep for functional tasks within 7days. continue  4. Patient will perform sit to stand with max assist in prep for functional ADL transfers within 7 days. continue   OCCUPATIONAL THERAPY TREATMENT  Patient: Rodriguez Montaño (61 y.o. male)  Date: 8/17/2017  Diagnosis: bladder clots  Bladder injury  perperated viscous  wound dehiscence <principal problem not specified>  Procedure(s) (LRB):  LAPAROTOMY EXPLORATORY, wound closure for dehiscence (N/A) 12 Days Post-Op  Precautions:  (abdominal binder)      ASSESSMENT:  Pt has a new confirmed CVA via MRI (multiple punctate foci of acute infarction in the  bilateral corona radiata and centrum semiovale). Confirmed with nursing that pt is ok to be hoyered and will not disrupt incision. Pt was very drowsy and not interacting much supine. Once light turned one and pt stimulated to wake up. Total assist for supine to sit edge of bed with rolling technique.   Once seated pt progressed to good balance and performing washing of face and brushing of teeth after set up with supervision. Moderate assist for througness with combing hair. Sit to stand performed x4 with max assist x2. Once up pt was able to take 4 steps HOB moderate assist x2. Unable to take steps to chair. Squat pivot max assist of 3 to recliner chair with kandace under pt. Continue to recommend SNF for rehab. Progression toward goals:  [ ]       Improving appropriately and progressing toward goals  [X]       Improving slowly and progressing toward goals  [ ]       Not making progress toward goals and plan of care will be adjusted       PLAN:  Patient continues to benefit from skilled intervention to address the above impairments. Continue treatment per established plan of care. Discharge Recommendations:  [facility]  Further Equipment Recommendations for Discharge:  TBD       SUBJECTIVE:   Patient stated I'm ok.       OBJECTIVE DATA SUMMARY:   Cognitive/Behavioral Status:  Neurologic State: Drowsy (became more alert as session continued)  Orientation Level: Oriented to person;Disoriented to situation;Disoriented to time  Cognition: Memory loss;Poor safety awareness;Decreased attention/concentration  Perception: Appears intact  Perseveration: No perseveration noted  Safety/Judgement: Fall prevention     Functional Mobility and Transfers for ADLs:  Bed Mobility:  Rolling: Total assistance  Supine to Sit: Total assistance;Maximum assistance;Assist x2  Scooting: Minimum assistance     Transfers:  Sit to Stand: Maximum assistance; Additional time;Assist x2        Balance:  Sitting - Static: Good (unsupported)  Sitting - Dynamic: Fair (occasional)  Standing: Impaired  Standing - Static: Constant support;Poor; Fair (occasional moments of fair; flexed posture)  Standing - Dynamic : Poor     ADL Intervention:        Grooming  Washing Hands: Supervision/set-up (physical cues to initate)  Brushing Teeth: Stand-by assistance (after set up)  Brushing/Combing Hair: Moderate assistance (throughness/accuracy)     Cognitive Retraining  Problem Solving: Identifying the task; Identifying the problem  Safety/Judgement: Fall prevention     Pain:  Pain Scale 1: Numeric (0 - 10)  Pain Intensity 1: 0              Activity Tolerance:      Please refer to the flowsheet for vital signs taken during this treatment.   After treatment:   [X] Patient left in no apparent distress sitting up in chair; kandace pad under pt  [ ] Patient left in no apparent distress in bed  [X] Call bell left within reach  [X] Nursing notified  [X] Caregiver present  [ ] Bed alarm activated      COMMUNICATION/COLLABORATION:   The patients plan of care was discussed with: Physical Therapist, Registered Nurse and patient and his daughter     Mika Cruz, OTR/L  Time Calculation: 38 mins

## 2017-08-17 NOTE — PROGRESS NOTES
Problem: Mobility Impaired (Adult and Pediatric)  Goal: *Acute Goals and Plan of Care (Insert Text)  Physical Therapy Goals  Revised 8/16/2017  Previous goals continue to be appropriate    Physical Therapy Goals  Initiated 8/9/2017  1. Patient will move from supine to sit and sit to supine , scoot up and down and roll side to side in bed with moderate assistance within 7 day(s). 2. Patient will transfer from bed to chair and chair to bed with maximal assistance using the least restrictive device within 7 day(s). 3. Patient will perform sit to stand with maximal assistance within 7 day(s). 4. Patient will tolerate sitting EOB x 5 minutes without external support in preparation for standing task within 7 days. PHYSICAL THERAPY TREATMENT  Patient: Jamaica Garcia (23 y.o. male)  Date: 8/17/2017  Diagnosis: bladder clots  Bladder injury  perperated viscous  wound dehiscence <principal problem not specified>  Procedure(s) (LRB):  LAPAROTOMY EXPLORATORY, wound closure for dehiscence (N/A) 12 Days Post-Op  Precautions:  (abdominal binder)      ASSESSMENT:  Patient continues to be confused which limits progress at times. Much more alert and participating more today. Supine to sit with totalA x 2 and patient grimacing in pain during transfer. Good sitting balance today and requires mainly supervision on EOB. Sit to stand x 4 trials with RW and maxA x 2. Able to come to full stand with cues for technique. Today able to take approx 4 side steps toward [facility] with modA x 2 and RW. Fatigues quickly and needs to sit. Attempted stand pivot from bed to chair with RW but unsuccessful secondary to fatigue. Bed to chair via stand pivot with maxA x 3. Notified RN patient up in recliner and kandace pad underneath him. Recommend kandace back to bed later this morning. Continue to recommend SNF level rehab.   Progression toward goals:  [ ]    Improving appropriately and progressing toward goals  [X]    Improving slowly and progressing toward goals  [ ]    Not making progress toward goals and plan of care will be adjusted       PLAN:  Patient continues to benefit from skilled intervention to address the above impairments. Continue treatment per established plan of care. Discharge Recommendations:  Skilled Nursing Facility  Further Equipment Recommendations for Discharge:  TBD       SUBJECTIVE:   Patient stated You ain't kidding.       OBJECTIVE DATA SUMMARY:   Critical Behavior:  Neurologic State: Drowsy  Orientation Level: Oriented to person, Disoriented to situation, Disoriented to time, Disoriented to place  Cognition: Decreased attention/concentration, Decreased command following, Poor safety awareness  Safety/Judgement: Fall prevention  Functional Mobility Training:  Bed Mobility:  Rolling: Total assistance  Supine to Sit: Total assistance;Maximum assistance;Assist x2     Scooting: Minimum assistance        Transfers:  Sit to Stand: Maximum assistance; Additional time;Assist x2  Stand to Sit: Moderate assistance;Assist x2        Bed to Chair: Total assistance;Maximum assistance (of 3)                    Balance:  Sitting - Static: Good (unsupported)  Sitting - Dynamic: Fair (occasional)  Standing: Impaired  Standing - Static: Constant support;Poor; Fair (occasional moments of fair; flexed posture)  Standing - Dynamic : Poor  Ambulation/Gait Training:  Distance (ft): 4 Feet (ft) (side steps toward [facility])  Assistive Device: Gait belt;Walker, rolling  Ambulation - Level of Assistance: Moderate assistance;Assist x2        Gait Abnormalities: Decreased step clearance;Shuffling gait        Base of Support: Widened     Speed/Janice: Pace decreased (<100 feet/min); Shuffled; Slow  Step Length: Left shortened;Right shortened           Pain:  Pain Scale 1: Numeric (0 - 10)  Pain Intensity 1: 0              Activity Tolerance:   VSS  Please refer to the flowsheet for vital signs taken during this treatment.   After treatment:   [X]    Patient left in no apparent distress sitting up in chair  [ ]    Patient left in no apparent distress in bed  [X]    Call bell left within reach  [X]    Nursing notified  [X]    Caregiver present  [ ]    Bed alarm activated      COMMUNICATION/COLLABORATION:   The patients plan of care was discussed with: Physical Therapist, Occupational Therapist and Registered Nurse     Henny Almeida, PT, DPT   Time Calculation: 40 mins

## 2017-08-17 NOTE — PROGRESS NOTES
Cardiology Progress Note  8/17/2017     Admit Date: 7/27/2017  Admit Diagnosis: bladder clots  Bladder injury  perperated viscous  wound dehiscence  CC: none currently  Cardiac Assessment/Plan:   CAD: Circ HORACIO 7/20; Ef 45-50% after PCI. On asa only since  surgery: restart plavix when able from surgical standpoint.      HTN: On IV labetalol and prn hydralazine; added standing NTG paste. Change to coreg/losartan when taking PO again or can use NG for meds.     P Afib: transiently on dilt gtt: NSR currently; If recurrent, would use amio.     Volume status: 8/7: no CHF on CXR; low O2 requirement, Cr up to 1.3: held lasix.     Resp failure, post-op bladder repair/stone extraction/explor lap/dehis, anemia, ID, Dispo: per primary team and consultants. For other plans, see orders.     8/8: Extubated;  slower to respond than usual but no complaints. Cr normalized. No new cardiac recs.     8/9: More alert; no complaints. Added NTG paste as above for BPs.    8/10: No complaints; Cont NSR; still NPO. Bloody NG but not malik blood: stable Hg.    8/11: stable cardiac status; Cont NSR; More lethargic this am; No new cardiac recs. No active cardiac issues. We are available over weekend as needed. Please call if questions/issues. Thanks. 8/13: Events noted; Waxing/waning MS; No current complaints. Stable tele; Will check back later this week; cardiac status stable; no new recs. 8/17: Cont waxing/waning MS per family; stable cardiac status. No new recs. Will sign-off; Please call if questions/issues. I am not here this weekend/next week; my partners are available as needed. Routine F/U with me after d/c. Thanks. For other plans, see orders.   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    Altered mental status, unspecified 08/15/2017    Stenosis of both internal carotid arteries 08/15/2017    Cerebral microvascular disease 08/15/2017    Convulsive syncope 08/15/2017    Disturbance of memory 08/15/2017    Bladder injury 2017        Subjective: David Lopez reports   Chest pain X none  consistent with:  Non-cardiac CP         Atypical CP     None now  On going  Anginal CP     Dyspnea X none  at rest  with exertion         improved  unchanged  worse              PND X none  overnight       Orthopnea X none  improved  unchanged  worse   Presyncope X none  improved  unchanged  worse     Ambulated in hallway without symptoms   Yes   Ambulated in room without symptoms  Yes   Objective:    Physical Exam:  Overall VSSAF;    Visit Vitals    BP (!) 158/91 (BP 1 Location: Right arm, BP Patient Position: At rest)    Pulse 76    Temp 98.2 °F (36.8 °C)    Resp 18    Ht 5' 10\" (1.778 m)    Wt 93.4 kg (205 lb 14.6 oz)    SpO2 98%    BMI 29.54 kg/m2     Temp (24hrs), Av.2 °F (36.8 °C), Min:98.1 °F (36.7 °C), Max:98.2 °F (36.8 °C)    Patient Vitals for the past 8 hrs:   Pulse   17 0910 76   17 0519 76    Patient Vitals for the past 8 hrs:   Resp   17 0910 18   17 0519 18    Patient Vitals for the past 8 hrs:   BP   17 0910 (!) 158/91   17 0519 166/74          Intake/Output Summary (Last 24 hours) at 17 0923  Last data filed at 17 0521   Gross per 24 hour   Intake                0 ml   Output             2450 ml   Net            -2450 ml     General Appearance: Well developed, well nourished, no acute distress. Ears/Nose/Mouth/Throat:   Dry MM; anicteric. NG out. JVP: WNL   Resp:   clear to auscultation bilaterally. Nl resp effort. Cardiovascular:  RRR, S1, S2 normal, no new murmur. No gallop or rub. Abdomen:   Post-surgical, bowel sounds present. Extremities: No edema bilaterally. Skin:  Neuro: Warm and dry.   Lethargic; arousable, grossly nonfocal   cath site intact w/o hematoma or new bruit; distal pulse unchanged  Yes   Data Review: Telemetry independently reviewed x sinus  chronic afib  parox afib  NSVT     ECG independently reviewed  NSR  afib  no significant changes  NSST-Tw chgs   x no new ECG provided for review   Lab results reviewed as noted below. Current medications reviewed as noted below. No results for input(s): PH, PCO2, PO2 in the last 72 hours. No results for input(s): CPK, CKMB, CKNDX, TROIQ in the last 72 hours. Recent Labs      08/17/17   0507  08/16/17   0304   NA  133*  134*   K  4.1  4.1   CL  101  104   CO2  26  26   BUN  22*  28*   CREA  0.75  0.78   GLU  142*  152*   PHOS   --   3.4   CA  8.2*  7.7*   WBC   --   7.7   HGB   --   10.2*   HCT   --   29.9*   PLT   --   343     Lab Results   Component Value Date/Time    Cholesterol, total 178 07/20/2017 12:28 PM    HDL Cholesterol 48 07/20/2017 12:28 PM    LDL, calculated 117.8 07/20/2017 12:28 PM    Triglyceride 61 07/20/2017 12:28 PM    CHOL/HDL Ratio 3.7 07/20/2017 12:28 PM     No results for input(s): SGOT, GPT, AP, TBIL, TP, ALB, GLOB, GGT, AML, LPSE in the last 72 hours. No lab exists for component: AMYP, HLPSE  No results for input(s): INR, PTP, APTT in the last 72 hours.     No lab exists for component: INREXT, INREXT   No components found for: GLPOC    Current Facility-Administered Medications   Medication Dose Route Frequency    TPN ADULT - CENTRAL AA 5% D20% W/ CA + ELECTROLYTES   IntraVENous CONTINUOUS    LORazepam (ATIVAN) injection 1-2 mg  1-2 mg IntraVENous Q6H PRN    zinc oxide-cod liver oil (DESITIN) 40 % paste   Topical PRN    metoclopramide HCl (REGLAN) injection 5 mg  5 mg IntraVENous Q8H PRN    nitroglycerin (NITROBID) 2 % ointment 1 Inch  1 Inch Topical BID    levalbuterol (XOPENEX) nebulizer soln 0.63 mg/3 mL  0.63 mg Nebulization Q6H PRN    acetaminophen (TYLENOL) solution 975 mg  975 mg Oral Q12H PRN    Or    acetaminophen (OFIRMEV) infusion 1,000 mg  1,000 mg IntraVENous Q12H PRN    aspirin (ASA) suppository 300 mg  300 mg Rectal DAILY    labetalol (NORMODYNE;TRANDATE) injection 10 mg  10 mg IntraVENous Q6H    hydrALAZINE (APRESOLINE) 20 mg/mL injection 20 mg  20 mg IntraVENous Q4H PRN    labetalol (NORMODYNE;TRANDATE) injection 20 mg  20 mg IntraVENous Q4H PRN    fat emulsion 20% (LIPOSYN, INTRALIPID) infusion 250 mL  250 mL IntraVENous Q MON, WED & FRI    insulin lispro (HUMALOG) injection   SubCUTAneous Q6H    glucose chewable tablet 16 g  4 Tab Oral PRN    dextrose (D50W) injection syrg 12.5-25 g  12.5-25 g IntraVENous PRN    glucagon (GLUCAGEN) injection 1 mg  1 mg IntraMUSCular PRN    enoxaparin (LOVENOX) injection 40 mg  40 mg SubCUTAneous Q24H    famotidine (PF) (PEPCID) 20 mg in sodium chloride 0.9 % 10 mL injection  20 mg IntraVENous Q12H    sodium chloride (NS) flush 5-10 mL  5-10 mL IntraVENous Q8H    ondansetron (ZOFRAN) injection 4 mg  4 mg IntraVENous Q4H PRN    0.9% sodium chloride infusion  25 mL/hr IntraVENous CONTINUOUS    sodium chloride (NS) flush 10 mL  10 mL InterCATHeter PRN    sodium chloride (NS) flush 10-40 mL  10-40 mL InterCATHeter Q8H        Deep Lane MD

## 2017-08-18 PROBLEM — Z71.89 COUNSELING REGARDING GOALS OF CARE: Status: ACTIVE | Noted: 2017-01-01

## 2017-08-18 PROBLEM — R53.81 PHYSICAL DECONDITIONING: Status: ACTIVE | Noted: 2017-01-01

## 2017-08-18 NOTE — PROGRESS NOTES
Problem: Dysphagia (Adult)  Goal: *Acute Goals and Plan of Care (Insert Text)  Speech pathology goals intitiated 8/14/2017   1. Patient will tolerate sips of clear liquids free of s/s aspiration within 7 days  2. Patient will demonstrate timely and complete posterior propulsion with purees within 7 days  3. Patient will participate in solid trials if mentation allows within 7 days   [id number] [facility]  Patient: Samuel Coyle (18 y.o. male)  Date: 8/18/2017  Diagnosis: bladder clots  Bladder injury  perperated viscous  wound dehiscence <principal problem not specified>  Procedure(s) (LRB):  LAPAROTOMY EXPLORATORY, wound closure for dehiscence (N/A) 13 Days Post-Op  Precautions:    (abdominal binder)      ASSESSMENT:  Patient continues with significant dysphagia, limited by mentation. Performance is variable such that occasional swallows appear functional, followed by severely impaired. Overt s/s of aspiration noted with ice chips. He was awake and verbal intermittently throughout session. Not safe for clearance for PO diet. Oral care attempted, though patient appears to have mucous coating his soft palate. Unable to remove with green swabs at this time, as patient tends to bite the swab and resist it with his tongue. Suspect this is related to cognition. Notified RN. Progression toward goals:  [ ]         Improving appropriately and progressing toward goals  [X]         Improving slowly and progressing toward goals  [ ]         Not making progress toward goals and plan of care will be adjusted       PLAN:  Recommendations and Planned Interventions:  Continue NPO with oral care. May liberalize pending goals of care. Patient continues to benefit from skilled intervention to address the above impairments. Continue treatment per established plan of care. Discharge Recommendations: To Be Determined       SUBJECTIVE:   Patient stated I'm awake.       OBJECTIVE:   Cognitive and Communication Status:  Neurologic State: Drowsy (variable)  Orientation Level: Oriented to person  Cognition: Decreased command following  Perception: Appears intact  Perseveration: No perseveration noted  Safety/Judgement: Fall prevention  Dysphagia Treatment:  Oral Assessment:  Oral Assessment  Labial: No impairment  Dentition: Natural  Oral Hygiene: moist secretions along teeth and palate. Attempted oral care, but patient with poor ability to follow commands, biting swab and resisting with his tongue  Lingual: No impairment  Velum:  (covered in mucous)  Mandible: No impairment  P.O. Trials:  Patient Position: upright in bed  Vocal quality prior to P.O.: No impairment  Consistency Presented: Ice chips; Thin liquid  How Presented: Spoon;Straw     Bolus Acceptance: Impaired (intermittently not accepting, poor awareness )  Bolus Formation/Control: Impaired  Type of Impairment: Delayed  Propulsion: Delayed (# of seconds)  Oral Residue: None  Initiation of Swallow: Delayed (# of seconds) (variable, occasionally functionally absent)  Laryngeal Elevation: Decreased  Aspiration Signs/Symptoms: Strong cough (x1 with ice chips)  Pharyngeal Phase Characteristics: Double swallow              Oral Phase Severity: Moderate-severe  Pharyngeal Phase Severity : Moderate                                                                                                                                Pain:  Pain Scale 1: Numeric (0 - 10)  Pain Intensity 1: 0     After treatment:   [ ]              Patient left in no apparent distress sitting up in chair  [X]              Patient left in no apparent distress in bed  [X]              Call bell left within reach  [X]              Nursing notified  [ ]              Caregiver present  [ ]              Bed alarm activated      COMMUNICATION/EDUCATION:   Patient was educated regarding purpose of SLP visit. He did not seem to understand.       The patients plan of care including recommendations, planned interventions, and recommended diet changes were discussed with: Registered Nurse. [ ]              Posted safety precautions in patient's room.      Stuart Wise, SLP  Time Calculation: 13 mins

## 2017-08-18 NOTE — PROGRESS NOTES
Hospitalist Progress Note    NAME: Kwabena Billy   :  1928   MRN:  [id number]       Interim Hospital Summary: 80 y.o. male whom presented on 2017 with      Assessment / Plan:  Encephalopathy   - MRI of brain:  multiple punctate foci of acute infarction in the  bilateral corona radiata and centrum semiovale. Background of mild chronic  microvascular ischemic disease. The result was discussed with the family by Dr. Steffi Kam  - MRI findings consistent with hypoxic injury and multiple small infarcts are the cause of encephalopathy and confusion. 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 Age-related volume loss unchanged  - ammonia 33, B12 1046, TSH 3.7  - neurology will follow as need from this point.      Postop respiratory failure s/p extubation , now on RA with O2 Sat 98%      S/P removal of bladder stone and repair of perforated bladder ()  - Cystoscopy with attempted clot evacuation with subsequent exploratory laparotomy with bladder stone retrieval and repair of large posterior bladder perforation on   - urology following      S/p Exp Lap () due to perforate viscus; diffuse gastric distention without bowel perforation   S/p Exp lap with closure for dehiscence with retention sutures ()  - general surgery following. Dr. Perkins Matters recommended to consult GI if and when the patient requires alternative tube feeding.      Nutritional support  Difficulty with feeding  - GI following  - SLP following; keep NPO, ice chips ok per nursing when pt is awake  - continue with TPN; discussed with the family about alternate tube feeding. Family would like to wait until Monday to think about the option.   Family would like to work on extra stimulation and encourage for the pt to eat.       Shock resolved  LINDSEY Resolved  Pneumothorax due to dissection of air from retroperitoneum cephalad, different physiology or behavior of typical pneumothorax -conservative management      CAD, PAF with RVR  HTN  - off cardizem gtt  - restart plavix when able to take PO, okay with surgery   - continue with labetalol and nitrobid      Anemia, post op s/p PRBC  - H/h 10.2/29. 5      UTI with enterococcus,  Completed ampicillin course  Electrolyte disturbance replete as indicated, on tpn    Pruritus  - pt was rubbing his face and scratching his chest.  Nursing staff has been applying extra moisturizer. We will add benadryl prn to control his itching sensation. No visible rashes        DVT prophylaxis: Lovenox      Code status: FULL code (Family would like to get some more answers before they talk about code status)          Recommended Disposition: SNF; max assist  Patient may transition to rehab or SNF once clear by the primary team (MIKE) and general surgery team.       Subjective:     Chief Complaint / Reason for Physician Visit  Pt opened eyes and made eye contact. Not following commends. Discussed with RN events overnight. Review of Systems:  Symptom Y/N Comments  Symptom Y/N Comments   Fever/Chills    Chest Pain     Poor Appetite    Edema     Cough    Abdominal Pain     Sputum    Joint Pain     SOB/CLARKE    Pruritis/Rash     Nausea/vomit    Tolerating PT/OT     Diarrhea    Tolerating Diet     Constipation    Other       Could NOT obtain due to:      Objective:     VITALS:   Last 24hrs VS reviewed since prior progress note.  Most recent are:  Patient Vitals for the past 24 hrs:   Temp Pulse Resp BP SpO2   08/18/17 1102 98.1 °F (36.7 °C) 71 18 165/70 97 %   08/18/17 0728 99.3 °F (37.4 °C) 74 20 168/64 96 %   08/18/17 0522 - - - 125/55 -   08/18/17 0442 99.2 °F (37.3 °C) 75 18 (!) 178/159 96 %   08/17/17 2337 99.3 °F (37.4 °C) 80 20 153/82 94 %   08/17/17 1930 99.3 °F (37.4 °C) 66 20 159/60 98 %   08/17/17 1508 98.3 °F (36.8 °C) 69 22 157/77 99 %       Intake/Output Summary (Last 24 hours) at 08/18/17 1320  Last data filed at 08/18/17 1105   Gross per 24 hour   Intake          1416.53 ml   Output             1550 ml   Net          -133.47 ml        PHYSICAL EXAM:  General: WD, WN. Open eyes to tactile and voice stimuli. no acute distress    EENT:  EOMI. Anicteric sclerae. Dry mucous membrane  Resp:  CTA bilaterally, no wheezing or rales. No accessory muscle use  CV:  Regular  rhythm,  No edema  GI:  Soft, Non distended, Non tender.  +Bowel sounds  Neurologic:  Open eyes to tactile stimuli, not following any commends  Psych:   Poor insight. Not anxious nor agitated  Skin:  MIKE aware of erythema and some drainage around spurapurpuric area. No jaundice. Dry skin    Reviewed most current lab test results and cultures  YES  Reviewed most current radiology test results   YES  Review and summation of old records today    NO  Reviewed patient's current orders and MAR    YES  PMH/ reviewed - no change compared to H&P  ________________________________________________________________________  Care Plan discussed with:    Comments   Patient y    Family  y    RN y    Care Manager y    Consultant  y MIKE, general surgery, neurology, and palliative service who are currently following pt                    y Multidiciplinary team rounds were held today with , nursing, pharmacist and clinical coordinator. Patient's plan of care was discussed; medications were reviewed and discharge planning was addressed.      ________________________________________________________________________  Total NON critical care TIME:  30  Minutes    Total CRITICAL CARE TIME Spent:   Minutes non procedure based      Comments   >50% of visit spent in counseling and coordination of care     ________________________________________________________________________  Erik Buenrostro NP     Procedures: see electronic medical records for all procedures/Xrays and details which were not copied into this note but were reviewed prior to creation of Plan. LABS:  I reviewed today's most current labs and imaging studies.   Pertinent labs include:  Recent Labs      08/16/17   0304   WBC  7.7   HGB  10.2*   HCT  29.9*   PLT  343     Recent Labs      08/18/17   0435  08/17/17   0507  08/16/17   0304   NA  133*  133*  134*   K  4.2  4.1  4.1   CL  102  101  104   CO2  26  26  26   GLU  164*  142*  152*   BUN  26*  22*  28*   CREA  0.82  0.75  0.78   CA  7.9*  8.2*  7.7*   MG  2.0   --   1.7   PHOS  3.3   --   3.4       Signed: )Lakeshia Deluca, NP

## 2017-08-18 NOTE — PROGRESS NOTES
Nutrition Assessment:    INTERVENTIONS/RECOMMENDATIONS:   Enteral/Parenteral Nutrition: Initiate enteral nutrition: If in line with goals of care, recommend NGT placement and initiation of enteral feedings. Jevity 1.5 @ 15 mL/hr x 24 hours; if patient tolerates well, advance by 10 mL q 12 hours to a goal rate of Jevity 1.5 @ 55 mL/hr + 150 mL water flushes q 4 hours (provides 1980 kcal, 84g protein, 1903 mL water)    If enteral nutrition started, wean TPN down once patient able to tolerate TF at a rate of 25-35 mL/hr x 24 hours. Diet advancement as able per SLP    ASSESSMENT:   Chart reviewed; patient discussed during PCU rounds. SLP continues to recommend NPO as patient is unsafe for diet at this time. TPN continues; meeting 100% of patients estimated kcal and protein needs. Preferred method of feeding in a patient with a functional gut is via enteral nutrition. TPN no longer appropriate/indicated. TF recommendations provided above. Palliative in discussions with family for goals of care. Will monitor progress and plan of care. Diet Order: NPO (TPN: AA5% D20% @ 83 mL/hr + Lipids 3x/week; provides 1967 kcal/99.6g protein)  % Eaten:  No data found. Pertinent Medications: [x] Reviewed []Other: famotidine, Humalog, Labetalol   Pertinent Labs: [x]Reviewed  []Other: -[phone]  Food Allergies: [x]None []Other:     Last BM: 8/15   []Active     []Hyperactive  [x]Hypoactive       [] Absent  BS  Skin:    [] Intact   [x] Incision  [] Breakdown   []Edema   []Other:    Anthropometrics: Height: 5' 10\" (177.8 cm) Weight: 93.4 kg (205 lb 14.6 oz)    IBW (%IBW):   ( ) UBW (%UBW):   (  %)    BMI: Body mass index is 29.54 kg/(m^2).     This BMI is indicative of:  []Underweight   []Normal   [x]Overweight   [] Obesity   [] Extreme Obesity (BMI>40)  Last Weight Metrics:  Weight Loss Metrics 8/17/2017 7/26/2017 3/26/2017 11/3/2016 3/17/2015 10/2/2014 5/16/2013   Today's Wt 205 lb 14.6 oz 211 lb 3.2 oz 215 lb 6.2 oz 217 lb 6 oz 214 lb 4.8 oz 200 lb 213 lb 10 oz   BMI 29.54 kg/m2 30.3 kg/m2 30.91 kg/m2 31.19 kg/m2 30.75 kg/m2 28.7 kg/m2 30.65 kg/m2       Estimated Nutrition Needs (Based on): 1934 Kcals/day (BMR (1611) x 1. 2AF) , 94 g (1.0 g/kg bw) Protein  Carbohydrate:  At Least 130 g/day  Fluids: 1950 mL/day     Pt expected to meet estimated nutrient needs: [x]Yes []No    NUTRITION DIAGNOSES:   Problem:  Swallowing difficulty      Etiology: related to AMS, encephaloapthy     Signs/Symptoms: as evidenced by NPO per SLP      NUTRITION INTERVENTIONS:  Meals/Snacks: General/healthful diet Enteral/Parenteral Nutrition: Initiate enteral nutrition                GOAL:   Transition to EN vs PO diet next 2-4 days    NUTRITION MONITORING AND EVALUATION   Behavioral-Environmental Outcomes: Behavior  Food/Nutrient Intake Outcomes: Enteral/parenteral nutrition intake  Physical Signs/Symptoms Outcomes: Weight/weight change, GI profile, Electrolyte and renal profile, Glucose profile    Previous Goal Met:   [x] Met              [] Progressing Towards Goal              [] Not Progressing Towards Goal   Previous Recommendations:   [x] Implemented          [] Not Implemented          [] Not Applicable    LEARNING NEEDS (Diet, Food/Nutrient-Drug Interaction):    [x] None Identified   [] Identified and Education Provided/Documented   [] Identified and Pt declined/was not appropriate     Cultural, Episcopal, OR Ethnic Dietary Needs:    [x] None Identified   [] Identified and Addressed     [x] Interdisciplinary Care Plan Reviewed/Documented    [x] Discharge Planning: TBD pending progress and goals of care   [x] Participated in Interdisciplinary Rounds    NUTRITION RISK:    [x] High              [] Moderate           []  Low  []  Minimal/Uncompromised      Cyn Rose  Pager [phone]              Weekend Pager [phone]

## 2017-08-18 NOTE — PROGRESS NOTES
Problem: Mobility Impaired (Adult and Pediatric)  Goal: *Acute Goals and Plan of Care (Insert Text)  Physical Therapy Goals  Revised 8/16/2017  Previous goals continue to be appropriate    Physical Therapy Goals  Initiated 8/9/2017  1. Patient will move from supine to sit and sit to supine , scoot up and down and roll side to side in bed with moderate assistance within 7 day(s). 2. Patient will transfer from bed to chair and chair to bed with maximal assistance using the least restrictive device within 7 day(s). 3. Patient will perform sit to stand with maximal assistance within 7 day(s). 4. Patient will tolerate sitting EOB x 5 minutes without external support in preparation for standing task within 7 days. PHYSICAL THERAPY TREATMENT  Patient: Keith Matamoros (70 y.o. male)  Date: 8/18/2017  Diagnosis: bladder clots, Bladder injury, perperated viscous, wound dehiscence      Procedure(s) (LRB):  LAPAROTOMY EXPLORATORY, wound closure for dehiscence (N/A) 13 Days Post-Op      Precautions:  (abdominal binder)  Chart, physical therapy assessment, plan of care and goals were reviewed. ASSESSMENT: pt had a hard time staying awake, had to constantly wake him up, had good sitting balance and was able to stand x2 with a lot less assistance but not able to stay on his feet long enough for transfer to chair, c/o a lot of pain at his surgery site with all movement, max vc's for safety. Progression toward goals:  [ ]      Improving appropriately and progressing toward goals  [X]      Improving slowly and progressing toward goals  [ ]      Not making progress toward goals and plan of care will be adjusted       PLAN:  Patient continues to benefit from skilled intervention to address the above impairments. Continue treatment per established plan of care.   Discharge Recommendations:  Tavo Ramos  Further Equipment Recommendations for Discharge:  rolling walker       OBJECTIVE DATA SUMMARY:      Critical Behavior:  Neurologic State: Drowsy  Orientation Level: Oriented to person  Cognition: Decreased attention/concentration, Impaired decision making  Safety/Judgement: Fall prevention      Functional Mobility Training:  Bed Mobility:  Rolling: Maximum assistance;Assist x2  Supine to Sit: Maximum assistance;Assist x2  Sit to Supine: Maximum assistance;Assist x2  Scooting: Moderate assistance;Assist x1  Level of Assistance: Maximum assistance              Interventions: Manual cues; Tactile cues; Verbal cues      Transfers:  Sit to Stand: Moderate assistance;Assist x2; Additional time  Stand to Sit: Contact guard assistance  Stand Pivot Transfers:  (unable)  Bed to Chair:  (unable)  Interventions: Manual cues; Tactile cues; Verbal cues  Level of Assistance: Maximum assistance      Balance:  Sitting: Intact; Without support  Sitting - Static: Good (unsupported)  Sitting - Dynamic: Not tested  Standing: Impaired; With support  Standing - Static: Poor;Constant support  Standing - Dynamic :  (unable)      Ambulation/Gait Training: unable at this time     Pain:  Pain Scale 1: Numeric (0 - 10)  Pain Intensity 1: 0  Pain Location 1: Abdomen  Pain Orientation 1: Lateral;Right; Lower  Pain Description 1: Sore  Pain Intervention(s) 1: Nurse notified      Activity Tolerance: poor     After treatment:   [ ] Patient left in no apparent distress sitting up in chair  [X] Patient left in no apparent distress in bed  [X] Call bell left within reach  [X] Nursing notified  [X] Caregiver present  [X] Bed alarm activated      COMMUNICATION/COLLABORATION:   The patients plan of care was discussed with: Registered Nurse     Vitor Chen PTA   Time Calculation: 25 mins

## 2017-08-18 NOTE — PROGRESS NOTES
1930: Patient sitting in recliner. Patient is alert and can state name but drifts off during questions and is unable to state date or where he is. Vital sign are stable. Patient does not appear to be in pain. Will monitor. 0530: Patient wound care done as ordered. Abd. Incision painted with betadine. Area around suprapubic catheter is Beefy red and warm to the touch. Incision around the catheter appears to be wider than tubing that is inserted almost as if the incision is expanding.

## 2017-08-18 NOTE — PROGRESS NOTES
Failed attempt to meet with patient/family. PTOT at bedside.     Christelle Valentino RN CM  Ext [phone]

## 2017-08-18 NOTE — CARDIO/PULMONARY
CP Rehab Note: chart review    Patient admitted for bladder injury. S/p cystoscopy for hematuria/clot evacuation   Medical History: HTN, enlarged prostate  EF 45-50%, Echo 7/20/2017  Non Smoker     Per cardiology note: 8/17: Cont waxing/waning MS per family; stable cardiac status. Palliative working with family with decision making process and to listen to family concerns of care. See palliative note on 8/18/17. Palliative to follow up with family on Monday. Teaching deferred.

## 2017-08-18 NOTE — PROGRESS NOTES
Neurology Progress Note    Patient ID:  Duane  [id number]  80 y.o.  7/22/1928      CHIEF COMPLAINT: Confusion    Subjective:      Patient has complaints of being confused and encephalopathic. His MRI scan of the brain shows that the patient has multiple small and artery infarcts throughout the brain all consistent with hypotension and hypoxic injury and a cause of his encephalopathy. His EEG is also moderately slow indicating hypoxic ischemic encephalopathy also. I discussed this with the patient's daughter and explained why he is confused and she seems to understand and is satisfied with this, and I told her he should gradually get better but may never be normal.  He will need inpatient rehab. Because of his confusion seem to be most likely a mild hypoxic injury. Discussed with the daughter and she says he had no memory problems prior to this. We are waiting his EEG, carotid Dopplers and MRI. His CT scan was reviewed personally on the PACS system by myself and I agree with a report that is negative. Patient has no new neurological problems. Patient does complain of headache, fever, or meningismus, and is working with physical therapy and is having difficulty standing or walking.     Current Facility-Administered Medications   Medication Dose Route Frequency    [START ON 8/18/2017] enoxaparin (LOVENOX) injection 40 mg  40 mg SubCUTAneous Q24H    TPN ADULT - CENTRAL AA 5% D20% W/ CA + ELECTROLYTES   IntraVENous CONTINUOUS    LORazepam (ATIVAN) injection 1-2 mg  1-2 mg IntraVENous Q6H PRN    zinc oxide-cod liver oil (DESITIN) 40 % paste   Topical PRN    metoclopramide HCl (REGLAN) injection 5 mg  5 mg IntraVENous Q8H PRN    nitroglycerin (NITROBID) 2 % ointment 1 Inch  1 Inch Topical BID    levalbuterol (XOPENEX) nebulizer soln 0.63 mg/3 mL  0.63 mg Nebulization Q6H PRN    acetaminophen (TYLENOL) solution 975 mg  975 mg Oral Q12H PRN    Or    acetaminophen (OFIRMEV) infusion 1,000 mg  1,000 mg IntraVENous Q12H PRN    aspirin (ASA) suppository 300 mg  300 mg Rectal DAILY    labetalol (NORMODYNE;TRANDATE) injection 10 mg  10 mg IntraVENous Q6H    hydrALAZINE (APRESOLINE) 20 mg/mL injection 20 mg  20 mg IntraVENous Q4H PRN    labetalol (NORMODYNE;TRANDATE) injection 20 mg  20 mg IntraVENous Q4H PRN    fat emulsion 20% (LIPOSYN, INTRALIPID) infusion 250 mL  250 mL IntraVENous Q MON, WED & FRI    insulin lispro (HUMALOG) injection   SubCUTAneous Q6H    glucose chewable tablet 16 g  4 Tab Oral PRN    dextrose (D50W) injection syrg 12.5-25 g  12.5-25 g IntraVENous PRN    glucagon (GLUCAGEN) injection 1 mg  1 mg IntraMUSCular PRN    famotidine (PF) (PEPCID) 20 mg in sodium chloride 0.9 % 10 mL injection  20 mg IntraVENous Q12H    sodium chloride (NS) flush 5-10 mL  5-10 mL IntraVENous Q8H    ondansetron (ZOFRAN) injection 4 mg  4 mg IntraVENous Q4H PRN    0.9% sodium chloride infusion  25 mL/hr IntraVENous CONTINUOUS    sodium chloride (NS) flush 10 mL  10 mL InterCATHeter PRN    sodium chloride (NS) flush 10-40 mL  10-40 mL InterCATHeter Q8H        Past Medical History:   Diagnosis Date    Hypertension     Prostate enlargement        Past Surgical History:   Procedure Laterality Date    ABDOMEN SURGERY PROC UNLISTED      hernia repair    CARDIAC SURG PROCEDURE UNLIST         [unfilled]    Social History   Substance Use Topics    Smoking status: Never Smoker    Smokeless tobacco: Never Used    Alcohol use No       Current Facility-Administered Medications   Medication Dose Route Frequency Provider Last Rate Last Dose    [START ON 8/18/2017] enoxaparin (LOVENOX) injection 40 mg  40 mg SubCUTAneous Q24H Neil Blackwood MD        TPN ADULT - CENTRAL AA 5% D20% W/ CA + ELECTROLYTES   IntraVENous CONTINUOUS Hyunsun BLANCA Deluca NP 83 mL/hr at 08/17/17 1801      LORazepam (ATIVAN) injection 1-2 mg  1-2 mg IntraVENous Q6H PRN Rudi Gonzalez MD        zinc oxide-cod liver oil (DESITIN) 40 % paste Topical PRN Gulshan Couch MD        metoclopramide HCl (REGLAN) injection 5 mg  5 mg IntraVENous Q8H PRN Jorge Luis Loyola MD        nitroglycerin (NITROBID) 2 % ointment 1 Inch  1 Inch Topical BID Cecillia Bumpers, MD   1 Inch at 08/17/17 1737    levalbuterol (XOPENEX) nebulizer soln 0.63 mg/3 mL  0.63 mg Nebulization Q6H PRN Tyler Bain MD        acetaminophen (TYLENOL) solution 975 mg  975 mg Oral Q12H PRN Tyler Bain MD        Or    acetaminophen (OFIRMEV) infusion 1,000 mg  1,000 mg IntraVENous Q12H PRN Tyler Bain  mL/hr at 08/09/17 0905 1,000 mg at 08/09/17 0905    aspirin (ASA) suppository 300 mg  300 mg Rectal DAILY Romario Kwok MD   300 mg at 08/17/17 0928    labetalol (NORMODYNE;TRANDATE) injection 10 mg  10 mg IntraVENous Q6H Romario Kwok MD   10 mg at 08/17/17 1730    hydrALAZINE (APRESOLINE) 20 mg/mL injection 20 mg  20 mg IntraVENous Q4H PRN Ata Ugarte MD   10 mg at 08/12/17 2214    labetalol (NORMODYNE;TRANDATE) injection 20 mg  20 mg IntraVENous Q4H PRN Ata Ugarte MD   10 mg at 08/06/17 1943    fat emulsion 20% (LIPOSYN, INTRALIPID) infusion 250 mL  250 mL IntraVENous Q MON, WED & FRI Ata Ugarte MD   250 mL at 08/16/17 1837    insulin lispro (HUMALOG) injection   SubCUTAneous Q6H Ata Ugarte MD   2 Units at 08/17/17 1754    glucose chewable tablet 16 g  4 Tab Oral PRN Ata Ugarte MD        dextrose (D50W) injection syrg 12.5-25 g  12.5-25 g IntraVENous PRN Ata Ugarte MD        glucagon (GLUCAGEN) injection 1 mg  1 mg IntraMUSCular PRN Ata Ugarte MD        famotidine (PF) (PEPCID) 20 mg in sodium chloride 0.9 % 10 mL injection  20 mg IntraVENous Q12H Jorge Luis Loyola MD   20 mg at 08/17/17 0921    sodium chloride (NS) flush 5-10 mL  5-10 mL IntraVENous Q8H Izabela Stallworth MD   10 mL at 08/17/17 1512    ondansetron (ZOFRAN) injection 4 mg  4 mg IntraVENous Q4H PRN Izabela Stallworth MD        0.9% sodium chloride infusion  25 mL/hr IntraVENous Zeke De Oliveira MD 25 mL/hr at 08/09/17 0430 25 mL/hr at 08/09/17 0430    sodium chloride (NS) flush 10 mL  10 mL InterCATHeter RAIN Steve Patrick MD   20 mL at 08/17/17 1946    sodium chloride (NS) flush 10-40 mL  10-40 mL InterCATHeter Jodi Garcia MD   10 mL at 08/17/17 1202       Allergies   Allergen Reactions    Metoprolol Nausea and Vomiting       Review of Systems:    Review of systems not obtained due to patient factors.     Objective:      Objective:     Patient Vitals for the past 24 hrs:   BP Temp Pulse Resp SpO2 Weight   08/17/17 1508 157/77 98.3 °F (36.8 °C) 69 22 99 % -   08/17/17 1153 153/74 98.2 °F (36.8 °C) 69 18 98 % -   08/17/17 0910 (!) 158/91 98.1 °F (36.7 °C) 76 18 98 % -   08/17/17 0519 166/74 98.2 °F (36.8 °C) 76 18 95 % -   08/17/17 0517 - - - - - 205 lb 14.6 oz (93.4 kg)         Lab Review   Recent Results (from the past 24 hour(s))   METABOLIC PANEL, BASIC    Collection Time: 08/17/17  5:07 AM   Result Value Ref Range    Sodium 133 (L) 136 - 145 mmol/L    Potassium 4.1 3.5 - 5.1 mmol/L    Chloride 101 97 - 108 mmol/L    CO2 26 21 - 32 mmol/L    Anion gap 6 5 - 15 mmol/L    Glucose 142 (H) 65 - 100 mg/dL    BUN 22 (H) 6 - 20 MG/DL    Creatinine 0.75 0.70 - 1.30 MG/DL    BUN/Creatinine ratio 29 (H) 12 - 20      GFR est AA >60 >60 ml/min/1.73m2    GFR est non-AA >60 >60 ml/min/1.73m2    Calcium 8.2 (L) 8.5 - 10.1 MG/DL   GLUCOSE, POC    Collection Time: 08/17/17  5:14 AM   Result Value Ref Range    Glucose (POC) >600 (HH) 65 - 100 mg/dL    Performed by Kenia Quispe, POC    Collection Time: 08/17/17  5:16 AM   Result Value Ref Range    Glucose (POC) 148 (H) 65 - 100 mg/dL    Performed by Ban Loya    GLUCOSE, POC    Collection Time: 08/17/17 11:49 AM   Result Value Ref Range    Glucose (POC) 163 (H) 65 - 100 mg/dL    Performed by [id number] [place] [address], POC    Collection Time: 08/17/17  5:48 PM   Result Value Ref Range    Glucose (POC) 152 (H) 65 - 100 mg/dL Performed by Inderjit            Additional comments:I personally viewed and interpreted the patient's CT scan and labs        NEUROLOGICAL EXAM:    Appearance: The patient is well developed, well nourished, provides a incoherent history and is in no acute distress. Mental Status: Oriented to place and person and not his age, date of birth or the date the president, cognitive function and fund of knowledge is abnormal. Speech is fluent without aphasia or dysarthria. Mood and affect appropriate. Cranial Nerves:   Intact visual fields. Fundi are benign. MARILUZ, EOM's full, no nystagmus, no ptosis. Facial sensation is normal. Corneal reflexes are not tested. Facial movement is symmetric. Hearing is abnormal bilaterally. Palate is midline with normal sternocleidomastoid and trapezius muscles are normal. Tongue is midline. Neck without meningismus or bruits   Motor:  4/5 strength in upper and lower proximal and distal muscles. Normal bulk and tone. No fasciculations. Reflexes:   Deep tendon reflexes 1+/4 and symmetrical.  No babinski or clonus present   Sensory:   Normal to touch, pinprick and temperature and vibration decreased in both feet. DSS is intact   Gait:  Not testable gait. Tremor:   No tremor noted. Cerebellar:  No cerebellar signs present. Neurovascular:  Normal heart sounds and regular rhythm, peripheral pulses decreased, and no carotid bruits. Assessment:        Assessment:       ICD-10-CM ICD-9-CM    1. Hematuria R31.9 599.70    2. Urinary retention R33.9 788.20    3. Wound dehiscence, surgical, initial encounter T81.31XA 998.32    4. Altered mental status, unspecified R41.82 780.97    5. 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 Convulsive syncope R55 780.2      780.39    7. Disturbance of memory R41.3 780.93    8. Mild hypoxic ischemic encephalopathy (HIE) P91.61 768.71    9. Stenosis of both internal carotid arteries I65.23 433.10      433.30    10.  Delirium due to general medical condition F05 293.0    11. Oral phase dysphagia R13.11 787.21      Active Problems:    Bladder injury (7/28/2017)      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)      Altered mental status, unspecified (8/15/2017)      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)      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)      Convulsive syncope (8/15/2017)      Disturbance of memory (8/15/2017)      Delirium due to general medical condition (8/17/2017)      Oral phase dysphagia (8/17/2017)        Plan:     His MRI scan of the brain shows that the patient has multiple small and artery infarcts throughout the brain all consistent with hypotension and hypoxic injury and a cause of his encephalopathy. His EEG is also moderately slow indicating hypoxic ischemic encephalopathy also.   I discussed this with the patient's daughter and explained why he is confused and she seems to understand and is satisfied with this, and I told her he should gradually get better but may never be normal.  He will need inpatient rehab  Patient with altered mental status, most likely secondary to mild hypoxic injury when he had respiratory arrest is what I can figure out  We will check MRI scan to make sure he does not have multiple embolic phenomena or microvascular disease and check a carotid Doppler, and that was done today and that was okay  Discussed with the patient's daughter and the hospitalist today, complete workup as ordered,  We will follow as needed for now, call if we can help, he will probably need intensive rehab      Signed:  Arnaud De Santiago MD  8/17/2017  8:25 PM    Bijan Easton MD  [phone]

## 2017-08-18 NOTE — PROGRESS NOTES
Neurology Progress Note    Patient ID:  Ciarra Benz  [id number]  80 y.o.  7/22/1928      CHIEF COMPLAINT: Confusion    Subjective:      Patient has complaints of being confused and encephalopathic and drowsy. His MRI scan of the brain shows that the patient has multiple small and artery infarcts throughout the brain all consistent with hypotension and hypoxic injury and a cause of his encephalopathy. His EEG is also moderately slow indicating hypoxic ischemic encephalopathy also. I discussed this with the patient's daughter and explained why he is confused and she seems to understand and is satisfied with this, and I told her he should gradually get better but may never be normal.  He will need inpatient rehab. Because of his confusion seem to be most likely a mild hypoxic injury. Discussed with the daughter and she says he had no memory problems prior to this. His CT scan was reviewed personally on the PACS system by myself and I agree with a report that is negative. Patient has no new neurological problems. Patient does not complain of headache, fever, or meningismus, and is working with physical therapy and is having difficulty standing or walking.     Current Facility-Administered Medications   Medication Dose Route Frequency    diphenhydrAMINE (BENADRYL) injection 12.5 mg  12.5 mg IntraVENous ONCE    TPN ADULT - CENTRAL AA 5% D20% W/ CA + ELECTROLYTES   IntraVENous CONTINUOUS    enoxaparin (LOVENOX) injection 40 mg  40 mg SubCUTAneous Q24H    LORazepam (ATIVAN) injection 1-2 mg  1-2 mg IntraVENous Q6H PRN    zinc oxide-cod liver oil (DESITIN) 40 % paste   Topical PRN    metoclopramide HCl (REGLAN) injection 5 mg  5 mg IntraVENous Q8H PRN    nitroglycerin (NITROBID) 2 % ointment 1 Inch  1 Inch Topical BID    levalbuterol (XOPENEX) nebulizer soln 0.63 mg/3 mL  0.63 mg Nebulization Q6H PRN    acetaminophen (TYLENOL) solution 975 mg  975 mg Oral Q12H PRN    Or    acetaminophen (OFIRMEV) infusion 1,000 mg  1,000 mg IntraVENous Q12H PRN    aspirin (ASA) suppository 300 mg  300 mg Rectal DAILY    labetalol (NORMODYNE;TRANDATE) injection 10 mg  10 mg IntraVENous Q6H    hydrALAZINE (APRESOLINE) 20 mg/mL injection 20 mg  20 mg IntraVENous Q4H PRN    labetalol (NORMODYNE;TRANDATE) injection 20 mg  20 mg IntraVENous Q4H PRN    fat emulsion 20% (LIPOSYN, INTRALIPID) infusion 250 mL  250 mL IntraVENous Q MON, WED & FRI    insulin lispro (HUMALOG) injection   SubCUTAneous Q6H    glucose chewable tablet 16 g  4 Tab Oral PRN    dextrose (D50W) injection syrg 12.5-25 g  12.5-25 g IntraVENous PRN    glucagon (GLUCAGEN) injection 1 mg  1 mg IntraMUSCular PRN    famotidine (PF) (PEPCID) 20 mg in sodium chloride 0.9 % 10 mL injection  20 mg IntraVENous Q12H    sodium chloride (NS) flush 5-10 mL  5-10 mL IntraVENous Q8H    ondansetron (ZOFRAN) injection 4 mg  4 mg IntraVENous Q4H PRN    0.9% sodium chloride infusion  25 mL/hr IntraVENous CONTINUOUS    sodium chloride (NS) flush 10 mL  10 mL InterCATHeter PRN    sodium chloride (NS) flush 10-40 mL  10-40 mL InterCATHeter Q8H        Past Medical History:   Diagnosis Date    Hypertension     Prostate enlargement        Past Surgical History:   Procedure Laterality Date    ABDOMEN SURGERY PROC UNLISTED      hernia repair    CARDIAC SURG PROCEDURE UNLIST         [unfilled]    Social History   Substance Use Topics    Smoking status: Never Smoker    Smokeless tobacco: Never Used    Alcohol use No       Current Facility-Administered Medications   Medication Dose Route Frequency Provider Last Rate Last Dose    diphenhydrAMINE (BENADRYL) injection 12.5 mg  12.5 mg IntraVENous ONCE Lakeshia Deluca NP   Stopped at 08/18/17 1000    TPN ADULT - CENTRAL AA 5% D20% W/ CA + ELECTROLYTES   IntraVENous CONTINUOUS Lakeshia Deluca NP 83 mL/hr at 08/18/17 1388      enoxaparin (LOVENOX) injection 40 mg  40 mg SubCUTAneous Q24H Sammi Bamberger, MD   40 mg at 08/18/17 5687    LORazepam (ATIVAN) injection 1-2 mg  1-2 mg IntraVENous Q6H PRN Heriberto Goldmann, MD        zinc oxide-cod liver oil (DESITIN) 40 % paste   Topical PRN Chrissie Francois MD        metoclopramide HCl (REGLAN) injection 5 mg  5 mg IntraVENous Q8H PRN Jorge Luis Lechuga MD        nitroglycerin (NITROBID) 2 % ointment 1 Inch  1 Inch Topical BID Yahaira Rivera MD   1 Inch at 08/18/17 1756    levalbuterol (XOPENEX) nebulizer soln 0.63 mg/3 mL  0.63 mg Nebulization Q6H PRN Rene Back MD        acetaminophen (TYLENOL) solution 975 mg  975 mg Oral Q12H PRN Rene Back MD        Or    acetaminophen (OFIRMEV) infusion 1,000 mg  1,000 mg IntraVENous Q12H PRN Rene Back  mL/hr at 08/09/17 0905 1,000 mg at 08/09/17 0905    aspirin (ASA) suppository 300 mg  300 mg Rectal DAILY Romario Kwok MD   300 mg at 08/18/17 0928    labetalol (NORMODYNE;TRANDATE) injection 10 mg  10 mg IntraVENous Q6H Romario Kwok MD   10 mg at 08/18/17 1759    hydrALAZINE (APRESOLINE) 20 mg/mL injection 20 mg  20 mg IntraVENous Q4H PRN Madhu Adan MD   10 mg at 08/12/17 2214    labetalol (NORMODYNE;TRANDATE) injection 20 mg  20 mg IntraVENous Q4H PRN Madhu Adan MD   10 mg at 08/06/17 1943    fat emulsion 20% (LIPOSYN, INTRALIPID) infusion 250 mL  250 mL IntraVENous Q MON, WED & FRI Madhu Adan MD   250 mL at 08/16/17 1837    insulin lispro (HUMALOG) injection   SubCUTAneous Q6H Madhu Adan MD   3 Units at 08/18/17 1757    glucose chewable tablet 16 g  4 Tab Oral PRN Madhu Adan MD        dextrose (D50W) injection syrg 12.5-25 g  12.5-25 g IntraVENous PRN Madhu Adan MD        glucagon (GLUCAGEN) injection 1 mg  1 mg IntraMUSCular PRN Madhu Adan MD        famotidine (PF) (PEPCID) 20 mg in sodium chloride 0.9 % 10 mL injection  20 mg IntraVENous Q12H Jorge Luis Young MD   20 mg at 08/18/17 0900    sodium chloride (NS) flush 5-10 mL  5-10 mL IntraVENous Q8H Geetha Tijerina MD   10 mL at 08/18/17 0442    ondansetron (ZOFRAN) injection 4 mg  4 mg IntraVENous Q4H PRN Winston Tee MD        0.9% sodium chloride infusion  25 mL/hr IntraVENous CONTINUOUS Deanne Nevarez MD 25 mL/hr at 08/09/17 0430 25 mL/hr at 08/09/17 0430    sodium chloride (NS) flush 10 mL  10 mL InterCATHeter PRN Winston Tee MD   20 mL at 08/17/17 1946    sodium chloride (NS) flush 10-40 mL  10-40 mL InterCATHeter Sarah Ferreira MD   10 mL at 08/18/17 1306       Allergies   Allergen Reactions    Metoprolol Nausea and Vomiting       Review of Systems:    Review of systems not obtained due to patient factors.     Objective:      Objective:     Patient Vitals for the past 24 hrs:   BP Temp Pulse Resp SpO2   08/18/17 1540 179/72 98 °F (36.7 °C) 76 18 100 %   08/18/17 1102 165/70 98.1 °F (36.7 °C) 71 18 97 %   08/18/17 0728 168/64 99.3 °F (37.4 °C) 74 20 96 %   08/18/17 0522 125/55 - - - -   08/18/17 0442 (!) 178/159 99.2 °F (37.3 °C) 75 18 96 %   08/17/17 2337 153/82 99.3 °F (37.4 °C) 80 20 94 %   08/17/17 1930 159/60 99.3 °F (37.4 °C) 66 20 98 %         Lab Review   Recent Results (from the past 24 hour(s))   GLUCOSE, POC    Collection Time: 08/17/17 11:35 PM   Result Value Ref Range    Glucose (POC) 153 (H) 65 - 100 mg/dL    Performed by Olegario Willson    MAGNESIUM    Collection Time: 08/18/17  4:35 AM   Result Value Ref Range    Magnesium 2.0 1.6 - 2.4 mg/dL   PHOSPHORUS    Collection Time: 08/18/17  4:35 AM   Result Value Ref Range    Phosphorus 3.3 2.6 - 4.7 MG/DL   METABOLIC PANEL, BASIC    Collection Time: 08/18/17  4:35 AM   Result Value Ref Range    Sodium 133 (L) 136 - 145 mmol/L    Potassium 4.2 3.5 - 5.1 mmol/L    Chloride 102 97 - 108 mmol/L    CO2 26 21 - 32 mmol/L    Anion gap 5 5 - 15 mmol/L    Glucose 164 (H) 65 - 100 mg/dL    BUN 26 (H) 6 - 20 MG/DL    Creatinine 0.82 0.70 - 1.30 MG/DL    BUN/Creatinine ratio 32 (H) 12 - 20      GFR est AA >60 >60 ml/min/1.73m2    GFR est non-AA >60 >60 ml/min/1.73m2    Calcium 7.9 (L) 8.5 - 10.1 MG/DL   GLUCOSE, POC    Collection Time: 08/18/17  7:30 AM   Result Value Ref Range    Glucose (POC) 166 (H) 65 - 100 mg/dL    Performed by Alexander Mendez, POC    Collection Time: 08/18/17 11:58 AM   Result Value Ref Range    Glucose (POC) 146 (H) 65 - 100 mg/dL    Performed by Alexander Mendez, POC    Collection Time: 08/18/17  5:49 PM   Result Value Ref Range    Glucose (POC) 141 (H) 65 - 100 mg/dL    Performed by Benjamín Wilson            Additional comments:I personally viewed and interpreted the patient's CT scan and labs        NEUROLOGICAL EXAM:    Appearance: The patient is well developed, well nourished, provides a incoherent history and is in no acute distress. Mental Status: Oriented to place and person and not his age, date of birth or the date the president, cognitive function and fund of knowledge is abnormal. Speech is fluent without aphasia or dysarthria. Mood and affect appropriate. Cranial Nerves:   Intact visual fields. Fundi are benign. MARILUZ, EOM's full, no nystagmus, no ptosis. Facial sensation is normal. Corneal reflexes are not tested. Facial movement is symmetric. Hearing is abnormal bilaterally. Palate is midline with normal sternocleidomastoid and trapezius muscles are normal. Tongue is midline. Neck without meningismus or bruits   Motor:  4/5 strength in upper and lower proximal and distal muscles. Normal bulk and tone. No fasciculations. Reflexes:   Deep tendon reflexes 1+/4 and symmetrical.  No babinski or clonus present   Sensory:   Normal to touch, pinprick and temperature and vibration decreased in both feet. DSS is intact   Gait:  Not testable gait. Tremor:   No tremor noted. Cerebellar:  No cerebellar signs present. Neurovascular:  Normal heart sounds and regular rhythm, peripheral pulses decreased, and no carotid bruits. Assessment:        Assessment:       ICD-10-CM ICD-9-CM    1. Hematuria R31.9 599.70    2.  Urinary retention R33.9 788.20 3. Wound dehiscence, surgical, initial encounter T81.31XA 998.32    4. Altered mental status, unspecified R41.82 780.97    5. 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 Convulsive syncope R55 780.2      780.39    7. Disturbance of memory R41.3 780.93    8. Mild hypoxic ischemic encephalopathy (HIE) P91.61 768.71    9. Stenosis of both internal carotid arteries I65.23 433.10      433.30    10. Delirium due to general medical condition F05 293.0    11. Oral phase dysphagia R13.11 787.21    12. Physical deconditioning R53.81 799.3    13. Counseling regarding goals of care Z71.89 V65.49      Active Problems:    Bladder injury (7/28/2017)      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)      Altered mental status, unspecified (8/15/2017)      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)      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)      Convulsive syncope (8/15/2017)      Disturbance of memory (8/15/2017)      Delirium due to general medical condition (8/17/2017)      Oral phase dysphagia (8/17/2017)      Physical deconditioning (8/18/2017)      Counseling regarding goals of care (8/18/2017)        Plan:     His MRI scan of the brain shows that the patient has multiple small and artery infarcts throughout the brain all consistent with hypotension and hypoxic injury and a cause of his encephalopathy. His EEG is also moderately slow indicating hypoxic ischemic encephalopathy also.   I discussed this with the patient's daughter and explained why he is confused and she seems to understand and is satisfied with this, and I told her he should gradually get better but may never be normal.  He will need inpatient rehab  Patient with altered mental status, most likely secondary to mild hypoxic injury when he had respiratory arrest is what I can figure out  Carotid Dopplers are okay  Discussed with the patient's daughter and the hospitalist today, and I told her he most likely will not get completely back to normal but should improve with time and therapy, but and result is hard to predict and he may have significant deficits  Discussed with palliative care also  He is on no medications that cause his sedation so I think it most likely is secondary to his ischemic hypoxic injury  At his age I am reluctant to use low-dose Ritalin  We will follow as needed for now, call if we can help, he will probably need intensive rehab      Signed:  Zari John MD  8/18/2017  8:25 PM    Jorge Luis Currie MD  [phone]

## 2017-08-18 NOTE — PROGRESS NOTES
UOP clear. Patient well from  standpoint. Nutrition per Gen Surg and swallow eval.  GTube? No further  input. Keep SP and wick intact.

## 2017-08-18 NOTE — CONSULTS
Palliative Medicine Consult  Lynn: [phone] ([phone])    Patient Name: Rodriguez Montaño  YOB: 1928    Date of Initial Consult: 8/17/17  Reason for Consult: care decisions  Requesting Provider: Maryuri Art   Primary Care Physician: Curt George MD      SUMMARY:   Rodriguez Montaño is a 80 y.o. with a past history of s/p hospital admission 7/20/2017 at which time he had cardiac catheterization performed with stent placement and PMHx of HTN / prostate enlargement , who was admitted on 7/27/2017 from home with a diagnosis of hematuria, urinary retention. Current medical issues leading to Palliative Medicine involvement include: care decisions. Psychosocial: lives with wife in 2 story house, totally independent with all ADLs and IADLs, rode stationary bike q. 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 PALLIATIVE DIAGNOSES:   1. Pain: post op  2. Post-op respiratory failure, slow to arouse  3. UTI  4. Hematuria-resolved  5. S/p removal of bladder stone and repair of perforated bladder  6. Anasarca   7. Delirium: thought to be hospital related, head CT 8/11 shows no acute intracranial abnormality  8. Encephalopathy: per neuro, may have hypoxic component  9. Dysphagia: ? PEG  10. Deconditioning   11. Care decisions   PLAN:   1. Met with pt's wife, 2 dtrs, RONY: first 30 min spent with family listening to their complaints about the care pt has received from this hospital (referred to patient advocacy). We discussed pt's lethargy: daughter states she spoke with Bon Lucia, who told her that pt will have some neurological deficits, but the strokes aren't that large, and he doubts the cause of lethargy. We talked about other causes, including medications (none at this time), altered sleep schedule (is he awake all night?), that pt is 79 y/o and just had multiple major surgeries and exposure to anesthesia, and lack of stimulation during the day.  Counseled family that if the lethargy is due to the last 3 possibilities, they could help him by being present as much as possible and stimulated during the day. Dysphagia: discussed this could be temporary or permanent depending on the cause. Discussed he has been getting nutrition via TPN, however, that is only a short treatment; if he is unable to take in enough orally to maintain nutrition and hydration, then we need to consider comfort care or PEG. We discussed that at this time, pt is not safe to take anything in orally, however, this may or may not get better with time. Deconditioning: long discussion with family about pt's decline over the past 3 weeks, that this will not improve until pt is awake enough to be safe moving, and that the longer he is immobile the weaker he will become, and the weaker he gets, the least likely he will be able to have a good recovery given his age. 1. PLAN:  1. Family wants this weekend to work with pt, they want to see if they can help him overcome some of the lethargy. 2. Will meet with them again Monday to decide if they want to proceed with a PEG. Will discuss code status at that time as well.   2.   3. Initial consult note routed to primary continuity provider  4. Communicated plan of care with: Palliative IDT, RN       GOALS OF CARE / TREATMENT PREFERENCES:   [====Goals of Care====]  GOALS OF CARE:  Patient / health care proxy stated goals:     1. Family will work with pt this weekend  2.  Re-evaluate on Monday      TREATMENT PREFERENCES:   Code Status: Full Code    Advance Care Planning:  Advance Care Planning 7/29/2017   Patient's Healthcare Decision Maker is: Legal Next of Jose Hermosillo   Primary Decision Maker Name Mari Martínez   Primary Decision Maker Phone Number [phone]   Primary Decision Maker Relationship to Patient Spouse   Confirm Advance Directive Yes, not on file       Other:    The palliative care team has discussed with patient / health care proxy about goals of care / treatment preferences for patient.  [====Goals of Care====]         HISTORY:     History obtained from: chart    CHIEF COMPLAINT: hematuria    HPI/SUBJECTIVE:    The patient is:   [] Verbal and participatory  [x] Non-participatory due to:     BIBA with c/o progressively worsening hematuria and clotting wick; Pt had cardiac stents placed 7/24/17,(was discharged the day before this visit) along with a new wick, however, wick has been clotting off, causing pt bladder pain. Pt has been drinking a lot of fluid, but very little urine output; daughter shared that pt's urine was a edwin color with improvement of sxs, however, today sxs have been getting worse. Urologist started pt on abx today after finding bacteria in his urine. In the ED, Dr. Wero Connell attempted to unblock the catheter without long-lasting success, therefore, admitted pt for OR surgical intervention. 7/28: In the OR, a large bladder stone was removed and a posterior bladder perforation was repaired. Post-operatively, pt has had progressively worsening abdominal pain and distention, and abdominal CT showed extensive hydropneumoperitoneum, extensive retroperitoneal emphysema, especially on the left, and mediastinal emphysema. 7/29: exploratory lap for diffuse pneumatosis and gastric distention without evidence of bowel perforation. Extubated, afib with RVR, post-op ileus  7/31. 8/2: PICC placement, NGT to sxn for ileus. Head CT neg (pt still lethargic). Anasarca. 8/5: confused, pulling at lines. Following coughing episode, pt's wound dehisced with small bowel protruding. 8/6: required short course of vasopressor. Extubated 8/7. transferred from CCU to floor 8/10.  8/11: KUB neg for ileus, NGT removed. 8/12: RRT called due to slurred speech, inability to swallow liquids, not following commands, worsening lethargy; no opioids or sedatives in over 24 hours.     8/18: initially pt was very lethargic, would not open his eyes or engage in conversation, however, after wife and daughters worked with him he did wake up, able to name family members and have a short conversation. Clinical Pain Assessment (nonverbal scale for severity on nonverbal patients): pt denies  [++++ Clinical Pain Assessment++++]  [++++Pain Severity++++]: Pain: 0  [++++Pain Character++++]:   [++++Pain Duration++++]:   [++++Pain Effect++++]:   [++++Pain Factors++++]:   [++++Pain Frequency++++]:   [++++Pain Location++++]:   [++++ Clinical Pain Assessment++++]  Duration: for how long has pt been experiencing pain (e.g., 2 days, 1 month, years)  Frequency: how often pain is an issue (e.g., several times per day, once every few days, constant)     FUNCTIONAL ASSESSMENT:     Palliative Performance Scale (PPS):  PPS: 30       PSYCHOSOCIAL/SPIRITUAL SCREENING:     Advance Care Planning:  Advance Care Planning 7/29/2017   Patient's Healthcare Decision Maker is: Legal Next crystal Garcia 69   Primary Decision Maker Name Marck Washington   Primary Decision Maker Phone Number [phone]   Primary Decision Maker Relationship to Patient Spouse   Confirm Advance Directive Yes, not on file        Any spiritual / Samaritan concerns:  [] Yes /  [x] No    Caregiver Burnout:  [] Yes /  [] No /  [x] No Caregiver Present      Anticipatory grief assessment:   [x] Normal  / [] Maladaptive       ESAS Anxiety: Anxiety: 0    ESAS Depression: Depression: 3        REVIEW OF SYSTEMS:     Positive and pertinent negative findings in ROS are noted above in HPI. The following systems were [x] reviewed / [] unable to be reviewed as noted in HPI  Other findings are noted below. Systems: constitutional, ears/nose/mouth/throat, respiratory, gastrointestinal, genitourinary, musculoskeletal, integumentary, neurologic, psychiatric, endocrine. Positive findings noted below.   Modified ESAS Completed by: provider   Fatigue: 8 Drowsiness: 3   Depression: 3 Pain: 0   Anxiety: 0 Nausea: 0   Anorexia: 4 Dyspnea: 0     Constipation: No     Stool Occurrence(s): 1        PHYSICAL EXAM:     From RN flowsheet:  Wt Readings from Last 3 Encounters:   08/17/17 205 lb 14.6 oz (93.4 kg)   08/15/17 207 lb 10.8 oz (94.2 kg)   07/26/17 211 lb 3.2 oz (95.8 kg)     Blood pressure 168/64, pulse 74, temperature 99.3 °F (37.4 °C), resp. rate 20, height 5' 10\" (1.778 m), weight 205 lb 14.6 oz (93.4 kg), SpO2 96 %. Pain Scale 1: Numeric (0 - 10)  Pain Intensity 1: 0  Pain Onset 1:  (pain with coughing, movement)  Pain Location 1: Abdomen  Pain Orientation 1: Mid  Pain Description 1: Sore  Pain Intervention(s) 1: Medication (see MAR)  Last bowel movement, if known:     Constitutional: WN, WD, NAD, in bed asleep  Eyes: pupils equal, anicteric  ENMT: no nasal discharge, moist mucous membranes  Cardiovascular: regular rhythm, distal pulses intact  Respiratory: breathing not labored, symmetric  Gastrointestinal: soft non-tender, +bowel sounds  Musculoskeletal: no deformity, no tenderness to palpation  Skin: warm, dry  Neurologic: following simple commands, moving all extremities   Psychiatric: dull affect           HISTORY:     Active Problems:    Bladder injury (7/28/2017)      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 No pertinent family history. History reviewed, no pertinent family history.   Social History   Substance Use Topics    Smoking status: Never Smoker    Smokeless tobacco: Never Used    Alcohol use No     Allergies   Allergen Reactions    Metoprolol Nausea and Vomiting      Current Facility-Administered Medications   Medication Dose Route Frequency    diphenhydrAMINE (BENADRYL) injection 12.5 mg  12.5 mg IntraVENous ONCE    enoxaparin (LOVENOX) injection 40 mg  40 mg SubCUTAneous Q24H    TPN ADULT - CENTRAL AA 5% D20% W/ CA + ELECTROLYTES   IntraVENous CONTINUOUS    LORazepam (ATIVAN) injection 1-2 mg  1-2 mg IntraVENous Q6H PRN    zinc oxide-cod liver oil (DESITIN) 40 % paste   Topical PRN    metoclopramide HCl (REGLAN) injection 5 mg  5 mg IntraVENous Q8H PRN    nitroglycerin (NITROBID) 2 % ointment 1 Inch  1 Inch Topical BID    levalbuterol (XOPENEX) nebulizer soln 0.63 mg/3 mL  0.63 mg Nebulization Q6H PRN    acetaminophen (TYLENOL) solution 975 mg  975 mg Oral Q12H PRN    Or    acetaminophen (OFIRMEV) infusion 1,000 mg  1,000 mg IntraVENous Q12H PRN    aspirin (ASA) suppository 300 mg  300 mg Rectal DAILY    labetalol (NORMODYNE;TRANDATE) injection 10 mg  10 mg IntraVENous Q6H    hydrALAZINE (APRESOLINE) 20 mg/mL injection 20 mg  20 mg IntraVENous Q4H PRN    labetalol (NORMODYNE;TRANDATE) injection 20 mg  20 mg IntraVENous Q4H PRN    fat emulsion 20% (LIPOSYN, INTRALIPID) infusion 250 mL  250 mL IntraVENous Q MON, WED & FRI    insulin lispro (HUMALOG) injection   SubCUTAneous Q6H    glucose chewable tablet 16 g  4 Tab Oral PRN    dextrose (D50W) injection syrg 12.5-25 g  12.5-25 g IntraVENous PRN    glucagon (GLUCAGEN) injection 1 mg  1 mg IntraMUSCular PRN    famotidine (PF) (PEPCID) 20 mg in sodium chloride 0.9 % 10 mL injection  20 mg IntraVENous Q12H    sodium chloride (NS) flush 5-10 mL  5-10 mL IntraVENous Q8H    ondansetron (ZOFRAN) injection 4 mg  4 mg IntraVENous Q4H PRN    0.9% sodium chloride infusion  25 mL/hr IntraVENous CONTINUOUS    sodium chloride (NS) flush 10 mL  10 mL InterCATHeter PRN    sodium chloride (NS) flush 10-40 mL 10-40 mL InterCATHeter Q8H          LAB AND IMAGING FINDINGS:     Lab Results   Component Value Date/Time    WBC 7.7 08/16/2017 03:04 AM    HGB 10.2 08/16/2017 03:04 AM    PLATELET 301 26/44/7149 03:04 AM     Lab Results   Component Value Date/Time    Sodium 133 08/18/2017 04:35 AM    Potassium 4.2 08/18/2017 04:35 AM    Chloride 102 08/18/2017 04:35 AM    CO2 26 08/18/2017 04:35 AM    BUN 26 08/18/2017 04:35 AM    Creatinine 0.82 08/18/2017 04:35 AM    Calcium 7.9 08/18/2017 04:35 AM    Magnesium 2.0 08/18/2017 04:35 AM    Phosphorus 3.3 08/18/2017 04:35 AM      Lab Results   Component Value Date/Time    AST (SGOT) 70 08/13/2017 05:28 AM    Alk. phosphatase 92 08/13/2017 05:28 AM    Protein, total 6.7 08/13/2017 05:28 AM    Albumin 2.0 08/13/2017 05:28 AM    Globulin 4.7 08/13/2017 05:28 AM     No results found for: INR, PTMR, PTP, PT1, PT2, APTT   No results found for: IRON, FE, TIBC, IBCT, PSAT, FERR   Lab Results   Component Value Date/Time    pH 7.49 08/07/2017 08:25 AM    PCO2 38 08/07/2017 08:25 AM    PO2 86 08/07/2017 08:25 AM     No components found for: [place]   Lab Results   Component Value Date/Time     03/26/2017 08:30 AM    CK - MB 2.7 03/26/2017 08:30 AM                Total time: 55 min  Counseling / coordination time, spent as noted above: 45 min  > 50% counseling / coordination?: yes    Prolonged service was provided for  []30 min   []75 min in face to face time in the presence of the patient, spent as noted above. Time Start:   Time End:   Note: this can only be billed with 82674 (initial) or 09379 (follow up). If multiple start / stop times, list each separately.

## 2017-08-18 NOTE — PROGRESS NOTES
Surgery      Speech recommendation in NPO it appears. Will defer to primary care and palliative care regarding REG, if desired should consult GI. Nothing acute issues per Surg status    Remove every other skin staple above lower retention suture      Kalyn Ford.  Joyce Lyons MD, [facility]

## 2017-08-18 NOTE — PROCEDURES
[facility] [phone]   EEG       Name:  Katina Saleh   MR#:  [id number]   :  1928   Account #:  [de-identified]    Date of Procedure:  2017   Date of Adm:  2017       DATE OF SERVICE: 2017    CLINICAL INDICATION: The patient is an 72-year-old male with   altered mental status, encephalopathy. The patient with confusion. EEG to rule out seizures, rule out cortical abnormality, rule out   epilepsy. ELECTROENCEPHALOGRAM CLASSIFICATION: Dysrhythmia grade   2, delta grade 1, generalized. DESCRIPTION OF THE RECORD: The background of this rhythm   contains a posteriorly located occipital alpha rhythm of 8 to 9 Hz, that   is somewhat poorly formed, but does seem to attenuate some with eye   opening. Throughout the recording, the main feature was a generalized   increase in some 2 to 6 Hz activity seen, but no clear areas of focal   slowing nor spike or spike-and-wave discharges were seen. The   patient probably enter brief states of sleep, with K-complexes and   sleep spindles seen in the central head regions. Photic stimulation   produced little change in the background recording. Hyperventilation   was not performed. INTERPRETATION: This is a moderately abnormal   electroencephalogram due to the widespread and generalized slow-  wave activity seen, most consistent with a diffuse encephalopathy of   toxic, metabolic or degenerative type. No clear focal process or   epileptiform activity was seen. Clinical correlation recommended.         MD Osbaldo Dupont Brewster / Abner Castro   D:  2017   20:40   T:  2017   13:21   Job #:  [id number]

## 2017-08-18 NOTE — PROGRESS NOTES
PCU SHIFT NURSING NOTE      Bedside shift change report given to [facility] RN (oncoming nurse) by Merry Bumpers (offgoing nurse). Report included the following information SBAR, Kardex and MAR. Shift Summary: 2797 0568- Palliative care into round with family for meeting. 9835- Dr. Merrill Cid into round ans assess suprapubic catheter. No new orders noted at time  1432- PT into work with patient. Family at bedside. 1945-Bedside shift change report given to Merry Bumpers (oncoming nurse) by Enrico Bowser RN (offgoing nurse). Report included the following information SBAR and Kardex. Admission Date 7/27/2017   Admission Diagnosis bladder clots  Bladder injury  perperated viscous  wound dehiscence   Consults IP CONSULT TO UROLOGY  IP CONSULT TO GASTROENTEROLOGY  IP CONSULT TO NEUROLOGY  IP CONSULT TO PALLIATIVE CARE - PROVIDER  IP CONSULT TO CARDIOLOGY        Consults   []PT   []OT   []Speech   []Case Management      [] Palliative      Cardiac Monitoring Order   []Yes   []No     IV drips   []Yes    Drip:                            Dose:  Drip:                            Dose:  Drip:                            Dose:   []No     GI Prophylaxis   []Yes   []No         DVT Prophylaxis   SCDs:  Sequential Compression Device: Bilateral          Ash stockings:  Graduated Compression Stockings: Bilateral      [] Medication   []Contraindicated   []None      Activity Level Activity Level: Recliner     Activity Assistance: Complete care   Purposeful Rounding every 1-2 hour? []Yes   Mcdonald Score  Total Score: 3   Bed Alarm (If score 3 or >)   []Yes   [] Refused (See signed refusal form in chart)   Kong Score  Kong Score: 16   Kong Score (if score 14 or less)   []PMT consult   []Wound Care consult      []Specialty bed   [] Nutrition consult          Needs prior to discharge:   Home O2 required:    []Yes   []No    If yes, how much O2 required?     Other:    Last Bowel Movement: Last Bowel Movement Date: 08/15/17 Influenza Vaccine Received Flu Vaccine for Current Season (usually Sept-March): Not Flu Season        Pneumonia Vaccine           Diet Active Orders   Diet    DIET NPO      LDAs         PICC Double Lumen 07/86/34 Basilic;Left (Active)   Central Line Being Utilized Yes 8/17/2017 11:37 PM   Criteria for Appropriate Use Total parenteral nutrition 8/17/2017 11:37 PM   Site Assessment Clean, dry, & intact 8/17/2017 11:37 PM   Phlebitis Assessment 0 8/17/2017 11:37 PM   Infiltration Assessment 0 8/17/2017 11:37 PM   Arm Circumference (cm) 33 cm 8/2/2017 10:47 AM   Date of Last Dressing Change 08/17/17 8/17/2017 11:37 PM   Dressing Status Clean, dry, & intact 8/17/2017 11:37 PM   Action Taken Dressing changed 8/17/2017  5:08 PM   External Catheter Length (cm) 0 centimeters 8/17/2017 11:37 PM   Dressing Type Disk with Chlorhexadine gluconate (CHG) 8/17/2017 11:37 PM   Hub Color/Line Status Purple; Infusing 8/17/2017 11:37 PM   Positive Blood Return (Site #1) Yes 8/17/2017 11:37 PM   Hub Color/Line Status Red;Flushed 8/17/2017 11:37 PM   Positive Blood Return (Site #2) Yes 8/17/2017 11:37 PM   Alcohol Cap Used Yes 8/17/2017  7:30 PM                            Urinary Catheter [REMOVED] Urinary Catheter 07/27/17 2- way-Criteria for Appropriate Use: Obstruction/retention  Urinary Catheter 07/28/17 3- way; [place]-Criteria for Appropriate Use: Surgical procedure  Urinary Catheter 07/28/17 Other (Comment)-Criteria for Appropriate Use: Surgical procedure  [REMOVED] Urinary Catheter 07/26/17 2- way-Criteria for Appropriate Use: Obstruction/retention    Intake & Output   Date 08/17/17 0700 - 08/18/17 0659 08/18/17 0700 - 08/19/17 0659   Shift 4976-7167 7822-3311 24 Hour Total 2248-1439 4869-8280 24 Hour Total   I  N  T  A  K  E   I.V.  (mL/kg/hr) 434.4  (0.4) 944.8  (0.8) 1379.2  (0.6) 471.7  471.7      Volume (acetaminophen (OFIRMEV) infusion 1,000 mg) 0  0         Volume (TPN ADULT - CENTRAL AA 5% D20% W/ CA + ELECTROLYTES) 434.4 434.4         Volume (TPN ADULT - CENTRAL AA 5% D20% W/ CA + ELECTROLYTES)  944.8 944.8 471.7  471.7    Shift Total  (mL/kg) 434.4  (4.7) 944.8  (10.1) 1379.2  (14.8) 471.7  (5.1)  471.7  (5.1)   O  U  T  P  U  T   Urine  (mL/kg/hr) 925  (0.8) 600  (0.5) 1525  (0.7) 950  950      Urine Output (mL) (Urinary Catheter 07/28/17 3- way; [place]) 225 350 575 800  800      Urine Output (mL) (Urinary Catheter 07/28/17 Other (Comment)) 700 250 950 150  150    Shift Total  (mL/kg) 925  (9.9) 600  (6.4) 1525  (16.3) 950  (10.2)  950  (10.2)   NET -490.6 344.8 -145.8 -478.3  -478. 3   Weight (kg) 93.4 93.4 93.4 93.4 93.4 93.4         Readmission Risk Assessment Tool Score Medium Risk            17       Total Score        3 Has Seen PCP in Last 6 Months (Yes=3, No=0)    2 . Living with Significant Other. Assisted Living. LTAC. SNF. or   Rehab    3 Patient Length of Stay (>5 days = 3)    4 IP Visits Last 12 Months (1-3=4, 4=9, >4=11)    5 Pt.  Coverage (Medicare=5 , Medicaid, or Self-Pay=4)        Criteria that do not apply:    Charlson Comorbidity Score (Age + Comorbid Conditions)       Expected Length of Stay 5d 12h   Actual Length of Stay 21

## 2017-08-19 NOTE — PROGRESS NOTES
1930: Bedside report received from [address] [place] [address]. Patient running a fever tonight. Daughter at bedside. 2000: Dr. Maria Luisa Travis made aware of fever and order received for blood cultures, urine cultures from wick and suprapubic catheter, and a lactic acid.

## 2017-08-19 NOTE — PROGRESS NOTES
Hospitalist Progress Note    NAME: Schuyler Muller   :  1928   MRN:  [id number]       Interim Hospital Summary: 80 y.o. male whom presented on 2017 with      Assessment / Plan:  Encephalopathy   Hypoxic Ischemia  - MRI of brain:  multiple punctate foci of acute infarction in the  bilateral corona radiata and centrum semiovale. Background of mild chronic  microvascular ischemic disease. The result was discussed with the family by Dr. Stacy Eli  - MRI findings consistent with hypoxic injury and multiple small infarcts are the cause of encephalopathy and confusion. 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 Age-related volume loss unchanged  - ammonia 33, B12 1046, TSH 3.7  - neurology will follow as need basis ()    UTI  - U/A done due to febrile episode which was (+) for moderate leukocyte with 1+ bacteria. Started on Rocephin. The sample also revealed yeast, but we would like to wait on urine cx before adding antifungal medication. Postop respiratory failure s/p extubation , now on RA with O2 Sat 98%      S/P removal of bladder stone and repair of perforated bladder ()  - Cystoscopy with attempted clot evacuation with subsequent exploratory laparotomy with bladder stone retrieval and repair of large posterior bladder perforation on   - urology following; recommend to apply H2O2 to suprapubic site with Q tip daily.      S/p Exp Lap () due to perforate viscus; diffuse gastric distention without bowel perforation   S/p Exp lap with closure for dehiscence with retention sutures ()  - general surgery following.   Dr. Johanna Puentes recommended to consult GI if and when the patient requires alternative tube feeding.      Nutritional support  Difficulty with feeding  - General surgery recommended to consult GI for alternative tube feeding is needed  - SLP following; keep NPO, ice chips ok per nursing when pt is awake  - continue with TPN; discussed with the family about alternate tube feeding. Family would like to wait until Monday to think about the option. Family would like to work on extra stimulation and encourage for the pt to eat.       Shock resolved  LINDSEY Resolved  Pneumothorax due to dissection of air from retroperitoneum cephalad, different physiology or behavior of typical pneumothorax -conservative management      CAD, PAF with RVR  HTN  - off cardizem gtt  - restart plavix when able to take PO, okay with surgery   - continue with labetalol and nitrobid      Anemia, post op s/p PRBC  - H/h 10.2/29. 5      UTI with enterococcus,  Completed ampicillin course  Electrolyte disturbance replete as indicated, on tpn     Pruritus (resolved)         DVT prophylaxis: Lovenox      Code status: FULL code (Family would like to get some more answers before they talk about code status)          Recommended Disposition: SNF; max assist  Patient may transition to rehab or SNF once clear by the primary team (MIKE) and general surgery team.         Subjective:     Chief Complaint / Reason for Physician Visit  Pt opened eyes and made eye contact. Not following commends. Discussed with RN events overnight. Review of Systems:  Symptom Y/N Comments  Symptom Y/N Comments   Fever/Chills    Chest Pain     Poor Appetite    Edema     Cough    Abdominal Pain     Sputum    Joint Pain     SOB/CLARKE    Pruritis/Rash     Nausea/vomit    Tolerating PT/OT     Diarrhea    Tolerating Diet     Constipation    Other       Could NOT obtain due to:      Objective:     VITALS:   Last 24hrs VS reviewed since prior progress note.  Most recent are:  Patient Vitals for the past 24 hrs:   Temp Pulse Resp BP SpO2   08/19/17 0711 96.6 °F (35.9 °C) 79 20 155/82 96 %   08/19/17 0308 97.9 °F (36.6 °C) 77 18 154/81 98 %   08/18/17 2322 98.2 °F (36.8 °C) 77 18 160/65 99 %   08/18/17 2025 100.3 °F (37.9 °C) - - - -   08/18/17 1936 (!) 100.5 °F (38.1 °C) 74 18 154/71 99 %   08/18/17 1540 98 °F (36.7 °C) 76 18 179/72 100 %   08/18/17 1102 98.1 °F (36.7 °C) 71 18 165/70 97 %       Intake/Output Summary (Last 24 hours) at 08/19/17 1037  Last data filed at 08/19/17 0412   Gross per 24 hour   Intake          1030.59 ml   Output             2200 ml   Net         -1169.41 ml        PHYSICAL EXAM:  General: WD, WN. Open eyes to tactile and voice stimuli. cooperative, no acute distress    EENT:  EOMI. Anicteric sclerae. Dry mucous membrane  Resp:  CTA in apex with decreased breath sounds at bases, no wheezing or rales. No accessory muscle use  CV:  Regular  rhythm,  No edema  GI:  Soft, Non distended, Non tender.  +Bowel sounds  Neurologic:  Open eyes to tactile stimuli, not following any commends   Psych:   Poor insight. Not anxious nor agitated  Skin:  Erythema around SP site. No jaundice, dry skin    Reviewed most current lab test results and cultures  YES  Reviewed most current radiology test results   YES  Review and summation of old records today    NO  Reviewed patient's current orders and MAR    YES  PMH/SH reviewed - no change compared to H&P  ________________________________________________________________________  Care Plan discussed with:    Comments   Patient y    Family  y    RN y    Care Manager     Consultant                        Multidiciplinary team rounds were held today with , nursing, pharmacist and clinical coordinator. Patient's plan of care was discussed; medications were reviewed and discharge planning was addressed.      ________________________________________________________________________  Total NON critical care TIME: 30   Minutes    Total CRITICAL CARE TIME Spent:   Minutes non procedure based      Comments   >50% of visit spent in counseling and coordination of care     ________________________________________________________________________  Liz Lopez NP Procedures: see electronic medical records for all procedures/Xrays and details which were not copied into this note but were reviewed prior to creation of Plan. LABS:  I reviewed today's most current labs and imaging studies.   Pertinent labs include:  Recent Labs      08/19/17 0417   WBC  7.1   HGB  9.9*   HCT  30.2*   PLT  339     Recent Labs      08/19/17   0417  08/18/17   0435  08/17/17   0507   NA  134*  133*  133*   K  4.1  4.2  4.1   CL  101  102  101   CO2  26  26  26   GLU  139*  164*  142*   BUN  23*  26*  22*   CREA  0.85  0.82  0.75   CA  7.8*  7.9*  8.2*   MG   --   2.0   --    PHOS   --   3.3   --        Signed: )Lakeshia Jimenez, NP

## 2017-08-19 NOTE — PROGRESS NOTES
Progress Note    Patient: David Lopez MRN: [id number]  SSN: [id number]    YOB: 1928  Age: 80 y.o. Sex: male        ADMITTED:  2017 to Ángel Mauricio MD by Ángel Mauricio MD for bladder clots  Bladder injury  perperated viscous  wound dehiscence   POD #  14 Days Post-Op Procedure(s):  LAPAROTOMY EXPLORATORY, wound closure for dehiscence         David Lopez is following multiple procedures and had fever yesterday. .    Vitals:  Temp (24hrs), Av.5 °F (36.9 °C), Min:96.6 °F (35.9 °C), Max:100.5 °F (38.1 °C)  Blood pressure 155/82, pulse 79, temperature 96.6 °F (35.9 °C), resp. rate 20, height 5' 10\" (1.778 m), weight 92 kg (202 lb 13.2 oz), SpO2 96 %. I&O's:   1901 -  0700  In: 1975.4 [I.V.:1975.4]  Out: 2800 [Urine:2800]       Exam:   Abd: soft but lower wound and SP site erythematous, no abscess. Labs:  CBC   Lab Results   Component Value Date/Time    WBC 7.1 2017 04:17 AM    WBC 7.7 2017 03:04 AM    HCT 30.2 2017 04:17 AM    HCT 29.9 2017 03:04 AM    PLATELET 370  04:17 AM   BMP   Lab Results   Component Value Date/Time    Sodium 134 2017 04:17 AM    Potassium 4.1 2017 04:17 AM    Chloride 101 2017 04:17 AM    CO2 26 2017 04:17 AM    BUN 23 2017 04:17 AM    Creatinine 0.85 2017 04:17 AM    Creatinine 0.82 2017 04:35 AM    Glucose 139 2017 04:17 AM    Calcium 7.8 2017 04:17 AM    Magnesium 2.0 2017 04:35 AM    Phosphorus 3.3 2017 04:35 AM      Cultures:      Imaging:       Assessment/Plan:     1. Long postop course, has wound issues, followed by Gen Surg. Will apply H2O2 to SP site with q tip. On antibiotics.     Signed By: Marlen Kirk MD - 2017

## 2017-08-19 NOTE — PROGRESS NOTES
PCU SHIFT NURSING NOTE      Bedside shift change report given to [facility] RN (oncoming nurse) by Ethan Celestin RN (offgoing nurse). Report included the following information SBAR, Kardex, Intake/Output and MAR. Shift Summary: 7259  0202- Dr. Sarah Velazquez from Urology into round with patient. Showed MD incision sites and suprapubic site. New  Wound care orders noted. 4609- Bath complete. Suprapubic cath clean with peroxide, wick care complete. Abdominal wound cleaned per orders. Patient tolerated well. Hyunsun- NP into round updated on events. 1000- Family at bedside. Updated on events. Questions answered. 1128- Vital stable. Patient up to chair via kandace lift. Family at bedside. 26- Dr. Ramos Early into round. No new orders noted. 1536- Reassessment complete. Patient up in recliner. Repositioned for comfort. Mouth care complete. Noted temp 100.1. IV tylenol given. Will monitor. Patient awake mouth care complete. Attempted to offer patient ice chip. Patient refused by closing mouth and shaking his head. Will re attempt later. Chair alarm, call bell in reach. 1658- Patient able to stand and pivot back to bed with max assist. Suprapubic cath site clean with peroxide, patient tolerated well. Temp now 97.8. Patient repositioned for comfort. 1836- Patient awake, able to eat a couple of ice chips without complications. Offered more but patient refused. Patient calm watching T.V. Will continue to monitor. 1915-Bedside shift change report given to Doreen RN (oncoming nurse) by [facility] RN (offgoing nurse). Report included the following information SBAR, Kardex, Intake/Output, MAR and Recent Results.      Admission Date 7/27/2017   Admission Diagnosis bladder clots  Bladder injury  perperated viscous  wound dehiscence   Consults IP CONSULT TO UROLOGY  IP CONSULT TO GASTROENTEROLOGY  IP CONSULT TO NEUROLOGY  IP CONSULT TO PALLIATIVE CARE - PROVIDER  IP CONSULT TO CARDIOLOGY        Consults   [x]PT   [x]OT []Speech   []Case Management      [] Palliative      Cardiac Monitoring Order   [x]Yes   []No     IV drips   []Yes    Drip:                            Dose:  Drip:                            Dose:  Drip:                            Dose:   [x]No     GI Prophylaxis   []Yes   []No         DVT Prophylaxis   SCDs:  Sequential Compression Device: Bilateral          Ash stockings:  Graduated Compression Stockings: Bilateral      [] Medication   []Contraindicated   []None      Activity Level Activity Level: Bed Rest     Activity Assistance: Complete care   Purposeful Rounding every 1-2 hour? [x]Yes   Mcdonald Score  Total Score: 4   Bed Alarm (If score 3 or >)   [x]Yes   [] Refused (See signed refusal form in chart)   Kong Score  Kong Score: 12   Kong Score (if score 14 or less)   [x]PMT consult   [x]Wound Care consult      []Specialty bed   [] Nutrition consult          Needs prior to discharge:   Home O2 required:    []Yes   [x]No    If yes, how much O2 required?     Other:    Last Bowel Movement: Last Bowel Movement Date: 08/15/17      Influenza Vaccine Received Flu Vaccine for Current Season (usually Sept-March): Not Flu Season        Pneumonia Vaccine           Diet Active Orders   Diet    DIET NPO      LDAs         PICC Double Lumen 30/40/39 Basilic;Left (Active)   Central Line Being Utilized Yes 8/19/2017  7:11 AM   Criteria for Appropriate Use Total parenteral nutrition 8/19/2017  7:11 AM   Site Assessment Clean, dry, & intact 8/19/2017  7:11 AM   Phlebitis Assessment 0 8/19/2017  7:11 AM   Infiltration Assessment 0 8/19/2017  7:11 AM   Arm Circumference (cm) 33 cm 8/2/2017 10:47 AM   Date of Last Dressing Change 08/17/17 8/18/2017  8:20 PM   Dressing Status Clean, dry, & intact 8/19/2017  7:11 AM   Action Taken Dressing changed 8/18/2017  8:25 PM   External Catheter Length (cm) 0 centimeters 8/19/2017  4:12 AM   Dressing Type Disk with Chlorhexadine gluconate (CHG) 8/19/2017  7:11 AM   Hub Color/Line Status Purple 8/19/2017  7:11 AM   Positive Blood Return (Site #1) Yes 8/19/2017  4:12 AM   Hub Color/Line Status Red 8/19/2017  7:11 AM   Positive Blood Return (Site #2) Yes 8/19/2017  4:12 AM   Alcohol Cap Used Yes 8/19/2017  7:11 AM                            Urinary Catheter [REMOVED] Urinary Catheter 07/27/17 2- way-Criteria for Appropriate Use: Obstruction/retention  Urinary Catheter 07/28/17 3- way; [place]-Criteria for Appropriate Use: Surgical procedure  Urinary Catheter 07/28/17 Other (Comment)-Criteria for Appropriate Use: Surgical procedure  [REMOVED] Urinary Catheter 07/26/17 2- way-Criteria for Appropriate Use: Obstruction/retention    Intake & Output   Date 08/18/17 0700 - 08/19/17 0659 08/19/17 0700 - 08/20/17 0659   Shift 9459-0946 1456-0475 24 Hour Total 5158-9087 2456-8897 24 Hour Total   I  N  T  A  K  E   I.V.  (mL/kg/hr) 471.7  (0.4) 558.9  (0.5) 1030.6  (0.5)         Volume (acetaminophen (OFIRMEV) infusion 1,000 mg)  0 0         Volume (TPN ADULT - CENTRAL AA 5% D20% W/ CA + ELECTROLYTES) 471.7  471.7         Volume (TPN ADULT - CENTRAL AA 5% D20% W/ CA + ELECTROLYTES)  558.9 558.9       Other  0 0         Irrigation Volume Input (mL) (Urinary Catheter 07/28/17 3- way; [place])  0 0       Shift Total  (mL/kg) 471.7  (5.1) 558.9  (6.1) 1030.6  (11.2)      O  U  T  P  U  T   Urine  (mL/kg/hr) 1200  (1.1) 1000  (0.9) 2200  (1)         Urine Output (mL) (Urinary Catheter 07/28/17 3- way; [place])          Urine Output (mL) (Urinary Catheter 07/28/17 Other (Comment))        Shift Total  (mL/kg) 1200  (12.8) 1000  (10.9) 2200  (23.9)      NET -728.3 -441.1 -1169.4      Weight (kg) 93.4 92 92 92 92 92         Readmission Risk Assessment Tool Score Medium Risk            17       Total Score        3 Has Seen PCP in Last 6 Months (Yes=3, No=0)    2 . Living with Significant Other. Assisted Living. LTAC. SNF.  or   Rehab    3 Patient Length of Stay (>5 days = 3)    4 IP Visits Last 12 Months (1-3=4, 4=9, >4=11)    5 Pt.  Coverage (Medicare=5 , Medicaid, or Self-Pay=4)        Criteria that do not apply:    Charlson Comorbidity Score (Age + Comorbid Conditions)       Expected Length of Stay 5d 12h   Actual Length of Stay 22

## 2017-08-20 NOTE — PROGRESS NOTES
Urology Consult    Patient: Lester Van MRN: [id number]  SSN: [id number]    YOB: 1928  Age: 80 y.o. Sex: male          Date of Consultation:  August 20, 2017  Requesting Physician: Tom Quan MD  Reason for Consultation:    Pre-existing [address] [place] [address] Urology Patient:   Physician:         History of Present Illness:  Patient is a 80 y.o. male admitted 7/27/2017 to the hospital for bladder clots  Bladder injury  perperated viscous  wound dehiscence. He notes overnight fever, no BM since 8/15    Past Medical History: Allergies   Allergen Reactions    Metoprolol Nausea and Vomiting      Prior to Admission medications    Medication Sig Start Date End Date Taking? Authorizing Provider   magnesium oxide (MAG-OX) 400 mg tablet Take 400 mg by mouth daily. Yes Historical Provider   ascorbic acid, vitamin C, (VITAMIN C WITH DAVID HIPS) 1,000 mg tablet Take 1,000 mg by mouth daily. Yes Historical Provider   co-enzyme Q-10 (CO Q-10) 100 mg capsule Take 300 mg by mouth daily. Yes Historical Provider   alpha lipoic acid 200 mg cap Take 200 mg by mouth daily. Yes Historical Provider   cholecalciferol, vitamin D3, (VITAMIN D3) 2,000 unit tab Take 2,000 Units by mouth daily. Yes Historical Provider   Arginine HCl, L-Arginine, 1,000 mg tab Take 1,000 mg by mouth daily. Yes Historical Provider   IRON, FERROUS SULFATE, PO Take 30 mg by mouth daily. Yes Historical Provider   Lactobacillus acidophilus (ACIDOPHILUS) cap    Yes Historical Provider   cyanocobalamin, vitamin B-12, (VITAMIN B-12) 5,000 mcg subl 5,000 mg by SubLINGual route daily. Yes Historical Provider   multivitamin (ONE A DAY) tablet Take 1 Tab by mouth daily. Yes Historical Provider   oxybutynin chloride XL (DITROPAN XL) 15 mg CR tablet Take 15 mg by mouth daily. Yes Historical Provider   losartan (COZAAR) 50 mg tablet Take 1 Tab by mouth daily.  7/25/17  Yes Pramod Ham MD   aspirin 81 mg chewable tablet Take 1 Tab by mouth daily. 17  Yes Rony Mota MD   atorvastatin (LIPITOR) 20 mg tablet Take 1 Tab by mouth nightly. 17  Yes Rony Mota MD   carvedilol (COREG) 3.125 mg tablet Take 1 Tab by mouth two (2) times daily (with meals). 17  Yes Rony Mota MD   clopidogrel (PLAVIX) 75 mg tab Take 1 Tab by mouth daily. 17  Yes Rony Mota MD   finasteride (PROSCAR) 5 mg tablet Take 1 Tab by mouth daily. 17  Yes Rony Mota MD      PMHx:  has a past medical history of Hypertension and Prostate enlargement. PSurgHx:  has a past surgical history that includes abdomen surgery proc unlisted and cardiac surg procedure unlist.  PSocHx:  reports that he has never smoked. He has never used smokeless tobacco. He reports that he does not drink alcohol or use illicit drugs. ROS:  Admission ROS by Mark Tuttle MD from 2017 were reviewed with the patient and changes (other than per HPI) include: none. Physical Exam:            Vitals[de-identified]    Temp (24hrs), Av.5 °F (37.5 °C), Min:97.8 °F (36.6 °C), Max:100.8 °F (38.2 °C)   Blood pressure 146/61, pulse 77, temperature (!) 100.8 °F (38.2 °C), resp. rate 20, height 5' 10\" (1.778 m), weight 92.8 kg (204 lb 9.4 oz), SpO2 95 %. I&O's:                  General:    appears nontoxic                     Skin:  no clubbing, cyanosis, edema                HEENT:  NCAT, O/P Clear        Throat/Neck:  supple, no LAD                 Chest[de-identified]  CTA      Heart[de-identified]  Regular rate and rhythm             Abdomen/Flank[de-identified]  no CVAT, minimally tender abdomen without masses, SP site improved, lower laporotomy red but no collection appreciated.              Bladder[de-identified]  Bladder not palpable   Lymph nodes:  no Cervical, supraclavicular, and axillary LAD     Lab Results   Component Value Date/Time    WBC 9.5 2017 07:13 AM    HCT 29.9 2017 07:13 AM    PLATELET 664  07:13 AM    Sodium 131 2017 07:13 AM    Potassium 4.2 2017 07:13 AM Chloride 100 08/20/2017 07:13 AM    CO2 26 08/20/2017 07:13 AM    BUN 27 08/20/2017 07:13 AM    Creatinine 0.98 08/20/2017 07:13 AM    Glucose 160 08/20/2017 07:13 AM    Calcium 7.7 08/20/2017 07:13 AM    Magnesium 2.0 08/18/2017 04:35 AM       UA:   Lab Results   Component Value Date/Time    Color YELLOW/STRAW 08/18/2017 09:13 PM    Appearance CLEAR 08/18/2017 09:13 PM    Specific gravity 1.018 08/18/2017 09:13 PM    Specific gravity 1.025 07/21/2017 08:24 AM    pH (UA) 6.5 08/18/2017 09:13 PM    Protein TRACE 08/18/2017 09:13 PM    Glucose NEGATIVE  08/18/2017 09:13 PM    Ketone NEGATIVE  08/18/2017 09:13 PM    Bilirubin NEGATIVE  08/18/2017 09:13 PM    Urobilinogen 2.0 08/18/2017 09:13 PM    Nitrites NEGATIVE  08/18/2017 09:13 PM    Leukocyte Esterase MODERATE 08/18/2017 09:13 PM    Epithelial cells FEW 08/18/2017 09:13 PM    Bacteria 1+ 08/18/2017 09:13 PM    WBC 10-20 08/18/2017 09:13 PM    RBC 10-20 08/18/2017 09:13 PM       Cultures:   Xrays:     Assessment/Plan:     1. Fevers persist, will give dulcolax to hopefully get things going. On antibiotics.   ??? CT if fevers don't come down quickly>>r/o collection    Signed By: Vj Valadez MD  - August 20, 2017

## 2017-08-20 NOTE — PROGRESS NOTES
1930: Bedside report received from [facility]. Patient resting comfortably in bed. Family at bedside. Vitals stable. Normal sinus rhythm on the monitor.

## 2017-08-20 NOTE — PROGRESS NOTES
Hospitalist Progress Note    NAME: Shelia Pabon   :  1928   MRN:  [id number]       Interim Hospital Summary: 80 y.o. male whom presented on 2017 with      Assessment / Plan:  Encephalopathy   Hypoxic Ischemia  - MRI of brain:  multiple punctate foci of acute infarction in the  bilateral corona radiata and centrum semiovale. Background of mild chronic  microvascular ischemic disease. The result was discussed with the family by Dr. Melissa Bernstein  - MRI findings consistent with hypoxic injury and multiple small infarcts are the cause of encephalopathy and confusion. 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 Age-related volume loss unchanged  - ammonia 33, B12 1046, TSH 3.7  - neurology will follow as need basis ()    Febrile  - axillary temp 100.8; tylenol around o'clock  - ordered chest x-ray  - blood cx no growth, urine cx (-). Pt was on Rocephin for (+) U/A. We will leave the rocephin for now, until we get the chest x-ray.   We will d/c rocephin if CXR (-) for ASDZ  - ordered wound cx from abdominal incision site and suprapubic catheter site     Postop respiratory failure s/p extubation , now on RA with O2 Sat 98%      S/P removal of bladder stone and repair of perforated bladder ()  - Cystoscopy with attempted clot evacuation with subsequent exploratory laparotomy with bladder stone retrieval and repair of large posterior bladder perforation on   - urology following; recommend to apply H2O2 to suprapubic site with Q tip daily.      S/p Exp Lap () due to perforate viscus; diffuse gastric distention without bowel perforation   S/p Exp lap with closure for dehiscence with retention sutures ()  - general surgery following.  Dr. Ramirez Mode recommended to consult GI if and when the patient requires alternative tube feeding.      Nutritional support  Difficulty with feeding  - General surgery recommended to consult GI for alternative tube feeding is needed  - SLP following; keep NPO, ice chips ok per nursing when pt is awake  - continue with TPN; discussed with the family about alternate tube feeding.  Family would like to wait until Monday to think about the option.  Family would like to work on extra stimulation and encourage for the pt to eat.       Shock resolved  LINDSEY Resolved  Pneumothorax due to dissection of air from retroperitoneum cephalad, different physiology or behavior of typical pneumothorax -conservative management      CAD, PAF with RVR  HTN  - off cardizem gtt  - restart plavix when able to take PO, okay with surgery   - continue with labetalol and nitrobid      Anemia, post op s/p PRBC  - 9.8/29/9      UTI with enterococcus,  Completed ampicillin course  Electrolyte disturbance replete as indicated, on tpn      Pruritus (resolved)          DVT prophylaxis: Lovenox      Code status: FULL code (Family would like to get some more answers before they talk about code status)          Recommended Disposition: SNF; max assist  Patient may transition to rehab or SNF once clear by the primary team (MIKE) and general surgery team.          Subjective:     Chief Complaint / Reason for Physician Visit   Discussed with RN events overnight. Pt opened eyes and made eye contact.  Not following commends. Review of Systems:  Symptom Y/N Comments  Symptom Y/N Comments   Fever/Chills    Chest Pain     Poor Appetite    Edema     Cough    Abdominal Pain     Sputum    Joint Pain     SOB/CLARKE    Pruritis/Rash     Nausea/vomit    Tolerating PT/OT     Diarrhea    Tolerating Diet     Constipation    Other       Could NOT obtain due to:      Objective:     VITALS:   Last 24hrs VS reviewed since prior progress note.  Most recent are:  Patient Vitals for the past 24 hrs:   Temp Pulse Resp BP SpO2   08/20/17 1110 99.4 °F (37.4 °C) 86 20 180/67 96 %   08/20/17 1028 (!) 100.8 °F (38.2 °C) - - - -   08/20/17 0656 98.4 °F (36.9 °C) 77 20 146/61 95 %   08/20/17 0551 99.7 °F (37.6 °C) 92 - 160/66 -   08/20/17 0414 100.4 °F (38 °C) - - - -   08/20/17 0358 99.1 °F (37.3 °C) 86 20 189/83 95 %   08/20/17 0016 99.9 °F (37.7 °C) 90 18 183/79 100 %   08/19/17 1950 98.9 °F (37.2 °C) 67 18 150/64 96 %   08/19/17 1658 97.8 °F (36.6 °C) - - - -   08/19/17 1513 100.1 °F (37.8 °C) 76 18 173/66 95 %       Intake/Output Summary (Last 24 hours) at 08/20/17 1222  Last data filed at 08/20/17 8628   Gross per 24 hour   Intake          2001.11 ml   Output             2350 ml   Net          -348.89 ml        PHYSICAL EXAM:  General:                              WD, WN. Open eyes to tactile and voice stimuli. cooperative, no acute distress    EENT:                                  EOMI. Anicteric sclerae. Dry mucous membrane  Resp:                                   CTA in apex with decreased breath sounds at bases, no wheezing or rales. No accessory muscle use  CV:                                      Regular  rhythm,  No edema  GI:                                       Soft, Non distended, Non tender.  +Bowel sounds  Neurologic:                          Open eyes to tactile stimuli, not following any commends   Psych:                                 Poor insight. Not anxious nor agitated  Skin:                                    Erythema around SP site.   No jaundice, dry skin    Reviewed most current lab test results and cultures  YES  Reviewed most current radiology test results   YES  Review and summation of old records today    NO  Reviewed patient's current orders and MAR    YES  PMH/SH reviewed - no change compared to H&P  ________________________________________________________________________  Care Plan discussed with:    Comments   Patient y    Family      RN y    Care Manager     Consultant                        Multidiciplinary team rounds were held today with , nursing, pharmacist and clinical coordinator. Patient's plan of care was discussed; medications were reviewed and discharge planning was addressed. ________________________________________________________________________  Total NON critical care TIME:  30   Minutes    Total CRITICAL CARE TIME Spent:   Minutes non procedure based      Comments   >50% of visit spent in counseling and coordination of care     ________________________________________________________________________  Pat Hughes NP     Procedures: see electronic medical records for all procedures/Xrays and details which were not copied into this note but were reviewed prior to creation of Plan. LABS:  I reviewed today's most current labs and imaging studies.   Pertinent labs include:  Recent Labs      08/20/17   0713 08/19/17 0417   WBC  9.5  7.1   HGB  9.8*  9.9*   HCT  29.9*  30.2*   PLT  317  339     Recent Labs      08/20/17   0713 08/19/17 0417 08/18/17   0435   NA  131*  134*  133*   K  4.2  4.1  4.2   CL  100  101  102   CO2  26  26  26   GLU  160*  139*  164*   BUN  27*  23*  26*   CREA  0.98  0.85  0.82   CA  7.7*  7.8*  7.9*   MG   --    --   2.0   PHOS   --    --   3.3       Signed: )Lakeshia Deluca, NP

## 2017-08-20 NOTE — PROGRESS NOTES
PCU SHIFT NURSING NOTE      Bedside shift change report given to [address] rn (oncoming nurse) by vick taveras (offgoing nurse). Report included the following information SBAR, Kardex, Intake/Output and MAR. Shift Summary:   9000- Assessment complete as noted. Patient drowsy but awakes to name and can answer simple yes or questions. Hyunsun -NP into round. Aware of over night events with patient running temps. Will continue to monitor. 1028-  Noted patient awake and dry heaving, Zofran given. Wound assessed, no change. Wound care complete. Uriology into see patient . MD assessed wound. MD aware no BM since 15 th. MD to write orders. Temp 100.8 Hyunsun - NP aware. Ttylenol dose changed , NP to put xray in for stat. Patient now resting after Zorfan. Will monitor closely. 1250- Temp now 99.2. Axillary. Family at bedside. Updated on patient condition. Wound cultures sent. 1551- Reassessment complete. Patient awake and alert. Attempted to sit patient up and to sit in chair. Patient able to stand but sat back down on bed. Patient states\"let me rest\". Patient shook head to no position even after encouragement to attempt to sit up in chair. Repositioned  patient in bed, Rosales care and  wound care complete on suprapubic catheter. Vitals stable at time. Will monitor closely. 200- DrRhianna Alcantara into round, some staples removed from abdominal wound. No new orders noted at time    1930-Bedside shift change report given to [address] (oncoming nurse) by Sudeep Perez RN (offgoing nurse). Report included the following information SBAR, Kardex, Intake/Output, MAR and Recent Results.          Admission Date 7/27/2017   Admission Diagnosis bladder clots  Bladder injury  perperated viscous  wound dehiscence   Consults IP CONSULT TO UROLOGY  IP CONSULT TO GASTROENTEROLOGY  IP CONSULT TO NEUROLOGY  IP CONSULT TO PALLIATIVE CARE - PROVIDER  IP CONSULT TO CARDIOLOGY        Consults   []PT   []OT   []Speech   []Case Management [] Palliative      Cardiac Monitoring Order   []Yes   []No     IV drips   []Yes    Drip:                            Dose:  Drip:                            Dose:  Drip:                            Dose:   []No     GI Prophylaxis   []Yes   []No         DVT Prophylaxis   SCDs:  Sequential Compression Device: Bilateral          Ash stockings:  Graduated Compression Stockings: Bilateral      [] Medication   []Contraindicated   []None      Activity Level Activity Level: Bed Rest     Activity Assistance: Complete care   Purposeful Rounding every 1-2 hour? []Yes   Mcdonald Score  Total Score: 4   Bed Alarm (If score 3 or >)   []Yes   [] Refused (See signed refusal form in chart)   Kong Score  Kong Score: 14   Kong Score (if score 14 or less)   []PMT consult   []Wound Care consult      []Specialty bed   [] Nutrition consult          Needs prior to discharge:   Home O2 required:    []Yes   []No    If yes, how much O2 required? Other:    Last Bowel Movement: Last Bowel Movement Date: 08/15/17      Influenza Vaccine Received Flu Vaccine for Current Season (usually Sept-March): Not Flu Season        Pneumonia Vaccine           Diet Active Orders   Diet    DIET NPO      LDAs         PICC Double Lumen 34/88/95 Basilic;Left (Active)   Central Line Being Utilized Yes 8/20/2017  6:13 AM   Criteria for Appropriate Use Total parenteral nutrition 8/20/2017  6:13 AM   Site Assessment Clean, dry, & intact 8/20/2017  6:13 AM   Phlebitis Assessment 0 8/20/2017  6:13 AM   Infiltration Assessment 0 8/20/2017  6:13 AM   Arm Circumference (cm) 33 cm 8/2/2017 10:47 AM   Date of Last Dressing Change 08/17/17 8/18/2017  8:20 PM   Dressing Status Clean, dry, & intact 8/20/2017  6:13 AM   Action Taken Dressing changed 8/18/2017  8:25 PM   External Catheter Length (cm) 0 centimeters 8/20/2017  6:13 AM   Dressing Type Disk with Chlorhexadine gluconate (CHG) 8/20/2017  6:13 AM   Hub Color/Line Status Pink; Infusing 8/20/2017  6:13 AM Positive Blood Return (Site #1) Yes 8/20/2017  6:13 AM   Hub Color/Line Status Red; Infusing 8/20/2017  6:13 AM   Positive Blood Return (Site #2) Yes 8/20/2017  6:13 AM   Alcohol Cap Used Yes 8/20/2017  6:13 AM                            Urinary Catheter [REMOVED] Urinary Catheter 07/27/17 2- way-Criteria for Appropriate Use: Obstruction/retention  Urinary Catheter 07/28/17 3- way; Rosales-Criteria for Appropriate Use: Surgical procedure  Urinary Catheter 07/28/17 Other (Comment)-Criteria for Appropriate Use: Surgical procedure  [REMOVED] Urinary Catheter 07/26/17 2- way-Criteria for Appropriate Use: Obstruction/retention    Intake & Output   Date 08/19/17 0700 - 08/20/17 0659 08/20/17 0700 - 08/21/17 0659   Shift 2130-7431 7438-2323 24 Hour Total 8995-1497 5983-2899 24 Hour Total   I  N  T  A  K  E   I.V.  (mL/kg/hr) 974.5  (0.9) 1076.6 2051. 1         Volume (acetaminophen (OFIRMEV) infusion 1,000 mg) 100 100 200         Volume (cefTRIAXone (ROCEPHIN) 1 g in 0.9% sodium chloride (MBP/ADV) 50 mL) 50 0 50         Volume (TPN ADULT - CENTRAL AA 5% D20% W/ CA + ELECTROLYTES) 824.5  824.5         Volume (TPN ADULT - CENTRAL AA 5% D20% W/ CA + ELECTROLYTES)  976.6 976.6       Shift Total  (mL/kg) 974.5  (10.6) 1076.6  (11.6) 2051.1  (22.1)      O  U  T  P  U  T   Urine  (mL/kg/hr) 1300  (1.2) 1050 2350         Urine Output (mL) (Urinary Catheter 07/28/17 3- way; Rosales)          Urine Output (mL) (Urinary Catheter 07/28/17 Other (Comment)) 425 100 525       Shift Total  (mL/kg) 1300  (14.1) 1050  (11.3) 2350  (25.3)      NET -325.5 26.6 -298.9      Weight (kg) 92 92.8 92.8 92.8 92.8 92.8         Readmission Risk Assessment Tool Score Medium Risk            17       Total Score        3 Has Seen PCP in Last 6 Months (Yes=3, No=0)    2 . Living with Significant Other. Assisted Living. LTAC. SNF. or   Rehab    3 Patient Length of Stay (>5 days = 3)    4 IP Visits Last 12 Months (1-3=4, 4=9, >4=11)    5 Pt. Coverage (Medicare=5 , Medicaid, or Self-Pay=4)        Criteria that do not apply:    Charlson Comorbidity Score (Age + Comorbid Conditions)       Expected Length of Stay 5d 12h   Actual Length of Stay 23

## 2017-08-20 NOTE — PROGRESS NOTES
Surgery      Every other clip removed from lower third of midline incision, no drainage or evidence of collection at site. Additional clips removed from upper portion of wound. Cont present RX    Dr Luis Antonio Diallo will assume surical management of pt in       [employer] [employer].  Michelle Brady MD, [facility]

## 2017-08-21 NOTE — PROGRESS NOTES
CM following pt's progress. Appreciate Palliative Care involvement to help family with big picture and care decisions. CM will remain avbl to assist w/ appropriate dc planning.   Lewis Alcala, PRATIMA

## 2017-08-21 NOTE — PROGRESS NOTES
Rounds - daughter at bedside and informed NP they would not be meeting today due to sister no being present. Cm returned after rounds to provide SNF list.  No one available. CM called and left message on spouse voicemail. [address] list left at bedside.     [id number] RN CM  Ext [phone]

## 2017-08-21 NOTE — PROGRESS NOTES
Problem: Self Care Deficits Care Plan (Adult)  Goal: *Acute Goals and Plan of Care (Insert Text)  Occupational Therapy Goals:  Routine 7 day re-eval   1. Patient will perform basic grooming seated SBA within 7 days. 2. Patient will tolerate 5 minutes of active exercise for increased strength with ADLS within 7 days. 3. Patient will sit edge of bed without support with good dynamic balance in prep for functional tasks within 7days. 4. Patient will perform sit to stand with max assist in prep for functional ADL transfers within 7 days. 5. Patient will perform UB dressing seated with SBA within 7 days. Initiated 8/9/2017  1. Patient will perform washing of face in supported sitting or at bed level with HOB elevated with max assist within 7 days. Met   2. Patient will tolerate 5 minutes of active exercise for increased strength with ADLS within 7 days. Continue   3. Patient will sit edge of bed without support with good dynamic balance in prep for functional tasks within 7days. continue  4. Patient will perform sit to stand with max assist in prep for functional ADL transfers within 7 days. continue   OCCUPATIONAL THERAPY TREATMENT  Patient: Ciarra Benz (89 y.o. male)  Date: 8/21/2017  Diagnosis: bladder clots  Bladder injury  perperated viscous  wound dehiscence <principal problem not specified>  Procedure(s) (LRB):  LAPAROTOMY EXPLORATORY, wound closure for dehiscence (N/A) 16 Days Post-Op  Precautions:  (abdominal binder)      ASSESSMENT:  Pt was very confused today, extremely warm to the touch with IV tylenol running. Pts wife was at bedside and was concerned about pt occasionally shaking. Pt appeared uncomfortable in bed and eventually pt was able to state that he was uncomfortable. Total assist for bed mobility today. Poor right lateral lean today and needed BUE on bed for support. Unable to achieve standing or perform ADLS due to confusion.   Returned pt to supine total assist and repositioned pt.  Pt appeared more comfortable and relaxed after repositioning. Continue to recommend SNF for rehab at discharge. Progression toward goals:  [ ]       Improving appropriately and progressing toward goals  [X]       Improving slowly and progressing toward goals  [ ]       Not making progress toward goals and plan of care will be adjusted       PLAN:  Patient continues to benefit from skilled intervention to address the above impairments. Continue treatment per established plan of care. Discharge Recommendations:  Skilled Nursing Facility  Further Equipment Recommendations for Discharge:  TBD       SUBJECTIVE:   Patient stated Yes.       OBJECTIVE DATA SUMMARY:   Cognitive/Behavioral Status:  Neurologic State: Alert;Confused  Orientation Level: Unable to verbalize  Cognition: Decreased attention/concentration;Decreased command following;Poor safety awareness        Safety/Judgement: Fall prevention     Functional Mobility and Transfers for ADLs:  Bed Mobility:  Rolling: Total assistance;Assist x2  Supine to Sit: Total assistance  Sit to Supine: Total assistance;Assist x2     Transfers:   Sit to stand unable today even with elevated bed and 2 assist   unable to this session     Balance:  Sitting: Impaired  Sitting - Static: Poor (constant support); Fair (occasional); Other (comment) (initailly poor with R lean but w/ time/cues maintains w/SBA)  Sitting - Dynamic: Poor (constant support)  Standing:  (unable to complete stand today)     ADL Intervention:  Unable today  Cognitive Retraining  Orientation Retraining: Place; Reorienting;Situation;Time  Safety/Judgement: Fall prevention        Pain:  Pain Scale 1: Visual  Pain Intensity 1: 0              Activity Tolerance:      Please refer to the flowsheet for vital signs taken during this treatment.   After treatment:   [ ] Patient left in no apparent distress sitting up in chair  [X] Patient left in no apparent distress in bed  [X] Call bell left within reach  [X] Nursing notified  [X] Caregiver present  [X] Bed alarm activated      COMMUNICATION/COLLABORATION:   The patients plan of care was discussed with: Physical Therapist, Registered Nurse and patient and his family     Martha Frost OTR/L  Time Calculation: 24 mins

## 2017-08-21 NOTE — PROGRESS NOTES
Admit Date: 2017    POD 16 Days Post-Op    Procedure:  Procedure(s):  LAPAROTOMY EXPLORATORY, wound closure for dehiscence    Subjective:     Patient remains withdrawn    Objective:     Blood pressure 150/60, pulse 89, temperature 98.3 °F (36.8 °C), resp. rate 14, height 5' 10\" (1.778 m), weight 202 lb 6.1 oz (91.8 kg), SpO2 93 %. Temp (24hrs), Av.8 °F (37.1 °C), Min:97.6 °F (36.4 °C), Max:100 °F (37.8 °C)      Physical Exam:  GENERAL: no distress, appears stated age, LUNG: clear to auscultation bilaterally, HEART: regular rate and rhythm, ABDOMEN: soft, non-tender.  Bowel sounds normal. No masses,  no organomegaly, wound c/d/i, less erythema at lower aspect of wound, EXTREMITIES:  extremities normal, atraumatic, no cyanosis or edema    Labs:   Recent Results (from the past 24 hour(s))   GLUCOSE, POC    Collection Time: 17 11:54 AM   Result Value Ref Range    Glucose (POC) 200 (H) 65 - 100 mg/dL    Performed by [address]    Collection Time: 17 12:39 PM   Result Value Ref Range    Special Requests: NO SPECIAL REQUESTS      GRAM STAIN RARE WBCS SEEN      GRAM STAIN NO ORGANISMS SEEN      Culture result: PENDING    CULTURE, WOUND W GRAM STAIN    Collection Time: 17 12:39 PM   Result Value Ref Range    Special Requests: NO SPECIAL REQUESTS      GRAM STAIN NO WBC'S SEEN      GRAM STAIN NO ORGANISMS SEEN      Culture result: PENDING    GLUCOSE, POC    Collection Time: 17  6:18 PM   Result Value Ref Range    Glucose (POC) 170 (H) 65 - 100 mg/dL    Performed by Blake Marks, POC    Collection Time: 17 11:58 PM   Result Value Ref Range    Glucose (POC) 159 (H) 65 - 100 mg/dL    Performed by Esthela Boss COMPREHENSIVE    Collection Time: 17  5:07 AM   Result Value Ref Range    Sodium 132 (L) 136 - 145 mmol/L    Potassium 4.2 3.5 - 5.1 mmol/L    Chloride 100 97 - 108 mmol/L    CO2 26 21 - 32 mmol/L    Anion gap 6 5 - 15 mmol/L    Glucose 158 (H) 65 - 100 mg/dL    BUN 30 (H) 6 - 20 MG/DL    Creatinine 1.00 0.70 - 1.30 MG/DL    BUN/Creatinine ratio 30 (H) 12 - 20      GFR est AA >60 >60 ml/min/1.73m2    GFR est non-AA >60 >60 ml/min/1.73m2    Calcium 8.0 (L) 8.5 - 10.1 MG/DL    Bilirubin, total 0.6 0.2 - 1.0 MG/DL    ALT (SGPT) 44 12 - 78 U/L    AST (SGOT) 43 (H) 15 - 37 U/L    Alk. phosphatase 122 (H) 45 - 117 U/L    Protein, total 7.2 6.4 - 8.2 g/dL    Albumin 2.0 (L) 3.5 - 5.0 g/dL    Globulin 5.2 (H) 2.0 - 4.0 g/dL    A-G Ratio 0.4 (L) 1.1 - 2.2     PHOSPHORUS    Collection Time: 08/21/17  5:07 AM   Result Value Ref Range    Phosphorus 3.1 2.6 - 4.7 MG/DL   MAGNESIUM    Collection Time: 08/21/17  5:07 AM   Result Value Ref Range    Magnesium 2.2 1.6 - 2.4 mg/dL   CBC WITH AUTOMATED DIFF    Collection Time: 08/21/17  5:07 AM   Result Value Ref Range    WBC 10.7 4.1 - 11.1 K/uL    RBC 3.35 (L) 4.10 - 5.70 M/uL    HGB 9.8 (L) 12.1 - 17.0 g/dL    HCT 29.4 (L) 36.6 - 50.3 %    MCV 87.8 80.0 - 99.0 FL    MCH 29.3 26.0 - 34.0 PG    MCHC 33.3 30.0 - 36.5 g/dL    RDW 14.7 (H) 11.5 - 14.5 %    PLATELET 844 837 - 049 K/uL    NEUTROPHILS 70 32 - 75 %    LYMPHOCYTES 19 12 - 49 %    MONOCYTES 10 5 - 13 %    EOSINOPHILS 1 0 - 7 %    BASOPHILS 0 0 - 1 %    ABS. NEUTROPHILS 7.5 1.8 - 8.0 K/UL    ABS. LYMPHOCYTES 2.0 0.8 - 3.5 K/UL    ABS. MONOCYTES 1.1 (H) 0.0 - 1.0 K/UL    ABS. EOSINOPHILS 0.1 0.0 - 0.4 K/UL    ABS.  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

## 2017-08-21 NOTE — CARDIO/PULMONARY
CP Rehab Note: chart review     Patient admitted for bladder injury. S/p cystoscopy for hematuria/clot evacuation   Medical History: HTN, enlarged prostate  EF 45-50%, Echo 7/20/2017  Non Smoker      Patient confused/restless.     Palliative working with family with decision making process and to listen to family concerns of care. See palliative note on 8/18/17. Palliative to meet with family today. CP Rehab teaching deferred.

## 2017-08-21 NOTE — PROGRESS NOTES
Bedside shift change report given to Shad Billy RN (oncoming nurse) by Rose Melara (offgoing nurse). Report included the following information SBAR, Kardex, ED Summary, OR Summary, MAR and Accordion.

## 2017-08-21 NOTE — PROGRESS NOTES
Problem: Dysphagia (Adult)  Goal: *Acute Goals and Plan of Care (Insert Text)  Speech pathology goals intitiated 8/14/2017   1. Patient will tolerate sips of clear liquids free of s/s aspiration within 7 days. Goal discontinued 8/21. 2. Patient will demonstrate timely and complete posterior propulsion with purees within 7 days  3. Patient will participate in solid trials if mentation allows within 7 days. Goal discontinued 8/21. SPEECH LANGUAGE PATHOLOGY DYSPHAGIA TREATMENT  Patient: Jamaica Garcia (48 y.o. male)  Date: 8/21/2017  Diagnosis: bladder clots  Bladder injury  perperated viscous  wound dehiscence <principal problem not specified>  Procedure(s) (LRB):  LAPAROTOMY EXPLORATORY, wound closure for dehiscence (N/A) 16 Days Post-Op  Precautions:   (abdominal binder)      ASSESSMENT:  Pt is not verbalizing today, not following commands and does not seem to have awareness of his surroundings. Poor oral acceptance and no lip closure on the spoon was noted. He had no awareness of an ice chip in his mouth and it had to be removed by myself. With his advanced age and current medical status he does not appear to be a good candidate for a PEG. Progression toward goals:  [ ]         Improving appropriately and progressing toward goals  [ ]         Improving slowly and progressing toward goals  [X]         Not making progress toward goals and plan of care will be adjusted       PLAN:  Recommendations and Planned Interventions:  NPO     Patient continues to benefit from skilled intervention to address the above impairments. Continue treatment per established plan of care. Discharge Recommendations:  None       SUBJECTIVE:   Patient stated nothing. Did not establish eye contact with me.        OBJECTIVE:   Cognitive and Communication Status:  Neurologic State: Alert, Confused  Orientation Level: Unable to verbalize  Cognition: Decreased attention/concentration, Decreased command following, Poor safety awareness  Perception: Appears intact  Perseveration: No perseveration noted  Safety/Judgement: Fall prevention  Dysphagia Treatment:  Oral Assessment:     P.O. Trials:  Patient Position: upright in bed  Vocal quality prior to P.O.: No impairment  Consistency Presented: Thin liquid  How Presented: Self-fed/presented;Spoon     Bolus Acceptance: Impaired  Bolus Formation/Control: Impaired     Propulsion: Absent  Oral Residue: None                          Oral Phase Severity: Severe            Pain:  Pain Scale 1: Visual  Pain Intensity 1: 1  Pain Location 1: Abdomen  After treatment:   [ ]              Patient left in no apparent distress sitting up in chair  [X]              Patient left in no apparent distress in bed  [X]              Call bell left within reach  [X]              Nursing notified  [ ]              Caregiver present  [ ]              Bed alarm activated      COMMUNICATION/EDUCATION:   Patient was educated regarding His deficit(s) of dysphagia as this relates to His diagnosis of CVA, advanced age . He demonstrated Guarded understanding as he was not responding to questions or verbalizing at all. The patients plan of care including recommendations, planned interventions, and recommended diet changes were discussed with: Registered Nurse. [ ]              Posted safety precautions in patient's room.      JOSE GUADALUPE Leone  Time Calculation: 10 mins

## 2017-08-21 NOTE — PROGRESS NOTES
PCU SHIFT NURSING NOTE      Bedside and Verbal shift change report given to Jean Fowler RN (oncoming nurse) by RN (offgoing nurse). Report included the following information SBAR, Kardex, Procedure Summary, Intake/Output, MAR, Recent Results, Cardiac Rhythm A fib and Alarm Parameters . Shift Summary: Bedside report received. 0840 NP at bedside to assess. 80 Rounds made family at bedside. Discussed feeding tube no choice was made  By spouse and daughter at this time. [address] at bedside, patient did not respond well. Patient did not detect ice being in mouth. Patient to remain NPO at this time. 46 Daughter in to visit patient. Daughter voiced concerns about CT Scan and wishes to hold off on procedure until tomorrow after talking to family. Nurse educated family on process of CT scan. Palliative care was offered to family and meeting to be held on 8/22/17.     1800 Family at bedside with patient at this time. CT called and made aware of family's wishes to hold on procedure. Will continue to assess patient. Patient afebrile throughout shift. Family voices to not give patient any sedative medications.     Admission Date 7/27/2017   Admission Diagnosis bladder clots  Bladder injury  perperated viscous  wound dehiscence   Consults IP CONSULT TO UROLOGY  IP CONSULT TO GASTROENTEROLOGY  IP CONSULT TO NEUROLOGY  IP CONSULT TO PALLIATIVE CARE - PROVIDER  IP CONSULT TO CARDIOLOGY        Consults   []PT   []OT   []Speech   []Case Management      [] Palliative      Cardiac Monitoring Order   []Yes   []No     IV drips   []Yes    Drip:                            Dose:  Drip:                            Dose:  Drip:                            Dose:   []No     GI Prophylaxis   []Yes   []No         DVT Prophylaxis   SCDs:  Sequential Compression Device: Bilateral     Patient Refused VTE Prophylaxis: Yes    Ash stockings:  Graduated Compression Stockings: Bilateral      [] Medication   []Contraindicated   []None Activity Level Activity Level: Bed Rest     Activity Assistance: Complete care   Purposeful Rounding every 1-2 hour? []Yes   Mcdonald Score  Total Score: 2   Bed Alarm (If score 3 or >)   []Yes   [] Refused (See signed refusal form in chart)   Kong Score  Kong Score: 11   Kong Score (if score 14 or less)   []PMT consult   []Wound Care consult      []Specialty bed   [] Nutrition consult          Needs prior to discharge:   Home O2 required:    []Yes   []No    If yes, how much O2 required? Other:    Last Bowel Movement: Last Bowel Movement Date: 08/21/17      Influenza Vaccine Received Flu Vaccine for Current Season (usually Sept-March): Not Flu Season        Pneumonia Vaccine           Diet Active Orders   Diet    DIET NPO      LDAs         PICC Double Lumen 48/54/88 Basilic;Left (Active)   Central Line Being Utilized Yes 8/21/2017  6:50 AM   Criteria for Appropriate Use Total parenteral nutrition 8/21/2017  6:50 AM   Site Assessment Clean, dry, & intact 8/21/2017  6:50 AM   Phlebitis Assessment 0 8/21/2017  6:50 AM   Infiltration Assessment 0 8/21/2017  6:50 AM   Arm Circumference (cm) 33 cm 8/2/2017 10:47 AM   Date of Last Dressing Change 08/17/17 8/18/2017  8:20 PM   Dressing Status Clean, dry, & intact 8/21/2017  6:50 AM   Action Taken Dressing changed 8/18/2017  8:25 PM   External Catheter Length (cm) 0 centimeters 8/20/2017  6:13 AM   Dressing Type Disk with Chlorhexadine gluconate (CHG) 8/20/2017  3:51 PM   Hub Color/Line Status Purple; Infusing 8/20/2017  3:51 PM   Positive Blood Return (Site #1) Yes 8/20/2017  3:51 PM   Hub Color/Line Status Red; Infusing 8/20/2017  3:51 PM   Positive Blood Return (Site #2) Yes 8/20/2017  3:51 PM   Alcohol Cap Used Yes 8/20/2017  3:51 PM                            Urinary Catheter [REMOVED] Urinary Catheter 07/27/17 2- way-Criteria for Appropriate Use: Obstruction/retention  Urinary Catheter 07/28/17 3- way; [place]-Criteria for Appropriate Use: Strict I/Os  Urinary Catheter 07/28/17 Other (Comment)-Criteria for Appropriate Use: Strict I/Os  [REMOVED] Urinary Catheter 07/26/17 2- way-Criteria for Appropriate Use: Obstruction/retention    Intake & Output   Date 08/20/17 0700 - 08/21/17 0659 08/21/17 0700 - 08/22/17 0659   Shift 6034-7486 8458-9177 24 Hour Total 7116-6006 6861-5174 24 Hour Total   I  N  T  A  K  E   I.V.  (mL/kg/hr) 799.6  (0.7) 1283.6  (1.2) 2083.1  (0.9)         I.V.  300 300         Volume (TPN ADULT - CENTRAL AA 5% D20% W/ CA + ELECTROLYTES) 799.6  799.6         Volume (TPN ADULT - CENTRAL AA 5% D20% W/ CA + ELECTROLYTES)  983.6 983. 6       Shift Total  (mL/kg) 799.6  (8.6) 1283.6  (14) 2083.1  (22.7)      O  U  T  P  U  T   Urine  (mL/kg/hr) 600  (0.5) 1550  (1.4) 2150  (1)         Urine Output (mL) (Urinary Catheter 07/28/17 3- way; [place]) 350 300 650         Urine Output (mL) (Urinary Catheter 07/28/17 Other (Comment)) 250 1250 1500       Stool    1  1      Stool    1  1    Shift Total  (mL/kg) 600  (6.5) 1550  (16.9) 2150  (23.4) 1  (0)  1  (0)   .6 -266.5 -66.9 -1  -1   Weight (kg) 92.8 91.8 91.8 91.8 91.8 91.8         Readmission Risk Assessment Tool Score Medium Risk            17       Total Score        3 Has Seen PCP in Last 6 Months (Yes=3, No=0)    2 . Living with Significant Other. Assisted Living. LTAC. SNF. or   Rehab    3 Patient Length of Stay (>5 days = 3)    4 IP Visits Last 12 Months (1-3=4, 4=9, >4=11)    5 Pt.  Coverage (Medicare=5 , Medicaid, or Self-Pay=4)        Criteria that do not apply:    Charlson Comorbidity Score (Age + Comorbid Conditions)       Expected Length of Stay 5d 12h   Actual Length of Stay 24

## 2017-08-21 NOTE — PROGRESS NOTES
Problem: Mobility Impaired (Adult and Pediatric)  Goal: *Acute Goals and Plan of Care (Insert Text)  Physical Therapy Goals  Revised 8/16/2017  Previous goals continue to be appropriate    Physical Therapy Goals  Initiated 8/9/2017  1. Patient will move from supine to sit and sit to supine , scoot up and down and roll side to side in bed with moderate assistance within 7 day(s). 2. Patient will transfer from bed to chair and chair to bed with maximal assistance using the least restrictive device within 7 day(s). 3. Patient will perform sit to stand with maximal assistance within 7 day(s). 4. Patient will tolerate sitting EOB x 5 minutes without external support in preparation for standing task within 7 days. PHYSICAL THERAPY TREATMENT  Patient: Steffen Benites (25 y.o. male)  Date: 8/21/2017  Diagnosis: bladder clots  Bladder injury  perperated viscous  wound dehiscence <principal problem not specified>  Procedure(s) (LRB):  LAPAROTOMY EXPLORATORY, wound closure for dehiscence (N/A) 16 Days Post-Op  Precautions:  (abdominal binder)      ASSESSMENT:  Pt cleared for session by nursing. Pt with family in room. Pt appearing more uncomfortable today, restless. Moderately warm to touch, note fever last night. Pt session completed in conjunction with OT due to pt's limited tolerance and need for multiperson assist.  Pt requiring total A of 1-2 for all bed mobility. Initially sitting with R lean and need for assist but with cues and time able to sit with SBA only. Attempted sit to stand at [facility] but pt unable to initiate with A of 2 and unable to unweight buttocks from bed. Pt returned to supine with eleveated HOB. Repositioned for comfort, SCDs in place, catheters intact. Call bell in reach and nursing notified. Continue to recommend SNF post d/c.   Progression toward goals:  [ ]    Improving appropriately and progressing toward goals  [X]    Improving slowly and progressing toward goals  [ ]    Not making progress toward goals and plan of care will be adjusted       PLAN:  Patient continues to benefit from skilled intervention to address the above impairments. Continue treatment per established plan of care. Discharge Recommendations:  Tavo Ramos  Further Equipment Recommendations for Discharge:  TBD       SUBJECTIVE:   Patient stated, difficult to understand, moaning, indicates pain or discomfort but unable to assess location      OBJECTIVE DATA SUMMARY:   Critical Behavior:  Neurologic State: Alert, Confused, Drowsy, Eyes open to voice  Orientation Level: Unable to verbalize  Cognition: Decreased attention/concentration, Impaired decision making  Safety/Judgement: Fall prevention  Functional Mobility Training:  Bed Mobility:  Rolling: Total assistance;Assist x2  Supine to Sit: Total assistance  Sit to Supine: Total assistance;Assist x2           Transfers:      Unable to progress safely to transfers                             Balance:  Sitting: Impaired  Sitting - Static: Poor (constant support); Fair (occasional); Other (comment) (initailly poor with R lean but w/ time/cues maintains w/SBA)  Sitting - Dynamic: Poor (constant support)  Standing:  (unable to complete stand today)  Ambulation/Gait Training:         Unable to assess                                                     Activity Tolerance:   See above narrative     Please refer to the flowsheet for vital signs taken during this treatment.   After treatment:   [ ]    Patient left in no apparent distress sitting up in chair  [X]    Patient left in no apparent distress in bed  [X]    Call bell left within reach  [X]    Nursing notified  [ ]    Caregiver present  [X]    Bed alarm activated      COMMUNICATION/COLLABORATION:   The patients plan of care was discussed with: Occupational Therapist and Registered Nurse     Coleman Oliveros, PT   Time Calculation: 19 mins

## 2017-08-21 NOTE — PROGRESS NOTES
Hospitalist Progress Note    NAME: Samuel oClunga   :  1928   MRN:  [id number]       Interim Hospital Summary: 80 y.o. male whom presented on 2017 with      Assessment / Plan:  Encephalopathy   Hypoxic Ischemia  - open eyes to verbal stimuli, limited conversation, falls asleep during conversation  - MRI of brain:  multiple punctate foci of acute infarction in the  bilateral corona radiata and centrum semiovale. Background of mild chronic  microvascular ischemic disease. The result was discussed with the family by Dr. Penelope Wild  - MRI findings consistent with hypoxic injury and multiple small infarcts are the cause of encephalopathy and confusion. 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 Age-related volume loss unchanged  - ammonia 33, B12 1046, TSH 3.7  - neurology will follow as need basis ()    Febrile  - afebrile for 24 hours so far. CT of ABD/PEL for recurrent fever to assess any abscess collection  - chest x-ray: No interval change in interstitial edema pattern  - blood cx no growth, urine cx (-).  Pt was on Rocephin for (+) U/A. D/c'd rocephin  - ordered wound cx from abdominal incision site and suprapubic catheter site  - ordered CT of abd/pel      Postop respiratory failure s/p extubation , now on RA with O2 Sat 98%      S/P removal of bladder stone and repair of perforated bladder ()  - Cystoscopy with attempted clot evacuation with subsequent exploratory laparotomy with bladder stone retrieval and repair of large posterior bladder perforation on   - urology following; recommend to apply H2O2 to suprapubic site with Q tip daily.      S/p Exp Lap () due to perforate viscus; diffuse gastric distention without bowel perforation   S/p Exp lap with closure for dehiscence with retention sutures ()  - general surgery following.   Elizabet Gallego recommended to consult GI if and when the patient requires alternative tube feeding.      Nutritional support  Difficulty with feeding  - General surgery recommended to consult GI for alternative tube feeding is needed  - SLP following; keep NPO, ice chips ok per nursing when pt is awake  - continue with TPN; discussed with the family about alternate tube feeding.  Family would like to wait until Monday to think about the option.  Family would like to work on extra stimulation and encourage for the pt to eat. No changes in patient's status. Family not interested alternative tube feeding at present time      Shock resolved  LINDSEY Resolved  Pneumothorax due to dissection of air from retroperitoneum cephalad, different physiology or behavior of typical pneumothorax -conservative management      CAD, PAF with RVR  HTN  - off cardizem gtt  - restart plavix when able to take PO, okay with surgery   - continue with labetalol and nitrobid      Anemia, post op s/p PRBC  - 9.8/29/9      UTI with enterococcus,  Completed ampicillin course  Electrolyte disturbance replete as indicated, on tpn      Pruritus (resolved)    Constipation  - resolved after dulcolax suppository          DVT prophylaxis: Lovenox      Code status: FULL code (Family would like to get some more answers before they talk about code status)          Recommended Disposition: SNF; max assist  Patient may transition to rehab or SNF once clear by the primary team () and general surgery team.        Subjective:     Chief Complaint / Reason for Physician Visit  Discussed with RN events overnight. Pt opened eyes and made eye contact.  Not following commends. Discussed with RN events overnight.      Review of Systems:  Symptom Y/N Comments  Symptom Y/N Comments   Fever/Chills    Chest Pain     Poor Appetite    Edema     Cough    Abdominal Pain     Sputum    Joint Pain     SOB/CLARKE    Pruritis/Rash     Nausea/vomit    Tolerating PT/OT     Diarrhea Tolerating Diet     Constipation    Other       Could NOT obtain due to:      Objective:     VITALS:   Last 24hrs VS reviewed since prior progress note. Most recent are:  Patient Vitals for the past 24 hrs:   Temp Pulse Resp BP SpO2   08/21/17 0834 - 89 - 150/60 -   08/21/17 0747 99.6 °F (37.6 °C) 91 16 150/60 93 %   08/21/17 0648 - (!) 101 - 167/77 -   08/21/17 0400 98.3 °F (36.8 °C) 99 14 167/77 93 %   08/21/17 0322 97.6 °F (36.4 °C) 92 20 188/64 95 %   08/21/17 0003 - 94 - 144/77 -   08/21/17 0000 98.1 °F (36.7 °C) 94 16 144/77 96 %   08/20/17 2344 100 °F (37.8 °C) 97 20 174/79 95 %   08/20/17 2000 99 °F (37.2 °C) 94 16 167/75 97 %   08/20/17 1551 98.7 °F (37.1 °C) 74 20 157/69 -   08/20/17 1419 - - - 126/59 -   08/20/17 1241 99.2 °F (37.3 °C) 80 22 110/47 93 %       Intake/Output Summary (Last 24 hours) at 08/21/17 1147  Last data filed at 08/21/17 0752   Gross per 24 hour   Intake          2083.12 ml   Output             2151 ml   Net           -67.88 ml        PHYSICAL EXAM:  General:                              WD, WN. Open eyes to tactile and voice stimuli. cooperative, no acute distress    EENT:                                  EOMI. Anicteric sclerae.  Dry mucous membrane  Resp:                                   CTA in apex with decreased breath sounds at bases, no wheezing or rales.                                               No accessory muscle use  CV:                                      Regular  rhythm,  No edema  GI:                                       Soft, Non distended, Non tender.  +Bowel sounds  Neurologic:                          Open eyes to tactile stimuli, not following any commends   Psych:                                 Poor insight. Not anxious nor agitated  Skin:                                    Erythema around SP site.  No jaundice, dry skin      Reviewed most current lab test results and cultures  YES  Reviewed most current radiology test results   YES  Review and summation of old records today    NO  Reviewed patient's current orders and MAR    YES  PMH/SH reviewed - no change compared to H&P  ________________________________________________________________________  Care Plan discussed with:    Comments   Patient y    Family      RN y    Care Manager y    Consultant                       y Multidiciplinary team rounds were held today with , nursing, pharmacist and clinical coordinator. Patient's plan of care was discussed; medications were reviewed and discharge planning was addressed. ________________________________________________________________________  Total NON critical care TIME:  30   Minutes    Total CRITICAL CARE TIME Spent:   Minutes non procedure based      Comments   >50% of visit spent in counseling and coordination of care     ________________________________________________________________________  Gabriel Olmstead NP     Procedures: see electronic medical records for all procedures/Xrays and details which were not copied into this note but were reviewed prior to creation of Plan. LABS:  I reviewed today's most current labs and imaging studies.   Pertinent labs include:  Recent Labs      08/21/17   0507 08/20/17   0713 08/19/17   0417   WBC  10.7  9.5  7.1   HGB  9.8*  9.8*  9.9*   HCT  29.4*  29.9*  30.2*   PLT  279  317  339     Recent Labs      08/21/17   0507  08/20/17   0713  08/19/17   0417   NA  132*  131*  134*   K  4.2  4.2  4.1   CL  100  100  101   CO2  26  26  26   GLU  158*  160*  139*   BUN  30*  27*  23*   CREA  1.00  0.98  0.85   CA  8.0*  7.7*  7.8*   MG  2.2   --    --    PHOS  3.1   --    --    ALB  2.0*   --    --    TBILI  0.6   --    --    SGOT  43*   --    --    ALT  44   --    --        Signed: )Lakeshia Deluca, NP

## 2017-08-22 NOTE — PROGRESS NOTES
Admit Date: 2017    POD 17 Days Post-Op    Procedure:  Procedure(s):  LAPAROTOMY EXPLORATORY, wound closure for dehiscence    Subjective:     Patient remains withdrawn, family states more interactive with them this morning    Objective:     Blood pressure 161/73, pulse 90, temperature 97.9 °F (36.6 °C), resp. rate 18, height 5' 10\" (1.778 m), weight 200 lb 9.9 oz (91 kg), SpO2 98 %. Temp (24hrs), Av.2 °F (36.8 °C), Min:97.8 °F (36.6 °C), Max:98.9 °F (37.2 °C)      Physical Exam:  GENERAL: no distress, appears stated age, LUNG: clear to auscultation bilaterally, HEART: regular rate and rhythm, ABDOMEN: soft, non-tender.  Bowel sounds normal. No masses,  no organomegaly, wound c/d/i, less erythema at lower aspect of wound, EXTREMITIES:  extremities normal, atraumatic, no cyanosis or edema    Labs:   Recent Results (from the past 24 hour(s))   GLUCOSE, POC    Collection Time: 17  1:08 PM   Result Value Ref Range    Glucose (POC) 209 (H) 65 - 100 mg/dL    Performed by Conrado Morris    GLUCOSE, POC    Collection Time: 17  5:26 PM   Result Value Ref Range    Glucose (POC) 150 (H) 65 - 100 mg/dL    Performed by Audelia Torrez    GLUCOSE, POC    Collection Time: 17 12:04 AM   Result Value Ref Range    Glucose (POC) 141 (H) 65 - 100 mg/dL    Performed by Esthela 417, BASIC    Collection Time: 17  3:30 AM   Result Value Ref Range    Sodium 135 (L) 136 - 145 mmol/L    Potassium 4.2 3.5 - 5.1 mmol/L    Chloride 102 97 - 108 mmol/L    CO2 25 21 - 32 mmol/L    Anion gap 8 5 - 15 mmol/L    Glucose 156 (H) 65 - 100 mg/dL    BUN 34 (H) 6 - 20 MG/DL    Creatinine 1.09 0.70 - 1.30 MG/DL    BUN/Creatinine ratio 31 (H) 12 - 20      GFR est AA >60 >60 ml/min/1.73m2    GFR est non-AA >60 >60 ml/min/1.73m2    Calcium 7.9 (L) 8.5 - 10.1 MG/DL   MAGNESIUM    Collection Time: 17  3:30 AM   Result Value Ref Range    Magnesium 2.4 1.6 - 2.4 mg/dL   GLUCOSE, POC    Collection Time: 08/22/17  6:09 AM   Result Value Ref Range    Glucose (POC) 186 (H) 65 - 100 mg/dL    Performed by Ami Simental        Data Review images and reports reviewed    Assessment:     Active Problems:    Bladder injury (7/28/2017)      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)      Altered mental status, unspecified (8/15/2017)      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

## 2017-08-22 NOTE — PROGRESS NOTES
He is awake and converses with me. Visit Vitals    /75 (BP 1 Location: Right arm, BP Patient Position: At rest)    Pulse 87    Temp 99.8 °F (37.7 °C)    Resp 20    Ht 5' 10\" (1.778 m)    Wt 91 kg (200 lb 9.9 oz)    SpO2 93%    BMI 28.79 kg/m2     Disoriented x 3. Urine clear  Wound looks ok     Imp  No  needs at this point. Cont medical care.

## 2017-08-22 NOTE — PROGRESS NOTES
Nutrition Assessment:    INTERVENTIONS/RECOMMENDATIONS:   Enteral/Parenteral Nutrition: Initiate enteral nutrition: If in line with goals of care, recommend NGT placement and initiation of enteral feedings. TPN no longer appropriate. Recommend Jevity 1.5 @ 15 mL/hr x 24 hours; if patient tolerates, advance by 10 mL q 12 hours to a goal rate of Jevity 1.5 @ 55 mL/hr + 150 mL water flushes q 4 hours (provides 1980 kcal, 84g protein, 1903 mL water)     If enteral nutrition started, wean TPN down once patient able to tolerate TF at a rate of 25-35 mL/hr x 24 hours.      Diet advancement as able per SLP    ASSESSMENT:   Chart reviewed; patient continues on TPN which is meeting 100% of estimated kcal/protein needs. Unsure of goals of care regarding alternative nutrition. TPN is no longer appropriate. Gut is functioning and patient remains NPO due to dysphagia and recommendations from SLP. Noted plans for family meeting tomorrow morning. Patient getting CT scan of abdomen today. Agree with SLP that with patients current condition and advanced age, may not be appropriate for a PEG. Will continue to monitor goals of care and provide further recommendations as needed. Diet Order: NPO (TPN: AA5% D20% @ 83 mL/hr + Lipids 3x/week; provides 1967 kcal/99.6g protein)  % Eaten:  No data found. Pertinent Medications: [x] Reviewed []Other: Famotidine, Humalog, Labetalol   Pertinent Labs: [x]Reviewed  []Other: [phone]  Food Allergies: [x]None []Other:     Last BM: 8/21   []Active     []Hyperactive  [x]Hypoactive       [] Absent  BS  Skin:    [] Intact   [x] Incision  [] Breakdown   []Edema   []Other:    Anthropometrics: Height: 5' 10\" (177.8 cm) Weight: 91 kg (200 lb 9.9 oz)    IBW (%IBW):   ( ) UBW (%UBW):   (  %)    BMI: Body mass index is 28.79 kg/(m^2).     This BMI is indicative of:  []Underweight   []Normal   [x]Overweight   [] Obesity   [] Extreme Obesity (BMI>40)  Last Weight Metrics:  Weight Loss Metrics 8/22/2017 7/26/2017 3/26/2017 11/3/2016 3/17/2015 10/2/2014 5/16/2013   Today's Wt 200 lb 9.9 oz 211 lb 3.2 oz 215 lb 6.2 oz 217 lb 6 oz 214 lb 4.8 oz 200 lb 213 lb 10 oz   BMI 28.79 kg/m2 30.3 kg/m2 30.91 kg/m2 31.19 kg/m2 30.75 kg/m2 28.7 kg/m2 30.65 kg/m2       Estimated Nutrition Needs (Based on): 1934 Kcals/day (BMR (1611) x 1. 2AF) , 91 g (1.0 g/kg bw) Protein  Carbohydrate:  At Least 130 g/day  Fluids: 1900 mL/day     Pt expected to meet estimated nutrient needs: [x]Yes []No    NUTRITION DIAGNOSES:   Problem:  Swallowing difficulty      Etiology: related to AMS, encephaloapthy     Signs/Symptoms: as evidenced by NPO per SLP      NUTRITION INTERVENTIONS:  Meals/Snacks: General/healthful diet Enteral/Parenteral Nutrition: Initiate enteral nutrition                GOAL:   Transition to EN vs PO diet vs Comfort care next 1-3 days    NUTRITION MONITORING AND EVALUATION   Behavioral-Environmental Outcomes: Behavior  Food/Nutrient Intake Outcomes: Enteral/parenteral nutrition intake  Physical Signs/Symptoms Outcomes: Weight/weight change, GI profile, Electrolyte and renal profile, Glucose profile    Previous Goal Met:   [] Met              [] Progressing Towards Goal              [x] Not Progressing Towards Goal   Previous Recommendations:   [] Implemented          [x] Not Implemented          [] Not Applicable    LEARNING NEEDS (Diet, Food/Nutrient-Drug Interaction):    [x] None Identified   [] Identified and Education Provided/Documented   [] Identified and Pt declined/was not appropriate     Cultural, Christianity, OR Ethnic Dietary Needs:    [x] None Identified   [] Identified and Addressed     [x] Interdisciplinary Care Plan Reviewed/Documented    [x] Discharge Planning: TBD   [x] Participated in Interdisciplinary Rounds    NUTRITION RISK:    [x] High              [] Moderate           []  Low  []  Minimal/Uncompromised      Addison Oar  Pager [phone]              Weekend Pager [phone]

## 2017-08-22 NOTE — PROGRESS NOTES
Hospitalist Progress Note    NAME: Gloria Cabral   :  1928   MRN:  [id number]       Interim Hospital Summary: 80 y.o. male whom presented on 2017 with      Assessment / Plan:  Encephalopathy   Hypoxic Ischemia  - open eyes to verbal stimuli, limited conversation, falls asleep during conversation  - MRI of brain:  multiple punctate foci of acute infarction in the  bilateral corona radiata and centrum semiovale. Background of mild chronic  microvascular ischemic disease. The result was discussed with the family by Dr. Maribell Bueno  - MRI findings consistent with hypoxic injury and multiple small infarcts are the cause of encephalopathy and confusion. 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 Age-related volume loss unchanged  - ammonia 33, B12 1046, TSH 3.7  - neurology will follow as need basis ()  -Still very lethargic, as per Neurology signs of Hypoxic Encephalopathy. Febrile  - afebrile for 24 hours so far. CT of ABD/PEL for recurrent fever to assess any abscess collection  - chest x-ray: No interval change in interstitial edema pattern  - blood cx no growth, urine cx (-). Pt was on Rocephin for (+) U/A. D/c'd rocephin  - ordered wound cx from abdominal incision site and suprapubic catheter site  - ordered CT of abd/pel (abdomen is showing signs of peritoneal irritation, need CT scan to be done)  Postop respiratory failure s/p extubation , now on RA with O2 Sat 98%  S/P removal of bladder stone and repair of perforated bladder ()  - Cystoscopy with attempted clot evacuation with subsequent exploratory laparotomy with bladder stone retrieval and repair of large posterior bladder perforation on   - urology following; recommend to apply H2O2 to suprapubic site with Q tip daily.   S/p Exp Lap () due to perforate viscus; diffuse gastric distention without bowel perforation   S/p Exp lap with closure for dehiscence with retention sutures (8/5)  - general surgery following.  Dr. Davin Gee recommended to consult GI if and when the patient requires alternative tube feeding. Nutritional support, c/w TPN, ordered today. Difficulty with feeding  - General surgery recommended to consult GI for alternative tube feeding is needed  - SLP following; keep NPO, ice chips ok per nursing when pt is awake  - continue with TPN; discussed with the family about alternate tube feeding.  Family would like to wait until Monday to think about the option.  Family would like to work on extra stimulation and encourage for the pt to eat. No changes in patient's status. Family not interested alternative tube feeding at present time  Shock resolved  LINDSEY Resolved  Pneumothorax due to dissection of air from retroperitoneum cephalad, different physiology or behavior of typical pneumothorax -conservative management   CAD, PAF with RVR  HTN  - off cardizem gtt  - restart plavix when able to take PO, okay with surgery   - continue with labetalol and nitrobid  Anemia, post op s/p PRBC  - 9.8/29/9  UTI with enterococcus,  Completed ampicillin course  Electrolyte disturbance replete as indicated, on tpn  Pruritus (resolved)  Constipation  - resolved after dulcolax suppository  DVT prophylaxis: Lovenox  Code status: FULL code (Family would like to get some more answers before they talk about code status)  wife Mireille Davila  Recommended Disposition: SNF; max assist  Patient may transition to rehab or SNF once clear by the primary team (MIKE) and general surgery team.       Spoke with wife today will meet with family tomorrow at [address] [place] [address], Prognosis is poor     Subjective:     Chief Complaint / Reason for Physician Visit  Discussed with RN events overnight. Pt opened eyes and made eye contact.  Not following commends. Discussed with RN events overnight.      Review of Systems:  Symptom Y/N Comments  Symptom Y/N Comments   Fever/Chills    Chest Pain     Poor Appetite    Edema     Cough    Abdominal Pain     Sputum    Joint Pain     SOB/CLARKE    Pruritis/Rash     Nausea/vomit    Tolerating PT/OT     Diarrhea    Tolerating Diet     Constipation    Other       Could NOT obtain due to: encephalopathy     Objective:     VITALS:   Last 24hrs VS reviewed since prior progress note. Most recent are:  Patient Vitals for the past 24 hrs:   Temp Pulse Resp BP SpO2   08/22/17 0936 98 °F (36.7 °C) 90 20 161/73 98 %   08/22/17 0617 - 90 - 165/68 -   08/22/17 0400 97.9 °F (36.6 °C) 92 18 165/68 98 %   08/22/17 0014 - 95 - 154/78 -   08/22/17 0000 98.1 °F (36.7 °C) 89 18 154/78 96 %   08/21/17 2338 - 94 22 (!) 187/96 96 %   08/21/17 2000 98.1 °F (36.7 °C) 81 18 (!) 150/126 98 %   08/21/17 1928 - 82 - 176/89 -   08/21/17 1458 97.8 °F (36.6 °C) 79 18 128/55 94 %       Intake/Output Summary (Last 24 hours) at 08/22/17 1257  Last data filed at 08/22/17 0936   Gross per 24 hour   Intake          3656.99 ml   Output           3849.6 ml   Net          -192.61 ml        PHYSICAL EXAM:  General:                              WD, WN. Open eyes to tactile and voice stimuli. Non cooperative, no acute distress    EENT:                                  EOMI. Anicteric sclerae.  Dry mucous membrane  Resp:                                   CTA in apex with decreased breath sounds at bases, no wheezing or rales.                                               No accessory muscle use  CV:                                      Regular  rhythm,  No edema  GI:                                       Firm, + distended, + tender, + rebound  +Bowel sounds  Neurologic:                          Open eyes to tactile stimuli, not following any commends   Psych:                                 Poor insight. Not anxious nor agitated  Skin:                                    Erythema around SP site.  No jaundice, dry skin      Reviewed most current lab test results and cultures  YES  Reviewed most current radiology test results   YES  Review and summation of old records today    NO  Reviewed patient's current orders and MAR    YES  PMH/SH reviewed - no change compared to H&P  ________________________________________________________________________  Care Plan discussed with:    Comments   Patient y    Family  y wife   RN y    Care Manager y    Consultant                       y Multidiciplinary team rounds were held today with , nursing, pharmacist and clinical coordinator. Patient's plan of care was discussed; medications were reviewed and discharge planning was addressed. ________________________________________________________________________  Total NON critical care TIME:  30   Minutes    Total CRITICAL CARE TIME Spent:   Minutes non procedure based      Comments   >50% of visit spent in counseling and coordination of care     ________________________________________________________________________  Kevin Razo MD     Procedures: see electronic medical records for all procedures/Xrays and details which were not copied into this note but were reviewed prior to creation of Plan. LABS:  I reviewed today's most current labs and imaging studies.   Pertinent labs include:  Recent Labs      08/21/17   0507  08/20/17   0713   WBC  10.7  9.5   HGB  9.8*  9.8*   HCT  29.4*  29.9*   PLT  279  317     Recent Labs      08/22/17   0330  08/21/17   0507  08/20/17   0713   NA  135*  132*  131*   K  4.2  4.2  4.2   CL  102  100  100   CO2  25  26  26   GLU  156*  158*  160*   BUN  34*  30*  27*   CREA  1.09  1.00  0.98   CA  7.9*  8.0*  7.7*   MG  2.4  2.2   --    PHOS   --   3.1   --    ALB   --   2.0*   --    TBILI   --   0.6   --    SGOT   --   43*   --    ALT   --   44   --        Signed: )Kevin Razo MD

## 2017-08-22 NOTE — PROGRESS NOTES
Bedside shift change report given to Cheng Key RN (oncoming nurse) by Kaiser Hill RN (offgoing nurse). Report included the following information SBAR, Kardex, Procedure Summary, MAR and Recent Results.

## 2017-08-22 NOTE — PROGRESS NOTES
Jose Antonio spoke with [facility] and requested that next time they evaluate patient they include dme that would be needed in the home.     Kayli , RN CM  Ext [phone]

## 2017-08-22 NOTE — PROGRESS NOTES
Cm spoke with Mrs. Blanca Browne via telephone. Home phone number is \"the 's Line\" and answered by the patient. Ms. Blanca Browne contact number [phone]    Mrs. Blanca Browne would like both daughters added to Emergency Contact List.    Salomon Mosqueda - [phone]  Eli Toth - [phone]    Facesheet to be updated. Patient lives with his wife in a 2 story home. 5 steps, entry. Bedrooms are on 2nd floor. There is a guest bedroom, kitchen and full bath on the first floor. 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 Wife states he had become less active during the weeks. Wife cooks/does chores. \"things were normal at home\". DME - walker. No previous HH/SNF experience. CM provided listening support during phone conversation. Reviewed PTOT recommendations regarding SNF. Mrs. Blanca Browne informed Cm that her preference is to discharge patient home with 24/7 care. Mrs. Keanu Lim states there are 2 daughters available to help and they can hire a nurse if need be. \"We pray he will get back on his feet\". \"We are going to do all we can\". CM offerred SNF list as an alternative option. Mrs. Blanca Browne accepted offer and Cm will leave SNF list and Private duty list in the room. PT OT will need to evaluate for at home DME should patient be discharged home.     Family meeting is scheduled for 8/23/2017 with health care team.    Colona Missy  Ext [phone]

## 2017-08-22 NOTE — ROUTINE PROCESS
0700 bedside report received from night shift RN. Orders reviewed at this time. 0900 Assessment done an routine meds given     1000 blood culture grew yeast in one of two bottles from [place] [facility] sample- Dr Joellen Graves and Dr Mickeal Holstein paged at this time    1200 multidisciplinary rounds- Dr Mickeal Holstein to see patient    0 Dr. Mickeal Holstein at bedside too see patient- spoke over the phone with POA to patient; plan for family meeting at 9 am tomorrow. Family agreed on CT of abd as ordered. 1430 patient off unit for CT scan of abd with IV contrast.    1500 Tierra from palliative care aware of family meeting with Dr. Mickeal Holstein and patient's family at [facility].    [phone] bedside report given to [facility]. No other needs at this time.

## 2017-08-22 NOTE — PROGRESS NOTES
D/w Dr. Herve Farrar, pt's hospitalist.  Dr. Jhoana Harris relayed to me that he spoke w/ pt's wife and has set up a family meeting for tomorrow, 08/23/2017 @ 0900. I informed Dr. Jhoana Harris that we would be unable to attend the meeting. Additionally, per my previous note from yesterday, our team attempted to reach out to family and were not able to set up family meeting. I plan to reach out to pt's dtr who spoke w/ our team earlier today.       Chuy Julio MD  Palliative Care Team

## 2017-08-22 NOTE — PROGRESS NOTES
Problem: Dysphagia (Adult)  Goal: *Acute Goals and Plan of Care (Insert Text)  8/22/2017  Speech path reeval  1. Pt will participate with reeval of swallowing daily. Speech pathology goals intitiated 8/14/2017   1. Patient will tolerate sips of clear liquids free of s/s aspiration within 7 days. Goal discontinued 8/21. 2. Patient will demonstrate timely and complete posterior propulsion with purees within 7 days  3. Patient will participate in solid trials if mentation allows within 7 days. Goal discontinued 8/21. SPEECH LANGUAGE PATHOLOGY DYSPHAGIA REEVALUATION  Patient: Viry James (85 y.o. male)  Date: 8/22/2017  Diagnosis: bladder clots  Bladder injury  perperated viscous  wound dehiscence <principal problem not specified>  Procedure(s) (LRB):  LAPAROTOMY EXPLORATORY, wound closure for dehiscence (N/A) 17 Days Post-Op  Precautions:   (abdominal binder)      ASSESSMENT:  Pt has very dry oral mucosa with sheets of dry mucous on the hard palate and dry tongue with dried mucous caked on it. He is a mouth breather on room air who needs aggressive oral care. An ice chip was placed in his mouth and it stayed in his anterior mouth behind his teeth. It had to be removed due to no posterior propulsion. He tolerated oral care well but needs it multiple times a day. Progression toward goals:  [ ]         Improving appropriately and progressing toward goals  [ ]         Improving slowly and progressing toward goals  [X]         Not making progress toward goals and plan of care will be adjusted       PLAN:  Recommendations and Planned Interventions:  NPO continue   Oral care  Patient continues to benefit from skilled intervention to address the above impairments. Continue treatment per established plan of care. Discharge Recommendations:  TBD       SUBJECTIVE:   Patient stated mama echoing myself.       OBJECTIVE:   Cognitive and Communication Status:  Neurologic State: Alert  Orientation Level: Disoriented X4  Cognition: No command following, Poor safety awareness, Impaired decision making  Perception: Appears intact  Perseveration: No perseveration noted  Safety/Judgement: Fall prevention  Dysphagia Treatment:  Oral Assessment:     P.O. Trials:     Vocal quality prior to P.O.:             Bolus Acceptance: Impaired  Bolus Formation/Control: Impaired  Type of Impairment:  (minimal manipulation)                     Pain:  Pain Scale 1: Behavioral Pain Scale (BPS)  Pain Intensity 1: 0     After treatment:   [ ]              Patient left in no apparent distress sitting up in chair  [X]              Patient left in no apparent distress in bed  [X]              Call bell left within reach  [X]              Nursing notified  [X]              Caregiver present  [ ]              Bed alarm activated      COMMUNICATION/EDUCATION:   Patient was educated regarding His deficit(s) of dysphagia as this relates to His diagnosis of dysphagia. He demonstrated Guarded understanding as evidenced by severe confusion. The patients plan of care including recommendations, planned interventions, and recommended diet changes were discussed with: Registered Nurse. [ ]              Posted safety precautions in patient's room.      JOSE GUADALUPE Rm  Time Calculation: 10 mins

## 2017-08-22 NOTE — PROGRESS NOTES
PCU SHIFT NURSING NOTE      Bedside and Verbal shift change report given to Sami Mcknight RN (oncoming nurse) by Cisco Chau RN (offgoing nurse). Report included the following information SBAR, Kardex, ED Summary, OR Summary, Procedure Summary, Intake/Output, MAR, Recent Results, Med Rec Status, Cardiac Rhythm NSR and Alarm Parameters . Shift Summary:     1820--Patient is agitated and thrashing around in bed. Will give patient ativan that is on MAR to decrease anxiety. 1832--Patient given ativan. 1900--Family has come to see patient and is concerned about patient not responding and having breathing difficulties. Family states that they have given direct instructions that patient was not to be given ativan at all. Not sure why the ativan is still on the patients MAR and not aware that family did not want ativan given to patient. Will give patient romizacon. 1942--Patient given reversal drug for ativan. Ativan has been removed from [facility] per family request.  Patient is now responding to family and is waking up. Will give report and pass on information to oncoming nurse.       Admission Date 7/27/2017   Admission Diagnosis bladder clots  Bladder injury  perperated viscous  wound dehiscence   Consults IP CONSULT TO UROLOGY  IP CONSULT TO GASTROENTEROLOGY  IP CONSULT TO NEUROLOGY  IP CONSULT TO PALLIATIVE CARE - PROVIDER  IP CONSULT TO CARDIOLOGY        Consults   [x]PT   [x]OT   [x]Speech   [x]Case Management      [x] Palliative      Cardiac Monitoring Order   [x]Yes   []No     IV drips   []Yes    Drip:                            Dose:  Drip:                            Dose:  Drip:                            Dose:   [x]No     GI Prophylaxis   [x]Yes   []No         DVT Prophylaxis   SCDs:  Sequential Compression Device: Bilateral     Patient Refused VTE Prophylaxis: Yes    Ash stockings:  Graduated Compression Stockings: Bilateral      [x] Medication   []Contraindicated   []None      Activity Level Activity Level: Bed Rest, Up with Assistance     Activity Assistance: Partial (two people)   Purposeful Rounding every 1-2 hour? [x]Yes   Mcdonald Score  Total Score: 3   Bed Alarm (If score 3 or >)   [x]Yes   [] Refused (See signed refusal form in chart)   Kong Score  Kong Score: 11   Kong Score (if score 14 or less)   [x]PMT consult   [x]Wound Care consult      [x]Specialty bed   [x] Nutrition consult          Needs prior to discharge:   Home O2 required:    []Yes   [x]No    If yes, how much O2 required? Other:    Last Bowel Movement: Last Bowel Movement Date: 08/21/17      Influenza Vaccine Received Flu Vaccine for Current Season (usually Sept-March): Not Flu Season        Pneumonia Vaccine           Diet Active Orders   Diet    DIET NPO      LDAs         PICC Double Lumen 89/39/04 Basilic;Left (Active)   Central Line Being Utilized Yes 8/22/2017  3:25 PM   Criteria for Appropriate Use Total parenteral nutrition 8/22/2017  3:25 PM   Site Assessment Clean, dry, & intact 8/22/2017  3:25 PM   Phlebitis Assessment 0 8/22/2017  3:25 PM   Infiltration Assessment 0 8/22/2017  3:25 PM   Arm Circumference (cm) 33 cm 8/2/2017 10:47 AM   Date of Last Dressing Change 08/17/17 8/22/2017  3:25 PM   Dressing Status Clean, dry, & intact 8/22/2017  3:25 PM   Action Taken Open ports on tubing capped 8/22/2017  3:25 PM   External Catheter Length (cm) 0 centimeters 8/20/2017  6:13 AM   Dressing Type Tape;Transparent 8/22/2017  3:25 PM   Hub Color/Line Status Purple; Infusing;Flushed 8/22/2017  3:25 PM   Positive Blood Return (Site #1) Yes 8/22/2017  3:25 PM   Hub Color/Line Status Red;Capped;Flushed 8/22/2017  3:25 PM   Positive Blood Return (Site #2) Yes 8/22/2017  3:25 PM   Alcohol Cap Used Yes 8/22/2017  3:25 PM                            Urinary Catheter [REMOVED] Urinary Catheter 07/27/17 2- way-Criteria for Appropriate Use: Obstruction/retention  Urinary Catheter 07/28/17 3- way; [place]-Criteria for Appropriate Use: Medically/surgically unstable, Obstruction/retention, Strict I/Os  Urinary Catheter 07/28/17 Other (Comment)-Criteria for Appropriate Use: Medically/surgically unstable, Strict I/Os  [REMOVED] Urinary Catheter 07/26/17 2- way-Criteria for Appropriate Use: Obstruction/retention    Intake & Output   Date 08/21/17 0700 - 08/22/17 0659 08/22/17 0700 - 08/23/17 0659   Shift 9885-7651 6621-6653 24 Hour Total 5835-0555 5785-1451 24 Hour Total   I  N  T  A  K  E   I.V.  (mL/kg/hr) 675.1  (0.6) 2981.9  (2.7) 3657  (1.7) 760.8  760.8      I.V.  2095.2 2095. 2         Volume (TPN ADULT - CENTRAL AA 5% D20% W/ CA + ELECTROLYTES) 675.1  675.1         Volume (TPN ADULT - CENTRAL AA 5% D20% W/ CA + ELECTROLYTES)  886.7 886. 7 760.8  760.8      Volume (TPN ADULT - CENTRAL AA 5% D20% W/ CA + ELECTROLYTES)    0  0    Shift Total  (mL/kg) 675.1  (7.4) 2981.9  (32.8) 3657  (40.2) 760.8  (8.4)  760.8  (8.4)   O  U  T  P  U  T   Urine  (mL/kg/hr) 1050  (1) 2449.6  (2.2) 3499.6  (1.6) 850  850      Urine Voided  1549. 6 1549.6         Urine Output (mL) (Urinary Catheter 07/28/17 3- way; [place])  150  150      Urine Output (mL) (Urinary Catheter 07/28/17 Other (Comment)) 875 50 925 700  700    Stool 1  1         Stool 1  1       Shift Total  (mL/kg) 1051  (11.4) 2449.6  (26.9) 3500.6  (38.5) 850  (9.3)  850  (9.3)   NET -375.9 532.3 156.4 -89.2  -89.2   Weight (kg) 91.8 91 91 91 91 91         Readmission Risk Assessment Tool Score Medium Risk            17       Total Score        3 Has Seen PCP in Last 6 Months (Yes=3, No=0)    2 . Living with Significant Other. Assisted Living. LTAC. SNF. or   Rehab    3 Patient Length of Stay (>5 days = 3)    4 IP Visits Last 12 Months (1-3=4, 4=9, >4=11)    5 Pt.  Coverage (Medicare=5 , Medicaid, or Self-Pay=4)        Criteria that do not apply:    Charlson Comorbidity Score (Age + Comorbid Conditions)       Expected Length of Stay 5d 12h   Actual Length of Stay 25

## 2017-08-22 NOTE — PROGRESS NOTES
I visited w/ pt briefly. Aurora Franco at bedside. She expressed frustration d/t unfortunate miscommunication about setting up f/u family meeting. I relayed to Simon Keepers that it was my responsibility and that I was accountable for the miscommunication regarding setting up a time. She had thought that 0900 tomorrow would work and had already spoken w/ several family members. 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 I relayed to her that our IDT would be meeting in the AM.  A member of our team will be reaching out to family. She noted that her sister should be point of contact for this and that she would be second. She verbalized understanding regarding the plan for setting up family meeting. Discussed w/ bedside RN staff, both day and night. Time and input appreciated.     Jeffery Carrel, MD  Palliative Care Team

## 2017-08-22 NOTE — PROGRESS NOTES
Physical therapy note:   Spoke with RN who reports patient drowsy this afternoon after CT scan and with poor command following. Patient sleeping upon arrival and difficult to awake. Patient not appropriate for OOB activity at this time. Will continue to follow to progress mobility.    Didi Zavala, PT, DPT

## 2017-08-23 NOTE — PROGRESS NOTES
Pharmacy Automatic Renal Dosing Protocol - Antimicrobials    Indication for Antimicrobials: HCAP  Current Regimen of Each Antimicrobial (Start Day & Day of Therapy): Anidulafungin 200 mg X1, then 100 mg every day (; day #1  Zyvox 600 mg IV q 12 hours (; day #1  Unasyn 3g q8 hours (; day #1    Previous abx  Vancomycin pharmacy to dose,    Cefepime 2 gram iv q12h. Cefazolin  Ampicillin   LVQ    Significant cultures:   : Blood cx: candida albicans = final  : Urine cx: NG - final  : Wound cx: ENTEROCOCCUS FAECALIS GROUP D (Vanc ADRIAN 4) and STAPHYLOCOCCUS EPIDERMIDIS (OXACILLIN RESISTANT) - pending  : Wound cx: STAPHYLOCOCCUS EPIDERMIDIS (OXACILLIN RESISTANT and LIGHT ANAEROBIC GRAM POSITIVE COCCI - FINAL  : Blood cx:   : Urine cx: pending    CAPD, Intermittent Hemodialysis or Renal Replacement Therapy: no  Paralysis, amputations, malnutrition: no    Labs:  Recent Labs      17   0330  17   0041  17   0330  17   0507   CREA  1.31*   --   1.09  1.00   BUN  43*   --   34*  30*   WBC  14.0*  11.4*   --   10.7     Temp (24hrs), Av.6 °F (37.6 °C), Min:98.5 °F (36.9 °C), Max:101.1 °F (38.4 °C)    Creatinine Clearance (ml/min): 47.4    Goal Vancomycin Level(s): Vancomycin trough between 15 and 20    Impression/Plan:   Will d/c the vancomycin since the ADRIAN is 4. Will d/c the cefepime since we don't have any GN organisms  Will start zyvox 600 mg BID   Will start Unasyn 3g IV q 8 hours  Will continue with Eraxis 200 mg X1, then 100 mg every day          Pharmacy will follow daily and adjust doses of monitored medications as appropriate for renal function and/or serum levels.     Thank you,  Jordy Whatley, PHARMD

## 2017-08-23 NOTE — PROGRESS NOTES
Called for pt tachypnea, CXR with Pulmonary edema vs PNA and fever. Pt ABGs consistent with respiratory alkalosis which could be either HCAp vs Pulmonary edema. Will hold TPN, check UA with reflex cultures, BP is high, give Lasix 40mg IV X1. Blood cultures already drawn. Start IV vancomycin and Cefepime.

## 2017-08-23 NOTE — PROGRESS NOTES
Spoke with nursing and pt is now on venti mask and not opening eyes or responding to cues. Will defer treatment today.     Riccardo Cohen, PT, DPT

## 2017-08-23 NOTE — PROGRESS NOTES
Hospitalist Progress Note    NAME: Greg Zavala   :  1928   MRN:  [id number]       Interim Hospital Summary: 80 y.o. male whom presented on 2017 with      Assessment / Plan:  Encephalopathy   Hypoxic Ischemia  - open eyes to verbal stimuli, limited conversation, falls asleep during conversation  - MRI of brain:  multiple punctate foci of acute infarction in the  bilateral corona radiata and centrum semiovale. Background of mild chronic  microvascular ischemic disease. The result was discussed with the family by Dr. Beatrice Wiggins  - MRI findings consistent with hypoxic injury and multiple small infarcts are the cause of encephalopathy and confusion. 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 Age-related volume loss unchanged  - ammonia 33, B12 1046, TSH 3.7  - neurology will follow as need basis ()  -Still very lethargic, as per Neurology signs of Hypoxic Encephalopathy. Febrile  - afebrile for 24 hours so far. CT of ABD/PEL for recurrent fever to assess any abscess collection  - chest x-ray: No interval change in interstitial edema pattern  - blood cx no growth, urine cx (-). Pt was on Rocephin for (+) U/A. D/c'd rocephin  - ordered wound cx from abdominal incision site and suprapubic catheter site  - ordered CT of abd/pel (abdomen is showing signs of peritoneal irritation, need CT scan to be done)  Sepsis: fever, tachycardia, leukocytosis due to UTI vs PNA. UTI: prior UTI grew Enterococcus, completed Ampicillin, yeast noted, c/w Eraxis, Cefepime and Vancomycin, F/U cultures.   Postop respiratory failure s/p extubation , now on RA with O2 Sat 98%  S/P removal of bladder stone and repair of perforated bladder ()  - Cystoscopy with attempted clot evacuation with subsequent exploratory laparotomy with bladder stone retrieval and repair of large posterior bladder perforation on 7/28  - urology following; recommend to apply H2O2 to suprapubic site with Q tip daily. S/p Exp Lap (7/29) due to perforate viscus; diffuse gastric distention without bowel perforation   S/p Exp lap with closure for dehiscence with retention sutures (8/5)  - general surgery following.  Dr. Jovita Perez recommended to consult GI if and when the patient requires alternative tube feeding. CT abdomen 8/22/17 shows catheter in place with bladder thickening and b/l pleural effusions. Nutritional support, c/w TPN, ordered today. Difficulty with feeding  - General surgery recommended to consult GI for alternative tube feeding is needed  - SLP following; keep NPO, ice chips ok per nursing when pt is awake  - continue with TPN; discussed with the family about alternate tube feeding.  Family would like to wait until Monday to think about the option.  Family would like to work on extra stimulation and encourage for the pt to eat. No changes in patient's status. Family not interested alternative tube feeding at present time  Shock resolved  LINDSEY Resolved  Pneumothorax due to dissection of air from retroperitoneum cephalad, different physiology or behavior of typical pneumothorax -conservative management   CAD, PAF with RVR  CHF?: has bilateral pleural effusions, ECHO EF 49% with diastolic dysfunction, CXR pulmonary edema, will give a dose of diuretics and monitor, check BNP. Pneumonia? ?: agree with Cefepime and Vancomycin, start bronchodilators, monitor.   HTN  - off cardizem gtt  - restart plavix when able to take PO, okay with surgery   - continue with labetalol and nitrobid  Anemia, post op s/p PRBC  - 9.8/29/9  UTI with enterococcus,  Completed ampicillin course  Electrolyte disturbance replete as indicated, on tpn  Pruritus (resolved)  Constipation  - resolved after dulcolax suppository  DVT prophylaxis: Lovenox  Code status: FULL code (Family would like to get some more answers before they talk about code [address] [facility]  [phone] or Phillip [phone] can receive info, do not call wife. Recommended Disposition: SNF; max assist  Patient may transition to rehab or SNF once clear by the primary team (MIKE) and general surgery team.       Spoke with family not ready to address Code Status, family upset due to communication problems and overall poor prognosis for the patient. Family wants NO SEDATION for the patient and requested RN Abigail Schreiber to be assigned to the case     Subjective:     Chief Complaint / Reason for Physician Visit  Discussed with RN events overnight. Pt opened eyes and made eye contact.  Not following commends. Discussed with RN events overnight. Review of Systems:  Symptom Y/N Comments  Symptom Y/N Comments   Fever/Chills y   Chest Pain     Poor Appetite    Edema     Cough    Abdominal Pain     Sputum    Joint Pain     SOB/CLARKE y   Pruritis/Rash     Nausea/vomit    Tolerating PT/OT     Diarrhea    Tolerating Diet     Constipation    Other       Could NOT obtain due to: encephalopathy     Objective:     VITALS:   Last 24hrs VS reviewed since prior progress note. Most recent are:  Patient Vitals for the past 24 hrs:   Temp Pulse Resp BP SpO2   08/23/17 0730 99.7 °F (37.6 °C) 84 (!) 32 173/73 96 %   08/23/17 0653 - 80 - 153/62 96 %   08/23/17 0335 98.8 °F (37.1 °C) 99 (!) 34 188/59 94 %   08/23/17 0053 - 87 - 177/79 99 %   08/22/17 2318 (!) 101.1 °F (38.4 °C) 94 (!) 34 188/83 95 %   08/22/17 1958 98.6 °F (37 °C) 83 24 (!) 158/98 97 %   08/22/17 1654 98.6 °F (37 °C) 97 20 182/90 95 %   08/22/17 1525 98.5 °F (36.9 °C) 88 20 168/84 94 %   08/22/17 1256 99.8 °F (37.7 °C) 87 20 140/75 93 %       Intake/Output Summary (Last 24 hours) at 08/23/17 0955  Last data filed at 08/23/17 0645   Gross per 24 hour   Intake          1687.67 ml   Output             3305 ml   Net         -1617.33 ml        PHYSICAL EXAM:  General:                              WD, WN.  Open eyes to tactile and voice stimuli. Non cooperative, no acute distress    EENT:                                  EOMI. Anicteric sclerae. Dry mucous membrane  Resp:                                   CTA in apex with decreased breath sounds at bases, no wheezing or rales.                                               No accessory muscle use  CV:                                      Regular  rhythm,  No edema  GI:                                       soft + distended, + tender, + rebound  +Bowel sounds  Neurologic:                          Open eyes to tactile stimuli, not following any commends   Psych:                                 Poor insight. Not anxious nor agitated  Skin:                                    Erythema around SP site.  No jaundice, dry skin      Reviewed most current lab test results and cultures  YES  Reviewed most current radiology test results   YES  Review and summation of old records today    NO  Reviewed patient's current orders and MAR    YES  PMH/ reviewed - no change compared to H&P  ________________________________________________________________________  Care Plan discussed with:    Comments   Patient y    Family  y Wife, DTRS   RN y    Care Manager y    Consultant                       y Multidiciplinary team rounds were held today with , nursing, pharmacist and clinical coordinator. Patient's plan of care was discussed; medications were reviewed and discharge planning was addressed.      ________________________________________________________________________  Total NON critical care TIME:  60  Minutes    Total CRITICAL CARE TIME Spent:   Minutes non procedure based      Comments   >50% of visit spent in counseling and coordination of care y    ________________________________________________________________________  Josep Varghese MD     Procedures: see electronic medical records for all procedures/Xrays and details which were not copied into this note but were reviewed prior to creation of Plan.      LABS:  I reviewed today's most current labs and imaging studies.   Pertinent labs include:  Recent Labs      08/23/17   0330  08/23/17   0041  08/21/17   0507   WBC  14.0*  11.4*  10.7   HGB  9.9*  9.4*  9.8*   HCT  30.5*  29.0*  29.4*   PLT  305  255  279     Recent Labs      08/23/17   0330  08/22/17   0330  08/21/17   0507   NA  137  135*  132*   K  4.7  4.2  4.2   CL  103  102  100   CO2  27  25  26   GLU  144*  156*  158*   BUN  43*  34*  30*   CREA  1.31*  1.09  1.00   CA  8.6  7.9*  8.0*   MG  2.3  2.4  2.2   PHOS   --    --   3.1   ALB  2.2*   --   2.0*   TBILI  0.7   --   0.6   SGOT  106*   --   43*   ALT  92*   --   44       Signed: )En Mendoza MD

## 2017-08-23 NOTE — PROGRESS NOTES
PCU SHIFT NURSING NOTE      Bedside and Verbal shift change report given to Monica Mckeon RN (oncoming nurse) by Shailesh Turner RN (offgoing nurse). Report included the following information SBAR, Kardex, Intake/Output and Cardiac Rhythm ST. Shift Summary: 12:  Family at bedside very upset about events from yesterday and patient now being on a venti mask and they were not called and told about changes. Informed family I would look into events from last night to determine why patient was on venti mask. 0900:  Family in [place] insisting on getting MD to bedside for 9 am meeting, stated \"they have things to do. \"      Admission Date 7/27/2017   Admission Diagnosis bladder clots  Bladder injury  perperated viscous  wound dehiscence   Consults IP CONSULT TO UROLOGY  IP CONSULT TO GASTROENTEROLOGY  IP CONSULT TO NEUROLOGY  IP CONSULT TO PALLIATIVE CARE - PROVIDER  IP CONSULT TO CARDIOLOGY        Consults   []PT   []OT   []Speech   []Case Management      [] Palliative      Cardiac Monitoring Order   []Yes   []No     IV drips   []Yes    Drip:                            Dose:  Drip:                            Dose:  Drip:                            Dose:   []No     GI Prophylaxis   []Yes   []No         DVT Prophylaxis   SCDs:  Sequential Compression Device: Bilateral     Patient Refused VTE Prophylaxis: Yes    Ash stockings:  Graduated Compression Stockings: Bilateral      [] Medication   []Contraindicated   []None      Activity Level Activity Level: Bed Rest     Activity Assistance: Complete care   Purposeful Rounding every 1-2 hour? []Yes   Mcdonald Score  Total Score: 3   Bed Alarm (If score 3 or >)   []Yes   [] Refused (See signed refusal form in chart)   Kong Score  Kong Score: 12   Kong Score (if score 14 or less)   []PMT consult   []Wound Care consult      []Specialty bed   [] Nutrition consult          Needs prior to discharge:   Home O2 required:    []Yes   []No    If yes, how much O2 required?     Other:    Last Bowel Movement: Last Bowel Movement Date: 08/21/17      Influenza Vaccine Received Flu Vaccine for Current Season (usually Sept-March): Not Flu Season        Pneumonia Vaccine           Diet Active Orders   Diet    DIET NPO      LDAs         PICC Double Lumen 03/46/62 Basilic;Left (Active)   Central Line Being Utilized Yes 8/23/2017  3:30 AM   Criteria for Appropriate Use Total parenteral nutrition 8/23/2017  3:30 AM   Site Assessment Clean, dry, & intact 8/23/2017  3:30 AM   Phlebitis Assessment 0 8/23/2017  3:30 AM   Infiltration Assessment 0 8/23/2017  3:30 AM   Arm Circumference (cm) 33 cm 8/2/2017 10:47 AM   Date of Last Dressing Change 08/17/17 8/23/2017  3:30 AM   Dressing Status Clean, dry, & intact 8/23/2017  3:30 AM   Action Taken Open ports on tubing capped 8/23/2017  3:30 AM   External Catheter Length (cm) 0 centimeters 8/20/2017  6:13 AM   Dressing Type Disk with Chlorhexadine gluconate (CHG); Transparent 8/23/2017  3:30 AM   Hub Color/Line Status Purple; Infusing 8/23/2017  3:30 AM   Positive Blood Return (Site #1) Yes 8/23/2017  3:30 AM   Hub Color/Line Status Red;Capped 8/23/2017  3:30 AM   Positive Blood Return (Site #2) Yes 8/23/2017  3:30 AM   Alcohol Cap Used Yes 8/22/2017  6:00 PM                            Urinary Catheter [REMOVED] Urinary Catheter 07/27/17 2- way-Criteria for Appropriate Use: Obstruction/retention  Urinary Catheter 07/28/17 3- way; [place]-Criteria for Appropriate Use: Medically/surgically unstable  Urinary Catheter 07/28/17 Other (Comment)-Criteria for Appropriate Use: Medically/surgically unstable  [REMOVED] Urinary Catheter 07/26/17 2- way-Criteria for Appropriate Use: Obstruction/retention    Intake & Output   Date 08/22/17 0700 - 08/23/17 0659 08/23/17 0700 - 08/24/17 0659   Shift 8017-9858 3225-3694 24 Hour Total 5643-1675 3065-8072 24 Hour Total   I  N  T  A  K  E   I.V.  (mL/kg/hr) 975.3  (0.9) 712.4  (0.7) 1687.7  (0.8)         Volume (TPN ADULT - CENTRAL AA 5% D20% W/ CA + ELECTROLYTES) 975.3  975.3         Volume (TPN ADULT - CENTRAL AA 5% D20% W/ CA + ELECTROLYTES) 0 712.4 712.4       Shift Total  (mL/kg) 975.3  (10.7) 712.4  (7.8) 1687.7  (18.5)      O  U  T  P  U  T   Urine  (mL/kg/hr) 1650  (1.5) 2005  (1.8) 3655  (1.7)         Urine Output (mL) (Urinary Catheter 07/28/17 3- way; [place]) 400 320 720         Urine Output (mL) (Urinary Catheter 07/28/17 Other (Comment)) 1250 1685 2935       Shift Total  (mL/kg) 1650  (18.1) 2005  (22) 3655  (40)      NET -674.8 -1292.6 -1967.3      Weight (kg) 91 91.3 91.3 91.3 91.3 91.3         Readmission Risk Assessment Tool Score Medium Risk            17       Total Score        3 Has Seen PCP in Last 6 Months (Yes=3, No=0)    2 . Living with Significant Other. Assisted Living. LTAC. SNF. or   Rehab    3 Patient Length of Stay (>5 days = 3)    4 IP Visits Last 12 Months (1-3=4, 4=9, >4=11)    5 Pt.  Coverage (Medicare=5 , Medicaid, or Self-Pay=4)        Criteria that do not apply:    Charlson Comorbidity Score (Age + Comorbid Conditions)       Expected Length of Stay 5d 12h   Actual Length of Stay 26

## 2017-08-23 NOTE — PROGRESS NOTES
Spoke with nursing and pt is now on venti mask and not opening eyes or responding to cues. Will defer treatment today.

## 2017-08-23 NOTE — PROGRESS NOTES
Pt found hyperventilating, sinus tach (104-112). Abg's reveal PH: 7.55, CO2 26, PO2 84, HCO3 22, BE 1.4 via 28% Venti mask.  RN aware of findings

## 2017-08-23 NOTE — PROGRESS NOTES
RRT assessment performed, patient received in mod resp distress, breathing 39-40 breaths/min, abdominal breathing noted, audible wheezing present, persistent  hypertensive 170's s/p PRN medication given, with noted abnormal appearing ECG on monitor. EKG and CE ordered per protocol & sent to ED MD for stat interpretation verification. Floor RN instructed to F/U with tele-hospitalist with results. Floor RN also instructed to inform tele-hospitalist of ED MDs concern of acute changes in EKG (but not ST elevation). Will continue to monitor. ..CE results pending.

## 2017-08-23 NOTE — PROGRESS NOTES
1910: Bedside and verbal report received. 2000: Assessment completed. Patient following few commands. Will not open eyes. Lungs sounds coarse bilaterally. 3L NC. Family at bedside. Daughters at bedside. They were upset that the patient received a CT scan during the day. Family also upset that patient received ativan. Stated, Gabbie Rodriguez should never get ativan\". Family will be back in the morning for the 9 am family meeting. Patient family requesting that he does not receive any narcotics. 2245: Daughter, Evangelina Hernandez, called for update. 2324: Tylenol given for axially temp of 101.1  0000: Reassessment completed. No changes noted. 0020: Paged hospitalist on call Dr. Jose Veliz returned call. Informed him of increase in RR and temp. New orders given for a CBC, lactic acid, blood cultures, UA and a CXR.   0140: Rapid response. EKG and cardiac enzymes performed. Venti mask placed. 0: Paged Dr. Jose Veliz. Informed him of recent results and mottling present on bilateral lower extremeties. He stated he would have a hospitalist consult. 65: Spoke with Dr. Sandra Quezada. Informed him of events overnight. New orders were given to stop TNP. To call back after diuresing. 0533: Spoke to Dr. Sandra Quezada. New orders given for another 40 mg of lasix. Repeat ABG in in 1 hour after medicated. 0700: Bedside and verbal report given to MELISSA Reyes.

## 2017-08-23 NOTE — PROGRESS NOTES
Palliative Medicine     LCSW responded to phone call from Pt's dtr, Rajan Yost that was left a little after 9 am this morning. Pt's dtr said she had called about attending not yet being at a scheduled mtg but said attending had arrived and they were talking about Pt's condition. She had no needs at this time and would call if this LCSW if needed. IDT with Palliative Medicine Team. Kofi Mae, Palliative NP is provider on case. Based on LCSW conversation with Pt's dtr, Rajan Yost, yesterday, we will be available to family for support and will only have family mtg if requested by family. From 8/22/17 note:   Note, Radha Richard, Pt's wife/nok medical surrogate (age 80) has deferred to [facility] of daughters Torri Villarreal, [phone] and Justus Bearden, [phone]). Rajan Yost provided correct spellings of dtrs names to this LCSW> Main contact would be Governor Johnny not wife. Governor Turner has more flexible schedule than Manda. Rajan Yost works f-t and is trying to streamline projects so she can take time away from office when Pt is discharged from hospital.  Informed Rajan Yost that Palliative team is supportive service and not meant to add burden to them. We will not have a meeting unless they request one. Communication will be by phone. Rajan Yost asked that Governor Johnny be the contact because she has most flexible schedule. (Addendum: Left Palliative Med brochure for Rajan Yost on windowsill afternoon of 8/22). Chinmay Sanders, [address]

## 2017-08-24 NOTE — PROGRESS NOTES
Received a call from patients Advocate and CM stating that family had requested a Gurinder Boone Doctor who speaks English, with this information I will sign off the case and appreciate the help from Dr. Ryan Kirkpatrick who graciously accepted to care for him starting tomorrow 08/25/17.   Dr. Carson Gloss

## 2017-08-24 NOTE — PROGRESS NOTES
Hospitalist Progress Note    NAME: Sachin Araujo   :  1928   MRN:  [id number]       Interim Hospital Summary: 80 y.o. male whom presented on 2017 with      Assessment / Plan:  Encephalopathy Hypoxia vs Ischemia  - open eyes to verbal stimuli, limited conversation, falls asleep during conversation  - MRI of brain:  multiple punctate foci of acute infarction in the  bilateral corona radiata and centrum semiovale. Background of mild chronic  microvascular ischemic disease. The result was discussed with the family by Dr. Glenroy Singh  - MRI findings consistent with hypoxic injury and multiple small infarcts are the cause of encephalopathy and confusion. 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 Age-related volume loss unchanged  - ammonia 33, B12 1046, TSH 3.7  - neurology will follow as need basis ()  -Still very lethargic, as per Neurology signs of either  Hypoxic vs Ischemic  Encephalopathy. Will ask Neurology to pass by for update. Febrile  - afebrile for 24 hours so far. CT of ABD/PEL for recurrent fever to assess any abscess collection  - chest x-ray: No interval change in interstitial edema pattern  - blood cx no growth, urine cx (-). Pt was on Rocephin for (+) U/A. D/c'd rocephin  - ordered wound cx from abdominal incision site and suprapubic catheter site  - ordered CT of abd/pel (abdomen is showing signs of peritoneal irritation, need CT scan to be done)  Sepsis: fever, tachycardia, leukocytosis due to UTI vs PNA. UTI: prior UTI grew Enterococcus, completed Ampicillin, yeast noted, c/w Eraxis, Unasyn and Zyvox, F/U cultures.   Postop respiratory failure s/p extubation , now on RA with O2 Sat 98%  S/P removal of bladder stone and repair of perforated bladder ()  - Cystoscopy with attempted clot evacuation with subsequent exploratory laparotomy with bladder stone retrieval and repair of large posterior bladder perforation on 7/28  - urology following; recommend to apply H2O2 to suprapubic site with Q tip daily. S/p Exp Lap (7/29) due to perforate viscus; diffuse gastric distention without bowel perforation   S/p Exp lap with closure for dehiscence with retention sutures (8/5)  - general surgery following.  Dr. Kae Braun recommended to consult GI if and when the patient requires alternative tube feeding. CT abdomen 8/22/17 shows catheter in place with bladder thickening and b/l pleural effusions. Nutritional support, c/w TPN, ordered today. Difficulty with feeding  - General surgery recommended to consult GI for alternative tube feeding is needed  - SLP following; keep NPO, ice chips ok per nursing when pt is awake  - continue with TPN; discussed with the family about alternate tube feeding.  Family would like to wait until Monday to think about the option.  Family would like to work on extra stimulation and encourage for the pt to eat. No changes in patient's status. Family not interested alternative tube feeding at present time  Shock resolved  LINDSEY Resolved initially, creatinine trending up again. Increased LFT: due to sepsis, monitor. Pneumothorax due to dissection of air from retroperitoneum cephalad, different physiology or behavior of typical pneumothorax -conservative management   CAD, PAF with RVR  CHF?: has bilateral pleural effusions, ECHO EF 45% with diastolic dysfunction, CXR pulmonary edema, will give a dose of diuretics and monitor, check BNP. Pneumonia? ?: agree with Unasyn and Zyvox, c/w bronchodilators, monitor.   HTN  - again on  cardizem gtt  - restart plavix when able to take PO, okay with surgery   - continue with labetalol and nitrobid  Anemia, post op s/p PRBC    Electrolyte disturbance replete as indicated, on tpn  Pruritus (resolved)  Constipation resolved after dulcolax suppository  DVT prophylaxis: Lovenox  Code status: FULL code (Family would like to get some more answers before they talk about code [address] [facility]  [phone] or [place] [phone] can receive info, do not call wife. Recommended Disposition: SNF; max assist  Patient may transition to rehab or SNF once clear by the primary team (MIKE) and general surgery team.    Very Sick patient with poor prognosis     Subjective:     Chief Complaint / Reason for Physician Visit  Discussed with RN events overnight. Pt opened eyes and made eye contact.  Not following commends. Discussed with RN events overnight. Review of Systems:  Symptom Y/N Comments  Symptom Y/N Comments   Fever/Chills y   Chest Pain     Poor Appetite    Edema     Cough    Abdominal Pain     Sputum    Joint Pain     SOB/CLARKE y   Pruritis/Rash     Nausea/vomit    Tolerating PT/OT     Diarrhea    Tolerating Diet     Constipation    Other       Could NOT obtain due to: encephalopathy     Objective:     VITALS:   Last 24hrs VS reviewed since prior progress note. Most recent are:  Patient Vitals for the past 24 hrs:   Temp Pulse Resp BP SpO2   08/24/17 0841 - - 20 - 98 %   08/24/17 0055 - (!) 125 - 143/74 -   08/24/17 0029 - (!) 134 - 122/77 -   08/23/17 1941 98.6 °F (37 °C) 86 - 150/82 96 %   08/23/17 1823 - - - 117/51 -   08/23/17 1800 - 74 - 170/82 -   08/23/17 1600 97.8 °F (36.6 °C) 88 25 146/72 95 %   08/23/17 1137 (!) 100.6 °F (38.1 °C) 91 (!) 32 145/80 98 %   08/23/17 1114 - - - - 96 %       Intake/Output Summary (Last 24 hours) at 08/24/17 0945  Last data filed at 08/24/17 0520   Gross per 24 hour   Intake                0 ml   Output             3450 ml   Net            -3450 ml        PHYSICAL EXAM:  General:                              WD, WN. Open eyes to tactile and voice stimuli. Non cooperative, no acute distress    EENT:                                  EOMI. Anicteric sclerae.  Dry mucous membrane  Resp:                                   Coarse BS, mild crackles in bases  No accessory muscle use  CV:                                      Regular  rhythm,  No edema  GI:                                       soft + distended, + tender, + rebound  +Bowel sounds  Neurologic:                          Open eyes to tactile stimuli, not following any commends   Psych:                                 Poor insight. Not anxious nor agitated  Skin:                                    Erythema around SP site.  No jaundice, dry skin      Reviewed most current lab test results and cultures  YES  Reviewed most current radiology test results   YES  Review and summation of old records today    NO  Reviewed patient's current orders and MAR    YES  PMH/SH reviewed - no change compared to H&P  ________________________________________________________________________  Care Plan discussed with:    Comments   Patient y    Family  y Wife, DTRS   RN y    Care Manager y    Consultant                       y Multidiciplinary team rounds were held today with , nursing, pharmacist and clinical coordinator. Patient's plan of care was discussed; medications were reviewed and discharge planning was addressed. ________________________________________________________________________  Total NON critical care TIME:    Minutes    Total CRITICAL CARE TIME Spent: 35  Minutes non procedure based      Comments   >50% of visit spent in counseling and coordination of care y    ________________________________________________________________________  Ruby Rivera MD     Procedures: see electronic medical records for all procedures/Xrays and details which were not copied into this note but were reviewed prior to creation of Plan. LABS:  I reviewed today's most current labs and imaging studies.   Pertinent labs include:  Recent Labs      08/24/17   0040  08/23/17   0330  08/23/17   0041   WBC  15.0*  14.0*  11.4*   HGB  9.2*  9.9*  9.4*   HCT  28.3*  30.5*  29.0*   PLT  254 305  255     Recent Labs      08/24/17   0040  08/23/17   0330  08/22/17   0330   NA  139  137  135*   K  3.5  4.7  4.2   CL  104  103  102   CO2  27  27  25   GLU  187*  144*  156*   BUN  52*  43*  34*   CREA  1.79*  1.31*  1.09   CA  7.8*  8.6  7.9*   MG  2.4  2.3  2.4   ALB  2.0*  2.2*   --    TBILI  0.6  0.7   --    SGOT  164*  106*   --    ALT  157*  92*   --        Signed: )Sergio Mar MD

## 2017-08-24 NOTE — PROGRESS NOTES
Problem: Self Care Deficits Care Plan (Adult)  Goal: *Acute Goals and Plan of Care (Insert Text)  Occupational Therapy Goals:  Routine 7 day re-eval   1. Patient will perform basic grooming seated SBA within 7 days. 2. Patient will tolerate 5 minutes of active exercise for increased strength with ADLS within 7 days. 3. Patient will sit edge of bed without support with good dynamic balance in prep for functional tasks within 7days. 4. Patient will perform sit to stand with max assist in prep for functional ADL transfers within 7 days. 5. Patient will perform UB dressing seated with SBA within 7 days. Initiated 8/9/2017  1. Patient will perform washing of face in supported sitting or at bed level with HOB elevated with max assist within 7 days. Met   2. Patient will tolerate 5 minutes of active exercise for increased strength with ADLS within 7 days. Continue   3. Patient will sit edge of bed without support with good dynamic balance in prep for functional tasks within 7days. continue  4. Patient will perform sit to stand with max assist in prep for functional ADL transfers within 7 days. continue   OCCUPATIONAL THERAPY TREATMENT  Patient: Kwabena Billy (39 y.o. male)  Date: 8/24/2017  Diagnosis: bladder clots  Bladder injury  perperated viscous  wound dehiscence <principal problem not specified>  Procedure(s) (LRB):  LAPAROTOMY EXPLORATORY, wound closure for dehiscence (N/A) 19 Days Post-Op  Precautions:  (abdominal binder)      ASSESSMENT:  Pt remains confused with decreased attention to task. Pt needed re-orienting and re-education on what task therapist was focusing on (such as sit to stand). Pt had loose BM upon arrival while in bed. Total assist and hand over hand assist for pt to reach for rail to roll into sidelying for dependent jermaine anal care. He was able to scoot hips over in bed using LE but did not do so when cued or asked.   Total assist for supine to sit and pt needed hand on surface (walker or bed) to remain sitting. Max assist x2 with full manual facilitation through glutes and upper chest for upright. Sit to stand trials attempted x3 overall. Unable to take steps today even with max assist with weight shift. Squat pivot transfer performed with max assist x2 with kandace pad under pt for return. Spoke to nurse to find out if pt continues to need abdominal binder with OOB activity as pt is 19 days post op. Family was present this session and was educated to keep lights on, talk with pt hand reminisce in hopes that pt will start to become more alert and oriented. They verbalized understanding   Progression toward goals:  [ ]       Improving appropriately and progressing toward goals  [X]       Improving slowly and progressing toward goals  [ ]       Not making progress toward goals and plan of care will be adjusted       PLAN:  Patient continues to benefit from skilled intervention to address the above impairments. Continue treatment per established plan of care. Discharge Recommendations:  Skilled Nursing Facility  Further Equipment Recommendations for Discharge:  TBD       SUBJECTIVE:   Patient stated I am not sure.   When asked what therapy was asking pt to do      OBJECTIVE DATA SUMMARY:   Cognitive/Behavioral Status:  Neurologic State: Confused;Drowsy  Orientation Level: Oriented to person  Cognition: Decreased attention/concentration;Decreased command following;Poor safety awareness  Perception: Appears intact  Perseveration: No perseveration noted  Safety/Judgement: Fall prevention     Functional Mobility and Transfers for ADLs:  Bed Mobility:  Rolling: Total assistance  Supine to Sit: Total assistance;Assist x2  Scooting:  Total assistance     Transfers:  Sit to Stand: Maximum assistance;Assist x2 (extensive manual cues for upright; trial x3)        Balance:  Sitting - Static: Fair (occasional)  Sitting - Dynamic: Poor (constant support)  Standing: Impaired  Standing - Static: Poor;Constant support  Standing - Dynamic : None     ADL Intervention:     Upper Body [address]: Total assistance (dependent) (at bed level)           Toileting  Bowel Hygiene: Total assistance (dependent) (at bed level)     Cognitive Retraining  Orientation Retraining: Place;Situation  Problem Solving: Identifying the task  Safety/Judgement: Fall prevention     Pain:  Pain Scale 1: Visual  Pain Intensity 1: 0              Activity Tolerance:      Please refer to the flowsheet for vital signs taken during this treatment.   After treatment:   [X] Patient left in no apparent distress sitting up in chair; with kandace pad under pt  [ ] Patient left in no apparent distress in bed  [X] Call bell left within reach  [X] Nursing notified  [ ] Caregiver present  [ ] Bed alarm activated      COMMUNICATION/COLLABORATION:   The patients plan of care was discussed with: Physical Therapist, Registered Nurse and patient and his family     Coral Montes OTR/L  Time Calculation: 33 mins

## 2017-08-24 NOTE — PROGRESS NOTES
Hospitalist Progress Note    NAME: David Blizzard   :  1928   MRN:  [id number]       Assessment / Plan:  Encephalopathy Hypoxia vs Ischemia  - open eyes to verbal stimuli, limited conversation, falls asleep during conversation  - MRI of brain:  multiple punctate foci of acute infarction in the  bilateral corona radiata and centrum semiovale. Background of mild chronic  microvascular ischemic disease. The result was discussed with the family by Dr. Huong Wang  - MRI findings consistent with hypoxic injury and multiple small infarcts are the cause of encephalopathy and confusion. 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 Age-related volume loss unchanged  - ammonia 33, B12 1046, TSH 3.7  - neurology will follow as need basis ()  -Still very lethargic, as per Neurology signs of either  Hypoxic vs Ischemic  Encephalopathy. Will ask Neurology to pass by for update. Febrile  - afebrile for 48 hours so far. -CT : wo acute findings   - chest x-ray: No interval change in interstitial edema pattern  - blood cx no growth, urine cx (-). Pt was on Rocephin for (+) U/A. D/c'd rocephin  - ordered wound cx from abdominal incision site and suprapubic catheter site  - ordered CT of abd/pel (abdomen is showing signs of peritoneal irritation, need CT scan to be done)  Sepsis: fever, tachycardia, leukocytosis due to UTI vs PNA. UTI: prior UTI grew Enterococcus, completed Ampicillin, yeast noted, c/w Eraxis, Unasyn and Zyvox, F/U cultures.   Postop respiratory failure s/p extubation , now on RA with O2 Sat 98%  S/P removal of bladder stone and repair of perforated bladder ()  - Cystoscopy with attempted clot evacuation with subsequent exploratory laparotomy with bladder stone retrieval and repair of large posterior bladder perforation on   - urology following; recommend to apply H2O2 to suprapubic site with Q tip daily. S/p Exp Lap (7/29) due to perforate viscus; diffuse gastric distention without bowel perforation   S/p Exp lap with closure for dehiscence with retention sutures (8/5)  - general surgery following.  Dr. Aleah Alcantara recommended to consult GI if and when the patient requires alternative tube feeding. CT abdomen 8/22/17 shows catheter in place with bladder thickening and b/l pleural effusions. Nutritional support, c/w TPN, ordered today. Difficulty with feeding  - General surgery recommended to consult GI for alternative tube feeding is needed  - SLP following; keep NPO, ice chips ok per nursing when pt is awake  - continue with TPN; discussed with the family about alternate tube feeding.  Family would like to wait until Monday to think about the option.  Family would like to work on extra stimulation and encourage for the pt to eat. No changes in patient's status. Family not interested alternative tube feeding at present time  Shock resolved  LINDSEY Resolved initially, creatinine trending up again. Increased LFT: due to sepsis, monitor. Pneumothorax due to dissection of air from retroperitoneum cephalad, different physiology or behavior of typical pneumothorax -conservative management   CAD, PAF with RVR  CHF?: has bilateral pleural effusions, ECHO EF 61% with diastolic dysfunction, CXR pulmonary edema, will give a dose of diuretics and monitor, check BNP. Pneumonia? ?: agree with Unasyn and Zyvox, c/w bronchodilators, monitor.   HTN  - on  cardizem gtt  - restart plavix when able to take PO, okay with surgery   - continue with labetalol and nitrobid  Anemia, post op s/p PRBC    Electrolyte disturbance replete as indicated, on tpn  Pruritus (resolved)  Constipation resolved after dulcolax suppository  DVT prophylaxis: Lovenox      Code status: Full code    NOK: palliative care team following - need to further address if majority of children for medical decisions or all other 4 children deferring to Usha Vora  [phone] or [place] [phone]   Children don't want wife to be involved in medical decision making     Body mass index is 28.88 kg/(m^2). Recommended Disposition: TBD     Subjective:     Chief Complaint / Reason for Physician Visit: encephalopathy / UTI   Pt is awake but not communicative       Discussed with RN events overnight. Review of Systems:  Symptom Y/N Comments  Symptom Y/N Comments   Fever/Chills    Chest Pain     Poor Appetite    Edema     Cough    Abdominal Pain     Sputum    Joint Pain     SOB/CLARKE    Pruritis/Rash     Nausea/vomit    Tolerating PT/OT     Diarrhea    Tolerating Diet     Constipation    Other       Could NOT obtain due to: AMS      Objective:     VITALS:   Last 24hrs VS reviewed since prior progress note. Most recent are:  Patient Vitals for the past 24 hrs:   Temp Pulse Resp BP SpO2   08/24/17 1127 - (!) 118 - 126/70 96 %   08/24/17 0841 - - 20 - 98 %   08/24/17 0740 97.8 °F (36.6 °C) 97 20 130/82 96 %   08/24/17 0055 - (!) 125 - 143/74 -   08/24/17 0029 - (!) 134 - 122/77 -   08/23/17 1941 98.6 °F (37 °C) 86 - 150/82 96 %   08/23/17 1823 - - - 117/51 -   08/23/17 1800 - 74 - 170/82 -       Intake/Output Summary (Last 24 hours) at 08/24/17 1612  Last data filed at 08/24/17 1229   Gross per 24 hour   Intake                0 ml   Output             1875 ml   Net            -1875 ml        PHYSICAL EXAM:  General: WD, WN. Awake, not communicative, not following commands. No acute distress    EENT:  EOMI. Anicteric sclerae. MMM  Resp:  CTA bilaterally, no wheezing or rales. No accessory muscle use  CV:  Regular  rhythm,  No edema  GI:  Soft, Non distended, Non tender.  +Bowel sounds                           + suprapubic catheter and Rosales   Neurologic:    Awake, not communicative, not following commands  Psych:   Poor insight. Not anxious nor agitated  Skin:  No rashes.   No jaundice    Reviewed most current lab test results and cultures  YES  Reviewed most current radiology test results   YES  Review and summation of old records today    NO  Reviewed patient's current orders and MAR    YES  PMH/SH reviewed - no change compared to H&P  ________________________________________________________________________  Care Plan discussed with:    Comments   Patient y    Family      RN y    Care Manager     Consultant  y palliative care team                      Multidiciplinary team rounds were held today with , nursing, pharmacist and clinical coordinator. Patient's plan of care was discussed; medications were reviewed and discharge planning was addressed. ________________________________________________________________________  Total NON critical care TIME:  35   Minutes    Total CRITICAL CARE TIME Spent:   Minutes non procedure based      Comments   >50% of visit spent in counseling and coordination of care y Coordination of care    ________________________________________________________________________  Marisa Allen MD     Procedures: see electronic medical records for all procedures/Xrays and details which were not copied into this note but were reviewed prior to creation of Plan. LABS:  I reviewed today's most current labs and imaging studies.   Pertinent labs include:  Recent Labs      08/24/17 0040 08/23/17 0330 08/23/17 0041   WBC  15.0*  14.0*  11.4*   HGB  9.2*  9.9*  9.4*   HCT  28.3*  30.5*  29.0*   PLT  254  305  255     Recent Labs      08/24/17   0040 08/23/17 0330 08/22/17 0330   NA  139  137  135*   K  3.5  4.7  4.2   CL  104  103  102   CO2  27  27  25   GLU  187*  144*  156*   BUN  52*  43*  34*   CREA  1.79*  1.31*  1.09   CA  7.8*  8.6  7.9*   MG  2.4  2.3  2.4   ALB  2.0*  2.2*   --    TBILI  0.6  0.7   --    SGOT  164*  106*   --    ALT  157*  92*   --        Signed: Marisa Allen MD

## 2017-08-24 NOTE — PROGRESS NOTES
Problem: Mobility Impaired (Adult and Pediatric)  Goal: *Acute Goals and Plan of Care (Insert Text)  Physical Therapy Goals  Revised 8/24/2017  1. Patient will move from supine to sit and sit to supine , scoot up and down and roll side to side in bed with maximal assistance within 7 day(s). 2. Patient will transfer from bed to chair and chair to bed with maximal assistance x2 using the least restrictive device within 7 day(s). 3. Patient will perform sit to stand with maximal assistance x2 within 7 day(s). 4. Patient will sit EOB for 10 minutes without external support with good stability within 7 days. Physical Therapy Goals  Revised 8/16/2017  Previous goals continue to be appropriate - goal 4 MET    Physical Therapy Goals  Initiated 8/9/2017  1. Patient will move from supine to sit and sit to supine , scoot up and down and roll side to side in bed with moderate assistance within 7 day(s). 2. Patient will transfer from bed to chair and chair to bed with maximal assistance using the least restrictive device within 7 day(s). 3. Patient will perform sit to stand with maximal assistance within 7 day(s). 4. Patient will tolerate sitting EOB x 5 minutes without external support in preparation for standing task within 7 days. PHYSICAL THERAPY TREATMENT: WEEKLY REASSESSMENT  Patient: Clair Norman (25 y.o. male)  Date: 8/24/2017  Diagnosis: bladder clots  Bladder injury  perperated viscous  wound dehiscence <principal problem not specified>  Procedure(s) (LRB):  LAPAROTOMY EXPLORATORY, wound closure for dehiscence (N/A) 19 Days Post-Op  Precautions:  (abdominal binder)      ASSESSMENT:  Patient continues to present with altered mental status, significant generalized weakness impacting mobility. Received in supine, with BM noted on gown, self, requiring significant time for total A hygiene tasks.  Once cleaned, patient total A to sit EOB where sat for 7 minutes total as room adjusted without external support and hands on bed with maintenance of midline without cues. Attempted sit<>stand x2, once with RW, once with HHA, requiring max-total A of 2 and CGA of another with patient unable to weight shift appropriately despite manual facilitation to begin to step functionally. At that point, patient performed squat pivot with total A to patient's R. Recommend RN staff utilize kandace lift to return patient to bed once approppriate. Family remains at bedside, supportive. Recommend SNF at [place]. Patient's progression toward goals since last assessment: 1 met; all goals adjusted       PLAN:  Goals have been updated based on progression since last assessment. Patient continues to benefit from skilled intervention to address the above impairments. Continue to follow the patient 3 times a week to address goals. Planned Interventions:  [X]              Bed Mobility Training             [X]       Neuromuscular Re-Education  [X]              Transfer Training                   [ ]       Orthotic/Prosthetic Training  [X]              Gait Training                         [ ]       Modalities  [X]              Therapeutic Exercises           [ ]       Edema Management/Control  [X]              Therapeutic Activities            [X]       Patient and Family Training/Education  [ ]              Other (comment):  Discharge Recommendations: Tavo Ramos  Further Equipment Recommendations for Discharge: defer       SUBJECTIVE:   Patient stated Marina Ramus.       OBJECTIVE DATA SUMMARY:   Critical Behavior:  Neurologic State: Confused, Drowsy  Orientation Level: Oriented to person  Cognition: Decreased attention/concentration, Decreased command following, Poor safety awareness  Safety/Judgement: Fall prevention        Functional Mobility Training:  Bed Mobility:  Rolling: Total assistance  Supine to Sit: Total assistance;Assist x2     Scooting:  Total assistance        Transfers:  Sit to Stand: Maximum assistance;Assist x2 (extensive manual cues for upright; trial x3)     Stand Pivot Transfers: Total assistance (X3 )                          Balance:  Sitting - Static: Fair (occasional)  Sitting - Dynamic: Poor (constant support)  Standing: Impaired  Standing - Static: Poor;Constant support  Standing - Dynamic : None     Pain:  Pain Scale 1: Visual  Pain Intensity 1: 0              Activity Tolerance:   VSS throughout on 1L NCO2. Please refer to the flowsheet for vital signs taken during this treatment.   After treatment:   [X]  Patient left in no apparent distress sitting up in chair  [ ]  Patient left in no apparent distress in bed  [X]  Call bell left within reach  [X]  Nursing notified  [X]  Caregiver present  [X]  Bed alarm activated      COMMUNICATION/COLLABORATION:   The patients plan of care was discussed with: Occupational Therapist and Registered Nurse     Dany Mcguire PT, DPT    Time Calculation: 29 mins

## 2017-08-24 NOTE — PROGRESS NOTES
Palliative Medicine     IDT with Palliative team this morning. LCSW received call from Pt Advocacy communicating family had a few questions for Palliative Med before deciding if would like our supportive services and would be open to LCSW visiting today. LCSW visited Pt with wife and dtr (Janene Carter) at bedside. LCSW explained role of palliative medicine team and family decided they would like to talk more. LCSW asked if they wanted to speak in room or outside of room as Pt is sitting up in chair with eyes closed so uncertain what he is hearing. Family verbalized appreciation for being asked and indicated preferred to meet outside of room. LCSW provided active listening as Pt's dtr (Janene Carter) and Pt's wife Rain Haines) expressed their thoughts, their understanding of medical information have received and processed over these past 35 days, and hopes for Pt's future. Their desire is for Pt to recover sufficiently to allow him to return home with dtrs assisting in care of Pt with wife's support. Janene Carter does not want pt to go to a SNF Rehab because she has experiences with SNFs from her ministry. Family desires for hope/positive acknowledgements of Pt's steps forward while also \"do not sugar coat\" reality of Pt's conditions & to be explain in empathetic and medical jargon-free way. LCSW asked what would be helpful and family said a Palliative mtg that addresses: 1. What is Pt's clinical picture today, what are treatments, and explain what are looking for to see/changes indicating recovery. 2. Confirm allergies and/or medication restrictions (placed by family, ie. \"Ativan\") are recorded in chart. Palliative Meeting with Dr. Inessa Valenzuela and this LCSW scheduled for Friday at 12:30 pm.     Family and Pt are spiritual. Kelle Atwood is sense of strength and upheld as highest power. Family shared about Pt.  He is a  with 6 children who has been relocated to many states for [employer] (3 dtrs (all live in [address]) and 3 sons (all live in different states)). (During Palliative Medicine meeting we will have to address NOK deferment situation as including majority of children for medical decisions or all other 4 children deferring to [place]). Pt is generally not a conversationalist. He is sarcastic. He likes [employer] channel. He enjoys music: Cameron Buchanan and [place]. Family was agreeable to music therapy referral for positive interaction. LCSW made referral to [facility] for anticipatory grief/losses (lengthy hospitalization), positive interaction, and spiritual support. Thank you for including Palliative Medicine in [facility] care.       eMlly Rogers, [address] ([id number])  Work cell: [phone]

## 2017-08-24 NOTE — PROGRESS NOTES
Bedside shift change report given to [address] [place] [address] (oncoming nurse) by Efrain Gomez (offgoing nurse). Report included the following information SBAR, Procedure Summary, Intake/Output, MAR, Accordion and Recent Results.

## 2017-08-24 NOTE — PROGRESS NOTES
Conference Call was held with Patient Advocate, Hospitalist (Dr. Jhoana Harris) and this writer regarding family request made to Patient Advocate. Family does not want Hospitalist to see patient, and have requested a \"Mu-ism Physician who speaks English\". Hospitalist contacting Dr. Kwaku Belle (Attending) regarding family request and will recommending that Hospitalist service be reconsulted if needed in the future.     Lan Espinal  AD/Care Management  [phone]

## 2017-08-24 NOTE — PROGRESS NOTES
Gu    Events noted  Patient Vitals for the past 12 hrs:   Temp Pulse Resp BP SpO2   08/24/17 1127 - (!) 118 - 126/70 96 %   08/24/17 0841 - - 20 - 98 %   08/24/17 0740 97.8 °F (36.6 °C) 97 20 130/82 96 %     Recent Results (from the past 24 hour(s))   GLUCOSE, POC    Collection Time: 08/23/17  5:53 PM   Result Value Ref Range    Glucose (POC) 139 (H) 65 - 100 mg/dL    Performed by [employer]    CBC WITH AUTOMATED DIFF    Collection Time: 08/24/17 12:40 AM   Result Value Ref Range    WBC 15.0 (H) 4.1 - 11.1 K/uL    RBC 3.21 (L) 4.10 - 5.70 M/uL    HGB 9.2 (L) 12.1 - 17.0 g/dL    HCT 28.3 (L) 36.6 - 50.3 %    MCV 88.2 80.0 - 99.0 FL    MCH 28.7 26.0 - 34.0 PG    MCHC 32.5 30.0 - 36.5 g/dL    RDW 15.6 (H) 11.5 - 14.5 %    PLATELET 012 395 - 472 K/uL    NEUTROPHILS 74 %    LYMPHOCYTES 21 %    MONOCYTES 5 %    EOSINOPHILS 0 %    BASOPHILS 0 %    ABS. NEUTROPHILS 11.0 K/UL    ABS. LYMPHOCYTES 3.2 K/UL    ABS. MONOCYTES 0.8 K/UL    ABS. EOSINOPHILS 0.0 K/UL    ABS. BASOPHILS 0.0 K/UL    DF MANUAL      RBC COMMENTS NORMOCYTIC, NORMOCHROMIC      WBC COMMENTS REACTIVE LYMPHS     MAGNESIUM    Collection Time: 08/24/17 12:40 AM   Result Value Ref Range    Magnesium 2.4 1.6 - 2.4 mg/dL   METABOLIC PANEL, COMPREHENSIVE    Collection Time: 08/24/17 12:40 AM   Result Value Ref Range    Sodium 139 136 - 145 mmol/L    Potassium 3.5 3.5 - 5.1 mmol/L    Chloride 104 97 - 108 mmol/L    CO2 27 21 - 32 mmol/L    Anion gap 8 5 - 15 mmol/L    Glucose 187 (H) 65 - 100 mg/dL    BUN 52 (H) 6 - 20 MG/DL    Creatinine 1.79 (H) 0.70 - 1.30 MG/DL    BUN/Creatinine ratio 29 (H) 12 - 20      GFR est AA 44 (L) >60 ml/min/1.73m2    GFR est non-AA 36 (L) >60 ml/min/1.73m2    Calcium 7.8 (L) 8.5 - 10.1 MG/DL    Bilirubin, total 0.6 0.2 - 1.0 MG/DL    ALT (SGPT) 157 (H) 12 - 78 U/L    AST (SGOT) 164 (H) 15 - 37 U/L    Alk.  phosphatase 168 (H) 45 - 117 U/L    Protein, total 7.6 6.4 - 8.2 g/dL    Albumin 2.0 (L) 3.5 - 5.0 g/dL    Globulin 5.6 (H) 2.0 - 4.0 g/dL    A-G Ratio 0.4 (L) 1.1 - 2.2     BNP    Collection Time: 08/24/17 12:40 AM   Result Value Ref Range     (H) 0 - 100 pg/mL   TROPONIN I    Collection Time: 08/24/17 12:40 AM   Result Value Ref Range    Troponin-I, Qt. 0.05 (H) <0.05 ng/mL   GLUCOSE, POC    Collection Time: 08/24/17  1:19 AM   Result Value Ref Range    Glucose (POC) 190 (H) 65 - 100 mg/dL    Performed by Kenyatta García    GLUCOSE, POC    Collection Time: 08/24/17  6:56 AM   Result Value Ref Range    Glucose (POC) 261 (H) 65 - 100 mg/dL    Performed by Kenyatta García    GLUCOSE, POC    Collection Time: 08/24/17 11:53 AM   Result Value Ref Range    Glucose (POC) 259 (H) 65 - 100 mg/dL    Performed by Radha Smalls      Not conversant today  Urine is clear   More coming from the SPT    Impr  S/p repair of ruptured bladder. SPT in place    Plan  Family reportedly wants the SPT out -- I think this is OK, as long as the wick functions well. If they agree, we will clamp the SPT and watch the wick. If it functions well, then we can remove the SPT. The wound looks clean.

## 2017-08-24 NOTE — PROGRESS NOTES
Admit Date: 2017    POD 19 Days Post-Op    Procedure:  Procedure(s):  LAPAROTOMY EXPLORATORY, wound closure for dehiscence    Subjective:     Patient stable    Objective:     Blood pressure 143/74, pulse (!) 125, temperature 98.6 °F (37 °C), resp. rate 20, height 5' 10\" (1.778 m), weight 201 lb 4.5 oz (91.3 kg), SpO2 98 %. Temp (24hrs), Av °F (37.2 °C), Min:97.8 °F (36.6 °C), Max:100.6 °F (38.1 °C)      Physical Exam:  GENERAL: no distress, appears stated age, LUNG: clear to auscultation bilaterally, HEART: regular rate and rhythm, ABDOMEN: soft, non-tender. Bowel sounds normal. No masses,  no organomegaly, wound c/d/i, less erythema at lower aspect of wound, EXTREMITIES:  extremities normal, atraumatic, no cyanosis or edema    Labs:   Recent Results (from the past 24 hour(s))   GLUCOSE, POC    Collection Time: 17 11:53 AM   Result Value Ref Range    Glucose (POC) 140 (H) 65 - 100 mg/dL    Performed by Karissa Samuels, POC    Collection Time: 17  5:53 PM   Result Value Ref Range    Glucose (POC) 139 (H) 65 - 100 mg/dL    Performed by Flores Quach    CBC WITH AUTOMATED DIFF    Collection Time: 17 12:40 AM   Result Value Ref Range    WBC 15.0 (H) 4.1 - 11.1 K/uL    RBC 3.21 (L) 4.10 - 5.70 M/uL    HGB 9.2 (L) 12.1 - 17.0 g/dL    HCT 28.3 (L) 36.6 - 50.3 %    MCV 88.2 80.0 - 99.0 FL    MCH 28.7 26.0 - 34.0 PG    MCHC 32.5 30.0 - 36.5 g/dL    RDW 15.6 (H) 11.5 - 14.5 %    PLATELET 709 371 - 872 K/uL    NEUTROPHILS 74 %    LYMPHOCYTES 21 %    MONOCYTES 5 %    EOSINOPHILS 0 %    BASOPHILS 0 %    ABS. NEUTROPHILS 11.0 K/UL    ABS. LYMPHOCYTES 3.2 K/UL    ABS. MONOCYTES 0.8 K/UL    ABS. EOSINOPHILS 0.0 K/UL    ABS.  BASOPHILS 0.0 K/UL    DF MANUAL      RBC COMMENTS NORMOCYTIC, NORMOCHROMIC      WBC COMMENTS REACTIVE LYMPHS     MAGNESIUM    Collection Time: 17 12:40 AM   Result Value Ref Range    Magnesium 2.4 1.6 - 2.4 mg/dL   METABOLIC PANEL, COMPREHENSIVE    Collection Time: 08/24/17 12:40 AM   Result Value Ref Range    Sodium 139 136 - 145 mmol/L    Potassium 3.5 3.5 - 5.1 mmol/L    Chloride 104 97 - 108 mmol/L    CO2 27 21 - 32 mmol/L    Anion gap 8 5 - 15 mmol/L    Glucose 187 (H) 65 - 100 mg/dL    BUN 52 (H) 6 - 20 MG/DL    Creatinine 1.79 (H) 0.70 - 1.30 MG/DL    BUN/Creatinine ratio 29 (H) 12 - 20      GFR est AA 44 (L) >60 ml/min/1.73m2    GFR est non-AA 36 (L) >60 ml/min/1.73m2    Calcium 7.8 (L) 8.5 - 10.1 MG/DL    Bilirubin, total 0.6 0.2 - 1.0 MG/DL    ALT (SGPT) 157 (H) 12 - 78 U/L    AST (SGOT) 164 (H) 15 - 37 U/L    Alk.  phosphatase 168 (H) 45 - 117 U/L    Protein, total 7.6 6.4 - 8.2 g/dL    Albumin 2.0 (L) 3.5 - 5.0 g/dL    Globulin 5.6 (H) 2.0 - 4.0 g/dL    A-G Ratio 0.4 (L) 1.1 - 2.2     BNP    Collection Time: 08/24/17 12:40 AM   Result Value Ref Range     (H) 0 - 100 pg/mL   TROPONIN I    Collection Time: 08/24/17 12:40 AM   Result Value Ref Range    Troponin-I, Qt. 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

## 2017-08-24 NOTE — PROGRESS NOTES
Pharmacy Automatic Renal Dosing Protocol - Antimicrobials    Indication for Antimicrobials: HCAP, ?wound infection, ? UTI  On , the patient had removal of bladder stone and repair of perforated bladder  , the patient had LAPAROTOMY EXPLORATORY, wound closure for dehiscence without bowel perforation   exp lap with closure for dehiscence with retention sutures  : CT scan: \"shows catheter in place with bladder thickening and b/l pleural effusions. \"    Current Regimen of Each Antimicrobial (Start Day & Day of Therapy): Anidulafungin 200 mg X1, then 100 mg every day (; day #2  Zyvox 600 mg IV q 12 hours (; day #2  Unasyn 3g q8 hours (; day #2    Previous abx  Vancomycin pharmacy to dose,    Cefepime 2 gram iv q12h. Cefazolin  Ampicillin   LVQ    Significant cultures:   : Blood cx: candida albicans = final  : Urine cx: NG - final  : Wound cx: ENTEROCOCCUS FAECALIS GROUP D (Vanc ADRIAN 4) and STAPHYLOCOCCUS EPIDERMIDIS (OXACILLIN RESISTANT) - pending  : Wound cx: STAPHYLOCOCCUS EPIDERMIDIS (OXACILLIN RESISTANT and LIGHT ANAEROBIC GRAM POSITIVE COCCI - FINAL  : Blood cx: pending  : Urine cx: pending    CAPD, Intermittent Hemodialysis or Renal Replacement Therapy: no  Paralysis, amputations, malnutrition: no    Labs:  Recent Labs      17   0040  17   0330  17   0041  17   0330   CREA  1.79*  1.31*   --   1.09   BUN  52*  43*   --   34*   WBC  15.0*  14.0*  11.4*   --      Temp (24hrs), Av °F (37.2 °C), Min:97.8 °F (36.6 °C), Max:100.6 °F (38.1 °C)    Creatinine Clearance (ml/min): ~30    Goal Vancomycin Level(s): Vancomycin trough between 15 and 20    Impression/Plan:   SCr trending up and based on the current renal function, will adjust the Unasyn to 3g q 12 hours.   Will continue to monitor the SCr, blood and urine cx  Will continue with Eraxis and Zyvox  Febrile        Pharmacy will follow daily and adjust doses of monitored medications as appropriate for renal function and/or serum levels.     Thank you,  Kamaljit Vargas PHARMD

## 2017-08-25 PROBLEM — Z71.89 COUNSELING REGARDING ADVANCED CARE PLANNING AND GOALS OF CARE: Status: ACTIVE | Noted: 2017-01-01

## 2017-08-25 PROBLEM — R60.1 ANASARCA: Status: ACTIVE | Noted: 2017-01-01

## 2017-08-25 PROBLEM — R53.1 WEAKNESS: Status: ACTIVE | Noted: 2017-01-01

## 2017-08-25 PROBLEM — R53.81 DEBILITY: Status: ACTIVE | Noted: 2017-01-01

## 2017-08-25 NOTE — PROGRESS NOTES
MIKE    Saw pt today. More verbal.    Urine flowing well per SPT    Family did not wish to pursue the catheter clamping I discussed yesterday. In fact they told his nurse that they don't want me seeing the patient. Will sign off for now, leaving care in hands of Dr RIOS. May call us back if needed. I learned of their wishes after visiting the patient.

## 2017-08-25 NOTE — PROGRESS NOTES
Problem: Dysphagia (Adult)  Goal: *Acute Goals and Plan of Care (Insert Text)  8/22/2017  Speech path reeval  1. Pt will participate with reeval of swallowing daily. Speech pathology goals intitiated 8/14/2017   1. Patient will tolerate sips of clear liquids free of s/s aspiration within 7 days. Goal discontinued 8/21. 2. Patient will demonstrate timely and complete posterior propulsion with purees within 7 days  3. Patient will participate in solid trials if mentation allows within 7 days. Goal discontinued 8/21. SPEECH LANGUAGE PATHOLOGY DYSPHAGIA TREATMENT/DISCHARGE  Patient: Steffen Benites (2 y.o. male)  Date: 8/25/2017  Diagnosis: bladder clots  Bladder injury  perperated viscous  wound dehiscence <principal problem not specified>  Procedure(s) (LRB):  LAPAROTOMY EXPLORATORY, wound closure for dehiscence (N/A) 20 Days Post-Op  Precautions:   (abdominal binder)      ASSESSMENT:  Pt has made no progress over the past 8 days we have been following him. He is very lethargic and confused. Speech is unintelligible. He does not open his eyes for longer than a few seconds. His mouth is open at rest. A spoon with ice was placed in his mouth and he closed his mouth only minimally. He did not move the ice from the front of his mouth. It had to be removed by myself due to lack of oral manipulation. He had poor oral awareness of the ice. He has made no progress and we will be signing off this case. Progression toward goals:  [ ]           Improving appropriately and progressing toward goals  [ ]           Improving slowly and progressing toward goals  [X]           Not making progress toward goals and plan of care will be adjusted       PLAN:  Discharge from speech due to lack of progress, lethargy. Family with very unrealistic expectations. Patient will be discharged from speech therapy at this time.   Rationale for discharge:  [ ]      Goals Achieved  [ ]      Courtney Almonteing  [ ]      Patient not participating in therapy  [X]      Other:  Discharge Recommendations:  None       SUBJECTIVE:   Patient stated nothing intelligible. OBJECTIVE:   Cognitive and Communication Status:  Neurologic State: Lethargic  Orientation Level: Oriented to person  Cognition: Decreased attention/concentration    Perception: Appears intact    Perseveration: No perseveration noted    Safety/Judgement: Fall prevention  Dysphagia Treatment:  Oral Assessment:     P.O. Trials:  Patient Position: upright in bed  Vocal quality prior to P.O.: No impairment  Consistency Presented: Ice chips  How Presented: Self-fed/presented;Spoon     Bolus Acceptance: Impaired  Bolus Formation/Control: Impaired  Type of Impairment: Delayed  Propulsion: Delayed (# of seconds)  Oral Residue:  (100%)        Aspiration Signs/Symptoms: None              Oral Phase Severity: Severe                    G Codes: In compliance with CMSs Claims Based Outcome Reporting, the following G-code set was chosen for this patient based the use of the NOMS functional outcome to quantify this patient's level of swallowing impairment. Using the NOMS, the patient was determined to be at level 1 for swallow function which correlates with the CH= 0% level of severity. Based on the objective assessment provided within this note, the current, goal, and discharge g-codes are as follows:     Swallow  Swallowing:   Swallow D/C Status CN= 100%         NOMS Swallowing Levels:  Level 1 (CN): NPO  Level 2 (CM): NPO but takes consistency in therapy  Level 3 (CL): Takes less than 50% of nutrition p.o. and continues with nonoral feedings; and/or safe with mod cues; and/or max diet restriction  Level 4 (CK):  Safe swallow but needs mod cues; and/or mod diet restriction; and/or still requires some nonoral feeding/supplements  Level 5 (CJ): Safe swallow with min diet restriction; and/or needs min cues  Level 6 (CI): Independent with p.o.; rare cues; usually self cues; may need to avoid some foods or needs extra time  Level 7 [facility]): Independent for all p.o.  AD. (2003). National Outcomes Measurement System (NOMS): Adult Speech-Language Pathology User's Guide. Pain:  Pain Scale 1: Visual  Pain Intensity 1: 0     After treatment:   [ ]                Patient left in no apparent distress sitting up in chair  [X]                Patient left in no apparent distress in bed  [X]                Call bell left within reach  [X]                Nursing notified  [X]                Caregiver present  [ ]                Bed alarm activated      COMMUNICATION/EDUCATION:         The patients plan of care including recommendations, planned interventions, and recommended diet changes were discussed with: Registered Nurse. [ ]                Posted safety precautions in patient's room.      Edelmira Mohs, SLP  Time Calculation: 10 mins

## 2017-08-25 NOTE — PROGRESS NOTES
Pharmacy Automatic Renal Dosing Protocol - Antimicrobials    Indication for Antimicrobials: HCAP, ?wound infection, ? UTI  On , the patient had removal of bladder stone and repair of perforated bladder  , the patient had LAPAROTOMY EXPLORATORY, wound closure for dehiscence without bowel perforation   exp lap with closure for dehiscence with retention sutures  : CT scan: \"shows catheter in place with bladder thickening and b/l pleural effusions. \"  Current Regimen of Each Antimicrobial (Start Day & Day of Therapy): Anidulafungin 200 mg X1, then 100 mg every day (; day #3  Zyvox 600 mg IV q 12 hours (; day #3  Unasyn 3g q8 hours (; day #3    Previous abx  Vancomycin  Cefepime 2 gram iv q12h. Cefazolin  Ampicillin   LVQ    Significant cultures:   : Blood cx: candida albicans = final  : Urine cx: NG - final  : Wound cx: ENTEROCOCCUS FAECALIS GROUP D (Vanc ADRIAN 4) and STAPHYLOCOCCUS EPIDERMIDIS (OXACILLIN RESISTANT) - pending  : Wound cx: STAPHYLOCOCCUS EPIDERMIDIS (OXACILLIN RESISTANT and LIGHT ANAEROBIC GRAM POSITIVE COCCI - FINAL  : Blood cx: pending  : Urine cx: CANDIDA ALBICANS - FINAL    CAPD, Intermittent Hemodialysis or Renal Replacement Therapy: no  Paralysis, amputations, malnutrition: no    Labs:  Recent Labs      17   0241  17   0040  17   0330   CREA  1.39*  1.79*  1.31*   BUN  55*  52*  43*   WBC  15.0*  15.0*  14.0*     Temp (24hrs), Av.3 ° F (36.3 ° C), Min:96.3 ° F (35.7 ° C), Max:97.8 ° F (36.6 ° C)    Creatinine Clearance (ml/min): ~40    Goal Vancomycin Level(s): Vancomycin trough between 15 and 20    Impression/Plan:   SCr trending down and based on the current renal function, will adjust the Unasyn back to 3g q 8 hours. Will continue to monitor the SCr and blood.   Will continue with Eraxis and Zyvox  Afebrile  WBC stable at 15        Pharmacy will follow daily and adjust doses of monitored medications as appropriate for renal function and/or serum levels.     Thank you,  Magaly Argueta, PHARMD

## 2017-08-25 NOTE — PROGRESS NOTES
Nutrition Assessment:    INTERVENTIONS/RECOMMENDATIONS:   If in line with goals of care, recommend feeding patient enterally (via NGT or PEG) as it is the preferred method in a patient with a functioning gut over parenteral nutrition. TPN not indicated at this time. Recommend Jevity 1.5 @ 15 mL/hr x 24 hours; if patient tolerates, advance as tolerated by 10 mL q 12 hours to a goal rate of Jevity 1.5 @ 55 mL/hr + 150 mL water flushes q 4 hours (provides 1980 kcal, 84g protein, 1903 mL water)       ASSESSMENT:   Chart reviewed; patient continues on TPN meeting >100% of estimated kcal/protein needs. SLP continues to recommend NPO; they have now signed off with no signs of progress x 8 days. Family meeting with palliative care today. Pending plan of care, recommendations provided above to initiate TF; recommend weaning TPN. It has been discussed in rounds that TPN is no longer appropriate. If plan of care trends towards comfort, would allow PO as desired. Will continue to monitor progress and plan of care to provide further recommendations. Diet Order: NPO (TPN: AA5% D20% @ 83 mL/hr + Lipids 3x/week; provides 1967 kcal/99.6g protein)  % Eaten:  No data found. Pertinent Medications: [x] Reviewed []Other: Humalog, Labetalol, Cardizem  Pertinent Labs: [x]Reviewed  []Other: K+ 3.4, BUN 55, Cr 1.39, -[phone]  Food Allergies: [x]None []Other:     Last BM: 8/25   []Active     []Hyperactive  [x]Hypoactive       [] Absent  BS  Skin:    [] Intact   [x] Incision  [] Breakdown   []Edema   []Other:    Anthropometrics: Height: 5' 10\" (177.8 cm) Weight: 90 kg (198 lb 6.6 oz)    IBW (%IBW):   ( ) UBW (%UBW):   (  %)    BMI: Body mass index is 28.47 kg/(m^2).     This BMI is indicative of:  []Underweight   []Normal   [x]Overweight   [] Obesity   [] Extreme Obesity (BMI>40)  Last Weight Metrics:  Weight Loss Metrics 8/24/2017 7/26/2017 3/26/2017 11/3/2016 3/17/2015 10/2/2014 5/16/2013   Today's Wt 198 lb 6.6 oz 211 lb 3.2 oz 215 lb 6.2 oz 217 lb 6 oz 214 lb 4.8 oz 200 lb 213 lb 10 oz   BMI 28.47 kg/m2 30.3 kg/m2 30.91 kg/m2 31.19 kg/m2 30.75 kg/m2 28.7 kg/m2 30.65 kg/m2       Estimated Nutrition Needs (Based on): 1886 Kcals/day (-2043 BMR (1571) x 1. 2AF-1.3AF) , 90 g (1.0 g/kg bw) Protein  Carbohydrate:  At Least 130 g/day  Fluids: 1900 mL/day     Pt expected to meet estimated nutrient needs: [x]Yes []No    NUTRITION DIAGNOSES:   Problem:  Swallowing difficulty      Etiology: related to AMS, encephaloapthy     Signs/Symptoms: as evidenced by NPO per SLP      NUTRITION INTERVENTIONS:  Meals/Snacks: General/healthful diet Enteral/Parenteral Nutrition: Initiate enteral nutrition                GOAL:   Transition to EN vs PO for comfort next 1-3 days    NUTRITION MONITORING AND EVALUATION   Behavioral-Environmental Outcomes: Behavior  Food/Nutrient Intake Outcomes: Enteral/parenteral nutrition intake  Physical Signs/Symptoms Outcomes: Weight/weight change, GI profile, Electrolyte and renal profile, Glucose profile    Previous Goal Met:   [] Met              [] Progressing Towards Goal              [x] Not Progressing Towards Goal   Previous Recommendations:   [] Implemented          [x] Not Implemented          [] Not Applicable    LEARNING NEEDS (Diet, Food/Nutrient-Drug Interaction):    [x] None Identified   [] Identified and Education Provided/Documented   [] Identified and Pt declined/was not appropriate     Cultural, Moravian, OR Ethnic Dietary Needs:    [x] None Identified   [] Identified and Addressed     [x] Interdisciplinary Care Plan Reviewed/Documented    [x] Discharge Planning: TBD pending plan of care   [] Participated in Interdisciplinary Rounds    NUTRITION RISK:    [x] High              [] Moderate           []  Low  []  Minimal/Uncompromised      Lawrence Miles  Pager [phone]              Weekend Pager [phone]

## 2017-08-25 NOTE — PROGRESS NOTES
Palliative Medicine     Lengthy Palliative Family meeting led by Dr. [employer], MD with Pt's wife and 2 dtrs with Dr. Leigh Weiss MD, Shad Murphy., Director of Nursing, [facility], Director of , and Palliative  Fellow Lani Johnson. Wife/NOK medical surrogate has deferred decision making to the next of kin/adult children: They have 6 and this LCSW needs to receive contact from Pt's other 4 children (Shelby Cloud/Leonid in [place], Ivett Alvarez, Chichi Quintanilla, and Ericka Leigh) regarding if they defer to [address] or if they want to be contacted for decisions but who they request to use as spokesperson for ease of medical team connecting with just 1 adult child. Currently, Torres Greta Bunch/dtr, [phone], is the requested  but we are waiting for clarification by other 4 children. Stuart Moran is other local dtr and her number is [phone]. Dr. [employer], MD provided information about Pt's current medical condition. 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 Dtrs acknowledged understanding of uncertainty regarding how Pt will recover from Fillistor[place]. \" Dtr, Torres Hernandes, spoke about importance of helping him \"remember who god is\" and shared about an experience where she spoke scripture and he verbalized remaining passage. They verbalized great [employer] support and multiple prayers.     Family shared their impression that Pt \"responds better to men\" so prefer male nurse Orange Essie). Dtr, Mariama Portillo, spoke about limitations, such as sedatives and if necessary/recommended then calling one of them to talk about it before providing. Mariama Portillo spoke about her concern of sedatives and Pt's mental status. Dtr, Mariama Portillo, explained \"we are not medical\" and this is \"our first experience with hospitalization. \" Acknowledged emotional roller coaster with Pt's medical journey over last month. action items:  1. Family desires to continue with [facility] but are requesting a second opinion of SPT care plan to get Pt ready for discharge from hospital. (Dr. Elke Murphy, placed order. LCSW spoke with Luba Leal of [facility] office, [phone])  2. Family to discuss amongst themselves about option of PEG tube for nutritional support & to help him get stronger & better (Family identified as main goal) or choosing no on feeding tube and continuing pleasure feeding with understanding of risk for aspiration. Requested decision sooner rather than later. 3. Palliative team will follow up with Pt/family on Monday, continue to offer psychosocial support, assist with being bridge of medical communication so family feels able to make informed decisions regarding Pt's care plan. 4. Pt's other 4 children need to contact Palliative SW with their decision regarding if want responsibility of nok medical surrogate medical decisions for Pt.  5. Family will continue to to visit Pt to offer familiar voices, touch, spiritual readings to assist with keeping Pt connected to family and irasema during his hospital journey. 6. CM will work with family on discharge planning. During meeting, dtrs voiced preference of bringing pt home post discharge versus going to a NH/SNF. After meeting, LCSW spoke with CM Jeremy Rucker), hospitalist (Dr. Gloria Funez.) and RN Evelin Benz). Thank you for including Palliative Medicine in [facility] care.       Fribelinda Morales, [address] ([id number])  Work cell: [phone]

## 2017-08-25 NOTE — PROGRESS NOTES
PCU SHIFT NURSING NOTE      Bedside shift change report given to Darion Shaffer RN (oncoming nurse) by Kanika Kim (offgoing nurse). Report included the following information Kardex, Procedure Summary, Intake/Output, MAR and Recent Results. Shift Summary:       Admission Date 7/27/2017   Admission Diagnosis bladder clots  Bladder injury  perperated viscous  wound dehiscence   Consults IP CONSULT TO UROLOGY  IP CONSULT TO GASTROENTEROLOGY  IP CONSULT TO NEUROLOGY  IP CONSULT TO PALLIATIVE CARE - PROVIDER  IP CONSULT TO CARDIOLOGY        Consults   [x]PT   []OT   [x]Speech   []Case Management      [] Palliative      Cardiac Monitoring Order   [x]Yes   []No     IV drips   [x]Yes    Drip:      Cardizem                      Dose: 10mg/hr  Drip:                            Dose:  Drip:                            Dose:   []No     GI Prophylaxis   []Yes   []No         DVT Prophylaxis   SCDs:  Sequential Compression Device: Bilateral     Patient Refused VTE Prophylaxis: Yes    Ash stockings:  Graduated Compression Stockings: Bilateral      [x] Medication   []Contraindicated   []None      Activity Level Activity Level: Bed Rest     Activity Assistance: Complete care   Purposeful Rounding every 1-2 hour? [x]Yes   Mcdonald Score  Total Score: 3   Bed Alarm (If score 3 or >)   [x]Yes   [] Refused (See signed refusal form in chart)   Kong Score  Kong Score: 13   Kong Score (if score 14 or less)   [x]PMT consult   [x]Wound Care consult      []Specialty bed   [x] Nutrition consult          Needs prior to discharge:   Home O2 required:    []Yes   [x]No    If yes, how much O2 required?     Other:    Last Bowel Movement: Last Bowel Movement Date: 08/24/17      Influenza Vaccine Received Flu Vaccine for Current Season (usually Sept-March): Not Flu Season        Pneumonia Vaccine           Diet Active Orders   Diet    DIET NPO      LDAs         PICC Double Lumen 72/58/45 Basilic;Left (Active)   [employer] Being Utilized Yes 8/24/2017 10:29 PM   Criteria for Appropriate Use Total parenteral nutrition 8/24/2017 10:29 PM   Site Assessment Clean, dry, & intact 8/24/2017 10:29 PM   Phlebitis Assessment 0 8/24/2017 10:29 PM   Infiltration Assessment 0 8/24/2017 10:29 PM   Arm Circumference (cm) 33 cm 8/2/2017 10:47 AM   Date of Last Dressing Change 08/17/17 8/23/2017  4:00 PM   Dressing Status Clean, dry, & intact 8/24/2017 10:29 PM   Action Taken Open ports on tubing capped 8/24/2017 10:29 PM   External Catheter Length (cm) 0 centimeters 8/24/2017 10:29 PM   Dressing Type Disk with Chlorhexadine gluconate (CHG) 8/24/2017 10:29 PM   Hub Color/Line Status Red; Infusing 8/24/2017 10:29 PM   Positive Blood Return (Site #1) Yes 8/24/2017 10:29 PM   Hub Color/Line Status Purple; Infusing 8/24/2017 10:29 PM   Positive Blood Return (Site #2) Yes 8/24/2017 10:29 PM   Alcohol Cap Used Yes 8/24/2017 10:29 PM                            Urinary Catheter [REMOVED] Urinary Catheter 07/27/17 2- way-Criteria for Appropriate Use: Obstruction/retention  Urinary Catheter 07/28/17 3- way; Rosales-Criteria for Appropriate Use: Medically/surgically unstable  Urinary Catheter 07/28/17 Other (Comment)-Criteria for Appropriate Use: Medically/surgically unstable  [REMOVED] Urinary Catheter 07/26/17 2- way-Criteria for Appropriate Use: Obstruction/retention    Intake & Output   Date 08/23/17 1900 - 08/24/17 0659 08/24/17 0700 - 08/25/17 0659   Shift 6988-8642 24 Hour Total 7274-3254 6115-6259 24 Hour Total   I  N  T  A  K  E   I.V.  (mL/kg/hr)   2228.3  (1) 631 9366.3      Cardizem Volume   159  159      Volume (anidulafungin (ERAXIS) 100 mg in 0.9% sodium chloride 130 mL IVPB)    130 130      Volume (linezolid (ZYVOX) IVPB premix in D5W 600 mg)   300 300 600      Volume (ampicillin-sulbactam (UNASYN) 3 g in 0.9% sodium chloride (MBP/ADV) 100 mL)    100 100      Volume (TPN ADULT - CENTRAL AA 5% D20% W/ CA + ELECTROLYTES)   1769.3  1769.3      Volume (TPN ADULT - CENTRAL AA 5% D20% W/ CA + ELECTROLYTES)    368 368    Shift Total  (mL/kg)   2228.3  (24.4) 898  (10) 3126.3  (34.7)   O  U  T  P  U  T   Urine  (mL/kg/hr) 1350 3450 1000  (0.9)  1000      Urine Voided   525  525      Urine Output (mL) (Urinary Catheter 07/28/17 3- way; Rosales) 750 1300 300  300      Urine Output (mL) (Urinary Catheter 07/28/17 Other (Comment)) 600 2150 175  175    Stool           Stool Occurrence(s) 1 x 1 x  1 x 1 x    Shift Total  (mL/kg) 1350  (14.8) 3450  (37.8) 1000  (11)  1000  (11.1)   NET -1350 -3450 1228.3 898 2126. 3   Weight (kg) 91.3 91.3 91.3 90 90         Readmission Risk Assessment Tool Score Medium Risk            17       Total Score        3 Has Seen PCP in Last 6 Months (Yes=3, No=0)    2 . Living with Significant Other. Assisted Living. LTAC. SNF. or   Rehab    3 Patient Length of Stay (>5 days = 3)    4 IP Visits Last 12 Months (1-3=4, 4=9, >4=11)    5 Pt.  Coverage (Medicare=5 , Medicaid, or Self-Pay=4)        Criteria that do not apply:    Charlson Comorbidity Score (Age + Comorbid Conditions)       Expected Length of Stay 5d 12h   Actual Length of Stay 27

## 2017-08-25 NOTE — PROGRESS NOTES
Attended family meeting with Mrs. James Alcantara Lisandro Abel and Wilfred florence) along with Palliative MD, Dr. Roxanne Prado; Palliative SW Beck Bartholomew --Care Coordinator; Griselda Merle -- Nurse Manager; and CMO - Dr. Patria Delarosa. Provided support especially to Mrs. James Alcantara as she seemed upset and then tearful at one point.  moved to sit next to her as a sign of support. She acknowledged the support with a smile and occasional dialogue. Provided a listening presence throughout the meeting -- especially for the airing of family concerns. Provided physical support for Mrs. James Alcantara at the end of the meeting as sitting for 2 hours can be difficult on an older person. Walked family back to Cali Javier' (pt's) room. Mrs. James Alcantara expressed gratitude for the attention of the . Introduced self to pt as he seemed to awaken a bit. Provided extended support to family, including notifying RN that pt was indicating a headache.  also shared that the [address] [place] [address] is a nice place to step away from the room and reconnect with a Holy place. [address] [place] [address] Road will continue to follow as able. Tereza Matos M.Div.   Palliative  Fellow

## 2017-08-25 NOTE — PROGRESS NOTES
Per discussion with family on 8/24/17, RN notified daughter Dario Ortega that Dr. Fatemeh Turner had rounded on her father and I asked about the Suprapubic catheter. Family had stated to RN that they were told catheter should not stay in more than 2 weeks. I voiced that to Dr. Fatemeh Turner. Dr. Carlin Rico recommendations were that suprapubic catheter should stay in place and that removing it too soon could cause more harm or discomfort to patient. I reiterated to Dario Ortega that it Emmit Hane Dr. Carlin Rico recommendation to pull the catheter, but if they wanted it out he was okay with clamping it temporarily to see if the Rosales catheter drained sufficiently. RN told Dario Ortega again that Dr. Fatemeh Turner recommended that the suprapubic catheter remain in place. Dario Ortega stated they they would like a second opinion.

## 2017-08-25 NOTE — PROGRESS NOTES
MD has informed ESTEFANÍA that she would like Risk Management involved. Patient's family has informed Nursing that Dr. Marlen Bradley (attending Urologist) is to be discharged from patient care. Estefanía confirmed with RN. There is a scheduled Palliative Care meeting with family at 12:30pm today. 940am - Lino Beaver CM Supervisor informed.      Jeffrey Mayfield RN   Ext [phone]

## 2017-08-25 NOTE — PROGRESS NOTES
NEUROLOGY DAILY PROGRESS NOTE     Chief Complaint   Patient presents with    Blood in Urine       Subjective  No new neurological event overnight. Spoke to the wife and she states that patient has good days and bad days. Overall has been stable. ROS:  A ten system review of constitutional, cardiovascular, respiratory, musculoskeletal, endocrine, skin, SHEENT, genitourinary, psychiatric and neurologic systems was obtained and is unremarkable except as stated in HPI     EXAMINATION:   Patient Vitals for the past 24 hrs:   Temp Pulse Resp BP SpO2   08/25/17 1609 96.5 °F (35.8 °C) (!) 114 20 149/85 96 %   08/25/17 1127 97.6 °F (36.4 °C) (!) 106 20 128/73 95 %   08/25/17 0706 97.7 °F (36.5 °C) (!) 109 20 (!) 159/91 96 %   08/25/17 0537 - - - 153/83 -   08/25/17 0254 96.3 °F (35.7 °C) 100 20 156/62 96 %   08/24/17 2317 97.7 °F (36.5 °C) (!) 105 22 137/63 94 %   08/24/17 1952 97.4 °F (36.3 °C) (!) 102 20 135/70 95 %        General:   General appearance: Pt is in no acute distress   Distal pulses are preserved    Neurological Examination:   Mental Status:  Patient is stuporous. Does not follow any commands. Cranial Nerves: Pupils are equal and reactive to light and extraocular movements are intact. No facial asymmetry seen      Motor: Spontaneous symmetric activity seen in bilateral upper and lower extremities    Sensation: Responds to pain. They both in upper and lower extremities    Skin: No significant bruising or lacerations.     LAB DATA REVIEWED:    Results for orders placed or performed during the hospital encounter of 07/27/17   CULTURE, BLOOD, LINE   Result Value Ref Range    Special Requests: NO SPECIAL REQUESTS      Culture result: (A)       CANDIDA ALBICANS GROWING IN 1 OF 2 BOTTLES DRAWN (SITE = RAC 2)    Culture result: REMAINING BOTTLE(S) HAS/HAVE NO GROWTH IN 5 DAYS     CULTURE, URINE   Result Value Ref Range    Special Requests: NO SPECIAL REQUESTS  Reflexed from [address] [place] [address] <1,000 CFU/ML      Culture result: NO GROWTH 1 DAY     CULTURE, URINE   Result Value Ref Range    Special Requests: NO SPECIAL REQUESTS  Reflexed from Venita Sood <1,000 CFU/ML      Culture result: NO GROWTH 1 DAY     CULTURE, WOUND W GRAM STAIN   Result Value Ref Range    Special Requests: NO SPECIAL REQUESTS      GRAM STAIN RARE WBCS SEEN      GRAM STAIN NO ORGANISMS SEEN      Culture result: (A)       FEW STAPHYLOCOCCUS EPIDERMIDIS (OXACILLIN RESISTANT)    Culture result: FEW ENTEROCOCCUS FAECALIS GROUP D (A)      Culture result: FEW ANAEROBIC GRAM POSITIVE COCCI (A)         Susceptibility    Staphylococcus epidermidis - ADRIAN     Ampicillin ($) >8 Resistant ug/mL     Ampicillin/sulbactam ($) <=8/4 Resistant ug/mL     Cefazolin ($) 8 Resistant ug/mL     Ciprofloxacin ($) >2 Resistant ug/mL     Clindamycin ($) >4 Resistant ug/mL     Daptomycin ($$$$$) <=0.5 Susceptible ug/mL     Erythromycin ($$$$) >4 Resistant ug/mL     Gentamicin ($) >8 Resistant ug/mL     Levofloxacin ($) >4 Resistant ug/mL     Linezolid ($$$$$) 2 Susceptible ug/mL     Oxacillin >2 Resistant ug/mL     Penicillin G ($$) >8 Resistant ug/mL     Rifampin ($$$$)* <=1 Susceptible ug/mL      * Rifampin is not to be used for mono-therapy.      Tetracycline <=4 Susceptible ug/mL     Trimeth-Sulfamethoxa >2/38 Resistant ug/mL     Vancomycin ($) 2 Susceptible ug/mL    Enterococcus  faecalis group D - ADRIAN     Ampicillin ($) <=2 Susceptible ug/mL     Daptomycin ($$$$$) 1 Susceptible ug/mL     Linezolid ($$$$$) 2 Susceptible ug/mL     Penicillin G ($$) 2 Susceptible ug/mL     Vancomycin ($) 4 Susceptible ug/mL   CULTURE, WOUND W GRAM STAIN   Result Value Ref Range    Special Requests: NO SPECIAL REQUESTS      GRAM STAIN NO WBC'S SEEN      GRAM STAIN NO ORGANISMS SEEN      Culture result: (A)       FEW STAPHYLOCOCCUS EPIDERMIDIS (OXACILLIN RESISTANT)    Culture result: LIGHT ANAEROBIC GRAM POSITIVE COCCI (A)         Susceptibility Staphylococcus epidermidis - ADRIAN     Ampicillin ($) >8 Resistant ug/mL     Ampicillin/sulbactam ($) <=8/4 Resistant ug/mL     Cefazolin ($) <=4 Resistant ug/mL     Ciprofloxacin ($) >2 Resistant ug/mL     Clindamycin ($) >4 Resistant ug/mL     Daptomycin ($$$$$) <=0.5 Susceptible ug/mL     Erythromycin ($$$$) >4 Resistant ug/mL     Gentamicin ($) >8 Resistant ug/mL     Levofloxacin ($) >4 Resistant ug/mL     Linezolid ($$$$$) 2 Susceptible ug/mL     Oxacillin >2 Resistant ug/mL     Penicillin G ($$) >8 Resistant ug/mL     Rifampin ($$$$)* <=1 Susceptible ug/mL      * Rifampin is not to be used for mono-therapy. Tetracycline <=4 Susceptible ug/mL     Trimeth-Sulfamethoxa >2/38 Resistant ug/mL     Vancomycin ($) 2 Susceptible ug/mL   CULTURE, BLOOD   Result Value Ref Range    Special Requests: NO SPECIAL REQUESTS      Culture result: NO GROWTH 2 DAYS     CULTURE, URINE   Result Value Ref Range    Special Requests: NO SPECIAL REQUESTS  Reflexed from [id number]        [place] Count >100,000  COLONIES/mL        Culture result: JADYN ALBICANS (A)     CBC WITH AUTOMATED DIFF   Result Value Ref Range    WBC 10.2 4.1 - 11.1 K/uL    RBC 3.73 (L) 4.10 - 5.70 M/uL    HGB 11.4 (L) 12.1 - 17.0 g/dL    HCT 32.8 (L) 36.6 - 50.3 %    MCV 87.9 80.0 - 99.0 FL    MCH 30.6 26.0 - 34.0 PG    MCHC 34.8 30.0 - 36.5 g/dL    RDW 13.1 11.5 - 14.5 %    PLATELET 293 083 - 500 K/uL    NEUTROPHILS 68 32 - 75 %    LYMPHOCYTES 18 12 - 49 %    MONOCYTES 10 5 - 13 %    EOSINOPHILS 3 0 - 7 %    BASOPHILS 1 0 - 1 %    ABS. NEUTROPHILS 6.9 1.8 - 8.0 K/UL    ABS. LYMPHOCYTES 1.9 0.8 - 3.5 K/UL    ABS. MONOCYTES 1.0 0.0 - 1.0 K/UL    ABS. EOSINOPHILS 0.3 0.0 - 0.4 K/UL    ABS.  BASOPHILS 0.1 0.0 - 0.1 K/UL   METABOLIC PANEL, COMPREHENSIVE   Result Value Ref Range    Sodium 132 (L) 136 - 145 mmol/L    Potassium 4.1 3.5 - 5.1 mmol/L    Chloride 101 97 - 108 mmol/L    CO2 23 21 - 32 mmol/L    Anion gap 8 5 - 15 mmol/L    Glucose 109 (H) 65 - 100 mg/dL BUN 27 (H) 6 - 20 MG/DL    Creatinine 1.15 0.70 - 1.30 MG/DL    BUN/Creatinine ratio 23 (H) 12 - 20      GFR est AA >60 >60 ml/min/1.73m2    GFR est non-AA 60 (L) >60 ml/min/1.73m2    Calcium 8.6 8.5 - 10.1 MG/DL    Bilirubin, total 0.5 0.2 - 1.0 MG/DL    ALT (SGPT) 32 12 - 78 U/L    AST (SGOT) 31 15 - 37 U/L    Alk. phosphatase 53 45 - 117 U/L    Protein, total 6.8 6.4 - 8.2 g/dL    Albumin 2.5 (L) 3.5 - 5.0 g/dL    Globulin 4.3 (H) 2.0 - 4.0 g/dL    A-G Ratio 0.6 (L) 1.1 - 2.2     STONE ANALYSIS, URINARY   Result Value Ref Range    Color Brown     Size 31 mm    Weight 19501.  mg    Composition Comment      Ca oxalate monohydrate 40 %    Calcium phosphate 3 %    Uric acid 57 %    Nidus No Nidus visualized     Comment Note:     Comment Comment      Disclaimer Comment     CBC W/O DIFF   Result Value Ref Range    WBC 20.4 (H) 4.1 - 11.1 K/uL    RBC 2.95 (L) 4.10 - 5.70 M/uL    HGB 9.0 (L) 12.1 - 17.0 g/dL    HCT 26.4 (L) 36.6 - 50.3 %    MCV 89.5 80.0 - 99.0 FL    MCH 30.5 26.0 - 34.0 PG    MCHC 34.1 30.0 - 36.5 g/dL    RDW 13.6 11.5 - 14.5 %    PLATELET 513 028 - 290 K/uL   METABOLIC PANEL, BASIC   Result Value Ref Range    Sodium 136 136 - 145 mmol/L    Potassium 4.7 3.5 - 5.1 mmol/L    Chloride 109 (H) 97 - 108 mmol/L    CO2 19 (L) 21 - 32 mmol/L    Anion gap 8 5 - 15 mmol/L    Glucose 122 (H) 65 - 100 mg/dL    BUN 23 (H) 6 - 20 MG/DL    Creatinine 1.21 0.70 - 1.30 MG/DL    BUN/Creatinine ratio 19 12 - 20      GFR est AA >60 >60 ml/min/1.73m2    GFR est non-AA 56 (L) >60 ml/min/1.73m2    Calcium 7.2 (L) 8.5 - 10.1 MG/DL   BLOOD GAS, ARTERIAL   Result Value Ref Range    pH 7.33 (L) 7.35 - 7.45      PCO2 36 35.0 - 45.0 mmHg    PO2 181 (H) 80 - 100 mmHg    O2 SAT 99 (H) 92 - 97 %    BICARBONATE 18 (L) 22 - 26 mmol/L    BASE DEFICIT 7.0 mmol/L    O2 METHOD VENTILATOR      FIO2 100 %    MODE A/C      Tidal volume 450      SET RATE 14      EPAP/CPAP/PEEP 6.0      Sample source ARTERIAL      SITE DRAWN FROM ARTERIAL LINE KAREN'S TEST N/A     CBC WITH AUTOMATED DIFF   Result Value Ref Range    WBC 20.6 (H) 4.1 - 11.1 K/uL    RBC 2.36 (L) 4.10 - 5.70 M/uL    HGB 7.3 (L) 12.1 - 17.0 g/dL    HCT 21.2 (L) 36.6 - 50.3 %    MCV 89.8 80.0 - 99.0 FL    MCH 30.9 26.0 - 34.0 PG    MCHC 34.4 30.0 - 36.5 g/dL    RDW 13.9 11.5 - 14.5 %    PLATELET 687 946 - 164 K/uL    NEUTROPHILS 84 (H) 32 - 75 %    LYMPHOCYTES 7 (L) 12 - 49 %    MONOCYTES 9 5 - 13 %    EOSINOPHILS 0 0 - 7 %    BASOPHILS 0 0 - 1 %    ABS. NEUTROPHILS 17.3 (H) 1.8 - 8.0 K/UL    ABS. LYMPHOCYTES 1.4 0.8 - 3.5 K/UL    ABS. MONOCYTES 1.9 (H) 0.0 - 1.0 K/UL    ABS. EOSINOPHILS 0.0 0.0 - 0.4 K/UL    ABS. BASOPHILS 0.0 0.0 - 0.1 K/UL    RBC COMMENTS ANISOCYTOSIS  1+        RBC COMMENTS POLYCHROMASIA  1+        DF SMEAR SCANNED     METABOLIC PANEL, COMPREHENSIVE   Result Value Ref Range    Sodium 136 136 - 145 mmol/L    Potassium 4.4 3.5 - 5.1 mmol/L    Chloride 107 97 - 108 mmol/L    CO2 21 21 - 32 mmol/L    Anion gap 8 5 - 15 mmol/L    Glucose 140 (H) 65 - 100 mg/dL    BUN 24 (H) 6 - 20 MG/DL    Creatinine 1.36 (H) 0.70 - 1.30 MG/DL    BUN/Creatinine ratio 18 12 - 20      GFR est AA 60 (L) >60 ml/min/1.73m2    GFR est non-AA 49 (L) >60 ml/min/1.73m2    Calcium 6.8 (L) 8.5 - 10.1 MG/DL    Bilirubin, total 0.4 0.2 - 1.0 MG/DL    ALT (SGPT) 19 12 - 78 U/L    AST (SGOT) 34 15 - 37 U/L    Alk.  phosphatase 39 (L) 45 - 117 U/L    Protein, total 5.2 (L) 6.4 - 8.2 g/dL    Albumin 1.9 (L) 3.5 - 5.0 g/dL    Globulin 3.3 2.0 - 4.0 g/dL    A-G Ratio 0.6 (L) 1.1 - 2.2     BILIRUBIN, DIRECT   Result Value Ref Range    Bilirubin, direct 0.2 0.0 - 0.2 MG/DL   TROPONIN I   Result Value Ref Range    Troponin-I, Qt. 1.59 (H) <0.05 ng/mL   CBC W/O DIFF   Result Value Ref Range    WBC 18.5 (H) 4.1 - 11.1 K/uL    RBC 2.06 (L) 4.10 - 5.70 M/uL    HGB 6.2 (L) 12.1 - 17.0 g/dL    HCT 18.7 (L) 36.6 - 50.3 %    MCV 90.8 80.0 - 99.0 FL    MCH 30.1 26.0 - 34.0 PG    MCHC 33.2 30.0 - 36.5 g/dL    RDW 14.3 11.5 - 14.5 %    PLATELET 956 299 - 657 K/uL   METABOLIC PANEL, BASIC   Result Value Ref Range    Sodium 138 136 - 145 mmol/L    Potassium 4.0 3.5 - 5.1 mmol/L    Chloride 111 (H) 97 - 108 mmol/L    CO2 17 (L) 21 - 32 mmol/L    Anion gap 10 5 - 15 mmol/L    Glucose 103 (H) 65 - 100 mg/dL    BUN 29 (H) 6 - 20 MG/DL    Creatinine 1.58 (H) 0.70 - 1.30 MG/DL    BUN/Creatinine ratio 18 12 - 20      GFR est AA 50 (L) >60 ml/min/1.73m2    GFR est non-AA 42 (L) >60 ml/min/1.73m2    Calcium 6.6 (L) 8.5 - 10.1 MG/DL   MAGNESIUM   Result Value Ref Range    Magnesium 1.8 1.6 - 2.4 mg/dL   PHOSPHORUS   Result Value Ref Range    Phosphorus 2.3 (L) 2.6 - 4.7 MG/DL   BLOOD GAS, ARTERIAL   Result Value Ref Range    pH 7.39 7.35 - 7.45      PCO2 28 (L) 35.0 - 45.0 mmHg    PO2 124 (H) 80 - 100 mmHg    O2 SAT 99 (H) 92 - 97 %    BICARBONATE 17 (L) 22 - 26 mmol/L    BASE DEFICIT 6.8 mmol/L    O2 METHOD VENTILATOR      FIO2 40 %    MODE A/C      Tidal volume 450      SET RATE 14      EPAP/CPAP/PEEP 6.0      Sample source ARTERIAL      SITE DRAWN FROM ARTERIAL LINE      KAREN'S TEST N/A     HGB & HCT   Result Value Ref Range    HGB 8.8 (L) 12.1 - 17.0 g/dL    HCT 25.3 (L) 36.6 - 50.3 %   HGB & HCT   Result Value Ref Range    HGB 6.7 (L) 12.1 - 17.0 g/dL    HCT 20.2 (L) 36.6 - 50.3 %   CBC W/O DIFF   Result Value Ref Range    WBC 15.1 (H) 4.1 - 11.1 K/uL    RBC 2.86 (L) 4.10 - 5.70 M/uL    HGB 8.5 (L) 12.1 - 17.0 g/dL    HCT 25.8 (L) 36.6 - 50.3 %    MCV 90.2 80.0 - 99.0 FL    MCH 29.7 26.0 - 34.0 PG    MCHC 32.9 30.0 - 36.5 g/dL    RDW 15.0 (H) 11.5 - 14.5 %    PLATELET 226 633 - 026 K/uL   METABOLIC PANEL, BASIC   Result Value Ref Range    Sodium 139 136 - 145 mmol/L    Potassium 4.1 3.5 - 5.1 mmol/L    Chloride 112 (H) 97 - 108 mmol/L    CO2 16 (L) 21 - 32 mmol/L    Anion gap 11 5 - 15 mmol/L    Glucose 113 (H) 65 - 100 mg/dL    BUN 31 (H) 6 - 20 MG/DL    Creatinine 1.64 (H) 0.70 - 1.30 MG/DL    BUN/Creatinine ratio 19 12 - 20      GFR est AA 48 (L) >60 ml/min/1.73m2    GFR est non-AA 40 (L) >60 ml/min/1.73m2    Calcium 7.0 (L) 8.5 - 10.1 MG/DL   MAGNESIUM   Result Value Ref Range    Magnesium 1.9 1.6 - 2.4 mg/dL   PHOSPHORUS   Result Value Ref Range    Phosphorus 2.5 (L) 2.6 - 4.7 MG/DL   METABOLIC PANEL, BASIC   Result Value Ref Range    Sodium 139 136 - 145 mmol/L    Potassium 3.8 3.5 - 5.1 mmol/L    Chloride 110 (H) 97 - 108 mmol/L    CO2 16 (L) 21 - 32 mmol/L    Anion gap 13 5 - 15 mmol/L    Glucose 144 (H) 65 - 100 mg/dL    BUN 27 (H) 6 - 20 MG/DL    Creatinine 1.27 0.70 - 1.30 MG/DL    BUN/Creatinine ratio 21 (H) 12 - 20      GFR est AA >60 >60 ml/min/1.73m2    GFR est non-AA 53 (L) >60 ml/min/1.73m2    Calcium 8.0 (L) 8.5 - 10.1 MG/DL   CBC W/O DIFF   Result Value Ref Range    WBC 15.6 (H) 4.1 - 11.1 K/uL    RBC 4.12 4.10 - 5.70 M/uL    HGB 12.2 12.1 - 17.0 g/dL    HCT 36.2 (L) 36.6 - 50.3 %    MCV 87.9 80.0 - 99.0 FL    MCH 29.6 26.0 - 34.0 PG    MCHC 33.7 30.0 - 36.5 g/dL    RDW 14.8 (H) 11.5 - 14.5 %    PLATELET 843 207 - 984 K/uL   MAGNESIUM   Result Value Ref Range    Magnesium 1.6 1.6 - 2.4 mg/dL   PHOSPHORUS   Result Value Ref Range    Phosphorus 2.2 (L) 2.6 - 4.7 MG/DL   TROPONIN I   Result Value Ref Range    Troponin-I, Qt. 2.35 (H) <0.05 ng/mL   PHOSPHORUS   Result Value Ref Range    Phosphorus 2.0 (L) 2.6 - 4.7 MG/DL   MAGNESIUM   Result Value Ref Range    Magnesium 1.8 1.6 - 2.4 mg/dL   METABOLIC PANEL, COMPREHENSIVE   Result Value Ref Range    Sodium 144 136 - 145 mmol/L    Potassium 3.7 3.5 - 5.1 mmol/L    Chloride 112 (H) 97 - 108 mmol/L    CO2 21 21 - 32 mmol/L    Anion gap 11 5 - 15 mmol/L    Glucose 142 (H) 65 - 100 mg/dL    BUN 29 (H) 6 - 20 MG/DL    Creatinine 1.16 0.70 - 1.30 MG/DL    BUN/Creatinine ratio 25 (H) 12 - 20      GFR est AA >60 >60 ml/min/1.73m2    GFR est non-AA 59 (L) >60 ml/min/1.73m2    Calcium 7.7 (L) 8.5 - 10.1 MG/DL    Bilirubin, total 1.0 0.2 - 1.0 MG/DL    ALT (SGPT) 28 12 - 78 U/L    AST (SGOT) 32 15 - 37 U/L    Alk. phosphatase 69 45 - 117 U/L    Protein, total 5.9 (L) 6.4 - 8.2 g/dL    Albumin 1.8 (L) 3.5 - 5.0 g/dL    Globulin 4.1 (H) 2.0 - 4.0 g/dL    A-G Ratio 0.4 (L) 1.1 - 2.2     CBC W/O DIFF   Result Value Ref Range    WBC 15.4 (H) 4.1 - 11.1 K/uL    RBC 4.03 (L) 4.10 - 5.70 M/uL    HGB 12.2 12.1 - 17.0 g/dL    HCT 35.3 (L) 36.6 - 50.3 %    MCV 87.6 80.0 - 99.0 FL    MCH 30.3 26.0 - 34.0 PG    MCHC 34.6 30.0 - 36.5 g/dL    RDW 14.8 (H) 11.5 - 14.5 %    PLATELET 489 837 - 867 K/uL   BLOOD GAS, ARTERIAL   Result Value Ref Range    pH 7.48 (H) 7.35 - 7.45      PCO2 31 (L) 35.0 - 45.0 mmHg    PO2 84 80 - 100 mmHg    O2 SAT 97 92 - 97 %    BICARBONATE 22 22 - 26 mmol/L    BASE DEFICIT 0.2 mmol/L    O2 METHOD NASAL O2      O2 FLOW RATE 3.00 L/min    SPONTANEOUS RATE 21.0      Sample source ARTERIAL      SITE LEFT RADIAL      KAREN'S TEST YES     HEMOGLOBIN A1C WITH EAG   Result Value Ref Range    Hemoglobin A1c 6.1 4.2 - 6.3 %    Est. average glucose 128 mg/dL   PHOSPHORUS   Result Value Ref Range    Phosphorus 2.4 (L) 2.6 - 4.7 MG/DL   MAGNESIUM   Result Value Ref Range    Magnesium 1.8 1.6 - 2.4 mg/dL   METABOLIC PANEL, COMPREHENSIVE   Result Value Ref Range    Sodium 146 (H) 136 - 145 mmol/L    Potassium 3.4 (L) 3.5 - 5.1 mmol/L    Chloride 110 (H) 97 - 108 mmol/L    CO2 29 21 - 32 mmol/L    Anion gap 7 5 - 15 mmol/L    Glucose 141 (H) 65 - 100 mg/dL    BUN 31 (H) 6 - 20 MG/DL    Creatinine 0.98 0.70 - 1.30 MG/DL    BUN/Creatinine ratio 32 (H) 12 - 20      GFR est AA >60 >60 ml/min/1.73m2    GFR est non-AA >60 >60 ml/min/1.73m2    Calcium 7.6 (L) 8.5 - 10.1 MG/DL    Bilirubin, total 1.0 0.2 - 1.0 MG/DL    ALT (SGPT) 30 12 - 78 U/L    AST (SGOT) 35 15 - 37 U/L    Alk.  phosphatase 61 45 - 117 U/L    Protein, total 5.8 (L) 6.4 - 8.2 g/dL    Albumin 1.8 (L) 3.5 - 5.0 g/dL    Globulin 4.0 2.0 - 4.0 g/dL    A-G Ratio 0.5 (L) 1.1 - 2.2     CBC W/O DIFF   Result Value Ref Range    WBC 14.3 (H) 4.1 - 11.1 K/uL    RBC 3.81 (L) 4.10 - 5.70 M/uL    HGB 11.7 (L) 12.1 - 17.0 g/dL    HCT 33.7 (L) 36.6 - 50.3 %    MCV 88.5 80.0 - 99.0 FL    MCH 30.7 26.0 - 34.0 PG    MCHC 34.7 30.0 - 36.5 g/dL    RDW 14.8 (H) 11.5 - 14.5 %    PLATELET 952 290 - 695 K/uL   LACTIC ACID   Result Value Ref Range    Lactic acid 1.2 0.4 - 2.0 MMOL/L   CREATININE, FLUID   Result Value Ref Range    Fluid Type: FLUID      Creatinine, fluid 1.08 MG/DL   CBC W/O DIFF   Result Value Ref Range    WBC 13.8 (H) 4.1 - 11.1 K/uL    RBC 3.83 (L) 4.10 - 5.70 M/uL    HGB 11.4 (L) 12.1 - 17.0 g/dL    HCT 34.7 (L) 36.6 - 50.3 %    MCV 90.6 80.0 - 99.0 FL    MCH 29.8 26.0 - 34.0 PG    MCHC 32.9 30.0 - 36.5 g/dL    RDW 15.0 (H) 11.5 - 14.5 %    PLATELET 227 157 - 761 K/uL   METABOLIC PANEL, COMPREHENSIVE   Result Value Ref Range    Sodium 145 136 - 145 mmol/L    Potassium 3.7 3.5 - 5.1 mmol/L    Chloride 108 97 - 108 mmol/L    CO2 33 (H) 21 - 32 mmol/L    Anion gap 4 (L) 5 - 15 mmol/L    Glucose 192 (H) 65 - 100 mg/dL    BUN 30 (H) 6 - 20 MG/DL    Creatinine 0.93 0.70 - 1.30 MG/DL    BUN/Creatinine ratio 32 (H) 12 - 20      GFR est AA >60 >60 ml/min/1.73m2    GFR est non-AA >60 >60 ml/min/1.73m2    Calcium 7.0 (L) 8.5 - 10.1 MG/DL    Bilirubin, total 0.8 0.2 - 1.0 MG/DL    ALT (SGPT) 36 12 - 78 U/L    AST (SGOT) 35 15 - 37 U/L    Alk.  phosphatase 56 45 - 117 U/L    Protein, total 5.7 (L) 6.4 - 8.2 g/dL    Albumin 1.8 (L) 3.5 - 5.0 g/dL    Globulin 3.9 2.0 - 4.0 g/dL    A-G Ratio 0.5 (L) 1.1 - 2.2     MAGNESIUM   Result Value Ref Range    Magnesium 2.1 1.6 - 2.4 mg/dL   PHOSPHORUS   Result Value Ref Range    Phosphorus 2.4 (L) 2.6 - 4.7 MG/DL   CREATININE, FLUID   Result Value Ref Range    Fluid Type: ABDOMINAL FLUID      Creatinine, fluid 1.06 MG/DL   BLOOD GAS, ARTERIAL   Result Value Ref Range    pH 7.42 7.35 - 7.45      PCO2 51 (H) 35.0 - 45.0 mmHg    PO2 166 (H) 80 - 100 mmHg    O2 SAT 99 (H) 92 - 97 %    BICARBONATE 32 (H) 22 - 26 mmol/L    BASE EXCESS 6.5 mmol/L    O2 METHOD VENTILATOR      FIO2 100 %    MODE SIMV      Tidal volume 550      SET RATE 12      PRESSURE SUPPORT 10.0      EPAP/CPAP/PEEP 6.0      Sample source ARTERIAL      SITE RIGHT RADIAL      KAREN'S TEST YES     PHOSPHORUS   Result Value Ref Range    Phosphorus 3.6 2.6 - 4.7 MG/DL   MAGNESIUM   Result Value Ref Range    Magnesium 2.1 1.6 - 2.4 mg/dL   METABOLIC PANEL, BASIC   Result Value Ref Range    Sodium 144 136 - 145 mmol/L    Potassium 3.7 3.5 - 5.1 mmol/L    Chloride 108 97 - 108 mmol/L    CO2 29 21 - 32 mmol/L    Anion gap 7 5 - 15 mmol/L    Glucose 146 (H) 65 - 100 mg/dL    BUN 33 (H) 6 - 20 MG/DL    Creatinine 1.02 0.70 - 1.30 MG/DL    BUN/Creatinine ratio 32 (H) 12 - 20      GFR est AA >60 >60 ml/min/1.73m2    GFR est non-AA >60 >60 ml/min/1.73m2    Calcium 6.8 (L) 8.5 - 10.1 MG/DL   CBC W/O DIFF   Result Value Ref Range    WBC 15.4 (H) 4.1 - 11.1 K/uL    RBC 3.68 (L) 4.10 - 5.70 M/uL    HGB 11.0 (L) 12.1 - 17.0 g/dL    HCT 34.1 (L) 36.6 - 50.3 %    MCV 92.7 80.0 - 99.0 FL    MCH 29.9 26.0 - 34.0 PG    MCHC 32.3 30.0 - 36.5 g/dL    RDW 15.2 (H) 11.5 - 14.5 %    PLATELET 908 982 - 112 K/uL   CBC W/O DIFF   Result Value Ref Range    WBC 16.1 (H) 4.1 - 11.1 K/uL    RBC 3.83 (L) 4.10 - 5.70 M/uL    HGB 11.3 (L) 12.1 - 17.0 g/dL    HCT 35.2 (L) 36.6 - 50.3 %    MCV 91.9 80.0 - 99.0 FL    MCH 29.5 26.0 - 34.0 PG    MCHC 32.1 30.0 - 36.5 g/dL    RDW 15.3 (H) 11.5 - 14.5 %    PLATELET 853 537 - 020 K/uL   METABOLIC PANEL, COMPREHENSIVE   Result Value Ref Range    Sodium 138 136 - 145 mmol/L    Potassium 3.9 3.5 - 5.1 mmol/L    Chloride 105 97 - 108 mmol/L    CO2 28 21 - 32 mmol/L    Anion gap 5 5 - 15 mmol/L    Glucose 188 (H) 65 - 100 mg/dL    BUN 44 (H) 6 - 20 MG/DL    Creatinine 1.31 (H) 0.70 - 1.30 MG/DL    BUN/Creatinine ratio 34 (H) 12 - 20      GFR est AA >60 >60 ml/min/1.73m2    GFR est non-AA 52 (L) >60 ml/min/1.73m2    Calcium 7.3 (L) 8.5 - 10.1 MG/DL    Bilirubin, total 1.4 (H) 0.2 - 1.0 MG/DL    ALT (SGPT) 44 12 - 78 U/L    AST (SGOT) 54 (H) 15 - 37 U/L    Alk. phosphatase 62 45 - 117 U/L    Protein, total 5.8 (L) 6.4 - 8.2 g/dL    Albumin 1.6 (L) 3.5 - 5.0 g/dL    Globulin 4.2 (H) 2.0 - 4.0 g/dL    A-G Ratio 0.4 (L) 1.1 - 2.2     MAGNESIUM   Result Value Ref Range    Magnesium 2.4 1.6 - 2.4 mg/dL   PHOSPHORUS   Result Value Ref Range    Phosphorus 3.1 2.6 - 4.7 MG/DL   BLOOD GAS, ARTERIAL   Result Value Ref Range    pH 7.49 (H) 7.35 - 7.45      PCO2 38 35.0 - 45.0 mmHg    PO2 86 80 - 100 mmHg    O2 SAT 97 92 - 97 %    BICARBONATE 28 (H) 22 - 26 mmol/L    BASE EXCESS 4.9 mmol/L    O2 METHOD VENTILATOR      FIO2 30 %    MODE CPAP      Tidal volume 550      SPONTANEOUS RATE 16.0      PRESSURE SUPPORT 5.0      EPAP/CPAP/PEEP 6.0      Sample source ARTERIAL      SITE RIGHT RADIAL      KAREN'S TEST YES     CBC WITH AUTOMATED DIFF   Result Value Ref Range    WBC 14.2 (H) 4.1 - 11.1 K/uL    RBC 3.67 (L) 4.10 - 5.70 M/uL    HGB 11.0 (L) 12.1 - 17.0 g/dL    HCT 33.9 (L) 36.6 - 50.3 %    MCV 92.4 80.0 - 99.0 FL    MCH 30.0 26.0 - 34.0 PG    MCHC 32.4 30.0 - 36.5 g/dL    RDW 15.0 (H) 11.5 - 14.5 %    PLATELET 419 690 - 109 K/uL    NEUTROPHILS 79 (H) 32 - 75 %    LYMPHOCYTES 12 12 - 49 %    MONOCYTES 4 (L) 5 - 13 %    EOSINOPHILS 5 0 - 7 %    BASOPHILS 0 0 - 1 %    ABS. NEUTROPHILS 11.3 (H) 1.8 - 8.0 K/UL    ABS. LYMPHOCYTES 1.7 0.8 - 3.5 K/UL    ABS. MONOCYTES 0.5 0.0 - 1.0 K/UL    ABS. EOSINOPHILS 0.7 (H) 0.0 - 0.4 K/UL    ABS.  BASOPHILS 0.1 0.0 - 0.1 K/UL   METABOLIC PANEL, COMPREHENSIVE   Result Value Ref Range    Sodium 138 136 - 145 mmol/L    Potassium 3.9 3.5 - 5.1 mmol/L    Chloride 104 97 - 108 mmol/L    CO2 29 21 - 32 mmol/L    Anion gap 5 5 - 15 mmol/L    Glucose 175 (H) 65 - 100 mg/dL    BUN 33 (H) 6 - 20 MG/DL    Creatinine 0.96 0.70 - 1.30 MG/DL    BUN/Creatinine ratio 34 (H) 12 - 20      GFR est AA >60 >60 ml/min/1.73m2    GFR est non-AA >60 >60 ml/min/1.73m2    Calcium 7.5 (L) 8.5 - 10.1 MG/DL    Bilirubin, total 1.1 (H) 0.2 - 1.0 MG/DL    ALT (SGPT) 61 12 - 78 U/L    AST (SGOT) 75 (H) 15 - 37 U/L    Alk. phosphatase 69 45 - 117 U/L    Protein, total 5.9 (L) 6.4 - 8.2 g/dL    Albumin 1.6 (L) 3.5 - 5.0 g/dL    Globulin 4.3 (H) 2.0 - 4.0 g/dL    A-G Ratio 0.4 (L) 1.1 - 2.2     MAGNESIUM   Result Value Ref Range    Magnesium 2.5 (H) 1.6 - 2.4 mg/dL   PHOSPHORUS   Result Value Ref Range    Phosphorus 2.4 (L) 2.6 - 4.7 MG/DL   CBC WITH AUTOMATED DIFF   Result Value Ref Range    WBC 11.8 (H) 4.1 - 11.1 K/uL    RBC 3.66 (L) 4.10 - 5.70 M/uL    HGB 10.9 (L) 12.1 - 17.0 g/dL    HCT 33.2 (L) 36.6 - 50.3 %    MCV 90.7 80.0 - 99.0 FL    MCH 29.8 26.0 - 34.0 PG    MCHC 32.8 30.0 - 36.5 g/dL    RDW 14.9 (H) 11.5 - 14.5 %    PLATELET 158 798 - 778 K/uL    NEUTROPHILS 74 32 - 75 %    LYMPHOCYTES 15 12 - 49 %    MONOCYTES 5 5 - 13 %    EOSINOPHILS 5 0 - 7 %    BASOPHILS 1 0 - 1 %    ABS. NEUTROPHILS 8.8 (H) 1.8 - 8.0 K/UL    ABS. LYMPHOCYTES 1.7 0.8 - 3.5 K/UL    ABS. MONOCYTES 0.6 0.0 - 1.0 K/UL    ABS. EOSINOPHILS 0.6 (H) 0.0 - 0.4 K/UL    ABS. BASOPHILS 0.1 0.0 - 0.1 K/UL   METABOLIC PANEL, COMPREHENSIVE   Result Value Ref Range    Sodium 137 136 - 145 mmol/L    Potassium 4.4 3.5 - 5.1 mmol/L    Chloride 104 97 - 108 mmol/L    CO2 29 21 - 32 mmol/L    Anion gap 4 (L) 5 - 15 mmol/L    Glucose 176 (H) 65 - 100 mg/dL    BUN 36 (H) 6 - 20 MG/DL    Creatinine 1.02 0.70 - 1.30 MG/DL    BUN/Creatinine ratio 35 (H) 12 - 20      GFR est AA >60 >60 ml/min/1.73m2    GFR est non-AA >60 >60 ml/min/1.73m2    Calcium 7.9 (L) 8.5 - 10.1 MG/DL    Bilirubin, total 0.9 0.2 - 1.0 MG/DL    ALT (SGPT) 71 12 - 78 U/L    AST (SGOT) 72 (H) 15 - 37 U/L    Alk.  phosphatase 83 45 - 117 U/L    Protein, total 6.1 (L) 6.4 - 8.2 g/dL    Albumin 1.7 (L) 3.5 - 5.0 g/dL    Globulin 4.4 (H) 2.0 - 4.0 g/dL    A-G Ratio 0.4 (L) 1.1 - 2.2     MAGNESIUM   Result Value Ref Range    Magnesium 2.2 1.6 - 2.4 mg/dL   PHOSPHORUS   Result Value Ref Range    Phosphorus 3.2 2.6 - 4.7 MG/DL   METABOLIC PANEL, COMPREHENSIVE   Result Value Ref Range    Sodium 136 136 - 145 mmol/L    Potassium 4.5 3.5 - 5.1 mmol/L    Chloride 103 97 - 108 mmol/L    CO2 28 21 - 32 mmol/L    Anion gap 5 5 - 15 mmol/L    Glucose 162 (H) 65 - 100 mg/dL    BUN 36 (H) 6 - 20 MG/DL    Creatinine 1.05 0.70 - 1.30 MG/DL    BUN/Creatinine ratio 34 (H) 12 - 20      GFR est AA >60 >60 ml/min/1.73m2    GFR est non-AA >60 >60 ml/min/1.73m2    Calcium 7.6 (L) 8.5 - 10.1 MG/DL    Bilirubin, total 0.7 0.2 - 1.0 MG/DL    ALT (SGPT) 67 12 - 78 U/L    AST (SGOT) 60 (H) 15 - 37 U/L    Alk. phosphatase 90 45 - 117 U/L    Protein, total 6.3 (L) 6.4 - 8.2 g/dL    Albumin 1.7 (L) 3.5 - 5.0 g/dL    Globulin 4.6 (H) 2.0 - 4.0 g/dL    A-G Ratio 0.4 (L) 1.1 - 2.2     MAGNESIUM   Result Value Ref Range    Magnesium 2.0 1.6 - 2.4 mg/dL   PHOSPHORUS   Result Value Ref Range    Phosphorus 3.1 2.6 - 4.7 MG/DL   CBC W/O DIFF   Result Value Ref Range    WBC 10.9 4.1 - 11.1 K/uL    RBC 3.45 (L) 4.10 - 5.70 M/uL    HGB 10.7 (L) 12.1 - 17.0 g/dL    HCT 31.6 (L) 36.6 - 50.3 %    MCV 91.6 80.0 - 99.0 FL    MCH 31.0 26.0 - 34.0 PG    MCHC 33.9 30.0 - 36.5 g/dL    RDW 14.8 (H) 11.5 - 14.5 %    PLATELET 085 370 - 752 K/uL   PHOSPHORUS   Result Value Ref Range    Phosphorus 3.6 2.6 - 4.7 MG/DL   CBC WITH AUTOMATED DIFF   Result Value Ref Range    WBC 9.2 4.1 - 11.1 K/uL    RBC 3.43 (L) 4.10 - 5.70 M/uL    HGB 10.1 (L) 12.1 - 17.0 g/dL    HCT 30.5 (L) 36.6 - 50.3 %    MCV 88.9 80.0 - 99.0 FL    MCH 29.4 26.0 - 34.0 PG    MCHC 33.1 30.0 - 36.5 g/dL    RDW 14.4 11.5 - 14.5 %    PLATELET 280 009 - 034 K/uL    NEUTROPHILS 68 32 - 75 %    LYMPHOCYTES 19 12 - 49 %    MONOCYTES 6 5 - 13 %    EOSINOPHILS 6 0 - 7 %    BASOPHILS 1 0 - 1 %    ABS. NEUTROPHILS 6.1 1.8 - 8.0 K/UL    ABS. LYMPHOCYTES 1.8 0.8 - 3.5 K/UL    ABS. MONOCYTES 0.6 0.0 - 1.0 K/UL    ABS. EOSINOPHILS 0.6 (H) 0.0 - 0.4 K/UL    ABS.  BASOPHILS 0.1 0.0 - 0.1 K/UL   METABOLIC PANEL, COMPREHENSIVE Result Value Ref Range    Sodium 132 (L) 136 - 145 mmol/L    Potassium 4.3 3.5 - 5.1 mmol/L    Chloride 101 97 - 108 mmol/L    CO2 25 21 - 32 mmol/L    Anion gap 6 5 - 15 mmol/L    Glucose 147 (H) 65 - 100 mg/dL    BUN 31 (H) 6 - 20 MG/DL    Creatinine 0.83 0.70 - 1.30 MG/DL    BUN/Creatinine ratio 37 (H) 12 - 20      GFR est AA >60 >60 ml/min/1.73m2    GFR est non-AA >60 >60 ml/min/1.73m2    Calcium 8.2 (L) 8.5 - 10.1 MG/DL    Bilirubin, total 0.6 0.2 - 1.0 MG/DL    ALT (SGPT) 76 12 - 78 U/L    AST (SGOT) 70 (H) 15 - 37 U/L    Alk. phosphatase 92 45 - 117 U/L    Protein, total 6.7 6.4 - 8.2 g/dL    Albumin 2.0 (L) 3.5 - 5.0 g/dL    Globulin 4.7 (H) 2.0 - 4.0 g/dL    A-G Ratio 0.4 (L) 1.1 - 2.2     MAGNESIUM   Result Value Ref Range    Magnesium 1.8 1.6 - 2.4 mg/dL   PHOSPHORUS   Result Value Ref Range    Phosphorus 3.2 2.6 - 4.7 MG/DL   NUCLEATED RBC   Result Value Ref Range    NRBC 0.0 0  WBC    ABSOLUTE NRBC 0.00 0.00 - 0.01 K/uL   CBC WITH AUTOMATED DIFF   Result Value Ref Range    WBC 9.3 4.1 - 11.1 K/uL    RBC 3.46 (L) 4.10 - 5.70 M/uL    HGB 10.4 (L) 12.1 - 17.0 g/dL    HCT 31.1 (L) 36.6 - 50.3 %    MCV 89.9 80.0 - 99.0 FL    MCH 30.1 26.0 - 34.0 PG    MCHC 33.4 30.0 - 36.5 g/dL    RDW 14.2 11.5 - 14.5 %    PLATELET 350 405 - 459 K/uL    NEUTROPHILS 64 32 - 75 %    LYMPHOCYTES 23 12 - 49 %    MONOCYTES 6 5 - 13 %    EOSINOPHILS 6 0 - 7 %    BASOPHILS 1 0 - 1 %    ABS. NEUTROPHILS 6.0 1.8 - 8.0 K/UL    ABS. LYMPHOCYTES 2.2 0.8 - 3.5 K/UL    ABS. MONOCYTES 0.5 0.0 - 1.0 K/UL    ABS. EOSINOPHILS 0.5 (H) 0.0 - 0.4 K/UL    ABS.  BASOPHILS 0.1 0.0 - 0.1 K/UL   METABOLIC PANEL, BASIC   Result Value Ref Range    Sodium 133 (L) 136 - 145 mmol/L    Potassium 4.3 3.5 - 5.1 mmol/L    Chloride 102 97 - 108 mmol/L    CO2 26 21 - 32 mmol/L    Anion gap 5 5 - 15 mmol/L    Glucose 152 (H) 65 - 100 mg/dL    BUN 27 (H) 6 - 20 MG/DL    Creatinine 0.79 0.70 - 1.30 MG/DL    BUN/Creatinine ratio 34 (H) 12 - 20      GFR est AA >60 >60 ml/min/1.73m2    GFR est non-AA >60 >60 ml/min/1.73m2    Calcium 7.8 (L) 8.5 - 10.1 MG/DL   AMMONIA   Result Value Ref Range    Ammonia 33 (H) <32 UMOL/L   VITAMIN B12   Result Value Ref Range    Vitamin B12 1046 (H) 211 - 911 pg/mL   TSH 3RD GENERATION   Result Value Ref Range    TSH 3.70 0.36 - 9.51 uIU/mL   METABOLIC PANEL, BASIC   Result Value Ref Range    Sodium 134 (L) 136 - 145 mmol/L    Potassium 4.1 3.5 - 5.1 mmol/L    Chloride 104 97 - 108 mmol/L    CO2 26 21 - 32 mmol/L    Anion gap 4 (L) 5 - 15 mmol/L    Glucose 152 (H) 65 - 100 mg/dL    BUN 28 (H) 6 - 20 MG/DL    Creatinine 0.78 0.70 - 1.30 MG/DL    BUN/Creatinine ratio 36 (H) 12 - 20      GFR est AA >60 >60 ml/min/1.73m2    GFR est non-AA >60 >60 ml/min/1.73m2    Calcium 7.7 (L) 8.5 - 10.1 MG/DL   PHOSPHORUS   Result Value Ref Range    Phosphorus 3.4 2.6 - 4.7 MG/DL   MAGNESIUM   Result Value Ref Range    Magnesium 1.7 1.6 - 2.4 mg/dL   CBC WITH AUTOMATED DIFF   Result Value Ref Range    WBC 7.7 4.1 - 11.1 K/uL    RBC 3.32 (L) 4.10 - 5.70 M/uL    HGB 10.2 (L) 12.1 - 17.0 g/dL    HCT 29.9 (L) 36.6 - 50.3 %    MCV 90.1 80.0 - 99.0 FL    MCH 30.7 26.0 - 34.0 PG    MCHC 34.1 30.0 - 36.5 g/dL    RDW 14.2 11.5 - 14.5 %    PLATELET 560 820 - 140 K/uL    NEUTROPHILS 56 32 - 75 %    LYMPHOCYTES 25 12 - 49 %    MONOCYTES 10 5 - 13 %    EOSINOPHILS 8 (H) 0 - 7 %    BASOPHILS 1 0 - 1 %    ABS. NEUTROPHILS 4.3 1.8 - 8.0 K/UL    ABS. LYMPHOCYTES 2.0 0.8 - 3.5 K/UL    ABS. MONOCYTES 0.8 0.0 - 1.0 K/UL    ABS. EOSINOPHILS 0.6 (H) 0.0 - 0.4 K/UL    ABS.  BASOPHILS 0.1 0.0 - 0.1 K/UL   METABOLIC PANEL, BASIC   Result Value Ref Range    Sodium 133 (L) 136 - 145 mmol/L    Potassium 4.1 3.5 - 5.1 mmol/L    Chloride 101 97 - 108 mmol/L    CO2 26 21 - 32 mmol/L    Anion gap 6 5 - 15 mmol/L    Glucose 142 (H) 65 - 100 mg/dL    BUN 22 (H) 6 - 20 MG/DL    Creatinine 0.75 0.70 - 1.30 MG/DL    BUN/Creatinine ratio 29 (H) 12 - 20      GFR est AA >60 >60 ml/min/1.73m2 GFR est non-AA >60 >60 ml/min/1.73m2    Calcium 8.2 (L) 8.5 - 10.1 MG/DL   MAGNESIUM   Result Value Ref Range    Magnesium 2.0 1.6 - 2.4 mg/dL   PHOSPHORUS   Result Value Ref Range    Phosphorus 3.3 2.6 - 4.7 MG/DL   METABOLIC PANEL, BASIC   Result Value Ref Range    Sodium 133 (L) 136 - 145 mmol/L    Potassium 4.2 3.5 - 5.1 mmol/L    Chloride 102 97 - 108 mmol/L    CO2 26 21 - 32 mmol/L    Anion gap 5 5 - 15 mmol/L    Glucose 164 (H) 65 - 100 mg/dL    BUN 26 (H) 6 - 20 MG/DL    Creatinine 0.82 0.70 - 1.30 MG/DL    BUN/Creatinine ratio 32 (H) 12 - 20      GFR est AA >60 >60 ml/min/1.73m2    GFR est non-AA >60 >60 ml/min/1.73m2    Calcium 7.9 (L) 8.5 - 10.1 MG/DL   URINALYSIS W/ REFLEX CULTURE   Result Value Ref Range    Color YELLOW/STRAW      Appearance CLEAR CLEAR      Specific gravity 1.018 1.003 - 1.030      pH (UA) 6.5 5.0 - 8.0      Protein TRACE (A) NEG mg/dL    Glucose NEGATIVE  NEG mg/dL    Ketone NEGATIVE  NEG mg/dL    Bilirubin NEGATIVE  NEG      Blood MODERATE (A) NEG      Urobilinogen 2.0 (H) 0.2 - 1.0 EU/dL    Nitrites NEGATIVE  NEG      Leukocyte Esterase MODERATE (A) NEG      WBC 10-20 0 - 4 /hpf    RBC 10-20 0 - 5 /hpf    Epithelial cells FEW FEW /lpf    Bacteria 1+ (A) NEG /hpf    UA:UC IF INDICATED URINE CULTURE ORDERED (A) CNI      Yeast PRESENT (A) NEG     URINALYSIS W/ REFLEX CULTURE   Result Value Ref Range    Color YELLOW/STRAW      Appearance CLEAR CLEAR      Specific gravity 1.018 1.003 - 1.030      pH (UA) 6.5 5.0 - 8.0      Protein TRACE (A) NEG mg/dL    Glucose NEGATIVE  NEG mg/dL    Ketone NEGATIVE  NEG mg/dL    Bilirubin NEGATIVE  NEG      Blood MODERATE (A) NEG      Urobilinogen 2.0 (H) 0.2 - 1.0 EU/dL    Nitrites NEGATIVE  NEG      Leukocyte Esterase MODERATE (A) NEG      WBC 10-20 0 - 4 /hpf    RBC 10-20 0 - 5 /hpf    Epithelial cells FEW FEW /lpf    Bacteria 1+ (A) NEG /hpf    UA:UC IF INDICATED URINE CULTURE ORDERED (A) CNI     LACTIC ACID   Result Value Ref Range    Lactic acid 1.1 0.4 - 2.0 MMOL/L   CBC WITH AUTOMATED DIFF   Result Value Ref Range    WBC 7.1 4.1 - 11.1 K/uL    RBC 3.38 (L) 4.10 - 5.70 M/uL    HGB 9.9 (L) 12.1 - 17.0 g/dL    HCT 30.2 (L) 36.6 - 50.3 %    MCV 89.3 80.0 - 99.0 FL    MCH 29.3 26.0 - 34.0 PG    MCHC 32.8 30.0 - 36.5 g/dL    RDW 14.4 11.5 - 14.5 %    PLATELET 335 549 - 233 K/uL    NEUTROPHILS 60 32 - 75 %    LYMPHOCYTES 23 12 - 49 %    MONOCYTES 10 5 - 13 %    EOSINOPHILS 6 0 - 7 %    BASOPHILS 1 0 - 1 %    ABS. NEUTROPHILS 4.3 1.8 - 8.0 K/UL    ABS. LYMPHOCYTES 1.6 0.8 - 3.5 K/UL    ABS. MONOCYTES 0.7 0.0 - 1.0 K/UL    ABS. EOSINOPHILS 0.4 0.0 - 0.4 K/UL    ABS. BASOPHILS 0.1 0.0 - 0.1 K/UL   METABOLIC PANEL, BASIC   Result Value Ref Range    Sodium 134 (L) 136 - 145 mmol/L    Potassium 4.1 3.5 - 5.1 mmol/L    Chloride 101 97 - 108 mmol/L    CO2 26 21 - 32 mmol/L    Anion gap 7 5 - 15 mmol/L    Glucose 139 (H) 65 - 100 mg/dL    BUN 23 (H) 6 - 20 MG/DL    Creatinine 0.85 0.70 - 1.30 MG/DL    BUN/Creatinine ratio 27 (H) 12 - 20      GFR est AA >60 >60 ml/min/1.73m2    GFR est non-AA >60 >60 ml/min/1.73m2    Calcium 7.8 (L) 8.5 - 10.1 MG/DL   CBC WITH AUTOMATED DIFF   Result Value Ref Range    WBC 9.5 4.1 - 11.1 K/uL    RBC 3.38 (L) 4.10 - 5.70 M/uL    HGB 9.8 (L) 12.1 - 17.0 g/dL    HCT 29.9 (L) 36.6 - 50.3 %    MCV 88.5 80.0 - 99.0 FL    MCH 29.0 26.0 - 34.0 PG    MCHC 32.8 30.0 - 36.5 g/dL    RDW 14.5 11.5 - 14.5 %    PLATELET 522 075 - 819 K/uL    NEUTROPHILS 70 32 - 75 %    LYMPHOCYTES 20 12 - 49 %    MONOCYTES 7 5 - 13 %    EOSINOPHILS 2 0 - 7 %    BASOPHILS 1 0 - 1 %    ABS. NEUTROPHILS 6.7 1.8 - 8.0 K/UL    ABS. LYMPHOCYTES 1.9 0.8 - 3.5 K/UL    ABS. MONOCYTES 0.7 0.0 - 1.0 K/UL    ABS. EOSINOPHILS 0.2 0.0 - 0.4 K/UL    ABS.  BASOPHILS 0.1 0.0 - 0.1 K/UL   METABOLIC PANEL, BASIC   Result Value Ref Range    Sodium 131 (L) 136 - 145 mmol/L    Potassium 4.2 3.5 - 5.1 mmol/L    Chloride 100 97 - 108 mmol/L    CO2 26 21 - 32 mmol/L    Anion gap 5 5 - 15 mmol/L Glucose 160 (H) 65 - 100 mg/dL    BUN 27 (H) 6 - 20 MG/DL    Creatinine 0.98 0.70 - 1.30 MG/DL    BUN/Creatinine ratio 28 (H) 12 - 20      GFR est AA >60 >60 ml/min/1.73m2    GFR est non-AA >60 >60 ml/min/1.73m2    Calcium 7.7 (L) 8.5 - 33.7 MG/DL   METABOLIC PANEL, COMPREHENSIVE   Result Value Ref Range    Sodium 132 (L) 136 - 145 mmol/L    Potassium 4.2 3.5 - 5.1 mmol/L    Chloride 100 97 - 108 mmol/L    CO2 26 21 - 32 mmol/L    Anion gap 6 5 - 15 mmol/L    Glucose 158 (H) 65 - 100 mg/dL    BUN 30 (H) 6 - 20 MG/DL    Creatinine 1.00 0.70 - 1.30 MG/DL    BUN/Creatinine ratio 30 (H) 12 - 20      GFR est AA >60 >60 ml/min/1.73m2    GFR est non-AA >60 >60 ml/min/1.73m2    Calcium 8.0 (L) 8.5 - 10.1 MG/DL    Bilirubin, total 0.6 0.2 - 1.0 MG/DL    ALT (SGPT) 44 12 - 78 U/L    AST (SGOT) 43 (H) 15 - 37 U/L    Alk. phosphatase 122 (H) 45 - 117 U/L    Protein, total 7.2 6.4 - 8.2 g/dL    Albumin 2.0 (L) 3.5 - 5.0 g/dL    Globulin 5.2 (H) 2.0 - 4.0 g/dL    A-G Ratio 0.4 (L) 1.1 - 2.2     PHOSPHORUS   Result Value Ref Range    Phosphorus 3.1 2.6 - 4.7 MG/DL   MAGNESIUM   Result Value Ref Range    Magnesium 2.2 1.6 - 2.4 mg/dL   CBC WITH AUTOMATED DIFF   Result Value Ref Range    WBC 10.7 4.1 - 11.1 K/uL    RBC 3.35 (L) 4.10 - 5.70 M/uL    HGB 9.8 (L) 12.1 - 17.0 g/dL    HCT 29.4 (L) 36.6 - 50.3 %    MCV 87.8 80.0 - 99.0 FL    MCH 29.3 26.0 - 34.0 PG    MCHC 33.3 30.0 - 36.5 g/dL    RDW 14.7 (H) 11.5 - 14.5 %    PLATELET 991 180 - 990 K/uL    NEUTROPHILS 70 32 - 75 %    LYMPHOCYTES 19 12 - 49 %    MONOCYTES 10 5 - 13 %    EOSINOPHILS 1 0 - 7 %    BASOPHILS 0 0 - 1 %    ABS. NEUTROPHILS 7.5 1.8 - 8.0 K/UL    ABS. LYMPHOCYTES 2.0 0.8 - 3.5 K/UL    ABS. MONOCYTES 1.1 (H) 0.0 - 1.0 K/UL    ABS. EOSINOPHILS 0.1 0.0 - 0.4 K/UL    ABS.  BASOPHILS 0.0 0.0 - 0.1 K/UL   METABOLIC PANEL, BASIC   Result Value Ref Range    Sodium 135 (L) 136 - 145 mmol/L    Potassium 4.2 3.5 - 5.1 mmol/L    Chloride 102 97 - 108 mmol/L    CO2 25 21 - 32 mmol/L    Anion gap 8 5 - 15 mmol/L    Glucose 156 (H) 65 - 100 mg/dL    BUN 34 (H) 6 - 20 MG/DL    Creatinine 1.09 0.70 - 1.30 MG/DL    BUN/Creatinine ratio 31 (H) 12 - 20      GFR est AA >60 >60 ml/min/1.73m2    GFR est non-AA >60 >60 ml/min/1.73m2    Calcium 7.9 (L) 8.5 - 10.1 MG/DL   MAGNESIUM   Result Value Ref Range    Magnesium 2.4 1.6 - 2.4 mg/dL   CBC WITH AUTOMATED DIFF   Result Value Ref Range    WBC 14.0 (H) 4.1 - 11.1 K/uL    RBC 3.41 (L) 4.10 - 5.70 M/uL    HGB 9.9 (L) 12.1 - 17.0 g/dL    HCT 30.5 (L) 36.6 - 50.3 %    MCV 89.4 80.0 - 99.0 FL    MCH 29.0 26.0 - 34.0 PG    MCHC 32.5 30.0 - 36.5 g/dL    RDW 15.4 (H) 11.5 - 14.5 %    PLATELET 627 544 - 631 K/uL    NEUTROPHILS 70 %    BAND NEUTROPHILS 3 %    LYMPHOCYTES 19 %    MONOCYTES 4 %    EOSINOPHILS 4 %    BASOPHILS 0 %    ABS. NEUTROPHILS 10.1 K/UL    ABS. LYMPHOCYTES 2.7 K/UL    ABS. MONOCYTES 0.6 K/UL    ABS. EOSINOPHILS 0.6 K/UL    ABS. BASOPHILS 0.0 K/UL    RBC COMMENTS NORMOCYTIC, NORMOCHROMIC      WBC COMMENTS REACTIVE LYMPHS      DF MANUAL     MAGNESIUM   Result Value Ref Range    Magnesium 2.3 1.6 - 2.4 mg/dL   METABOLIC PANEL, COMPREHENSIVE   Result Value Ref Range    Sodium 137 136 - 145 mmol/L    Potassium 4.7 3.5 - 5.1 mmol/L    Chloride 103 97 - 108 mmol/L    CO2 27 21 - 32 mmol/L    Anion gap 7 5 - 15 mmol/L    Glucose 144 (H) 65 - 100 mg/dL    BUN 43 (H) 6 - 20 MG/DL    Creatinine 1.31 (H) 0.70 - 1.30 MG/DL    BUN/Creatinine ratio 33 (H) 12 - 20      GFR est AA >60 >60 ml/min/1.73m2    GFR est non-AA 52 (L) >60 ml/min/1.73m2    Calcium 8.6 8.5 - 10.1 MG/DL    Bilirubin, total 0.7 0.2 - 1.0 MG/DL    ALT (SGPT) 92 (H) 12 - 78 U/L    AST (SGOT) 106 (H) 15 - 37 U/L    Alk.  phosphatase 186 (H) 45 - 117 U/L    Protein, total 7.9 6.4 - 8.2 g/dL    Albumin 2.2 (L) 3.5 - 5.0 g/dL    Globulin 5.7 (H) 2.0 - 4.0 g/dL    A-G Ratio 0.4 (L) 1.1 - 2.2     CBC WITH AUTOMATED DIFF   Result Value Ref Range    WBC 11.4 (H) 4.1 - 11.1 K/uL    RBC 3.25 (L) 4.10 - 5.70 M/uL    HGB 9.4 (L) 12.1 - 17.0 g/dL    HCT 29.0 (L) 36.6 - 50.3 %    MCV 89.2 80.0 - 99.0 FL    MCH 28.9 26.0 - 34.0 PG    MCHC 32.4 30.0 - 36.5 g/dL    RDW 15.3 (H) 11.5 - 14.5 %    PLATELET 285 178 - 390 K/uL    NEUTROPHILS 66 32 - 75 %    LYMPHOCYTES 22 12 - 49 %    MONOCYTES 10 5 - 13 %    EOSINOPHILS 1 0 - 7 %    BASOPHILS 1 0 - 1 %    ABS. NEUTROPHILS 7.6 1.8 - 8.0 K/UL    ABS. LYMPHOCYTES 2.5 0.8 - 3.5 K/UL    ABS. MONOCYTES 1.1 (H) 0.0 - 1.0 K/UL    ABS. EOSINOPHILS 0.1 0.0 - 0.4 K/UL    ABS.  BASOPHILS 0.1 0.0 - 0.1 K/UL    DF MANUAL      RBC COMMENTS NORMOCYTIC, NORMOCHROMIC      WBC COMMENTS REACTIVE LYMPHS     LACTIC ACID   Result Value Ref Range    Lactic acid 0.9 0.4 - 2.0 MMOL/L   CK W/ CKMB & INDEX   Result Value Ref Range    CK 38 (L) 39 - 308 U/L    CK - MB <1.0 <3.6 NG/ML    CK-MB Index Cannot be calculated 0 - 2.5     TROPONIN I   Result Value Ref Range    Troponin-I, Qt. 0.06 (H) <0.05 ng/mL   URINALYSIS W/ REFLEX CULTURE   Result Value Ref Range    Color YELLOW/STRAW      Appearance TURBID (A) CLEAR      Specific gravity 1.019 1.003 - 1.030      pH (UA) 5.5 5.0 - 8.0      Protein 100 (A) NEG mg/dL    Glucose NEGATIVE  NEG mg/dL    Ketone NEGATIVE  NEG mg/dL    Bilirubin NEGATIVE  NEG      Blood LARGE (A) NEG      Urobilinogen 1.0 0.2 - 1.0 EU/dL    Nitrites NEGATIVE  NEG      Leukocyte Esterase LARGE (A) NEG      WBC >100 (H) 0 - 4 /hpf    RBC 10-20 0 - 5 /hpf    Epithelial cells FEW FEW /lpf    Bacteria 2+ (A) NEG /hpf    UA:UC IF INDICATED URINE CULTURE ORDERED (A) CNI      Yeast PRESENT (A) NEG      Yeast w/hyphae PRESENT (A) NEG     BLOOD GAS, ARTERIAL   Result Value Ref Range    pH 7.55 (H) 7.35 - 7.45      PCO2 26 (L) 35.0 - 45.0 mmHg    PO2 84 80 - 100 mmHg    O2 SAT 98 (H) 92 - 97 %    BICARBONATE 22 22 - 26 mmol/L    BASE EXCESS 1.4 mmol/L    O2 METHOD VENTURI MASK      O2 FLOW RATE 4.00 L/min    FIO2 28 %    SPONTANEOUS RATE 24.0      Sample source ARTERIAL      SITE RIGHT RADIAL      KAREN'S TEST YES     CBC WITH AUTOMATED DIFF   Result Value Ref Range    WBC 15.0 (H) 4.1 - 11.1 K/uL    RBC 3.21 (L) 4.10 - 5.70 M/uL    HGB 9.2 (L) 12.1 - 17.0 g/dL    HCT 28.3 (L) 36.6 - 50.3 %    MCV 88.2 80.0 - 99.0 FL    MCH 28.7 26.0 - 34.0 PG    MCHC 32.5 30.0 - 36.5 g/dL    RDW 15.6 (H) 11.5 - 14.5 %    PLATELET 972 775 - 547 K/uL    NEUTROPHILS 74 %    LYMPHOCYTES 21 %    MONOCYTES 5 %    EOSINOPHILS 0 %    BASOPHILS 0 %    ABS. NEUTROPHILS 11.0 K/UL    ABS. LYMPHOCYTES 3.2 K/UL    ABS. MONOCYTES 0.8 K/UL    ABS. EOSINOPHILS 0.0 K/UL    ABS. BASOPHILS 0.0 K/UL    DF MANUAL      RBC COMMENTS NORMOCYTIC, NORMOCHROMIC      WBC COMMENTS REACTIVE LYMPHS     MAGNESIUM   Result Value Ref Range    Magnesium 2.4 1.6 - 2.4 mg/dL   METABOLIC PANEL, COMPREHENSIVE   Result Value Ref Range    Sodium 139 136 - 145 mmol/L    Potassium 3.5 3.5 - 5.1 mmol/L    Chloride 104 97 - 108 mmol/L    CO2 27 21 - 32 mmol/L    Anion gap 8 5 - 15 mmol/L    Glucose 187 (H) 65 - 100 mg/dL    BUN 52 (H) 6 - 20 MG/DL    Creatinine 1.79 (H) 0.70 - 1.30 MG/DL    BUN/Creatinine ratio 29 (H) 12 - 20      GFR est AA 44 (L) >60 ml/min/1.73m2    GFR est non-AA 36 (L) >60 ml/min/1.73m2    Calcium 7.8 (L) 8.5 - 10.1 MG/DL    Bilirubin, total 0.6 0.2 - 1.0 MG/DL    ALT (SGPT) 157 (H) 12 - 78 U/L    AST (SGOT) 164 (H) 15 - 37 U/L    Alk.  phosphatase 168 (H) 45 - 117 U/L    Protein, total 7.6 6.4 - 8.2 g/dL    Albumin 2.0 (L) 3.5 - 5.0 g/dL    Globulin 5.6 (H) 2.0 - 4.0 g/dL    A-G Ratio 0.4 (L) 1.1 - 2.2     BNP   Result Value Ref Range     (H) 0 - 100 pg/mL   TROPONIN I   Result Value Ref Range    Troponin-I, Qt. 0.05 (H) <0.05 ng/mL   CBC WITH AUTOMATED DIFF   Result Value Ref Range    WBC 15.0 (H) 4.1 - 11.1 K/uL    RBC 3.11 (L) 4.10 - 5.70 M/uL    HGB 8.8 (L) 12.1 - 17.0 g/dL    HCT 27.5 (L) 36.6 - 50.3 %    MCV 88.4 80.0 - 99.0 FL    MCH 28.3 26.0 - 34.0 PG    MCHC 32.0 30.0 - 36.5 g/dL    RDW 15.7 (H) 11.5 - 14.5 % PLATELET 693 500 - 341 K/uL    NEUTROPHILS 68 32 - 75 %    BAND NEUTROPHILS 2 0 - 6 %    LYMPHOCYTES 24 12 - 49 %    MONOCYTES 6 5 - 13 %    EOSINOPHILS 0 0 - 7 %    BASOPHILS 0 0 - 1 %    ABS. NEUTROPHILS 10.5 (H) 1.8 - 8.0 K/UL    ABS. LYMPHOCYTES 3.6 (H) 0.8 - 3.5 K/UL    ABS. MONOCYTES 0.9 0.0 - 1.0 K/UL    ABS. EOSINOPHILS 0.0 0.0 - 0.4 K/UL    ABS. BASOPHILS 0.0 0.0 - 0.1 K/UL    DF MANUAL      RBC COMMENTS NORMOCYTIC, NORMOCHROMIC      WBC COMMENTS REACTIVE LYMPHS       *Note: Due to a large number of results and/or encounters for the requested time period, some results have not been displayed. A complete set of results can be found in Results Review. 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 Background of mild chronic  microvascular ischemic disease. EEG  This is a moderately abnormal   electroencephalogram due to the widespread and generalized slow-  wave activity seen, most consistent with a diffuse encephalopathy of   toxic, metabolic or degenerative type. No clear focal process or   epileptiform activity was seen. Clinical correlation recommended.       MEDICATIONS:  Current Facility-Administered Medications   Medication Dose Route Frequency    TPN ADULT - CENTRAL AA 5% D20% W/ CA + ELECTROLYTES   IntraVENous CONTINUOUS    ampicillin-sulbactam (UNASYN) 3 g in 0.9% sodium chloride (MBP/ADV) 100 mL  3 g IntraVENous Q8H    dilTIAZem (CARDIZEM) 100 mg in dextrose 5% (MBP/ADV) 100 mL infusion  10 mg/hr IntraVENous TITRATE    chlorhexidine (PERIDEX) 0.12 % mouthwash 15 mL  15 mL Oral Q12H    heparin (porcine) injection 5,000 Units  5,000 Units SubCUTAneous Q12H    anidulafungin (ERAXIS) 100 mg in 0.9% sodium chloride 130 mL IVPB  100 mg IntraVENous Q24H    linezolid (ZYVOX) IVPB premix in D5W 600 mg  600 mg IntraVENous Q12H    nitroglycerin (NITROBID) 2 % ointment 1 Inch  1 Inch Topical BID    aspirin (ASA) suppository 300 mg  300 mg Rectal DAILY    labetalol (NORMODYNE;TRANDATE) injection 10 mg  10 mg IntraVENous Q6H    fat emulsion 20% (LIPOSYN, INTRALIPID) infusion 250 mL  250 mL IntraVENous Q MON, WED & FRI    insulin lispro (HUMALOG) injection   SubCUTAneous Q6H    sodium chloride (NS) flush 5-10 mL  5-10 mL IntraVENous Q8H    sodium chloride (NS) flush 10-40 mL  10-40 mL InterCATHeter Q8H       Assessment/Plan  1. Encephalopathy-anoxic brain injury and multifactorial  2. Anoxic brain injury    - Discussed the result of the MRI of the brain with the wife. Discussed about long-term prognosis. No seizure activity seen on prior EEG.  -No further neurological workup recommended at this time. We will continue to see the patient back as needed    Signed:  Zhang Newton MD  Neurologist      This note will not be viewable in [address]

## 2017-08-25 NOTE — PROGRESS NOTES
Surgery      Pt seems to be doing a bit better. Wound healing well, cellulitis resolved. Will remove all remaining staples today. Retention sutures to remain total 6 weeks. Will continue to check on pt sporadically while here, nothing really to add from gen surgery standpoint. Desire Mark.  Emily Ronquillo MD, [facility]

## 2017-08-26 NOTE — PROGRESS NOTES
PCU SHIFT NURSING NOTE      Bedside and Verbal shift change report given to Melissa Cobos RN (oncoming nurse) by Ludivina Gerard RN (offgoing nurse). Report included the following information SBAR, Kardex, ED Summary, Intake/Output, MAR, Recent Results and Cardiac Rhythm A. Fib. Shift Summary: 1840    2120: Pt c.diff positive. Hospitalist luci. Spoke w/ Dr. Feng Cain MD to place order for IV Flagyl. 2130: Daughter, Mara Mcmullen, updated on patient's positive c.diff specimen including Dr. Feng Cain placing a new order for IV Flagyl. All questions and concerns answered. 2145: Pt's daughter, Rick Amaya, called & updated on patient's status including positive c. diff specimen. All questions answered. 0800: Bedside and Verbal shift change report given to Jenny Blount RN (oncoming nurse) by Melissa Cobos RN (offgoing nurse). Report included the following information SBAR, Kardex, ED Summary, OR Summary, Procedure Summary, Intake/Output, MAR, Recent Results and Cardiac Rhythm A. Fib. Admission Date 7/27/2017   Admission Diagnosis bladder clots  Bladder injury  perperated viscous  wound dehiscence   Consults IP CONSULT TO UROLOGY  IP CONSULT TO GASTROENTEROLOGY  IP CONSULT TO NEUROLOGY  IP CONSULT TO PALLIATIVE CARE - PROVIDER  IP CONSULT TO UROLOGY  IP CONSULT TO CARDIOLOGY        Consults   [x]PT   [x]OT   []Speech   [x]Case Management      [x] Palliative      Cardiac Monitoring Order   [x]Yes   []No     IV drips   [x]Yes    Drip: TPN                    Dose: 83 ml/hr  Drip: Cardizem           Dose: 10 mg/hr  Drip:                            Dose:   []No     GI Prophylaxis   []Yes   []No         DVT Prophylaxis   SCDs:  Sequential Compression Device: Bilateral     Patient Refused VTE Prophylaxis: Yes    Ash stockings:  Graduated Compression Stockings: Bilateral      [] Medication   []Contraindicated   []None      Activity Level Activity Level: Bed Rest     Activity Assistance: Complete care   Purposeful Rounding every 1-2 hour?    [x]Yes   Lashawn Fitzgerald Score  Total Score: 3   Bed Alarm (If score 3 or >)   [x]Yes   [] Refused (See signed refusal form in chart)   Kong Score  Kong Score: 13   Kong Score (if score 14 or less)   [x]PMT consult   []Wound Care consult      []Specialty bed   [x] Nutrition consult          Needs prior to discharge:   Home O2 required:    []Yes   []No    If yes, how much O2 required? Other:    Last Bowel Movement: Last Bowel Movement Date: 08/25/17      Influenza Vaccine Received Flu Vaccine for Current Season (usually Sept-March): Not Flu Season        Pneumonia Vaccine           Diet Active Orders   Diet    DIET NPO      LDAs         PICC Double Lumen 77/59/36 Basilic;Left (Active)   Central Line Being Utilized Yes 8/25/2017  4:09 PM   Criteria for Appropriate Use Total parenteral nutrition 8/25/2017  4:09 PM   Site Assessment Clean, dry, & intact 8/25/2017  4:09 PM   Phlebitis Assessment 0 8/25/2017  4:09 PM   Infiltration Assessment 0 8/25/2017  4:09 PM   Arm Circumference (cm) 33 cm 8/2/2017 10:47 AM   Date of Last Dressing Change 08/17/17 8/23/2017  4:00 PM   Dressing Status Clean, dry, & intact 8/25/2017  4:09 PM   Action Taken Open ports on tubing capped 8/25/2017  2:58 AM   External Catheter Length (cm) 0 centimeters 8/25/2017  4:09 PM   Dressing Type Disk with Chlorhexadine gluconate (CHG); Transparent 8/25/2017  4:09 PM   Hub Color/Line Status Red; Infusing 8/25/2017  4:09 PM   Positive Blood Return (Site #1) Yes 8/25/2017  4:09 PM   Hub Color/Line Status Purple; Infusing 8/25/2017  4:09 PM   Positive Blood Return (Site #2) Yes 8/25/2017  4:09 PM   Alcohol Cap Used Yes 8/25/2017  2:58 AM          Peripheral IV 08/25/17 Left Arm (Active)   Site Assessment Clean, dry, & intact 8/25/2017  4:09 PM   Phlebitis Assessment 0 8/25/2017  4:09 PM   Infiltration Assessment 0 8/25/2017  4:09 PM   Dressing Status Clean, dry, & intact 8/25/2017  4:09 PM   Dressing Type Transparent;Tape 8/25/2017  4:09 PM   Hub Color/Line Status Blue;Infusing 8/25/2017  4:09 PM                      Urinary Catheter [REMOVED] Urinary Catheter 07/27/17 2- way-Criteria for Appropriate Use: Obstruction/retention  Urinary Catheter 07/28/17 3- way; Rosales-Criteria for Appropriate Use: Medically/surgically unstable  Urinary Catheter 07/28/17 Other (Comment)-Criteria for Appropriate Use: Medically/surgically unstable  [REMOVED] Urinary Catheter 07/26/17 2- way-Criteria for Appropriate Use: Obstruction/retention    Intake & Output   Date 08/24/17 1900 - 08/25/17 0659 08/25/17 0700 - 08/26/17 0659   Shift 6033-5603 24 Hour Total 1630-3842 6799-8983 24 Hour Total   I  N  T  A  K  E   I.V.  (mL/kg/hr) 1359.6 3587.9 131.8  (0.1)  131.8      Cardizem Volume 89.5 248.5 131.8  131.8      Volume (anidulafungin (ERAXIS) 100 mg in 0.9% sodium chloride 130 mL IVPB) 130 130         Volume (linezolid (ZYVOX) IVPB premix in D5W 600 mg) 300 600         Volume (ampicillin-sulbactam (UNASYN) 3 g in 0.9% sodium chloride (MBP/ADV) 100 mL) 100 100         Volume (TPN ADULT - CENTRAL AA 5% D20% W/ CA + ELECTROLYTES)  1769.3         Volume (TPN ADULT - CENTRAL AA 5% D20% W/ CA + ELECTROLYTES) 740.1 740.1       Shift Total  (mL/kg) 1359.6  (15.1) 3587.9  (39.9) 131.8  (1.5)  131.8  (1.5)   O  U  T  P  U  T   Urine  (mL/kg/hr) 500 1500 875  (0.8) 500 1375      Urine Voided  525         Urine Output (mL) (Urinary Catheter 07/28/17 3- way; Rosales) 500 800       Urine Output (mL) (Urinary Catheter 07/28/17 Other (Comment))  175 125 100 225    Stool           Stool Occurrence(s) 1 x 1 x       Shift Total  (mL/kg) 500  (5.6) 1500  (16.7) 875  (9.7) 500  (5.6) 1375  (15.3)   .6 2087. 9 -743.2 -500 -1243.2   Weight (kg) 90 90 90 90 90         Readmission Risk Assessment Tool Score Medium Risk            17       Total Score        3 Has Seen PCP in Last 6 Months (Yes=3, No=0)    2 . Living with Significant Other. Assisted Living. LTAC. SNF.  or   Rehab    3 Patient Length of Stay (>5 days = 3)    4 IP Visits Last 12 Months (1-3=4, 4=9, >4=11)    5 Pt.  Coverage (Medicare=5 , Medicaid, or Self-Pay=4)        Criteria that do not apply:    Charlson Comorbidity Score (Age + Comorbid Conditions)       Expected Length of Stay 5d 12h   Actual Length of Stay 28

## 2017-08-26 NOTE — PROGRESS NOTES
Surgery      Pt seems to be doing a bit better. Wound healing well, cellulitis resolved. Staples out yesterday  Retention sutures to remain total 6 weeks. Spoke with family regarding concerns with negative surgical exploration - they state that they were concerned with the CT scan being performed when they didn't want it, not the surgical exploration in light of diffuse pneumatosis. They have no concerns with the patient's surgical care thus far. Will continue to check on pt sporadically while here, nothing really to add from gen surgery standpoint. Hernandez Townsend.  Melvi Carmona MD, [facility]

## 2017-08-26 NOTE — PROGRESS NOTES
Hospitalist Progress Note    NAME: Fe Renee   :  1928   MRN:  [id number]       Interim Hospital Summary: 80 y.o. male whom presented on 2017 with      Assessment / Plan:  Encephalopathy Hypoxia vs Ischemia  - open eyes to verbal stimuli, limited conversation, falls asleep during conversation  - MRI of brain:  multiple punctate foci of acute infarction in the  bilateral corona radiata and centrum semiovale. Background of mild chronic  microvascular ischemic disease. The result was discussed with the family by Dr. Rocio Luong  - MRI findings consistent with hypoxic injury and multiple small infarcts are the cause of encephalopathy and confusion. 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 Age-related volume loss unchanged  - ammonia 33, B12 1046, TSH 3.7  - neurology saw pt on  per neurology    \" Encephalopathy-anoxic brain injury and multifactorial  Anoxic brain injury   - Discussed the result of the MRI of the brain with the wife. Discussed about long-term prognosis. No seizure activity seen on prior EEG.  -No further neurological workup recommended at this time. We will continue to see the patient back as needed\"         -Still very lethargic, as per Neurology signs of either  Hypoxic vs Ischemic  Encephalopathy. Febrile  - afebrile for 72 hours so far. -CT : wo acute findings   - chest x-ray: No interval change in interstitial edema pattern  - blood cx no growth, urine cx (-). Pt was on Rocephin for (+) U/A. D/c'd rocephin  - ordered wound cx from abdominal incision site and suprapubic catheter site  - ordered CT of abd/pel (abdomen is showing signs of peritoneal irritation, need CT scan to be done)  Sepsis: fever, tachycardia, leukocytosis due to UTI vs PNA.   UTI: prior UTI grew Enterococcus, completed Ampicillin, yeast noted, c/w Eraxis, Unasyn and Zyvox, F/U cultures. Postop respiratory failure s/p extubation 8/7, now on RA with O2 Sat 98%  S/P removal of bladder stone and repair of perforated bladder (7/28)  - Cystoscopy with attempted clot evacuation with subsequent exploratory laparotomy with bladder stone retrieval and repair of large posterior bladder perforation on 7/28  - urology following; recommend to apply H2O2 to suprapubic site with Q tip daily. S/p Exp Lap (7/29) due to perforate viscus; diffuse gastric distention without bowel perforation   S/p Exp lap with closure for dehiscence with retention sutures (8/5)  - general surgery following.  Dr. Quique Hernández recommended to consult GI if and when the patient requires alternative tube feeding. CT abdomen 8/22/17 shows catheter in place with bladder thickening and b/l pleural effusions. Nutritional support, c/w TPN, ordered today. Difficulty with feeding  - General surgery recommended to consult GI for alternative tube feeding is needed  - SLP following; keep NPO, ice chips ok per nursing when pt is awake  - continue with TPN; discussed with the family about alternate tube feeding.  Family would like to wait until Monday to think about the option.  Family would like to work on extra stimulation and encourage for the pt to eat. No changes in patient's status. Family not interested alternative tube feeding at present time  Shock resolved  LINDSEY Resolved initially, creatinine trending up again./2 to sepsis  Increased LFT: due to sepsis, monitor. CAD, PAF with RVR on cardi drip   Cardiology following  CHF?: has bilateral pleural effusions, ECHO EF 09% with diastolic dysfunction, CXR pulmonary edema, will give a dose of diuretics and monitor, check BNP. 756  Pneumonia??: last xray 8/23 Stable diffuse interstitial opacities in keeping with pulmonary  edema.  Mildly increased bibasilar airspace opacities may represent edema,  atelectasis, or developing infection.     for now on  Unasyn and Zyvox, c/w bronchodilators, monitor. HTN  - on  cardizem gtt per cardiology bp 147/70  - restart plavix when able to take PO, okay with surgery not taking po   - continue with labetalol and nitrobid    Anemia, post op s/p PRBC  h/h 8.8/27  Electrolyte disturbance replete as indicated, on tpn  Pruritus (resolved)  Constipation resolved after dulcolax suppository  DVT prophylaxis: Lovenox        Code status: Full code    NOK: palliative care team following - need to further address if majority of children for medical decisions or all other 4 children deferring to Deleta Norris  [phone] or Phillip [phone]   Children don't want wife to be involved in medical decision making      Body mass index is 28.88 kg/(m^2). Recommended Disposition: TBD         Subjective:     Chief Complaint / Reason for Physician Visit: pt cathi cyr asked if he is pain  By nurse \"no}  . Discussed with RN events overnight. Indication for Antimicrobials: HCAP, ?wound infection, ? UTI  On 7/28, the patient had removal of bladder stone and repair of perforated bladder  7/29, the patient had LAPAROTOMY EXPLORATORY, wound closure for dehiscence without bowel perforation  8/5 exp lap with closure for dehiscence with retention sutures  8/22: CT scan: \"shows catheter in place with bladder thickening and b/l pleural effusions. \"  Current Regimen of Each Antimicrobial (Start Day & Day of Therapy):   Anidulafungin 200 mg X1, then 100 mg every day (8/23; day #4  Zyvox 600 mg IV q 12 hours (8/23; day #4  Unasyn 3g q8 hours (8/23; day #4  Flagyl 500 mg q8 day #2         Review of Systems:  Symptom Y/N Comments  Symptom Y/N Comments   Fever/Chills    Chest Pain     Poor Appetite    Edema     Cough    Abdominal Pain     Sputum    Joint Pain     SOB/CLARKE    Pruritis/Rash     Nausea/vomit    Tolerating PT/OT     Diarrhea    Tolerating Diet     Constipation    Other       Could NOT obtain due to:      Objective:     VITALS:   Last 24hrs VS reviewed since prior progress note. Most recent are:  Patient Vitals for the past 24 hrs:   Temp Pulse Resp BP SpO2   08/26/17 0622 - 85 - 161/80 -   08/26/17 0405 96.5 °F (35.8 °C) 92 21 156/89 96 %   08/25/17 2340 96.8 °F (36 °C) 94 20 155/82 94 %   08/25/17 2008 96.7 °F (35.9 °C) 94 21 125/77 95 %   08/25/17 1609 96.5 °F (35.8 °C) (!) 114 20 149/85 96 %   08/25/17 1127 97.6 °F (36.4 °C) (!) 106 20 128/73 95 %       Intake/Output Summary (Last 24 hours) at 08/26/17 0745  Last data filed at 08/26/17 0630   Gross per 24 hour   Intake          1172.21 ml   Output             2725 ml   Net         -1552.79 ml        PHYSICAL EXAM:  General:                    WD, WN. Awake, moaning  not communicative, not following commands. Mild distress    EENT:                                  EOMI. Anicteric sclerae. MMM  Resp:                                   decraese bs jose , no wheezing or rales. No accessory muscle use  CV:                                      Regular  rhythm,  No edema  GI:                                       Soft, obese Non distended, Non tender.  +Bowel sounds                           + suprapubic catheter and Rosales wound c/d/i no erythema  Neurologic:                  Awake, not communicative, not following commands  Psych:                       Poor insight. Not anxious nor agitated  Skin:                                    No rashes.   No jaundice         Reviewed most current lab test results and cultures  YES  Reviewed most current radiology test results   YES  Review and summation of old records today    NO  Reviewed patient's current orders and MAR    YES  PMH/SH reviewed - no change compared to H&P  ________________________________________________________________________  Care Plan discussed with:    Comments   Patient x    Family      RN x    Care Manager     Consultant                        Multidiciplinary team rounds were held today with , nursing, pharmacist and clinical coordinator. Patient's plan of care was discussed; medications were reviewed and discharge planning was addressed. ________________________________________________________________________  Total NON critical care TIME:  45 Minutes    Total CRITICAL CARE TIME Spent:   Minutes non procedure based      Comments   >50% of visit spent in counseling and coordination of care x    ________________________________________________________________________  Ebonie Orantes MD     Procedures: see electronic medical records for all procedures/Xrays and details which were not copied into this note but were reviewed prior to creation of Plan. LABS:  I reviewed today's most current labs and imaging studies.   Pertinent labs include:  Recent Labs      08/25/17 0241 08/24/17   0040   WBC  15.0*  15.0*   HGB  8.8*  9.2*   HCT  27.5*  28.3*   PLT  283  254     Recent Labs      08/25/17 0241 08/24/17   0040   NA  143  139   K  3.4*  3.5   CL  106  104   CO2  30  27   GLU  155*  187*   BUN  55*  52*   CREA  1.39*  1.79*   CA  8.2*  7.8*   MG  2.5*  2.4   ALB  1.8*  2.0*   TBILI  0.5  0.6   SGOT  133*  164*   ALT  150*  157*       Signed: Ebonie Orantes MD

## 2017-08-26 NOTE — PROGRESS NOTES
.    PCU SHIFT NURSING NOTE      Bedside shift change report given to Lalo Landeros RN (oncoming nurse) by Carvin Bence (offgoing nurse). Report included the following information SBAR, Kardex, Intake/Output, MAR and Recent Results. Shift Summary:   0730- Receiving report. Dr. Kaila Fink at bedside. Patient restless and moving in bed with leg hanging off bed. Remains in A-fib with controlled rate on Cardizem 10mg. 1200- Family at bedside. Stated patient felt warm. Patient remains afebrile. Family pleasant. Dr. Danya Anne at bedside. No other issues. 1300- PICC nurse. Stated purple port doesn't have blood return. Suggested when ABX finishes, place Cardizem in PIV and move TPN to red port and give heparin per protocol. 0- Notified Dr. Kaila Fink that patient's respiratory status has changed. His RR 28 and 02 sat 90-91%. Respirations labored. Stated to give neb tx and he will order chest x-ray. 1500- Placed patient on NC 2L for sats 88-90%. Called RT. Patient remains non-verbal except no to pain. No stool today. Informed on coming nurse of plan for PICC.   Admission Date 7/27/2017   Admission Diagnosis bladder clots  Bladder injury  perperated viscous  wound dehiscence   Consults IP CONSULT TO UROLOGY  IP CONSULT TO GASTROENTEROLOGY  IP CONSULT TO NEUROLOGY  IP CONSULT TO PALLIATIVE CARE - PROVIDER  IP CONSULT TO UROLOGY  IP CONSULT TO CARDIOLOGY        Consults   [x]PT   [x]OT   []Speech   [x]Case Management      [x] Palliative      Cardiac Monitoring Order   [x]Yes   []No     IV drips   [x]Yes    Drip:                            Dose:  Drip:                            Dose:  Drip:                            Dose:   []No     GI Prophylaxis   [x]Yes   []No         DVT Prophylaxis   SCDs:  Sequential Compression Device: Bilateral     Patient Refused VTE Prophylaxis: Yes    Ash stockings:  Graduated Compression Stockings: Bilateral      [x] Medication   []Contraindicated   []None      Activity Level Activity Level: Bed Rest Activity Assistance: Complete care   Purposeful Rounding every 1-2 hour? [x]Yes   Mcdonald Score  Total Score: 3   Bed Alarm (If score 3 or >)   []Yes   [] Refused (See signed refusal form in chart)   Kong Score  Kong Score: 14   Kong Score (if score 14 or less)   []PMT consult   []Wound Care consult      []Specialty bed   [] Nutrition consult          Needs prior to discharge:   Home O2 required:    []Yes   [x]No    If yes, how much O2 required? Other:    Last Bowel Movement: Last Bowel Movement Date: 08/25/17      Influenza Vaccine Received Flu Vaccine for Current Season (usually Sept-March): Not Flu Season        Pneumonia Vaccine           Diet Active Orders   Diet    DIET NPO      LDAs         PICC Double Lumen 76/86/21 Basilic;Left (Active)   Central Line Being Utilized Yes 8/25/2017  7:54 PM   Criteria for Appropriate Use Total parenteral nutrition 8/25/2017  7:54 PM   Site Assessment Clean, dry, & intact 8/25/2017  7:54 PM   Phlebitis Assessment 0 8/25/2017  7:54 PM   Infiltration Assessment 0 8/25/2017  7:54 PM   Arm Circumference (cm) 33 cm 8/2/2017 10:47 AM   Date of Last Dressing Change 08/17/17 8/23/2017  4:00 PM   Dressing Status Clean, dry, & intact 8/25/2017  7:54 PM   Action Taken Open ports on tubing capped 8/25/2017  2:58 AM   External Catheter Length (cm) 0 centimeters 8/25/2017  4:09 PM   Dressing Type Disk with Chlorhexadine gluconate (CHG); Transparent 8/25/2017  7:54 PM   Hub Color/Line Status Red;Flushed; Infusing 8/26/2017  2:55 AM   Positive Blood Return (Site #1) Yes 8/26/2017  2:55 AM   Hub Color/Line Status Purple;Flushed; Infusing 8/26/2017  2:55 AM   Positive Blood Return (Site #2) Yes 8/26/2017  2:55 AM   Alcohol Cap Used Yes 8/25/2017  2:58 AM          Peripheral IV 08/25/17 Left Arm (Active)   Site Assessment Clean, dry, & intact 8/25/2017  7:54 PM   Phlebitis Assessment 0 8/25/2017  7:54 PM   Infiltration Assessment 0 8/25/2017  7:54 PM   Dressing Status Clean, dry, & intact 8/25/2017  7:54 PM   Dressing Type Transparent;Tape 8/25/2017  7:54 PM   Hub Color/Line Status Blue; Infusing 8/25/2017  7:54 PM       Peripheral IV 08/25/17 Right Forearm (Active)   Site Assessment Clean, dry, & intact 8/25/2017 10:26 PM   Phlebitis Assessment 0 8/25/2017 10:26 PM   Infiltration Assessment 0 8/25/2017 10:26 PM   Dressing Status Clean, dry, & intact 8/25/2017 10:26 PM   Dressing Type Transparent;Tape 8/25/2017 10:26 PM   Hub Color/Line Status Blue;Flushed; Infusing 8/25/2017 10:26 PM                      Urinary Catheter [REMOVED] Urinary Catheter 07/27/17 2- way-Criteria for Appropriate Use: Obstruction/retention  Urinary Catheter 07/28/17 3- way; Rosales-Criteria for Appropriate Use: Medically/surgically unstable  Urinary Catheter 07/28/17 Other (Comment)-Criteria for Appropriate Use: Medically/surgically unstable  [REMOVED] Urinary Catheter 07/26/17 2- way-Criteria for Appropriate Use: Obstruction/retention    Intake & Output   Date 08/25/17 0700 - 08/26/17 0659 08/26/17 0700 - 08/27/17 0659   Shift 0935-3093 4798-4551 24 Hour Total 6010-1787 8927-0040 24 Hour Total   I  N  T  A  K  E   I.V.  (mL/kg/hr) 672.2  (0.6) 500  (0.5) 1172.2  (0.5)         Cardizem Volume 160.2  160.2         Volume (linezolid (ZYVOX) IVPB premix in D5W 600 mg) 300 300 600         Volume (ampicillin-sulbactam (UNASYN) 3 g in 0.9% sodium chloride (MBP/ADV) 100 mL) 100 100 200         Volume (metroNIDAZOLE (FLAGYL) IVPB premix 500 mg)  100 100         Volume (TPN ADULT - CENTRAL AA 5% D20% W/ CA + ELECTROLYTES) 112.1  112.1       Shift Total  (mL/kg) 672.2  (7.5) 500  (5.6) 1172.2  (13)      O  U  T  P  U  T   Urine  (mL/kg/hr) 875  (0.8) 1850  (1.7) 2725  (1.3)         Urine Output (mL) (Urinary Catheter 07/28/17 3- way; Rosales) 750 1325 2075         Urine Output (mL) (Urinary Catheter 07/28/17 Other (Comment)) 125 542 650       Stool            Stool Occurrence(s)  3 x 3 x       Shift Total  (mL/kg) 875  (9.7) 1850  (20.6) 2725  (30.3)      NET -202.8 -8940 -1552.8      Weight (kg) 90 90 90 90 90 90         Readmission Risk Assessment Tool Score High Risk            22       Total Score        3 Has Seen PCP in Last 6 Months (Yes=3, No=0)    2 . Living with Significant Other. Assisted Living. LTAC. SNF. or   Rehab    3 Patient Length of Stay (>5 days = 3)    4 IP Visits Last 12 Months (1-3=4, 4=9, >4=11)    5 Pt.  Coverage (Medicare=5 , Medicaid, or Self-Pay=4)    5 Charlson Comorbidity Score (Age + Comorbid Conditions)       Expected Length of Stay 5d 12h   Actual Length of Stay 29

## 2017-08-26 NOTE — PROGRESS NOTES
PCU SHIFT NURSING NOTE      Bedside and Verbal shift change report given to Aamir Jones RN (oncoming nurse) by Valery Ragland RN (offgoing nurse). Report included the following information SBAR, Kardex, ED Summary, OR Summary, Procedure Summary, Intake/Output, MAR, Recent Results, Med Rec Status, Cardiac Rhythm A-fib and Alarm Parameters . Shift Summary:         Admission Date 7/27/2017   Admission Diagnosis bladder clots  Bladder injury  perperated viscous  wound dehiscence   Consults IP CONSULT TO UROLOGY  IP CONSULT TO GASTROENTEROLOGY  IP CONSULT TO NEUROLOGY  IP CONSULT TO PALLIATIVE CARE - PROVIDER  IP CONSULT TO UROLOGY  IP CONSULT TO CARDIOLOGY        Consults   [x]PT   [x]OT   [x]Speech   [x]Case Management      [x] Palliative      Cardiac Monitoring Order   [x]Yes   []No     IV drips   []Yes    Drip:                            Dose:  Drip:                            Dose:  Drip:                            Dose:   [x]No     GI Prophylaxis   [x]Yes   []No         DVT Prophylaxis   SCDs:  Sequential Compression Device: Bilateral     Patient Refused VTE Prophylaxis: Yes    Ash stockings:  Graduated Compression Stockings: Bilateral      [x] Medication   []Contraindicated   []None      Activity Level Activity Level: Bed Rest     Activity Assistance: Complete care   Purposeful Rounding every 1-2 hour? [x]Yes   Mcdonald Score  Total Score: 3   Bed Alarm (If score 3 or >)   [x]Yes   [] Refused (See signed refusal form in chart)   Kong Score  Kong Score: 13   Kong Score (if score 14 or less)   [x]PMT consult   [x]Wound Care consult      [x]Specialty bed   [x] Nutrition consult          Needs prior to discharge:   Home O2 required:    []Yes   [x]No    If yes, how much O2 required?     Other:    Last Bowel Movement: Last Bowel Movement Date: 08/25/17      Influenza Vaccine Received Flu Vaccine for Current Season (usually Sept-March): Not Flu Season        Pneumonia Vaccine           Diet Active Orders Diet    DIET NPO      LDAs         PICC Double Lumen 41/19/93 Basilic;Left (Active)   Central Line Being Utilized Yes 8/26/2017  3:30 PM   Criteria for Appropriate Use Total parenteral nutrition 8/26/2017  3:30 PM   Site Assessment Clean, dry, & intact 8/26/2017  3:30 PM   Phlebitis Assessment 0 8/26/2017  3:30 PM   Infiltration Assessment 0 8/26/2017  3:30 PM   Arm Circumference (cm) 33 cm 8/2/2017 10:47 AM   Date of Last Dressing Change 08/26/17 8/26/2017  3:30 PM   Dressing Status Clean, dry, & intact 8/26/2017  3:30 PM   Action Taken Open ports on tubing capped 8/26/2017  3:30 PM   External Catheter Length (cm) 0 centimeters 8/26/2017 12:45 PM   Dressing Type Disk with Chlorhexadine gluconate (CHG); Transparent 8/26/2017  3:30 PM   Hub Color/Line Status Purple; Infusing;Flushed 8/26/2017  3:30 PM   Positive Blood Return (Site #1) No 8/26/2017  3:30 PM   Hub Color/Line Status Red; Infusing;Flushed 8/26/2017  3:30 PM   Positive Blood Return (Site #2) Yes 8/26/2017  3:30 PM   Alcohol Cap Used Yes 8/26/2017  3:30 PM          Peripheral IV 08/25/17 Left Arm (Active)   Site Assessment Clean, dry, & intact 8/26/2017  3:30 PM   Phlebitis Assessment 0 8/26/2017  3:30 PM   Infiltration Assessment 0 8/26/2017  3:30 PM   Dressing Status Clean, dry, & intact 8/26/2017  3:30 PM   Dressing Type Tape;Transparent 8/26/2017  3:30 PM   Hub Color/Line Status Blue;Capped;Flushed 8/26/2017  3:30 PM       Peripheral IV 08/25/17 Right Forearm (Active)   Site Assessment Clean, dry, & intact 8/26/2017  3:30 PM   Phlebitis Assessment 0 8/26/2017  3:30 PM   Infiltration Assessment 0 8/26/2017  3:30 PM   Dressing Status Clean, dry, & intact 8/26/2017  3:30 PM   Dressing Type Tape;Transparent 8/26/2017  3:30 PM   Hub Color/Line Status Blue; Infusing;Flushed 8/26/2017  3:30 PM                      Urinary Catheter [REMOVED] Urinary Catheter 07/27/17 2- way-Criteria for Appropriate Use: Obstruction/retention  Urinary Catheter 07/28/17 3- way;[place]-Criteria for Appropriate Use: Medically/surgically unstable, Surgical procedure  Urinary Catheter 07/28/17 Other (Comment)-Criteria for Appropriate Use: Medically/surgically unstable, Surgical procedure  [REMOVED] Urinary Catheter 07/26/17 2- way-Criteria for Appropriate Use: Obstruction/retention    Intake & Output   Date 08/25/17 0700 - 08/26/17 0659 08/26/17 0700 - 08/27/17 0659   Shift 1810-9043 9499-9127 24 Hour Total 4834-8721 9663-4133 24 Hour Total   I  N  T  A  K  E   P. O.    0  0      P. O.    0  0    I.V.  (mL/kg/hr) 672.2  (0.6) 700  (0.6) 1372.2  (0.6) 3951.6  3951.6      I.V.    1144  1144      Cardizem Volume 160.2  160.2 220.2  220.2      Volume (anidulafungin (ERAXIS) 100 mg in 0.9% sodium chloride 130 mL IVPB)    260  260      Volume (linezolid (ZYVOX) IVPB premix in D5W 600 mg) 300 300 600 300  300      Volume (ampicillin-sulbactam (UNASYN) 3 g in 0.9% sodium chloride (MBP/ADV) 100 mL) 100 200 300 100  100      Volume (metroNIDAZOLE (FLAGYL) IVPB premix 500 mg)  200 200 100  100      Volume (TPN ADULT - CENTRAL AA 5% D20% W/ CA + ELECTROLYTES) 112.1  112.1 1827.4  1827.4    Shift Total  (mL/kg) 672.2  (7.5) 700  (7.8) 1372.2  (15.2) 3951.6  (43.9)  3951.6  (43.9)   O  U  T  P  U  T   Urine  (mL/kg/hr) 875  (0.8) 1850  (1.7) 2725  (1.3) 1250  1250      Urine Output (mL) (Urinary Catheter 07/28/17 3- way; [place]) 750 1325 2075 350  350      Urine Output (mL) (Urinary Catheter 07/28/17 Other (Comment)) 125 525 650 900  900    Stool            Stool Occurrence(s)  4 x 4 x       Shift Total  (mL/kg) 875  (9.7) 1850  (20.6) 2725  (30.3) 1250  (13.9)  1250  (13.9)   NET -202.8 -1150 -1352.8 2701.6  2701.6   Weight (kg) 90 90 90 90 90 90         Readmission Risk Assessment Tool Score High Risk            22       Total Score        3 Has Seen PCP in Last 6 Months (Yes=3, No=0)    2 . Living with Significant Other. Assisted Living. LTAC. SNF.  or   Rehab    3 Patient Length of Stay (>5 days = 3)    4 IP Visits Last 12 Months (1-3=4, 4=9, >4=11)    5 Pt.  Coverage (Medicare=5 , Medicaid, or Self-Pay=4)    5 Charlson Comorbidity Score (Age + Comorbid Conditions)       Expected Length of Stay 5d 12h   Actual Length of Stay 29

## 2017-08-26 NOTE — CONSULTS
Palliative Medicine Consult  [place]: [phone] ([phone])    Patient Name: Ciarra Benz  YOB: 1928    Date of Initial Consult: 8/17/17  Reason for Consult: care decisions  Requesting Provider: Celia Connell   Primary Care Physician: Pedrito Young MD      SUMMARY:   Ciarra Benz is a 80 y.o. gentleman with a past history of s/p hospital admission 7/20/2017 at which time he had cardiac catheterization performed with stent placement and PMHx of HTN / prostate enlargement , who was admitted on 7/27/2017 from home with a diagnosis of hematuria, urinary retention. Current medical issues leading to Palliative Medicine involvement include: care decisions. Psychosocial: lives with wife in 2 story house, totally independent with all ADLs and IADLs, rode stationary bike q. 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 PALLIATIVE DIAGNOSES:   1. AMS  2. Dysphagia  3. Anasarca  4. Weakness  5. Debility  6. Counseling regarding goals of care and advance care planning     PLAN:   Brief Summary of Plan  -visited pt in his rm. Wife and 2 dtrs (Lynl Fillers and Arpit Albino) at bedside  -family meeting held (scheduled for [phone] but began shortly afterwards). Benja Nelson (SW), Maria Del Carmen Albert () and I were present from our team.  Dr. Kim Bravo, and [facility] were also present in family meeting  -goals of care confirmed  Code status was not discussed  -family expressed many concerns. See below  Consult placed to Urology per instructions provided by urology office staff.   -please refer to [facility] note from earlier today  -we will continue to establish therapeutic alliance as well as provide psychosocial support    1. Goals of Care- confirmed  Wife, along w/ 2 dtrs present, expressed wishes for full, restorative treatment and all measures that support this. 2. Advance Care Planning- pt's code status is FULL CODE per active EMR order.    Code status was not discussed in family meeting. If opportunity arises, discussion regarding code status will take place. 3. Information Sharing-   I relayed info about our team and its role in their care. They verbalized understanding of all the points made including the following:  -we are an interdisciplinary team serving as a bridge of communication and advocate on behalf of the patient and family when needed  -we are a support care service that, when needed, assists w/ sx mgmt  -though hospice is \"under\" palliative medicine, our service is not hospice nor does a consult visit by our service mean that hospice is being or should be considered  -we are not here to make medical decisions, and our being consulted does not equate to a change in a patient's overall condition  -our involvement is independent of clinical trajectory and/or medical decisions made    No questions regarding the above. We moved to their understanding of what is going on medically in their own words. This led to their presenting concerns about care received in ED as well as during hospital admission. Supportive listening provided throughout the family meeting. Family presented concerns for approximately 30 min uninterrupted. Discussion was directed to focus on what is going on medically. Family focused again on concerns about his care. There was presentation of concern surrounding his bladder and urological mgmt. They raised concerns about \"negative' language, later referred to as \"doom and gloom\". There was emphasis on God and irasema. There were references to a sense of presence of God through some staff. At the same time, there was emphasis on \"I don't mean to be ugly\" or \"We don't want to throw anyone under the bus\". It was relayed that the main cause of problems in pt's care was \"the administration\" and how things were handled from there.      They expressed concerns about RN staffing and how RNs at times appeared too tired \"to do their job\". They raised question about safety. Laurie Mckeon emphasized that, in her job, she supervises all the time and this is an issue with supervising. She cannot understand why things are the way they are. Laurie Mckeon expressed that what occurred in the ED was \"torture\". She said pt did not get pain meds and should have. She relayed that pt told her that his fear was not dying b/c of what he was going through at the time. Family feels that doctors are taking too long (which is unacceptable) to address pt's sx's. They also note that pt responds best to a male RN. Later, however, they gave example of a male RN with whom pt did not do well. They mentioned that there was issue with \"English speaking\". They further noted the importance of [employer] presence. Family wished for \"2nd opinion\" regarding mgmt of SPT. Most specifically, family wishes for an additional opinion regarding current mgmt and long-term plan of care for SPT. Family is under impression that SPT will be clamped and then removed per their understanding of what Dr. Slava Small had said. Niel Gilford also noted that their concern is regarding safety of doing so and ramifications of clamping and removing too early. Niel Gilford thought that SPT should not be in for >2 weeks. They are asking for another urologist provide his/her input on plan of care.  This was during family meeting w/ wife and 2 dtrs today. They emphasize that they are \"not medical people\" and wish to know the best plan of care from specialist (i.e. another urologist).     There are additional specific areas that family wished for explanations:  -what occurred in the ED regarding what they perceived as repeated Rosales placement attempts  -how could such a large bladder stone be \"missed\"  -why was a surgical procedure done that found \"nothing\"  -why was CT scan done on this past Tues despite family specifically saying not to proceed w/ this    It was shared w/ family regarding surrogate decision making. If wife chooses to defer, she cannot select one or a set number of children; rather, surrogate decision making goes to majority of children. Hillary KeyesToribio asked why can their mother not do what she wants with deferring. This was explained by Laurita Greenfield. It was relayed to our team before this family meeting that pt is to be referred to only as \"Odessaer Tash Valero"    Medical team is not to call wife first.  We are to call Sorin Mendez or Hillary Rooney. Please also refer to Casey Johnson note from earlier today. It was also relayed to family that we would be following up on Monday as our team typically does not have in-person presence here in the hospital on Sat and Sun.     4. Psychosocial Support- We will continue to support patient and family during this difficult hospitalization    5. Spiritual- at this time, there are no apparent spiritual needs or concerns. 6. Symptom Management- at this time, there does not appear to be symptom management for which our assistance is needed. I have discussed with patients bedside RN, Juan Carlos. Time and input appreciated. I have also discussed with:  Dr. Farzad Rush  In setting of pre-brief and debrief around family meeting  Time and input appreciated. I have discussed with our palliative care IDT. Thank you for this consult that has provided us with the opportunity to be involved in this patient's care. We will continue to follow along with you. Should you have any questions or concerns prior to our next visit at the bedside, please do not hesitate to contact us at [phone] Rizwan Rivas MD  Palliative Care Team     GOALS OF CARE / TREATMENT PREFERENCES:   [====Goals of Care====]  GOALS OF CARE:  Patient / health care proxy stated goals:   Full, restorative treatment and all measures that support this.    However, family does not want \"any narcotics\" or \"sedatives\"      TREATMENT PREFERENCES:   Code Status: Full Code    Advance Care Planning:  Advance Care Planning 8/23/2017   Patient's Healthcare Decision Maker is: Legal Next of Kin   Primary Decision Maker Name Josefina Mccartney deferred to her dtrs: Frances Johnny and Silviasantiago Dannielle   Primary Decision Maker Phone Number Vianney, [phone]; Flower Hess [phone]   Primary Decision Maker Relationship to Patient Adult child   Confirm Advance Directive -       Other:    The palliative care team has discussed with patient / health care proxy about goals of care / treatment preferences for patient.  [====Goals of Care====]         HISTORY:     History obtained from: chart    CHIEF COMPLAINT: pt does not relay cc    HPI/SUBJECTIVE:    The patient is:   [] Verbal and participatory  [x] Non-participatory due to: current condition  Limited participation in conversation d/t current condition    No o/n events or issues. Per bedside RN, no staff concerns at this time. BIBA with c/o progressively worsening hematuria and clotting wick; Pt had cardiac stents placed 7/24/17,(was discharged the day before this visit) along with a new wick, however, wick has been clotting off, causing pt bladder pain. Pt has been drinking a lot of fluid, but very little urine output; daughter shared that pt's urine was a edwin color with improvement of sxs, however, today sxs have been getting worse. Urologist started pt on abx today after finding bacteria in his urine. In the ED, Dr. Radha Moser attempted to unblock the catheter without long-lasting success, therefore, admitted pt for OR surgical intervention. 7/28: In the OR, a large bladder stone was removed and a posterior bladder perforation was repaired. Post-operatively, pt has had progressively worsening abdominal pain and distention, and abdominal CT showed extensive hydropneumoperitoneum, extensive retroperitoneal emphysema, especially on the left, and mediastinal emphysema.     7/29: exploratory lap for diffuse pneumatosis and gastric distention without evidence of bowel perforation. Extubated, afib with RVR, post-op ileus  7/31. 8/2: PICC placement, NGT to sxn for ileus. Head CT neg (pt still lethargic). Anasarca. 8/5: confused, pulling at lines. Following coughing episode, pt's wound dehisced with small bowel protruding. 8/6: required short course of vasopressor. Extubated 8/7. transferred from CCU to floor 8/10.  8/11: KUB neg for ileus, NGT removed. 8/12: RRT called due to slurred speech, inability to swallow liquids, not following commands, worsening lethargy; no opioids or sedatives in over 24 hours. 8/18: initially pt was very lethargic, would not open his eyes or engage in conversation, however, after wife and daughters worked with him he did wake up, able to name family members and have a short conversation. Clinical Pain Assessment (nonverbal scale for severity on nonverbal patients): pt denies  [++++ Clinical Pain Assessment++++]  [++++Pain Severity++++]: Pain: 0  [++++Pain Character++++]:   [++++Pain Duration++++]:   [++++Pain Effect++++]:   [++++Pain Factors++++]:   [++++Pain Frequency++++]:   [++++Pain Location++++]:   [++++ Clinical Pain Assessment++++]  Duration: for how long has pt been experiencing pain (e.g., 2 days, 1 month, years)  Frequency: how often pain is an issue (e.g., several times per day, once every few days, constant)     FUNCTIONAL ASSESSMENT:     Palliative Performance Scale (PPS):  PPS: 30       PSYCHOSOCIAL/SPIRITUAL SCREENING:     Advance Care Planning:  Advance Care Planning 8/23/2017   Patient's Healthcare Decision Maker is: Legal Next of Jose 69   Primary Decision Maker Name Koki East deferred to her dtrs: Jose Ramon Ayala and Carly James   Primary Decision Maker Phone Number Vianney, [phone];  Rozina Ruiz [phone]   Primary Decision Maker Relationship to Patient Adult child   Confirm Advance Directive -        Any spiritual / Mandaeism concerns:  [] Yes /  [x] No    Caregiver Burnout:  [] Yes /  [] No /  [x] No Caregiver Present      Anticipatory grief assessment:   [x] Normal  / [] Maladaptive       ESAS Anxiety: Anxiety: 0    ESAS Depression: Depression: 3        REVIEW OF SYSTEMS:     Positive and pertinent negative findings in ROS are noted above in HPI. The following systems were [x] reviewed / [] unable to be reviewed as noted in HPI  Other findings are noted below. Systems: constitutional, ears/nose/mouth/throat, respiratory, gastrointestinal, genitourinary, musculoskeletal, integumentary, neurologic, psychiatric, endocrine. Positive findings noted below. Modified ESAS Completed by: provider   Fatigue: 8 Drowsiness: 3   Depression: 3 Pain: 0   Anxiety: 0 Nausea: 0   Anorexia: 4 Dyspnea: 0     Constipation: No     Stool Occurrence(s): 1        PHYSICAL EXAM:     From RN flowsheet:  Wt Readings from Last 3 Encounters:   08/24/17 198 lb 6.6 oz (90 kg)   08/15/17 207 lb 10.8 oz (94.2 kg)   07/26/17 211 lb 3.2 oz (95.8 kg)     Blood pressure 125/77, pulse 94, temperature 96.7 °F (35.9 °C), resp. rate 21, height 5' 10\" (1.778 m), weight 198 lb 6.6 oz (90 kg), SpO2 95 %.     Pain Scale 1: Numeric (0 - 10)  Pain Intensity 1: 0  Pain Onset 1:  (pain with coughing, movement)  Pain Location 1: Abdomen  Pain Orientation 1: Left  Pain Description 1: Sore  Pain Intervention(s) 1: Repositioned    Gen: pleasant, in NAD  HEENT: NC/AT, MMM  Respiratory: breathing not labored, symmetric  Musculoskeletal: no deformity, no tenderness to palpation  Skin: warm, dry  Neurologic: awake, answers some questions appropriately  Does not correctly identify one of his dtrs  Does recognize and identify his wife  Psychiatric: blunted affect  No obvious signs of agitation   Unable to demonstrate capacity regarding any specific task      HISTORY:     Active Problems:    Bladder injury (7/28/2017)      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)      Altered mental status, unspecified (8/15/2017)      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 No pertinent family history. History reviewed, no pertinent family history.   Social History   Substance Use Topics    Smoking status: Never Smoker    Smokeless tobacco: Never Used    Alcohol use No     Allergies   Allergen Reactions    Metoprolol Nausea and Vomiting      Current Facility-Administered Medications   Medication Dose Route Frequency    TPN ADULT - CENTRAL AA 5% D20% W/ CA + ELECTROLYTES   IntraVENous CONTINUOUS    ampicillin-sulbactam (UNASYN) 3 g in 0.9% sodium chloride (MBP/ADV) 100 mL  3 g IntraVENous Q8H    dilTIAZem (CARDIZEM) 100 mg in dextrose 5% (MBP/ADV) 100 mL infusion  10 mg/hr IntraVENous TITRATE    albuterol-ipratropium (DUO-NEB) 2.5 MG-0.5 MG/3 ML  3 mL Nebulization Q6H PRN    chlorhexidine (PERIDEX) 0.12 % mouthwash 15 mL  15 mL Oral Q12H    heparin (porcine) injection 5,000 Units  5,000 Units SubCUTAneous Q12H    anidulafungin (ERAXIS) 100 mg in 0.9% sodium chloride 130 mL IVPB  100 mg IntraVENous Q24H    linezolid (ZYVOX) IVPB premix in D5W 600 mg  600 mg IntraVENous Q12H    acetaminophen (TYLENOL) solution 975 mg  975 mg Oral Q6H PRN    Or    acetaminophen (TYLENOL) suppository 650 mg  650 mg Rectal Q6H PRN    bisacodyl (DULCOLAX) suppository 10 mg  10 mg Rectal DAILY PRN    zinc oxide-cod liver oil (DESITIN) 40 % paste   Topical PRN    metoclopramide HCl (REGLAN) injection 5 mg  5 mg IntraVENous Q8H PRN    nitroglycerin (NITROBID) 2 % ointment 1 Inch  1 Inch Topical BID    levalbuterol (XOPENEX) nebulizer soln 0.63 mg/3 mL  0.63 mg Nebulization Q6H PRN    aspirin (ASA) suppository 300 mg  300 mg Rectal DAILY    labetalol (NORMODYNE;TRANDATE) injection 10 mg  10 mg IntraVENous Q6H    hydrALAZINE (APRESOLINE) 20 mg/mL injection 20 mg  20 mg IntraVENous Q4H PRN    labetalol (NORMODYNE;TRANDATE) injection 20 mg  20 mg IntraVENous Q4H PRN    fat emulsion 20% (LIPOSYN, INTRALIPID) infusion 250 mL  250 mL IntraVENous Q MON, WED & FRI    insulin lispro (HUMALOG) injection   SubCUTAneous Q6H    glucose chewable tablet 16 g  4 Tab Oral PRN    dextrose (D50W) injection syrg 12.5-25 g  12.5-25 g IntraVENous PRN    glucagon (GLUCAGEN) injection 1 mg  1 mg IntraMUSCular PRN    sodium chloride (NS) flush 5-10 mL  5-10 mL IntraVENous Q8H    ondansetron (ZOFRAN) injection 4 mg  4 mg IntraVENous Q4H PRN    sodium chloride (NS) flush 10 mL  10 mL InterCATHeter PRN    sodium chloride (NS) flush 10-40 mL  10-40 mL InterCATHeter Q8H          LAB AND IMAGING FINDINGS:     Lab Results   Component Value Date/Time    WBC 15.0 08/25/2017 02:41 AM    HGB 8.8 08/25/2017 02:41 AM    PLATELET 514 60/34/6004 02:41 AM     Lab Results   Component Value Date/Time    Sodium 143 08/25/2017 02:41 AM    Potassium 3.4 08/25/2017 02:41 AM    Chloride 106 08/25/2017 02:41 AM    CO2 30 08/25/2017 02:41 AM    BUN 55 08/25/2017 02:41 AM    Creatinine 1.39 08/25/2017 02:41 AM    Calcium 8.2 08/25/2017 02:41 AM    Magnesium 2.5 08/25/2017 02:41 AM    Phosphorus 3.1 08/21/2017 05:07 AM      Lab Results   Component Value Date/Time    AST (SGOT) 133 08/25/2017 02:41 AM    Alk.  phosphatase 136 08/25/2017 02:41 AM    Protein, total 7.2 08/25/2017 02:41 AM    Albumin 1.8 08/25/2017 02:41 AM    Globulin 5.4 08/25/2017 02:41 AM     No results found for: INR, PTMR, PTP, PT1, PT2, APTT   No results found for: IRON, FE, TIBC, IBCT, PSAT, FERR   Lab Results   Component Value Date/Time    pH 7.55 08/23/2017 02:00 AM    PCO2 26 08/23/2017 02:00 AM    PO2 84 08/23/2017 02:00 AM     No components found for: [place]   Lab Results   Component Value Date/Time    CK 38 08/23/2017 01:40 AM    CK - MB <1.0 08/23/2017 01:40 AM                Total time: 160 min  Counseling / coordination time, spent as noted above: 145 min  > 50% counseling / coordination?: yes    Prolonged service was provided for  []30 min   [x]75 min in face to face time in the presence of the patient, spent as noted above. Time Start: 1240   Time End: 2042  Note: this can only be billed with 29219 (initial) or 84380 (follow up). If multiple start / stop times, list each separately.

## 2017-08-27 NOTE — PROGRESS NOTES
PCU SHIFT NURSING NOTE      Bedside and Verbal shift change report given to Maria Del Rosario Peñaloza RN (oncoming nurse) by Whit Toscano RN (offgoing nurse). Report included the following information SBAR, Kardex, ED Summary, OR Summary, Procedure Summary, Intake/Output, MAR, Recent Results, Med Rec Status, Cardiac Rhythm NSR with PVC's and Alarm Parameters . Shift Summary:         Admission Date 7/27/2017   Admission Diagnosis bladder clots  Bladder injury  perperated viscous  wound dehiscence   Consults IP CONSULT TO UROLOGY  IP CONSULT TO GASTROENTEROLOGY  IP CONSULT TO NEUROLOGY  IP CONSULT TO PALLIATIVE CARE - PROVIDER  IP CONSULT TO UROLOGY  IP CONSULT TO CARDIOLOGY        Consults   [x]PT   [x]OT   [x]Speech   [x]Case Management      [x] Palliative      Cardiac Monitoring Order   [x]Yes   []No     IV drips   [x]Yes    Drip:       cardizem gtt                     Dose: 10 cc/hr  Drip:                            Dose:  Drip:                            Dose:   []No     GI Prophylaxis   [x]Yes   []No         DVT Prophylaxis   SCDs:  Sequential Compression Device: Bilateral     Patient Refused VTE Prophylaxis: Yes    Ash stockings:  Graduated Compression Stockings: Bilateral      [x] Medication   []Contraindicated   []None      Activity Level Activity Level: Bed Rest     Activity Assistance: Complete care   Purposeful Rounding every 1-2 hour? [x]Yes   Mcdonald Score  Total Score: 3   Bed Alarm (If score 3 or >)   [x]Yes   [] Refused (See signed refusal form in chart)   Kong Score  Kong Score: 11   Kong Score (if score 14 or less)   [x]PMT consult   [x]Wound Care consult      [x]Specialty bed   [x] Nutrition consult          Needs prior to discharge:   Home O2 required:    []Yes   [x]No    If yes, how much O2 required?     Other:    Last Bowel Movement: Last Bowel Movement Date: 08/26/17      Influenza Vaccine Received Flu Vaccine for Current Season (usually Sept-March): Not Flu Season        Pneumonia Vaccine Diet Active Orders   Diet    DIET NPO      LDAs         PICC Double Lumen 37/56/20 Basilic;Left (Active)   Central Line Being Utilized Yes 8/27/2017  7:30 AM   Criteria for Appropriate Use Total parenteral nutrition 8/27/2017  7:30 AM   Site Assessment Clean, dry, & intact 8/27/2017  7:30 AM   Phlebitis Assessment 0 8/27/2017  7:30 AM   Infiltration Assessment 0 8/27/2017  7:30 AM   Arm Circumference (cm) 33 cm 8/2/2017 10:47 AM   Date of Last Dressing Change 08/26/17 8/27/2017  7:30 AM   Dressing Status Clean, dry, & intact 8/27/2017  7:30 AM   Action Taken Open ports on tubing capped 8/27/2017  7:30 AM   External Catheter Length (cm) 0 centimeters 8/26/2017 12:45 PM   Dressing Type Disk with Chlorhexadine gluconate (CHG); Transparent 8/27/2017  7:30 AM   Hub Color/Line Status Purple; Infusing;Flushed 8/27/2017  7:30 AM   Positive Blood Return (Site #1) No 8/27/2017  7:30 AM   Hub Color/Line Status Red; Infusing;Flushed 8/27/2017  7:30 AM   Positive Blood Return (Site #2) Yes 8/27/2017  7:30 AM   Alcohol Cap Used Yes 8/27/2017  7:30 AM          Peripheral IV 08/25/17 Left Arm (Active)   Site Assessment Clean, dry, & intact 8/27/2017  7:30 AM   Phlebitis Assessment 0 8/27/2017  7:30 AM   Infiltration Assessment 0 8/27/2017  7:30 AM   Dressing Status Clean, dry, & intact 8/27/2017  7:30 AM   Dressing Type Tape;Transparent 8/27/2017  7:30 AM   Hub Color/Line Status Blue; Infusing;Flushed 8/27/2017  7:30 AM       Peripheral IV 08/25/17 Right Forearm (Active)   Site Assessment Clean, dry, & intact 8/27/2017  7:30 AM   Phlebitis Assessment 0 8/27/2017  7:30 AM   Infiltration Assessment 0 8/27/2017  7:30 AM   Dressing Status Clean, dry, & intact 8/27/2017  7:30 AM   Dressing Type Tape;Transparent 8/27/2017  7:30 AM   Hub Color/Line Status Blue; Infusing;Flushed 8/27/2017  7:30 AM                      Urinary Catheter [REMOVED] Urinary Catheter 07/27/17 2- way-Criteria for Appropriate Use: Obstruction/retention  Urinary Catheter 07/28/17 3- way; Rosales-Criteria for Appropriate Use: Medically/surgically unstable, Strict I/Os, Surgical procedure  Urinary Catheter 07/28/17 Other (Comment)-Criteria for Appropriate Use: Medically/surgically unstable, Strict I/Os, Surgical procedure  [REMOVED] Urinary Catheter 07/26/17 2- way-Criteria for Appropriate Use: Obstruction/retention    Intake & Output   Date 08/26/17 0700 - 08/27/17 0659 08/27/17 0700 - 08/28/17 0659   Shift 3966-6776 9574-7493 24 Hour Total 1185-2658 7191-4567 24 Hour Total   I  N  T  A  K  E   P. O. 0  0         P. O. 0  0       I.V.  (mL/kg/hr) 3951.6  (3.7) 1903.6  (1.8) 5855.2  (2.8) 1.6  1.6      I.V. 1144  1144         Cardizem Volume 220.2 139.8 360 0.2  0.2      Volume (anidulafungin (ERAXIS) 100 mg in 0.9% sodium chloride 130 mL IVPB) 260 0 260 0  0      Volume (linezolid (ZYVOX) IVPB premix in D5W 600 mg) 300 300 600 0  0      Volume (ampicillin-sulbactam (UNASYN) 3 g in 0.9% sodium chloride (MBP/ADV) 100 mL) 100 200 300 0  0      Volume (metroNIDAZOLE (FLAGYL) IVPB premix 500 mg) 100 200 300 0  0      Volume (TPN ADULT - CENTRAL AA 5% D20% W/ CA + ELECTROLYTES) 1827.4  1827.4         Volume (TPN ADULT - CENTRAL AA 5% D20% W/ CA + ELECTROLYTES)  1063.8 1063.8 1.4  1.4    Shift Total  (mL/kg) 3951.6  (43.9) 1903.6  (21.7) 5855.2  (66.7) 1.6  (0)  1.6  (0)   O  U  T  P  U  T   Urine  (mL/kg/hr) 1250  (1.2) 2100  (2) 3350  (1.6) 470  470      Urine Output (mL) (Urinary Catheter 07/28/17 3- way; Rosales) 350 1500 1850 370  370      Urine Output (mL) (Urinary Catheter 07/28/17 Other (Comment))  100  100    Shift Total  (mL/kg) 1250  (13.9) 2100  (23.9) 3350  (38.2) 470  (5.4)  470  (5.4)   NET 2701.6 -196.4 2505.2 -468.5  -468.5   Weight (kg) 90 87.8 87.8 87.8 87.8 87.8         Readmission Risk Assessment Tool Score High Risk            22       Total Score        3 Has Seen PCP in Last 6 Months (Yes=3, No=0)    2 . Living with Significant Other.  Assisted Living. LTAC. SNF. or   Rehab    3 Patient Length of Stay (>5 days = 3)    4 IP Visits Last 12 Months (1-3=4, 4=9, >4=11)    5 Pt.  Coverage (Medicare=5 , Medicaid, or Self-Pay=4)    5 Charlson Comorbidity Score (Age + Comorbid Conditions)       Expected Length of Stay 5d 12h   Actual Length of Stay 30

## 2017-08-27 NOTE — PROGRESS NOTES
PCU SHIFT NURSING NOTE    Bedside and verbal shift change report given by Ally Rose RN (offgoing nurse). Report included the following information: SBAR, Kardex, Intake/Output, procedures, MAR, recent results, cardiac rhythm and cardizem drip. [id number]: Patients family at bedside. Patient lying on left side. RR 24. Patient minimally responsive, drowsy, not to command, minimal restlessness. Family verbalizes that patients current status is not new. Opens eyes to name and proceeds to fall back to sleep. Unable to hold conversation with patient. Family pray's with patient and leaves. Assessment completed. Oral care given. Patient repositioned for maximum comfort. Vital signs received and PRN hydralazine given for SBP greater than 150. RR rate increased to 26. Oxygen 92%. Increased O2 to 4L. Patient HOB 45 degrees. Lung sounds diminished with crackles noted to left lower lung base. Stayed at bedside. At 5159 patient resting with eyes closed. RR 22. Oxygen 94 percent SBP at 143 when received at 2025. Patient repositioned supine. Decreased in restlessness noted. IVF medications infusing. Lines identified. Oxygen humidification applied. Will continue to monitor. 0122-4952: Patient very restless. Legs hanging off bed. Repositioned back to bed with HOB elevated. RR 32. Oxygen sat 94%. . Patient tachyneic and labored. Crackles and rhonchi scattered throughout bilateral lungs. Dr. Alicia Nelson, on call hospitalist notified, regarding patients status, MD ordered IV lasix 40 mg x1 and to notify Jarred Gonzales (daughter) to receive permission to give patient morphine. Jarred Gonzales and Yadira Bajwa notified. Jarred Gonzales stated \"its okay to give him morphine. But absolutely no Ativan. \" Dr. Alicia Nelson called back and made aware of Manda's request and 1 mg of Morphine PRN ordered. Subsequent result of lasix resulted in 750 total of urine output. Morphine given. Approximately 30 minutes later, patient resting with eyes closed. RR 20-22.  No signs of distress noted. PRN labetelol given for SBP in the 170-180's. Subsequent blood pressure post PRN labetelol 123/69 HR 82. Oxygen sat 92%. Patient HOB elevated at 39. No signs of distress noted. Eyes closed with no evidence of restlessness. Will continue to monitor. 6585-0296: Noted patient to be restless in bed, more than previous hourly check. Adult non-verbal scale used and PRN morphine given. Vital signs received. Assessment completed. Oral care given. Rosales care given. Patient repositioned for maximum comfort. Room clean. Call bell in reach. Bed in lowest position. Will continue to monitor. 5Marvin Ronan called regarding an update on patients status. Update given.

## 2017-08-27 NOTE — PROGRESS NOTES
Progress Note:  Late Entry from Family Meeting held 08/25/17    Met with spouse and two daughters during [address] as outlined in Dr. Moe Man note on 08/25/17. At that time, provided information for family regarding resources for discharge including [facility] [place] and resources for patient's care at home. Family indicated they have had a poor experience with a skilled nursing facility in the past when providing visits for their Mandaen. Asked about acute rehab and Dr. Ashley Hawkins reviewed that patient does not currently meet the requirement for intensive therapy at [facility], but could possibly receive this service at a later time if his condition approves after discharge. Family strongly opposed SNF placement and desire to take patient home at discharge. Family informed of Home Health, [facility], private pay caregiver services for home care. [address] [place] [address] services which decrease in number of visits depending on patient's needs and plan of care. Emphasized that caring for patient will be difficult due to his care needs and that multiple people will need to be involved to provide 24/7 care. Family expressed that they would provide for patients care and again refused SNF. Needs for Discharge:  TPN will need to be changed to feeding tube prior to discharge. Family Education for tube feeds, patient care prior to discharge. Recommend family providing care in hospital for 24/48 hours continuously prior to going home. Family will need to develop solid plan of caregiver shifts. Medical Equipment in home:  Hospital bed, oxygen, feeding supplies and other items as deemed necessary  Home Health for Skilled Nursing, PT/OT, Aide. Ambulance transportation to home. Recommend one Care Manager to follow continuously throughout patient stay. This writer will designate Care Manager and continue to follow as well.     Young Albert  AD/Care Management  [phone]

## 2017-08-27 NOTE — PROGRESS NOTES
Hospitalist Progress Note    NAME: Greg Zavala   :  1928   MRN:  [id number]       Interim Hospital Summary: 80 y.o. male whom presented on 2017 with      Assessment / Plan:  Encephalopathy Hypoxia vs Ischemia  - open eyes to verbal stimuli, limited conversation, falls asleep during conversation  - MRI of brain:  multiple punctate foci of acute infarction in the  bilateral corona radiata and centrum semiovale. Background of mild chronic  microvascular ischemic disease. The result was discussed with the family by Dr. Beatrice Wiggins  - MRI findings consistent with hypoxic injury and multiple small infarcts are the cause of encephalopathy and confusion. 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 Age-related volume loss unchanged  - ammonia 33, B12 1046, TSH 3.7  - neurology saw pt on  per neurology    \" Encephalopathy-anoxic brain injury and multifactorial  Anoxic brain injury   - Discussed the result of the MRI of the brain with the wife.  Discussed about long-term prognosis.  No seizure activity seen on prior EEG.  -No further neurological workup recommended at this time.  We will continue to see the patient back as needed\"            -Still very lethargic, as per Neurology signs of either  Hypoxic vs Ischemic  Encephalopathy.      Febrile  - afebrile for 72 hours so far. -CT : wo acute findings   - chest x-ray: No interval change in interstitial edema pattern  - blood cx no growth, urine cx (-). Pt was on Rocephin for (+) U/A. D/c'd rocephin  - ordered wound cx from abdominal incision site and suprapubic catheter site  - ordered CT of abd/pel (abdomen is showing signs of peritoneal irritation, need CT scan to be done)  Sepsis: fever, tachycardia, leukocytosis due to UTI vs PNA.   UTI: prior UTI grew Enterococcus, completed Ampicillin, yeast noted, c/w Eraxis, Unasyn and Zyvox, F/U cultures. Postop respiratory failure s/p extubation 8/7, now on RA with O2 Sat 98%  S/P removal of bladder stone and repair of perforated bladder (7/28)  - Cystoscopy with attempted clot evacuation with subsequent exploratory laparotomy with bladder stone retrieval and repair of large posterior bladder perforation on 7/28  - urology following; recommend to apply H2O2 to suprapubic site with Q tip daily. S/p Exp Lap (7/29) due to perforate viscus; diffuse gastric distention without bowel perforation   S/p Exp lap with closure for dehiscence with retention sutures (8/5)  - general surgery following.  Dr. Ashley Brennan recommended to consult GI if and when the patient requires alternative tube feeding. CT abdomen 8/22/17 shows catheter in place with bladder thickening and b/l pleural effusions. Nutritional support, c/w TPN, ordered today. Difficulty with feeding  - General surgery recommended to consult GI for alternative tube feeding is needed  - SLP following; keep NPO, ice chips ok per nursing when pt is awake  - continue with TPN; discussed with the family about alternate tube feeding.  Family would like to wait until Monday to think about the option.  Family would like to work on extra stimulation and encourage for the pt to eat. No changes in patient's status. Family not interested alternative tube feeding at present time  Shock resolved  LINDSEY Resolved initially, creatinine trending up again./2 to sepsis  Increased LFT: due to sepsis, much today 8/27     CAD, PAF with RVR on cardi drip   Cardiology following  CHF?: has bilateral pleural effusions, ECHO EF 33% with diastolic dysfunction, CXR pulmonary edema, will give a dose of diuretics and monitor, check BNP. 756  Pneumonia??: last xray 8/23 Stable diffuse interstitial opacities in keeping with pulmonary  edema.  Mildly increased bibasilar airspace opacities may represent edema,  atelectasis, or developing infection.     for now on Unasyn and Zyvox, c/w bronchodilators, monitor. S/p lasix 40 mg iv x good urine output  laisx 20 mg iv  pnr     HTN  - on  cardizem gtt per cardiology bp 147/70  - restart plavix when able to take PO, okay with surgery not taking po   - continue with labetalol and nitrobid     Anemia, post op s/p PRBC  h/h 9/28.4 8/27  Electrolyte disturbance replete as indicated, on tpn  Pruritus (resolved)  Constipation resolved after dulcolax suppository  DVT prophylaxis: Lovenox          Code status: Full code    NOK: palliative care team following - need to further address if majority of children for medical decisions or all other 4 children deferring to Anjelica Morales   or Phillip [phone]   Children don't want wife to be involved in medical decision making       Body mass index is 28.88 kg/(m^2). Recommended Disposition:  per - Family strongly opposed SNF placement and desire to take patient home at discharge  Emphasized that caring for patient will be difficult due to his care needs and that multiple people will need to be involved to provide 24/7 care. Subjective:     Chief Complaint / Reason for Physician Visit lasix given alst night with good urine output. Also getting morphine prn- ok with family. Wound healing well, cellulitis resolved. Discussed with RN events overnight. Review of Systems:  Symptom Y/N Comments  Symptom Y/N Comments   Fever/Chills    Chest Pain     Poor Appetite    Edema     Cough    Abdominal Pain     Sputum    Joint Pain     SOB/CLARKE    Pruritis/Rash     Nausea/vomit    Tolerating PT/OT     Diarrhea    Tolerating Diet     Constipation    Other       Could NOT obtain due to:      Objective:     VITALS:   Last 24hrs VS reviewed since prior progress note.  Most recent are:  Patient Vitals for the past 24 hrs:   Temp Pulse Resp BP SpO2   08/27/17 1202 98.5 °F (36.9 °C) 63 26 127/56 95 %   08/27/17 1123 98.5 °F (36.9 °C) 66 26 148/64 97 %   08/27/17 0730 98.6 °F (37 °C) 71 26 153/62 95 %   08/27/17 0641 - 71 - 139/50 96 %   08/27/17 0636 - 75 - 148/60 -   08/27/17 0259 98.8 °F (37.1 °C) 78 24 156/58 94 %   08/27/17 0001 - 79 24 137/88 94 %   08/26/17 2303 97.8 °F (36.6 °C) 82 22 123/69 92 %   08/26/17 2257 - 97 - (!) 116/114 -   08/26/17 2254 - 96 - (!) 169/114 92 %   08/26/17 2227 - 96 - 174/77 93 %   08/26/17 2202 - 100 - 180/79 93 %   08/26/17 2200 - (!) 102 - 185/88 92 %   08/26/17 2150 - 100 - 178/84 -   08/26/17 2142 - 99 - 178/82 -   08/26/17 2025 97.4 °F (36.3 °C) 85 26 143/80 94 %   08/26/17 2018 - 86 - (!) 159/92 -   08/26/17 2005 97.4 °F (36.3 °C) 89 28 (!) 159/92 95 %   08/26/17 1530 98.6 °F (37 °C) 82 26 166/74 96 %   08/26/17 1456 - - - - 93 %   08/26/17 1454 - 81 28 - (!) 89 %   08/26/17 1359 - 80 26 150/69 93 %       Intake/Output Summary (Last 24 hours) at 08/27/17 1235  Last data filed at 08/27/17 1123   Gross per 24 hour   Intake          6394.36 ml   Output             4745 ml   Net          1649.36 ml        PHYSICAL EXAM:  General:                    WD, WN. Awake, moaning  not communicative, not following commands. Mild distress    EENT:                                  EOMI. Anicteric sclerae.  MMM  Resp:                                   decraese bs jose , no wheezing or rales.  No accessory muscle use  CV:                                      Regular  rhythm,  No edema  GI:                                       Soft, obese Non distended, Non tender.  +Bowel sounds                           + suprapubic catheter and Rosales wound c/d/i no erythema  Neurologic:                  Awake, not communicative, not following commands  Psych:                       Poor insight. Not anxious nor agitated  Skin:                                    No rashes.  No jaundice     Reviewed most current lab test results and cultures  YES  Reviewed most current radiology test results   YES  Review and summation of old records today    NO  Reviewed patient's current orders and MAR    YES  PMH/SH reviewed - no change compared to H&P  ________________________________________________________________________  Care Plan discussed with:    Comments   Patient x    Family      RN x    Care Manager x    Consultant                        Multidiciplinary team rounds were held today with , nursing, pharmacist and clinical coordinator. Patient's plan of care was discussed; medications were reviewed and discharge planning was addressed. ________________________________________________________________________  Total NON critical care TIME:  30   Minutes    Total CRITICAL CARE TIME Spent:   Minutes non procedure based      Comments   >50% of visit spent in counseling and coordination of care x    ________________________________________________________________________  Roman Finch MD     Procedures: see electronic medical records for all procedures/Xrays and details which were not copied into this note but were reviewed prior to creation of Plan. LABS:  I reviewed today's most current labs and imaging studies.   Pertinent labs include:  Recent Labs      08/27/17   0657  08/27/17   0306  08/25/17   0241   WBC  14.4*  13.8*  15.0*   HGB  9.0*  8.1*  8.8*   HCT  28.4*  31.6*  27.5*   PLT  327  290  283     Recent Labs      08/27/17   0656  08/25/17   0241   NA  157*  143   K  3.7  3.4*   CL  117*  106   CO2  28  30   GLU  162*  155*   BUN  49*  55*   CREA  1.66*  1.39*   CA  7.3*  8.2*   MG   --   2.5*   ALB  1.6*  1.8*   TBILI  0.5  0.5   SGOT  48*  133*   ALT  87*  150*       Signed: Roman Finch MD

## 2017-08-28 NOTE — PROGRESS NOTES
PCU SHIFT NURSING NOTE      Bedside shift change report given to Rosa Paniagua RN (oncoming nurse) by Ada Bae (offgoing nurse). Report included the following information Kardex, Intake/Output, MAR and Recent Results. Shift Summary:   2000: Went in patients room to receive bedside report and patients suprapubic catheter was lying on the floor. Dr. Odalis Lenz the urologist on call was notified and site was cleaned and sterile dressing applied. No new orders received. Patients jarad Bowman updated and made aware. Admission Date 7/27/2017   Admission Diagnosis bladder clots  Bladder injury  perperated viscous  wound dehiscence   Consults IP CONSULT TO UROLOGY  IP CONSULT TO GASTROENTEROLOGY  IP CONSULT TO NEUROLOGY  IP CONSULT TO PALLIATIVE CARE - PROVIDER  IP CONSULT TO UROLOGY  IP CONSULT TO CARDIOLOGY        Consults   []PT   []OT   []Speech   []Case Management      [] Palliative      Cardiac Monitoring Order   []Yes   []No     IV drips   []Yes    Drip:                            Dose:  Drip:                            Dose:  Drip:                            Dose:   []No     GI Prophylaxis   []Yes   []No         DVT Prophylaxis   SCDs:  Sequential Compression Device: Bilateral     Patient Refused VTE Prophylaxis: Yes    Ash stockings:  Graduated Compression Stockings: Bilateral      [] Medication   []Contraindicated   []None      Activity Level Activity Level: Bed Rest     Activity Assistance: Complete care   Purposeful Rounding every 1-2 hour? []Yes   Mcdonald Score  Total Score: 3   Bed Alarm (If score 3 or >)   []Yes   [] Refused (See signed refusal form in chart)   Kong Score  Kong Score: 11   Kong Score (if score 14 or less)   []PMT consult   []Wound Care consult      []Specialty bed   [] Nutrition consult          Needs prior to discharge:   Home O2 required:    []Yes   []No    If yes, how much O2 required?     Other:    Last Bowel Movement: Last Bowel Movement Date: 08/26/17      Influenza Vaccine Received Flu Vaccine for Current Season (usually Sept-March): Not Flu Season        Pneumonia Vaccine           Diet Active Orders   Diet    DIET NPO      LDAs         PICC Double Lumen 83/81/74 Basilic;Left (Active)   Central Line Being Utilized Yes 8/27/2017  4:05 PM   Criteria for Appropriate Use Total parenteral nutrition 8/27/2017  4:05 PM   Site Assessment Clean, dry, & intact 8/27/2017  4:05 PM   Phlebitis Assessment 0 8/27/2017  4:05 PM   Infiltration Assessment 0 8/27/2017  4:05 PM   Arm Circumference (cm) 33 cm 8/2/2017 10:47 AM   Date of Last Dressing Change 08/26/17 8/27/2017  4:05 PM   Dressing Status Clean, dry, & intact 8/27/2017  4:05 PM   Action Taken Open ports on tubing capped 8/27/2017  4:05 PM   External Catheter Length (cm) 0 centimeters 8/26/2017 12:45 PM   Dressing Type Disk with Chlorhexadine gluconate (CHG); Transparent 8/27/2017  4:05 PM   Hub Color/Line Status Purple; Infusing;Flushed 8/27/2017  4:05 PM   Positive Blood Return (Site #1) No 8/27/2017  4:05 PM   Hub Color/Line Status Red; Infusing;Flushed 8/27/2017  4:05 PM   Positive Blood Return (Site #2) Yes 8/27/2017  4:05 PM   Alcohol Cap Used Yes 8/27/2017  4:05 PM          Peripheral IV 08/25/17 Left Arm (Active)   Site Assessment Clean, dry, & intact 8/27/2017  4:05 PM   Phlebitis Assessment 0 8/27/2017  4:05 PM   Infiltration Assessment 0 8/27/2017  4:05 PM   Dressing Status Clean, dry, & intact 8/27/2017  4:05 PM   Dressing Type Tape;Transparent 8/27/2017  4:05 PM   Hub Color/Line Status Blue; Infusing;Flushed 8/27/2017  4:05 PM   Action Taken Open ports on tubing capped 8/27/2017  4:05 PM   Alcohol Cap Used Yes 8/27/2017  4:05 PM       Peripheral IV 08/25/17 Right Forearm (Active)   Site Assessment Clean, dry, & intact 8/27/2017  4:05 PM   Phlebitis Assessment 0 8/27/2017  4:05 PM   Infiltration Assessment 0 8/27/2017  4:05 PM   Dressing Status Clean, dry, & intact 8/27/2017  4:05 PM   Dressing Type Tape;Transparent 8/27/2017  4:05 PM   Hub Color/Line Status Blue;Capped;Flushed 8/27/2017  4:05 PM   Action Taken Open ports on tubing capped 8/27/2017  4:05 PM   Alcohol Cap Used Yes 8/27/2017  4:05 PM                      Urinary Catheter [REMOVED] Urinary Catheter 07/27/17 2- way-Criteria for Appropriate Use: Obstruction/retention  Urinary Catheter 07/28/17 3- way; [place]-Criteria for Appropriate Use: Medically/surgically unstable, Surgical procedure  Urinary Catheter 07/28/17 Other (Comment)-Criteria for Appropriate Use: Medically/surgically unstable, Surgical procedure  [REMOVED] Urinary Catheter 07/26/17 2- way-Criteria for Appropriate Use: Obstruction/retention    Intake & Output   Date 08/26/17 1900 - 08/27/17 0659 08/27/17 0700 - 08/28/17 0659   Shift 6252-6696 24 Hour Total 3876-5745 8714-7432 24 Hour Total   I  N  T  A  K  E   P. O.  0         P. O.  0       I.V.  (mL/kg/hr) 1903.6 5855.2 1287.9  (1.2)  1287.9      I.V.  1144         Cardizem Volume 139.8 360 103  103      Volume (anidulafungin (ERAXIS) 100 mg in 0.9% sodium chloride 130 mL IVPB) 0 260 130  130      Volume (linezolid (ZYVOX) IVPB premix in D5W 600 mg) 300 600 0  0      Volume (ampicillin-sulbactam (UNASYN) 3 g in 0.9% sodium chloride (MBP/ADV) 100 mL) 200 300 100  100      Volume (metroNIDAZOLE (FLAGYL) IVPB premix 500 mg) 200 300 100  100      Volume (TPN ADULT - CENTRAL AA 5% D20% W/ CA + ELECTROLYTES)  1827.4         Volume (TPN ADULT - CENTRAL AA 5% D20% W/ CA + ELECTROLYTES) 1063.8 1063.8 854.9  854.9    Shift Total  (mL/kg) 1903.6  (21.7) 5855.2  (66.7) 1287.9  (14.7)  1287.9  (14.7)   O  U  T  P  U  T   Urine  (mL/kg/hr) 2100 3350 2395  (2.3) 1800 4195      Urine Output (mL) (Urinary Catheter 07/28/17 3- way; [place]) 1500 1850 1870 1800 [id number]      Urine Output (mL) (Urinary Catheter 07/28/17 Other (Comment)) 600 1500 525  525    Shift Total  (mL/kg) 2100  (23.9) 3350  (38.2) 2395  (27.3) 1800  (20.5) 4195  (47.8)   NET -196.4 2505.2 -1107.1 -1800 -2907.1   Weight (kg) 87.8 87.8 87.8 87.8 87.8         Readmission Risk Assessment Tool Score High Risk            22       Total Score        3 Has Seen PCP in Last 6 Months (Yes=3, No=0)    2 . Living with Significant Other. Assisted Living. LTAC. SNF. or   Rehab    3 Patient Length of Stay (>5 days = 3)    4 IP Visits Last 12 Months (1-3=4, 4=9, >4=11)    5 Pt.  Coverage (Medicare=5 , Medicaid, or Self-Pay=4)    5 Charlson Comorbidity Score (Age + Comorbid Conditions)       Expected Length of Stay 5d 12h   Actual Length of Stay 30

## 2017-08-28 NOTE — PROGRESS NOTES
Music Therapy Assessment    Viry James [id number]  [id number]    7/22/1928  80 y.o.  male    Patient Telephone Number: 21  (home)   Cheondoism Affiliation: Bj Naik   Language: English   Extended Emergency Contact Information  Primary Emergency Contact: [address] Phone: [phone]  Relation: Daughter  Secondary Emergency Contact: Noel Willis Phone: [phone]  Relation: Daughter   Patient Active Problem List    Diagnosis Date Noted    Anasarca 08/25/2017    Weakness 08/25/2017    Debility 08/25/2017    Counseling regarding advanced care planning and goals of care 08/25/2017    Physical deconditioning 08/18/2017    Counseling regarding goals of care 08/18/2017    Delirium due to general medical condition 08/17/2017    Oral phase dysphagia 08/17/2017    Mild hypoxic ischemic encephalopathy (HIE) 08/15/2017    Altered mental status, unspecified 08/15/2017    Stenosis of both internal carotid arteries 08/15/2017    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 [address] 07/20/2017        Date: 8/28/2017       Mental Status:   [x] Alert [  ] Forgetful [x]  Confused  [x] Minimally responsive    Communication Status: [  ] Impaired Speech [x] Nonverbal     Physical Status:   [  ] Oxygen in use  [  ] Hard of Hearing [  ] Vision Impaired  [  ] Ambulatory  [  ] Ambulatory with assistance [  ] Non-ambulatory -N/A    Music Preferences, Background: The referrer shared that the pt enjoys Jose M Latin and [employer]. Clinical Problem addressed: Decrease feelings of stress, decrease agitation, increase relaxation, spiritual support. Goal(s) met in session:  Physical/Pain management (Scale of 1-10):    Pre-session rating: Pt didn't respond when asked about pain.   Post-session rating: Pt didn't respond when asked about pain. [x] Increased relaxation   [  ] Regulated breathing patterns  [  ] Decreased muscle tension   [  ] Minimized physical distress     Emotional/Psychological:  [  ] Increased self-expression   [  ] Decreased aggressive behavior   [  ] Decreased sadness   [  ] Discussed healthy coping skills     [  ] Improved mood    [  ] Decreased withdrawn behavior     Social:  [  ] Decreased feelings of isolation/loneliness [x] Positive social interaction   [  ] Provided support and/or comfort for family/friends    Spiritual:  [  ] Spiritual support    [  ] Expressed peace  [  ] Expressed irasema    [  ] Discussed beliefs    Techniques Utilized (Check all that apply):   [  ] Procedural support MT [x] Music for relaxation [x] Patient preferred music  [  ] Harriet analysis  [  ] Song choice  [  ] Music for validation  [x] Entrainment  [  ] Progressive muscle relax. [  ] Guided visualization  [  ] Margarette Kul  [  ] Patient instrument playing [  ] Gena Campos writing  [  ] Ellen Young along   [  ] Improvisation  [x] Sensory stimulation  [  ] Active Listening  [x] Music for spiritual support [  ] Making of CDs as gifts    Session Observations:  [facility] . The patient (pt) was observed to have occasional restless movements and an anxious facial expression lying in bed. This music therapist (MT) sat at bedside and calmly greeted the pt. The MT asked the pt yes/no questions to assess his communication status, but the pt didn't engage in eye contact or respond to the these questions verbally. The MT sang the Jaun Alberto Larson song This Will Be a capella (vocals only without instrumental accompaniment). The MT entrained the tempo of the song to the pt's breathing rhythm. The pt's restless movements continued at times during this song. The MT sang the germain Mathew Rm next.  The pt increased relaxation and positive social interaction in response to this song, as evidenced by (AEB) decreased restless movements and engaging in eye contact for about five seconds. The MT sang Blanca Chau next. During this song the pt had more restless movements, and the MT held the pt's hand to offer support in a tactile way. The pt decreased restless movements in response to this. Next the MT sang the [employer] and provided gentle touch to the pt's forehead. The MT concluded the session with Let There Be Peace on Earth. Will follow as able.     GABY De La PazBC (Music Therapist-Board Certified)  Spiritual Care Department  Referral-based service

## 2017-08-28 NOTE — PROGRESS NOTES
Pt was sleeping and no family present. Left Care Card in the room with my business card. Will continue to check up on pt as able. Jan Acosta M.Div.   Palliative  Fellow

## 2017-08-28 NOTE — PROGRESS NOTES
1200-Patient restless and obtunded. No command following. Pupils 3s and reactive. 1mg of Morphine given to help patient relax and get some rest since he appears uncomfortable. Per family request, Morphine fine right now, but prefer Tylenol in the future at the 1st pain med of choice. Chinmay Medico at bedside talking with the patient's family. Lungs course diminished satting in the low 90's on RA. Abdominal incision with clean dressing in place. No drainage noted. 1345-Patient desating into upper 80's. 2L NC applied.

## 2017-08-28 NOTE — PROGRESS NOTES
Palliative Medicine     IDT with Palliative team this morning. LCSW made follow up call to Pt's dtr, Angela Duque. Mccauleykee Sapp and family are on way to hospital so this LCSW will visit in about 1 hour. Addendum 9:59: LCSW called [facility] to follow up on 8/25/17 4:12 pm conversation with Angela Chambers (sp?) regarding Dr. Karo Loyd MD's order for 2nd opinion from another urologist of their group placed on 8/25/17. Office staff person said they received a phoned in order this morning at 9:11 am. The staff person verbalized the urologist would be seeing pt today but did not yet know which doctor. LCSW left [facility] for Director of RN and she returned call and spoke with Palliative team.    Addendum 12:00: LCSW relayed update and plan of care with Risk (Kristie. T.) and was directed to follow up with CMO. Spoke with CMO. Addendum 12:45: LCSW made supportive visit with Pt's dtr Rafael Murray), Pt's wife Devin ) and Pt's friend/Islam member Becca Susana). Mccauley Fuuniquedomingo spoke mainly with LCSW as wife was finishing her meal and Sadie Lanza was standing near Pt. Pt was being tended to by staff. LCSW inquired about status of Pt's other children and if they wanted to be involved in Pt's medical decisions; Parisa Sapp verbalized, Ambika Vance was going to handle but she would be happy to text her siblings and ask them to connect with this LCSW today, if possible. Although, Angela Duque is the  for the family if decisions need to be made, it would be all 6 adult children until told otherwise, ie. Some of  The children decline responsibility until it becomes remaining children who want the medical suroggate decision making/nok. LCSW provided written note (content below) of questions for other children of Pt [facility] Cloud/Leonid, Rl Lyons, Campbell Marroquin, and Noelle Vega).    LCSW asked about how Parisa Sapp viewed Pt doing over the weekend and provided active listening as Cobly shared about Pt being more alert, not talking but appearing more responsive to their familiar voices, and him responding to Bethany's conversation about fighting the \"devil\"; Viki Callahan explained that Pt, in his irasema, preaches often about fighting the devil from a situation. LCSW acknowledged the strength family finds in irasema, miracles and hope for Pt to return to how he was prior to hospitalization. LCSW asked if family had made decision about PEG tube. 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 \" Viki Callahan verbalized that they had been weighing peg option over the week. This LCSW recommends main physician address with family/NOK adult children in simple language (avoid medical jargon) if there is recommendation to explore this intervention sooner than later. In Colby's opinion she is leaning towards a PEG tube but she doesn't feel it is a priority at this time. Viki Callahan verbalized what she thought she heard from Dr. Vicki Sorto on Friday when he provided education on benefits vs burden of a PEG after receiving permission from dtrs and wife present. Viki Callahan verbalized hearing \"asphyxiation\" and spoke about concern for \"choking\" as risk of PEG tube; in her own words she said it is a catch because without peg he could \"choke himself to death. \"  This LCSW was present during the Friday mtg led by Dr. Vicki Sorto and relayed accurately what was said was \"aspiration\" regarding going down wrong pipe; Viki Callahan nodded her head in understanding. LCSW cautioned dtr against using wrong words and relayed  difference of choking word implies a blockage of something vs aspiration as something going into wrong pipe. Dtr shifted conversation to her hopes for Pt's recovery. Provided active listening. During visit, Viki Callahan said family had agreed to morphine for pain mgmt as they want him to be comfortable; Viki Callahan verbalized speaking with nurses about medication.  LCSW affirmed their speaking with health team about their wishes for Pt's care.     LCSW visited with Pt's wife at bedside of Pt. She shared her emotions. LCSW validated overwhelming feelings with Pt being seriously ill and their hope for him to recover. Pt opened eyes at end of visit and this LCSW left room to allow family time to visit with Pt as family is leaving shortly. Spoke with Samuel Halsted (RN) and was informed Dr. Yohannes Rizzo, hospitalist is following pt's care. Appreciate Urology speaking with Pt's family today. Content of note given to Mccauley Senait today to use as guide to communicate with her siblings/Pt's other children:  [email or web address]  1. As one of the next of kin (child) you have right to be involved in medical decisions for your father since your mom deferred responsibility. You can defer to your other siblings if you wish. (if defer please contact Sheridan Lucia at email or by phone [phone]). 2. If want to be involved in decisions then who do you want as sibling spokesperson (name one person whom health staff will contact. This sibling will take on resp. of communicating options with you.)  (LCSW asked Mccauley Nazanindomingo to read and asked if she had any question. Parisa Sapp did not verbalize any questions after being provided with silence to read sheet of paper)      Plan:  Palliative Medicine will continue to follow for psychosocial support of Pt and family as Pt proceeds with his journey. Full medical interventions in hopes of recovery from acute conditions and regaining some of prior functioning. Thank you for including Palliative Medicine in [facility] care.       Sheridan Lucia, [address] ([id number])  Work cell: [phone]

## 2017-08-28 NOTE — PROGRESS NOTES
Nutrition Assessment:    INTERVENTIONS/RECOMMENDATIONS:   If in line with goals of care, recommend feeding patient enterally (via NGT or PEG) as it is the preferred method in a patient with a functioning gut over parenteral nutrition. TPN not indicated at this time. Recommend Jevity 1.5 @ 15 mL/hr x 24 hours; if patient tolerates, advance as tolerated by 10 mL q 12 hours to a goal rate of Jevity 1.5 @ 55 mL/hr + 150 mL water flushes q 4 hours (provides 1980 kcal, 84g protein, 1903 mL water)      ASSESSMENT:   Chart reviewed; patient continues on TPN meeting >100% of estimated kcal/protein needs. Pending plan of care, recommendations provided above to initiate TF; recommend weaning TPN. If plan of care trends towards comfort, would allow PO as desired. Will continue to monitor progress and plan of care to provide further recommendations. Diet Order: NPO (TPN: AA5% D20% @ 83 mL/hr + Lipids 3x/week; provides 1967 kcal/99.6g protein)  % Eaten:  No data found. Pertinent Medications: [x] Reviewed []Other: (insulin, flagyl)  Pertinent Labs: [x]Reviewed  []Other:  Food Allergies: [x]None []Other:     Last BM: 8/26   []Active     []Hyperactive  [x]Hypoactive       [] Absent  BS  Skin:    [] Intact   [x] Incision  [x] Breakdown   []Edema   []Other:    Anthropometrics: Height: 5' 10\" (177.8 cm) Weight: 88.9 kg (195 lb 15.8 oz)    IBW (%IBW):   ( ) UBW (%UBW):   (  %)    BMI: Body mass index is 28.12 kg/(m^2). This BMI is indicative of:  []Underweight   []Normal   [x]Overweight   [] Obesity   [] Extreme Obesity (BMI>40)  Last Weight Metrics:  Weight Loss Metrics 8/28/2017 7/26/2017 3/26/2017 11/3/2016 3/17/2015 10/2/2014 5/16/2013   Today's Wt 195 lb 15.8 oz 211 lb 3.2 oz 215 lb 6.2 oz 217 lb 6 oz 214 lb 4.8 oz 200 lb 213 lb 10 oz   BMI 28.12 kg/m2 30.3 kg/m2 30.91 kg/m2 31.19 kg/m2 30.75 kg/m2 28.7 kg/m2 30.65 kg/m2       Estimated Nutrition Needs (Based on): 1886 Kcals/day (-2043 BMR (1571) x 1. 2AF-1.3AF) , 90 g (1.0 g/kg bw) Protein  Carbohydrate:  At Least 130 g/day  Fluids: 1886 mL/day or per primary team    NUTRITION DIAGNOSES:   Problem:  Swallowing difficulty      Etiology: related to AMS, encephaloapthy     Signs/Symptoms: as evidenced by NPO per SLP      NUTRITION INTERVENTIONS:  Meals/Snacks: General/healthful diet Enteral/Parenteral Nutrition: Initiate enteral nutrition                GOAL:   Transition to EN vs PO for comfort next 1-3 days    NUTRITION MONITORING AND EVALUATION   Behavioral-Environmental Outcomes: Behavior  Food/Nutrient Intake Outcomes: Enteral/parenteral nutrition intake  Physical Signs/Symptoms Outcomes: Weight/weight change, GI profile, Electrolyte and renal profile, Glucose profile    Previous Goal Met:   [] Met              [] Progressing Towards Goal              [x] Not Progressing Towards Goal   Previous Recommendations:   [] Implemented          [] Not Implemented          [x] Not Applicable    LEARNING NEEDS (Diet, Food/Nutrient-Drug Interaction):    [] None Identified   [] Identified and Education Provided/Documented   [] Identified and Pt declined/was not appropriate     Cultural, Faith, OR Ethnic Dietary Needs:    [x] None Identified   [] Identified and Addressed     [x] Interdisciplinary Care Plan Reviewed/Documented    [x] Discharge Planning: TBD   [] Participated in Interdisciplinary Rounds    NUTRITION RISK:    [x] High              [] Moderate           []  Low  []  Minimal/Uncompromised      Viviane Pittman, RDN  Pager [phone]  Weekend Pager [phone]

## 2017-08-28 NOTE — PROGRESS NOTES
PCU SHIFT NURSING NOTE      Bedside and Verbal shift change report given to Kathleen Dunlap RN (oncoming nurse) by Mildred Gutierrez RN (offgoing nurse). Report included the following information SBAR, Kardex, ED Summary, OR Summary, Procedure Summary, Intake/Output, MAR, Recent Results, Med Rec Status, Cardiac Rhythm NSR with PVC's and Alarm Parameters . Shift Summary      Admission Date 7/27/2017   Admission Diagnosis bladder clots  Bladder injury  perperated viscous  wound dehiscence   Consults IP CONSULT TO UROLOGY  IP CONSULT TO GASTROENTEROLOGY  IP CONSULT TO NEUROLOGY  IP CONSULT TO PALLIATIVE CARE - PROVIDER  IP CONSULT TO UROLOGY  IP CONSULT TO CARDIOLOGY        Consults   [x]PT   [x]OT   [x]Speech   [x]Case Management      [x] Palliative      Cardiac Monitoring Order   [x]Yes   []No     IV drips   [x]Yes    Drip:     Cardizem gtt                       Dose: 10 cc/hr  Drip:                            Dose:  Drip:                            Dose:   []No     GI Prophylaxis   [x]Yes   []No         DVT Prophylaxis   SCDs:  Sequential Compression Device: Bilateral     Patient Refused VTE Prophylaxis: Yes    Ash stockings:  Graduated Compression Stockings: Bilateral      [x] Medication   []Contraindicated   []None      Activity Level Activity Level: Bed Rest     Activity Assistance: Complete care   Purposeful Rounding every 1-2 hour? [x]Yes   Mcdonald Score  Total Score: 3   Bed Alarm (If score 3 or >)   [x]Yes   [] Refused (See signed refusal form in chart)   Kong Score  Kong Score: 11   Kong Score (if score 14 or less)   [x]PMT consult   [x]Wound Care consult      [x]Specialty bed   [] Nutrition consult          Needs prior to discharge:   Home O2 required:    []Yes   [x]No    If yes, how much O2 required?     Other:    Last Bowel Movement: Last Bowel Movement Date: 08/26/17      Influenza Vaccine Received Flu Vaccine for Current Season (usually Sept-March): Not Flu Season        Pneumonia Vaccine Diet Active Orders   Diet    DIET NPO      LDAs         PICC Double Lumen 34/99/19 Basilic;Left (Active)   Central Line Being Utilized Yes 8/27/2017  9:53 PM   Criteria for Appropriate Use Total parenteral nutrition 8/27/2017  9:53 PM   Site Assessment Clean, dry, & intact 8/27/2017  9:53 PM   Phlebitis Assessment 0 8/27/2017  9:53 PM   Infiltration Assessment 0 8/27/2017  9:53 PM   Arm Circumference (cm) 33 cm 8/2/2017 10:47 AM   Date of Last Dressing Change 08/26/17 8/27/2017  9:53 PM   Dressing Status Clean, dry, & intact 8/27/2017  9:53 PM   Action Taken Open ports on tubing capped 8/27/2017  9:53 PM   External Catheter Length (cm) 0 centimeters 8/27/2017  9:53 PM   Dressing Type Disk with Chlorhexadine gluconate (CHG) 8/27/2017  9:53 PM   Hub Color/Line Status Purple; Infusing 8/27/2017  9:53 PM   Positive Blood Return (Site #1) No 8/27/2017  9:53 PM   Hub Color/Line Status Red; Infusing 8/27/2017  9:53 PM   Positive Blood Return (Site #2) Yes 8/27/2017  4:05 PM   Alcohol Cap Used Yes 8/27/2017  4:05 PM          Peripheral IV 08/25/17 Right Forearm (Active)   Site Assessment Clean, dry, & intact 8/28/2017  3:38 AM   Phlebitis Assessment 0 8/28/2017  3:38 AM   Infiltration Assessment 0 8/28/2017  3:38 AM   Dressing Status Clean, dry, & intact 8/28/2017  3:38 AM   Dressing Type Transparent 8/28/2017  3:38 AM   Hub Color/Line Status Blue;Capped 8/28/2017  3:38 AM   Action Taken Open ports on tubing capped 8/27/2017  4:05 PM   Alcohol Cap Used Yes 8/27/2017  4:05 PM       Peripheral IV 08/28/17 Left Arm (Active)   Site Assessment Clean, dry, & intact 8/28/2017  3:38 AM   Phlebitis Assessment 0 8/28/2017  3:38 AM   Infiltration Assessment 0 8/28/2017  3:38 AM   Dressing Status Clean, dry, & intact 8/28/2017  3:38 AM   Dressing Type Transparent 8/28/2017  3:38 AM   Hub Color/Line Status Pink;Capped 8/28/2017  3:38 AM                      Urinary Catheter [REMOVED] Urinary Catheter 07/27/17 2- way-Criteria for Appropriate Use: Obstruction/retention  Urinary Catheter 07/28/17 3- way; [place]-Criteria for Appropriate Use: Medically/surgically unstable, Surgical procedure  [REMOVED] Urinary Catheter 07/28/17 Other (Comment)-Criteria for Appropriate Use: Medically/surgically unstable, Surgical procedure  [REMOVED] Urinary Catheter 07/26/17 2- way-Criteria for Appropriate Use: Obstruction/retention    Intake & Output   Date 08/27/17 0700 - 08/28/17 0659 08/28/17 0700 - 08/29/17 0659   Shift 1279-1063 7281-5633 24 Hour Total 4101-0575 9626-2466 24 Hour Total   I  N  T  A  K  E   I.V.  (mL/kg/hr) 1287.9  (1.2)  1287.9  (0.6)         Cardizem Volume 103  103         Volume (anidulafungin (ERAXIS) 100 mg in 0.9% sodium chloride 130 mL IVPB) 130  130         Volume (linezolid (ZYVOX) IVPB premix in D5W 600 mg) 0  0         Volume (ampicillin-sulbactam (UNASYN) 3 g in 0.9% sodium chloride (MBP/ADV) 100 mL) 100  100         Volume (metroNIDAZOLE (FLAGYL) IVPB premix 500 mg) 100  100         Volume (TPN ADULT - CENTRAL AA 5% D20% W/ CA + ELECTROLYTES) 854.9  854.9       Shift Total  (mL/kg) 1287.9  (14.7)  1287.9  (14.5)      O  U  T  P  U  T   Urine  (mL/kg/hr) 2395  (2.3) 3225  (3) 5620  (2.6)         Urine Output (mL) (Urinary Catheter 07/28/17 3- way; [place]) 8992 8876 9267         Urine Output (mL) ([REMOVED] Urinary Catheter 07/28/17 Other (Comment)) 525  525       Shift Total  (mL/kg) 2395  (27.3) 3225  (36.3) 5620  (63.2)      NET -1107.1 -3225 -4332.1      Weight (kg) 87.8 88.9 88.9 88.9 88.9 88.9         Readmission Risk Assessment Tool Score High Risk            22       Total Score        3 Has Seen PCP in Last 6 Months (Yes=3, No=0)    2 . Living with Significant Other. Assisted Living. LTAC. SNF. or   Rehab    3 Patient Length of Stay (>5 days = 3)    4 IP Visits Last 12 Months (1-3=4, 4=9, >4=11)    5 Pt.  Coverage (Medicare=5 , Medicaid, or Self-Pay=4)    5 Charlson Comorbidity Score (Age + Comorbid Conditions) Expected Length of Stay 5d 12h   Actual Length of Stay 31

## 2017-08-28 NOTE — CONSULTS
Palliative Medicine Consult  [place]ane: [phone])    Patient Name: Amanda Alejo  YOB: 1928    Date of Initial Consult: 8/17/17  Reason for Consult: care decisions  Requesting Provider: Doreen Sales   Primary Care Physician: Ricky Aburto MD      SUMMARY:   Amanda Alejo is a 80 y.o. gentleman with a past history of s/p hospital admission 7/20/2017 at which time he had cardiac catheterization performed with stent placement and PMHx of HTN / prostate enlargement , who was admitted on 7/27/2017 from home with a diagnosis of hematuria, urinary retention. Current medical issues leading to Palliative Medicine involvement include: care decisions. Psychosocial: lives with wife in 2 story house, totally independent with all ADLs and IADLs, rode stationary bike q. 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 PALLIATIVE DIAGNOSES:   1. AMS  2. Dysphagia  3. Anasarca  4. Weakness  5. Debility  6. Counseling regarding goals of care and advance care planning     PLAN:   Brief Summary of Plan  -visited pt in his rm. No family at bedside  -goals of care confirmed  Code status has not been discussed  -family has expressed many concerns. See below  -Consult was placed to Urology per instructions provided by urology office staff on 08/25/2017  -please refer to Christy Alegre note from earlier today  -we will continue to establish therapeutic alliance as well as provide psychosocial support    1. Goals of Care- confirmed  Wife, along w/ 2 dtrs present, expressed wishes for full, restorative treatment and all measures that support this. 2. Advance Care Planning- pt's code status is FULL CODE per active EMR order. Code status was not discussed in family meeting. If opportunity arises, discussion regarding code status will take place. No AMD on file. Wife thought she could defer to [place], but she can only defer to majority of children.      3. Information Sharing-   08/28/2017  No opportunity for significant info sharing by me today. Please refer to Courtney Palafox note from earlier today. Of note, during family meeting on Friday, benefit vs. Risk/burden of PEG tube was discussed. Family had verbalized understanding. 08/25/2017  I relayed info about our team and its role in their care. They verbalized understanding of all the points made including the following:  -we are an interdisciplinary team serving as a bridge of communication and advocate on behalf of the patient and family when needed  -we are a support care service that, when needed, assists w/ sx mgmt  -though hospice is \"under\" palliative medicine, our service is not hospice nor does a consult visit by our service mean that hospice is being or should be considered  -we are not here to make medical decisions, and our being consulted does not equate to a change in a patient's overall condition  -our involvement is independent of clinical trajectory and/or medical decisions made    No questions regarding the above. We moved to their understanding of what is going on medically in their own words. This led to their presenting concerns about care received in ED as well as during hospital admission. Supportive listening provided throughout the family meeting. Family presented concerns for approximately 30 min uninterrupted. Discussion was directed to focus on what is going on medically. Family focused again on concerns about his care. There was presentation of concern surrounding his bladder and urological mgmt. They raised concerns about \"negative' language, later referred to as \"doom and gloom\". There was emphasis on God and irasema. There were references to a sense of presence of God through some staff. At the same time, there was emphasis on \"I don't mean to be ugly\" or \"We don't want to throw anyone under the bus\".   It was relayed that the main cause of problems in pt's care was \"the administration\" and how things were handled from there. They expressed concerns about RN staffing and how RNs at times appeared too tired \"to do their job\". They raised question about safety. Gwen Irizarry emphasized that, in her job, she supervises all the time and this is an issue with supervising. She cannot understand why things are the way they are. Gwen Irizarry expressed that what occurred in the ED was \"torture\". She said pt did not get pain meds and should have. She relayed that pt told her that his fear was not dying b/c of what he was going through at the time. Family feels that doctors are taking too long (which is unacceptable) to address pt's sx's. They also note that pt responds best to a male RN. Later, however, they gave example of a male RN with whom pt did not do well. They mentioned that there was issue with \"English speaking\". They further noted the importance of Anglican presence. Family wished for \"2nd opinion\" regarding mgmt of SPT. Most specifically, family wishes for an additional opinion regarding current mgmt and long-term plan of care for SPT. Family is under impression that SPT will be clamped and then removed per their understanding of what Dr. Uche Orantes had said. Kay Darby also noted that their concern is regarding safety of doing so and ramifications of clamping and removing too early. Kay Darby thought that SPT should not be in for >2 weeks. They are asking for another urologist provide his/her input on plan of care.  This was during family meeting w/ wife and 2 dtrs today. They emphasize that they are \"not medical people\" and wish to know the best plan of care from specialist (i.e. another urologist).     There are additional specific areas that family wished for explanations:  -what occurred in the ED regarding what they perceived as repeated Rosales placement attempts  -how could such a large bladder stone be \"missed\"  -why was a surgical procedure done that found \"nothing\"  -why was CT scan done on this past Tues despite family specifically saying not to proceed w/ this    It was shared w/ family regarding surrogate decision making. If wife chooses to defer, she cannot select one or a set number of children; rather, surrogate decision making goes to majority of children. Hillary Toribio asked why can their mother not do what she wants with deferring. This was explained by Laurita Greenfield. It was relayed to our team before this family meeting that pt is to be referred to only as \"Brother Tash Vaelro"    Medical team is not to call wife first.  We are to call Sorin Mendez or Hillary Rooney. Please also refer to Casey Johnson note from earlier today. It was also relayed to family that we would be following up on Monday as our team typically does not have in-person presence here in the hospital on Sat and Sun.     4. Psychosocial Support- We will continue to support patient and family during this difficult hospitalization    5. Spiritual- at this time, there are no apparent spiritual needs or concerns. 6. Symptom Management- at this time, there does not appear to be symptom management for which our assistance is needed. I have discussed with patients bedside RN, Sherwood Closs. Time and input appreciated. I have discussed with our palliative care IDT. Thank you for this consult that has provided us with the opportunity to be involved in this patient's care. We will continue to follow along with you. Should you have any questions or concerns prior to our next visit at the bedside, please do not hesitate to contact us at [phone]). Rizwan Rivas MD  Palliative Care Team     GOALS OF CARE / TREATMENT PREFERENCES:   [====Goals of Care====]  GOALS OF CARE:  Patient / health care proxy stated goals:   Full, restorative treatment and all measures that support this.    However, family does not want \"any narcotics\" or \"sedatives\"    TREATMENT PREFERENCES:   Code Status: Full Code    Advance Care Planning:  Advance Care Planning 8/28/2017   Patient's Healthcare Decision Maker is: Legal Next of Kin   Primary Decision Maker Name Mari Martínez deferred to [address] Phone Number -   Primary Decision Maker Relationship to Patient Adult child   Confirm Advance Directive -       Other:    The palliative care team has discussed with patient / health care proxy about goals of care / treatment preferences for patient.  [====Goals of Care====]         HISTORY:     History obtained from: chart    CHIEF COMPLAINT: pt does not relay cc    HPI/SUBJECTIVE:    The patient is: asleep  [] Verbal and participatory  [] Non-participatory due to:     No o/n events or issues. Per bedside RN, no staff concerns at this time. BIBA with c/o progressively worsening hematuria and clotting wick; Pt had cardiac stents placed 7/24/17,(was discharged the day before this visit) along with a new wick, however, wick has been clotting off, causing pt bladder pain. Pt has been drinking a lot of fluid, but very little urine output; daughter shared that pt's urine was a edwin color with improvement of sxs, however, today sxs have been getting worse. Urologist started pt on abx today after finding bacteria in his urine. In the ED, Dr. Yahaira Liu attempted to unblock the catheter without long-lasting success, therefore, admitted pt for OR surgical intervention. 7/28: In the OR, a large bladder stone was removed and a posterior bladder perforation was repaired. Post-operatively, pt has had progressively worsening abdominal pain and distention, and abdominal CT showed extensive hydropneumoperitoneum, extensive retroperitoneal emphysema, especially on the left, and mediastinal emphysema. 7/29: exploratory lap for diffuse pneumatosis and gastric distention without evidence of bowel perforation. Extubated, afib with RVR, post-op ileus  7/31. 8/2: PICC placement, NGT to sxn for ileus.  Head CT neg (pt still lethargic). Anasarca. 8/5: confused, pulling at lines. Following coughing episode, pt's wound dehisced with small bowel protruding. 8/6: required short course of vasopressor. Extubated 8/7. transferred from CCU to floor 8/10.  8/11: KUB neg for ileus, NGT removed. 8/12: RRT called due to slurred speech, inability to swallow liquids, not following commands, worsening lethargy; no opioids or sedatives in over 24 hours. 8/18: initially pt was very lethargic, would not open his eyes or engage in conversation, however, after wife and daughters worked with him he did wake up, able to name family members and have a short conversation.     Clinical Pain Assessment (nonverbal scale for severity on nonverbal patients): pt denies  [++++ Clinical Pain Assessment++++]  [++++Pain Severity++++]: Pain: 0  [++++Pain Character++++]:   [++++Pain Duration++++]:   [++++Pain Effect++++]:   [++++Pain Factors++++]:   [++++Pain Frequency++++]:   [++++Pain Location++++]:   [++++ Clinical Pain Assessment++++]  Duration: for how long has pt been experiencing pain (e.g., 2 days, 1 month, years)  Frequency: how often pain is an issue (e.g., several times per day, once every few days, constant)     FUNCTIONAL ASSESSMENT:     Palliative Performance Scale (PPS):  PPS: 30       PSYCHOSOCIAL/SPIRITUAL SCREENING:     Advance Care Planning:  Advance Care Planning 8/28/2017   Patient's Healthcare Decision Maker is: Legal Next of Jose 69   Primary Decision Maker Name Mary Wu deferred to [facility]   Primary Decision Maker Phone Number -   Primary Decision Maker Relationship to Patient Adult child   Confirm Advance Directive -      Any spiritual / Shinto concerns:  [] Yes /  [x] No    Caregiver Burnout:   [] Yes /  [] No /  [x] No Caregiver Present      Anticipatory grief assessment:   [] Normal  / [] Maladaptive       ESAS Anxiety: Anxiety: 0    ESAS Depression: Depression: 3        REVIEW OF SYSTEMS:     Positive and pertinent negative findings in ROS are noted above in HPI. The following systems were [x] reviewed / [] unable to be reviewed as noted in HPI  Other findings are noted below. Systems: constitutional, ears/nose/mouth/throat, respiratory, gastrointestinal, genitourinary, musculoskeletal, integumentary, neurologic, psychiatric, endocrine. Positive findings noted below. Modified ESAS Completed by: provider   Fatigue: 8 Drowsiness: 3   Depression: 3 Pain: 0   Anxiety: 0 Nausea: 0   Anorexia: 4 Dyspnea: 0     Constipation: No     Stool Occurrence(s): 1        PHYSICAL EXAM:     From RN flowsheet:  Wt Readings from Last 3 Encounters:   08/28/17 195 lb 15.8 oz (88.9 kg)   08/15/17 207 lb 10.8 oz (94.2 kg)   07/26/17 211 lb 3.2 oz (95.8 kg)     Blood pressure (!) 133/91, pulse 81, temperature 97.7 °F (36.5 °C), resp. rate 24, height 5' 10\" (1.778 m), weight 195 lb 15.8 oz (88.9 kg), SpO2 96 %.     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)      Altered mental status, unspecified (8/15/2017)      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)      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)      Convulsive syncope (8/15/2017)      Disturbance of memory (8/15/2017)      Delirium due to general medical condition (8/17/2017)      Oral phase dysphagia (8/17/2017)      Physical deconditioning (8/18/2017)      Counseling regarding goals of care (8/18/2017)      Anasarca (8/25/2017)      Weakness (8/25/2017)      Debility (8/25/2017)      Counseling regarding advanced care planning and goals of care (8/25/2017)      Past Medical History:   Diagnosis Date    Hypertension     Prostate enlargement       Past Surgical History:   Procedure Laterality Date    ABDOMEN SURGERY PROC UNLISTED      hernia repair    CARDIAC SURG PROCEDURE UNLIST        History reviewed. No pertinent family history. History reviewed, no pertinent family history.   Social History   Substance Use Topics    Smoking status: Never Smoker    Smokeless tobacco: Never Used    Alcohol use No     Allergies   Allergen Reactions    Metoprolol Nausea and Vomiting      Current Facility-Administered Medications   Medication Dose Route Frequency    TPN ADULT - CENTRAL AA 5% D20% W/ CA + ELECTROLYTES   IntraVENous CONTINUOUS    TPN ADULT - CENTRAL AA 5% D20% W/ CA + ELECTROLYTES   IntraVENous CONTINUOUS    furosemide (LASIX) injection 40 mg  40 mg IntraVENous DAILY PRN    morphine injection 1 mg  1 mg IntraVENous Q4H PRN    ampicillin-sulbactam (UNASYN) 3 g in 0.9% sodium chloride (MBP/ADV) 100 mL  3 g IntraVENous Q8H    metroNIDAZOLE (FLAGYL) IVPB premix 500 mg  500 mg IntraVENous Q8H    dilTIAZem (CARDIZEM) 100 mg in dextrose 5% (MBP/ADV) 100 mL infusion  10 mg/hr IntraVENous TITRATE    albuterol-ipratropium (DUO-NEB) 2.5 MG-0.5 MG/3 ML  3 mL Nebulization Q6H PRN    chlorhexidine (PERIDEX) 0.12 % mouthwash 15 mL  15 mL Oral Q12H    heparin (porcine) injection 5,000 Units  5,000 Units SubCUTAneous Q12H    anidulafungin (ERAXIS) 100 mg in 0.9% sodium chloride 130 mL IVPB  100 mg IntraVENous Q24H    linezolid (ZYVOX) IVPB premix in D5W 600 mg  600 mg IntraVENous Q12H    acetaminophen (TYLENOL) solution 975 mg  975 mg Oral Q6H PRN    Or    acetaminophen (TYLENOL) suppository 650 mg  650 mg Rectal Q6H PRN    bisacodyl (DULCOLAX) suppository 10 mg  10 mg Rectal DAILY PRN    zinc oxide-cod liver oil (DESITIN) 40 % paste   Topical PRN    metoclopramide HCl (REGLAN) injection 5 mg  5 mg IntraVENous Q8H PRN    nitroglycerin (NITROBID) 2 % ointment 1 Inch  1 Inch Topical BID    levalbuterol (XOPENEX) nebulizer soln 0.63 mg/3 mL  0.63 mg Nebulization Q6H PRN    aspirin (ASA) suppository 300 mg  300 mg Rectal DAILY    labetalol (NORMODYNE;TRANDATE) injection 10 mg  10 mg IntraVENous Q6H    hydrALAZINE (APRESOLINE) 20 mg/mL injection 20 mg  20 mg IntraVENous Q4H PRN    labetalol (NORMODYNE;TRANDATE) injection 20 mg  20 mg IntraVENous Q4H PRN    fat emulsion 20% (LIPOSYN, INTRALIPID) infusion 250 mL  250 mL IntraVENous Q MON, WED & FRI    insulin lispro (HUMALOG) injection   SubCUTAneous Q6H    glucose chewable tablet 16 g  4 Tab Oral PRN    dextrose (D50W) injection syrg 12.5-25 g  12.5-25 g IntraVENous PRN    glucagon (GLUCAGEN) injection 1 mg  1 mg IntraMUSCular PRN    sodium chloride (NS) flush 5-10 mL  5-10 mL IntraVENous Q8H    ondansetron (ZOFRAN) injection 4 mg  4 mg IntraVENous Q4H PRN    sodium chloride (NS) flush 10 mL  10 mL InterCATHeter PRN    sodium chloride (NS) flush 10-40 mL  10-40 mL InterCATHeter Q8H          LAB AND IMAGING FINDINGS:     Lab Results   Component Value Date/Time    WBC 13.3 08/28/2017 03:03 AM    HGB 9.4 08/28/2017 03:03 AM    PLATELET 465 87/31/6699 03:03 AM     Lab Results   Component Value Date/Time    Sodium 150 08/28/2017 03:03 AM    Potassium 3.8 08/28/2017 03:03 AM    Chloride 114 08/28/2017 03:03 AM    CO2 30 08/28/2017 03:03 AM    BUN 49 08/28/2017 03:03 AM    Creatinine 1.66 08/28/2017 03:03 AM    Calcium 8.3 08/28/2017 03:03 AM    Magnesium 2.4 08/28/2017 03:03 AM    Phosphorus 4.2 08/28/2017 03:03 AM      Lab Results   Component Value Date/Time    AST (SGOT) 48 08/27/2017 06:56 AM    Alk.  phosphatase 224 08/27/2017 06:56 AM    Protein, total 7.3 08/27/2017 06:56 AM    Albumin 1.6 08/27/2017 06:56 AM    Globulin 5.7 08/27/2017 06:56 AM     No results found for: INR, PTMR, PTP, PT1, PT2, APTT   No results found for: IRON, FE, TIBC, IBCT, PSAT, FERR   Lab Results   Component Value Date/Time    pH 7.55 08/23/2017 02:00 AM    PCO2 26 08/23/2017 02:00 AM    PO2 84 08/23/2017 02:00 AM     No components found for: [place]   Lab Results   Component Value Date/Time    CK 38 08/23/2017 01:40 AM    CK - MB <1.0 08/23/2017 01:40 AM                Total time: 30 min  Counseling / coordination time, spent as noted above: 20 min  > 50% counseling / coordination?: yes    Prolonged service was provided for  []30 min   []75 min in face to face time in the presence of the patient, spent as noted above. Time Start:    Time End:   Note: this can only be billed with 55586 (initial) or 64629 (follow up). If multiple start / stop times, list each separately.

## 2017-08-28 NOTE — PROGRESS NOTES
Physical therapy note:   Cleared by nursing to see patient. RN says family would like patient to return home vs SNF. Patient will have significant equipment needs and assist if patient returns home. Attempted co-treat with OT however patient not answering questions or following commands and unsafe for OOB mobility. Repositioned patient in bed for increased comfort. Will continue to follow to progress mobility and perform caregiver/family training as able/appropriate.  If going home, patient will at least need hospital bed, kandace, w/c.   Fernanda Tracy, PT, DPT

## 2017-08-28 NOTE — PROGRESS NOTES
SP tube came out spontaneously over the weekend. No problems there, we were contemplating that this point anyway. Track will seal fairly quickly especially with Rosales from below now in for convenience. No remaining urologic issues. Palliative care. I spoke with daughter Kylee Callahan), and Mother Edwar Watts) and had a cordial and productive conversation. I answered all the questions they have regarding urologic issues previously. They had no remaining concerns and appreciated the call. They suggested I call their other daughter/sister Vitor Weiss) and I will do that today. clear to confusion. We have 2 Dr. Tonie Montes in our practice. There are unhappy with Adam Peer and seemed to confuse that with Cheri Freed who has seen him most recently. Addendum:  I spoke with Yajaira Chase, and had another cordial and productive conversation. Answered all urologic questions. General discussion was related to unhappiness about number of catheters used to manage hematuria, clot retention. Rosales catheter per urethra for convenience. Can be removed if clinically improves.  Nothing more to offer urologically, but will be available for questions or concerns

## 2017-08-28 NOTE — PROGRESS NOTES
Cleared by nursing to see patient. Per chart and RN family has declined SNF and wants patient to return home. Patient will have significant equipment and physical needs and assist if patient returns home. Attempted co-treat with PT however patient not answering questions or following commands and unsafe for OOB mobility. Unable to perform ADLS this session due to inability to follow cues or commands. Repositioned patient in bed for increased comfort. Will continue to follow to progress mobility, ADLS and perform caregiver/family training as able/appropriate. If going home, patient will at least need hospital bed, kandace, w/c with removable arm rests, wheelchair cushion, and bedside commode and or bed pan. Will also decrease frequency to 3x wk due to decreased consistency with being able to participate in skilled services.

## 2017-08-29 NOTE — PROGRESS NOTES
Palliative Medicine     This LCSW heard from one of Pt's long distance children, Gary via email. Placed correspondence on hard chart. He defers to siblings for medical decisions of Pt's care. Addendum 10:25 am: This LCSW heard from Pt's child, Gerardo Louis this morning via email. She has deferred to other siblings for medical decisions of Pt. Placed copy of email on hard chart. Palliative team has heard from 4 of the 6 children and are awaiting to hear from Pt's sons, Kevin Newby and Charity, to learn if they want to be involved in medical decision making on Pt's behalf or defer to other children. Addendum 12:30pm: this LCSW visited with Pt's wife Makayla Cuellar) and Dtr Nunzio Goltz) outside of Pt's room. Provided active listening as they shared that Pt was resting today and not as interactive. Pt and wife anniversary is coming up in Sept and it will be 76 years together. Pt's wife spoke about desire for Pt to recover and return home while also verbalizing future is in hands of the Lord and his plan is bigger than our wants. Pt's wife shared that Pt is 80years old and has led a good, full life. She appears to be processing reality of her 's mortality while also keeping hope of recovery present. Pt's dtr, Jaclyn Mandujano, appears fatigued today and plans to take some respite time this afternoon. LCSW suggested having a meeting on Thurs to review medical situation and decisions (ie, peg); Colby response was to verbalize frustration with meetings because her goal is focused on Pt recovering, ideally to how he was prior to hospitalization. LCSW acknowledged her thoughts and reframed purpose of meetings is to ensure children/as deferred to medical decisions makers have the medical info of Pt to make informed decisions about Pt's ongoing care plan. Pt's dtr was agreeable to having a letter/summary written for her because recognizes importance of information.  She said she couldn't speak for Arpit Griffin about mtg and deferred LCSW to call her (LCSW will call Dario Ortega on Wed.). LCSW explained to Janene Carter we are waiting to hear from Juan Alberto Willis and Leonor Bledsoe; Janene Carter was under impression Leonro Bledsoe had connect with LCSW so she was going to ask him to send email message again. Per family, Juan Alberto Willis is in midst of a move; LCSW offered a phone call if easier. Spoke with RN Kana Leos). From 8/28/17 Note, Addendum 12:45: LCSW made supportive visit with Pt's dtr Daniella Romero), Pt's wife Rain Haines) and Pt's friend/[employer] member Cheryle Zazueta). Janene Carter spoke mainly with LCSW as wife was finishing her meal and Fe Finley was standing near Pt. Pt was being tended to by staff. LCSW inquired about status of Pt's other children and if they wanted to be involved in Pt's medical decisions; Janene Carter verbalized, Dario Ortega was going to handle but she would be happy to text her siblings and ask them to connect with this LCSW today, if possible. Although, Lupe Duncan is the  for the family if decisions need to be made, it would be all 6 adult children until told otherwise, ie. Some of  The children decline responsibility until it becomes remaining children who want the medical suroggate decision making/nok. LCSW provided written note (content below) of questions for other children of Pt [facility]-SALINE Pedro Luis/Leonid, Stephy Chua, Santos Bond, and Gerald Section). LCSW asked about how Janene Carter viewed Pt doing over the weekend and provided active listening as Colby shared about Pt being more alert, not talking but appearing more responsive to their familiar voices, and him responding to Bethany's conversation about fighting the \"devil\"; Janene Carter explained that Pt, in his irasema, preaches often about fighting the devil from a situation. LCSW acknowledged the strength family finds in irasema, miracles and hope for Pt to return to how he was prior to hospitalization. LCSW asked if family had made decision about PEG tube.  Colby response was to deflect decision because \"believes it would be burden to put pt through it and they want him to overcome 2 issues of c-diff and lung issues. \" Delphine Adam verbalized that they had been weighing peg option over the week. This LCSW recommends main physician address with family/NOK adult children in simple language (avoid medical jargon) if there is recommendation to explore this intervention sooner than later. In Colby's opinion she is leaning towards a PEG tube but she doesn't feel it is a priority at this time. Delphine Adam verbalized what she thought she heard from Dr. Edwige Lorenz on Friday when he provided education on benefits vs burden of a PEG after receiving permission from dtrs and wife present. Delphine Adam verbalized hearing \"asphyxiation\" and spoke about concern for \"choking\" as risk of PEG tube; in her own words she said it is a catch because without peg he could \"choke himself to death. \"  This LCSW was present during the Friday mtg led by Dr. Edwige Lorenz and relayed accurately what was said was \"aspiration\" regarding going down wrong pipe; Delphine Adam nodded her head in understanding. LCSW cautioned dtr against using wrong words and relayed  difference of choking word implies a blockage of something vs aspiration as something going into wrong pipe. Dtr shifted conversation to her hopes for Pt's recovery. Provided active listening. During visit, Delphine Adam said family had agreed to morphine for pain mgmt as they want him to be comfortable; Delphine Adam verbalized speaking with nurses about medication. LCSW affirmed their speaking with health team about their wishes for Pt's care. LCSW visited with Pt's wife at bedside of Pt. She shared her emotions. LCSW validated overwhelming feelings with Pt being seriously ill and their hope for him to recover. Pt opened eyes at end of visit and this LCSW left room to allow family time to visit with Pt as family is leaving shortly. Spoke with Pema Ott (RN) and was informed Dr. Leanne Leiva, hospitalist is following pt's care. Appreciate Urology speaking with Pt's family today.        Content of note given to Yadira Wayne today to use as guide to communicate with her siblings/Pt's other children:  [email or web address]  1. As one of the next of kin (child) you have right to be involved in medical decisions for your father since your mom deferred responsibility. You can defer to your other siblings if you wish. (if defer please contact Kala Sandoval at email or by phone [phone]). 2. If want to be involved in decisions then who do you want as sibling spokesperson (name one person whom health staff will contact. This sibling will take on resp. of communicating options with you.)  (LCSW asked Yadira Wayne to read and asked if she had any question. Yadira Burrells did not verbalize any questions after being provided with silence to read sheet of paper)      Plan:  Palliative Medicine will continue to follow for psychosocial support of Pt and family as Pt proceeds with his journey. Full medical interventions in hopes of recovery from acute conditions and regaining some of prior functioning. Thank you for including Palliative Medicine in [facility] care.       Kala Sandoval, [address] ([id number])  Work cell: [phone]

## 2017-08-29 NOTE — PROGRESS NOTES
Attempted to see pt for therapy services. Pt is unable to follow commands or actively participate. Will defer and follow up at a later time.

## 2017-08-29 NOTE — PROGRESS NOTES
Chart reviewed. Attempted to see pt this PT intervention however spoke to OT who reports that pt is not appropriate this date secondary to inability to follow commands or actively participation. Will defer however continue to follow. Thank you    Rut Chiang.  MEG CarlsonT

## 2017-08-29 NOTE — PROGRESS NOTES
Hospitalist Progress Note    NAME: Ryanne Bearden   :  1928   MRN:  [id number]         Assessment / Plan:  Acute encephalopathy Hypoxia vs Ischemia POA  Hypernatremia POA  Open eyes to verbal stimuli, non verbal for me  MRI of brain:  multiple punctate foci of acute infarction in the  bilateral corona radiata and centrum semiovale. Background of mild chronic  microvascular ischemic disease. The result was discussed with the family by Dr. Jeanette Kilpatrick  MRI findings consistent with hypoxic injury and multiple small infarcts are the cause of encephalopathy and confusion. 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 Age-related volume loss unchanged  Ammonia 33, B12 1046, TSH 3.7  Neurology saw pt on   Family not ready for a     Sepsis: fever, tachycardia, leukocytosis due to UTI vs PNA. UTI: prior UTI grew Enterococcus, completed Ampicillin, yeast noted, c/w Eraxis, Unasyn and Zyvox, F/U cultures. Postop respiratory failure s/p extubation , now on RA with O2 Sat 98%  S/P removal of bladder stone and repair of perforated bladder ()  Cystoscopy with attempted clot evacuation with subsequent exploratory laparotomy with bladder stone retrieval and repair of large posterior bladder perforation on   Urology following; suprapubic out  S/p Exp Lap () due to perforate viscus; diffuse gastric distention without bowel perforation   S/p Exp lap with closure for dehiscence with retention sutures ()  - general surgery following.  Dr. Omar Barillas recommended to consult GI if and when the patient requires alternative tube feeding. CT abdomen 17 shows catheter in place with bladder thickening and b/l pleural effusions. Nutritional support, c/w TPN, ordered today.   Difficulty with feeding  - General surgery recommended to consult GI for alternative tube feeding is needed  - SLP following; keep NPO, ice chips ok per nursing when pt is awake  - continue with TPN; discussed with the family about alternate tube feeding.  Family would like to wait until Monday to think about the option.  Family would like to work on extra stimulation and encourage for the pt to eat. No changes in patient's status. Family not interested alternative tube feeding at present time  Shock resolved  LINDSEY Resolved initially, creatinine trending up again./2 to sepsis  Increased LFT: due to sepsis, much today 8/27     CAD, PAF with RVR on cardi drip   Cardiology following  CHF?: has bilateral pleural effusions, ECHO EF 85% with diastolic dysfunction, CXR pulmonary edema, will give a dose of diuretics and monitor, check BNP. 756  Pneumonia??: last xray 8/23 Stable diffuse interstitial opacities in keeping with pulmonary  edema. Mildly increased bibasilar airspace opacities may represent edema,  atelectasis, or developing infection.     for now on  Unasyn and Zyvox, c/w bronchodilators, monitor. S/p lasix 40 mg iv x good urine output  laisx 20 mg iv  pnr     HTN  - on  cardizem gtt per cardiology bp 147/70  - restart plavix when able to take PO, okay with surgery not taking po   - continue with labetalol and nitrobid     Anemia, post op s/p PRBC  h/h 9/28.4 8/27  Electrolyte disturbance replete as indicated, on tpn  Pruritus (resolved)  Constipation resolved after dulcolax suppository  DVT prophylaxis: Lovenox          Code status: Full code    NOK: palliative care team following - need to further address if majority of children for medical decisions or all other 4 children deferring to Mellissa Wilson  [phone] or Phillip [phone]   Children don't want wife to be involved in medical decision making       Body mass index is 28.88 kg/(m^2).   Recommended Disposition:  per cm- Family strongly opposed SNF placement and desire to take patient home at discharge  Emphasized that caring for patient will be difficult due to his care needs and that multiple people will need to be involved to provide 24/7 care. Subjective:     Chief Complaint / Reason for Physician Visit   Opens eyes, not talking, daughter says has not talked in 1 week  Suprapubic catheter fell out  Wound healing well, cellulitis resolved. Discussed with RN events overnight. Review of Systems:  Symptom Y/N Comments  Symptom Y/N Comments   Fever/Chills    Chest Pain     Poor Appetite    Edema     Cough    Abdominal Pain     Sputum    Joint Pain     SOB/CLARKE    Pruritis/Rash     Nausea/vomit    Tolerating PT/OT     Diarrhea    Tolerating Diet     Constipation    Other       Could NOT obtain due to: Non verbal     Objective:     VITALS:   Last 24hrs VS reviewed since prior progress note. Most recent are:  Patient Vitals for the past 24 hrs:   Temp Pulse Resp BP SpO2   08/28/17 1913 97.4 °F (36.3 °C) 77 24 (!) 143/93 94 %   08/28/17 1553 97.7 °F (36.5 °C) 81 24 (!) 133/91 96 %   08/28/17 1333 - 80 - (!) 153/100 91 %   08/28/17 1132 97.8 °F (36.6 °C) 83 24 (!) 185/92 94 %   08/28/17 1000 97.8 °F (36.6 °C) 77 24 158/72 98 %   08/28/17 0718 97.7 °F (36.5 °C) 78 26 154/66 98 %   08/28/17 0542 - 72 - 159/81 -   08/28/17 0246 97.6 °F (36.4 °C) 74 24 153/68 -   08/27/17 2301 97.1 °F (36.2 °C) 81 24 179/72 95 %       Intake/Output Summary (Last 24 hours) at 08/28/17 2246  Last data filed at 08/28/17 1913   Gross per 24 hour   Intake          3282.49 ml   Output             3800 ml   Net          -517.51 ml        PHYSICAL EXAM:  General:                    WD, WN. Awake, opens eyes, not verbal  EENT:                        EOMI. Anicteric sclerae.  MMM  Resp:                         Decreased BS bilaterally , no wheezing or rales.                                       No accessory muscle use  CV:                            Regular  rhythm,  No edema  GI:                             Soft, obese Non distended, Non tender.  +Bowel sounds                                   + suprapubic catheter out, Rosales wound c/d/i no erythema  Neurologic:               Awake, not communicative, not following commands  Psych:                       Poor insight. Not anxious nor agitated  Skin:                           No rashes.  No jaundice     Reviewed most current lab test results and cultures  YES  Reviewed most current radiology test results   YES  Review and summation of old records today    NO  Reviewed patient's current orders and MAR    YES  PMH/SH reviewed - no change compared to H&P  ________________________________________________________________________  Care Plan discussed with:    Comments   Patient x    Family  x    RN x    Care Manager     Consultant                        Multidiciplinary team rounds were held today with , nursing, pharmacist and clinical coordinator. Patient's plan of care was discussed; medications were reviewed and discharge planning was addressed. ________________________________________________________________________  Total NON critical care TIME:  30   Minutes    Total CRITICAL CARE TIME Spent:   Minutes non procedure based      Comments   >50% of visit spent in counseling and coordination of care x    ________________________________________________________________________  Colt Buenrostro MD     Procedures: see electronic medical records for all procedures/Xrays and details which were not copied into this note but were reviewed prior to creation of Plan. LABS:  I reviewed today's most current labs and imaging studies.   Pertinent labs include:  Recent Labs      08/28/17   0303  08/27/17   0657  08/27/17   0306   WBC  13.3*  14.4*  PLEASE DISREGARD RESULTS   HGB  9.4*  9.0*  PLEASE DISREGARD RESULTS   HCT  30.2*  28.4*  PLEASE DISREGARD RESULTS   PLT  347  327  PLEASE DISREGARD RESULTS     Recent Labs      08/28/17   0303  08/27/17   1542  08/27/17   0656   NA  150*  151*  157*   K  3.8  3.8 3.7   CL  114*  116*  117*   CO2  30  30  28   GLU  214*  164*  162*   BUN  49*  52*  49*   CREA  1.66*  1.57*  1.66*   CA  8.3*  8.1*  7.3*   MG  2.4   --    --    PHOS  4.2   --    --    ALB   --    --   1.6*   TBILI   --    --   0.5   SGOT   --    --   48*   ALT   --    --   87*       Signed: Colt Buenrostro MD

## 2017-08-29 NOTE — PROGRESS NOTES
PCU SHIFT NURSING NOTE      Bedside shift change report given to Omid Alan RN (oncoming nurse) by Bryan Contreras (offgoing nurse). Report included the following information Kardex, Procedure Summary, Intake/Output and MAR. Shift Summary:   1920: Patient vitals stable. Patient resting comfortably, Will monitor. Admission Date 7/27/2017   Admission Diagnosis bladder clots  Bladder injury  perperated viscous  wound dehiscence   Consults IP CONSULT TO UROLOGY  IP CONSULT TO GASTROENTEROLOGY  IP CONSULT TO NEUROLOGY  IP CONSULT TO PALLIATIVE CARE - PROVIDER  IP CONSULT TO UROLOGY  IP CONSULT TO CARDIOLOGY        Consults   []PT   []OT   []Speech   []Case Management      [] Palliative      Cardiac Monitoring Order   []Yes   []No     IV drips   []Yes    Drip:                            Dose:  Drip:                            Dose:  Drip:                            Dose:   []No     GI Prophylaxis   []Yes   []No         DVT Prophylaxis   SCDs:  Sequential Compression Device: Bilateral     Patient Refused VTE Prophylaxis: Yes    Ash stockings:  Graduated Compression Stockings: Bilateral      [] Medication   []Contraindicated   []None      Activity Level Activity Level: Bed Rest     Activity Assistance: Complete care   Purposeful Rounding every 1-2 hour? []Yes   Mcdonald Score  Total Score: 3   Bed Alarm (If score 3 or >)   []Yes   [] Refused (See signed refusal form in chart)   Kong Score  Kong Score: 11   Kong Score (if score 14 or less)   []PMT consult   []Wound Care consult      []Specialty bed   [] Nutrition consult          Needs prior to discharge:   Home O2 required:    []Yes   []No    If yes, how much O2 required?     Other:    Last Bowel Movement: Last Bowel Movement Date: 08/26/17      Influenza Vaccine Received Flu Vaccine for Current Season (usually Sept-March): Not Flu Season        Pneumonia Vaccine           Diet Active Orders   Diet    DIET NPO      LDAs         PICC Double Lumen 08/02/17 Basilic;Left (Active)   Central Line Being Utilized Yes 8/28/2017  9:04 PM   Criteria for Appropriate Use Total parenteral nutrition 8/28/2017  9:04 PM   Site Assessment Clean, dry, & intact 8/28/2017  9:04 PM   Phlebitis Assessment 0 8/28/2017  9:04 PM   Infiltration Assessment 0 8/28/2017  9:04 PM   Arm Circumference (cm) 33 cm 8/2/2017 10:47 AM   Date of Last Dressing Change 08/26/17 8/28/2017  9:04 PM   Dressing Status Clean, dry, & intact 8/28/2017  9:04 PM   Action Taken Open ports on tubing capped 8/28/2017  9:04 PM   External Catheter Length (cm) 0 centimeters 8/28/2017  9:04 PM   Dressing Type Disk with Chlorhexadine gluconate (CHG) 8/28/2017  9:04 PM   Hub Color/Line Status Purple; Infusing 8/28/2017  9:04 PM   Positive Blood Return (Site #1) No 8/28/2017  9:04 PM   Hub Color/Line Status Red; Infusing 8/28/2017  9:04 PM   Positive Blood Return (Site #2) Yes 8/28/2017  9:04 PM   Alcohol Cap Used Yes 8/28/2017  9:04 PM          Peripheral IV 08/25/17 Right Forearm (Active)   Site Assessment Clean, dry, & intact 8/28/2017  9:04 PM   Phlebitis Assessment 0 8/28/2017  9:04 PM   Infiltration Assessment 0 8/28/2017  9:04 PM   Dressing Status Clean, dry, & intact 8/28/2017  9:04 PM   Dressing Type Transparent 8/28/2017  9:04 PM   Hub Color/Line Status Blue; Infusing 8/28/2017  9:04 PM   Action Taken Open ports on tubing capped 8/28/2017  9:04 PM   Alcohol Cap Used Yes 8/28/2017  5:00 PM       Peripheral IV 08/28/17 Left Arm (Active)   Site Assessment Clean, dry, & intact 8/28/2017  9:04 PM   Phlebitis Assessment 0 8/28/2017  9:04 PM   Infiltration Assessment 0 8/28/2017  9:04 PM   Dressing Status Clean, dry, & intact 8/28/2017  9:04 PM   Dressing Type Transparent 8/28/2017  9:04 PM   Hub Color/Line Status Pink;Capped 8/28/2017  9:04 PM   Action Taken Open ports on tubing capped 8/28/2017  9:04 PM   Alcohol Cap Used Yes 8/28/2017  5:00 PM                      Urinary Catheter [REMOVED] Urinary Catheter 07/27/17 2- way-Criteria for Appropriate Use: Obstruction/retention  Urinary Catheter 07/28/17 3- way; Rosales-Criteria for Appropriate Use: Medically/surgically unstable  [REMOVED] Urinary Catheter 07/28/17 Other (Comment)-Criteria for Appropriate Use: Medically/surgically unstable, Surgical procedure  [REMOVED] Urinary Catheter 07/26/17 2- way-Criteria for Appropriate Use: Obstruction/retention    Intake & Output   Date 08/27/17 1900 - 08/28/17 0659 08/28/17 0700 - 08/29/17 0659   Shift 7336-2140 24 Hour Total 7487-6279 8630-2054 24 Hour Total   I  N  T  A  K  E   I.V.  (mL/kg/hr) 1721 3008. 9 1561.5  (1.5)  1561.5      Cardizem Volume 137 240 100.2  100.2      Volume (anidulafungin (ERAXIS) 100 mg in 0.9% sodium chloride 130 mL IVPB) 0 130 130  130      Volume (linezolid (ZYVOX) IVPB premix in D5W 600 mg) 300 300 300  300      Volume (ampicillin-sulbactam (UNASYN) 3 g in 0.9% sodium chloride (MBP/ADV) 100 mL) 200 300 100  100      Volume (metroNIDAZOLE (FLAGYL) IVPB premix 500 mg) 200 300 100  100      Volume (TPN ADULT - CENTRAL AA 5% D20% W/ CA + ELECTROLYTES)  854.9         Volume (TPN ADULT - CENTRAL AA 5% D20% W/ CA + ELECTROLYTES) 884 884 831.4  831.4    Shift Total  (mL/kg) 1721  (19.4) 3008.9  (33.8) 1561.5  (17.6)  1561.5  (17.6)   O  U  T  P  U  T   Urine  (mL/kg/hr) 3225 5620 1075  (1) 1300 2375      Urine Output (mL) (Urinary Catheter 07/28/17 3- way; Rosales) 3225 [id number] 1075 1300 2375      Urine Output (mL) ([REMOVED] Urinary Catheter 07/28/17 Other (Comment))  525       Shift Total  (mL/kg) 3225  (36.3) 5620  (63.2) 1075  (12.1) 1300  (14.6) 2375  (26.7)   NET -1504.1 -2611.2 486.5 -1300 -813.5   Weight (kg) 88.9 88.9 88.9 88.9 88.9         Readmission Risk Assessment Tool Score High Risk            22       Total Score        3 Has Seen PCP in Last 6 Months (Yes=3, No=0)    2 . Living with Significant Other. Assisted Living. LTAC. SNF.  or   Rehab    3 Patient Length of Stay (>5 days = 3)    4 IP Visits Last 12 Months (1-3=4, 4=9, >4=11)    5 Pt.  Coverage (Medicare=5 , Medicaid, or Self-Pay=4)    5 Charlson Comorbidity Score (Age + Comorbid Conditions)       Expected Length of Stay 5d 12h   Actual Length of Stay 31

## 2017-08-29 NOTE — PROGRESS NOTES
PCU SHIFT NURSING NOTE      Bedside and Verbal shift change report given to Kathleen Dunlap RN (oncoming nurse) by Mildred Gutierrez RN (offgoing nurse). Report included the following information SBAR, Kardex, ED Summary, OR Summary, Procedure Summary, Intake/Output, MAR, Recent Results, Med Rec Status, Cardiac Rhythm NSR with PVC's and Alarm Parameters . Shift Summary:         Admission Date 7/27/2017   Admission Diagnosis bladder clots  Bladder injury  perperated viscous  wound dehiscence   Consults IP CONSULT TO UROLOGY  IP CONSULT TO GASTROENTEROLOGY  IP CONSULT TO NEUROLOGY  IP CONSULT TO PALLIATIVE CARE - PROVIDER  IP CONSULT TO UROLOGY  IP CONSULT TO CARDIOLOGY        Consults   [x]PT   [x]OT   [x]Speech   [x]Case Management      [x] Palliative      Cardiac Monitoring Order   [x]Yes   []No     IV drips   [x]Yes    Drip:          cardizem gtt                Dose: 10 cc/hr  Drip:                            Dose:  Drip:                            Dose:   []No     GI Prophylaxis   [x]Yes   []No         DVT Prophylaxis   SCDs:  Sequential Compression Device: Bilateral     Patient Refused VTE Prophylaxis: Yes    Ash stockings:  Graduated Compression Stockings: Bilateral      [x] Medication   []Contraindicated   []None      Activity Level Activity Level: Bed Rest     Activity Assistance: Complete care   Purposeful Rounding every 1-2 hour? [x]Yes   Mcdonald Score  Total Score: 3   Bed Alarm (If score 3 or >)   [x]Yes   [] Refused (See signed refusal form in chart)   Kong Score  Kong Score: 11   Kong Score (if score 14 or less)   [x]PMT consult   [x]Wound Care consult      [x]Specialty bed   [x] Nutrition consult          Needs prior to discharge:   Home O2 required:    []Yes   [x]No    If yes, how much O2 required?     Other:    Last Bowel Movement: Last Bowel Movement Date: 08/26/17      Influenza Vaccine Received Flu Vaccine for Current Season (usually Sept-March): Not Flu Season        Pneumonia Vaccine Diet Active Orders   Diet    DIET NPO      LDAs         PICC Double Lumen 11/54/24 Basilic;Left (Active)   Central Line Being Utilized Yes 8/29/2017  7:36 AM   Criteria for Appropriate Use Total parenteral nutrition 8/29/2017  7:36 AM   Site Assessment Clean, dry, & intact 8/29/2017  7:36 AM   Phlebitis Assessment 0 8/29/2017  7:36 AM   Infiltration Assessment 0 8/29/2017  7:36 AM   Arm Circumference (cm) 33 cm 8/2/2017 10:47 AM   Date of Last Dressing Change 08/26/17 8/29/2017  7:36 AM   Dressing Status Clean, dry, & intact 8/29/2017  7:36 AM   Action Taken Open ports on tubing capped 8/29/2017  7:36 AM   External Catheter Length (cm) 0 centimeters 8/29/2017  3:06 AM   Dressing Type Disk with Chlorhexadine gluconate (CHG); Transparent 8/29/2017  7:36 AM   Hub Color/Line Status Purple; Infusing;Flushed 8/29/2017  7:36 AM   Positive Blood Return (Site #1) No 8/29/2017  7:36 AM   Hub Color/Line Status Red; Infusing;Flushed 8/29/2017  7:36 AM   Positive Blood Return (Site #2) Yes 8/29/2017  7:36 AM   Alcohol Cap Used Yes 8/29/2017  7:36 AM          Peripheral IV 08/28/17 Left Arm (Active)   Site Assessment Clean, dry, & intact 8/29/2017  7:36 AM   Phlebitis Assessment 0 8/29/2017  7:36 AM   Infiltration Assessment 0 8/29/2017  7:36 AM   Dressing Status Clean, dry, & intact 8/29/2017  7:36 AM   Dressing Type Tape;Transparent 8/29/2017  7:36 AM   Hub Color/Line Status Pink; Infusing;Flushed 8/29/2017  7:36 AM   Action Taken Open ports on tubing capped 8/29/2017  7:36 AM   Alcohol Cap Used Yes 8/29/2017  7:36 AM       Peripheral IV 08/29/17 Right Arm (Active)   Site Assessment Clean, dry, & intact 8/29/2017  7:36 AM   Phlebitis Assessment 0 8/29/2017  7:36 AM   Infiltration Assessment 0 8/29/2017  7:36 AM   Dressing Status Clean, dry, & intact 8/29/2017  7:36 AM   Dressing Type Tape;Transparent 8/29/2017  7:36 AM   Hub Color/Line Status Pink; Infusing;Flushed 8/29/2017  7:36 AM   Action Taken Open ports on tubing capped 8/29/2017  7:36 AM   Alcohol Cap Used Yes 8/29/2017  7:36 AM                      Urinary Catheter [REMOVED] Urinary Catheter 07/27/17 2- way-Criteria for Appropriate Use: Obstruction/retention  Urinary Catheter 07/28/17 3- way; Rosales-Criteria for Appropriate Use: Medically/surgically unstable, Strict I/Os, Surgical procedure  [REMOVED] Urinary Catheter 07/28/17 Other (Comment)-Criteria for Appropriate Use: Medically/surgically unstable, Surgical procedure  [REMOVED] Urinary Catheter 07/26/17 2- way-Criteria for Appropriate Use: Obstruction/retention    Intake & Output   Date 08/28/17 0700 - 08/29/17 0659 08/29/17 0700 - 08/30/17 0659   Shift 7250-2599 3584-8955 24 Hour Total 2335-5970 9054-4962 24 Hour Total   I  N  T  A  K  E   I.V.  (mL/kg/hr) 1561.5  (1.5) 1936.8  (1.8) 3498.4  (1.7) 57.4  57.4      Cardizem Volume 100.2 139.8 240 6.2  6.2      Volume (anidulafungin (ERAXIS) 100 mg in 0.9% sodium chloride 130 mL IVPB) 130 0 130 0  0      Volume (linezolid (ZYVOX) IVPB premix in D5W 600 mg) 300 300 600 0  0      Volume (ampicillin-sulbactam (UNASYN) 3 g in 0.9% sodium chloride (MBP/ADV) 100 mL) 100 200 300 0  0      Volume (metroNIDAZOLE (FLAGYL) IVPB premix 500 mg) 100 200 300 0  0      Volume (TPN ADULT - CENTRAL AA 5% D20% W/ CA + ELECTROLYTES) 831.4  831.4         Volume (TPN ADULT - CENTRAL AA 5% D20% W/ CA + ELECTROLYTES)  1097 1097 51.2  51.2    Shift Total  (mL/kg) 1561.5  (17.6) 1936.8  (22) 3498.4  (39.8) 57.4  (0.7)  57.4  (0.7)   O  U  T  P  U  T   Urine  (mL/kg/hr) 1075  (1) 2175  (2.1) 3250  (1.5) 1000  1000      Urine Output (mL) (Urinary Catheter 07/28/17 3- way; [place]) 1075 2175 3250 1000  1000    Shift Total  (mL/kg) 1075  (12.1) 2175  (24.7) 3250  (36.9) 1000  (11.4)  1000  (11.4)   .5 -238.2 248.4 -942.7  -942. 7   Weight (kg) 88.9 88 88 88 88 88         Readmission Risk Assessment Tool Score High Risk            22       Total Score        3 Has Seen PCP in Last 6 Months (Yes=3, No=0) 2 . Living with Significant Other. Assisted Living. LTAC. SNF. or   Rehab    3 Patient Length of Stay (>5 days = 3)    4 IP Visits Last 12 Months (1-3=4, 4=9, >4=11)    5 Pt.  Coverage (Medicare=5 , Medicaid, or Self-Pay=4)    5 Charlson Comorbidity Score (Age + Comorbid Conditions)       Expected Length of Stay 5d 12h   Actual Length of Stay 32

## 2017-08-30 NOTE — PROGRESS NOTES
PCU SHIFT NURSING NOTE      Bedside shift change report given to Ton Melvin RN (oncoming nurse) by Magdalena Toscano (offgoing nurse). Report included the following information Kardex, Intake/Output, MAR and Recent Results. Shift Summary:   1909: Patient lying in bed. Vitals signs stable. Will monitor. Admission Date 7/27/2017   Admission Diagnosis bladder clots  Bladder injury  perperated viscous  wound dehiscence   Consults IP CONSULT TO UROLOGY  IP CONSULT TO GASTROENTEROLOGY  IP CONSULT TO NEUROLOGY  IP CONSULT TO PALLIATIVE CARE - PROVIDER  IP CONSULT TO UROLOGY  IP CONSULT TO CARDIOLOGY        Consults   []PT   []OT   []Speech   []Case Management      [] Palliative      Cardiac Monitoring Order   []Yes   []No     IV drips   []Yes    Drip:                            Dose:  Drip:                            Dose:  Drip:                            Dose:   []No     GI Prophylaxis   []Yes   []No         DVT Prophylaxis   SCDs:  Sequential Compression Device: Bilateral     Patient Refused VTE Prophylaxis: Yes    Ash stockings:  Graduated Compression Stockings: Bilateral      [] Medication   []Contraindicated   []None      Activity Level Activity Level: Bed Rest     Activity Assistance: Complete care   Purposeful Rounding every 1-2 hour? []Yes   Mcdonald Score  Total Score: 3   Bed Alarm (If score 3 or >)   []Yes   [] Refused (See signed refusal form in chart)   Kong Score  Kong Score: 11   Kong Score (if score 14 or less)   []PMT consult   []Wound Care consult      []Specialty bed   [] Nutrition consult          Needs prior to discharge:   Home O2 required:    []Yes   []No    If yes, how much O2 required?     Other:    Last Bowel Movement: Last Bowel Movement Date: 08/26/17      Influenza Vaccine Received Flu Vaccine for Current Season (usually Sept-March): Not Flu Season        Pneumonia Vaccine           Diet Active Orders   Diet    DIET NPO      LDAs         PICC Double Lumen 56/39/09 Basilic;Left (Active)   Central Line Being Utilized Yes 8/29/2017  3:08 PM   Criteria for Appropriate Use Total parenteral nutrition 8/29/2017  3:08 PM   Site Assessment Clean, dry, & intact 8/29/2017  3:08 PM   Phlebitis Assessment 0 8/29/2017  3:08 PM   Infiltration Assessment 0 8/29/2017  3:08 PM   Arm Circumference (cm) 33 cm 8/2/2017 10:47 AM   Date of Last Dressing Change 08/26/17 8/29/2017  3:08 PM   Dressing Status Clean, dry, & intact 8/29/2017  3:08 PM   Action Taken Open ports on tubing capped 8/29/2017  3:08 PM   External Catheter Length (cm) 0 centimeters 8/29/2017  3:06 AM   Dressing Type Disk with Chlorhexadine gluconate (CHG); Transparent 8/29/2017  3:08 PM   Hub Color/Line Status Purple; Infusing;Flushed 8/29/2017  3:08 PM   Positive Blood Return (Site #1) No 8/29/2017  3:08 PM   Hub Color/Line Status Red; Infusing;Flushed 8/29/2017  3:08 PM   Positive Blood Return (Site #2) Yes 8/29/2017  3:08 PM   Alcohol Cap Used Yes 8/29/2017  3:08 PM          Peripheral IV 08/28/17 Left Arm (Active)   Site Assessment Clean, dry, & intact 8/29/2017  3:08 PM   Phlebitis Assessment 0 8/29/2017  3:08 PM   Infiltration Assessment 0 8/29/2017  3:08 PM   Dressing Status Clean, dry, & intact 8/29/2017  3:08 PM   Dressing Type Tape;Transparent 8/29/2017  3:08 PM   Hub Color/Line Status Pink; Infusing;Flushed 8/29/2017  3:08 PM   Action Taken Open ports on tubing capped 8/29/2017  3:08 PM   Alcohol Cap Used Yes 8/29/2017  3:08 PM       Peripheral IV 08/29/17 Right Arm (Active)   Site Assessment Clean, dry, & intact 8/29/2017  3:08 PM   Phlebitis Assessment 0 8/29/2017  3:08 PM   Infiltration Assessment 0 8/29/2017  3:08 PM   Dressing Status Clean, dry, & intact 8/29/2017  3:08 PM   Dressing Type Tape;Transparent 8/29/2017  3:08 PM   Hub Color/Line Status Pink; Infusing;Flushed 8/29/2017  3:08 PM   Action Taken Open ports on tubing capped 8/29/2017  3:08 PM   Alcohol Cap Used Yes 8/29/2017  3:08 PM                      Urinary Catheter [REMOVED] Urinary Catheter 07/27/17 2- way-Criteria for Appropriate Use: Obstruction/retention  Urinary Catheter 07/28/17 3- way; [place]-Criteria for Appropriate Use: Medically/surgically unstable, Surgical procedure  [REMOVED] Urinary Catheter 07/28/17 Other (Comment)-Criteria for Appropriate Use: Medically/surgically unstable, Surgical procedure  [REMOVED] Urinary Catheter 07/26/17 2- way-Criteria for Appropriate Use: Obstruction/retention    Intake & Output   Date 08/28/17 1900 - 08/29/17 0659 08/29/17 0700 - 08/30/17 0659   Shift 3734-6726 24 Hour Total 7150-5248 6767-4764 24 Hour Total   I  N  T  A  K  E   I.V.  (mL/kg/hr) 1936.8 3498.4 2152.5  (2)  2152.5      Cardizem Volume 139.8 240 106  106      Volume (dextrose 5% infusion)   536.7  536.7      Volume (anidulafungin (ERAXIS) 100 mg in 0.9% sodium chloride 130 mL IVPB) 0 130 130  130      Volume (linezolid (ZYVOX) IVPB premix in D5W 600 mg) 300 600 300  300      Volume (ampicillin-sulbactam (UNASYN) 3 g in 0.9% sodium chloride (MBP/ADV) 100 mL) 200 300 100  100      Volume (metroNIDAZOLE (FLAGYL) IVPB premix 500 mg) 200 300 100  100      Volume (TPN ADULT - CENTRAL AA 5% D20% W/ CA + ELECTROLYTES)  831.4         Volume (TPN ADULT - CENTRAL AA 5% D20% W/ CA + ELECTROLYTES) 1097 1097 879.8  879.8    Shift Total  (mL/kg) 1936.8  (22) 3498.4  (39.8) 2152.5  (24.5)  2152.5  (24.5)   O  U  T  P  U  T   Urine  (mL/kg/hr) 2175 3250 2550  (2.4)  2550      Urine Output (mL) (Urinary Catheter 07/28/17 3- way; [place]) 2175 3250 2550  2550    Shift Total  (mL/kg) 2175  (24.7) 3250  (36.9) 2550  (29)  2550  (29)   NET -238.2 248.4 -397.5  -397.5   Weight (kg) 88 88 88 88 88         Readmission Risk Assessment Tool Score High Risk            22       Total Score        3 Has Seen PCP in Last 6 Months (Yes=3, No=0)    2 . Living with Significant Other. Assisted Living. LTAC. SNF.  or   Rehab    3 Patient Length of Stay (>5 days = 3)    4 IP Visits Last 12 Months (1-3=4, 4=9, >4=11)    5 Pt.  Coverage (Medicare=5 , Medicaid, or Self-Pay=4)    5 Charlson Comorbidity Score (Age + Comorbid Conditions)       Expected Length of Stay 5d 12h   Actual Length of Stay 32

## 2017-08-30 NOTE — PROGRESS NOTES
LCSW visited Pt; no visitors in room at the time. Pt is resting in bed. LCSW held his hand and provided supportive presence. Spoke with RN Darion Ugarte). LCSW called Pt's dtr, Aurora Franco, and explained we had heard from [de-identified] of her siblings so she and her sister, Bob Weiss will be who the health team speaks to for medical decision making regarding Pt's care plan. LCSW offered a palliative meeting with Aurora Jose Fzoya if she would like as Bob Weiss had expressed desire for no mtg when spoke the other day. Also offered to provide a palliative patient letter of Pt's clinical diagnoses at present to help them see in writing the medical terminology so they have medical info to help them in making informed decisions regarding their father's medical care. It will also contain the goals they communicated on Friday. Manda's response was agreement to the letter and LCSW said will provide 2 copies about midday tomorrow. Simonmoe Franco will visit in evening tomorrow. Aurora Franco did not want desire a palliative meeting at this time but she understands she or family can request a meeting or ask questions by contacting Palliative Med #.     Pt's dtr, Simon Salvador, shared that Dr. Radha Rosenbaum, urology, and appreciated his explanations and answering her questions earlier this week. She shared that she likes Dr. Mabel Sims MD, current hospitalist, based on their meeting. She also shared that since the Friday meeting, she appreciates that \"nurses who are familiar with my dad\" are being assigned to provide his care. From 8/29/17 note: LCSW suggested having a meeting on Thurs to review medical situation and decisions (ie, peg); Colby response was to verbalize frustration with meetings because her goal is focused on Pt recovering, ideally to how he was prior to hospitalization. LCSW acknowledged her thoughts and reframed purpose of meetings is to ensure children/as deferred to medical decisions makers have the medical info of Pt to make informed decisions about Pt's ongoing care plan.  Pt's dtr was agreeable to having a letter/summary written for her because recognizes importance of information. She said she couldn't speak for Hillary Rooney about mtg and deferred LCSW to call her (LCSW will call Hillary Rooney on Wed.). From 8/28 note:  LCSW asked (Sorin Mendez and Del Knapp) if family had made decision about PEG tube. 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 \" Sorin Mendez verbalized that they had been weighing peg option over the week. This LCSW recommends main physician (attending) address with family/NOK adult children in simple language (avoid medical jargon) if there is recommendation to explore this intervention sooner than later    From today's ACP note: On August 25nd, Pt's wife/NOK again verbalized her deferment medical surrogate decision making to next in line which is their 6 children. As of this morning, Palliative LCSW has heard from 5 of Pt's 6 children. Maddison Bari and Uziel Marquez have all deferred medical decision making for their father/Pt to their other siblings via email; emails placed on hard chart for EMR. Hillary Rooney and Sorin Mendez have verbalized acceptance of responsibility to make medical decisions on behalf of Pt. Palliative team has not heard from Pt's son, Bernardo Pathak. His family has communicated with him on multiple days. At this time, children involved in medical decision making of Pt as deferred to [facility] medical surrogates are Eva Lucero, [phone] and Ghassan Shaver, [phone]. Thank you for including Palliative Medicine in Mr. Danielle Carvajal care.       Laurita Greenfield, [address] ([phone])  Work cell: [phone]

## 2017-08-30 NOTE — PROGRESS NOTES
Care Management continues to follow patient for transition of care planning. At present, family continue to hope for recovery and are focused on that plan for patient. SNF continues to be the recommendation from [facility] but Family have indicated plan for patient to return to home with 24/7 caregivers. Family have not been willing/able to discuss possible PEG which prohibits moving forward with any type of RAMONITA planning. Appreciate Palliative SW guidance with decision makers for patient care. Agree that another family meeting is needed as PEG has been recommended for several days.     Lino Beaver  AD/Care Management  [phone]

## 2017-08-30 NOTE — PROGRESS NOTES
Pharmacy Automatic Renal Dosing Protocol - Antimicrobials    Indication for Antimicrobials: HCAP, ?wound infection, ? UTI  On , the patient had removal of bladder stone and repair of perforated bladder  , the patient had LAPAROTOMY EXPLORATORY, wound closure for dehiscence without bowel perforation   exp lap with closure for dehiscence with retention sutures  : CT scan: \"shows catheter in place with bladder thickening and b/l pleural effusions. \"    Current Regimen of Each Antimicrobial (Start Day & Day of Therapy): Anidulafungin 200 mg X1, then 100 mg IV every day (; day #8)  Zyvox 600 mg IV q 12 hours (; day #8)  Unasyn 3g IV q12 hours (; day #8)    Previous abx  Vancomycin  Cefepime 2 gram iv q12h.   Cefazolin  Ampicillin   LVQ    Significant cultures:   : Blood cx: candida albicans = final  : Urine cx: NG - final  : Wound cx: ENTEROCOCCUS FAECALIS GROUP D (Vanc ADRIAN 4) and STAPHYLOCOCCUS EPIDERMIDIS (OXACILLIN RESISTANT) and Few Anaerobic GPC - FINAL  : Wound cx: STAPHYLOCOCCUS EPIDERMIDIS (OXACILLIN RESISTANT and LIGHT ANAEROBIC GRAM POSITIVE COCCI - FINAL  : Blood cx: NG x 5 days pending  : Urine cx: CANDIDA ALBICANS - FINAL    CAPD, Intermittent Hemodialysis or Renal Replacement Therapy: no  Paralysis, amputations, malnutrition: no    Labs:  Recent Labs      17   0025  17   0303  17   1542  17   0657   CREA  1.78*  1.66*  1.57*   --    BUN  53*  49*  52*   --    WBC  12.3*  13.3*   --   14.4*     Temp (24hrs), Av.2 °F (36.8 °C), Min:97.4 °F (36.3 °C), Max:98.9 °F (37.2 °C)    Creatinine Clearance (ml/min): ~28    Goal Vancomycin Level(s): Vancomycin trough between 15 and 20    Impression/Plan:   -Unasyn changed to Unasyn 3g q 12hr for renal dosing (IDW Crcl < 30mL/min)  -Eraxis and Linezolid dosed appropriately for renal fxn  -Afebrile        Pharmacy will follow daily and adjust doses of monitored medications as appropriate for renal function and/or serum levels.     Thank you,  DENNY Melissa

## 2017-08-30 NOTE — PROGRESS NOTES
Hospitalist Progress Note    NAME: Ryanne Bearden   :  1928   MRN:  [id number]         Assessment / Plan:  Acute encephalopathy Hypoxia vs Ischemia POA  Hypernatremia POA  Open eyes to verbal stimuli, non verbal for me  MRI of brain:  multiple punctate foci of acute infarction in the  bilateral corona radiata and centrum semiovale. Background of mild chronic  microvascular ischemic disease. The result was discussed with the family by Dr. Jeanette Kilpatrick  MRI findings consistent with hypoxic injury and multiple small infarcts are the cause of encephalopathy and confusion. 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 Age-related volume loss unchanged  Ammonia 33, B12 1046, TSH 3.7  Need to correct the hypernatremia to ensure MS as close to baseline as possible, add D5W, continue TPN     Sepsis: fever, tachycardia, leukocytosis due to UTI vs PNA. UTI: prior UTI grew Enterococcus, completed Ampicillin, yeast noted, c/w Eraxis, Unasyn and Zyvox, F/U cultures. Postop respiratory failure worsening oxygenation  S/P removal of bladder stone and repair of perforated bladder ()  Cystoscopy with attempted clot evacuation with subsequent exploratory laparotomy with bladder stone retrieval and repair of large posterior bladder perforation on   Urology following; suprapubic out  Check CXR  Continue antibiotics  ChecK pBNP    S/p Exp Lap () due to perforate viscus; diffuse gastric distention without bowel perforation   S/p Exp lap with closure for dehiscence with retention sutures ()  - general surgery following.  Dr. Omar Barillas recommended to consult GI if and when the patient requires alternative tube feeding. CT abdomen 17 shows catheter in place with bladder thickening and b/l pleural effusions. Nutritional support, c/w TPN, ordered today.   Difficulty with feeding  - General surgery recommended to consult GI for alternative tube feeding is needed  - SLP following; keep NPO, ice chips ok per nursing when pt is awake  - continue with TPN; discussed with the family about alternate tube feeding.  Family would like to wait until Monday to think about the option.  Family would like to work on extra stimulation and encourage for the pt to eat. No changes in patient's status. Family not interested alternative tube feeding at present time  Shock resolved  LINDSEY Resolved initially, creatinine trending up again./2 to sepsis  Increased LFT: due to sepsis, much today 8/27     CAD, PAF with RVR on cardi drip   Cardiology following  CHF?: has bilateral pleural effusions, ECHO EF 15% with diastolic dysfunction, CXR pulmonary edema, will give a dose of diuretics and monitor, check BNP. 756  Pneumonia??: last xray 8/23 Stable diffuse interstitial opacities in keeping with pulmonary  edema. Mildly increased bibasilar airspace opacities may represent edema,  atelectasis, or developing infection.     for now on  Unasyn and Zyvox, c/w bronchodilators, monitor. S/p lasix 40 mg iv x good urine output  laisx 20 mg iv  pnr     HTN  - on  cardizem gtt per cardiology bp 147/70  - restart plavix when able to take PO, okay with surgery not taking po   - continue with labetalol and nitrobid     Anemia, post op s/p PRBC  h/h 9/28.4 8/27  Electrolyte disturbance replete as indicated, on tpn  Pruritus (resolved)  Constipation resolved after dulcolax suppository  DVT prophylaxis: Lovenox          Code status: Full code    NOK: palliative care team following - need to further address if majority of children for medical decisions or all other 4 children deferring to Joshua Bishop  [phone] or Phillip [phone]   Children don't want wife to be involved in medical decision making       Body mass index is 28.88 kg/(m^2).   Recommended Disposition:  per cm- Family strongly opposed SNF placement and desire to take patient home at discharge  Emphasized that caring for patient will be difficult due to his care needs and that multiple people will need to be involved to provide 24/7 care. Subjective:     Chief Complaint / Reason for Physician Visit   Opens eyes, not talking, no family in room  Discussed with RN events overnight. Review of Systems:  Symptom Y/N Comments  Symptom Y/N Comments   Fever/Chills    Chest Pain     Poor Appetite    Edema     Cough    Abdominal Pain     Sputum    Joint Pain     SOB/CLARKE    Pruritis/Rash     Nausea/vomit    Tolerating PT/OT     Diarrhea    Tolerating Diet     Constipation    Other       Could NOT obtain due to: Non verbal     Objective:     VITALS:   Last 24hrs VS reviewed since prior progress note. Most recent are:  Patient Vitals for the past 24 hrs:  Patient Vitals for the past 24 hrs:   Temp Pulse Resp BP SpO2   08/30/17 1600 97.2 °F (36.2 °C) 76 18 148/67 92 %   08/30/17 1212 97.1 °F (36.2 °C) 68 18 136/62 93 %   08/30/17 0800 - 67 - 147/53 100 %   08/30/17 0746 97.2 °F (36.2 °C) 66 18 160/54 100 %   08/30/17 0312 97 °F (36.1 °C) 72 18 144/62 98 %   08/29/17 2321 98.6 °F (37 °C) 90 18 142/71 96 %         Intake/Output Summary (Last 24 hours) at 08/1934  Last data filed at 08/30/17 1847   Gross per 24 hour   Intake          4163.34 ml   Output             3950 ml   Net           213.34 ml        PHYSICAL EXAM:  General:                    WD, WN. Awake, opens eyes, not verbal  EENT:                        EOMI. Anicteric sclerae.  MMM  Resp:                         Decreased BS bilaterally , no wheezing or rales.                                       No accessory muscle use  CV:                            Regular  rhythm,  No edema  GI:                             Soft, obese Non distended, Non tender.  +Bowel sounds                                   + suprapubic catheter out, Rosales wound c/d/i no erythema  Neurologic:               Awake, not communicative, not following commands  Psych:                       Poor insight. Not anxious nor agitated  Skin:                           No rashes.  No jaundice     Reviewed most current lab test results and cultures  YES  Reviewed most current radiology test results   YES  Review and summation of old records today    NO  Reviewed patient's current orders and MAR    YES  PMH/SH reviewed - no change compared to H&P  ________________________________________________________________________  Care Plan discussed with:    Comments   Patient x    Family      RN x    Care Manager     Consultant                        Multidiciplinary team rounds were held today with , nursing, pharmacist and clinical coordinator. Patient's plan of care was discussed; medications were reviewed and discharge planning was addressed. ________________________________________________________________________  Total NON critical care TIME:  25   Minutes    Total CRITICAL CARE TIME Spent:   Minutes non procedure based      Comments   >50% of visit spent in counseling and coordination of care x    ________________________________________________________________________  Geetha Boyle MD     Procedures: see electronic medical records for all procedures/Xrays and details which were not copied into this note but were reviewed prior to creation of Plan. LABS:  I reviewed today's most current labs and imaging studies.   Pertinent labs include:  Recent Labs      08/30/17 0524 08/29/17   0025  08/28/17   0303   WBC  11.7*  12.3*  13.3*   HGB  8.5*  9.0*  9.4*   HCT  27.0*  28.7*  30.2*   PLT  256  307  347     Recent Labs      08/30/17 0524 08/29/17   0025  08/28/17   0303   NA  146*  153*  150*   K  3.6  3.6  3.8   CL  111*  115*  114*   CO2  31  33*  30   GLU  183*  223*  214*   BUN  46*  53*  49*   CREA  1.79*  1.78*  1.66*   CA  7.7*  8.0*  8.3*   MG   --   2.2  2.4   PHOS   --   4.0  4.2   ALB  1.6*   --    -- TBILI  0.5   --    --    SGOT  42*   --    --    ALT  46   --    --        Signed: Brenden Moser MD

## 2017-08-30 NOTE — PROGRESS NOTES
Hospitalist Progress Note    NAME: Rodriguez Montaño   :  1928   MRN:  [id number]         Assessment / Plan:  Acute encephalopathy Hypoxia vs Ischemia POA  Hypernatremia POA  Open eyes to verbal stimuli, non verbal for me  MRI of brain:  multiple punctate foci of acute infarction in the  bilateral corona radiata and centrum semiovale. Background of mild chronic  microvascular ischemic disease. The result was discussed with the family by Dr. Ian Stephens  MRI findings consistent with hypoxic injury and multiple small infarcts are the cause of encephalopathy and confusion. 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 Age-related volume loss unchanged  Ammonia 33, B12 1046, TSH 3.7  Need to correct the hypernatremia to ensure MS as close to baseline as possible, add D5W, continue TPN     Sepsis: fever, tachycardia, leukocytosis due to UTI vs PNA. UTI: prior UTI grew Enterococcus, completed Ampicillin, yeast noted, c/w Eraxis, Unasyn and Zyvox, F/U cultures. Postop respiratory failure worsening oxygenation  S/P removal of bladder stone and repair of perforated bladder ()  Cystoscopy with attempted clot evacuation with subsequent exploratory laparotomy with bladder stone retrieval and repair of large posterior bladder perforation on   Urology following; suprapubic out  Check CXR  Continue antibiotics  ChecK pBNP    S/p Exp Lap () due to perforate viscus; diffuse gastric distention without bowel perforation   S/p Exp lap with closure for dehiscence with retention sutures ()  - general surgery following.  Dr. Bridget Pimentel recommended to consult GI if and when the patient requires alternative tube feeding. CT abdomen 17 shows catheter in place with bladder thickening and b/l pleural effusions. Nutritional support, c/w TPN, ordered today.   Difficulty with feeding  - General surgery recommended to consult GI for alternative tube feeding is needed  - SLP following; keep NPO, ice chips ok per nursing when pt is awake  - continue with TPN; discussed with the family about alternate tube feeding.  Family would like to wait until Monday to think about the option.  Family would like to work on extra stimulation and encourage for the pt to eat. No changes in patient's status. Family not interested alternative tube feeding at present time  Shock resolved  LINDSEY Resolved initially, creatinine trending up again./2 to sepsis  Increased LFT: due to sepsis, much today 8/27     CAD, PAF with RVR on cardi drip   Cardiology following  CHF?: has bilateral pleural effusions, ECHO EF 99% with diastolic dysfunction, CXR pulmonary edema, will give a dose of diuretics and monitor, check BNP. 756  Pneumonia??: last xray 8/23 Stable diffuse interstitial opacities in keeping with pulmonary  edema. Mildly increased bibasilar airspace opacities may represent edema,  atelectasis, or developing infection.     for now on  Unasyn and Zyvox, c/w bronchodilators, monitor. S/p lasix 40 mg iv x good urine output  laisx 20 mg iv  pnr     HTN  - on  cardizem gtt per cardiology bp 147/70  - restart plavix when able to take PO, okay with surgery not taking po   - continue with labetalol and nitrobid     Anemia, post op s/p PRBC  h/h 9/28.4 8/27  Electrolyte disturbance replete as indicated, on tpn  Pruritus (resolved)  Constipation resolved after dulcolax suppository  DVT prophylaxis: Lovenox          Code status: Full code    NOK: palliative care team following - need to further address if majority of children for medical decisions or all other 4 [facility]  [phone] or Phillip [phone]   Children don't want wife to be involved in medical decision making       Body mass index is 28.88 kg/(m^2).   Recommended Disposition:  per cm- Family strongly opposed SNF placement and desire to take patient home at discharge  Emphasized that caring for patient will be difficult due to his care needs and that multiple people will need to be involved to provide 24/7 care. Subjective:     Chief Complaint / Reason for Physician Visit   Opens eyes, not talking, no family in room  Suprapubic catheter fell out  Discussed with RN events overnight. Review of Systems:  Symptom Y/N Comments  Symptom Y/N Comments   Fever/Chills    Chest Pain     Poor Appetite    Edema     Cough    Abdominal Pain     Sputum    Joint Pain     SOB/CLARKE    Pruritis/Rash     Nausea/vomit    Tolerating PT/OT     Diarrhea    Tolerating Diet     Constipation    Other       Could NOT obtain due to: Non verbal     Objective:     VITALS:   Last 24hrs VS reviewed since prior progress note. Most recent are:  Patient Vitals for the past 24 hrs:  08/29/17 2321 98.6 °F (37 °C) 90 18 142/71 96 %       Intake/Output Summary (Last 24 hours) at 08/30/17 1929  Last data filed at 08/30/17 1847   Gross per 24 hour   Intake          4163.34 ml   Output             3950 ml   Net           213.34 ml        PHYSICAL EXAM:  General:                    WD, WN. Awake, opens eyes, not verbal  EENT:                        EOMI. Anicteric sclerae.  MMM  Resp:                         Decreased BS bilaterally , no wheezing or rales.                                       No accessory muscle use  CV:                            Regular  rhythm,  No edema  GI:                             Soft, obese Non distended, Non tender.  +Bowel sounds                                   + suprapubic catheter out, Rosales wound c/d/i no erythema  Neurologic:               Awake, not communicative, not following commands  Psych:                       Poor insight. Not anxious nor agitated  Skin:                           No rashes.  No jaundice     Reviewed most current lab test results and cultures  YES  Reviewed most current radiology test results   YES  Review and summation of old records today    NO  Reviewed patient's current orders and MAR    YES  PMH/SH reviewed - no change compared to H&P  ________________________________________________________________________  Care Plan discussed with:    Comments   Patient x    Family      RN x    Care Manager     Consultant                        Multidiciplinary team rounds were held today with , nursing, pharmacist and clinical coordinator. Patient's plan of care was discussed; medications were reviewed and discharge planning was addressed. ________________________________________________________________________  Total NON critical care TIME:  25   Minutes    Total CRITICAL CARE TIME Spent:   Minutes non procedure based      Comments   >50% of visit spent in counseling and coordination of care x    ________________________________________________________________________  Colt Buenrostro MD     Procedures: see electronic medical records for all procedures/Xrays and details which were not copied into this note but were reviewed prior to creation of Plan. LABS:  I reviewed today's most current labs and imaging studies.   Pertinent labs include:  Recent Labs      08/30/17   0524  08/29/17   0025  08/28/17   0303   WBC  11.7*  12.3*  13.3*   HGB  8.5*  9.0*  9.4*   HCT  27.0*  28.7*  30.2*   PLT  256  307  347     Recent Labs      08/30/17   0524  08/29/17   0025  08/28/17   0303   NA  146*  153*  150*   K  3.6  3.6  3.8   CL  111*  115*  114*   CO2  31  33*  30   GLU  183*  223*  214*   BUN  46*  53*  49*   CREA  1.79*  1.78*  1.66*   CA  7.7*  8.0*  8.3*   MG   --   2.2  2.4   PHOS   --   4.0  4.2   ALB  1.6*   --    --    TBILI  0.5   --    --    SGOT  42*   --    --    ALT  46   --    --        Signed: Colt Buenrostro MD

## 2017-08-30 NOTE — PROGRESS NOTES
Problem: Mobility Impaired (Adult and Pediatric)  Goal: *Acute Goals and Plan of Care (Insert Text)  Physical Therapy Goals  Revised 8/24/2017  1. Patient will move from supine to sit and sit to supine , scoot up and down and roll side to side in bed with maximal assistance within 7 day(s). 2. Patient will transfer from bed to chair and chair to bed with maximal assistance x2 using the least restrictive device within 7 day(s). 3. Patient will perform sit to stand with maximal assistance x2 within 7 day(s). 4. Patient will sit EOB for 10 minutes without external support with good stability within 7 days. Physical Therapy Goals  Revised 8/16/2017  Previous goals continue to be appropriate - goal 4 MET    Physical Therapy Goals  Initiated 8/9/2017  1. Patient will move from supine to sit and sit to supine , scoot up and down and roll side to side in bed with moderate assistance within 7 day(s). 2. Patient will transfer from bed to chair and chair to bed with maximal assistance using the least restrictive device within 7 day(s). 3. Patient will perform sit to stand with maximal assistance within 7 day(s). 4. Patient will tolerate sitting EOB x 5 minutes without external support in preparation for standing task within 7 days. PHYSICAL THERAPY TREATMENT  Patient: Sunday Gayle (41 y.o. male)  Date: 8/30/2017  Diagnosis: bladder clots  Bladder injury  perperated viscous  wound dehiscence <principal problem not specified>  Procedure(s) (LRB):  LAPAROTOMY EXPLORATORY, wound closure for dehiscence (N/A) 25 Days Post-Op  Precautions:  (abdominal binder)      ASSESSMENT:  Pt received supine in bed w/ wife and daughter present in room and RN clearing pt for participation with therapy. Pt remains extremely lethargic however per family, more alert this date in comparison to yesterday/previous days. Pt with minimal to no command following as well as no voluntary/purposeful movements throughout therapy session.  Pt would open eyes briefly to elevated voice however with increased difficulty maintained eyes in opened position. Pt performed AA/PROM of BLEs however with minimal participation. Pt repositioned for comfort to off load sacral area. Continue to recommend SNF at discharge. Progression toward goals:  [ ]    Improving appropriately and progressing toward goals  [X]    Improving slowly and progressing toward goals  [ ]    Not making progress toward goals and plan of care will be adjusted       PLAN:  Patient continues to benefit from skilled intervention to address the above impairments. Continue treatment per established plan of care. Discharge Recommendations:  Tavo Ramos  Further Equipment Recommendations for Discharge:  TBD by facility        SUBJECTIVE:   Patient mostly nonverbal, moaning out with repositioning      OBJECTIVE DATA SUMMARY:   Critical Behavior:  Neurologic State: Confused, Drowsy  Orientation Level: Unable to verbalize  Cognition: Decreased attention/concentration  Safety/Judgement: Fall prevention  Functional Mobility Training:  Bed Mobility:           Scooting: Total assistance;Assist x2        Transfers:                                   Balance:     Ambulation/Gait Training:                                                                 Ambulation did not occur  Pain:  Pain Scale 1: Numeric (0 - 10)  Pain Intensity 1: 0              Activity Tolerance:   Poor  Please refer to the flowsheet for vital signs taken during this treatment.   After treatment:   [ ]    Patient left in no apparent distress sitting up in chair  [X]    Patient left in no apparent distress in bed  [X]    Call bell left within reach  [X]    Nursing notified  [X]    Caregiver present  [ ]    Bed alarm activated      COMMUNICATION/COLLABORATION:   The patients plan of care was discussed with: Occupational Therapist, Registered Nurse and      Jose Elizabeth PT, DPT   Time Calculation: 15 mins

## 2017-08-30 NOTE — ACP (ADVANCE CARE PLANNING)
On August 25nd, Pt's wife/NOK again verbalized her deferment medical surrogate decision making to next in line which is their 6 children. As of this morning, Palliative LCSW has heard from 5 of Pt's 6 children. Jatinder Oviedo and Reilly De León have all deferred medical decision making for their father/Pt to their other siblings via email; emails placed on hard chart for EMR. [facility] and Willam Pac have verbalized acceptance of responsibility to make medical decisions on behalf of Pt. Palliative team has not heard from Pt's son, Dina Blas. His family has communicated with him on multiple days. At this time, children involved in medical decision making of Pt as deferred to [facility] medical surrogates are Parul Villarreal and Mylene Louie.

## 2017-08-30 NOTE — PROGRESS NOTES
Problem: Self Care Deficits Care Plan (Adult)  Goal: *Acute Goals and Plan of Care (Insert Text)  Occupational Therapy Goals:  Routine 7 day re-eval 8/30/2017 all goals remain appropriate    1. Patient will perform basic grooming SBA within 7 days. 2. Patient will tolerate 5 minutes of active exercise for increased strength with ADLS within 7 days. 3. Patient will sit edge of bed without support with good dynamic balance in prep for functional tasks within 7days. 4. Patient will perform sit to stand with max assist in prep for functional ADL transfers within 7 days. 5. Patient will perform UB dressing seated with SBA within 7 days. Routine 7 day re-eval   1. Patient will perform basic grooming seated SBA within 7 days. 2. Patient will tolerate 5 minutes of active exercise for increased strength with ADLS within 7 days. 3. Patient will sit edge of bed without support with good dynamic balance in prep for functional tasks within 7days. 4. Patient will perform sit to stand with max assist in prep for functional ADL transfers within 7 days. 5. Patient will perform UB dressing seated with SBA within 7 days. Initiated 8/9/2017  1. Patient will perform washing of face in supported sitting or at bed level with HOB elevated with max assist within 7 days. Met   2. Patient will tolerate 5 minutes of active exercise for increased strength with ADLS within 7 days. Continue   3. Patient will sit edge of bed without support with good dynamic balance in prep for functional tasks within 7days. continue  4. Patient will perform sit to stand with max assist in prep for functional ADL transfers within 7 days.  continue   OCCUPATIONAL THERAPY REEVALUATION  Patient: Joanna Carmona (76 y.o. male)  Date: 8/30/2017  Diagnosis: bladder clots  Bladder injury  perperated viscous  wound dehiscence <principal problem not specified>  Procedure(s) (LRB):  LAPAROTOMY EXPLORATORY, wound closure for dehiscence (N/A) 25 Days Post-Op  Precautions:  (abdominal binder has been discontinued)      ASSESSMENT :  Based on the objective data described below, the patient presents with decreased ability to follow commands or interact. Pt has eyes open but was following only briefly 2% of commands/cues. He was on 50% Venti this session and was on NC on previous session attempt. Pt did not attempt hand over hand assist washing of face or hands. Performed AAROM/PROM BUE. He did not make eye contact or purposefully communicate today. He has been very inconsistent in ability to participate in skilled services. Total assist for bed mobility, fair seated balance at times but total assist other times. Total/max assist x2 to squat pivot to chair. Total assist for ADLs overall. He now has C-diff as well. Noted that family is unsure about PEG (pt has been NPO for quite some time now) and family only wants home with home health. If pt goes home he will need 24/7 extensive physical assist.  He will need kandace, wheelchair with removable arm rests and gel cushion as well as hospital bed and bed pan. Due to inconsistent progress will decrease frequency to 2-3. Patient will benefit from skilled intervention to address the above impairments.   Patients rehabilitation potential is considered to be Guarded  Factors which may influence rehabilitation potential include:   [ ]                None noted  [X]                Mental ability/status  [ ]                Medical condition  [ ]                Home/family situation and support systems  [ ]                Safety awareness  [ ]                Pain tolerance/management  [ ]                Other:        PLAN :  Recommendations and Planned Interventions:  [X]                  Self Care Training                  [X]           Therapeutic Activities  [X]                  Functional Mobility Training    [X]           Cognitive Retraining  [X]                  Therapeutic Exercises           [ ] Endurance Activities  [X]                  Balance Training                   [ ]           Neuromuscular Re-Education  [ ]                  Visual/Perceptual Training     [X]      Home Safety Training  [X]                  Patient Education                 [X]           Family Training/Education  [ ]                  Other (comment):     Frequency/Duration: Patient will be followed by occupational therapy 2-3 times a week to address goals. Discharge Recommendations: Home Health with extensive 24/7 assist as pts family only wants to take pt home  Further Equipment Recommendations for Discharge: hospital bed; kandace lift; wheelchair with gel cushion and removable leg rests; bed pan       SUBJECTIVE:   Patient stated oh.       OBJECTIVE DATA SUMMARY:   Hospital course since last seen and reason for reevaluation: variable medical status and ability to participate  Cognitive/Behavioral Status:  Neurologic State: Confused  Orientation Level: Unable to verbalize  Cognition: Unable to assess (comment) (does not verbalize)              Vision/Perceptual:    Tracking:  (unable to follow commands to perform)                                 Range of Motion:     AROM: Generally decreased, functional (BUE; did not move BLE on command)                       Strength:     Strength: Generally decreased, functional              Coordination:  Coordination: Generally decreased, functional  Fine Motor Skills-Upper: Left Impaired;Right Impaired    Gross Motor Skills-Upper: Left Impaired;Right Impaired  Tone & Sensation:     Tone: Normal                          Balance:        Functional Mobility and Transfers for ADLs:  Bed Mobility:  Scooting: Total assistance;Assist x2     Transfers:  Sit to Stand: Total assistance;Assist x2        ADL Assessment:  Feeding:  (NPO)     Oral Facial Hygiene/Grooming: Total assistance     Bathing: Total assistance     Upper Body Dressing: Total assistance     Lower Body Dressing:  Total assistance Toileting: Total assistance              ADL Intervention:  Did not initiate even with max hand over hand assist  Therapeutic Exercise:  PROM BUE  Pain:  Pain Scale 1: Numeric (0 - 10)  Pain Intensity 1: 0              Activity Tolerance:      Please refer to the flowsheet for vital signs taken during this treatment. After treatment:   [ ] Patient left in no apparent distress sitting up in chair  [X] Patient left in no apparent distress in bed  [X] Call bell left within reach  [X] Nursing notified  [ ] Caregiver present  [ ] Bed alarm activated      COMMUNICATION/EDUCATION:   The patients plan of care was discussed with: Physical Therapist, Registered Nurse and pts family. [ ]    Home safety education was provided and the patient/caregiver indicated understanding. [ ]    Patient/family have participated as able in goal setting and plan of care. [ ]    Patient/family agree to work toward stated goals and plan of care. [ ]    Patient understands intent and goals of therapy, but is neutral about his/her participation. [X]    Patient is unable to participate in goal setting and plan of care. This patients plan of care is appropriate for delegation to [facility].      Thank you for this referral.  Mika Cruz OTR/L  Time Calculation: 12 mins

## 2017-08-30 NOTE — PROGRESS NOTES
Nutrition Assessment:    INTERVENTIONS/RECOMMENDATIONS:   Enteral/Parenteral Nutrition: Initiate enteral nutrition Recommend feeding patient enterally (via NGT or PEG) as it is the preferred method in a patient with a functioning gut over parenteral nutrition. TPN not indicated at this time. Recommend Jevity 1.5 @ 15 mL/hr x 24 hrs; if patient tolerates, advance as toleerated by 10 mL q 12 hours to goal rate of Jevity 1.5 @ 55 mL/hr + 150 mL water flushes q 4 hours (provides 1980 kcal, 84 g protein, 1903 mL water)    ASSESSMENT:   Chart reviewed; patient continues on TPN meeting >100% of estimated kcal needs. Pending plan of care, recommendations provided above to initiate TF to preserve GI integrity; recommend weaning TPN. If plan of care trends toward comfort, would allow PO as desired. Will continue to monitor progress and plan of care to provide further recommendations. Diet Order: NPO (TPN: AA5%, D20% @ 83mL/hr + Lipids 3x/wk; provides 1967 kcal/99.6g pro)  % Eaten:  No data found. Pertinent Medications: [x] Reviewed []Other:  Pertinent Labs: [x]Reviewed  []Other: Na+ 146, BUN 46, -200-182  Food Allergies: [x]None []Other:     Last BM: 8/26   [x]Active     []Hyperactive  []Hypoactive       [] Absent  BS  Skin:    [] Intact   [x] Incision  [] Breakdown   []Edema   []Other:    Anthropometrics: Height: 5' 10\" (177.8 cm) Weight: 88 kg (194 lb 0.1 oz)    IBW (%IBW):   ( ) UBW (%UBW):   (  %)    BMI: Body mass index is 27.84 kg/(m^2).     This BMI is indicative of:  []Underweight   []Normal   [x]Overweight   [] Obesity   [] Extreme Obesity (BMI>40)  Last Weight Metrics:  Weight Loss Metrics 8/30/2017 7/26/2017 3/26/2017 11/3/2016 3/17/2015 10/2/2014 5/16/2013   Today's Wt 194 lb 0.1 oz 211 lb 3.2 oz 215 lb 6.2 oz 217 lb 6 oz 214 lb 4.8 oz 200 lb 213 lb 10 oz   BMI 27.84 kg/m2 30.3 kg/m2 30.91 kg/m2 31.19 kg/m2 30.75 kg/m2 28.7 kg/m2 30.65 kg/m2       Estimated Nutrition Needs (Based on): 1855 Kcals/day (HRX1965L0. 2(AF)) , 88 g (1g/kg bw) Protein  Carbohydrate:  At Least 130 g/day  Fluids: 1855 mL/day     Pt expected to meet estimated nutrient needs: [x]Yes []No    NUTRITION DIAGNOSES:   Problem:  Swallowing difficulty      Etiology: related to AMS, encephalopathy     Signs/Symptoms: as evidenced by NPO per SLP      NUTRITION INTERVENTIONS:  Meals/Snacks: General/healthful diet Enteral/Parenteral Nutrition: Initiate enteral nutrition                GOAL:   Transition to EN vs PO for comfort next 1-3 days    NUTRITION MONITORING AND EVALUATION   Behavioral-Environmental Outcomes: Behavior  Food/Nutrient Intake Outcomes: Enteral/parenteral nutrition intake  Physical Signs/Symptoms Outcomes: Weight/weight change, Electrolyte and renal profile, Glucose profile    Previous Goal Met:   [] Met              [] Progressing Towards Goal              [x] Not Progressing Towards Goal   Previous Recommendations:   [] Implemented          [x] Not Implemented          [] Not Applicable    LEARNING NEEDS (Diet, Food/Nutrient-Drug Interaction):    [x] None Identified   [] Identified and Education Provided/Documented   [] Identified and Pt declined/was not appropriate     Cultural, Mosque, OR Ethnic Dietary Needs:    [x] None Identified   [] Identified and Addressed     [x] Interdisciplinary Care Plan Reviewed/Documented    [x] Discharge Planning: TBD   [] Participated in Interdisciplinary Rounds    NUTRITION RISK:    [x] High              [] Moderate           []  Low  []  Minimal/Uncompromised      Lavern Sotelo V  Dietetic Intern

## 2017-08-31 NOTE — DIABETES MGMT
DTC Progress Note    Recommendations/ Comments: If appropriate, please consider increasing insulin in TPN to 15 units/L. Blood sugars remain elevated with the addition of 10 units/L. Chart reviewed on [address] [place] [address]. Patient is a 80 y.o. male with no noted history of diabetes. A1c:   Lab Results   Component Value Date/Time    Hemoglobin A1c 6.1 08/02/2017 10:21 AM       Recent Glucose Results:   Lab Results   Component Value Date/Time     (H) 08/31/2017 01:20 AM    GLUCPOC 299 (H) 08/31/2017 02:21 PM    GLUCPOC 220 (H) 08/31/2017 06:31 AM    GLUCPOC 199 (H) 08/30/2017 11:36 PM        Lab Results   Component Value Date/Time    Creatinine 1.46 08/31/2017 01:20 AM     Estimated Creatinine Clearance: 35.4 mL/min (based on Cr of 1.46). Active Orders   Diet    DIET NPO        PO intake: No data found. Will continue to follow as needed. Thank you.   Isacc Jones, [address], [address]  Office:  [phone]

## 2017-08-31 NOTE — PROGRESS NOTES
Responded to information that Palliative pt had a Code Blue. Palliative SW Ceasar Roblero and  Charity Donnelly were with family in CCU waiting area. Provided continuity with my presence; providing active listening when Manual Caper went to check on pt. Provided support for continued self-care, especially for Mrs. Karen Quiles. Family spoke about their rethinking the approach to pt's care. Family shared how pt has been through so much. Family seems to be making peace with the understanding that pt may not continue for as long as they might like.  stepped out after Dr. Sly Hankins began talking to the family about what happened. Kat Hernandez will continue to follow as able. Andre Hernandez M.Div.   Palliative  Fellow

## 2017-08-31 NOTE — DISCHARGE SUMMARY
Hospitalist Discharge Note    NAME:  Kailey Ferrera   :   1928   MRN:   [id number]       Admit date: 2017    Discharge Date:    Discharge Diagnoses:    Urinary retention POA S/P removal of bladder stone/hematoma and repair of perforated bladder ()    Hematuria POA    Gastric, intestinal, retroperitoneal pneumatosis post op , required exp lap    Acute respiratory failure with hypoxia  required intubation    S/P PEA arrest 2017 ROSC in 3 minutes    Acute renal failure 17 peak creat 1.69      Hypernatremia Na 157     Candida albicans fungemia      Acute encephalopathy     Acute brain injury 2017      Malnutrition requiring prolonged TPN    Atrial fibrillation with RVR      CAD s/p recent Left circ stent 2017    Chronic diastolic CHF 18-08%      PCP: Brendon Quan MD      Summary of admission H+P(copied from Dr Whitney Chang consult Note ):    REASON FOR CONSULT: Abdominal pain.     HISTORY OF PRESENT ILLNESS: The patient is an 80-year-old   gentleman with a past medical history of coronary artery disease,   status post recent episode of unstable angina and cardiac   catheterization with stent placement on 2017, history of   hypertension and BPH, who presents to the ED with complaints of   hematuria and clotting of his Rosales catheter that was changed prior to   his cardiac catheterization at [facility]. The   patient was seen by Urology, started on antibiotics. Dr. Froylan Almanzar   admitted the patient and apparently the patient had a cystoscopy with   attempted clot evacuation, but that was not successful, so the patient   underwent exploratory laparotomy for bladder stone retrieval and for a   large posterior wall bladder perforation. Surgery was performed on   2017. The nurse called me at around 8 p.m. stating that the   patient is having significant abdominal pain, has had abdominal pain   throughout the day and it is getting worse.  She had called Dr. Joyce Ospina   who had requested the patient to be given Dilaudid 0.5 mg IV and   hospitalist service to be consulted. When I went to evaluate the patient,   the patient seems to be in extreme distress with significant pain, not   providing much history because he is moaning and groaning and does   not answer my questions because of what appears to be abdominal   pain. His wife, who is present at the bedside, reports that the patient   has been getting progressively worse pain with some abdominal   distention. Per patient, he has passed any flatus. He had an episode of   vomiting earlier this morning and has not had a bowel movement since   his surgery. The patient is on clear liquid diet, but has not been eating   or drinking. The nurse reports that as far as she knows, the patient has   not had any bowel movement, neither has he been passing flatus. The   patient complains of pain all over his belly. Reports that he feels that it   is, Katie Bravo is stuck in my belly and I can't pass the gas\". The patient was   also found to have elevated white count .  The patient was evaluated   by Urology earlier today, Dr. Marcela Leahy., who does not have any   significant recommendations .          Hospital course:     Admitted 7/20 to 7/26/2017 unstable angina to cardiology service   Echo LVEF 45 to 50%   Cath with left circ lesion, underwent HORACIO stenting, started on ASA and plavix   Trouble with urinary retention, wick placed, removed, then retained again, so discharged with wick   Levaquin for possible UTI   D/C to home    Admitted 7/27/2017 with hematuria and clotted wick, unable to irrigate bladder despite catheter change  Urology admitted to their service  Plavix and aspirin discontinued, plavix not resumed until 9/4/2017    OR 7/27/2017 urgently for clot removal   Cystoscopy with attempted clot evacuation, not successful   Exp laparotomy with bladder stone 6 x 6 cm and hematoma, noted with large posterior bladder wall perforation    Stone remove and defect repaired    Suprapubic catheter replaced    7/28/2017 Increasing abdominal pain, distension, peritoneal signs   CT scan abdomen/pelvis = soft tissue emphysema in the  abdominal wall, in the retroperitoneum and in the mediastinum. Small, bilateral pneumothoraces which may represent passive transmission as opposed to true lung pathology. Small collection of high attenuation material to the right of the bladder suggesting blood products. Gastric distension    Concern for visceral perforation   OR with Dr Carnes Call    Exp laparotomy     Diffuse gastric pneumatosis ? From gastric distension, pneumatosis of ascending and descending colon and dependent small bowel    Retroperitoneal pneumatosis    No perforation   Post op intubated in ICU    Post-op developed acute renal failure, peak creatinine 1.64  Required pressors initially for hypotension  1 unit pRBCs given    7/31/2017 extubated, pressors weaned  Post-op ileus, remained NPO  Increased troponin 2.35, cardiology followed  Developed atrial fib with RVR, IV cardizem gtt    8/2/2017 slow to arouse, ? Related to IV dilaudid   Increased lower abdominal wound drainage   Head CT negative for acute changes   PRN started    8/3/2017 increased wound drainage, continued fluid drainage from the wound   Surgery followed    8/5/2017 Increased delirium   Aspirin resumed rectally   Increased coughing with abdominal wound dehiscence, SB protruding from the wound   OR with Dr Levi Vidal    Exp laparotomy with wound closure with retention sutures   Post op neosynephrine, intubated    8/7/2017 extubated   Persistent Ileus, NG tube, TPN started    8/11/2017 increased NG tube output, GI consulted   KUB improved ileus   Confused    8/12/2017 RAT call for decreased responsiveness, slurred speech, trouble swallowing   Abrupt change in MS   Head CT negative for acute changes   NH4 33, TSH 3.7, vit B12 1046   Seen by neurology, ?  Anoxic injury   Carotids < 50% ICA stenosis bilaterally   Made NPO by speech   Seen by Dr Melissa Bernstein from neurology    8/17/2017 MRI Brain with motion artifact    Multiple punctate foci of acute infarction in the bilateral corona radiata and      centrum semiovale. Mild chronicmicrovascular ischemic disease. ? Hypoxia and hypotension as cause    8/18/2017 EEG moderately abnormal electroencephalogram    Widespread and generalized slow-wave activity seen    c/w a diffuse encephalopathy of toxic, metabolic or degenerative type. No clear focal process or epileptiform activity was seen. 8/19/2017 Fever   BC with candida albicans in 1/2 bottles   Wound culture with few staph epi   Urine culture negative   pCXR interstitial edema pattern    8/21/17 lethargic, limited speech   Remained NPO on TPN   Currently on room air    8/22/17 CT abdomen/pelvis with IV contast negative for source of abscess   Creatinine   Family upset CT done, fired hospitalist   Creatinine 0.9 to 1.09   Not really talking since this time per family    8/23/17 increased RR, temp 101.1, increased WBC 15,000   ABG on 28% ventimask 7.55/26/84   pCXR interstitial edema ?  HCAP   Repeat BC negative   Urine culture with C albicans     Creatinine increased post CT scan, peaked at 1.79 on 8/30     Anidulafungin IV x 14 days for the C albicans   Zyvox and unasyn started    8/25/17 C difficile positive, IV flagyl started   Became afebrile and WBC decreased    8/27/17 Na increased to 157   Continued on TPN     Pallaitive care followed, discussions re goals of care, remains full code   Discussions re need for PEG   Continued on TPN    8/28/17 Suprapubic catheter fell out, urology followed, wick in place   D5W added to TPN to improve sodium   Opens eyes, not verbal     8/29/2017 Pt transferred to medicine service per urology/administration     8/30/17 increased O2 requirements   pCXR with improved interstitial edema, no focal ASD   pBNP 301   ABG on 50% 7.45/39/62     V/Q low prob for PE   LE dopplers negative for DVT    8/31/2017 increasing O2 requirements, sats mid 80s on 50% ventimask   Eventually required 100% non rebreather facemask   Transferred to ICU   PEA arrest 12:36 pm, ROSC in 3 minutes pending intubation     Intubation with very thick and dried secretions coating his oropharynx. No pressors   IV antibiotics changed to cefepime, vanco, Po flagyl   Completing Anidulafungin     Sputum culture with candida    9/1/2017 ICU   Echo LVEF 55%, no regional wall motion changes, normal RV function    PA pressure 41, no AS, mild MR    9/2/2017 increased stomach distension after TF started at low rate, stopped   KUB no free air or obstruction   Fever to 101.4, prior afebrile > 1 week, WBC count eduardo;l    Lactate 2.0    pCXR Increased density left lung base, ? Infiltrate and/or effusion.     UA > 100 WBC, 1+ bacteria, few squames    Blood culture no growth    Urine Cx negative   Gi following for consideration of PEG        Time spent on discharge:   I spent greater than 30 minutes on discharge, seeing and examining the patient, reconciling home meds and new meds, coordinating care with case management, doing the discharge papers and the D/C summary    Please see my progress note on day of discharge for discharge exam and history    Discharge disposition:     Discharge Condition:     ___________________________________________________    Hospitalist: Felisha Ortiz MD     ___________________________________________________    **Lab Data Reviewed:

## 2017-08-31 NOTE — PROGRESS NOTES
Chart reviewed. Pt transferred to CCU and is s/p code blue and intubation. Will defer PT intervention this date however continue to follow as appropriate. Thank you    Dana Epps.  Bonita Barker DPT

## 2017-08-31 NOTE — PROGRESS NOTES
Unasyn DC'd and switched to cefepime. Have adjusted to cefepime 2 g IV Q12H per protocol for CrCl 30-60.     Thanks,  DENNY Schuster

## 2017-08-31 NOTE — PROGRESS NOTES
1130:  Patient's sats are dropping to mid 80\"s on 50% VM. Suctioned patient and repositioned to sit more upright in bed. Patient had no gag reflex when suctioning. Paged Dr. Karthik Valdez with update. States he will come see patient. 1145:  Patient's sats 85% on 50% VM, RN placed 100% NRB mask. Sats came up to 91%. 1150:  Dr. Karthik Valdez at bedside. Dr. Karthik Valdez went to ICU to speak with Pulmonologist, will return shortly, RN will continue to closely monitor patient.

## 2017-08-31 NOTE — PROCEDURES
[facility]  Pulmonary, Critical Care, and Sleep Medicine    Name: Viraj Sue MRN: [id number]   : 1928 Hospital: [facility]   Date: 2017        Intubation/Code Report    Pt was transferred into the ICU. Pt had agonal repsirations, went into PEA arrest.  Code 1236 code blue was called. Pt had Epinephrine x1. ROSC occurred at 1239. PT was intubated at 1245, preoxygenated. Procedure: Intubation    Indication: Acute respiratory failure, Encephalopathy, poor airway clearance. S/p code blue    Consent/Treatment:Emergent    Anesthesia:   None    Procedure Details:   Pt had agonal respiration, had bradycardia, the went into Pulseless Electrical Activity. Using Glidescope with MAC 4 blade. Oropharynx was noted to have very thick secretions. Suctioned and using Nesha forceps the secretions and coating was removed. Pt has ROSC, had 2 attempts of intubation. Had very thick and dried secretions coating his oropharynx. Once secretions were cleared the vocal cords could be seen, Placed 7.5 ET tube in place. +[id number] changes. Bilateral breath sounds. The airway was cleared as much as possible. Had some bleeding from the oropharynx. Has very thick secretions from the ET tube. CXR was done and confirmed placement of ET tube. Specimens: Will send sputum Cx.        Complications: none    Estimated Blood Loss: Minimal    Orin Harrell MD

## 2017-08-31 NOTE — PROGRESS NOTES
Called to room 2526 for Code Blue. Patient reported with respiratory arrest leading to PEA. See code blue documentation. ROSC @ 1239. Patient intubated @ 0484 31 [phone] 02. Pastoral care with family.

## 2017-08-31 NOTE — PROGRESS NOTES
1420 - Bedside and Verbal shift change report given to Cecilio Dixon, MELISSA (oncoming nurse) by Milagros Almaguer RN (offgoing nurse). Report included the following information SBAR, Intake/Output and Recent Results. 1600 - Assessment complete. 26 - Dr. Argelia Maddox notified of BP (89/44). New orders received for one time 500ml NS Bolus, Albumin and Andre.    1800 - /52 following bolus and albumin. Andre not started at this time. 1845 - Troponin sent to lab.     1921 - Bedside and Verbal shift change report given to Vipul Currie RN (oncoming nurse) by Cecilio Dixon RN (offgoing nurse). Report included the following information SBAR, Kardex, Intake/Output and MAR.

## 2017-08-31 NOTE — PROGRESS NOTES
Palliative Medicine     LCSW visited with Pt's wife, 2 dtrs Diane Common and [place]) and granddtr/Colby's dtr (Michelle, sp?) in waiting room after Code Blue called. Family praying and connecting with other children/family electronically for prayers. Chaplains Sam Cobian and Mindy Beaver came to offer spiritual support. Pt and family deeply spiritual and connected to their [employer] irasema. Dtrs spoke of the lord's will with understanding it is not always what people want. Family appropriately emotional. Chaplains continuing to offer support as family waits to speak with doctors and to visit Pt. LCSW left. LCSW has not provided the Palliative Pt letter written before respiratory event and code blue event, but will drop off later as promised to family with yesterday's conversation. Spoke with Dr. Rodrigo Holland and Dr. Zenobia Wilhelm MD. [facility] with Palliative team. [facility] with CM. Addendum 16:00: LCSW visited Pt. No family in room at present. Left Palliative letters for Silverio and Tita Sorto as explained yesterday. Spoke with Rn (Claudette Person). From 8/30/17 note: LCSW called Pt's dtr, Tita Sorto, and explained we had heard from majority of her siblings so she and her sister, Silverio will be who the health team speaks to for medical decision making regarding Pt's care plan. LCSW offered a palliative meeting with Tita Sorto if she would like as Silverio had expressed desire for no mtg when spoke the other day. Also offered to provide a palliative patient letter of Pt's clinical diagnoses at present to help them see in writing the medical terminology so they have medical info to help them in making informed decisions regarding their father's medical care. It will also contain the goals they communicated on Friday. Manda's response was agreement to the letter and LCSW said will provide 2 copies about midday tomorrow. Tita Sorto will visit in evening tomorrow.  Tita Sorto did not want desire a palliative meeting at this time but she understands she or family can request a meeting or ask questions by contacting Palliative Med #.     Pt's dtr, Danay Bruce, shared that Dr. Paulino Weir, urology, and appreciated his explanations and answering her questions earlier this week. She shared that she likes Dr. Hadley Russell MD, current hospitalist, based on their meeting. She also shared that since the Friday meeting, she appreciates that \"nurses who are familiar with my dad\" are being assigned to provide his care. From 8/29/17 note: LCSW suggested having a meeting on Thurs to review medical situation and decisions (ie, peg); Colby response was to verbalize frustration with meetings because her goal is focused on Pt recovering, ideally to how he was prior to hospitalization. LCSW acknowledged her thoughts and reframed purpose of meetings is to ensure children/as deferred to medical decisions makers have the medical info of Pt to make informed decisions about Pt's ongoing care plan. Pt's dtr was agreeable to having a letter/summary written for her because recognizes importance of information. She said she couldn't speak for Danay Bruce about mtg and deferred LCSW to call her (LCSW will call Danay Bruce on Wed.). From 8/28 note:  LCSW asked (Flo Bryant and Mookie Franco) if family had made decision about PEG tube. Flo Bryant response was to deflect decision because \"believes it would be burden to put pt through it and they want him to overcome 2 issues of c-diff and lung issues. \" Flo Bryant verbalized that they had been weighing peg option over the week. This LCSW recommends main physician (attending) address with family/NOK adult children in simple language (avoid medical jargon) if there is recommendation to explore this intervention sooner than later    From 8/30/17's ACP note: On August 25nd, Pt's wife/NOK again verbalized her deferment medical surrogate decision making to next in line which is their 6 children. As of this morning, Palliative LCSW has heard from 5 of Pt's 6 children.  Xavier Leal and Mounika Duarte have all deferred medical decision making for their father/Pt to their other siblings via email; emails placed on hard chart for EMR. Jarred Gonzales and Yadira Bajwa have verbalized acceptance of responsibility to make medical decisions on behalf of Pt. Palliative team has not heard from Pt's son, Tip Everett. His family has communicated with him on multiple days. At this time, children involved in medical decision making of Pt as deferred to [facility] medical surrogates are Pete Manzano, [phone] and Avtar Boyle, [phone]. Thank you for including Palliative Medicine in Mr. Deven sánchez. Sarah Langley, [address] ([phone])  Work cell: [phone]        Thank you for including Palliative Medicine in Mr. Deven sánchez.       Sarah Langley, [address] ([id number])  Work cell: [phone]

## 2017-08-31 NOTE — CONSULTS
Gastroenterology Consult Note  NAME: Fe Renee : 1928 MRN: [id number]   ATTG: [unfilled] PCP: Susan Holman MD  Date/Time:  2017 12:29 PM  Subjective:   REASON FOR CONSULT:      Radha Doran is a 80 y.o.  male who I was asked to see for feeding diffuculties. He has a very complex  surgery history was being followed by urology and gen surg. Initial surgery was on 17 for a cystoscopy with resultant large bladder perforation requiring Ex Lap and repair of bladder perforation. Subsequently, he  had wound dehiscence and also emphysematous appearing stomach at surgery. We saw him initially for high NG output. CT showed a distended stomach. This improved and we signed off. He has been in house and now is on TPN and cannot take oral food (see SLP records). We were consulted for a possible PEG placement after multiple family discussions. Currently he is very sick with low O2 sats on 50% venti mask and is obtunded. I cannot get any hx from him. RN is at bedside. Past Medical History:   Diagnosis Date    Hypertension     Prostate enlargement       Past Surgical History:   Procedure Laterality Date    ABDOMEN SURGERY PROC UNLISTED      hernia repair    CARDIAC SURG PROCEDURE UNLIST       Social History   Substance Use Topics    Smoking status: Never Smoker    Smokeless tobacco: Never Used    Alcohol use No      History reviewed. No pertinent family history. Allergies   Allergen Reactions    Metoprolol Nausea and Vomiting      Home Medications:  Prior to Admission Medications   Prescriptions Last Dose Informant Patient Reported? Taking? Arginine HCl, L-Arginine, 1,000 mg tab 2017 at Unknown time  Yes Yes   Sig: Take 1,000 mg by mouth daily. IRON, FERROUS SULFATE, PO 2017 at Unknown time  Yes Yes   Sig: Take 30 mg by mouth daily.    Lactobacillus acidophilus (ACIDOPHILUS) cap 2017 at Unknown time  Yes Yes   alpha lipoic acid 200 mg cap 2017 at Unknown time  Yes Yes   Sig: Take 200 mg by mouth daily. ascorbic acid, vitamin C, (VITAMIN C WITH DAVID HIPS) 1,000 mg tablet 2017 at Unknown time  Yes Yes   Sig: Take 1,000 mg by mouth daily. aspirin 81 mg chewable tablet 2017 at Unknown time  No Yes   Sig: Take 1 Tab by mouth daily. atorvastatin (LIPITOR) 20 mg tablet 2017 at Unknown time  No Yes   Sig: Take 1 Tab by mouth nightly. carvedilol (COREG) 3.125 mg tablet 2017 at Unknown time  No Yes   Sig: Take 1 Tab by mouth two (2) times daily (with meals). cholecalciferol, vitamin D3, (VITAMIN D3) 2,000 unit tab 2017 at Unknown time  Yes Yes   Sig: Take 2,000 Units by mouth daily. ciprofloxacin HCl (CIPRO) 500 mg tablet 2017 at Unknown time  No Yes   Sig: Take 1 Tab by mouth daily for 6 days. clopidogrel (PLAVIX) 75 mg tab 2017 at Unknown time  No Yes   Sig: Take 1 Tab by mouth daily. co-enzyme Q-10 (CO Q-10) 100 mg capsule 2017 at Unknown time  Yes Yes   Sig: Take 300 mg by mouth daily. cyanocobalamin, vitamin B-12, (VITAMIN B-12) 5,000 mcg subl 2017 at Unknown time  Yes Yes   Si,000 mg by SubLINGual route daily. finasteride (PROSCAR) 5 mg tablet 2017 at Unknown time  No Yes   Sig: Take 1 Tab by mouth daily. losartan (COZAAR) 50 mg tablet 2017 at Unknown time  No Yes   Sig: Take 1 Tab by mouth daily. magnesium oxide (MAG-OX) 400 mg tablet 2017 at Unknown time  Yes Yes   Sig: Take 400 mg by mouth daily. multivitamin (ONE A DAY) tablet 2017 at Unknown time  Yes Yes   Sig: Take 1 Tab by mouth daily. nystatin (MYCOSTATIN) powder 2017 at Unknown time  No Yes   Sig: Apply  to affected area two (2) times a day for 7 days. oxybutynin chloride XL (DITROPAN XL) 15 mg CR tablet 2017 at Unknown time  Yes Yes   Sig: Take 15 mg by mouth daily.       Facility-Administered Medications: None     Hospital medications:  Current Facility-Administered Medications   Medication Dose Route Frequency    cefepime (MAXIPIME) 2 g in 0.9% sodium chloride (MBP/ADV) 100 mL  2 g IntraVENous Q12H    TPN ADULT - CENTRAL AA 5% D20% W/ CA + ELECTROLYTES   IntraVENous CONTINUOUS    dextrose 5% infusion  100 mL/hr IntraVENous CONTINUOUS    furosemide (LASIX) injection 40 mg  40 mg IntraVENous DAILY PRN    morphine injection 1 mg  1 mg IntraVENous Q4H PRN    metroNIDAZOLE (FLAGYL) IVPB premix 500 mg  500 mg IntraVENous Q8H    albuterol-ipratropium (DUO-NEB) 2.5 MG-0.5 MG/3 ML  3 mL Nebulization Q6H PRN    chlorhexidine (PERIDEX) 0.12 % mouthwash 15 mL  15 mL Oral Q12H    heparin (porcine) injection 5,000 Units  5,000 Units SubCUTAneous Q12H    anidulafungin (ERAXIS) 100 mg in 0.9% sodium chloride 130 mL IVPB  100 mg IntraVENous Q24H    linezolid (ZYVOX) IVPB premix in D5W 600 mg  600 mg IntraVENous Q12H    acetaminophen (TYLENOL) solution 975 mg  975 mg Oral Q6H PRN    Or    acetaminophen (TYLENOL) suppository 650 mg  650 mg Rectal Q6H PRN    bisacodyl (DULCOLAX) suppository 10 mg  10 mg Rectal DAILY PRN    zinc oxide-cod liver oil (DESITIN) 40 % paste   Topical PRN    metoclopramide HCl (REGLAN) injection 5 mg  5 mg IntraVENous Q8H PRN    nitroglycerin (NITROBID) 2 % ointment 1 Inch  1 Inch Topical BID    levalbuterol (XOPENEX) nebulizer soln 0.63 mg/3 mL  0.63 mg Nebulization Q6H PRN    aspirin (ASA) suppository 300 mg  300 mg Rectal DAILY    labetalol (NORMODYNE;TRANDATE) injection 10 mg  10 mg IntraVENous Q6H    hydrALAZINE (APRESOLINE) 20 mg/mL injection 20 mg  20 mg IntraVENous Q4H PRN    labetalol (NORMODYNE;TRANDATE) injection 20 mg  20 mg IntraVENous Q4H PRN    fat emulsion 20% (LIPOSYN, INTRALIPID) infusion 250 mL  250 mL IntraVENous Q MON, WED & FRI    insulin lispro (HUMALOG) injection   SubCUTAneous Q6H    glucose chewable tablet 16 g  4 Tab Oral PRN    dextrose (D50W) injection syrg 12.5-25 g  12.5-25 g IntraVENous PRN    glucagon (GLUCAGEN) injection 1 mg  1 mg IntraMUSCular PRN    sodium chloride (NS) flush 5-10 mL  5-10 mL IntraVENous Q8H    ondansetron (ZOFRAN) injection 4 mg  4 mg IntraVENous Q4H PRN    sodium chloride (NS) flush 10 mL  10 mL InterCATHeter PRN    sodium chloride (NS) flush 10-40 mL  10-40 mL InterCATHeter Q8H     REVIEW OF SYSTEMS:     [x]     Unable to obtain  ROS due to  [x]    mental status change  []    sedated   []    intubated   []    Total of 11 systems reviewed as follows:  Const:  negative fever, negative chills, negative weight loss  Eyes:   negative diplopia or visual changes, negative eye pain  ENT:   negative coryza, negative sore throat  Resp:   negative cough, hemoptysis, dyspnea  Cards:  negative for chest pain, palpitations, lower extremity edema  :  negative for frequency, dysuria and hematuria  Skin:   negative for rash and pruritus  Heme:  negative for easy bruising and gum/nose bleeding  MS:  negative for myalgias, arthralgias, back pain and muscle weakness  Neurolo:  negative for headaches, dizziness, vertigo, memory problems   Psych:  negative for feelings of anxiety, depression     Pertinent Positives include :    Objective:   VITALS:    Visit Vitals    /55 (BP 1 Location: Right arm, BP Patient Position: At rest)    Pulse 66    Temp 97.4 °F (36.3 °C)    Resp 24    Ht 5' 10\" (1.778 m)    Wt 86.9 kg (191 lb 9.3 oz)    SpO2 90%    BMI 27.49 kg/m2     Temp (24hrs), Av.1 °F (36.2 °C), Min:96.2 °F (35.7 °C), Max:97.6 °F (36.4 °C)    PHYSICAL EXAM:     General: High resp rate. Unresponsive  Eyes; Eyes closed  ENMT: Lips unremarkable. Chest:  breath sounds are diffcult to auscultate  Heart: Heart sounds normal, S1,S2  Abdomen: Mild distension. Surgical incision noted.   Lymphatic: NA  Neurologic:Obtunded  Psyc: NA  Extremities: No edema       LAB DATA REVIEWED:    Recent Results (from the past 48 hour(s))   GLUCOSE, POC    Collection Time: 17  5:42 PM   Result Value Ref Range    Glucose (POC) 182 (H) 65 - 100 mg/dL    Performed by REINALDO SORENSON (PCT) CON    GLUCOSE, POC    Collection Time: 08/30/17 12:23 AM   Result Value Ref Range    Glucose (POC) 200 (H) 65 - 100 mg/dL    Performed by Fiona Campbell    GLUCOSE, POC    Collection Time: 08/30/17  5:17 AM   Result Value Ref Range    Glucose (POC) 185 (H) 65 - 100 mg/dL    Performed by Fiona Campbell    CBC WITH AUTOMATED DIFF    Collection Time: 08/30/17  5:24 AM   Result Value Ref Range    WBC 11.7 (H) 4.1 - 11.1 K/uL    RBC 2.94 (L) 4.10 - 5.70 M/uL    HGB 8.5 (L) 12.1 - 17.0 g/dL    HCT 27.0 (L) 36.6 - 50.3 %    MCV 91.8 80.0 - 99.0 FL    MCH 28.9 26.0 - 34.0 PG    MCHC 31.5 30.0 - 36.5 g/dL    RDW 16.7 (H) 11.5 - 14.5 %    PLATELET 159 013 - 672 K/uL    NEUTROPHILS 67 32 - 75 %    LYMPHOCYTES 23 12 - 49 %    MONOCYTES 5 5 - 13 %    EOSINOPHILS 4 0 - 7 %    BASOPHILS 1 0 - 1 %    ABS. NEUTROPHILS 8.0 1.8 - 8.0 K/UL    ABS. LYMPHOCYTES 2.6 0.8 - 3.5 K/UL    ABS. MONOCYTES 0.6 0.0 - 1.0 K/UL    ABS. EOSINOPHILS 0.4 0.0 - 0.4 K/UL    ABS. BASOPHILS 0.1 0.0 - 0.1 K/UL   METABOLIC PANEL, COMPREHENSIVE    Collection Time: 08/30/17  5:24 AM   Result Value Ref Range    Sodium 146 (H) 136 - 145 mmol/L    Potassium 3.6 3.5 - 5.1 mmol/L    Chloride 111 (H) 97 - 108 mmol/L    CO2 31 21 - 32 mmol/L    Anion gap 4 (L) 5 - 15 mmol/L    Glucose 183 (H) 65 - 100 mg/dL    BUN 46 (H) 6 - 20 MG/DL    Creatinine 1.79 (H) 0.70 - 1.30 MG/DL    BUN/Creatinine ratio 26 (H) 12 - 20      GFR est AA 44 (L) >60 ml/min/1.73m2    GFR est non-AA 36 (L) >60 ml/min/1.73m2    Calcium 7.7 (L) 8.5 - 10.1 MG/DL    Bilirubin, total 0.5 0.2 - 1.0 MG/DL    ALT (SGPT) 46 12 - 78 U/L    AST (SGOT) 42 (H) 15 - 37 U/L    Alk.  phosphatase 174 (H) 45 - 117 U/L    Protein, total 7.1 6.4 - 8.2 g/dL    Albumin 1.6 (L) 3.5 - 5.0 g/dL    Globulin 5.5 (H) 2.0 - 4.0 g/dL    A-G Ratio 0.3 (L) 1.1 - 2.2     NT-PRO BNP    Collection Time: 08/30/17  5:24 AM   Result Value Ref Range    NT pro- 0 - 450 PG/ML   TROPONIN I    Collection Time: 08/30/17  5:24 AM   Result Value Ref Range    Troponin-I, Qt. <0.04 <0.05 ng/mL   GLUCOSE, POC    Collection Time: 08/30/17 11:52 AM   Result Value Ref Range    Glucose (POC) 217 (H) 65 - 100 mg/dL    Performed by Haily Castillo    GLUCOSE, POC    Collection Time: 08/30/17  5:52 PM   Result Value Ref Range    Glucose (POC) 171 (H) 65 - 100 mg/dL    Performed by Haily Castillo    BLOOD GAS, ARTERIAL    Collection Time: 08/30/17  8:53 PM   Result Value Ref Range    pH 7.45 7.35 - 7.45      PCO2 39 35.0 - 45.0 mmHg    PO2 62 (L) 80 - 100 mmHg    O2 SAT 93 92 - 97 %    BICARBONATE 26 22 - 26 mmol/L    BASE EXCESS 2.1 mmol/L    O2 METHOD VENTURI MASK      FIO2 50 %    Sample source ARTERIAL      SITE RIGHT RADIAL      KAREN'S TEST YES     GLUCOSE, POC    Collection Time: 08/30/17 11:36 PM   Result Value Ref Range    Glucose (POC) 199 (H) 65 - 100 mg/dL    Performed by Shira Cabrera    CBC WITH AUTOMATED DIFF    Collection Time: 08/31/17  1:20 AM   Result Value Ref Range    WBC 11.2 (H) 4.1 - 11.1 K/uL    RBC 3.20 (L) 4.10 - 5.70 M/uL    HGB 9.2 (L) 12.1 - 17.0 g/dL    HCT 29.3 (L) 36.6 - 50.3 %    MCV 91.6 80.0 - 99.0 FL    MCH 28.8 26.0 - 34.0 PG    MCHC 31.4 30.0 - 36.5 g/dL    RDW 16.6 (H) 11.5 - 14.5 %    PLATELET 694 872 - 761 K/uL    NEUTROPHILS 68 32 - 75 %    LYMPHOCYTES 21 12 - 49 %    MONOCYTES 5 5 - 13 %    EOSINOPHILS 6 0 - 7 %    BASOPHILS 0 0 - 1 %    ABS. NEUTROPHILS 7.7 1.8 - 8.0 K/UL    ABS. LYMPHOCYTES 2.3 0.8 - 3.5 K/UL    ABS. MONOCYTES 0.5 0.0 - 1.0 K/UL    ABS. EOSINOPHILS 0.6 (H) 0.0 - 0.4 K/UL    ABS.  BASOPHILS 0.0 0.0 - 0.1 K/UL   METABOLIC PANEL, BASIC    Collection Time: 08/31/17  1:20 AM   Result Value Ref Range    Sodium 144 136 - 145 mmol/L    Potassium 3.7 3.5 - 5.1 mmol/L    Chloride 109 (H) 97 - 108 mmol/L    CO2 32 21 - 32 mmol/L    Anion gap 3 (L) 5 - 15 mmol/L    Glucose 183 (H) 65 - 100 mg/dL    BUN 43 (H) 6 - 20 MG/DL    Creatinine 1.46 (H) 0.70 - 1.30 MG/DL BUN/Creatinine ratio 29 (H) 12 - 20      GFR est AA 55 (L) >60 ml/min/1.73m2    GFR est non-AA 45 (L) >60 ml/min/1.73m2    Calcium 8.0 (L) 8.5 - 10.1 MG/DL   MAGNESIUM    Collection Time: 08/31/17  1:20 AM   Result Value Ref Range    Magnesium 2.3 1.6 - 2.4 mg/dL   PHOSPHORUS    Collection Time: 08/31/17  1:20 AM   Result Value Ref Range    Phosphorus 3.3 2.6 - 4.7 MG/DL   GLUCOSE, POC    Collection Time: 08/31/17  6:31 AM   Result Value Ref Range    Glucose (POC) 220 (H) 65 - 100 mg/dL    Performed by Sandra Rodgers      IMAGING RESULTS:   []      I have personally reviewed the actual   []    CXR  []    CT  []     US    Recommendations/Plan:    Feeding difficulties     Active Problems:    Bladder injury (7/28/2017)      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)      Altered mental status, unspecified (8/15/2017)      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)      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 He may not be a good PEG placement candidate to begin with for a dilated stomach (on CT) with emphysematous changes as noted during surgery. · He is being transferred to the ICU. V/Q scan result is pending. · I spoke with his RN in detail. · I left a phone message for Dr Jone Lara. · We will follow with you. · Guarded prognosis. Thank you for entrusting me with this patient's care.  Please do not hesitate to contact me with any questions or if I can be of assistance with this patient or any of your other patients' GI needs. Discussed Code Status:    [x]    Full Code      []    DNR    ___________________________________________________  Care Plan discussed with:    []    Patient   []    Family   [x]    Nursing   []    Attending      ___________________________________________________  GI: Boris Hernandez MD

## 2017-08-31 NOTE — PROGRESS NOTES
Responded to Code blue called for patient. Provided support to patient's wife, two daughters and granddaughters in CCU waiting area. Daughter led a prayer for healing for her father. Offered presence. Wife shared that she firmly believes that God will take her  when God is ready and finds comfort in her belief of eternity. Presence and support offered. Palliative care  Layman Spare arrived; pastoral care will continue to follow. [phone]. Visit by: Rajendra Wills. Guadalupe Roman.  Deana Martinez MA, [facility]    Lead  Profession Development & Advancement

## 2017-08-31 NOTE — PROGRESS NOTES
Dear Daron Lopez and Shaggy Bell,    The Palliative Medicine team was consulted as part of your / your loved one's care in the hospital. Our team is a supportive service; we strive to relieve suffering and improve quality of life. The focus of our consultation was support and goals of care conversations. We reviewed advance care planning information, which includes the following:  Advance Care Planning 8/28/2017   Patient's Healthcare Decision Maker is: Legal Next of Kin   Primary Decision Maker Name Robert Canales deferred to children & 3 deferred   Primary Decision Maker Phone Number Vianney, [phone]; Allyson Rivera [phone]   Primary Decision Maker Relationship to Patient Adult child   Confirm Advance Directive None   Patient Would Like to Complete Advance Directive Unable     Acting on your father's behalf, you have shared his treatment goals are:  1. 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 2. During this hospitalization, you have conversed with providers about your fathers preference for attempts at resuscitation (often referred to as CPR/Cardiac Pulmonary Resuscitation) if/when heart or breathing stops in the future. If you have questions or would like facts about CPR/intubation then please speak with any of your health team members, including Palliative Medicine. 3. In conversations, you indicated it would be helpful to have Brother Matt's current medical conditions written:   Current medical conditions    Acute encephalopathy (altered mental status)  -MRI brain showed multiple areas of acute infarction   -EEG was also done.   Its results combined w/ MRI results are consistent with hypoxic injury to the brain (injury to the brain resulting from significant decrease in blood flow)    Weakness and difficulty with feeding  Requiring nutritional support  -currently on Total parenteral nutrition (TPN)  -decision will need to be made soon whether or not to proceed with Percutaneous endoscopic gastrostomy (PEG) tube placement. For full, restorative measures and for plan of going home, this is highly recommended. Sepsis- bodys response to infection. This response includes fever, elevated/fast heart rate, and elevated/high white blood cell count  -assessed as due to urinary tract infection (UTI) and/or pneumonia  Chest X-ray on 8/23 was concerning for possible developing infection  -we have continued antibiotics    Perforated bladder that was repair surgically; removal of bladder stone. Suprapubic tube was displaced and not replaced. Exploratory laparotomy on 7/29. There was dehiscence of surgical wound on 8/5 and this was addressed by general surgery team.     Clostrium difficile (C diff) infection requiring antibiotic treatment    Acute kidney injury    Coronary artery disease and paroxysmal atrial fibrillation w/ rapid ventricular response      If you have any questions about the above information, please contact the Palliative Medicine team at [phone]. We are also going to continue to be available for support and family meetings.     TAHIR Esqueda

## 2017-08-31 NOTE — PROGRESS NOTES
Hospitalist Progress Note    NAME: Viraj Sue   :  1928   MRN:  [id number]         Assessment / Plan:  Acute respiratory failure with hypoxia   Respiratory status declining x days  Now on 100% O2 and sats in low 90s, tachypneic and minimally responsive  pCXR yesterday with improved edema, no ASD or edema  pBNP was 301  ABG last PM 7.45/39/62 on 50% O2  Completing treatment for ? pneumonia on IV antibiotics  No recent fevers and WBC decreasing  Broaden antibiotics to vancomycin, cefepime, flagyl  Check repeat CXR and ABG  Nebs  Transfer to ICU  D/W pulmonary, they will see in consultation    Acute renal failure likely contrast dye nephropathy and diuretics  Hypernatremia POA resolved  Continues on TPN, received addition D5W, will stop now that Na is normal  Held diuretics   Creatinine increased within day after contrasted CT scan done 17  Peak creatinine 1.79, prior had been 0.9 to 1.0 for days  Hold nephrotoxins  Improving    Candida albicans fungemia   On eraxis , currently day   Not clear that line was changed, should consider replacing the PICC    Acute encephalopathy POA  Acute brain injury due to possible anoxia and   Baseline alert, livers independently  Confusion during   Not talking much for past week per family  MRI of brain  multiple punctate foci of acute infarction in the  bilateral corona radiata and centrum semiovale. Background of mild chronic  microvascular ischemic disease. The result was discussed with the family by Dr. Valerie Parikh  Neuro felt findings c/w hypoxic injury and multiple small infarcts as the cause of encephalopathy and confusion.   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 Difficulty with feeding  Will need PEG once respiratory issues are improved  Continue TPN    Atrial fibrillation currently in NSR, off the cardizem gtt     CAD s/p recent Left circ stent 7/7239  Chronic diastolic CHF 87-21%  ASA  Has not taken plavix since admit  Last troponin was 0.04  Needs to resume plavix ASAP    DVT prophylaxis: Lovenox      Code status: Full code      NOK: palliative care team following - need to further address if majority of children for medical decisions or all other 4 children deferring to Ziggy Puri and Domonique Angela at [facility] last night with daughters Karly Horn and Clarisse Ahumada re care, events and testing ordered, spoke with Karly Horn today re ICU transfer      Body mass index is 28.88 kg/(m^2). Recommended Disposition: family wants to bring home         Subjective:     Chief Complaint / Reason for Physician Visit f/u acute respiratory failure  More tachypneic overnight and this AM,  Now requiring 100% non rebreather to main babs  Will stir when stimulated, but not opening eyes or following commands  Discussed with RN events overnight. Review of Systems:  Symptom Y/N Comments  Symptom Y/N Comments   Fever/Chills    Chest Pain     Poor Appetite    Edema     Cough    Abdominal Pain     Sputum    Joint Pain     SOB/CLARKE    Pruritis/Rash     Nausea/vomit    Tolerating PT/OT     Diarrhea    Tolerating Diet     Constipation    Other       Could NOT obtain due to: Non verbal     Objective:     VITALS:   Last 24hrs VS reviewed since prior progress note.  Most recent are:  Patient Vitals for the past 24 hrs:  Patient Vitals for the past 24 hrs:   Temp Pulse Resp BP SpO2   08/31/17 1200 97.4 °F (36.3 °C) 66 24 125/55 90 %   08/31/17 0749 97.5 °F (36.4 °C) 78 20 175/80 98 %   08/31/17 0600 - 75 - 160/66 97 %   08/31/17 0302 96.2 °F (35.7 °C) 76 22 166/77 99 %   08/30/17 2328 96.6 °F (35.9 °C) 76 22 155/73 96 %   08/30/17 2325 - - - 155/73 -   08/30/17 2008 97.6 °F (36.4 °C) 78 20 156/73 93 %   08/30/17 1600 97.2 °F (36.2 °C) 76 18 148/67 92 %         Intake/Output Summary (Last 24 hours) at 08/31/17 1229  Last data filed at 08/31/17 0749   Gross per 24 hour   Intake          5307.14 ml   Output             3350 ml   Net          1957.14 ml        PHYSICAL EXAM:  General:                    WD, WN. Lethargic and tachypneic, not opening eyes  EENT:                        EOMI. Anicteric sclerae. MM moist  Neck:   No meningismus  Resp:                         Decreased BS bilaterally, Prolonged expiration, no wheezing or rales.                                       No accessory muscle use  CV:                            Regular  rhythm,  No edema  GI:                             Soft, obese Non distended, Non tender.  +Bowel sounds                                   Rosales wound c/d/i no erythema  LN:   No cervical or inguinal WILLIAM  Neurologic:               Lethargic, not communicative, not following commands  Psych:                       Poor insight. Not anxious nor agitated  Skin:                           No rashes.  No jaundice     Reviewed most current lab test results and cultures  YES  Reviewed most current radiology test results   YES  Review and summation of old records today    NO  Reviewed patient's current orders and MAR    YES  PMH/ reviewed - no change compared to H&P  ________________________________________________________________________  Care Plan discussed with:    Comments   Patient x    Family  a Wife and Daughter in room   RN x    Care Manager     Consultant  a PULMONARY                     Multidiciplinary team rounds were held today with , nursing, pharmacist and clinical coordinator.   Patient's plan of care was discussed; medications were reviewed and discharge planning was addressed. ________________________________________________________________________  Total NON critical care TIME:  #5   Minutes    Total CRITICAL CARE TIME Spent:   Minutes non procedure based      Comments   >50% of visit spent in counseling and coordination of care x    ________________________________________________________________________  Ada Goodman MD     Procedures: see electronic medical records for all procedures/Xrays and details which were not copied into this note but were reviewed prior to creation of Plan. LABS:  I reviewed today's most current labs and imaging studies.   Pertinent labs include:  Recent Labs      08/31/17   0120  08/30/17   0524  08/29/17   0025   WBC  11.2*  11.7*  12.3*   HGB  9.2*  8.5*  9.0*   HCT  29.3*  27.0*  28.7*   PLT  274  256  307     Recent Labs      08/31/17   0120  08/30/17   0524  08/29/17   0025   NA  144  146*  153*   K  3.7  3.6  3.6   CL  109*  111*  115*   CO2  32  31  33*   GLU  183*  183*  223*   BUN  43*  46*  53*   CREA  1.46*  1.79*  1.78*   CA  8.0*  7.7*  8.0*   MG  2.3   --   2.2   PHOS  3.3   --   4.0   ALB   --   1.6*   --    TBILI   --   0.5   --    SGOT   --   42*   --    ALT   --   46   --        Signed: Ada Goodman MD

## 2017-08-31 NOTE — PROGRESS NOTES
1220 Report received from [facility] PCU) on pt with decreasing SAO2 and respiratory distress. 1236 Upon arrival to unit pt. With 100 % NRM in used. Eyes closed bedside report update in progress. Nurse reports unresponsiveness for several days. Respirations shallow and SAO2 low 60's and decreasing Code Blue initiaited. 1240 ROSC obtained and pt. Intubated with 7.5 23 @ lip. Large amounts of thick hardened tan /blood tinged particles remove from mouth. Thick jacques suctioned via ETT. Abdomen distended but semi-soft incision line clean and dry with retention sutures and stitches. Left lower abdomen with open area ? Previous removal of supra-pubic. Dual assessment performed by NIKI Moreland RN on arrival.  Penis and scrotum area with redness and excoriation, penis enlarged with 2 scab over areas noted above tip. Mottle of knees noted on arrival right > left . Profo boot on left lower extremity. Rosales patent with hazy urine. Urometer changed. Pupils unequal right > left 2/3. Does not open eyes or follow commands  Slight movement of arms to painful stimuli. 1400 12 Fr. NG placed right nare approximately 70 at tip, moving hands upward in attempts to remove NG and movement of head in response to ? discomfort with placement. Attempted OG  Unsuccessfully. Continue to remove  thick chunks of particles as previously described. 1515 Complete hygiene provided with linens changed small particle of soft brown stool removed from rectum. Feet are cool with faint doppler pulses. ABG resulted. Vent settings as recorded. Report given to MELY Means RN vis SBAR and bedside format. sputnum culture obtain.

## 2017-08-31 NOTE — PROGRESS NOTES
Noted that patient has been transferred from PCU to CCU 2/2 to a decline in his medical status. Will defer OT at this time, but continue to follow.

## 2017-09-01 NOTE — DIABETES MGMT
DTC Progress Note    Recommendations/ Comments: If appropriate, please consider increasing insulin in TPN to 15 units/L. Blood sugars remain elevated with the addition of 10 units/L. Chart reviewed on [address] [place] [address]. Patient is a 80 y.o. male with no noted history of diabetes. A1c:   Lab Results   Component Value Date/Time    Hemoglobin A1c 6.1 08/02/2017 10:21 AM       Recent Glucose Results:   Lab Results   Component Value Date/Time     (H) 09/01/2017 03:20 AM    GLUCPOC 187 (H) 09/01/2017 05:54 AM    GLUCPOC 165 (H) 08/31/2017 11:13 PM    GLUCPOC 205 (H) 08/31/2017 05:55 PM        Lab Results   Component Value Date/Time    Creatinine 1.61 09/01/2017 03:20 AM     Estimated Creatinine Clearance: 35.1 mL/min (based on Cr of 1.61). Active Orders   Diet    DIET NPO        PO intake: No data found. Will continue to follow as needed. Thank you.   Kedric Brittle, [address] [place] [address], [address]  Office:  [phone]

## 2017-09-01 NOTE — PROGRESS NOTES
Chart reviewed. Pt currently intubated, sedated, and not appropriate for participation in PT intervention. Will defer at this time however continue to follow. Thank you. Elisa Bain.  MEG PatelT

## 2017-09-01 NOTE — PROGRESS NOTES
[facility] Consult Service Progress NOTE  Pulmonary, Critical Care, and Sleep Medicine    Name: David Blizzard MRN: [id number]   : 1928 Hospital: [facility]   Date: 2017  Admission Date: 2017     Chart and notes reviewed. Data reviewed. Pt seen on rounds earlier today. I have evaluated and examined the patient. Pt is acutely ill. Reviewed the evaluation and will assist in implementing the plan as outlined by Dr. Tee Judge and team    Hospital Day: 40    I was asked by Kate aCrmona MD to see David Blizzard in consultation for a chief complaint of Acute respiratory failure with hypoxia.  transient drop in BP last evening but pressors not requireed. Still critcally ill on vent. Sedated with propofol. Restless and agitated when lightened. No fever. D/w .  Pt moved to CCU with agonal respirations and rapid PEA arrest. Emergently intubated noting thick, dried irregular layers of respiratory secretions that caused upper airway obstruction. MacGill forceps needed to tease away the debris and expose the glottis, cords. Pt was transferred from CCU to floor on  after a complicated CCU stay. Initial surgery was on 17 for a cystoscopy with resultant large bladder perforation requiring Ex Lap and repair of bladder perforation. Post op he had atrial Fib RVR, NSTEMI. Had a period of agitation, HTN. Pt was on a course of Abx for sepsis, wound infection. Subsequently, he had wound dehiscence . Pt eventually had return of bowel function but failed clear liquids due to dysphagia. On  has RRT call for AMS. Had negative head Ct but on  had MRI and Neurology consult. 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 EEG was slowed and he remained hypersomnolent. On  he developed a fever, hyperventilation and abnormal CXR. After empiric abx, fever resolved but he had hypernatremia.  All sutures removed with good wound healing. Suprapubic catheter removed. Pt has been followed closely by primary team, Palliative Care, Urology and Surgery. I visited with Pt's wife, 2 dtrs Tony Iron and Asiya and granddtr/Colby's dtr in waiting room after Code Blue called. Pt's management was discussed in detail They expressed their concerns and I tried to answer their questions. His heart did stop for a few minutes and it remains to be seen how this might affect his recovery. He is stil critically ill and has been through a lot. They accept that pt will need a PEG for support. IMPRESSION:   1. Acute respiratory failure with hypoxia s/p emergent intubation  2. PEA arrest from rspiratory insufiiciency due to   3. Upper airway obstruction from dry, thick coating of supraglottic secretions which covered his cords; mouth breathing dried his airway and mucous with poor clearance of secretions  4. Acute encephalopathy from hypoxia per MRI and Neurology evaluation; hypersomnolent with level of alertness very poor for past 10-14 days  5. Dysphagia  6. Protein calorie malnutrition on TPN  7. C Diff 8/25 on IV Flagyl  8. Complicated Enterococcal UTI- sensitive to Amp  9. NSTEMI demand ischemia  10. CAD recent stents per [facility] July 2017, needs plavix to be restarted  11. PAF Cardiology following  12. Pneumonia??: last xray 8/23   13. Postop respiratory failure, Extubated second time on 8-7 after Midline abdominal incision fascial wound dehiscence 8/5/17 Exploratory laparotomy with closure of midline incision using retention sutures per Dr. Mukesh Pitt following. 14. S/P removal of bladder stone and repair of perforated bladder POA  15. S/P subsequent ex-lap due to extensive sq emphysema in abdomen/retroperitoneum - no sign of bowel perforation  16. Pt is critically ill and at high risk of end organ dysfunction and failure  17. Critical care time with pt exclusive of procedures  35    minutes      RECOMMENDATIONS/PLAN:   1. CCU  2.  Vent over the weekend but lighten sedation and reorient  3. will ask GI to consider PEG while on vent and before we resume Plavix  4. If no PEG now, will start TF and see if tolerated  5. Oral care  6. Sputum for culture  7. NG tube  8. Add PO vancomycin  9. abx per Itzel Ashby - looks like will be completing course soon but sputum looks ominous  10. No Ativan per family request  11. Discuss any scan with family before ordering  12. renew TPN for now  13. replete lytes  14. PICC  15. DVT, SUP prophylaxis  16. Patient requiring restraints due to threat of injury to self, interference with medical devices. 17. Will be available to assist in medical management while in the CCU pending disposition     ICU disposition :Unchanged. Updated Family. Time above. Risk of deterioration: high   [x] High complexity decision making was performed   [x] See my orders for details  Tubes:   Rosales and Intubated/Vent    Code: Full Code        Hemodynamics:    CO:    CI:    CVP: CVP (mmHg): 8 mmHg (17 0800)  SVR:   PAP Systolic:    PAP Diastolic:    PVR:    LM37:        Ventilator Settings:      Mode Rate TV Press PEEP FiO2 PIP Min.  Vent   Assist control    500 ml 5 cm H2O  6 cm H20 50 %  18 cm H2O  15.1 l/min      Vital Signs: Intake/Output: Intake/Output:   Visit Vitals    /56 (BP 1 Location: Right arm, BP Patient Position: At rest)    Pulse 69    Temp 99 °F (37.2 °C)    Resp 25    Ht 5' 10\" (1.778 m)    Wt 90 kg (198 lb 6.6 oz)    SpO2 100%    BMI 28.47 kg/m2         O2 Device: Endotracheal tube  O2 Flow Rate (L/min): 12 l/min  Wt Readings from Last 4 Encounters:   17 90 kg (198 lb 6.6 oz)   08/15/17 94.2 kg (207 lb 10.8 oz)   17 95.8 kg (211 lb 3.2 oz)   17 97.7 kg (215 lb 6.2 oz)       Temp (24hrs), Av.1 °F (37.3 °C), Min:97.4 °F (36.3 °C), Max:100.6 °F (38.1 °C)     Intake/Output Summary (Last 24 hours) at 17 09  Last data filed at 17 0800   Gross per 24 hour   Intake           3479.3 ml Output             1520 ml   Net           1959.3 ml     Last shift:      09/01 0701 - 09/01 1900  In: 93.4 [I.V.:93.4]  Out: -   Last 3 shifts: 08/30 1901 - 09/01 0700  In: 6369.2 [I.V.:6309.2]  Out: 6662 [Urine:3160]     Telemetry:    normal sinus rhythm    Physical Exam:    General: severely ill elderly WM   HEENT: intubated; dry mucosa   Neck: No abnormally enlarged lymph nodes. Chest: normal   Lungs: rhonchi   Heart: Regular rate and rhythm   Abdomen: soft, non-tender, without masses or organomegaly   :    Extremity: negative, clubbing;    Neuro: obtunded   Psych: unresponsive    Skin: Pallor and Turgor absent;   Pulses: Bilateral, Radial, 2+ Normal upper and lower extremity pulses   Capillary refill: normal brisk capillary refill;    DATA:    Interval lab and diagnostic data was reviewed. Interval radiology images were independently viewed and available reports were reviewed. All lab results for the last 24 hours reviewed.       MAR reviewed and pertinent medications noted or modified as needed    MEDS:   Current Facility-Administered Medications   Medication    cefepime (MAXIPIME) 2 g in 0.9% sodium chloride (MBP/ADV) 100 mL    propofol (DIPRIVAN) infusion    TPN ADULT - CENTRAL AA 5% D20% W/ CA + ELECTROLYTES    vancomycin 50 mg/mL oral solution (compounded) 250 mg    PHENYLephrine (NEOSYNEPHRINE) 30,000 mcg in 0.9% sodium chloride 250 mL infusion    furosemide (LASIX) injection 40 mg    morphine injection 1 mg    metroNIDAZOLE (FLAGYL) IVPB premix 500 mg    albuterol-ipratropium (DUO-NEB) 2.5 MG-0.5 MG/3 ML    chlorhexidine (PERIDEX) 0.12 % mouthwash 15 mL    heparin (porcine) injection 5,000 Units    anidulafungin (ERAXIS) 100 mg in 0.9% sodium chloride 130 mL IVPB    linezolid (ZYVOX) IVPB premix in D5W 600 mg    acetaminophen (TYLENOL) solution 975 mg    Or    acetaminophen (TYLENOL) suppository 650 mg    bisacodyl (DULCOLAX) suppository 10 mg    zinc oxide-cod liver oil (DESITIN) 40 % paste    metoclopramide HCl (REGLAN) injection 5 mg    levalbuterol (XOPENEX) nebulizer soln 0.63 mg/3 mL    aspirin (ASA) suppository 300 mg    labetalol (NORMODYNE;TRANDATE) injection 10 mg    hydrALAZINE (APRESOLINE) 20 mg/mL injection 20 mg    labetalol (NORMODYNE;TRANDATE) injection 20 mg    fat emulsion 20% (LIPOSYN, INTRALIPID) infusion 250 mL    insulin lispro (HUMALOG) injection    glucose chewable tablet 16 g    dextrose (D50W) injection syrg 12.5-25 g    glucagon (GLUCAGEN) injection 1 mg    sodium chloride (NS) flush 5-10 mL    ondansetron (ZOFRAN) injection 4 mg    sodium chloride (NS) flush 10 mL    sodium chloride (NS) flush 10-40 mL        Labs:    Recent Labs      09/01/17 0320 08/31/17   0120 08/30/17   0524   WBC  9.4  11.2*  11.7*   HGB  7.9*  9.2*  8.5*   PLT  213  274  256     Recent Labs      09/01/17 0320 08/31/17   0120  08/30/17   0524   NA  141  144  146*   K  3.9  3.7  3.6   CL  110*  109*  111*   CO2  25  32  31   GLU  170*  183*  183*   BUN  51*  43*  46*   CREA  1.61*  1.46*  1.79*   CA  7.4*  8.0*  7.7*   MG   --   2.3   --    PHOS   --   3.3   --    ALB   --    --   1.6*   SGOT   --    --   42*   ALT   --    --   46     Recent Labs      08/31/17   1330  08/30/17   2053   PH  7.28*  7.45   PCO2  52*  39   PO2  199*  62*   HCO3  24  26   FIO2  80  50     Recent Labs      09/01/17 0320 08/31/17   1839  08/30/17   0524   TROIQ  0.12*  0.12*  <0.04     Lab Results   Component Value Date/Time     08/24/2017 12:40 AM          Imaging:  [x]                           I have personally reviewed the patients radiographs and reports:    I have provided   35  minutes of critical care time rendering care exclusive of any procedures.  During this entire length of time I was immediately available to the patient.      The reason for providing this level of medical care was due to a critical illness that impaired one or more vital organ systems, such that there was a high probability of imminent or life threatening deterioration in the patient's condition. Pt's condition is unstable and unpredictable. This care involved high complexity decision making which includes reviewing the patient's past medical records, current laboratory results, medications that were immediately available to me and actual Xray films in order to assess, support vital system function, and to treat this degree of vital organ system failure, and to prevent further life threatening deterioration of the patients condition.     Kwasi Poon MD

## 2017-09-01 NOTE — PROGRESS NOTES
Palliative Medicine     IDT with palliative team this morning. Visited with Pt's dtr (Kenia Barrett) in [facility] as Pt's wife was speaking with Palliative  Fellow Darian Baron). Kenia Barrett said her father is resting. She says the plan is to allow Pt to rest for a few days; she describes Pt as \"a fighter. \" Kenia Barrett acknowledged receiving the Palliative Pt letter. Kenia Barrett returned to speaking with [place] (Rn). Addendum 12:05: LCSW visited Pt. Provided supportive presence by holding hand and sharing encouraging words. Read out loud one of the Psalms from his booklet as recall Pt's dtr saying he appreciates hearing scripture. Palliative team will continue to follow for psychosocial support during this hospitalization. Addendum 13:30: this afternoon, Pt's dtr Mauriciojose Beyer) and grandson spoke with LCSW in [facility]. LCSW shared about her visit with Pt earlier. Mariama Portillo shared she appreciated how this LCSW handled sharing about the news of the code blue yesterday. They felt supported by health team during that difficult event. Mariama Portillo shared that Pt appears to be resting well today and plan is for him to rest this weekend and then consider PEG placement early next week. Family is taking turns visiting Pt today for short period of time. Palliative SW will visit again next Tuesday. Thank you for including Palliative Medicine in Mr. Fadumo sánchez.       Melody Morales, [address] ([id number])  Work cell: [phone]

## 2017-09-01 NOTE — PROGRESS NOTES
Nutrition Assessment:    RECOMMENDATIONS:   Initiate trophic feeds via NGT:   TwoCal HN @ 10mL/h + 1 packet Prosource BID + 100mL H2O flush q 4h (provides 600kcals/50gPro/768mL)     Recommend weaning TPN as TF is tolerated and titrated up    ASSESSMENT:   Chart reviewed, case discussed during CCU rounds. Pt coded yesterday and is now intubated. Needs re-estimated. Pt sedated on propofol @ 10.2mL/h which provides 274 kcals daily. NGT to suction. Discussed with milvia Marcos to start trophic feeds as PEG is on hold till next week. Pt remains on TPN, recommend weaning. Labs reviewed. BM noted on 8/31. Dietitians Intervention(s)/Plan(s): Initiate trickle feeds, wean TPN  SUBJECTIVE/OBJECTIVE:   Pt intubated and sedated   Diet Order: NPO  % Eaten:  No data found. to suction at   flush with       via NG Tube     Pertinent Medications:eraxis, cefepime, humalog, zyvox, flagyl, vancomcyin; Drips: propofol. Chemistries:  Lab Results   Component Value Date/Time    Sodium 141 09/01/2017 03:20 AM    Potassium 3.9 09/01/2017 03:20 AM    Chloride 110 09/01/2017 03:20 AM    CO2 25 09/01/2017 03:20 AM    Anion gap 6 09/01/2017 03:20 AM    Glucose 170 09/01/2017 03:20 AM    BUN 51 09/01/2017 03:20 AM    Creatinine 1.61 09/01/2017 03:20 AM    BUN/Creatinine ratio 32 09/01/2017 03:20 AM    GFR est AA 49 09/01/2017 03:20 AM    GFR est non-AA 41 09/01/2017 03:20 AM    Calcium 7.4 09/01/2017 03:20 AM    Albumin 1.6 08/30/2017 05:24 AM      Anthropometrics: Height: 5' 10\" (177.8 cm) Weight: 90 kg (198 lb 6.6 oz)    IBW (%IBW):   ( ) UBW (%UBW):   (  %)    BMI: Body mass index is 28.47 kg/(m^2). This BMI is indicative of:  []Underweight   []Normal   [x]Overweight   [] Obesity   [] Extreme Obesity (BMI>40)  Estimated Nutrition Needs (Based on): 2127 Kcals/day (PSU (MSJ 2652)) , 88 g (1g/kg bw) Protein  Carbohydrate:  At Least 130 g/day  Fluids: 2100 mL/day    Last BM: 8/31   [x]Active     []Hyperactive  []Hypoactive [] Absent   BS  Skin:    [] Intact   [] Incision  [x] Breakdown (PU-sacrum, no stage?)  [] DTI   [] Tears/Excoriation/Abrasion  [x]Edema(+1-generalized; nonpitting-BUE; trace-BLE) [] Other: Wt Readings from Last 30 Encounters:   08/31/17 90 kg (198 lb 6.6 oz)   08/15/17 94.2 kg (207 lb 10.8 oz)   07/26/17 95.8 kg (211 lb 3.2 oz)   03/26/17 97.7 kg (215 lb 6.2 oz)   11/03/16 98.6 kg (217 lb 6 oz)   03/17/15 97.2 kg (214 lb 4.8 oz)   10/02/14 90.7 kg (200 lb)   05/16/13 96.9 kg (213 lb 10 oz)   02/10/13 99.5 kg (219 lb 5.7 oz)   04/02/12 96.1 kg (211 lb 13.8 oz)   03/13/11 96.8 kg (213 lb 6.5 oz)      NUTRITION DIAGNOSES:   Problem:  Swallowing difficulty      Etiology: related to AMS, encephalopathy     Signs/Symptoms: as evidenced by NPO per SLP    Previous dx re: swallowing difficulty continues, pt intubated. NUTRITION INTERVENTIONS:   Enteral/Parenteral Nutrition: Initiate enteral nutrition, Discontinue parenteral nutrition                GOAL:   Pt will tolerate initiation of TF with residuals <250mL and TPN will start to be weaned in 2-3 days.      NUTRITION MONITORING AND EVALUATION   Previous Goal: transition to EN vs PO for comfort next 1-3 days  Previous Goal Met: Yes (Starting TF today)  Previous Recommendations Implemented: N/A   Cultural, Gnosticism, or Ethnic Dietary Needs: None   LEARNING NEEDS (Diet, Food/Nutrient-Drug Interaction):    [x] None Identified   [] Identified and Education Provided/Documented   [] Identified and Pt declined/was not appropriate      [x] Interdisciplinary Care Plan Reviewed/Documented    [x] Participated in Discharge Planning: Unable to determine    [x] Interdisciplinary Rounds     NUTRITION RISK:    [x] High              [] Moderate           []  Low  []  Minimal/Uncompromised      Gin Lopez RD, [address]  Pager [phone]  Weekend Pager [phone]

## 2017-09-01 NOTE — PROGRESS NOTES
09/01/17 0619   ABCDE Bundle   SBT Safety Screen Passed No   SBT Screen Reason for Failure Agitation

## 2017-09-01 NOTE — PROGRESS NOTES
Gastroenterology Daily Progress Note (Dr. Chong Fletcher for Dr. Majo Roldan)    Admit Date: 7/27/2017       Subjective:       Patient seen with family at bedside (wife and daughter). He remains intubated on ventilator. Had a PEA arrest yesterday prior to ICU transfer.     Current Facility-Administered Medications   Medication Dose Route Frequency    cefepime (MAXIPIME) 2 g in 0.9% sodium chloride (MBP/ADV) 100 mL  2 g IntraVENous Q12H    propofol (DIPRIVAN) infusion  5-50 mcg/kg/min IntraVENous TITRATE    TPN ADULT - CENTRAL AA 5% D20% W/ CA + ELECTROLYTES   IntraVENous CONTINUOUS    vancomycin 50 mg/mL oral solution (compounded) 250 mg  250 mg Per NG tube Q6H    PHENYLephrine (NEOSYNEPHRINE) 30,000 mcg in 0.9% sodium chloride 250 mL infusion   mcg/min IntraVENous TITRATE    furosemide (LASIX) injection 40 mg  40 mg IntraVENous DAILY PRN    morphine injection 1 mg  1 mg IntraVENous Q4H PRN    metroNIDAZOLE (FLAGYL) IVPB premix 500 mg  500 mg IntraVENous Q8H    albuterol-ipratropium (DUO-NEB) 2.5 MG-0.5 MG/3 ML  3 mL Nebulization Q6H PRN    chlorhexidine (PERIDEX) 0.12 % mouthwash 15 mL  15 mL Oral Q12H    heparin (porcine) injection 5,000 Units  5,000 Units SubCUTAneous Q12H    anidulafungin (ERAXIS) 100 mg in 0.9% sodium chloride 130 mL IVPB  100 mg IntraVENous Q24H    linezolid (ZYVOX) IVPB premix in D5W 600 mg  600 mg IntraVENous Q12H    acetaminophen (TYLENOL) solution 975 mg  975 mg Oral Q6H PRN    Or    acetaminophen (TYLENOL) suppository 650 mg  650 mg Rectal Q6H PRN    bisacodyl (DULCOLAX) suppository 10 mg  10 mg Rectal DAILY PRN    zinc oxide-cod liver oil (DESITIN) 40 % paste   Topical PRN    metoclopramide HCl (REGLAN) injection 5 mg  5 mg IntraVENous Q8H PRN    levalbuterol (XOPENEX) nebulizer soln 0.63 mg/3 mL  0.63 mg Nebulization Q6H PRN    aspirin (ASA) suppository 300 mg  300 mg Rectal DAILY    labetalol (NORMODYNE;TRANDATE) injection 10 mg  10 mg IntraVENous Q6H    hydrALAZINE (APRESOLINE) 20 mg/mL injection 20 mg  20 mg IntraVENous Q4H PRN    labetalol (NORMODYNE;TRANDATE) injection 20 mg  20 mg IntraVENous Q4H PRN    fat emulsion 20% (LIPOSYN, INTRALIPID) infusion 250 mL  250 mL IntraVENous Q MON, WED & FRI    insulin lispro (HUMALOG) injection   SubCUTAneous Q6H    glucose chewable tablet 16 g  4 Tab Oral PRN    dextrose (D50W) injection syrg 12.5-25 g  12.5-25 g IntraVENous PRN    glucagon (GLUCAGEN) injection 1 mg  1 mg IntraMUSCular PRN    sodium chloride (NS) flush 5-10 mL  5-10 mL IntraVENous Q8H    ondansetron (ZOFRAN) injection 4 mg  4 mg IntraVENous Q4H PRN    sodium chloride (NS) flush 10 mL  10 mL InterCATHeter PRN    sodium chloride (NS) flush 10-40 mL  10-40 mL InterCATHeter Q8H        Objective:     Visit Vitals    /57    Pulse 73    Temp 99 °F (37.2 °C)    Resp 23    Ht 5' 10\" (1.778 m)    Wt 90 kg (198 lb 6.6 oz)    SpO2 100%    BMI 28.47 kg/m2   Blood pressure 147/57, pulse 73, temperature 99 °F (37.2 °C), resp. rate 23, height 5' 10\" (1.778 m), weight 90 kg (198 lb 6.6 oz), SpO2 100 %.     09/01 0701 - 09/01 1900  In: 93.4 [I.V.:93.4]  Out: -     08/30 1901 - 09/01 0700  In: 3697.9 [I.V.:6309.2]  Out: 5607 [Urine:3160]      Intake/Output Summary (Last 24 hours) at 09/01/17 0936  Last data filed at 09/01/17 0800   Gross per 24 hour   Intake           3479.3 ml   Output             1520 ml   Net           1959.3 ml     Physical Exam:     General: intubated, sedated WM with ETT in place  Chest:  Coarse bs at bases b/l  Heart: S1, S2, RRR  GI: Soft, nontender, nondistended, + BS    Labs:       Recent Results (from the past 24 hour(s))   BLOOD GAS, ARTERIAL    Collection Time: 08/31/17  1:30 PM   Result Value Ref Range    pH 7.28 (L) 7.35 - 7.45      PCO2 52 (H) 35.0 - 45.0 mmHg    PO2 199 (H) 80 - 100 mmHg    O2 SAT 99 (H) 92 - 97 %    BICARBONATE 24 22 - 26 mmol/L    BASE DEFICIT 3.4 mmol/L    O2 METHOD VENTILATOR      FIO2 80 %    Tidal volume 500      SET RATE 14      EPAP/CPAP/PEEP 6.0      Sample source ARTERIAL      SITE RIGHT RADIAL      KAREN'S TEST YES     GLUCOSE, POC    Collection Time: 08/31/17  2:21 PM   Result Value Ref Range    Glucose (POC) 299 (H) 65 - 100 mg/dL    Performed by Oliverio Hernadez    CULTURE, RESPIRATORY/SPUTUM/BRONCH [place]  STAIN    Collection Time: 08/31/17  3:17 PM   Result Value Ref Range    Special Requests: NO SPECIAL REQUESTS      GRAM STAIN FEW WBCS SEEN      GRAM STAIN OCCASIONAL EPITHELIAL CELLS SEEN      GRAM STAIN NO ORGANISMS SEEN      Culture result: PENDING    GLUCOSE, POC    Collection Time: 08/31/17  5:55 PM   Result Value Ref Range    Glucose (POC) 205 (H) 65 - 100 mg/dL    Performed by Yulissa Schneider    TROPONIN I    Collection Time: 08/31/17  6:39 PM   Result Value Ref Range    Troponin-I, Qt. 0.12 (H) <0.05 ng/mL   GLUCOSE, POC    Collection Time: 08/31/17 11:13 PM   Result Value Ref Range    Glucose (POC) 165 (H) 65 - 100 mg/dL    Performed by Loren Arriaga \"Nydia\" Arielle    CBC WITH AUTOMATED DIFF    Collection Time: 09/01/17  3:20 AM   Result Value Ref Range    WBC 9.4 4.1 - 11.1 K/uL    RBC 2.72 (L) 4.10 - 5.70 M/uL    HGB 7.9 (L) 12.1 - 17.0 g/dL    HCT 25.0 (L) 36.6 - 50.3 %    MCV 91.9 80.0 - 99.0 FL    MCH 29.0 26.0 - 34.0 PG    MCHC 31.6 30.0 - 36.5 g/dL    RDW 17.0 (H) 11.5 - 14.5 %    PLATELET 037 368 - 207 K/uL    NEUTROPHILS 74 32 - 75 %    LYMPHOCYTES 19 12 - 49 %    MONOCYTES 5 5 - 13 %    EOSINOPHILS 2 0 - 7 %    BASOPHILS 0 0 - 1 %    ABS. NEUTROPHILS 7.0 1.8 - 8.0 K/UL    ABS. LYMPHOCYTES 1.8 0.8 - 3.5 K/UL    ABS. MONOCYTES 0.4 0.0 - 1.0 K/UL    ABS. EOSINOPHILS 0.2 0.0 - 0.4 K/UL    ABS.  BASOPHILS 0.0 0.0 - 0.1 K/UL   METABOLIC PANEL, BASIC    Collection Time: 09/01/17  3:20 AM   Result Value Ref Range    Sodium 141 136 - 145 mmol/L    Potassium 3.9 3.5 - 5.1 mmol/L    Chloride 110 (H) 97 - 108 mmol/L    CO2 25 21 - 32 mmol/L    Anion gap 6 5 - 15 mmol/L    Glucose 170 (H) 65 - 100 mg/dL    BUN 51 (H) 6 - 20 MG/DL    Creatinine 1.61 (H) 0.70 - 1.30 MG/DL    BUN/Creatinine ratio 32 (H) 12 - 20      GFR est AA 49 (L) >60 ml/min/1.73m2    GFR est non-AA 41 (L) >60 ml/min/1.73m2    Calcium 7.4 (L) 8.5 - 10.1 MG/DL   TROPONIN I    Collection Time: 09/01/17  3:20 AM   Result Value Ref Range    Troponin-I, Qt. 0.12 (H) <0.05 ng/mL   GLUCOSE, POC    Collection Time: 09/01/17  5:54 AM   Result Value Ref Range    Glucose (POC) 187 (H) 65 - 100 mg/dL    Performed by Anna Finley \"Nydia\" Arielle      Recent Labs      09/01/17   0320  08/31/17   0120  08/30/17   0524   NA  141  144  146*   K  3.9  3.7  3.6   CL  110*  109*  111*   CO2  25  32  31   BUN  51*  43*  46*   CREA  1.61*  1.46*  1.79*   GLU  170*  183*  183*   CA  7.4*  8.0*  7.7*   MG   --   2.3   --    PHOS   --   3.3   --      Recent Labs      08/30/17   0524   SGOT  42*   AP  174*   TP  7.1   ALB  1.6*   GLOB  5.5*     Recent Labs      08/31/17   1330  08/30/17   2053   PH  7.28*  7.45   PCO2  52*  39   PO2  199*  62*     Impression:    Acute respiratory failure s/p PEA arrest 8/31/17    Bladder injury     Mild hypoxic ischemic encephalopathy     Altered mental status, unspecified    Stenosis of both internal carotid arteries     Cerebral microvascular disease     Convulsive syncope     Delirium due to general medical condition     Anasarca     Plan:  Patient not hypotensive today and low grade temp last night following PEA arrest and code blue due to acute respiratory event now improving today. He has an NG tube in place and can start on tube feedings and hopefully will be able to have a PEG tube in a few days. He is less than 24 hrs post cardiac arrest and will not stress him with PEG placement given not an emergent procedure but will have on call GI see over weekend. Plan outlined with family.        Esperanza Olsen MD    9/1/2017  [address] [place] [address], [address] [place] [address]  Loc: [phone]

## 2017-09-01 NOTE — PROGRESS NOTES
Met Dario Ortega and her son Clifford Alegre outside the CCU. Escorted them in to see pt. Provided active listening as Dario Jordan shared gratitude for the time spent with the family yesterday, especially with her mom. Dario Ortega has a lot of respect for her mother.  shared that I had also come upon Janene Emma and Mrs. Jacob Harrell in the hallway as they were heading to Mrs. Jacob Harrell' doctor appointment. Janene Carter and Mrs. Jacob Harrell had reported that pt was resting well so they may not come back today. Family members seem to be coping well today. Jose M Humphries will continue to follow as able. Naseem Lopez M.Div.   Palliative  Fellow

## 2017-09-01 NOTE — PROGRESS NOTES
Brief Progress Note    Chart reviewed. Goals of care and advanced care planning have been clarified. We will revisit appropriate discussions if the need should arise w/ a significant change in pt's clinical trajectory or if for any other reason and family is amenable to family meeting w/ our team.     D/w Dr. Yon Day on Wednesday, 08/30/2017. D/w Chayito Moreland on Thursday, 08/31/2017. D/w [place], bedside RN. No staff concerns at this time. Time and input appreciated. Should you have questions/concerns or the need for a bedside visit by our team, please do not hesitate to contact us at [phone]). Thank you for providing us w/ the continued opportunity to be involved in this patient's care.       Javier Reis MD  Palliative Care Team

## 2017-09-01 NOTE — INTERDISCIPLINARY ROUNDS
Interdisciplinary team rounds were held 9/1/2017 with the following team members:Care Management, Diabetes Treatment Specialist, Nursing, Nutrition, Pharmacy, Physical Therapy, Physician and Respiratory Therapy. Plan of care discussed. See clinical pathway and/or care plan for interventions and desired outcomes.

## 2017-09-01 NOTE — PROGRESS NOTES
1900 Report received from Robert Sanders RN.    8319 Shift summary-pt restless through the night, propofol up to 20 mcg/kg/min. Attempted SAT, pt agitated. Good UOP. Medicated with tylenol at beginning of shift for temp of 100.6, otherwise VSS.     0700 Report given to MELISSA Salcedo.

## 2017-09-01 NOTE — ROUTINE PROCESS
Bedside and Verbal shift change report received from CHERYL Newton Ace (offgoing nurse). Report included the following information SBAR, Kardex, ED Summary, Procedure Summary, Intake/Output, MAR, Accordion, Recent Results, Med Rec Status and Cardiac Rhythm NSR. He is tolerating the current mechanical ventilator settings well without respiratory distress. Lungs with coarse breath sounds, repositioned and suctioned for a small amount of thick tan sputum via the ET-tube. Oral care completed. Resting with his eyes closed, or facial grimaces. The Propofol is maintained at 20mcq/kg/min. TPN at 83ml/hour via the left PICC. Midline abdominal incision intact with retention sutures, which are open to air. Rosales catheter has yellow urine in the tubing. 0915:Echocardiogram in process. 0945:Oral care completed, noted with a large amount of thick substance at the back of his throat. It has a thick \"oatmeal\" consistency. Skin care completed and repositioned for comfort. 1000:Family members,(Wife and daughter) are at the bedside anointing him and praying. 1200:Repositioned for comfort. Family friend in.  1400:His other daughter and grandson are at the bedside. 1515:Propofol increase due to agitation. He is moving his legs outside of the bed, contiue to monitor his status. 1545:Less agitated, skin care completed,and her tolerated it well. Tube feeding initiated as per MD order, via the right nare nasogastric tube. It is secured at 70 cms.   1600:Noted with a very thick substance at the back of his throat, oral care completed, and suctioned for a moderate amount of thick pale sputum. No respiratory distress. Otherwise, his assessment is unchanged. No facial grimaces. 1800:His wife called to receive updates on his condition, continue the current plan of care. 1930:Bedside and Verbal shift change report given to MAURICE Harrison RN (oncoming nurse) by myself (offgoing nurse).  Report included the following information SBAR, [address], ED Summary, Procedure Summary, Intake/Output, MAR, Accordion, Recent Results, Med Rec Status and Cardiac Rhythm sinus rhythm with occasional PVC's. Yogesh Chiuy

## 2017-09-01 NOTE — PROGRESS NOTES
Chart reviewed. Pt currently intubated, sedated, and not appropriate for participation in OT intervention. Will defer at this time however continue to follow.

## 2017-09-02 NOTE — PROGRESS NOTES
GI PROGRESS NOTE    NAME:             Soo Robles   :              1928   MRN:              [id number]   Admit Date:     2017  Todays Date:  2017      Subjective:          Patient seen with family at bedside (wife and daughter). He remains intubated on ventilator. Had a PEA arrest  prior to ICU transfer. Getting NG feedings which the family requested be turned off because of perceived abdominal distension.  Patient head loose stool this PM    Current Facility-Administered Medications   Medication Dose Route Frequency    TPN ADULT - CENTRAL AA 5% D20% W/ CA + ELECTROLYTES   IntraVENous CONTINUOUS    furosemide (LASIX) injection 40 mg  40 mg IntraVENous ONCE    fentaNYL (PF) 900 mcg/30 ml infusion soln  0-200 mcg/hr IntraVENous CONTINUOUS    mupirocin (BACTROBAN) 2 % ointment   Topical Q12H    anidulafungin (ERAXIS) 100 mg in 0.9% sodium chloride 130 mL IVPB  100 mg IntraVENous Q24H    cefepime (MAXIPIME) 2 g in 0.9% sodium chloride (MBP/ADV) 100 mL  2 g IntraVENous Q12H    propofol (DIPRIVAN) infusion  5-50 mcg/kg/min IntraVENous TITRATE    vancomycin 50 mg/mL oral solution (compounded) 250 mg  250 mg Per NG tube Q6H    PHENYLephrine (NEOSYNEPHRINE) 30,000 mcg in 0.9% sodium chloride 250 mL infusion   mcg/min IntraVENous TITRATE    furosemide (LASIX) injection 40 mg  40 mg IntraVENous DAILY PRN    morphine injection 1 mg  1 mg IntraVENous Q4H PRN    metroNIDAZOLE (FLAGYL) IVPB premix 500 mg  500 mg IntraVENous Q8H    albuterol-ipratropium (DUO-NEB) 2.5 MG-0.5 MG/3 ML  3 mL Nebulization Q6H PRN    chlorhexidine (PERIDEX) 0.12 % mouthwash 15 mL  15 mL Oral Q12H    heparin (porcine) injection 5,000 Units  5,000 Units SubCUTAneous Q12H    linezolid (ZYVOX) IVPB premix in D5W 600 mg  600 mg IntraVENous Q12H    acetaminophen (TYLENOL) solution 975 mg  975 mg Oral Q6H PRN    Or    acetaminophen (TYLENOL) suppository 650 mg  650 mg Rectal Q6H PRN    bisacodyl (DULCOLAX) suppository 10 mg  10 mg Rectal DAILY PRN    zinc oxide-cod liver oil (DESITIN) 40 % paste   Topical PRN    metoclopramide HCl (REGLAN) injection 5 mg  5 mg IntraVENous Q8H PRN    levalbuterol (XOPENEX) nebulizer soln 0.63 mg/3 mL  0.63 mg Nebulization Q6H PRN    aspirin (ASA) suppository 300 mg  300 mg Rectal DAILY    labetalol (NORMODYNE;TRANDATE) injection 10 mg  10 mg IntraVENous Q6H    hydrALAZINE (APRESOLINE) 20 mg/mL injection 20 mg  20 mg IntraVENous Q4H PRN    labetalol (NORMODYNE;TRANDATE) injection 20 mg  20 mg IntraVENous Q4H PRN    fat emulsion 20% (LIPOSYN, INTRALIPID) infusion 250 mL  250 mL IntraVENous Q MON, WED & FRI    insulin lispro (HUMALOG) injection   SubCUTAneous Q6H    glucose chewable tablet 16 g  4 Tab Oral PRN    dextrose (D50W) injection syrg 12.5-25 g  12.5-25 g IntraVENous PRN    glucagon (GLUCAGEN) injection 1 mg  1 mg IntraMUSCular PRN    sodium chloride (NS) flush 5-10 mL  5-10 mL IntraVENous Q8H    ondansetron (ZOFRAN) injection 4 mg  4 mg IntraVENous Q4H PRN    sodium chloride (NS) flush 10 mL  10 mL InterCATHeter PRN    sodium chloride (NS) flush 10-40 mL  10-40 mL InterCATHeter Q8H        Objective:   Patient Vitals for the past 8 hrs:   BP Temp Pulse Resp SpO2   09/02/17 1950 - - 76 24 100 %   09/02/17 1900 142/51 - 83 (!) 31 100 %   09/02/17 1800 133/47 - 84 (!) 31 100 %   09/02/17 1700 144/51 - 87 30 100 %   09/02/17 1600 (!) 154/96 99.9 °F (37.7 °C) 91 (!) 33 100 %   09/02/17 1500 163/60 - 89 (!) 40 100 %   09/02/17 1446 - - 88 (!) 34 100 %   09/02/17 1400 151/54 - 73 30 100 %   09/02/17 1300 155/55 - 68 28 100 %   09/02/17 1200 151/58 100.2 °F (37.9 °C) 76 26 100 %        09/01 0701 - 09/02 1900  In: 6670.5 [I.V.:5365.5]  Out: 3346 [Urine:3775]    EXAM:    Visit Vitals    /51    Pulse 76    Temp 99.9 °F (37.7 °C)    Resp 24    Ht 5' 10\" (1.778 m)    Wt 88.1 kg (194 lb 3.6 oz)    SpO2 100%    BMI 27.87 kg/m2        General: intubated, sedated WM with ETT in place  Chest:  Coarse bs at bases b/l  Heart: S1, S2, RRR  GI: Soft, nontender, nondistended, + BS      Data Review     Recent Labs      09/02/17   0323  09/01/17   0320   WBC  9.3  9.4   HGB  8.1*  7.9*   HCT  24.7*  25.0*   PLT  221  213     Recent Labs      09/02/17   0323  09/01/17   0320  08/31/17   0120   NA  141  141  144   K  3.5  3.9  3.7   CL  111*  110*  109*   CO2  23  25  32   BUN  46*  51*  43*   CREA  1.59*  1.61*  1.46*   GLU  185*  170*  183*   PHOS  2.4*   --   3.3   CA  7.7*  7.4*  8.0*     No results for input(s): SGOT, GPT, AP, TBIL, TP, ALB, GLOB, GGT, AML, LPSE in the last 72 hours. No lab exists for component: AMYP, HLPSE                           Assessment:     Acute respiratory failure s/p PEA arrest 8/31/17    Bladder injury     Mild hypoxic ischemic encephalopathy     Altered mental status, unspecified    Stenosis of both internal carotid arteries     Cerebral microvascular disease     Convulsive syncope     Delirium due to general medical condition     Anasarca    Enteral feedings with PEG requested         Active Problems:    Bladder injury (7/28/2017)      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)      Altered mental status, unspecified (8/15/2017)      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)      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)      Convulsive syncope (8/15/2017)      Disturbance of memory (8/15/2017)      Delirium due to general medical condition (8/17/2017)      Oral phase dysphagia (8/17/2017)      Physical deconditioning (8/18/2017)      Counseling regarding goals of care (8/18/2017)      Anasarca (8/25/2017)      Weakness (8/25/2017)      Debility (8/25/2017)      Counseling regarding advanced care planning and goals of care (8/25/2017)        Plan:   1. Restart NG feedings at slow rate tomorrow at 20 cc increase slowly 10 cc every 12 hours.   2. Possible PEG next week         Lima Pringle MD

## 2017-09-02 NOTE — PROGRESS NOTES
[facility] Consult Service Progress NOTE  Pulmonary, Critical Care, and Sleep Medicine    Name: Sachin Araujo MRN: [id number]   : 1928 Hospital: [facility]   Date: 2017  Admission Date: 2017     Chart and notes reviewed. Data reviewed. Pt seen on rounds earlier today. I have evaluated and examined the patient. Pt is acutely ill. Reviewed the evaluation and will assist in implementing the plan as outlined by Dr. Sarita Jordan and team     Hospital Day: 45    I was asked by Tammy Sutton MD to see Sachin Araujo in consultation for a chief complaint of Acute respiratory failure with hypoxia. :  Failed SBT due to RR this am.  Agitated  RN says peg planned for Monday and plan was to keep intubated until that time for some reason     transient drop in BP last evening but pressors not requireed. Still critcally ill on vent. Sedated with propofol. Restless and agitated when lightened. No fever. D/w .  Pt moved to CCU with agonal respirations and rapid PEA arrest. Emergently intubated noting thick, dried irregular layers of respiratory secretions that caused upper airway obstruction. MacGill forceps needed to tease away the debris and expose the glottis, cords. Pt was transferred from CCU to floor on  after a complicated CCU stay. Initial surgery was on 17 for a cystoscopy with resultant large bladder perforation requiring Ex Lap and repair of bladder perforation. Post op he had atrial Fib RVR, NSTEMI. Had a period of agitation, HTN. Pt was on a course of Abx for sepsis, wound infection. Subsequently, he had wound dehiscence . Pt eventually had return of bowel function but failed clear liquids due to dysphagia. On  has RRT call for AMS. Had negative head Ct but on  had MRI and Neurology consult. 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 EEG was slowed and he remained hypersomnolent.  On  he developed a fever, hyperventilation and abnormal CXR. After empiric abx, fever resolved but he had hypernatremia. All sutures removed with good wound healing. Suprapubic catheter removed. Pt has been followed closely by primary team, Palliative Care, Urology and Surgery. I visited with Pt's wife, 2 dtrs Ngoc Green and Asiya and granddtr/Colby's dtr in waiting room after [employer] called. Pt's management was discussed in detail They expressed their concerns and I tried to answer their questions. His heart did stop for a few minutes and it remains to be seen how this might affect his recovery. He is stil critically ill and has been through a lot. They accept that pt will need a PEG for support. IMPRESSION:   1. Acute respiratory failure with hypoxia s/p emergent intubation  2. PEA arrest from rspiratory insufiiciency due to   3. Upper airway obstruction from dry, thick coating of supraglottic secretions which covered his cords; mouth breathing dried his airway and mucous with poor clearance of secretions  4. Acute encephalopathy from hypoxia per MRI and Neurology evaluation; hypersomnolent with level of alertness very poor for past 10-14 days  5. Dysphagia  6. Protein calorie malnutrition on TPN  7. C Diff 8/25 on IV Flagyl  8. Complicated Enterococcal UTI- sensitive to Amp  9. NSTEMI demand ischemia  10. CAD recent stents per [facility] July 2017, needs plavix to be restarted  11. PAF Cardiology following  12. Pneumonia??: last xray 8/23   13. Postop respiratory failure, Extubated second time on 8-7 after Midline abdominal incision fascial wound dehiscence 8/5/17 Exploratory laparotomy with closure of midline incision using retention sutures per Dr. Ninfa Feldman following. 14. S/P removal of bladder stone and repair of perforated bladder POA  15. S/P subsequent ex-lap due to extensive sq emphysema in abdomen/retroperitoneum - no sign of bowel perforation  16.  Pt is critically ill and at high risk of end organ dysfunction and failure  17. Critical care time with pt exclusive of procedures  35    minutes      RECOMMENDATIONS/PLAN:   1. CCU  2. Vent over the weekend but lighten sedation and reorient  3. will ask GI to consider PEG while on vent and before we resume Plavix  4. If no PEG now, will start TF and see if tolerated  5. Oral care  6. Sputum for culture  7. NG tube  8. Add PO vancomycin  9. abx per Shannan Mccall - looks like will be completing course soon but sputum looks ominous  10. No Ativan per family request  11. Discuss any scan with family before ordering  12. renew TPN for now  13. replete lytes  14. PICC  15. DVT, SUP prophylaxis  16. Patient requiring restraints due to threat of injury to self, interference with medical devices. 17. Will be available to assist in medical management while in the CCU pending disposition     ICU disposition :Unchanged. Updated Family. Time above. Risk of deterioration: high   [x] High complexity decision making was performed   [x] See my orders for details  Tubes:   Rosales and Intubated/Vent    Code: Full Code        Hemodynamics:    CO:    CI:    CVP: CVP (mmHg): 8 mmHg (17 0800)  SVR:   PAP Systolic:    PAP Diastolic:    PVR:    KX45:        Ventilator Settings:      Mode Rate TV Press PEEP FiO2 PIP Min. Vent   Assist control (placed back on due to agitation.  increased respiratory rate)    500 ml 5 cm H2O  6 cm H20 35 %  22 cm H2O  16.1 l/min      Vital Signs: Intake/Output: Intake/Output:   Visit Vitals    /60    Pulse 75    Temp 100.3 °F (37.9 °C)    Resp 26    Ht 5' 10\" (1.778 m)    Wt 88.1 kg (194 lb 3.6 oz)    SpO2 100%    BMI 27.87 kg/m2         O2 Device: Endotracheal tube  O2 Flow Rate (L/min): 12 l/min  Wt Readings from Last 4 Encounters:   17 88.1 kg (194 lb 3.6 oz)   08/15/17 94.2 kg (207 lb 10.8 oz)   17 95.8 kg (211 lb 3.2 oz)   17 97.7 kg (215 lb 6.2 oz)       Temp (24hrs), Av.2 °F (37.3 °C), Min:97.8 °F (36.6 °C), Max:100.3 °F (37.9 °C)     Intake/Output Summary (Last 24 hours) at 09/02/17 1106  Last data filed at 09/02/17 1000   Gross per 24 hour   Intake          5239.34 ml   Output             2740 ml   Net          2499.34 ml     Last shift:      09/02 0701 - 09/02 1900  In: 933.7 [I.V.:703.7]  Out: 355 [Urine:355]  Last 3 shifts: 08/31 1901 - 09/02 0700  In: 6166.2 [I.V.:5511.2]  Out: 0278 [Urine:3545]     Telemetry:    normal sinus rhythm    Physical Exam:    General: severely ill elderly WM   HEENT: intubated; dry mucosa   Neck: No abnormally enlarged lymph nodes. Chest: normal   Lungs: rhonchi   Heart: Regular rate and rhythm   Abdomen: soft, non-tender, without masses or organomegaly   :    Extremity: negative, clubbing;    Neuro: obtunded   Psych: unresponsive    Skin: Pallor and Turgor absent;   Pulses: Bilateral, Radial, 2+ Normal upper and lower extremity pulses   Capillary refill: normal brisk capillary refill;    DATA:    Interval lab and diagnostic data was reviewed. Interval radiology images were independently viewed and available reports were reviewed. All lab results for the last 24 hours reviewed.       MAR reviewed and pertinent medications noted or modified as needed    MEDS:   Current Facility-Administered Medications   Medication    TPN ADULT - CENTRAL AA 5% D20% W/ CA + ELECTROLYTES    mupirocin (BACTROBAN) 2 % ointment    anidulafungin (ERAXIS) 100 mg in 0.9% sodium chloride 130 mL IVPB    TPN ADULT - CENTRAL AA 5% D20% W/ CA + ELECTROLYTES    cefepime (MAXIPIME) 2 g in 0.9% sodium chloride (MBP/ADV) 100 mL    propofol (DIPRIVAN) infusion    vancomycin 50 mg/mL oral solution (compounded) 250 mg    PHENYLephrine (NEOSYNEPHRINE) 30,000 mcg in 0.9% sodium chloride 250 mL infusion    furosemide (LASIX) injection 40 mg    morphine injection 1 mg    metroNIDAZOLE (FLAGYL) IVPB premix 500 mg    albuterol-ipratropium (DUO-NEB) 2.5 MG-0.5 MG/3 ML    chlorhexidine (PERIDEX) 0.12 % mouthwash 15 mL  heparin (porcine) injection 5,000 Units    linezolid (ZYVOX) IVPB premix in D5W 600 mg    acetaminophen (TYLENOL) solution 975 mg    Or    acetaminophen (TYLENOL) suppository 650 mg    bisacodyl (DULCOLAX) suppository 10 mg    zinc oxide-cod liver oil (DESITIN) 40 % paste    metoclopramide HCl (REGLAN) injection 5 mg    levalbuterol (XOPENEX) nebulizer soln 0.63 mg/3 mL    aspirin (ASA) suppository 300 mg    labetalol (NORMODYNE;TRANDATE) injection 10 mg    hydrALAZINE (APRESOLINE) 20 mg/mL injection 20 mg    labetalol (NORMODYNE;TRANDATE) injection 20 mg    fat emulsion 20% (LIPOSYN, INTRALIPID) infusion 250 mL    insulin lispro (HUMALOG) injection    glucose chewable tablet 16 g    dextrose (D50W) injection syrg 12.5-25 g    glucagon (GLUCAGEN) injection 1 mg    sodium chloride (NS) flush 5-10 mL    ondansetron (ZOFRAN) injection 4 mg    sodium chloride (NS) flush 10 mL    sodium chloride (NS) flush 10-40 mL        Labs:    Recent Labs      09/02/17 0323 09/01/17   0320  08/31/17   0120   WBC  9.3  9.4  11.2*   HGB  8.1*  7.9*  9.2*   PLT  221  213  274     Recent Labs      09/02/17 0323 09/01/17   0320  08/31/17   0120   NA  141  141  144   K  3.5  3.9  3.7   CL  111*  110*  109*   CO2  23  25  32   GLU  185*  170*  183*   BUN  46*  51*  43*   CREA  1.59*  1.61*  1.46*   CA  7.7*  7.4*  8.0*   MG  2.2   --   2.3   PHOS  2.4*   --   3.3     Recent Labs      09/01/17   1123  08/31/17   1330  08/30/17   2053   PH  7.47*  7.28*  7.45   PCO2  34*  52*  39   PO2  152*  199*  62*   HCO3  24  24  26   FIO2  50  80  50     Recent Labs      09/02/17   0323  09/01/17   0320  08/31/17   1839   TROIQ  0.05*  0.05*  0.12*  0.12*     Lab Results   Component Value Date/Time     08/24/2017 12:40 AM          Imaging:  [x]                           I have personally reviewed the patients radiographs and reports:    Tammy Hamilton MD

## 2017-09-02 NOTE — PROGRESS NOTES
Cardiology Progress Note    9/2/2017     Admit Date: 7/27/2017    Cardiac Assessment/Plan:   CAD: Left circumflex artery PCI with HORACIO on 7/20/2017; EF 45-50% after PCI. On ASA only since  surgery: restart Plavix when able from surgical standpoint. Anticipate this will be done after PEG, will defer to primary team for the OK. Risk of acute stent closure is higher on ASA alone, off Plavix.     Paroxysmal Afib: transiently on dilt gtt: NSR currently; If recurrent, would use amio.        For other plans, see orders.     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    Convulsive syncope 08/15/2017    Disturbance of memory 08/15/2017    Bladder injury 07/28/2017        Subjective: Gloria Cabral cannot report due to intubation   Chest pain  none  consistent with:  Non-cardiac CP         Atypical CP     None now  On going  Anginal CP     Dyspnea  none  at rest  with exertion         improved  unchanged  worse              PND  none  overnight       Orthopnea  none  improved  unchanged  worse   Presyncope  none  improved  unchanged  worse     Ambulated in hallway without symptoms   Yes   Ambulated in room without symptoms  Yes   Objective:    Physical Exam:  Overall VSSAF;    Visit Vitals    /54    Pulse 88    Temp 100.2 °F (37.9 °C)    Resp (!) 34    Ht 5' 10\" (1.778 m)    Wt 88.1 kg (194 lb 3.6 oz)    SpO2 100%    BMI 27.87 kg/m2     Temp (24hrs), Av.5 °F (37.5 °C), Min:98.1 °F (36.7 °C), Max:100.3 °F (37.9 °C)    Patient Vitals for the past 8 hrs:   Pulse   17 1446 88   17 1400 73   17 1300 68   17 1200 76   17 1146 71   17 1100 77   17 1000 75   17 0900 77   17 0800 74   17 0727 86   17 0711 81   17 0700 79    Patient Vitals for the past 8 hrs:   Resp   17 1446 (!) 34   17 1400 30   17 1300 28   17 1200 26   17 1146 27   17 1100 27   17 1000 26   17 0900 29   17 0800 24   17 0727 (!) 32   17 0711 (!) 33   17 0700 30    Patient Vitals for the past 8 hrs:   BP   17 1400 151/54   17 1300 155/55   17 1200 151/58   17 1100 163/63   17 1000 159/60   17 0900 157/59   17 0800 140/50   17 0700 161/60          Intake/Output Summary (Last 24 hours) at 17 1459  Last data filed at 17 1400   Gross per 24 hour   Intake          5956.56 ml   Output             2570 ml   Net          3386.56 ml     General Appearance: Well-developed, well-nourished, no acute distress. Ears/Nose/Mouth/Throat:   Dry MM; anicteric. JVP: Difficult to discern while supine. Resp:   clear to auscultation bilaterally anteriorly. Intubated. Cardiovascular:  RRR, S1, S2 normal, no new murmur. No gallop or rub. Abdomen:   Post-surgical, bowel sounds present. Extremities: No edema bilaterally. Skin:  Neuro: Warm and dry. No tremor. cath site intact w/o hematoma or new bruit; distal pulse unchanged  Yes   Data Review:     Telemetry independently reviewed x sinus  chronic afib  parox afib  NSVT     ECG independently reviewed  NSR  afib  no significant changes  NSST-Tw chgs   x no new ECG provided for review   Lab results reviewed as noted below. Current medications reviewed as noted below.     Recent Labs      17   1123  17   1330   PH 7.47*  7.28*   PCO2  34*  52*   PO2  152*  199*     Recent Labs      09/02/17   0323  09/01/17   0320  08/31/17   1839   TROIQ  0.05*  0.05*  0.12*  0.12*     Recent Labs      09/02/17 0323 09/01/17 0320  08/31/17   0120   NA  141  141  144   K  3.5  3.9  3.7   CL  111*  110*  109*   CO2  23  25  32   BUN  46*  51*  43*   CREA  1.59*  1.61*  1.46*   GLU  185*  170*  183*   PHOS  2.4*   --   3.3   CA  7.7*  7.4*  8.0*   WBC  9.3  9.4  11.2*   HGB  8.1*  7.9*  9.2*   HCT  24.7*  25.0*  29.3*   PLT  221  213  274     Lab Results   Component Value Date/Time    Cholesterol, total 178 07/20/2017 12:28 PM    HDL Cholesterol 48 07/20/2017 12:28 PM    LDL, calculated 117.8 07/20/2017 12:28 PM    Triglyceride 286 08/26/2017 02:36 AM    CHOL/HDL Ratio 3.7 07/20/2017 12:28 PM     No results for input(s): SGOT, GPT, AP, TBIL, TP, ALB, GLOB, GGT, AML, LPSE in the last 72 hours. No lab exists for component: AMYP, HLPSE  No results for input(s): INR, PTP, APTT in the last 72 hours.     No lab exists for component: INREXT, INREXT   No components found for: GLPOC    Current Facility-Administered Medications   Medication Dose Route Frequency    TPN ADULT - CENTRAL AA 5% D20% W/ CA + ELECTROLYTES   IntraVENous CONTINUOUS    mupirocin (BACTROBAN) 2 % ointment   Topical Q12H    anidulafungin (ERAXIS) 100 mg in 0.9% sodium chloride 130 mL IVPB  100 mg IntraVENous Q24H    TPN ADULT - CENTRAL AA 5% D20% W/ CA + ELECTROLYTES   IntraVENous CONTINUOUS    cefepime (MAXIPIME) 2 g in 0.9% sodium chloride (MBP/ADV) 100 mL  2 g IntraVENous Q12H    propofol (DIPRIVAN) infusion  5-50 mcg/kg/min IntraVENous TITRATE    vancomycin 50 mg/mL oral solution (compounded) 250 mg  250 mg Per NG tube Q6H    PHENYLephrine (NEOSYNEPHRINE) 30,000 mcg in 0.9% sodium chloride 250 mL infusion   mcg/min IntraVENous TITRATE    furosemide (LASIX) injection 40 mg  40 mg IntraVENous DAILY PRN    morphine injection 1 mg  1 mg IntraVENous Q4H PRN    metroNIDAZOLE (FLAGYL) IVPB premix 500 mg  500 mg IntraVENous Q8H    albuterol-ipratropium (DUO-NEB) 2.5 MG-0.5 MG/3 ML  3 mL Nebulization Q6H PRN    chlorhexidine (PERIDEX) 0.12 % mouthwash 15 mL  15 mL Oral Q12H    heparin (porcine) injection 5,000 Units  5,000 Units SubCUTAneous Q12H    linezolid (ZYVOX) IVPB premix in D5W 600 mg  600 mg IntraVENous Q12H    acetaminophen (TYLENOL) solution 975 mg  975 mg Oral Q6H PRN    Or    acetaminophen (TYLENOL) suppository 650 mg  650 mg Rectal Q6H PRN    bisacodyl (DULCOLAX) suppository 10 mg  10 mg Rectal DAILY PRN    zinc oxide-cod liver oil (DESITIN) 40 % paste   Topical PRN    metoclopramide HCl (REGLAN) injection 5 mg  5 mg IntraVENous Q8H PRN    levalbuterol (XOPENEX) nebulizer soln 0.63 mg/3 mL  0.63 mg Nebulization Q6H PRN    aspirin (ASA) suppository 300 mg  300 mg Rectal DAILY    labetalol (NORMODYNE;TRANDATE) injection 10 mg  10 mg IntraVENous Q6H    hydrALAZINE (APRESOLINE) 20 mg/mL injection 20 mg  20 mg IntraVENous Q4H PRN    labetalol (NORMODYNE;TRANDATE) injection 20 mg  20 mg IntraVENous Q4H PRN    fat emulsion 20% (LIPOSYN, INTRALIPID) infusion 250 mL  250 mL IntraVENous Q MON, WED & FRI    insulin lispro (HUMALOG) injection   SubCUTAneous Q6H    glucose chewable tablet 16 g  4 Tab Oral PRN    dextrose (D50W) injection syrg 12.5-25 g  12.5-25 g IntraVENous PRN    glucagon (GLUCAGEN) injection 1 mg  1 mg IntraMUSCular PRN    sodium chloride (NS) flush 5-10 mL  5-10 mL IntraVENous Q8H    ondansetron (ZOFRAN) injection 4 mg  4 mg IntraVENous Q4H PRN    sodium chloride (NS) flush 10 mL  10 mL InterCATHeter PRN    sodium chloride (NS) flush 10-40 mL  10-40 mL InterCATHeter Q8H        Oleg Thakkar MD

## 2017-09-02 NOTE — PROGRESS NOTES
0700: Received bedside and verbal shift report from Jeronimo [facility].  0800: Assessment complete, pt sedated, responds to noxious stimuli, moves extremities spontaneously, NSR, with frequent PVC's, lungs diminished in the bases, temp 100.3 axillary, pt does not appear to be in any distress or pain, will titrated propofol accordingly and closely monitor. 1200: Assessment complete, temp 100.2 axillary, pt appears comfortable at 25mcg/kg/min of propofol. 1414: After deep suctioning patient, RR>30 and experiencing labored breathing, increased propofol to 30mcg/kg/min, will continue to titrate and monitor closely  1600: Assessment complete, pt appears to still be in respiratory distress with a behavioral pain scale of 7, will give PRN morphine. 1629: Pt respiratory rate continues to increase and continues to have labored breathing; gave PRN morphine  1800: Kenya Fields, patients daughter at bedside, very hostile and abusive verbally to me; only wanted to speak to a Doctor about her dads condition, she had concerns about the edema in his arms and belly; paged hospitalist Dr. Jacky Clancy about Katerina's concerns; orders noted and stopped tube feedings per the request of Dr. Jacky Clancy. 1850: Dr. Dusty Garcia, pulmonologist, updated on patients condition of RR >30 and labored breathing despite being on 40mcg/kg/hr, and pt had a decrease in respiratory rate <20 after morphine was given, order to start patient on a fentanyl drip with a range of  mcg/hr and to maintain a RASS of -2.  1900: Gave bedside and verbal shift report to MELISSA Decker, and informed that Dr. Jacky Clancy wants the results of the KUB called to him.

## 2017-09-02 NOTE — PROGRESS NOTES
Hospitalist Progress Note    NAME: Samuel Coyle   :  1928   MRN:  [id number]         Assessment / Plan:  Acute respiratory failure with hypoxia   Respiratory status declining x days, intubated 2017  Intubated and sedated currently  pBNP was 301  Large amount of hardened secretions in posterior pharynx, ? Occluded airway    Area was cleaned out after intubation  pCXR with increased interstitial markings  Completing treatment for ? pneumonia on IV antibiotics  Pulmonary following  ? place PEG tube before extubation    Acute renal failure likely contrast dye nephropathy and diuretics  Hypernatremia POA resolved  Continues on TPN, received addition D5W, off once NA normalized  Creatinine increased within day after contrasted CT scan done 17  Peak creatinine 1.79, prior had been 0.9 to 1.0 for days  Hold nephrotoxins  Generally stable    Candida albicans fungemia   On eraxis , currently day 10/14  Not clear that line was changed, should consider replacing the PICC    Acute encephalopathy POA  Acute brain injury due to possible anoxia and   Baseline alert, lived independently  Not talking much for past week per family  MRI of brain  multiple punctate foci of acute infarction in the  bilateral corona radiata and centrum semiovale. Background of mild chronic  microvascular ischemic disease. The result was discussed with the family by Dr. Dusty Garcia  Neuro felt findings c/w hypoxic injury and multiple small infarcts as the cause of encephalopathy and confusion.   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 Difficulty with feeding  Will need PEG once respiratory issues are improved  Continue TPN    Atrial fibrillation currently in NSR, off the cardizem gtt     CAD s/p recent Left circ stent 6/5985  Chronic diastolic CHF 03-21%  ASA  Has not taken plavix since admit  Last troponin was 0.04  Needs to resume plavix ASAP    DVT prophylaxis: Lovenox      Code status: Full code      NOK: palliative care team following - need to further address if majority of children for medical decisions or all other 4 children deferring to Abhishek      Body mass index is 28.88 kg/(m^2). Recommended Disposition: family wants to bring home         Subjective:     Chief Complaint / Reason for Physician Visit f/u acute respiratory failure  Intubated, sedated, not able to provide history  Discussed with RN events overnight. Review of Systems:  Symptom Y/N Comments  Symptom Y/N Comments   Fever/Chills    Chest Pain     Poor Appetite    Edema     Cough    Abdominal Pain     Sputum    Joint Pain     SOB/CLARKE    Pruritis/Rash     Nausea/vomit    Tolerating PT/OT     Diarrhea    Tolerating Diet     Constipation    Other       Could NOT obtain due to: Non verbal     Objective:     VITALS:   Last 24hrs VS reviewed since prior progress note.  Most recent are:  Patient Vitals for the past 24 hrs:  Patient Vitals for the past 24 hrs:  09/01/17 1900 - 72 27 145/50 97 %   09/01/17 1800 - 76 28 149/50 98 %   09/01/17 1719 - 72 26 - 97 %   09/01/17 1700 - 73 26 151/55 97 %   09/01/17 1600 98.6 °F (37 °C) 69 24 135/45 97 %   09/01/17 1500 - 72 26 151/58 98 %   09/01/17 1400 - 74 28 171/61 100 %   09/01/17 1300 - 70 25 156/58 99 %   09/01/17 1200 97.8 °F (36.6 °C) 68 26 147/61 99 %   09/01/17 1100 - 69 24 148/62 99 %         Intake/Output Summary (Last 24 hours) at 09/02/17 [facility] filed at 09/02/17 0900   Gross per 24 hour   Intake          4716.34 ml   Output             2760 ml   Net          1956.34 ml        PHYSICAL EXAM:  General:                    WD, WN. Intubated, sedated  EENT:                        PERRL. Anicteric sclerae. Resp:                         Decreased BS bilaterally, no wheezing   CV:                            Regular  rhythm, 1+ edema  GI:                             Soft, obese Non distended, Non tender.  +Bowel sounds                                   Rosales wound c/d/i no erythema  LN:   No cervical or inguinal WILLIAM  Neurologic:              Intubated, sedated, will stir when stimulated  Psych:                       Not anxious nor agitated  Skin:                           No rashes.  No jaundice     Reviewed most current lab test results and cultures  YES  Reviewed most current radiology test results   YES  Review and summation of old records today    NO  Reviewed patient's current orders and MAR    YES  PMH/ reviewed - no change compared to H&P  ________________________________________________________________________  Care Plan discussed with:    Comments   Patient x    Family      RN x    Care Manager     Consultant                        Multidiciplinary team rounds were held today with , nursing, pharmacist and clinical coordinator. Patient's plan of care was discussed; medications were reviewed and discharge planning was addressed.      ________________________________________________________________________  Total NON critical care TIME:  25   Minutes    Total CRITICAL CARE TIME Spent:   Minutes non procedure based      Comments   >50% of visit spent in counseling and coordination of care x    ________________________________________________________________________  Twyla Padilla MD     Procedures: see electronic medical records for all procedures/Xrays and details which were not copied into this note but were reviewed prior to creation of Plan. LABS:  I reviewed today's most current labs and imaging studies. Pertinent labs include:  Recent Results (from the past 12 hour(s))   GLUCOSE, POC    Collection Time: 09/01/17 11:33 PM   Result Value Ref Range    Glucose (POC) 199 (H) 65 - 100 mg/dL    Performed by [facility]    METABOLIC PANEL, BASIC    Collection Time: 09/02/17  3:23 AM   Result Value Ref Range    Sodium 141 136 - 145 mmol/L    Potassium 3.5 3.5 - 5.1 mmol/L    Chloride 111 (H) 97 - 108 mmol/L    CO2 23 21 - 32 mmol/L    Anion gap 7 5 - 15 mmol/L    Glucose 185 (H) 65 - 100 mg/dL    BUN 46 (H) 6 - 20 MG/DL    Creatinine 1.59 (H) 0.70 - 1.30 MG/DL    BUN/Creatinine ratio 29 (H) 12 - 20      GFR est AA 50 (L) >60 ml/min/1.73m2    GFR est non-AA 41 (L) >60 ml/min/1.73m2    Calcium 7.7 (L) 8.5 - 10.1 MG/DL   MAGNESIUM    Collection Time: 09/02/17  3:23 AM   Result Value Ref Range    Magnesium 2.2 1.6 - 2.4 mg/dL   PHOSPHORUS    Collection Time: 09/02/17  3:23 AM   Result Value Ref Range    Phosphorus 2.4 (L) 2.6 - 4.7 MG/DL   CBC WITH AUTOMATED DIFF    Collection Time: 09/02/17  3:23 AM   Result Value Ref Range    WBC 9.3 4.1 - 11.1 K/uL    RBC 2.71 (L) 4.10 - 5.70 M/uL    HGB 8.1 (L) 12.1 - 17.0 g/dL    HCT 24.7 (L) 36.6 - 50.3 %    MCV 91.1 80.0 - 99.0 FL    MCH 29.9 26.0 - 34.0 PG    MCHC 32.8 30.0 - 36.5 g/dL    RDW 17.2 (H) 11.5 - 14.5 %    PLATELET 989 022 - 285 K/uL    NEUTROPHILS 74 32 - 75 %    LYMPHOCYTES 18 12 - 49 %    MONOCYTES 5 5 - 13 %    EOSINOPHILS 3 0 - 7 %    BASOPHILS 0 0 - 1 %    ABS. NEUTROPHILS 6.9 1.8 - 8.0 K/UL    ABS. LYMPHOCYTES 1.7 0.8 - 3.5 K/UL    ABS. MONOCYTES 0.4 0.0 - 1.0 K/UL    ABS. EOSINOPHILS 0.3 0.0 - 0.4 K/UL    ABS.  BASOPHILS 0.0 0.0 - 0.1 K/UL   TROPONIN I    Collection Time: 09/02/17  3:23 AM   Result Value Ref Range    Troponin-I, Qt. 0.05 (H) <0.05 ng/mL   TROPONIN I    Collection Time: 09/02/17  3:23 AM   Result Value Ref Range    Troponin-I, Qt. 0.05 (H) <0.05 ng/mL GLUCOSE, POC    Collection Time: 09/02/17  6:07 AM   Result Value Ref Range    Glucose (POC) 202 (H) 65 - 100 mg/dL    Performed by Shantell Louie          Signed: Jak Valentin MD

## 2017-09-02 NOTE — PROGRESS NOTES
09/02/17 0650   ABCDE Bundle   SBT Safety Screen Passed Yes   SBT Trial Passed Yes   Weaning Parameters   Spontaneous Breathing Trial Complete Yes   Resp Rate Observed 31   Ve 15.7      RSBI 66

## 2017-09-02 NOTE — PROGRESS NOTES
Hospitalist Progress Note    NAME: Schuyler Muller   :  1928   MRN:  [id number]         Assessment / Plan:  Acute respiratory failure with hypoxia   Respiratory status declining x days, intubated 2017  Intubated and sedated  pBNP was 301  Large amount of hardened secretions in posterior pharynx, ? Occluded airway    Area was cleaned out after intubation  Completing treatment for ? pneumonia on IV antibiotics  Pulmonary following  ? place PEG tube before extubation    Acute renal failure likely contrast dye nephropathy and diuretics  Hypernatremia POA resolved  Continues on TPN, received addition D5W, off once NA normalized  Creatinine increased within day after contrasted CT scan done 17  Peak creatinine 1.79, prior had been 0.9 to 1.0 for days  Hold nephrotoxins  Generally stable    Candida albicans fungemia   On eraxis , currently day 10/14  Not clear that line was changed, should consider replacing the PICC    Acute encephalopathy POA  Acute brain injury due to possible anoxia and   Baseline alert, lived independently  Not talking much for past week per family  MRI of brain  multiple punctate foci of acute infarction in the  bilateral corona radiata and centrum semiovale. Background of mild chronic  microvascular ischemic disease. The result was discussed with the family by Dr. Stacy Eli  Neuro felt findings c/w hypoxic injury and multiple small infarcts as the cause of encephalopathy and confusion.   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 Difficulty with feeding  Will need PEG once respiratory issues are improved  Continue TPN    Atrial fibrillation currently in NSR, off the cardizem gtt     CAD s/p recent Left circ stent 5/8685  Chronic diastolic CHF 63-84%  ASA  Has not taken plavix since admit  Last troponin was 0.04  Needs to resume plavix ASAP    DVT prophylaxis: Lovenox      Code status: Full code      NOK: palliative care team following - need to further address if majority of children for medical decisions or all other 4 children deferring to Abhishek      Body mass index is 28.88 kg/(m^2). Recommended Disposition: family wants to bring home         Subjective:     Chief Complaint / Reason for Physician Visit f/u acute respiratory failure  Intubated, sedated, not able to provide history  Transferred to ICU yesterday with worsening hypoxia, brief PEA arrest before intubation  Will stir when stimulated, but not opening eyes or following commands  Discussed with RN events overnight. Review of Systems:  Symptom Y/N Comments  Symptom Y/N Comments   Fever/Chills    Chest Pain     Poor Appetite    Edema     Cough    Abdominal Pain     Sputum    Joint Pain     SOB/CLARKE    Pruritis/Rash     Nausea/vomit    Tolerating PT/OT     Diarrhea    Tolerating Diet     Constipation    Other       Could NOT obtain due to: Non verbal     Objective:     VITALS:   Last 24hrs VS reviewed since prior progress note.  Most recent are:  Patient Vitals for the past 24 hrs:  Patient Vitals for the past 24 hrs:  09/01/17 1900 - 72 27 145/50 97 %   09/01/17 1800 - 76 28 149/50 98 %   09/01/17 1719 - 72 26 - 97 %   09/01/17 1700 - 73 26 151/55 97 %   09/01/17 1600 98.6 °F (37 °C) 69 24 135/45 97 %   09/01/17 1500 - 72 26 151/58 98 %   09/01/17 1400 - 74 28 171/61 100 %   09/01/17 1300 - 70 25 156/58 99 %   09/01/17 1200 97.8 °F (36.6 °C) 68 26 147/61 99 %   09/01/17 1100 - 69 24 148/62 99 %         Intake/Output Summary (Last 24 hours) at 09/02/17 1009  Last data filed at 09/02/17 0900   Gross per 24 hour   Intake          4716.34 ml   Output             2760 ml   Net          1956.34 ml        PHYSICAL EXAM:  General:                    WD, WN. Intubated, sedated  EENT:                        PERRL. Anicteric sclerae. Resp:                         Decreased BS bilaterally, no wheezing   CV:                            Regular  rhythm, 1+ edema  GI:                             Soft, obese Non distended, Non tender.  +Bowel sounds                                   Rosales wound c/d/i no erythema  LN:   No cervical or inguinal WILLIAM  Neurologic:              Intubated, sedated, will stir when stimulated  Psych:                       Not anxious nor agitated  Skin:                           No rashes.  No jaundice     Reviewed most current lab test results and cultures  YES  Reviewed most current radiology test results   YES  Review and summation of old records today    NO  Reviewed patient's current orders and MAR    YES  PMH/ reviewed - no change compared to H&P  ________________________________________________________________________  Care Plan discussed with:    Comments   Patient x    Family  a Daughter by phone   RN x    Care Manager     Consultant                        Multidiciplinary team rounds were held today with , nursing, pharmacist and clinical coordinator. Patient's plan of care was discussed; medications were reviewed and discharge planning was addressed.      ________________________________________________________________________  Total NON critical care TIME:  25   Minutes    Total CRITICAL CARE TIME Spent:   Minutes non procedure based      Comments   >50% of visit spent in counseling and coordination of care x    ________________________________________________________________________  Susie Urbina MD     Procedures: see electronic medical records for all procedures/Xrays and details which were not copied into this note but were reviewed prior to creation of Plan. LABS:  I reviewed today's most current labs and imaging studies.   Pertinent labs include:  Recent Labs      09/01/17 0320 08/31/17   0120   WBC  9.4  11.2*   HGB  7.9*  9.2*   HCT  25.0*  29.3*   PLT  213  274     Recent Labs      09/01/17 0320 08/31/17   0120   NA  141  144   K  3.9  3.7   CL  110*  109*   CO2  25  32   GLU  170*  183*   BUN  51*  43*   CREA  1.61*  1.46*   CA  7.4*  8.0*   MG   --   2.3   PHOS   --   3.3       Signed: Elva Steven MD

## 2017-09-02 NOTE — PROGRESS NOTES
8108   Patient received last evening from [place]; patient on vent, FiO2 35%, peep 6 cm, , AC 14; patient suctioned for small to moderate amount of tan secretions from ETT and moderate amount of tan/yellow secretions from mouth/back of throat; patient on propofol 30 mics at the beginning of the shift and patient is tachypneic with frequent coughing spells and propofol increased to 35 mics, then 40 mics to decrease the coughing on the vent; respiratory rate tonight has been 27 - 31/min; oxygen saturation is 99 - 100% on the monitor; on the propofol patient does not open his eyes, has cough/gag; trace movement occasionally of extremities; patient restrained right and left wrist; patient in sinus rhythm with frequent PAC's and PVC's; patient has left upper arm double lumen PICC line infusing TPN at 83 cc/hr, lipids at 21 cc/hr and the propofol and NS at 10 cc/hr (med line); patient has right nare NG tube infusing Two Marcello HN at 10 cc/hr (goal) with water flush 100 cc every 4 hours; 10 cc residual one time; no emesis; wick catheter draining hazy dewin color urine; patient's daughter called one time to check on her father; no family/visitors at the bedside; lab done; portable chest x-ray done. 0700   Patient's propofol stopped at 0645 hours; patient calm; respiratory rate still 29 - 30/min; non-labored; when propofol off patient opens eyes spontaneously, trace spontaneous movement of all extremities except right arm which does withdraw to pain; patient will not obey when propofol off; wick catheter drained 1150 cc of urine this shift. No further contact with family during the night. Wick catheter drained 1150 cc of urine this shift; [facility].      0730  Patient with labored respiratory effort, respiratory rate 31 - 36/min; patient restless; patient placed back on rate on vent and propofol restarted at 40 mics.

## 2017-09-03 NOTE — PROGRESS NOTES
Cardiology Progress Note    9/3/2017     Admit Date: 7/27/2017    Cardiac Assessment/Plan:   CAD: Left circumflex artery PCI with HORACIO on 7/20/2017; EF 45-50% after PCI. Was on dual antiplatelet therapy, but then on ASA montherapy since  surgery: restart clopidogrel when able from other teams' standpoints. Anticipate this will not be done until sometime after PEG. Risk of acute stent closure is higher on monotherapy with just ASA. After looking at the events of the past month, I appreciate the complexity of the decision.     Paroxysmal Afib: transiently on dilt gtt: SINUS RHYTHM currently; If recurrent afib, would use amio.     Altered mental status:  Doesn't follow commands. Off sedation since this AM.   For other plans, see orders.     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    Convulsive syncope 08/15/2017    Disturbance of memory 08/15/2017    Bladder injury 07/28/2017        Subjective: Samuel Colunga does not report due to altered mental status   Chest pain  none  consistent with:  Non-cardiac CP         Atypical CP     None now  On going  Anginal CP     Dyspnea  none  at rest  with exertion         improved  unchanged  worse              PND  none  overnight       Orthopnea  none  improved  unchanged  worse   Presyncope  none  improved  unchanged  worse     Ambulated in hallway without symptoms   Yes   Ambulated in room without symptoms  Yes   Objective:    Physical Exam:  Overall VSSAF;    Visit Vitals    /48 (BP 1 Location: Right arm, BP Patient Position: At rest)    Pulse 78    Temp 99.1 °F (37.3 °C)    Resp 19    Ht 5' 10\" (1.778 m)    Wt 89 kg (196 lb 3.4 oz)    SpO2 100%    BMI 28.15 kg/m2     Temp (24hrs), Av.8 °F (37.7 °C), Min:98.6 °F (37 °C), Max:101.4 °F (38.6 °C)    Patient Vitals for the past 8 hrs:   Pulse   17 1600 78   17 1500 75   17 1444 76   17 1400 70   17 1311 75   17 1300 74   17 1223 72   17 1200 74   17 1155 73   17 1100 76   17 1000 81   17 0900 81    Patient Vitals for the past 8 hrs:   Resp   17 1600 19   17 1500 18   17 1444 18   17 1400 17   17 1300 18   17 1223 18   17 1200 18   17 1155 18   17 1100 19   17 1000 23   17 0900 27    Patient Vitals for the past 8 hrs:   BP   17 1600 130/48   17 1500 120/48   17 1400 121/44   17 1311 124/48   17 1300 124/48   17 1200 125/48   17 1100 119/45   17 1000 136/48   17 0900 131/53          Intake/Output Summary (Last 24 hours) at 17 1636  Last data filed at 17 1600   Gross per 24 hour   Intake          3694.81 ml   Output             2410 ml   Net          1284.81 ml     General Appearance: Well-developed, well-nourished, no acute distress. Ears/Nose/Mouth/Throat:   Dry MM; anicteric. JVP: Difficult to discern while supine. Resp:   clear to auscultation bilaterally anteriorly. Intubated. Cardiovascular:  RRR, S1, S2 normal, no new murmur. No gallop or rub. Abdomen:   Post-surgical, bowel sounds present. Extremities: No edema bilaterally. Skin:  Neuro: Warm and dry. No tremor. Doesn't follow commands.      cath site intact w/o hematoma or new bruit; distal pulse unchanged  Yes   Data Review:     Telemetry independently reviewed x sinus  chronic afib  parox afib  NSVT     ECG independently reviewed  NSR  afib  no significant changes  NSST-Tw chgs   x no new ECG provided for review   Lab results reviewed as noted below. Current medications reviewed as noted below. Recent Labs      09/01/17   1123   PH  7.47*   PCO2  34*   PO2  152*     Recent Labs      09/02/17   0323  09/01/17   0320  08/31/17   1839   TROIQ  0.05*  0.05*  0.12*  0.12*     Recent Labs      09/03/17   0438  09/02/17 0323  09/01/17   0320   NA  142  141  141   K  3.8  3.5  3.9   CL  110*  111*  110*   CO2  20*  23  25   BUN  50*  46*  51*   CREA  1.77*  1.59*  1.61*   GLU  148*  185*  170*   PHOS  3.8  2.4*   --    CA  7.6*  7.7*  7.4*   ALB  1.5*   --    --    WBC  8.9  9.3  9.4   HGB  7.3*  8.1*  7.9*   HCT  22.3*  24.7*  25.0*   PLT  201  221  213     Lab Results   Component Value Date/Time    Cholesterol, total 178 07/20/2017 12:28 PM    HDL Cholesterol 48 07/20/2017 12:28 PM    LDL, calculated 117.8 07/20/2017 12:28 PM    Triglyceride 286 08/26/2017 02:36 AM    CHOL/HDL Ratio 3.7 07/20/2017 12:28 PM     Recent Labs      09/03/17   0438   SGOT  41*   AP  161*   TP  6.7   ALB  1.5*   GLOB  5.2*     No results for input(s): INR, PTP, APTT in the last 72 hours.     No lab exists for component: INREXT, INREXT   No components found for: [place]    Current Facility-Administered Medications   Medication Dose Route Frequency    [START ON 9/4/2017] clopidogrel (PLAVIX) tablet 75 mg  75 mg Per NG tube DAILY    TPN ADULT - CENTRAL AA 5% D20% W/ CA + ELECTROLYTES   IntraVENous CONTINUOUS    vancomycin (VANCOCIN) 2,250 mg in 0.9% sodium chloride 500 mL IVPB  2,250 mg IntraVENous ONCE    VANCOMYCIN INFORMATION NOTE   Other CONTINUOUS    [START ON 9/5/2017] vancomycin (VANCOCIN) 1750 mg in  ml infusion  1,750 mg IntraVENous Q36H    TPN ADULT - CENTRAL AA 5% D20% W/ CA + ELECTROLYTES   IntraVENous CONTINUOUS    fentaNYL (PF) 900 mcg/30 ml infusion soln  0-200 mcg/hr IntraVENous CONTINUOUS    0.9% sodium chloride infusion  10 mL/hr IntraVENous CONTINUOUS    mupirocin (BACTROBAN) 2 % ointment   Topical Q12H    anidulafungin (ERAXIS) 100 mg in 0.9% sodium chloride 130 mL IVPB  100 mg IntraVENous Q24H    cefepime (MAXIPIME) 2 g in 0.9% sodium chloride (MBP/ADV) 100 mL  2 g IntraVENous Q12H    propofol (DIPRIVAN) infusion  5-50 mcg/kg/min IntraVENous TITRATE    vancomycin 50 mg/mL oral solution (compounded) 250 mg  250 mg Per NG tube Q6H    PHENYLephrine (NEOSYNEPHRINE) 30,000 mcg in 0.9% sodium chloride 250 mL infusion   mcg/min IntraVENous TITRATE    furosemide (LASIX) injection 40 mg  40 mg IntraVENous DAILY PRN    morphine injection 1 mg  1 mg IntraVENous Q4H PRN    albuterol-ipratropium (DUO-NEB) 2.5 MG-0.5 MG/3 ML  3 mL Nebulization Q6H PRN    chlorhexidine (PERIDEX) 0.12 % mouthwash 15 mL  15 mL Oral Q12H    heparin (porcine) injection 5,000 Units  5,000 Units SubCUTAneous Q12H    acetaminophen (TYLENOL) solution 975 mg  975 mg Oral Q6H PRN    Or    acetaminophen (TYLENOL) suppository 650 mg  650 mg Rectal Q6H PRN    bisacodyl (DULCOLAX) suppository 10 mg  10 mg Rectal DAILY PRN    zinc oxide-cod liver oil (DESITIN) 40 % paste   Topical PRN    metoclopramide HCl (REGLAN) injection 5 mg  5 mg IntraVENous Q8H PRN    levalbuterol (XOPENEX) nebulizer soln 0.63 mg/3 mL  0.63 mg Nebulization Q6H PRN    aspirin (ASA) suppository 300 mg  300 mg Rectal DAILY    labetalol (NORMODYNE;TRANDATE) injection 10 mg  10 mg IntraVENous Q6H    hydrALAZINE (APRESOLINE) 20 mg/mL injection 20 mg  20 mg IntraVENous Q4H PRN    labetalol (NORMODYNE;TRANDATE) injection 20 mg  20 mg IntraVENous Q4H PRN    fat emulsion 20% (LIPOSYN, INTRALIPID) infusion 250 mL  250 mL IntraVENous Q MON, WED & FRI    insulin lispro (HUMALOG) injection   SubCUTAneous Q6H    glucose chewable tablet 16 g  4 Tab Oral PRN    dextrose (D50W) injection syrg 12.5-25 g  12.5-25 g IntraVENous PRN    glucagon (GLUCAGEN) injection 1 mg  1 mg IntraMUSCular PRN    sodium chloride (NS) flush 5-10 mL  5-10 mL IntraVENous Q8H    ondansetron (ZOFRAN) injection 4 mg  4 mg IntraVENous Q4H PRN    sodium chloride (NS) flush 10 mL  10 mL InterCATHeter PRN    sodium chloride (NS) flush 10-40 mL  10-40 mL InterCATHeter Q8H        Thersa Skiff, MD

## 2017-09-03 NOTE — PROGRESS NOTES
Brief Progress Note    Chart reviewed. No family at bedside. Discussed with Sandra Crowell pt's bedside RN. Discussed with Marciano Caceres, MELISSA. Time and input appreciated. At this time, we will continue to provide support. Should you have questions/concerns or the need for a bedside visit by our team, please do not hesitate to contact us at [phone] Thank you for providing us w/ the continued opportunity to be involved in this patient's care.       Margo Yost MD  Palliative Care Team

## 2017-09-03 NOTE — PROGRESS NOTES
0600: Reminded nurse to put in No SAT because patient is finally breathing at 20-21 on sedation medicine.

## 2017-09-03 NOTE — PROGRESS NOTES
0700: Received bedside and verbal shift report from [place],  [address]: SAT in progress, propofol stopped, will continue to closely monitor. 0800: Assessment complete; Pt maintaining respiratory rate <20 off of propofol, opens eyes spontaneously, does not track or follow with eyes, does not follow commands, moves all extremities, NSR, pulses palpable in all 4 extremities, lungs course and diminished, afebrile, pt does not appear to be in pain or discomfort. 0930: Dr. Nedra Maria at bedside, updated him on patients condition; passed SAT, on 25mcg/hr of fentanyl, and off propofol since 0740; also informed of a hemaglobin that has been trending down in the past few days; order to obtain a stool occult test for next bowel movement  0947: Pt has a respiratory rate >25 and has very labored breathing/increased work load and using accessory muscles, fighting against ventilator; increased fentanyl to 50 mcg/hr; informed Dr. Nedra Mraia aware of this change. 1200: Assessment complete; pt appears comfortable on 50mcg/hr of fentanyl, will continue to closely monitor  1230: Pt passed SBT trial and was placed back on vent to rest, Dr. Nedra Maria aware; no new orders were received. 1340: Updated Dr. Kayce Villegas via telephone of patients lactic acid result of 2.0, CXR and urinalysis and culture; no orders were received. 1600: Assessment complete; Neuro: pt continues to opens eyes spontaneously but does not follow commands or track with eyes, decreased fentanyl to 25mcg/hr to reach a RASS of -2.  1640: Dr. Ruddy Colmenares (Cardiology) at bedside, updated on patients condition; no new orders noted. 1708: Respiratory rate >35, labored breathing noted, increased fentanyl drip to 50mcg/hr, will continue to closely monitor. 1900: Gave bedside and verbal shift report to  MELISSA Wren.

## 2017-09-03 NOTE — PROGRESS NOTES
0087  Patient received last evening from [address]; patient on vent, FiO2 35%, peep 6 cm, , AC 14; patient on propofol 40 mics and per MD order fentanyl PCA started; fentanyl PCA started at 25 mics; while on propofol 40 mics, patient has cough/gag, non labored respiratory effort, eyes do not open, restrained right and left wrist; after fentanyl started I started to decrease propofol; propofol at 25 mics at change of shift; yesterday patient SBT tried; patient OK for 1 3/4 hours, then respiratory rate 30 - 35/min, labored, patient agitated, restless, and patient was then placed back on AC rate; today since patient now on fentanyl PCA and propofol, will stop propofol at change of shift and then SBT when MD present; propofol was stopped at 0740 hours this AM and fentanyl was still at 25 mics at that time; patient in sinus rhythm; patient has left upper arm PICC line and two peripheral IV sites right arm (capped); patient infusing TPN at 83 cc/hr, the propofol, the fentanyl PCA, two NS iv fluid bags at 10 cc/hr each (one is a rider for fentanyl PCA and one is a med line); patient is NPO, right nare NG tube to low continuous wall suction and drained 90 cc of green color fluid this shift; no BM this shift; wick catheter drained 1335 cc of edwin color urine this shift; lab done; patient rested well tonight; since fentanyl PCA added, patient's heart rate and BP lower and two labetalol held this shift; Dr. Valera Courser in at change of shift and informed that labetalol held; report given to [address].

## 2017-09-03 NOTE — PROGRESS NOTES
Hospitalist Progress Note    NAME: Shelia Pabon   :  1928   MRN:  [id number]         Assessment / Plan:  New fever 101.4   Etiology unclear  BC sent, will check lactate  Check UA, urine culture, pCXR  IF all negative, will re CT abdomen/pelvis without contrast  Continue Antibiotics      Linezolid day 11, will consider stopping soon once fever resolved      Cefepime day 3      Flagyl IV day 8, stop as started for C difficile      Vancomycin PO day 3 for C difficile       Anidulafungin stop 2017  ? If w/u otherwise negative, should we change the PICC line    Acute respiratory failure with hypoxia   Respiratory status declining x days, intubated 2017  Intubated and sedated currently  pBNP was 301  Large amount of hardened secretions in posterior pharynx, ? Occluded airway    Area was cleaned out after intubation    Normal CO2 on ABG prior  pCXR with increased interstitial markings  V/Q scan low prob and LE dopplers negative  Completing treatment for ? pneumonia on IV antibiotics  Pulmonary following  ? place PEG tube before extubation    Acute renal failure likely contrast dye nephropathy and diuretics  Hypernatremia POA resolved  Continues on TPN, received addition D5W, off once NA normalized  Creatinine increased within day after contrasted CT scan done 17  Peak creatinine 1.79, prior had been 0.9 to 1.0 for days  Hold nephrotoxins  Generally stable    C difficile colitis 2017  Complete oral vancomycin    Candida albicans fungemia   On eraxis , currently day 10/14  Not clear that line was changed, should consider replacing the PICC    Acute encephalopathy POA  Acute brain injury due to possible anoxia and   Baseline alert, lived independently  Not talking much for past week per family  MRI of brain  multiple punctate foci of acute infarction in the  bilateral corona radiata and centrum semiovale. Background of mild chronic  microvascular ischemic disease.  The result was discussed with the family by Dr. Valerie Parikh  Neuro felt findings c/w hypoxic injury and multiple small infarcts as the cause of encephalopathy and confusion. EEG revealed moderately slow indicating hypoxic ischemic encephalopathy  Carotid duplex scan: bilateral ICA  less than 50% stenosis  Repeated head CT scan 8/30/2017 , unchanged  Ammonia 33, B12 1046, TSH 3.7  Corrected the hypernatremia  Will need to see how MS does after last event    S/P removal of bladder stone and repair of perforated bladder (7/28)  Cystoscopy with attempted clot evacuation with subsequent exploratory laparotomy with bladder stone retrieval and repair of large posterior bladder perforation on 7/28  Urology following; suprapubic out    Nutritional support, c/w TPN, ordered today. Difficulty with feeding  Will need PEG once respiratory issues are improved  Continue TPN    Atrial fibrillation currently in NSR, off the cardizem gtt     CAD s/p recent Left circ stent 5/3250  Chronic diastolic CHF 74-89%  ASA  Has not taken plavix since admit  Last troponin was 0.04  Needs to resume plavix ASAP    DVT prophylaxis: Lovenox      Code status: Full code      NOK: palliative care team following - need to further address if majority of children for medical decisions or all other 4 children deferring to Abhishek      Body mass index is 28.88 kg/(m^2). Recommended Disposition: family wants to bring home         Subjective:     Chief Complaint / Reason for Physician Visit f/u acute respiratory failure  Intubated, sedated, not able to provide history  Spoke with daughters last PM re several concerns     Increasing agitation yesterday on sedation     Stomach looks more distended, ?  Related to the TF, TF held     Arms were swollen, IV lasix x 1 given     They were concerned that the nurse caring for him was \"too young\"  KUB with normal bowel gas pattern  Febrile overnight to 101.4 overnight  IV lopressor held x several doses  2 loose but not watery stools  Discussed with RN events overnight. Review of Systems:  Symptom Y/N Comments  Symptom Y/N Comments   Fever/Chills    Chest Pain     Poor Appetite    Edema     Cough    Abdominal Pain     Sputum    Joint Pain     SOB/CLARKE    Pruritis/Rash     Nausea/vomit    Tolerating PT/OT     Diarrhea    Tolerating Diet     Constipation    Other       Could NOT obtain due to: Non verbal     Objective:     VITALS:   Last 24hrs VS reviewed since prior progress note.  Most recent are:  Patient Vitals for the past 24 hrs:   Temp Pulse Resp BP SpO2   09/03/17 0818 - 75 20 - 100 %   09/03/17 0800 98.6 °F (37 °C) 74 21 134/52 100 %   09/03/17 0700 - 71 19 126/46 100 %   09/03/17 0600 - 67 19 116/46 100 %   09/03/17 0543 - 64 - 105/42 -   09/03/17 0416 - 71 27 - 98 %   09/03/17 0303 100.2 °F (37.9 °C) - - - -   09/03/17 0200 - 76 29 (!) 107/37 98 %   09/03/17 0100 - 79 28 (!) 117/37 98 %   09/03/17 0026 - 79 - 127/46 -   09/03/17 0012 (!) 101.4 °F (38.6 °C) - - - -   09/03/17 0001 - 78 27 127/46 97 %   09/03/17 0000 - 78 26 - 98 %   09/02/17 2350 - 78 28 - 99 %   09/02/17 2300 - 76 29 120/43 98 %   09/02/17 2200 - 74 26 121/40 98 %   09/02/17 2100 - 74 23 112/41 98 %   09/02/17 2000 - 77 29 123/44 100 %   09/02/17 1950 99.8 °F (37.7 °C) 76 24 - 100 %   09/02/17 1900 - 83 (!) 31 142/51 100 %   09/02/17 1800 - 84 (!) 31 133/47 100 %   09/02/17 1700 - 87 30 144/51 100 %   09/02/17 1600 99.9 °F (37.7 °C) 91 (!) 33 (!) 154/96 100 %   09/02/17 1500 - 89 (!) 40 163/60 100 %   09/02/17 1446 - 88 (!) 34 - 100 %   09/02/17 1400 - 73 30 151/54 100 %   09/02/17 1300 - 68 28 155/55 100 %   09/02/17 1200 100.2 °F (37.9 °C) 76 26 151/58 100 %   09/02/17 1146 - 71 27 - 100 %   09/02/17 1100 - 77 27 163/63 100 %   09/02/17 1000 - 75 26 159/60 100 %           Intake/Output Summary (Last 24 hours) at 09/03/17 0924  Last data filed at 09/03/17 0900   Gross per 24 hour   Intake           3648.8 ml   Output             2535 ml   Net           1113.8 ml        PHYSICAL EXAM:  General:                    WD, WN. Intubated, sedated, calm this AM  EENT:                        PERRL. Anicteric sclerae. Resp:                         Decreased BS bilaterally, no wheezing   CV:                            Regular  rhythm, 1+ edema  GI:                             Soft, obese, mildly ditended distended, Non tender.  + hypoactive Bowel sounds                                    wound c/d/i no erythema  LN:   No cervical or inguinal WILLIAM  Neurologic:              Intubated, sedated, not responsive  Psych:                       Not anxious nor agitated  Skin:                           No rashes.  No jaundice     Reviewed most current lab test results and cultures  YES  Reviewed most current radiology test results   YES  Review and summation of old records today    NO  Reviewed patient's current orders and MAR    YES  PMH/ reviewed - no change compared to H&P  ________________________________________________________________________  Care Plan discussed with:    Comments   Patient x    Family  x Daughters last PM   RN x    Care Manager     Consultant                        Multidiciplinary team rounds were held today with , nursing, pharmacist and clinical coordinator. Patient's plan of care was discussed; medications were reviewed and discharge planning was addressed. ________________________________________________________________________  Total NON critical care TIME:  25   Minutes    Total CRITICAL CARE TIME Spent:   Minutes non procedure based      Comments   >50% of visit spent in counseling and coordination of care x    ________________________________________________________________________  Vanesa Aguilar MD     Procedures: see electronic medical records for all procedures/Xrays and details which were not copied into this note but were reviewed prior to creation of Plan.       LABS:  I reviewed today's most current labs and imaging studies. Pertinent labs include:  Recent Results (from the past 12 hour(s))   GLUCOSE, POC    Collection Time: 09/03/17 12:36 AM   Result Value Ref Range    Glucose (POC) 168 (H) 65 - 100 mg/dL    Performed by [facility]    CBC WITH AUTOMATED DIFF    Collection Time: 09/03/17  4:38 AM   Result Value Ref Range    WBC 8.9 4.1 - 11.1 K/uL    RBC 2.46 (L) 4.10 - 5.70 M/uL    HGB 7.3 (L) 12.1 - 17.0 g/dL    HCT 22.3 (L) 36.6 - 50.3 %    MCV 90.7 80.0 - 99.0 FL    MCH 29.7 26.0 - 34.0 PG    MCHC 32.7 30.0 - 36.5 g/dL    RDW 17.3 (H) 11.5 - 14.5 %    PLATELET 355 235 - 695 K/uL    NEUTROPHILS 71 32 - 75 %    LYMPHOCYTES 19 12 - 49 %    MONOCYTES 6 5 - 13 %    EOSINOPHILS 3 0 - 7 %    BASOPHILS 1 0 - 1 %    ABS. NEUTROPHILS 6.3 1.8 - 8.0 K/UL    ABS. LYMPHOCYTES 1.7 0.8 - 3.5 K/UL    ABS. MONOCYTES 0.5 0.0 - 1.0 K/UL    ABS. EOSINOPHILS 0.3 0.0 - 0.4 K/UL    ABS. BASOPHILS 0.1 0.0 - 0.1 K/UL   METABOLIC PANEL, COMPREHENSIVE    Collection Time: 09/03/17  4:38 AM   Result Value Ref Range    Sodium 142 136 - 145 mmol/L    Potassium 3.8 3.5 - 5.1 mmol/L    Chloride 110 (H) 97 - 108 mmol/L    CO2 20 (L) 21 - 32 mmol/L    Anion gap 12 5 - 15 mmol/L    Glucose 148 (H) 65 - 100 mg/dL    BUN 50 (H) 6 - 20 MG/DL    Creatinine 1.77 (H) 0.70 - 1.30 MG/DL    BUN/Creatinine ratio 28 (H) 12 - 20      GFR est AA 44 (L) >60 ml/min/1.73m2    GFR est non-AA 36 (L) >60 ml/min/1.73m2    Calcium 7.6 (L) 8.5 - 10.1 MG/DL    Bilirubin, total 0.7 0.2 - 1.0 MG/DL    ALT (SGPT) 31 12 - 78 U/L    AST (SGOT) 41 (H) 15 - 37 U/L    Alk.  phosphatase 161 (H) 45 - 117 U/L    Protein, total 6.7 6.4 - 8.2 g/dL    Albumin 1.5 (L) 3.5 - 5.0 g/dL    Globulin 5.2 (H) 2.0 - 4.0 g/dL    A-G Ratio 0.3 (L) 1.1 - 2.2     MAGNESIUM    Collection Time: 09/03/17  4:38 AM   Result Value Ref Range    Magnesium 2.4 1.6 - 2.4 mg/dL   PHOSPHORUS    Collection Time: 09/03/17  4:38 AM   Result Value Ref Range    Phosphorus 3.8 2.6 - 4.7 MG/DL   CULTURE, BLOOD, LINE Collection Time: 09/03/17  4:38 AM   Result Value Ref Range    Special Requests: NO SPECIAL REQUESTS      Culture result: NO GROWTH AFTER 3 HOURS     GLUCOSE, POC    Collection Time: 09/03/17  5:43 AM   Result Value Ref Range    Glucose (POC) 160 (H) 65 - 100 mg/dL    Performed by Kandice Damon          Signed: Joe Griffith MD

## 2017-09-03 NOTE — PROGRESS NOTES
09/03/17 1135 09/03/17 1155 09/03/17 1221   Weaning Parameters   Resp Rate Observed 19 19 16   Ve 10.9 10.5 10.5    742 735   RSBI 32 28 24       Placed on SBT (spont mode) per Dr. Thais Dance. Patient passed SBT. Placed back on a rate at previous settings.

## 2017-09-03 NOTE — PROGRESS NOTES
Intensivist    Remains on vent  Seems more comfortable and calm on fentanyl. Currently off of propofol  TF held for abd distension  Supposed to have PEG next week and plan was to keep on vent until that is done    Patient Vitals for the past 4 hrs:   Temp Pulse Resp BP SpO2   09/03/17 1000 - 81 23 136/48 100 %   09/03/17 0818 - 75 20 - 100 %   09/03/17 0800 98.6 °F (37 °C) 74 21 134/52 100 %   09/03/17 0700 - 71 19 126/46 100 %       No distress  Scattered rhonchi  RRR  bs present    Lab:  Recent Labs      09/03/17   0438  09/02/17   0323  09/01/17   0320  08/31/17   1839   WBC  8.9  9.3  9.4   --    HGB  7.3*  8.1*  7.9*   --    PLT  201  221  213   --    NA  142  141  141   --    K  3.8  3.5  3.9   --    CL  110*  111*  110*   --    CO2  20*  23  25   --    BUN  50*  46*  51*   --    CREA  1.77*  1.59*  1.61*   --    GLU  148*  185*  170*   --    CA  7.6*  7.7*  7.4*   --    MG  2.4  2.2   --    --    PHOS  3.8  2.4*   --    --    TROIQ   --   0.05*  0.05*  0.12*  0.12*   TBILI  0.7   --    --    --    SGOT  41*   --    --    --      Impression  1. Acute respiratory failure with hypoxia s/p emergent intubation  2. PEA arrest from rspiratory insufiiciency due to   3. Upper airway obstruction from dry, thick coating of supraglottic secretions which covered his cords; mouth breathing dried his airway and mucous with poor clearance of secretions  4. Acute encephalopathy from hypoxia per MRI and Neurology evaluation; hypersomnolent with level of alertness very poor for past 10-14 days  5. Dysphagia  6. Protein calorie malnutrition on TPN  7. C Diff 8/25 on IV Flagyl  8. Complicated Enterococcal UTI- sensitive to Amp  9. NSTEMI demand ischemia  10. CAD recent stents per [facility] July 2017, needs plavix to be restarted  11. PAF Cardiology following  12. Pneumonia??: last xray 8/23   13.  Postop respiratory failure, Extubated second time on 8-7 after Midline abdominal incision fascial wound dehiscence 8/5/17 Exploratory laparotomy with closure of midline incision using retention sutures per Dr. Mendez Fall following. 14. S/P removal of bladder stone and repair of perforated bladder POA  15.  S/P subsequent ex-lap due to extensive sq emphysema in abdomen/retroperitoneum - no sign of bowel perforation    --continue vent  --continue supportive care  --tpn renewed    Lisseth Parker MD

## 2017-09-03 NOTE — PROGRESS NOTES
Pharmacy Automatic Renal Dosing Protocol - Antimicrobials    Indication for Antimicrobials: Wound infection; HCAP; UTI; Candidemia; CDIFF    Current Regimen of Each Antimicrobial (Start Day & Day of Therapy): Anidulafungin 100 mg IV every 24 hours (Started 17; Day #12 of 14)  Cefepime 2 gm IV every 12 hours (Started 17; Day #4)  Linezolid 600 mg IV every 12 hours (Started 17; Day #12)  Metronidazole 500 mg IV every 8 hours (Started 17; Day #10)  Vancomycin 250 mg PO every 6 hours (Started 17; Day #5)  Vancomycin 2250mg IV x 1 on 9/3  Vancomycin 1750mg IV q36hr to start  at 0300    Significant cultures:   17 Respiratory culture = NG currently (scant candida albicans)---final  17 CDIFF = Postive (FINAL)  17 Urine culture = Greater than 100K CFU/mL C albicans (FINAL)  17 Blood culture = No growth (FINAL)  17 Wound culture = Few oxacillin-resistant staph epi; Few enterococcus faecalis; Light anaerobic GPC (FINAL)  17 Wound culture = Few oxacillin-resistant staph epi; Light anaerobic GPC (FINAL)  17 Urine culture = No growth (FINAL)  17 Blood culture = Candida albicans in 1/2 (FINAL)  17 Urine culture = No growth (FINAL)  17 Urine culture = Greater than 100K CFU/mL Enterococcus (FINAL)    Labs:  Recent Labs      17   0438  17   0323  17   0320   CREA  1.77*  1.59*  1.61*   BUN  50*  46*  51*   WBC  8.9  9.3  9.4     Temp (24hrs), Av °F (37.8 °C), Min:98.6 °F (37 °C), Max:101.4 °F (38.6 °C)    Creatinine Clearance: ~33 mL/min    Impression/Plan:   - Anidulafungin dosed appropriately based on indication. Continue current regimen. 14 day duration added on 17 CCU rounds. - Cefepime dosed appropriately based on indication and renal function. Continue current regimen.  - Linezolid discontinued  - Metronidazole IV dosed appropriately based on indication. Continue current regimen.   - Vancomycin PO dosed appropriately based on indication. Continue current regimen. Per Dr. Hadley Russell,   Wants pharmacy to dose and add IV vancomycin therapy for UTI  Vancomycin 2250mg IV x 1 (25mg/kg load), then 1750mg IV q 36hr to yield trough of ~17mcg/mL to start on 9/5 @ 0300. Pharmacy will continue to monitor and adjust Vancomycin interval/dose if Scr changes. Pharmacy will follow daily and adjust doses of monitored medications as appropriate for renal function and/or serum levels.     Thank you,  DENNY Maradiaga

## 2017-09-04 NOTE — PROGRESS NOTES
Spoke with RN about how the weekend was with family. Pt did not seem to be awake. Left a Care Card on tray table. Comfort Baron will continue to follow as able. Carey Villalobos M.Div.   Palliative  Fellow

## 2017-09-04 NOTE — PROGRESS NOTES
Nutrition Assessment:    INTERVENTIONS/RECOMMENDATIONS:   · Increase TF as medically feasible, recommend  · Increasing TwoCal by 10 ml/hr q 12 to goal of 35 ml/hr + prosource BID with 100 ml water flush q 4 hrs   · Wean TPN as TF increasing  · Consider monitor lipids due to lipid emulsion 3x per week and propofol     ASSESSMENT:   Chart reviewed, TF held on 9/3 due to abdominal distention, restarted today @ 10 ml/hr. If tolerated, consider increase TF to goal of 35 ml/hr + prosource BID with 100 ml water flush q 4 hrs and weaning TPN. TPN D20 5%AA currently running @ 83 ml/hr with lipids 3x per week. Propofol providing ~274 kcals. Diet Order: NPO  % Eaten:  No data found. Pertinent Medications: [x]Reviewed: insulin, propofol @ 10.4  Pertinent Labs: [x]Reviewed:   Food Allergies: [x]NKFA  []Other   Last BM:  9/2     Pressure Ulcer:    sacral     [x] Stage I   [] Stage II   [] Stage III   [] Stage IV  Edema:                []RUE   []LUE   []RLE   []LLE    Anthropometrics: Height: 5' 10\" (177.8 cm) Weight: 92.9 kg (204 lb 12.9 oz)    IBW (%IBW):   ( ) UBW (%UBW):   (  %)    BMI: Body mass index is 29.39 kg/(m^2). This BMI is indicative of:  []Underweight   []Normal   [x]Overweight   [] Obesity   [] Extreme Obesity (BMI>40)  Last Weight Metrics:  Weight Loss Metrics 9/4/2017 7/26/2017 3/26/2017 11/3/2016 3/17/2015 10/2/2014 5/16/2013   Today's Wt 204 lb 12.9 oz 211 lb 3.2 oz 215 lb 6.2 oz 217 lb 6 oz 214 lb 4.8 oz 200 lb 213 lb 10 oz   BMI 29.39 kg/m2 30.3 kg/m2 30.91 kg/m2 31.19 kg/m2 30.75 kg/m2 28.7 kg/m2 30.65 kg/m2       Estimated Nutrition Needs (Based on): 2127 Kcals/day (PSU (MSJ 1571)) , 88 g (1g/kg bw) Protein  Carbohydrate:  At Least 130 g/day  Fluids: 2127 mL/day or per primary team    NUTRITION DIAGNOSES:   Problem:  Swallowing difficulty      Etiology: related to AMS, encephalopathy     Signs/Symptoms: as evidenced by NPO per SLP      NUTRITION INTERVENTIONS:  Meals/Snacks: General/healthful diet Enteral/Parenteral Nutrition: Initiate enteral nutrition                GOAL:   Pt will tolerate initiation of TF with residuals <250mL and TPN will start to be weaned in 2-3 days.      NUTRITION MONITORING AND EVALUATION   Behavioral-Environmental Outcomes: Behavior  Food/Nutrient Intake Outcomes: Enteral/parenteral nutrition intake, IV fluids  Physical Signs/Symptoms Outcomes: GI, Glucose profile, GI profile, Electrolyte and renal profile, Weight/weight change    Previous Goal Met:   [] Met              [x] Progressing Towards Goal              [] Not Progressing Towards Goal   Previous Recommendations:   [x] Implemented          [] Not Implemented          [] Not Applicable    LEARNING NEEDS (Diet, Food/Nutrient-Drug Interaction):    [x] None Identified   [] Identified and Education Provided/Documented   [] Identified and Pt declined/was not appropriate     Cultural, Orthodoxy, OR Ethnic Dietary Needs:    [x] None Identified   [] Identified and Addressed     [x] Interdisciplinary Care Plan Reviewed/Documented    [x] Discharge Planning: TBD, likely TF via PEG   [] Participated in Interdisciplinary Rounds    NUTRITION RISK:    [x] High              [] Moderate           []  Low  []  Minimal/Uncompromised      Sylwia Mckeon RDN  Pager [phone]  Weekend Pager [phone]

## 2017-09-04 NOTE — PROGRESS NOTES
0700: Received bedside and verbal shift report from MELISSA Correa.  0800: Assessment complete, pt does not respond to any painful stimuli, has a strong gag reflex, and opens eyes spontaneously, currently on 50mcg/fentanly, NSR, pulses palpable in all 4 extremities, lungs course and diminished, temp 100.9, does not appear to be in any pain. 0840: Dr. Delfino Momin at bedside, updated on patients condition, order to re-start tube feeds today and re-place pt on ventilator support d/t neurological status  0850: Sent note to pharmacy to send Eraxis  0920: Called pharmacy for the status of Eraxis to be delivered, was told the IV room needed to make it and then it will be sent; medication will be given late. 1000: Tube feeds resumed at 10ml per hour. 1042: Pt heart rate in 130-150's, Dr. Mike Lane paged; /52; strips printed and placed on chart. 1100: Updated Dr. Mike Lane via telephone of patients HR and BP; orders noted and 12-lead EKG obtained and placed on chart  1200: Assessment complete; pt remaiins unresponsive, will stop fentanyl to assess neuro status. 1600: Assessment complete; pt remains unresponsive, opens eyes spontaneously but does not track or follow, does not move extremities or withdraw to pain, gag reflex present, pupils round, sluggish/reactive, temp 101.3, gave PRN Tylenol, pt does not appear to be in pain off of the fentanyl, RR<25.  1800: Temperature 100.2 axillary  1815: Updated Chuy Gonzalez (patients daughter) via telephone informed her that patient is now in an  atrial fibrillation rhythm, started on amiodarone. 1900: Gave bedside and verbal shift report to Gwen Irizarry RN.

## 2017-09-04 NOTE — PROGRESS NOTES
[facility] Consult Service Progress NOTE  Pulmonary, Critical Care, and Sleep Medicine    Name: Samuel Colunga MRN: [id number]   : 1928 Hospital: [facility]   Date: 2017  Admission Date: 2017     Chart and notes reviewed. Data reviewed. Pt seen on rounds earlier today. I have evaluated and examined the patient. Pt is acutely ill. Reviewed the evaluation and will assist in implementing the plan as outlined by Dr. Rosalinda Marroquin and team    Hospital Day: 36    I was asked by Hill Taveras MD to see Samuel Colunga in consultation for a chief complaint of Acute respiratory failure with hypoxia.   Remains on vent. Seems more comfortable and calm on fentanyl 50 mcg/hr. Currently off of propofol  TF held for abd distension. Daughter upset over this and swelling af arms. Explained low protein to daughter and wife. Supposed to have PEG next week and plan was to keep on vent until that is done. Holding Plavix until all procedures done. Discussed lack of neuro recovery the main concern. Next step would theroretically be trach but this is a big decision and asked them to think about this. He was so active and may not be content with beng anything less. Will see what GI plans. Renewed TPN       transient drop in BP last evening but pressors not requireed. Still critcally ill on vent. Sedated with propofol. Restless and agitated when lightened. No fever. D/w .  Pt moved to CCU with agonal respirations and rapid PEA arrest. Emergently intubated noting thick, dried irregular layers of respiratory secretions that caused upper airway obstruction. MacGill forceps needed to tease away the debris and expose the glottis, cords. Pt was transferred from CCU to floor on  after a complicated CCU stay. Initial surgery was on 17 for a cystoscopy with resultant large bladder perforation requiring Ex Lap and repair of bladder perforation.  Post op he had atrial Fib RVR, NSTEMI. Had a period of agitation, HTN. Pt was on a course of Abx for sepsis, wound infection. Subsequently, he had wound dehiscence 8/5. Pt eventually had return of bowel function but failed clear liquids due to dysphagia. On 8/12 has RRT call for AMS. Had negative head Ct but on 8/16 had MRI and Neurology consult. 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 EEG was slowed and he remained hypersomnolent. On 8/23 he developed a fever, hyperventilation and abnormal CXR. After empiric abx, fever resolved but he had hypernatremia. All sutures removed with good wound healing. Suprapubic catheter removed. Pt has been followed closely by primary team, Palliative Care, Urology and Surgery. I visited with Pt's wife, 2 dtrs Nelly Perez and Asiya and elena/Colby's dtr in waiting room after [employer] called. Pt's management was discussed in detail They expressed their concerns and I tried to answer their questions. His heart did stop for a few minutes and it remains to be seen how this might affect his recovery. He is stil critically ill and has been through a lot. They accept that pt will need a PEG for support. IMPRESSION:   1. Acute respiratory failure with hypoxia s/p emergent intubation  2. PEA arrest from rspiratory insufiiciency due to   3. Upper airway obstruction from dry, thick coating of supraglottic secretions which covered his cords; mouth breathing dried his airway and mucous with poor clearance of secretions  4. Acute encephalopathy from hypoxia per MRI and Neurology evaluation; hypersomnolent with level of alertness very poor for past 10-14 days  5. Dysphagia  6. Protein calorie malnutrition on TPN  7. C Diff 8/25 on IV Flagyl  8. Complicated Enterococcal UTI- sensitive to Amp  9. NSTEMI demand ischemia  10. CAD recent stents per [facility] July 2017, needs plavix to be restarted  11. PAF Cardiology following  12. Pneumonia??: last xray 8/23   13.  Postop respiratory failure, Extubated second time on 8-7 after Midline abdominal incision fascial wound dehiscence 8/5/17 Exploratory laparotomy with closure of midline incision using retention sutures per Dr. Frank Irwin following. 14. S/P removal of bladder stone and repair of perforated bladder POA  15. S/P subsequent ex-lap due to extensive sq emphysema in abdomen/retroperitoneum - no sign of bowel perforation  16. Pt is critically ill and at high risk of end organ dysfunction and failure  17. Critical care time with pt exclusive of procedures  35    minutes      RECOMMENDATIONS/PLAN:   1. CCU  2. Would NOT extubate( cannot control airway and secretions)  3. lighten sedation and reorient  4. will ask GI to consider PEG while on vent and before we resume Plavix  5. PEG per GI, will restart TF and see if tolerated  6. Consider trach  7. Oral care  8. Sputum for culture  9. NG tube  10. Add PO vancomycin  11. abx per  - looks like will be completing course soon but sputum looks ominous  12. No Ativan per family request  15. Discuss any scan with family before ordering  15. renew TPN for now  15. replete lytes  16. PICC  17. DVT, SUP prophylaxis  18. Patient requiring restraints due to threat of injury to self, interference with medical devices. 19. Will be available to assist in medical management while in the CCU pending disposition     ICU disposition :Unchanged. Updated Family. Time above. Risk of deterioration: high   [x] High complexity decision making was performed   [x] See my orders for details  Tubes:   Rosales and Intubated/Vent    Code: Full Code        Hemodynamics:    CO:    CI:    CVP: CVP (mmHg): 8 mmHg (08/02/17 0800)  SVR:   PAP Systolic:    PAP Diastolic:    PVR:    [id number]:        Ventilator Settings:      Mode Rate TV Press PEEP FiO2 PIP Min.  Vent   CPAP    500 ml 5 cm H2O  6 cm H20 35 %  13 cm H2O  10.3 l/min      Vital Signs: Intake/Output: Intake/Output:   Visit Vitals    /84    Pulse 78    Temp (!) 100.8 °F (38.2 °C)    Resp 18    Ht 5' 10\" (1.778 m)    Wt 92.9 kg (204 lb 12.9 oz)    SpO2 100%    BMI 29.39 kg/m2         O2 Device: Endotracheal tube  O2 Flow Rate (L/min): 12 l/min  Wt Readings from Last 4 Encounters:   17 92.9 kg (204 lb 12.9 oz)   08/15/17 94.2 kg (207 lb 10.8 oz)   17 95.8 kg (211 lb 3.2 oz)   17 97.7 kg (215 lb 6.2 oz)       Temp (24hrs), Av.5 °F (37.5 °C), Min:98.8 °F (37.1 °C), Max:100.8 °F (38.2 °C)     Intake/Output Summary (Last 24 hours) at 17 1101  Last data filed at 17 1000   Gross per 24 hour   Intake          3047.16 ml   Output             2180 ml   Net           867.16 ml     Last shift:       0701 -  1900  In: 417.1 [I.V.:367.1]  Out: 265 [Urine:265]  Last 3 shifts:  190 -  0700  In: 5551.9 [I.V.:5461.9]  Out: 3642 [Urine:3420]     Telemetry:    normal sinus rhythm    Physical Exam:    General: severely ill elderly WM   HEENT: intubated; dry mucosa   Neck: No abnormally enlarged lymph nodes. Chest: normal   Lungs: rhonchi   Heart: Regular rate and rhythm   Abdomen: soft, non-tender, without masses or organomegaly   :    Extremity: negative, clubbing;    Neuro: obtunded   Psych: unresponsive    Skin: Pallor and Turgor absent;   Pulses: Bilateral, Radial, 2+ Normal upper and lower extremity pulses   Capillary refill: normal brisk capillary refill;    DATA:    Interval lab and diagnostic data was reviewed. Interval radiology images were independently viewed and available reports were reviewed. All lab results for the last 24 hours reviewed.       MAR reviewed and pertinent medications noted or modified as needed    MEDS:   Current Facility-Administered Medications   Medication    TPN ADULT-CENTRAL AA 5% D20%-MVI W/ VIT K-RCH    clopidogrel (PLAVIX) tablet 75 mg    TPN ADULT - CENTRAL AA 5% D20% W/ CA + ELECTROLYTES    VANCOMYCIN INFORMATION NOTE    [START ON 2017] vancomycin (VANCOCIN) 1750 mg in  ml infusion    fentaNYL (PF) 900 mcg/30 ml infusion soln    0.9% sodium chloride infusion    mupirocin (BACTROBAN) 2 % ointment    anidulafungin (ERAXIS) 100 mg in 0.9% sodium chloride 130 mL IVPB    cefepime (MAXIPIME) 2 g in 0.9% sodium chloride (MBP/ADV) 100 mL    propofol (DIPRIVAN) infusion    vancomycin 50 mg/mL oral solution (compounded) 250 mg    PHENYLephrine (NEOSYNEPHRINE) 30,000 mcg in 0.9% sodium chloride 250 mL infusion    furosemide (LASIX) injection 40 mg    morphine injection 1 mg    albuterol-ipratropium (DUO-NEB) 2.5 MG-0.5 MG/3 ML    chlorhexidine (PERIDEX) 0.12 % mouthwash 15 mL    heparin (porcine) injection 5,000 Units    acetaminophen (TYLENOL) solution 975 mg    Or    acetaminophen (TYLENOL) suppository 650 mg    bisacodyl (DULCOLAX) suppository 10 mg    zinc oxide-cod liver oil (DESITIN) 40 % paste    metoclopramide HCl (REGLAN) injection 5 mg    levalbuterol (XOPENEX) nebulizer soln 0.63 mg/3 mL    aspirin (ASA) suppository 300 mg    labetalol (NORMODYNE;TRANDATE) injection 10 mg    hydrALAZINE (APRESOLINE) 20 mg/mL injection 20 mg    labetalol (NORMODYNE;TRANDATE) injection 20 mg    fat emulsion 20% (LIPOSYN, INTRALIPID) infusion 250 mL    insulin lispro (HUMALOG) injection    glucose chewable tablet 16 g    dextrose (D50W) injection syrg 12.5-25 g    glucagon (GLUCAGEN) injection 1 mg    sodium chloride (NS) flush 5-10 mL    ondansetron (ZOFRAN) injection 4 mg    sodium chloride (NS) flush 10 mL    sodium chloride (NS) flush 10-40 mL        Labs:    Recent Labs      09/04/17   0400  09/03/17   0438  09/02/17   0323   WBC  8.9  8.9  9.3   HGB  7.2*  7.3*  8.1*   PLT  222  201  221     Recent Labs      09/04/17   0400  09/03/17   1009  09/03/17   0438  09/02/17   0323   NA  141   --   142  141   K  4.4   --   3.8  3.5   CL  112*   --   110*  111*   CO2  22   --   20*  23   GLU  167*   --   148*  185*   BUN  58*   --   50*  46*   CREA  1.69*   --   1.77* 1.59*   CA  7.5*   --   7.6*  7.7*   MG  2.5*   --   2.4  2.2   PHOS  4.4   --   3.8  2.4*   LAC   --   2.0   --    --    ALB  1.5*   --   1.5*   --    SGOT  40*   --   41*   --    ALT  28   --   31   --      Recent Labs      09/01/17   1123   PH  7.47*   PCO2  34*   PO2  152*   HCO3  24   FIO2  50     Recent Labs      09/02/17   0323   TROIQ  0.05*  0.05*     Lab Results   Component Value Date/Time     08/24/2017 12:40 AM          Imaging:  [x]                           I have personally reviewed the patients radiographs and reports:    I have provided   35  minutes of critical care time rendering care exclusive of any procedures. During this entire length of time I was immediately available to the patient.      The reason for providing this level of medical care was due to a critical illness that impaired one or more vital organ systems, such that there was a high probability of imminent or life threatening deterioration in the patient's condition. Pt's condition is unstable and unpredictable. This care involved high complexity decision making which includes reviewing the patient's past medical records, current laboratory results, medications that were immediately available to me and actual Xray films in order to assess, support vital system function, and to treat this degree of vital organ system failure, and to prevent further life threatening deterioration of the patients condition.     Fitz Brown MD

## 2017-09-04 NOTE — PROGRESS NOTES
Cardiology Progress Note    9/4/2017     Admit Date: 7/27/2017    Cardiac Assessment/Plan:   CAD:  Left circumflex artery PCI with HORACIO on 7/20/2017; EF 45-50% after PCI. Was on dual antiplatelet therapy, but then on ASA montherapy since  surgery: restart clopidogrel when able from other teams' standpoints. Anticipate this will not be done until sometime after PEG. Risk of acute stent closure is higher on monotherapy with just ASA. After looking at the events of the past month, I appreciate the complexity of the decision.     Paroxysmal Afib: transiently on diltiazem infusion earlier in admission. Now with recurrent afib, started amio infusion without bolus. Will try to avoid diltiazem due to borderline BP, see if HR's slowly come down today with amio. Poor chronic anticoagulation candidate, unfortunately thromboembolic risk is high. ECG 9/4:  Afib with nonspecific repol abn, QTc is <500 msec.     Altered mental status:  Doesn't follow commands. Off sedation. Febrile illness:  Unclear source, followed by other teams. Remains critically ill in ICU. For other plans, see orders.     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    Convulsive syncope 08/15/2017    Disturbance of memory 08/15/2017    Bladder injury 07/28/2017        Subjective: David Lopez does not report due to altered mental status   Chest pain none  consistent with:  Non-cardiac CP         Atypical CP     None now  On going  Anginal CP     Dyspnea  none  at rest  with exertion         improved  unchanged  worse              PND  none  overnight       Orthopnea  none  improved  unchanged  worse   Presyncope  none  improved  unchanged  worse     Ambulated in hallway without symptoms   Yes   Ambulated in room without symptoms  Yes   Objective:    Physical Exam:  Overall VSSAF;    Visit Vitals    /55 (BP 1 Location: Right arm, BP Patient Position: At rest)    Pulse (!) 131    Temp (!) 100.6 °F (38.1 °C)    Resp 18    Ht 5' 10\" (1.778 m)    Wt 92.9 kg (204 lb 12.9 oz)    SpO2 97%    BMI 29.39 kg/m2     Temp (24hrs), Av.8 °F (37.7 °C), Min:98.8 °F (37.1 °C), Max:100.8 °F (38.2 °C)    Patient Vitals for the past 8 hrs:   Pulse   17 1200 (!) 131   17 1128 (!) 128   17 1100 (!) 132   17 1048 (!) 131   17 1043 (!) 110   17 1042 79   17 0900 78   17 0800 73   17 0755 71   17 0701 70   17 0700 72   17 0600 76   17 0500 80   17 0458 81    Patient Vitals for the past 8 hrs:   Resp   17 1200 18   17 1128 18   17 1100 18   17 1048 17   17 1043 17   17 1042 16   17 0900 18   17 0800 15   17 0755 16   17 0701 15   17 0700 15   17 0600 17   17 0500 18   17 0458 18    Patient Vitals for the past 8 hrs:   BP   17 1200 114/55   17 1100 145/60   17 1048 139/52   17 0900 102/84   17 0800 123/48   17 0700 122/46   17 0600 118/49   17 0500 135/55          Intake/Output Summary (Last 24 hours) at 17 1247  Last data filed at 17 1200   Gross per 24 hour   Intake          3328.86 ml   Output             2290 ml   Net          1038.86 ml     General Appearance: Well-developed, well-nourished, no acute distress. Intubated and on ventilator. Ears/Nose/Mouth/Throat:   Dry MM; anicteric. JVP: Difficult to discern while supine. Resp:   clear to auscultation bilaterally anteriorly. Intubated. Cardiovascular:  Irregular rate and rhythm, S1, S2 normal, no new murmur. No gallop or rub. Abdomen:   Post-surgical, bowel sounds present. Extremities: No edema bilaterally. Skin:  Neuro: Warm and dry. No tremor. Doesn't follow commands. cath site intact w/o hematoma or new bruit; distal pulse unchanged  Yes   Data Review:     Telemetry independently reviewed  sinus  chronic afib X parox afib  NSVT     ECG independently reviewed 9/4  NSR X afib  no significant changes X NSST-Tw chgs    no new ECG provided for review   Lab results reviewed as noted below. Current medications reviewed as noted below. No results for input(s): PH, PCO2, PO2 in the last 72 hours. Recent Labs      09/02/17 0323   TROIQ  0.05*  0.05*     Recent Labs      09/04/17 0400 09/03/17 0438 09/02/17 0323   NA  141  142  141   K  4.4  3.8  3.5   CL  112*  110*  111*   CO2  22  20*  23   BUN  58*  50*  46*   CREA  1.69*  1.77*  1.59*   GLU  167*  148*  185*   PHOS  4.4  3.8  2.4*   CA  7.5*  7.6*  7.7*   ALB  1.5*  1.5*   --    WBC  8.9  8.9  9.3   HGB  7.2*  7.3*  8.1*   HCT  22.2*  22.3*  24.7*   PLT  222  201  221     Lab Results   Component Value Date/Time    Cholesterol, total 178 07/20/2017 12:28 PM    HDL Cholesterol 48 07/20/2017 12:28 PM    LDL, calculated 117.8 07/20/2017 12:28 PM    Triglyceride 286 08/26/2017 02:36 AM    CHOL/HDL Ratio 3.7 07/20/2017 12:28 PM     Recent Labs      09/04/17 0400 09/03/17 0438   SGOT  40*  41*   AP  150*  161*   TP  6.8  6.7   ALB  1.5*  1.5*   GLOB  5.3*  5.2*     No results for input(s): INR, PTP, APTT in the last 72 hours.     No lab exists for component: INREXT, INREXT   No components found for: GLPOC    Current Facility-Administered Medications   Medication Dose Route Frequency    TPN ADULT-CENTRAL AA 5% D20%-MVI W/ VIT K-RCH   IntraVENous CONTINUOUS    amiodarone (CORDARONE) 450 mg in dextrose 5% 250 mL infusion  0.5-1 mg/min IntraVENous CONTINUOUS    TPN ADULT - CENTRAL AA 5% D20% W/ CA + ELECTROLYTES   IntraVENous CONTINUOUS    VANCOMYCIN INFORMATION NOTE   Other CONTINUOUS    [START ON 9/5/2017] vancomycin (VANCOCIN) 1750 mg in  ml infusion  1,750 mg IntraVENous Q36H    fentaNYL (PF) 900 mcg/30 ml infusion soln  0-200 mcg/hr IntraVENous CONTINUOUS    0.9% sodium chloride infusion  10 mL/hr IntraVENous CONTINUOUS    mupirocin (BACTROBAN) 2 % ointment   Topical Q12H    anidulafungin (ERAXIS) 100 mg in 0.9% sodium chloride 130 mL IVPB  100 mg IntraVENous Q24H    cefepime (MAXIPIME) 2 g in 0.9% sodium chloride (MBP/ADV) 100 mL  2 g IntraVENous Q12H    propofol (DIPRIVAN) infusion  5-50 mcg/kg/min IntraVENous TITRATE    vancomycin 50 mg/mL oral solution (compounded) 250 mg  250 mg Per NG tube Q6H    PHENYLephrine (NEOSYNEPHRINE) 30,000 mcg in 0.9% sodium chloride 250 mL infusion   mcg/min IntraVENous TITRATE    furosemide (LASIX) injection 40 mg  40 mg IntraVENous DAILY PRN    morphine injection 1 mg  1 mg IntraVENous Q4H PRN    albuterol-ipratropium (DUO-NEB) 2.5 MG-0.5 MG/3 ML  3 mL Nebulization Q6H PRN    chlorhexidine (PERIDEX) 0.12 % mouthwash 15 mL  15 mL Oral Q12H    heparin (porcine) injection 5,000 Units  5,000 Units SubCUTAneous Q12H    acetaminophen (TYLENOL) solution 975 mg  975 mg Oral Q6H PRN    Or    acetaminophen (TYLENOL) suppository 650 mg  650 mg Rectal Q6H PRN    bisacodyl (DULCOLAX) suppository 10 mg  10 mg Rectal DAILY PRN    zinc oxide-cod liver oil (DESITIN) 40 % paste   Topical PRN    metoclopramide HCl (REGLAN) injection 5 mg  5 mg IntraVENous Q8H PRN    levalbuterol (XOPENEX) nebulizer soln 0.63 mg/3 mL  0.63 mg Nebulization Q6H PRN    aspirin (ASA) suppository 300 mg  300 mg Rectal DAILY    labetalol (NORMODYNE;TRANDATE) injection 10 mg  10 mg IntraVENous Q6H  hydrALAZINE (APRESOLINE) 20 mg/mL injection 20 mg  20 mg IntraVENous Q4H PRN    labetalol (NORMODYNE;TRANDATE) injection 20 mg  20 mg IntraVENous Q4H PRN    fat emulsion 20% (LIPOSYN, INTRALIPID) infusion 250 mL  250 mL IntraVENous Q MON, WED & FRI    insulin lispro (HUMALOG) injection   SubCUTAneous Q6H    glucose chewable tablet 16 g  4 Tab Oral PRN    dextrose (D50W) injection syrg 12.5-25 g  12.5-25 g IntraVENous PRN    glucagon (GLUCAGEN) injection 1 mg  1 mg IntraMUSCular PRN    sodium chloride (NS) flush 5-10 mL  5-10 mL IntraVENous Q8H    ondansetron (ZOFRAN) injection 4 mg  4 mg IntraVENous Q4H PRN    sodium chloride (NS) flush 10 mL  10 mL InterCATHeter PRN    sodium chloride (NS) flush 10-40 mL  10-40 mL InterCATHeter Q8H        Kavitha Light MD

## 2017-09-04 NOTE — PROGRESS NOTES
Hospitalist Progress Note    NAME: Cristal Guillory   :  1928   MRN:  [id number]         Assessment / Plan:  New fever 101.4 on 2017  Etiology unclear, lactate not increased  BC negative so far  pCXR with ? Increased ASD left base  UA with pyuria, awaiting culture  Continue Antibiotics      Linezolid day 12, Changed to IV vanco for now      Cefepime day 4      Vancomycin PO day 4 for C difficile       Anidulafungin stop 2017  ? If w/u otherwise negative, should we change the PICC line with recurrent fever  Will consider CT scan abdomen/pelvis with recurrent fever    Acute respiratory failure with hypoxia   S/P PEA arrest with intubation, ROSC in 3 minutes  Respiratory status declining x days, intubated 2017  Intubated and sedated currently, will open eyes, not following  pBNP was 301  Large amount of hardened secretions in posterior pharynx, ? Occluded airway    Area was cleaned out after intubation    Normal CO2 on ABG prior  pCXR with increased interstitial markings  V/Q scan low prob and LE dopplers negative  Completing treatment for ? pneumonia on IV antibiotics  Pulmonary following  ?  place PEG tube before extubation    Acute renal failure  likely contrast dye nephropathy and diuretics  Hypernatremia POA resolved  Continues on TPN, received addition IV D5W, off once Na normalized  Creatinine increased within day after contrasted CT scan done 17  Peak creatinine 1.79, prior had been 0.9 to 1.0 for days  Hold nephrotoxins  Generally stable    C difficile colitis 2017  Complete oral vancomycin    Candida albicans fungemia   On eraxis , currently day 10/14  Not clear that line was changed, should consider replacing the PICC    Acute encephalopathy POA  Acute brain injury MS change  due to possible anoxia and hypotension  Baseline alert, lived independently  Not talking much for past week per family before family  MRI of brain  multiple punctate foci of acute infarction in the  bilateral corona radiata and centrum semiovale. Background of mild chronic  microvascular ischemic disease. The result was discussed with the family by Dr. Rina Maher  Neuro felt findings c/w hypoxic injury and multiple small infarcts as the cause of encephalopathy and confusion. EEG revealed moderately slow indicating hypoxic ischemic encephalopathy  Carotid duplex scan: bilateral ICA  less than 50% stenosis  Repeated head CT scan 8/30/2017 , unchanged  Ammonia 33, B12 1046, TSH 3.7  Corrected the hypernatremia  Will need to see how MS does after last event    S/P removal of bladder stone and repair of perforated bladder (7/28)  Cystoscopy with attempted clot evacuation with subsequent exploratory laparotomy with bladder stone retrieval and repair of large posterior bladder perforation on 7/28  Urology following; suprapubic out    Nutritional support, c/w TPN, ordered today. Difficulty with feeding  Will need PEG once respiratory issues are improved  Continue TPN    Atrial fibrillation currently in NSR, off the cardizem gtt     CAD s/p recent Left circ stent 0/2611  Chronic diastolic CHF 89-53%  ASA  Has not taken plavix since admit  Last troponin was 0.04  Needs to resume plavix ASAP    DVT prophylaxis: Lovenox      Code status: Full code      NOK: palliative care team following - need to further address if majority of children for medical decisions or all other 4 children deferring to Abhishek      Body mass index is 28.88 kg/(m^2). Recommended Disposition: family wants to bring home         Subjective:     Chief Complaint / Reason for Physician Visit f/u acute respiratory failure  Intubated, sedated, not able to provide history  No events overnight per NS  No further fevers  Discussed with RN events overnight.      Review of Systems:  Symptom Y/N Comments  Symptom Y/N Comments   Fever/Chills    Chest Pain     Poor Appetite    Edema     Cough    Abdominal Pain     Sputum    Joint Pain SOB/CLARKE    Pruritis/Rash     Nausea/vomit    Tolerating PT/OT     Diarrhea    Tolerating Diet     Constipation    Other       Could NOT obtain due to: Non verbal     Objective:     VITALS:   Last 24hrs VS reviewed since prior progress note. Most recent are:  Patient Vitals for the past 24 hrs:   Temp Pulse Resp BP SpO2   09/04/17 0701 - 70 15 - 100 %   09/04/17 0700 - 72 15 122/46 99 %   09/04/17 0600 - 76 17 118/49 99 %   09/04/17 0500 - 80 18 135/55 99 %   09/04/17 0458 - 81 18 - 99 %   09/04/17 0400 99.8 °F (37.7 °C) 81 18 123/44 98 %   09/04/17 0300 - 80 17 122/42 98 %   09/04/17 0200 - 77 28 134/47 100 %   09/04/17 0100 - 73 19 128/50 97 %   09/04/17 0035 - 73 19 - 98 %   09/04/17 0015 - 72 21 - 98 %   09/04/17 0000 98.8 °F (37.1 °C) 73 19 126/50 97 %   09/03/17 2300 - 76 19 125/51 97 %   09/03/17 2200 - 79 19 127/46 98 %   09/03/17 2115 - 76 25 - 99 %   09/03/17 2100 - 77 20 123/44 99 %   09/03/17 2000 98.8 °F (37.1 °C) 79 26 146/54 98 %   09/03/17 1900 - 75 19 126/50 99 %   09/03/17 1800 - 71 18 119/47 100 %   09/03/17 1700 - 79 21 134/53 100 %   09/03/17 1600 99.1 °F (37.3 °C) 78 19 130/48 100 %   09/03/17 1500 - 75 18 120/48 100 %   09/03/17 1444 - 76 18 - 100 %   09/03/17 1400 - 70 17 121/44 98 %   09/03/17 1311 - 75 - 124/48 -   09/03/17 1300 - 74 18 124/48 99 %   09/03/17 1223 - 72 18 - 99 %   09/03/17 1200 99.7 °F (37.6 °C) 74 18 125/48 100 %   09/03/17 1155 - 73 18 - 100 %   09/03/17 1100 - 76 19 119/45 100 %   09/03/17 1000 - 81 23 136/48 100 %   09/03/17 0900 - 81 27 131/53 100 %   09/03/17 0818 - 75 20 - 100 %   09/03/17 0800 98.6 °F (37 °C) 74 21 134/52 100 %           Intake/Output Summary (Last 24 hours) at 09/04/17 0748  Last data filed at 09/04/17 0700   Gross per 24 hour   Intake          3393.36 ml   Output             2355 ml   Net          1038.36 ml        PHYSICAL EXAM:  General:                    WD, WN. Intubated, sedated, opens eyes to stimuli  EENT:                        PERRL. Anicteric sclerae. Resp:                         Decreased BS bilaterally, no wheezing   CV:                            Regular  rhythm, 1+ edema  GI:                             Soft, obese, mildly distended, Non tender.  + hypoactive Bowel sounds                                    wound c/d/i no erythema  LN:           No cervical or inguinal WILLIAM  Neurologic:               Intubated, sedated, opens eyes, not following commands  Psych:                      Not anxious nor agitated  Skin:                         No rashes.  No jaundice     Reviewed most current lab test results and cultures  YES  Reviewed most current radiology test results   YES  Review and summation of old records today    NO  Reviewed patient's current orders and MAR    YES  PMH/SH reviewed - no change compared to H&P  ________________________________________________________________________  Care Plan discussed with:    Comments   Patient x    Family  x Spoke with daughter by phone last PM   RN x    Care Manager     Consultant                        Multidiciplinary team rounds were held today with , nursing, pharmacist and clinical coordinator. Patient's plan of care was discussed; medications were reviewed and discharge planning was addressed. ________________________________________________________________________  Total NON critical care TIME:  25   Minutes    Total CRITICAL CARE TIME Spent:   Minutes non procedure based      Comments   >50% of visit spent in counseling and coordination of care x    ________________________________________________________________________  Jeanna Wu MD     Procedures: see electronic medical records for all procedures/Xrays and details which were not copied into this note but were reviewed prior to creation of Plan. LABS:  I reviewed today's most current labs and imaging studies.   Pertinent labs include:  Recent Results (from the past 12 hour(s))   GLUCOSE, POC    Collection Time: 09/03/17 11:28 PM   Result Value Ref Range    Glucose (POC) 177 (H) 65 - 100 mg/dL    Performed by Silvia Lubin    CBC WITH AUTOMATED DIFF    Collection Time: 09/04/17  4:00 AM   Result Value Ref Range    WBC 8.9 4.1 - 11.1 K/uL    RBC 2.44 (L) 4.10 - 5.70 M/uL    HGB 7.2 (L) 12.1 - 17.0 g/dL    HCT 22.2 (L) 36.6 - 50.3 %    MCV 91.0 80.0 - 99.0 FL    MCH 29.5 26.0 - 34.0 PG    MCHC 32.4 30.0 - 36.5 g/dL    RDW 17.8 (H) 11.5 - 14.5 %    PLATELET 149 173 - 786 K/uL    NEUTROPHILS 70 32 - 75 %    LYMPHOCYTES 21 12 - 49 %    MONOCYTES 5 5 - 13 %    EOSINOPHILS 3 0 - 7 %    BASOPHILS 1 0 - 1 %    ABS. NEUTROPHILS 6.2 1.8 - 8.0 K/UL    ABS. LYMPHOCYTES 1.9 0.8 - 3.5 K/UL    ABS. MONOCYTES 0.5 0.0 - 1.0 K/UL    ABS. EOSINOPHILS 0.3 0.0 - 0.4 K/UL    ABS. BASOPHILS 0.1 0.0 - 0.1 K/UL   MAGNESIUM    Collection Time: 09/04/17  4:00 AM   Result Value Ref Range    Magnesium 2.5 (H) 1.6 - 2.4 mg/dL   METABOLIC PANEL, COMPREHENSIVE    Collection Time: 09/04/17  4:00 AM   Result Value Ref Range    Sodium 141 136 - 145 mmol/L    Potassium 4.4 3.5 - 5.1 mmol/L    Chloride 112 (H) 97 - 108 mmol/L    CO2 22 21 - 32 mmol/L    Anion gap 7 5 - 15 mmol/L    Glucose 167 (H) 65 - 100 mg/dL    BUN 58 (H) 6 - 20 MG/DL    Creatinine 1.69 (H) 0.70 - 1.30 MG/DL    BUN/Creatinine ratio 34 (H) 12 - 20      GFR est AA 47 (L) >60 ml/min/1.73m2    GFR est non-AA 38 (L) >60 ml/min/1.73m2    Calcium 7.5 (L) 8.5 - 10.1 MG/DL    Bilirubin, total 0.8 0.2 - 1.0 MG/DL    ALT (SGPT) 28 12 - 78 U/L    AST (SGOT) 40 (H) 15 - 37 U/L    Alk.  phosphatase 150 (H) 45 - 117 U/L    Protein, total 6.8 6.4 - 8.2 g/dL    Albumin 1.5 (L) 3.5 - 5.0 g/dL    Globulin 5.3 (H) 2.0 - 4.0 g/dL    A-G Ratio 0.3 (L) 1.1 - 2.2     PHOSPHORUS    Collection Time: 09/04/17  4:00 AM   Result Value Ref Range    Phosphorus 4.4 2.6 - 4.7 MG/DL   GLUCOSE, POC    Collection Time: 09/04/17  5:23 AM   Result Value Ref Range    Glucose (POC) 177 (H) 65 - 100 mg/dL    Performed by Marvin Pa Norma          Signed: Geetha Boyle MD

## 2017-09-05 NOTE — PROGRESS NOTES
Attempted PT treatment; noted recent medical course. Patient minimally responsive, only to pain. Inappropriate at this time for PT services. Will continue to follow. Javier Elias PT, DPT.

## 2017-09-05 NOTE — PROGRESS NOTES
0715 Bedside report received from [facility].    0900 Assessment completed. Patient opens eyes to pain, does not follow commands. Patient currently intubated on ventilator for airway support. Amiodarone IV infusing at 0.5mg/min due to AFIB. Fentanyl IV restarted at 50mcg/hr due to patient grimacing in pain. Rosales intact draining edwin colored urine. NG tube intact, placement verified. Midline incision intact from previous abdominal surgery, no drainage noted. Contact/Enteric Precautions maintained for C-DIFF. Patient's respirations tachypneic. No other issues noted at present. 1130 NG tube noted to be coming out of nose, placement verified and no audible auscultation noted with air bolus. NG tube reinserted and portable abdominal x-ray ordered to verify placement. 1205 Assessment unchanged. Wife in room at present. 200 Per milvia Corey for patient to receive Metoprolol IV even though listed as allergy and previously had Labetalol IV per order. Will monitor patient closely. 1300 X-ray tech in room for portable abdominal x-ray. 1400 NG tube noted to be in distal esophagus, Dr. Bandar Newsome made aware. Per Dr Bandar Newsome, insert NG tube further down to 80cm and get a repeat abdominal x-ray. NG tube pushed down to 80cm and abdominal x-ray ordered. [address] in room for repeat abdominal x-ray. 1600 Patient minimally responsive only to pain. No change from previous assessment. Will closely monitor. X-ray confirmed that NG tube is in appropriate location per . Tube feedings restarted at 10ml/hr. Will continue to monitor. 1900 Bedside report given to [facility].

## 2017-09-05 NOTE — PROGRESS NOTES
Interdisciplinary team rounds were held 9/5/ 2017 with the following team members:Care Management, Diabetes Treatment Specialist, Nursing, Nutrition, Pastoral Care, Pharmacy, Physical Therapy, Physician and Respiratory Therapy. Plan of care discussed. Goal: See MD orders and progress notes for further  interventions and desired outcomes.

## 2017-09-05 NOTE — PROGRESS NOTES
Patient had uneventful night. 0700 Bedside and Verbal shift change report given to [address] [place] [address] (oncoming nurse) by Sherren Colt (offgoing nurse). Report included the following information SBAR, Kardex, Procedure Summary, Intake/Output, MAR and Recent Results.

## 2017-09-05 NOTE — PROGRESS NOTES
Pharmacy Automatic Renal Dosing Protocol - Antimicrobials    Indication for Antimicrobials: Wound infection; HCAP; UTI; Candidemia; CDIFF    Current Regimen of Each Antimicrobial (Start Day & Day of Therapy): Anidulafungin 100 mg IV every 24 hours (Started 17; Day #14 of 14)  Cefepime 2 gm IV every 12 hours (Started 17; Day #6)  Vancomycin 250 mg PO every 6 hours (Started 17; Day #6)  Vancomycin 1750 mg IV every 36 hours (Started 9/3/17; Day #3)    Significant cultures:   9/3/17 Urine culture = No growth (FINAL)  9/3/17 Blood culture = No growth x 1 day (Results pending)  17 Respiratory culture = Scant candida (FINAL)  17 CDIFF = Postive (FINAL)  17 Urine culture = Greater than 100K CFU/mL C albicans (FINAL)  17 Blood culture = No growth (FINAL)  17 Wound culture = Few oxacillin-resistant staph epi; Few enterococcus faecalis; Light anaerobic GPC (FINAL)  17 Wound culture = Few oxacillin-resistant staph epi; Light anaerobic GPC (FINAL)  17 Urine culture = No growth (FINAL)  17 Blood culture = Candida albicans in 1/2 (FINAL)  17 Urine culture = No growth (FINAL)  17 Urine culture = Greater than 100K CFU/mL Enterococcus (FINAL)    Labs:  Recent Labs      17   0426  17   0400  17   0438   CREA  1.63*  1.69*  1.77*   BUN  57*  58*  50*   WBC  9.1  8.9  8.9     Temp (24hrs), Av.8 °F (37.7 °C), Min:99 °F (37.2 °C), Max:101.3 °F (38.5 °C)    Creatinine Clearance: ~32 mL/min    Impression/Plan:   - Anidulafungin dosed appropriately based on indication. Continue current regimen. 14 day duration added on 17 CCU rounds. - Cefepime dosed appropriately based on indication and renal function. Continue current regimen. - Vancomycin IV dose adjusted to 1250 mg IV every 24 hours based on renal function improvement. - Vancomycin PO does not require renal adjustment. Continue current regimen. 10 day duration added on 17 CCU rounds. Pharmacy will follow daily and adjust doses of monitored medications as appropriate for renal function and/or serum levels.     Thank you,  Sabrina Patricio, PHARMD

## 2017-09-05 NOTE — PROGRESS NOTES
Attended Interdisciplinary rounds in Critical Care Unit, where patient care was discussed. Visit by: Wyatt Doyle. Cora Sin.  Dot Espinosa MA, [address]

## 2017-09-05 NOTE — PROGRESS NOTES
[facility] Consult Service Progress NOTE  Pulmonary, Critical Care, and Sleep Medicine    Name: David Blizzard MRN: [id number]   : 1928 Hospital: [facility]   Date: 2017  Admission Date: 2017     Chart and notes reviewed. Data reviewed. Pt seen on rounds earlier today. I have evaluated and examined the patient. Pt is acutely ill. Hospital Day: 41       Remains on vent. Seems more comfortable and calm      IMPRESSION:   1. Acute respiratory failure with hypoxia s/p emergent intubation  2. PEA arrest    3. Upper airway obstruction  4. Acute encephalopathy   5. Dysphagia  6. Protein calorie malnutrition on TPN  7. C Diff  on IV Flagyl  8. Complicated Enterococcal UTI- sensitive to Amp  9. NSTEMI demand ischemia  10. CAD recent stents per [facility] 2017, needs plavix to be restarted  11. PAF Cardiology following  12. Pneumonia??: last xray    13. Postop respiratory failure, Extubated second time on  after Midline abdominal incision fascial wound dehiscence 17 Exploratory laparotomy with closure of midline incision using retention sutures per Dr. Mumtaz Alberto following. 14. S/P removal of bladder stone and repair of perforated bladder POA  15. S/P subsequent ex-lap due to extensive sq emphysema in abdomen/retroperitoneum - no sign of bowel perforation  16. Pt is critically ill and at high risk of end organ dysfunction and failure  17. Critical care time with pt exclusive of procedures  35    minutes      RECOMMENDATIONS/PLAN:   1. CCU  2. Would NOT extubate (cannot control airway and secretions)  3. lighten sedation and reorient  4. will ask GI to consider PEG while on vent and before we resume Plavix  5. Consider trach  6. Oral care  7. NG tube  8. Add PO vancomycin  9. abx per Sonya Kelly - looks like will be completing course soon but sputum looks ominous  10. No Ativan per family request  11. renew TPN for now  15.  replete lytes  13. PICC  14. DVT, SUP prophylaxis  15. Palliative care consult     ICU disposition :Unchanged. Updated Family. Time above. Risk of deterioration: high   [x] High complexity decision making was performed   [x] See my orders for details  Tubes:   Rosales and Intubated/Vent    Code: Full Code        Hemodynamics:    CO:    CI:    CVP: CVP (mmHg): 8 mmHg (17 0800)  SVR:   PAP Systolic:    PAP Diastolic:    PVR:    ZA31:        Ventilator Settings:      Mode Rate TV Press PEEP FiO2 PIP Min. Vent   Assist control    500 ml 0 cm H2O  6 cm H20 35 %  15 cm H2O  14.3 l/min      Vital Signs: Intake/Output: Intake/Output:   Visit Vitals    /63 (BP 1 Location: Right arm, BP Patient Position: At rest)    Pulse (!) 116    Temp 99 °F (37.2 °C)    Resp 25    Ht 5' 10\" (1.778 m)    Wt 94.1 kg (207 lb 7.3 oz)    SpO2 97%    BMI 29.77 kg/m2         O2 Device: Endotracheal tube, Ventilator  O2 Flow Rate (L/min): 12 l/min  Wt Readings from Last 4 Encounters:   17 94.1 kg (207 lb 7.3 oz)   08/15/17 94.2 kg (207 lb 10.8 oz)   17 95.8 kg (211 lb 3.2 oz)   17 97.7 kg (215 lb 6.2 oz)       Temp (24hrs), Av °F (37.8 °C), Min:99 °F (37.2 °C), Max:101.3 °F (38.5 °C)     Intake/Output Summary (Last 24 hours) at 17 0831  Last data filed at 17 0800   Gross per 24 hour   Intake          4162.07 ml   Output             2150 ml   Net          2012.07 ml     Last shift:      701 -  190  In: 211.4 [I.V.:101.4]  Out: 120 [Urine:120]  Last 3 shifts: 1901 -  0700  In: 5371.8 [I.V.:3971.8]  Out: 9491 [Urine:3275]     Telemetry:    normal sinus rhythm    Physical Exam:    General: severely ill elderly WM   HEENT: intubated; dry mucosa   Neck: No abnormally enlarged lymph nodes.    Chest: normal   Lungs: rhonchi   Heart: Regular rate and rhythm   Abdomen: soft, non-tender, without masses or organomegaly   :    Extremity: negative, clubbing;    Neuro: obtunded   Psych: unresponsive    Skin: Pallor and Turgor absent;   Pulses: Bilateral, Radial, 2+ Normal upper and lower extremity pulses   Capillary refill: normal brisk capillary refill;    DATA:    Interval lab and diagnostic data was reviewed. Interval radiology images were independently viewed and available reports were reviewed. All lab results for the last 24 hours reviewed.       MAR reviewed and pertinent medications noted or modified as needed    MEDS:   Current Facility-Administered Medications   Medication    TPN ADULT-CENTRAL AA 5% D20%-MVI W/ VIT K-RCH    amiodarone (CORDARONE) 450 mg in dextrose 5% 250 mL infusion    vancomycin (VANCOCIN) 1750 mg in  ml infusion    fentaNYL (PF) 900 mcg/30 ml infusion soln    0.9% sodium chloride infusion    mupirocin (BACTROBAN) 2 % ointment    anidulafungin (ERAXIS) 100 mg in 0.9% sodium chloride 130 mL IVPB    cefepime (MAXIPIME) 2 g in 0.9% sodium chloride (MBP/ADV) 100 mL    propofol (DIPRIVAN) infusion    vancomycin 50 mg/mL oral solution (compounded) 250 mg    PHENYLephrine (NEOSYNEPHRINE) 30,000 mcg in 0.9% sodium chloride 250 mL infusion    furosemide (LASIX) injection 40 mg    morphine injection 1 mg    albuterol-ipratropium (DUO-NEB) 2.5 MG-0.5 MG/3 ML    chlorhexidine (PERIDEX) 0.12 % mouthwash 15 mL    heparin (porcine) injection 5,000 Units    acetaminophen (TYLENOL) solution 975 mg    Or    acetaminophen (TYLENOL) suppository 650 mg    bisacodyl (DULCOLAX) suppository 10 mg    zinc oxide-cod liver oil (DESITIN) 40 % paste    metoclopramide HCl (REGLAN) injection 5 mg    levalbuterol (XOPENEX) nebulizer soln 0.63 mg/3 mL    aspirin (ASA) suppository 300 mg    labetalol (NORMODYNE;TRANDATE) injection 10 mg    hydrALAZINE (APRESOLINE) 20 mg/mL injection 20 mg    labetalol (NORMODYNE;TRANDATE) injection 20 mg    fat emulsion 20% (LIPOSYN, INTRALIPID) infusion 250 mL    insulin lispro (HUMALOG) injection    glucose chewable tablet 16 g    dextrose (D50W) injection syrg 12.5-25 g    glucagon (GLUCAGEN) injection 1 mg    sodium chloride (NS) flush 5-10 mL    ondansetron (ZOFRAN) injection 4 mg    sodium chloride (NS) flush 10 mL    sodium chloride (NS) flush 10-40 mL        Labs:    Recent Labs      09/05/17   0426  09/04/17   0400  09/03/17   0438   WBC  9.1  8.9  8.9   HGB  7.2*  7.2*  7.3*   PLT  235  222  201     Recent Labs      09/05/17   0426  09/04/17   0400  09/03/17   1009  09/03/17   0438   NA  143  141   --   142   K  4.1  4.4   --   3.8   CL  113*  112*   --   110*   CO2  21  22   --   20*   GLU  188*  167*   --   148*   BUN  57*  58*   --   50*   CREA  1.63*  1.69*   --   1.77*   CA  7.8*  7.5*   --   7.6*   MG  2.5*  2.5*   --   2.4   PHOS  3.0  4.4   --   3.8   LAC   --    --   2.0   --    ALB  1.4*  1.5*   --   1.5*   SGOT  40*  40*   --   41*   ALT  27  28   --   31     No results for input(s): PH, PCO2, PO2, HCO3, FIO2 in the last 72 hours. No results for input(s): CPK, CKNDX, TROIQ in the last 72 hours. No lab exists for component: CPKMB  Lab Results   Component Value Date/Time     08/24/2017 12:40 AM          Imaging:  []                           I have personally reviewed the patients radiographs and reports:    I have provided     minutes of critical care time rendering care exclusive of any procedures. During this entire length of time I was immediately available to the patient.      The reason for providing this level of medical care was due to a critical illness that impaired one or more vital organ systems, such that there was a high probability of imminent or life threatening deterioration in the patient's condition. Pt's condition is unstable and unpredictable.  This care involved high complexity decision making which includes reviewing the patient's past medical records, current laboratory results, medications that were immediately available to me and actual Xray films in order to assess, support vital system function, and to treat this degree of vital organ system failure, and to prevent further life threatening deterioration of the patients condition.     Kusum Manzano MD

## 2017-09-05 NOTE — PROGRESS NOTES
Hospitalist Progress Note    NAME: Viraj Sue   :  1928   MRN:  [id number]       Assessment / Plan:  Fever, not present on admission:  Multiple infectious issues this admission including Candida albicans fungemia , C Diff colitis   - CXR this morning with bilateral pleural effusions with associated passive atelectasis. Consolidation cannot be excluded, especially in the left base. - Urine cx 9/3 1+ bacteria but <1000 colonies on culture. Blood cultures 9/3 no growth. - repeat sputum culture requested  - con't emperic cefepime, vancomycin [IV and oral] for now (previously on linezolid; anidulofungin completed today for fungemia]  - consider ID consult  - feel he needs repeat abdominal imaging if he has continued fevers and recent surgeries (CT A/P) - need to discuss with pulmonology and family  Paroxysmal atrial fibrillation, CAD s/p recent HORACIO L Cx stent  and chronic diastolic CHF: EF 51-53%  - con't ASA, lipitor  - restart plavix after PEG/trach placement, as soon as possible due to recent stent  - transiently on dilt gtt, now on amiodarone gtt  - con't metoprolol with additional prn bblocker  - appreciate cardiology assistance  Acute respiratory failure with hypoxia (reintubated) s/p PEA arrest:  Respiratory status declining x days with large amount of hardened secretions in posterior pharynx, intubated 2017. Will open eyes, not following commands.  - CXR today as above  - V/Q scan  low prob and LE dopplers negative  - pulmonology managing ventilator, poor mental status limiting ability to extubate. May need trach if can be stabilized for procedures.   - con't fentanyl prn  - palliative assisting  C Diff colitis and candida albicans fungemia, not present on admission:  Dx   - con't oral vancomycin  - completing anidulofungin today as above  Acute kidney injury:  Cr remains elevated but stable, suspected some component of contrast nephropathy, meds (diuretics) and ATN due to severe medical illness  - holding nephrotoxins (home diovan)  Acute encephalopathy with acute CVAs (possibly embolic) and suspected brain injury with MS change 8/12:  possible anoxia and hypotension contributing  - MRI of brain 8/16 multiple punctate foci of acute infarction in the bilateral corona radiata and centrum semiovale. Background of mild chronic microvascular ischemic disease.  - appreciate neurology consult  - EEG revealed moderately slow indicating hypoxic ischemic encephalopathy   - carotid duplex scan with bilateral ICA  less than 50% stenosis  - con't ASA  Bladder clot with perforation/stone, abdominal wound dehiscence in setting of BPH:    - s/p cystoscopy with attempted clot evacuation, ex lap, bladder stone retrieval, repair of large posterior bladder perforation with Dr. Meredith Loza 7/28   - s/p ex lap with findings of diffuse pneumatosis and gastric distention without evidence of bowel perforation with Dr. Farzana Murray 7/29  - s/p ex lap closure of midline incision using retention sutures with Dr. Santos Hi 8/6 due to midline abdominal incision fascial wound dehiscence   - last seen by urology 8/28, they have signed off  - holding home ditropan and proscar for now  - con't wick - per urology does not need suprapubic catheter  Severe protein calorie malnutrition:  Albumin 1.4  - con't TPN, not stable for PEG yet with uncontrolled afib and fevers  - appreciate GI assistance  Hyponatremia, resolved     DVT prophylaxis: Lovenox  Code status: Full (2 daughters Raven Penn and Adeline Sirbeulah are acting as mPOAs at this time per family preference)     Subjective:     Chief Complaint / Reason for Physician Visit  Intubated, not responsive. Discussed with RN events overnight.      Review of Systems:  Symptom Y/N Comments  Symptom Y/N Comments   Fever/Chills    Chest Pain     Poor Appetite    Edema     Cough    Abdominal Pain     Sputum    Joint Pain     SOB/CLARKE    Pruritis/Rash     Nausea/vomit    Tolerating PT/OT     Diarrhea Tolerating Diet     Constipation    Other       Could NOT obtain due to: Not responsive     Objective:     VITALS:   Last 24hrs VS reviewed since prior progress note. Most recent are:  Patient Vitals for the past 24 hrs:   Temp Pulse Resp BP SpO2   09/05/17 0800 99 °F (37.2 °C) (!) 116 25 138/63 97 %   09/05/17 0652 - (!) 124 25 - 97 %   09/05/17 0625 - (!) 133 24 - 97 %   09/05/17 0600 - (!) 127 24 139/68 97 %   09/05/17 0500 - (!) 121 22 135/60 96 %   09/05/17 0400 99.2 °F (37.3 °C) (!) 118 24 131/55 98 %   09/05/17 0341 - (!) 112 23 - 98 %   09/05/17 0300 - (!) 115 25 117/51 99 %   09/05/17 0200 - (!) 121 25 120/58 98 %   09/05/17 0100 - (!) 122 25 129/51 98 %   09/05/17 0008 - (!) 117 23 - 99 %   09/05/17 0000 99.9 °F (37.7 °C) (!) 120 26 117/49 99 %   09/04/17 2300 - (!) 118 24 130/54 98 %   09/04/17 2200 - (!) 120 26 115/53 99 %   09/04/17 2108 - (!) 117 25 - 99 %   09/04/17 2100 - (!) 120 23 115/59 99 %   09/04/17 2000 99.4 °F (37.4 °C) (!) 109 24 103/57 99 %   09/04/17 1900 - (!) 119 24 112/58 99 %   09/04/17 1800 100.2 °F (37.9 °C) (!) 120 23 110/51 99 %   09/04/17 1700 - (!) 119 22 109/53 99 %   09/04/17 1600 (!) 101.3 °F (38.5 °C) (!) 126 23 111/48 98 %   09/04/17 1509 - (!) 116 23 - 98 %   09/04/17 1500 - (!) 125 21 102/66 98 %   09/04/17 1400 - (!) 120 20 121/59 98 %   09/04/17 1300 - (!) 120 19 118/55 97 %   09/04/17 1200 (!) 100.6 °F (38.1 °C) (!) 131 18 114/55 97 %   09/04/17 1146 (!) 100.6 °F (38.1 °C) - - - -   09/04/17 1128 - (!) 128 18 - 97 %   09/04/17 1100 - (!) 132 18 145/60 97 %   09/04/17 1048 - (!) 131 17 139/52 98 %   09/04/17 1043 - (!) 110 17 - 97 %   09/04/17 1042 - 79 16 - 97 %   09/04/17 0900 - 78 18 102/84 100 %       Intake/Output Summary (Last 24 hours) at 09/05/17 0829  Last data filed at 09/05/17 0800   Gross per 24 hour   Intake          4162.07 ml   Output             2150 ml   Net          2012.07 ml        PHYSICAL EXAM:  General: WD, WN.  Not alert, not cooperative, no acute distress    EENT:  EOM not intact. Anicteric sclerae. MMM with ET tube in place  Resp:  CTA bilaterally, no wheezing or rales. No accessory muscle use  CV:  Regular rhythm,  No edema  GI:  Soft, Non distended, Non tender.  +Bowel sounds  Neurologic:  Not alert and oriented X 0, no speech,   Psych:   No insight. Not anxious nor agitated  Skin:  Pale, No rashes. No jaundice    Reviewed most current lab test results and cultures  YES  Reviewed most current radiology test results   YES  Review and summation of old records today    NO  Reviewed patient's current orders and MAR    YES  PMH/SH reviewed - no change compared to H&P  ________________________________________________________________________  Care Plan discussed with:    Comments   Patient x    Family      RN x    Care Manager     Consultant                        Multidiciplinary team rounds were held today with , nursing, pharmacist and clinical coordinator. Patient's plan of care was discussed; medications were reviewed and discharge planning was addressed. ________________________________________________________________________  Total NON critical care TIME:  35 Minutes    Total CRITICAL CARE TIME Spent:   Minutes non procedure based      Comments   >50% of visit spent in counseling and coordination of care x    ________________________________________________________________________  Jose Humphries MD     Procedures: see electronic medical records for all procedures/Xrays and details which were not copied into this note but were reviewed prior to creation of Plan. LABS:  I reviewed today's most current labs and imaging studies.   Pertinent labs include:  Recent Labs      09/05/17   0426  09/04/17   0400  09/03/17   0438   WBC  9.1  8.9  8.9   HGB  7.2*  7.2*  7.3*   HCT  22.3*  22.2*  22.3*   PLT  235  222  201     Recent Labs      09/05/17   0426  09/04/17   0400  09/03/17   0438   NA  143  141  142   K  4.1  4.4  3.8   CL  113* 112*  110*   CO2  21  22  20*   GLU  188*  167*  148*   BUN  57*  58*  50*   CREA  1.63*  1.69*  1.77*   CA  7.8*  7.5*  7.6*   MG  2.5*  2.5*  2.4   PHOS  3.0  4.4  3.8   ALB  1.4*  1.5*  1.5*   TBILI  0.5  0.8  0.7   SGOT  40*  40*  41*   ALT  27  28  31       Signed: Emily Pascal MD

## 2017-09-05 NOTE — PROGRESS NOTES
Palliative Medicine     This LCSW made supportive visit with Pt's dtr Champ Bashir) and granddtr outside of Pt's room. Family outside of room while Pt getting an x-ray. Provided active listening, affirmed the importance of their (family) presence at bedside of Pt and prayers. 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 She shared about emotional day on Saturday and recognition of need for self care/respite for a few days. LCSW validated emotional roller coaster Pt and family experiencing. Pt's dtr is verbalizing understanding of Pt's multiple conditions and seriousness as relates to his overall medical condition. She understands concern for Pt's limited responsiveness (responds to pain) with intubation and not being on sedating medications. They verbalize hopefulness that tube feedings may provide protein to Pt, assisting with energy which will lead to more interaction. Today, they said he opened his eyes. Dtr is trying to balance hopefulness of Pt recovering with knowledge of his medical conditions. Erin Frankard shared about her conversations with providers and likelihood of moving towards [facility] because she understands pt will need airway support and peg for nutritional support. Plan/Goals:  1. 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 2. During this hospitalization, you have conversed with providers about your fathers preference for attempts at resuscitation (often referred to as CPR/Cardiac Pulmonary Resuscitation) if/when heart or breathing stops in the future. If you have questions or would like facts about CPR/intubation then please speak with any of your health team members, including Palliative Medicine (from Palliative letter)  3.  Pt's dtrs (Donna Soto and Erin Taylor) are acting as deferred to [facility] medical surrogates on behalf of Pt.  4. Continue to support Pt's spirituality and irasema through spiritual visits, reading of scripture, supporting his informal network of friends, family and [employer] members. Thank you for including Palliative Medicine in [facility] care.       Danica Keane, [address] ([id number])  Work cell: [phone]

## 2017-09-05 NOTE — PROGRESS NOTES
Cardiology Progress Note  9/5/2017    Admit Date: 7/27/2017  Admit Diagnosis: bladder clots  Bladder injury  perperated viscous  wound dehiscence  CC:  None currently  Cardiac Assessment/Plan:   CAD: Circ HORACIO 7/20/17; Ef 45-50% after PCI. On asa only since  surgery: still needs to restart plavix when able from post-surgical standpoint.       P Afib: transiently on dilt gtt, then back to NSR for weeks; with recurrent afib, now on IV  Amio; HR higher than ideal: changed IV labetalol to metoprolol with additional prn bblocker. Poor candidate for anticoagulation. HTN: Stable; future PO bblocker/ARB. Resp failure, AMS; post-op bladder repair/stone extraction/explor lap/dehis, anemia, ID, Dispo: per primary team and consultants. For other plans, see orders. Echo 9/1/17: EF 55%; mild MR; Mild RVE; Mild AI. PAp 41. For other plans, see orders.   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    Convulsive syncope 08/15/2017    Disturbance of memory 08/15/2017    Bladder injury 07/28/2017                                                         Subjective:  Patient reports  [x]   nothing; unable to communicate    [x]   intubated   Chest pain  none  consistent with:  Non-cardiac CP         Atypical CP     None now  On going  Anginal CP     Dyspnea  none  at rest  with exertion         improved  unchanged  worse              PND  none  overnight       Orthopnea  none  improved  unchanged  worse   Presyncope  none  improved  unchanged  worse   Ambulated in hallway without symptoms   Yes   Ambulated in room without symptoms  Yes     ROS Hematuria:  Yes X No Dysuria:  Yes X No                (2+  other systems) Cough: Yes X No Sputum: Yes X No                 BRBPR:  Yes X No Melena: Yes X No   No change in family and social history from H&P/Consult note.   Objective:    Physical Exam:  24 hr VS reviewed, overall VSSAF  Temp (24hrs), Av °F (37.8 °C), Min:99 °F (37.2 °C), Max:101.3 °F (38.5 °C)    Patient Vitals for the past 8 hrs:   Pulse   17 1100 (!) 125   17 1000 (!) 119   17 0900 (!) 118   17 0859 (!) 121   17 0800 (!) 116   17 0652 (!) 124   17 0625 (!) 133   17 0600 (!) 127   17 0500 (!) 121   17 0400 (!) 118    Patient Vitals for the past 8 hrs:   Resp   17 1100 25   17 1000 25   17 0900 25   17 0859 24   17 0800 25   17 0652 25   17 0625 24   17 0600 24   17 0500 22   17 0400 24    Patient Vitals for the past 8 hrs:   BP   17 1100 141/69   17 1000 130/65   17 0900 141/57   17 0800 138/63   17 0600 139/68   17 0500 135/60   17 0400 131/55        Intake/Output Summary (Last 24 hours) at 17 1143  Last data filed at 17 1100   Gross per 24 hour   Intake          4152.17 ml   Output             2125 ml   Net          2027.17 ml     General: x WD,WN  Elderly  Cachetic x NAD     Agitated  Lethargic  Arousable  Obtunded    x Sedated  On Bipap x Intubated                ENT/Palate: X WNL  Dry MM  anicteric x NG in              Respiratory: X CTA ant  Nl resp effort  Increased effort  No significant change     rhonchi  rales  improved worse              Cardiovasc:  RRR x IRRR X Nl S1, S2 x No rub     No murmur X No new murmur  Murmur c/w: x No gallop    x No edema  BLE edema:+  RLE edema:+  LLE edema:+     Edema less  Edema more  Edema same  Edema worse    X Nl JVP  Elevated JVP  JVP same  JVP worse    X Carotid wnl x abd aorta not palpated X no peripheral emboli noted                GI: X abd soft x nondistended X BS present X No organo- megaly noted              Skin: X Warm, dry  Cold extremites                  Neuro:  A/O  Grossly non- focal  Obtunded x Sedated     Lethargic  Arousable x intubated       cath site intact w/o hematoma or new bruit; distal pulse unchanged  Yes   Data Review:     Telemetry independently reviewed  sinus  chronic afib x persistent afib  NSVT     ECG independently reviewed  NSR  afib  no significant changes  NSST-Tw chgs    no new ECG provided for review   Lab results reviewed as noted below. Current medications reviewed as noted below. No results for input(s): PH, PCO2, PO2 in the last 72 hours. No results for input(s): CPK, CKMB, CKNDX, TROIQ in the last 72 hours. Recent Labs      09/05/17 0426 09/04/17 0400 09/03/17 0438   NA  143  141  142   K  4.1  4.4  3.8   CL  113*  112*  110*   CO2  21  22  20*   BUN  57*  58*  50*   CREA  1.63*  1.69*  1.77*   GLU  188*  167*  148*   PHOS  3.0  4.4  3.8   CA  7.8*  7.5*  7.6*   ALB  1.4*  1.5*  1.5*   WBC  9.1  8.9  8.9   HGB  7.2*  7.2*  7.3*   HCT  22.3*  22.2*  22.3*   PLT  235  222  201     Recent Labs      09/05/17 0426 09/04/17 0400 09/03/17   0438   SGOT  40*  40*  41*   AP  186*  150*  161*   TBILI  0.5  0.8  0.7   TP  7.0  6.8  6.7   ALB  1.4*  1.5*  1.5*   GLOB  5.6*  5.3*  5.2*     No results for input(s): INR, PTP, APTT in the last 72 hours. No lab exists for component: INREXT   No results for input(s): FE, TIBC, PSAT, FERR in the last 72 hours.    Lab Results   Component Value Date/Time    Glucose (POC) 211 09/05/2017 10:59 AM    Glucose (POC) 210 09/05/2017 06:15 AM    Glucose (POC) 206 09/05/2017 12:32 AM    Glucose (POC) 174 09/04/2017 05:46 PM    Glucose (POC) 178 09/04/2017 11:30 AM       Current Facility-Administered Medications   Medication Dose Route Frequency    TPN ADULT-CENTRAL AA 5% D20%-MVI W/ VIT K-RCH   IntraVENous CONTINUOUS    TPN ADULT-CENTRAL AA 5% D20%-MVI W/ VIT K-RCH   IntraVENous CONTINUOUS    amiodarone (CORDARONE) 450 mg in dextrose 5% 250 mL infusion  0.5-1 mg/min IntraVENous CONTINUOUS    vancomycin (VANCOCIN) 1750 mg in  ml infusion  1,750 mg IntraVENous Q36H    fentaNYL (PF) 900 mcg/30 ml infusion soln  0-200 mcg/hr IntraVENous CONTINUOUS    0.9% sodium chloride infusion  10 mL/hr IntraVENous CONTINUOUS    mupirocin (BACTROBAN) 2 % ointment   Topical Q12H    cefepime (MAXIPIME) 2 g in 0.9% sodium chloride (MBP/ADV) 100 mL  2 g IntraVENous Q12H    vancomycin 50 mg/mL oral solution (compounded) 250 mg  250 mg Per NG tube Q6H    furosemide (LASIX) injection 40 mg  40 mg IntraVENous DAILY PRN    morphine injection 1 mg  1 mg IntraVENous Q4H PRN    albuterol-ipratropium (DUO-NEB) 2.5 MG-0.5 MG/3 ML  3 mL Nebulization Q6H PRN    chlorhexidine (PERIDEX) 0.12 % mouthwash 15 mL  15 mL Oral Q12H    heparin (porcine) injection 5,000 Units  5,000 Units SubCUTAneous Q12H    acetaminophen (TYLENOL) solution 975 mg  975 mg Oral Q6H PRN    Or    acetaminophen (TYLENOL) suppository 650 mg  650 mg Rectal Q6H PRN    bisacodyl (DULCOLAX) suppository 10 mg  10 mg Rectal DAILY PRN    zinc oxide-cod liver oil (DESITIN) 40 % paste   Topical PRN    metoclopramide HCl (REGLAN) injection 5 mg  5 mg IntraVENous Q8H PRN    levalbuterol (XOPENEX) nebulizer soln 0.63 mg/3 mL  0.63 mg Nebulization Q6H PRN    aspirin (ASA) suppository 300 mg  300 mg Rectal DAILY    labetalol (NORMODYNE;TRANDATE) injection 10 mg  10 mg IntraVENous Q6H    hydrALAZINE (APRESOLINE) 20 mg/mL injection 20 mg  20 mg IntraVENous Q4H PRN    labetalol (NORMODYNE;TRANDATE) injection 20 mg  20 mg IntraVENous Q4H PRN    fat emulsion 20% (LIPOSYN, INTRALIPID) infusion 250 mL  250 mL IntraVENous Q MON, WED & FRI    insulin lispro (HUMALOG) injection   SubCUTAneous Q6H    glucose chewable tablet 16 g  4 Tab Oral PRN    dextrose (D50W) injection syrg 12.5-25 g  12.5-25 g IntraVENous PRN    glucagon (GLUCAGEN) injection 1 mg  1 mg IntraMUSCular PRN    sodium chloride (NS) flush 5-10 mL  5-10 mL IntraVENous Q8H    ondansetron (ZOFRAN) injection 4 mg  4 mg IntraVENous Q4H PRN    sodium chloride (NS) flush 10 mL  10 mL InterCATHeter PRN    sodium chloride (NS) flush 10-40 mL  10-40 mL InterCATHeter Q8H         Bill Mesa MD

## 2017-09-05 NOTE — PROGRESS NOTES
09/05/17 0652   ABCDE Bundle   SBT Trial Passed Yes   Weaning Parameters   Spontaneous Breathing Trial Complete Yes   Resp Rate Observed 25   Ve 14.3      RSBI 40     Patient passed SBT trial. Placed back on previous rate and settings

## 2017-09-05 NOTE — PROGRESS NOTES
Occupational Therapy 1543    Chart reviewed in preparation for OT treatement however noted PT reported pt minimally responsive to pain only and is not appropriate for therapy at time time. Noted pt re evaluated 8/30 however has had a medical decline since then with transfer to CCU s/p code blue/intubation. Will need re evaluation if when/if appropriate for therapy.       Krystin Soni OTR/L

## 2017-09-05 NOTE — PROGRESS NOTES
[address] [address], [address], [address] [facility], [address]     GI PROGRESS NOTE    NAME: David Lopez   :  1928   MRN:  [id number]       Subjective:     - went into afib soon after starting TF this am  - otherwise, doing about as well as can be expected    Objective:     VITALS:   Last 24hrs VS reviewed since prior progress note. Most recent are:    Visit Vitals    /53    Pulse (!) 120    Temp 99.4 °F (37.4 °C)    Resp 26    Ht 5' 10\" (1.778 m)    Wt 92.9 kg (204 lb 12.9 oz)    SpO2 99%    BMI 29.39 kg/m2       Intake/Output Summary (Last 24 hours) at 17 2344  Last data filed at 17 2200   Gross per 24 hour   Intake           3209.6 ml   Output             2210 ml   Net            999.6 ml       PHYSICAL EXAM:    General: Intubated, ill appearing    HEENT: Intubated, OG in place  Chest/ Lungs: Adequate air movement bilaterally, no overt wheezing, rhonchi or rales. Heart:  Regular rate & rhythm, no murmurs  Abdomen: Soft, Non distended, Non tender.  +Bowel sounds  Extremities: No clubbing or cyanosis  Neurologic:  Not sedated while intubated  Skin:   No jaundice, no rashes  Psych:   Unable to assess     Data Review     Recent Labs      17   WBC  8.9  8.9   HGB  7.2*  7.3*   HCT  22.2*  22.3*   PLT  222  201     Recent Labs      17   NA  141  142   K  4.4  3.8   CL  112*  110*   CO2  22  20*   BUN  58*  50*   CREA  1.69*  1.77*   GLU  167*  148*   PHOS  4.4  3.8   CA  7.5*  7.6*     Recent Labs      170  17   043   SGOT  40*  41*   AP  150*  161*   TP  6.8  6.7   ALB  1.5*  1.5*   GLOB  5.3*  5.2*     No results for input(s): INR, PTP, APTT in the last 72 hours.     No lab exists for component: INREXT  ___________________________________________________________________       Assessment:   · Severely ill, recent PEA arrest, did OK over the weekend until Afib today soon after starting tube feeds     Plan:   · Hold off on PEG for now given Afib, will reassess day by day  · [place] or Delores Barahona to follow      Signed By: Kirill Bolaños MD     9/4/2017  11:44 PM

## 2017-09-05 NOTE — PROGRESS NOTES
GI PROGRESS NOTE    NAME: Concepcion Gallegos   :  1928   MRN:  [id number]       Subjective:   -Weekend events noted. Remains critically ill. - Went into afib after starting TF yesterday  -Getting TF via NGT. Objective:     VITALS:   Last 24hrs VS reviewed since prior progress note. Most recent are:    Visit Vitals    /63 (BP 1 Location: Right arm, BP Patient Position: At rest)    Pulse (!) 116    Temp 99 °F (37.2 °C)    Resp 25    Ht 5' 10\" (1.778 m)    Wt 94.1 kg (207 lb 7.3 oz)    SpO2 97%    BMI 29.77 kg/m2       Intake/Output Summary (Last 24 hours) at 17 1779  Last data filed at 17 0800   Gross per 24 hour   Intake          4162.07 ml   Output             2150 ml   Net          2012.07 ml       PHYSICAL EXAM:    General: Intubated, ill appearing    HEENT: Intubated, OG in place  Chest/ Lungs: Adequate air movement bilaterally. Heart:  Regular rate & rhythm, no murmurs  Abdomen: Soft, Non distended, Non tender.  +Bowel sounds  Extremities: No cyanosis  Neurologic:  Not sedated while intubated       Data Review     Recent Labs      17   0426  17   0400   WBC  9.1  8.9   HGB  7.2*  7.2*   HCT  22.3*  22.2*   PLT  235  222     Recent Labs      17   0426  17   0400   NA  143  141   K  4.1  4.4   CL  113*  112*   CO2  21  22   BUN  57*  58*   CREA  1.63*  1.69*   GLU  188*  167*   PHOS  3.0  4.4   CA  7.8*  7.5*     Recent Labs      17   0426  17   0400   SGOT  40*  40*   AP  186*  150*   TP  7.0  6.8   ALB  1.4*  1.5*   GLOB  5.6*  5.3*     No results for input(s): INR, PTP, APTT in the last 72 hours.     No lab exists for component: INREXT, INREXT  ___________________________________________________________________       Assessment:   · Severely ill,   · Recent PEA arrest,   · Did OK over the weekend until Afib  soon after starting tube feeds     Plan:   · Will re-assess for PEG placement tomorrow if family agrees (issues with abdominal distention which will not get better with PEG placement. Please see complex history) and consultants feel that he is stable. · Code status remains full, for now.      Signed By: Thi Marc MD     9/5/2017  11:44 PM

## 2017-09-06 NOTE — PROGRESS NOTES
[facility] Consult Service Progress NOTE  Pulmonary, Critical Care, and Sleep Medicine    Name: David Lopez MRN: [id number]   : 1928 Hospital: [facility]    Date: 2017  Admission Date: 2017     Chart and notes reviewed. Data reviewed. Pt seen on rounds earlier today. I have evaluated and examined the patient. Pt is acutely ill. Hospital Day: 42       Remains on vent. Seems more comfortable and calm      IMPRESSION:   1. Acute respiratory failure with hypoxia s/p emergent intubation  2. PEA arrest    3. Upper airway obstruction  4. Acute encephalopathy   5. Dysphagia  6. Protein calorie malnutrition on TPN  7. C Diff  on IV Flagyl  8. Complicated Enterococcal UTI- sensitive to Amp  9. NSTEMI demand ischemia  10. CAD recent stents per [facility] 2017, needs plavix to be restarted  11. PAF Cardiology following  12. Pneumonia??: last xray    13. Postop respiratory failure, Extubated second time on  after Midline abdominal incision fascial wound dehiscence 17 Exploratory laparotomy with closure of midline incision using retention sutures per Dr. Joseph Brown following. 14. S/P removal of bladder stone and repair of perforated bladder POA  15. S/P subsequent ex-lap due to extensive sq emphysema in abdomen/retroperitoneum - no sign of bowel perforation  16. Pt is critically ill and at high risk of end organ dysfunction and failure  17. Critical care time with pt exclusive of procedures  35    minutes      RECOMMENDATIONS/PLAN:   1. CCU  2. Would NOT extubate (cannot control airway and secretions)  3. will ask GI to consider PEG while on vent and before we resume Plavix  4. Consider trach  5. Oral care  6. NG tube  7. PO vancomycin  8. IV abx   9. No Ativan per family request  10. renew TPN for now  6. PICC  12. DVT, SUP prophylaxis  13. Palliative care following  14. Renal fx worse     ICU disposition :Unchanged. Updated Family.  Time above.  Risk of deterioration: high   [x] High complexity decision making was performed   [x] See my orders for details  Tubes:   Rosales and Intubated/Vent    Code: Full Code        Hemodynamics:    CO:    CI:    CVP: CVP (mmHg): 8 mmHg (17 0800)  SVR:   PAP Systolic:    PAP Diastolic:    PVR:    FB23:        Ventilator Settings:      Mode Rate TV Press PEEP FiO2 PIP Min. Vent   Assist control    500 ml 0 cm H2O  6 cm H20 35 %  16 cm H2O  9.72 l/min      Vital Signs: Intake/Output: Intake/Output:   Visit Vitals    /54    Pulse (!) 107    Temp (!) 101.9 °F (38.8 °C)    Resp 18    Ht 5' 10\" (1.778 m)    Wt 94.1 kg (207 lb 7.3 oz)    SpO2 96%    BMI 29.77 kg/m2         O2 Device: Endotracheal tube, Ventilator  O2 Flow Rate (L/min): 12 l/min  Wt Readings from Last 4 Encounters:   17 94.1 kg (207 lb 7.3 oz)   08/15/17 94.2 kg (207 lb 10.8 oz)   17 95.8 kg (211 lb 3.2 oz)   17 97.7 kg (215 lb 6.2 oz)       Temp (24hrs), Av.8 °F (37.7 °C), Min:99 °F (37.2 °C), Max:101.9 °F (38.8 °C)     Intake/Output Summary (Last 24 hours) at 17 0746  Last data filed at 17 2200   Gross per 24 hour   Intake           1701.9 ml   Output             1290 ml   Net            411.9 ml     Last shift:         Last 3 shifts:  1901 -  0700  In: 3297 [I.V.:2632]  Out: 2465 [Urine:2465]     Telemetry:    normal sinus rhythm    Physical Exam:    General: severely ill elderly WM   HEENT: intubated; dry mucosa   Neck: No abnormally enlarged lymph nodes. Chest: normal   Lungs: rhonchi   Heart: Regular rate and rhythm   Abdomen: soft, non-tender, without masses or organomegaly   :    Extremity: negative, clubbing;    Neuro: obtunded   Psych: unresponsive    Skin: Pallor and Turgor absent;   Pulses: Bilateral, Radial, 2+ Normal upper and lower extremity pulses   Capillary refill: normal brisk capillary refill;    DATA:    Interval lab and diagnostic data was reviewed.   Interval radiology images were independently viewed and available reports were reviewed. All lab results for the last 24 hours reviewed.       MAR reviewed and pertinent medications noted or modified as needed    MEDS:   Current Facility-Administered Medications   Medication    TPN ADULT-CENTRAL AA 5% D20%-MVI W/ VIT K-RCH    metoprolol (LOPRESSOR) injection 5 mg    metoprolol (LOPRESSOR) injection 5 mg    vancomycin (VANCOCIN) 1250 mg in  ml infusion    amiodarone (CORDARONE) 450 mg in dextrose 5% 250 mL infusion    fentaNYL (PF) 900 mcg/30 ml infusion soln    0.9% sodium chloride infusion    cefepime (MAXIPIME) 2 g in 0.9% sodium chloride (MBP/ADV) 100 mL    vancomycin 50 mg/mL oral solution (compounded) 250 mg    furosemide (LASIX) injection 40 mg    morphine injection 1 mg    albuterol-ipratropium (DUO-NEB) 2.5 MG-0.5 MG/3 ML    chlorhexidine (PERIDEX) 0.12 % mouthwash 15 mL    heparin (porcine) injection 5,000 Units    acetaminophen (TYLENOL) solution 975 mg    Or    acetaminophen (TYLENOL) suppository 650 mg    bisacodyl (DULCOLAX) suppository 10 mg    zinc oxide-cod liver oil (DESITIN) 40 % paste    metoclopramide HCl (REGLAN) injection 5 mg    levalbuterol (XOPENEX) nebulizer soln 0.63 mg/3 mL    aspirin (ASA) suppository 300 mg    hydrALAZINE (APRESOLINE) 20 mg/mL injection 20 mg    labetalol (NORMODYNE;TRANDATE) injection 20 mg    fat emulsion 20% (LIPOSYN, INTRALIPID) infusion 250 mL    insulin lispro (HUMALOG) injection    glucose chewable tablet 16 g    dextrose (D50W) injection syrg 12.5-25 g    glucagon (GLUCAGEN) injection 1 mg    sodium chloride (NS) flush 5-10 mL    ondansetron (ZOFRAN) injection 4 mg    sodium chloride (NS) flush 10 mL    sodium chloride (NS) flush 10-40 mL        Labs:    Recent Labs      09/06/17 0428 09/05/17 0426 09/04/17   0400   WBC  13.1*  9.1  8.9   HGB  7.2*  7.2*  7.2*   PLT  247  235  222     Recent Labs      09/06/17 0428 09/05/17 0426  09/04/17   0400  09/03/17   1009   NA  141  143  141   --    K  4.2  4.1  4.4   --    CL  114*  113*  112*   --    CO2  21  21  22   --    GLU  194*  188*  167*   --    BUN  60*  57*  58*   --    CREA  2.15*  1.63*  1.69*   --    CA  7.6*  7.8*  7.5*   --    MG   --   2.5*  2.5*   --    PHOS   --   3.0  4.4   --    LAC   --    --    --   2.0   ALB   --   1.4*  1.5*   --    SGOT   --   40*  40*   --    ALT   --   27  28   --      Recent Labs      09/06/17   0534   PH  7.26*   PCO2  41   PO2  95   HCO3  18*   FIO2  35     No results for input(s): CPK, CKNDX, TROIQ in the last 72 hours. No lab exists for component: CPKMB  Lab Results   Component Value Date/Time     08/24/2017 12:40 AM          Imaging:  [x]                           I have personally reviewed the patients radiographs and reports:  CXR: LLL ATX      I have provided     minutes of critical care time rendering care exclusive of any procedures. During this entire length of time I was immediately available to the patient.      The reason for providing this level of medical care was due to a critical illness that impaired one or more vital organ systems, such that there was a high probability of imminent or life threatening deterioration in the patient's condition. Pt's condition is unstable and unpredictable. This care involved high complexity decision making which includes reviewing the patient's past medical records, current laboratory results, medications that were immediately available to me and actual Xray films in order to assess, support vital system function, and to treat this degree of vital organ system failure, and to prevent further life threatening deterioration of the patients condition.     Garrett Alvarado MD

## 2017-09-06 NOTE — PROGRESS NOTES
Came upon family (Mrs. Adalberto Edmondson and Silverio) on their way out of the CCU. Provided empathic listening. Silverio shared that they decided not to do the feeding tube today because of their concern for him having a more regular heartbeat. Mrs. Adalberto Edmondson shared that she just didn't feel right about pt getting feeding tube at this time -- gut instinct she seemed to suggest.  Silverio shared that they come in to see pt in the morning for Brother Adalberto Edmondson' morning prayers, but since he is unresponsive at this time they don't see any point hanging around. [facility] said pt would just tell them to go home (if he were responsive) so they often don't come back until evening. Alexandra Muñoz will continue to follow as able. Lo Joiner M.Div.   Palliative  Fellow

## 2017-09-06 NOTE — PROGRESS NOTES
1900: Bedside and verbal report received from Cesar Newman [address] [place] [address] Mirlande Daviesx: Assessment completed. Patient intubated and sedated. Withdrawals from pain. Lungs coarse bilaterally. A-fib. Amio infusing at 0.5 mg/min. Fentanyl 50 mcg/hr. TPN at 83 ml/hr. 2025: Axillary temp of 102. Medicated with 975 mg of Tylenol. 2123: Daughter Tita Sorto called for update. Informed her for temp of 102.   2330: Reassessment completed. No changes noted. 0321: Patient bathed and linen changed. 0330: Reassessment completed. No changes noted. 0543: Spoke to Dr. Darek Cleaning regarding critical ph of 7.08. New orders given to draw lactic acid and to give two amps of bicarb.   0641: Spoke to one of the daughters called for updated. Informed her of temp of 99.4 and events over the night.   0657: Informed pharmacist of 20.4 vanc trough value. 7355: Bedside and verbal report given to Sylvain mcintyre RN.

## 2017-09-06 NOTE — PROGRESS NOTES
0720 Bedside report received from [facility].    0800 Assessment completed. Patient currently intubated on ventilator for airway support. Patient opens eyes to pain but does not follow commands. Fentanyl infusing at 50mcg/hr for pain management and sedation. Amiodarone infusing at 0.5mg/min due to running Afib on monitor. Tube feedings currently on hold for possible peg tube placement today. Rosales catheter intact draining edwin colored urine. No signs of pain noted. Will closely monitor. 36 Family in room, per family they do not want Peg tube inserted today but wait another day. Family is worried about patient's heart and the fact that patient is in Afib. Dr. Abdiel Jordan and Endoscopy notified of family's decision. 1200 No change from previous assessment. All needs met. 1330 Dr. Alissa Garvin in room to see and assess patient. 1451 Temp 101.7 Axillary, Tylenol 975mg given per NG tube. 1600 Temp 98.9  Axillary, assessment unchanged. No other issues noted. 1900 Bedside report given to [facility].

## 2017-09-06 NOTE — PROGRESS NOTES
GI PROGRESS NOTE    NAME: Schuyler Muller   :  1928   MRN:  [id number]       Subjective:   -No new change. TF were stopped. Objective:     VITALS:   Last 24hrs VS reviewed since prior progress note. Most recent are:    Visit Vitals    /57    Pulse (!) 111    Temp (!) 101.9 °F (38.8 °C)    Resp 18    Ht 5' 10\" (1.778 m)    Wt 94.1 kg (207 lb 7.3 oz)    SpO2 96%    BMI 29.77 kg/m2       Intake/Output Summary (Last 24 hours) at 17 0802  Last data filed at 17 2200   Gross per 24 hour   Intake           1490.5 ml   Output             1170 ml   Net            320.5 ml       PHYSICAL EXAM:    General: Intubated, ill appearing    HEENT: Intubated, OG in place  Chest/ Lungs: Adequate air movement bilaterally. Heart:  Regular rate & rhythm, no murmurs  Abdomen: Soft, Non distended, Non tender.  +Bowel sounds  Extremities: No cyanosis  Neurologic:  Not sedated while intubated       Data Review     Recent Labs      17   WBC  13.1*  9.1   HGB  7.2*  7.2*   HCT  22.6*  22.3*   PLT  247  235     Recent Labs      17   0400   NA  141  143  141   K  4.2  4.1  4.4   CL  114*  113*  112*   CO2  21  21  22   BUN  60*  57*  58*   CREA  2.15*  1.63*  1.69*   GLU  194*  188*  167*   PHOS   --   3.0  4.4   CA  7.6*  7.8*  7.5*     Recent Labs      17   0400   SGOT  40*  40*   AP  186*  150*   TP  7.0  6.8   ALB  1.4*  1.5*   GLOB  5.6*  5.3*     No results for input(s): INR, PTP, APTT in the last 72 hours. No lab exists for component: INREXT, INREXT  ___________________________________________________________________       Assessment:   · Severely ill,   · Recent PEA arrest,   · Did OK over the weekend until Afib  soon after starting tube feeds     Plan:   · I spoke with his daughter Marisela Gonzalez in detail about PEG (benefits, alternatives, risks-discussed in detail: 10 minute conversation).  She had questions about afib, fever, him being sick etc. She will talk to her sister and let us know about their decision. · I spoke with Dr Nelson Taveras and his RN in person. · Code status remains full.      Signed By: Jolene Corona MD     9/6/2017  11:44 PM

## 2017-09-06 NOTE — PROGRESS NOTES
0700 Patient had uneventful night, save developing a fever of 101.9 at 0400. Medicated with Tylenol elixir. Bedside and Verbal shift change report given to [address] [place] [address] (oncoming nurse) by Tomasa Jimenez (offgoing nurse). Report included the following information SBAR, Kardex, Procedure Summary, Intake/Output, MAR and Recent Results.

## 2017-09-06 NOTE — PROGRESS NOTES
Hospitalist Progress Note    NAME: Rodriguez Montaño   :  1928   MRN:  [id number]       Assessment / Plan:  Fever, not present on admission:  Multiple infectious issues this admission including Candida albicans fungemia , C Diff colitis . Concern for new infection with development of fevers this week - discussed with daughter Thad over phone today. - CXR  with bilateral pleural effusions with associated passive atelectasis. Consolidation cannot be excluded, especially in the left base. - Urine cx 9/3 1+ bacteria but <1000 colonies on culture. Blood cultures 9/3 no growth. - repeat sputum culture requested, not sent  - con't emperic cefepime, vancomycin [IV and oral] for now (previously on linezolid; anidulofungin completed today for fungemia]  - consider ID consult  - feel he needs repeat abdominal imaging with continued fevers and recent surgeries (CT A/P). Discussed with daughter today who wants to further discuss with her siblings. Paroxysmal atrial fibrillation, CAD s/p recent HORACIO L Cx stent  and chronic diastolic CHF: EF 38-60%. Uncontrolled HR, likely fever is contributor. - con't ASA, lipitor  - restart plavix after PEG/trach placement, as soon as possible due to recent stent  - transiently on dilt gtt, now on amiodarone gtt  - con't metoprolol with additional prn bblocker  - appreciate cardiology assistance  Acute respiratory failure with hypoxia (reintubated) s/p PEA arrest with bilateral pleural effusions:  Respiratory status declining x days with large amount of hardened secretions in posterior pharynx, intubated 2017. Will open eyes, not following commands. - V/Q scan  low prob and LE dopplers negative  - pulmonology managing ventilator, poor mental status limiting ability to extubate. May need trach if can be stabilized for procedures.   - con't fentanyl prn  - palliative assisting  C Diff colitis and candida albicans fungemia, not present on admission:  Dx 8/25  - con't oral vancomycin  - completed anidulofungin 9/5  Acute kidney injury:  Cr increasing again today, suspected some component of contrast nephropathy, meds (diuretics) and ATN due to severe medical illness  - holding nephrotoxins (home diovan)  Acute encephalopathy with acute CVAs (possibly embolic) and suspected brain injury with MS change 8/12:  possible anoxia and hypotension contributing  - MRI of brain 8/16 multiple punctate foci of acute infarction in the bilateral corona radiata and centrum semiovale. Background of mild chronic microvascular ischemic disease.  - appreciate neurology consult  - EEG revealed moderately slow indicating hypoxic ischemic encephalopathy   - carotid duplex scan with bilateral ICA  less than 50% stenosis  - con't ASA  Bladder clot with perforation/stone, abdominal wound dehiscence in setting of BPH:    - s/p cystoscopy with attempted clot evacuation, ex lap, bladder stone retrieval, repair of large posterior bladder perforation with Dr. Radha Moser 7/28   - s/p ex lap with findings of diffuse pneumatosis and gastric distention without evidence of bowel perforation with Dr. Melvi Carmona 7/29  - s/p ex lap closure of midline incision using retention sutures with Dr. Mariana Gunn 8/6 due to midline abdominal incision fascial wound dehiscence   - last seen by urology 8/28, they have signed off  - holding home ditropan and proscar for now  - con't wick - per urology does not need suprapubic catheter  Severe protein calorie malnutrition:  Albumin 1.4  - con't TPN, not stable for PEG yet with uncontrolled afib and fevers  - appreciate GI assistance  Hyponatremia, resolved      DVT prophylaxis: Lovenox  Code status: Full (2 daughters Swati Crouch and Flower Hess are acting as mPOAs at this time per family preference)     Subjective:     Chief Complaint / Reason for Physician Visit  Intubated, nonresponsive. Discussed with RN events overnight.      Review of Systems:  Symptom Y/N Comments  Symptom Y/N Comments Fever/Chills    Chest Pain     Poor Appetite    Edema     Cough    Abdominal Pain     Sputum    Joint Pain     SOB/CLARKE    Pruritis/Rash     Nausea/vomit    Tolerating PT/OT     Diarrhea    Tolerating Diet     Constipation    Other       Could NOT obtain due to: nonresponsive     Objective:     VITALS:   Last 24hrs VS reviewed since prior progress note.  Most recent are:  Patient Vitals for the past 24 hrs:   Temp Pulse Resp BP SpO2   09/06/17 1300 - (!) 108 25 120/56 96 %   09/06/17 1200 99.2 °F (37.3 °C) (!) 107 22 115/56 96 %   09/06/17 1158 - (!) 110 24 - 96 %   09/06/17 1100 - (!) 104 22 111/63 96 %   09/06/17 1000 - (!) 108 22 118/63 96 %   09/06/17 0900 - (!) 106 20 113/60 96 %   09/06/17 0800 99.4 °F (37.4 °C) (!) 111 18 128/57 96 %   09/06/17 0749 - (!) 107 21 - 96 %   09/06/17 0700 - (!) 110 19 123/54 96 %   09/06/17 0600 - (!) 110 18 125/59 96 %   09/06/17 0531 - (!) 107 18 - 96 %   09/06/17 0500 - (!) 109 19 111/58 96 %   09/06/17 0400 (!) 101.9 °F (38.8 °C) (!) 113 20 131/62 96 %   09/06/17 0300 - (!) 117 21 - 96 %   09/06/17 0200 - (!) 116 21 129/61 97 %   09/06/17 0100 - (!) 123 21 154/65 96 %   09/06/17 0025 - (!) 117 21 - 96 %   09/06/17 0000 (!) 101.9 °F (38.8 °C) (!) 121 23 136/71 97 %   09/05/17 2300 - (!) 128 29 154/69 97 %   09/05/17 2200 - (!) 120 29 141/54 98 %   09/05/17 2100 - (!) 120 28 149/67 97 %   09/05/17 2008 - (!) 138 29 - 97 %   09/05/17 2000 99.7 °F (37.6 °C) (!) 125 27 146/78 97 %   09/05/17 1900 - (!) 122 26 156/64 96 %   09/05/17 1800 - (!) 126 25 137/72 96 %   09/05/17 1700 - (!) 126 24 141/53 97 %   09/05/17 1600 99.2 °F (37.3 °C) (!) 120 24 139/61 97 %   09/05/17 1520 - (!) 114 26 - 97 %   09/05/17 1500 - (!) 123 23 138/53 97 %   09/05/17 1400 - (!) 121 25 127/65 96 %       Intake/Output Summary (Last 24 hours) at 09/06/17 1355  Last data filed at 09/06/17 1300   Gross per 24 hour   Intake          3281.23 ml   Output             2235 ml   Net          1046.23 ml PHYSICAL EXAM:  General: WD, WN. Not alert, not cooperative, no acute distress    EENT:  EOM not intact. Anicteric sclerae. MMM  Resp:  CTA bilaterally, no wheezing or rales. No accessory muscle use  CV:  Regular rhythm,  No edema  GI:  Soft, mildly distended, Non tender.  +Bowel sounds. Abdominal sutures intact with minimal surrounding erythema. Neurologic:  Oriented X 0, no speech,   Psych:   Unable to assess insight. Not anxious nor agitated  Skin:  No rashes. No jaundice    Reviewed most current lab test results and cultures  YES  Reviewed most current radiology test results   YES  Review and summation of old records today    NO  Reviewed patient's current orders and MAR    YES  PMH/SH reviewed - no change compared to H&P  ________________________________________________________________________  Care Plan discussed with:    Comments   Patient x    Family  x dtr over phone   RN x    Care Manager     Consultant  x General surgery                     Multidiciplinary team rounds were held today with , nursing, pharmacist and clinical coordinator. Patient's plan of care was discussed; medications were reviewed and discharge planning was addressed. ________________________________________________________________________  Total NON critical care TIME:  30 Minutes    Total CRITICAL CARE TIME Spent:   Minutes non procedure based      Comments   >50% of visit spent in counseling and coordination of care x    ________________________________________________________________________  Baudilio Marlow MD     Procedures: see electronic medical records for all procedures/Xrays and details which were not copied into this note but were reviewed prior to creation of Plan. LABS:  I reviewed today's most current labs and imaging studies.   Pertinent labs include:  Recent Labs      09/06/17   0428  09/05/17   0426  09/04/17   0400   WBC  13.1*  9.1  8.9   HGB  7.2*  7.2*  7.2*   HCT  22.6*  22.3*  22.2* PLT  247  235  222     Recent Labs      09/06/17   0428  09/05/17   0426  09/04/17   0400   NA  141  143  141   K  4.2  4.1  4.4   CL  114*  113*  112*   CO2  21  21  22   GLU  194*  188*  167*   BUN  60*  57*  58*   CREA  2.15*  1.63*  1.69*   CA  7.6*  7.8*  7.5*   MG   --   2.5*  2.5*   PHOS   --   3.0  4.4   ALB   --   1.4*  1.5*   TBILI   --   0.5  0.8   SGOT   --   40*  40*   ALT   --   27  28       Signed: Sana Pitt MD

## 2017-09-06 NOTE — PROGRESS NOTES
Cardiology Progress Note  9/6/2017    Admit Date: 7/27/2017  Admit Diagnosis: bladder clots  Bladder injury  perperated viscous  wound dehiscence  peg  CC:  None currently  Cardiac Assessment/Plan:   CAD: Circ HORACIO 7/20/17; Ef 45-50% after PCI. On asa only since  surgery: still needs to restart plavix when able from post-surgical standpoint.       P Afib: transiently on dilt gtt, then back to NSR for weeks; with recurrent afib, now on IV  Amio; HR higher than ideal: changed IV labetalol to metoprolol with additional prn bblocker. Poor candidate for anticoagulation. HTN: Stable; future PO bblocker/ARB. Resp failure, AMS; post-op bladder repair/stone extraction/explor lap/dehis, anemia, ID, Dispo: per primary team and consultants. For other plans, see orders. Echo 9/1/17: EF 55%; mild MR; Mild RVE; Mild AI. PAp 41.    9/6: HR better but still higher than ideal; tolerating IV metoprolol: lower target HR to < 100 for prn IV metoprolol. PEG plan noted. For other plans, see orders.   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    Convulsive syncope 08/15/2017    Disturbance of memory 08/15/2017    Bladder injury 07/28/2017                                                         Subjective: Patient reports  [x]   nothing; unable to communicate    [x]   intubated   Chest pain  none  consistent with:  Non-cardiac CP         Atypical CP     None now  On going  Anginal CP     Dyspnea  none  at rest  with exertion         improved  unchanged  worse              PND  none  overnight       Orthopnea  none  improved  unchanged  worse   Presyncope  none  improved  unchanged  worse   Ambulated in hallway without symptoms   Yes   Ambulated in room without symptoms  Yes     ROS Hematuria:  Yes X No Dysuria:  Yes X No                (2+  other systems) Cough: Yes X No Sputum: Yes X No                 BRBPR:  Yes X No Melena: Yes X No   No change in family and social history from H&P/Consult note.   Objective:    Physical Exam:  24 hr VS reviewed, overall VSSAF  Temp (24hrs), Av.2 °F (37.9 °C), Min:99.2 °F (37.3 °C), Max:101.9 °F (38.8 °C)    Patient Vitals for the past 8 hrs:   Pulse   17 0800 (!) 111   17 0749 (!) 107   17 0700 (!) 110   17 0600 (!) 110   17 0531 (!) 107   17 0500 (!) 109   17 0400 (!) 113   17 0300 (!) 117   17 0200 (!) 116   17 0100 (!) 123    Patient Vitals for the past 8 hrs:   Resp   17 0800 18   17 0749 21   17 0700 19   17 0600 18   17 0531 18   17 0500 19   17 0400 20   17 0300 21   17 0200 21   17 0100 21    Patient Vitals for the past 8 hrs:   BP   17 0800 128/57   17 0700 123/54   17 0600 125/59   17 0500 111/58   17 0400 131/62   17 0200 129/61   17 0100 154/65          Intake/Output Summary (Last 24 hours) at 17 0852  Last data filed at 17 0800   Gross per 24 hour   Intake          2941.23 ml   Output             2065 ml   Net           876.23 ml     General: x WD,WN  Elderly  Cachetic x NAD     Agitated  Lethargic  Arousable  Obtunded    x Sedated  On Bipap x Intubated                ENT/Palate: X WNL  Dry MM  anicteric x NG in              Respiratory: X CTA ant  Nl resp effort  Increased effort  No significant change     rhonchi  rales  improved  worse              Cardiovasc:  RRR x IRRR X Nl S1, S2 x No rub     No murmur X No new murmur  Murmur c/w: x No gallop    x No edema  BLE edema:+  RLE edema:+  LLE edema:+     Edema less  Edema more  Edema same  Edema worse    X Nl JVP  Elevated JVP  JVP same  JVP worse    X Carotid wnl x abd aorta not palpated X no peripheral emboli noted                GI: X abd soft x nondistended X BS present X No organo- megaly noted              Skin: X Warm, dry  Cold extremites                  Neuro:  A/O  Grossly non- focal  Obtunded x Sedated     Lethargic  Arousable x intubated       cath site intact w/o hematoma or new bruit; distal pulse unchanged  Yes   Data Review:     Telemetry independently reviewed  sinus  chronic afib x persistent afib  NSVT     ECG independently reviewed  NSR  afib  no significant changes  NSST-Tw chgs    no new ECG provided for review   Lab results reviewed as noted below. Current medications reviewed as noted below. Recent Labs      09/06/17   0534   PH  7.26*   PCO2  41   PO2  95     No results for input(s): CPK, CKMB, CKNDX, TROIQ in the last 72 hours. Recent Labs      09/06/17 0428 09/05/17 0426 09/04/17   0400   NA  141  143  141   K  4.2  4.1  4.4   CL  114*  113*  112*   CO2  21  21  22   BUN  60*  57*  58*   CREA  2.15*  1.63*  1.69*   GLU  194*  188*  167*   PHOS   --   3.0  4.4   CA  7.6*  7.8*  7.5*   ALB   --   1.4*  1.5*   WBC  13.1*  9.1  8.9   HGB  7.2*  7.2*  7.2*   HCT  22.6*  22.3*  22.2*   PLT  247  235  222     Recent Labs      09/05/17 0426 09/04/17   0400   SGOT  40*  40*   AP  186*  150*   TBILI  0.5  0.8   TP  7.0  6.8   ALB  1.4*  1.5*   GLOB  5.6*  5.3*     No results for input(s): INR, PTP, APTT in the last 72 hours.     No lab exists for component: INREXT, INREXT   No results for input(s): FE, TIBC, PSAT, FERR in the last 72 hours.    Lab Results   Component Value Date/Time    Glucose (POC) 179 09/06/2017 12:31 AM    Glucose (POC) 191 09/05/2017 05:05 PM    Glucose (POC) 211 09/05/2017 10:59 AM    Glucose (POC) 210 09/05/2017 06:15 AM    Glucose (POC) 206 09/05/2017 12:32 AM       Current Facility-Administered Medications   Medication Dose Route Frequency    TPN ADULT-CENTRAL AA 5% D20%-MVI W/ VIT K-RCH   IntraVENous CONTINUOUS    metoprolol (LOPRESSOR) injection 5 mg  5 mg IntraVENous Q4H PRN    TPN ADULT-CENTRAL AA 5% D20%-MVI W/ VIT K-RCH   IntraVENous CONTINUOUS    metoprolol (LOPRESSOR) injection 5 mg  5 mg IntraVENous Q6H    vancomycin (VANCOCIN) 1250 mg in  ml infusion  1,250 mg IntraVENous Q24H    amiodarone (CORDARONE) 450 mg in dextrose 5% 250 mL infusion  0.5-1 mg/min IntraVENous CONTINUOUS    fentaNYL (PF) 900 mcg/30 ml infusion soln  0-200 mcg/hr IntraVENous CONTINUOUS    0.9% sodium chloride infusion  100 mL/hr IntraVENous CONTINUOUS    cefepime (MAXIPIME) 2 g in 0.9% sodium chloride (MBP/ADV) 100 mL  2 g IntraVENous Q12H    vancomycin 50 mg/mL oral solution (compounded) 250 mg  250 mg Per NG tube Q6H    furosemide (LASIX) injection 40 mg  40 mg IntraVENous DAILY PRN    morphine injection 1 mg  1 mg IntraVENous Q4H PRN    albuterol-ipratropium (DUO-NEB) 2.5 MG-0.5 MG/3 ML  3 mL Nebulization Q6H PRN    chlorhexidine (PERIDEX) 0.12 % mouthwash 15 mL  15 mL Oral Q12H    heparin (porcine) injection 5,000 Units  5,000 Units SubCUTAneous Q12H    acetaminophen (TYLENOL) solution 975 mg  975 mg Oral Q6H PRN    Or    acetaminophen (TYLENOL) suppository 650 mg  650 mg Rectal Q6H PRN    bisacodyl (DULCOLAX) suppository 10 mg  10 mg Rectal DAILY PRN    zinc oxide-cod liver oil (DESITIN) 40 % paste   Topical PRN    metoclopramide HCl (REGLAN) injection 5 mg  5 mg IntraVENous Q8H PRN    levalbuterol (XOPENEX) nebulizer soln 0.63 mg/3 mL  0.63 mg Nebulization Q6H PRN    aspirin (ASA) suppository 300 mg  300 mg Rectal DAILY    hydrALAZINE (APRESOLINE) 20 mg/mL injection 20 mg  20 mg IntraVENous Q4H PRN    labetalol (NORMODYNE;TRANDATE) injection 20 mg  20 mg IntraVENous Q4H PRN    fat emulsion 20% (LIPOSYN, INTRALIPID) infusion 250 mL  250 mL IntraVENous Q MON, WED & FRI    insulin lispro (HUMALOG) injection   SubCUTAneous Q6H    glucose chewable tablet 16 g  4 Tab Oral PRN    dextrose (D50W) injection syrg 12.5-25 g  12.5-25 g IntraVENous PRN    glucagon (GLUCAGEN) injection 1 mg  1 mg IntraMUSCular PRN    sodium chloride (NS) flush 5-10 mL  5-10 mL IntraVENous Q8H    ondansetron (ZOFRAN) injection 4 mg  4 mg IntraVENous Q4H PRN    sodium chloride (NS) flush 10 mL  10 mL InterCATHeter PRN    sodium chloride (NS) flush 10-40 mL  10-40 mL Magan Esqueda MD

## 2017-09-06 NOTE — PROGRESS NOTES
Attended Interdisciplinary rounds in Critical Care Unit, where patient care was discussed. Visit by: Hiral Bright. Edison Pascal.  Sandra Méndez MA, [address]

## 2017-09-06 NOTE — PROGRESS NOTES
Interdisciplinary team rounds were held 9/6/2017  with the following team members:Care Management, Diabetes Treatment Specialist, Nursing, Nutrition, Pastoral Care, Pharmacy, Physical Therapy, Physician, Respiratory Therapy and Clinical Coordinator. Plan of care discussed. Goal: See MD orders and progress notes for further  interventions and desired outcomes.

## 2017-09-06 NOTE — PROGRESS NOTES
Nutrition Assessment:    RECOMMENDATIONS:   Continue TPN as ordered for now   RD to provide TF recommendations once plan is made for PEG    ASSESSMENT:   Chart reviewed, case discussed during CCU rounds. Pt remains intubated, needs re-estimated. TF on hold for possible PEG placement today but it looks like family would like to hold off another day of placement. Pt remains on TPN for now which meets 90% kcal and 100% protein needs. -211. Amio drip. Will continue to monitor plan of care. Dietitians Intervention(s)/Plan(s): Continue TPN as ordered, provide TF recommendations prn  SUBJECTIVE/OBJECTIVE:   Pt intubated   Diet Order: NPO, Other (comment) (TPN: D20, 5% AA @ 83mL/h + lipids 3 x week (provides 1967kcals/100gPro) )  % Eaten:  No data found. TF on hold for PEG      Pertinent Medications:cefepime, humalog, vancomycin; [email or web address]); Drips: amio. Chemistries:  Lab Results   Component Value Date/Time    Sodium 141 09/06/2017 04:28 AM    Potassium 4.2 09/06/2017 04:28 AM    Chloride 114 09/06/2017 04:28 AM    CO2 21 09/06/2017 04:28 AM    Anion gap 6 09/06/2017 04:28 AM    Glucose 194 09/06/2017 04:28 AM    BUN 60 09/06/2017 04:28 AM    Creatinine 2.15 09/06/2017 04:28 AM    BUN/Creatinine ratio 28 09/06/2017 04:28 AM    GFR est AA 35 09/06/2017 04:28 AM    GFR est non-AA 29 09/06/2017 04:28 AM    Calcium 7.6 09/06/2017 04:28 AM    Albumin 1.4 09/05/2017 04:26 AM      Anthropometrics: Height: 5' 10\" (177.8 cm) Weight: 94.1 kg (207 lb 7.3 oz)    IBW (%IBW):   ( ) UBW (%UBW):   (  %)    BMI: Body mass index is 29.77 kg/(m^2). This BMI is indicative of:  []Underweight   []Normal   [x]Overweight   [] Obesity   [] Extreme Obesity (BMI>40)  Estimated Nutrition Needs (Based on): 2181 Kcals/day (PSU (MSJ 1612)) , 88 g (1g/kg bw) Protein  Carbohydrate:  At Least 130 g/day  Fluids: 2100 mL/day    Last BM: 9/2   []Active     []Hyperactive  [x]Hypoactive       [] Absent   BS  Skin:    [] Intact   [x] Incision  [x] Breakdown (PU to sacrum)  [] DTI   [] Tears/Excoriation/Abrasion  [x]Edema(+3 pitting-BUE; +2 pitting-generalized + BLE) [] Other: Wt Readings from Last 30 Encounters:   09/06/17 94.1 kg (207 lb 7.3 oz)   08/15/17 94.2 kg (207 lb 10.8 oz)   07/26/17 95.8 kg (211 lb 3.2 oz)   03/26/17 97.7 kg (215 lb 6.2 oz)   11/03/16 98.6 kg (217 lb 6 oz)   03/17/15 97.2 kg (214 lb 4.8 oz)   10/02/14 90.7 kg (200 lb)   05/16/13 96.9 kg (213 lb 10 oz)   02/10/13 99.5 kg (219 lb 5.7 oz)   04/02/12 96.1 kg (211 lb 13.8 oz)   03/13/11 96.8 kg (213 lb 6.5 oz)      NUTRITION DIAGNOSES:   Problem:  Swallowing difficulty      Etiology: related to AMS, encephalopathy     Signs/Symptoms: as evidenced by NPO per SLP    Previous dx re: swallowing difficulty continues. NUTRITION INTERVENTIONS:   Enteral/Parenteral Nutrition: Initiate enteral nutrition, Other (Continue TPN as ordered for now )                GOAL:   Pt will tolerate TPN with stable electyrolytes in 2-4 days.      NUTRITION MONITORING AND EVALUATION   Previous Goal: Pt will tolerate initiation of TF with residuals <250mL and TPN will start to be weaned in 2-3 days   Previous Goal Met: N/A   Previous Recommendations Implemented: Yes   Cultural, Adventism, or Ethnic Dietary Needs: None   LEARNING NEEDS (Diet, Food/Nutrient-Drug Interaction):    [x] None Identified   [] Identified and Education Provided/Documented   [] Identified and Pt declined/was not appropriate      [x] Interdisciplinary Care Plan Reviewed/Documented    [x] Participated in Discharge Planning: Unable to determine    [x] Interdisciplinary Rounds     NUTRITION RISK:    [x] High              [] Moderate           []  Low  []  Minimal/Uncompromised      Franne Sandifer, [address]   Pager [phone]  Weekend Pager [phone]

## 2017-09-06 NOTE — PROGRESS NOTES
Attempted to see patient for OT treatment, but patient remains intubated and sedated, unable and not medically appropriate to participate in therapy per nursing. Will continue to follow and reevaluate patient when he is medically appropriate and able to participate.

## 2017-09-06 NOTE — PROGRESS NOTES
Patient remains inappropriate for PT treatment at this time. Will defer and continue to follow. Darshana Deshpande PT, DPT.

## 2017-09-07 NOTE — PROGRESS NOTES
Attended Interdisciplinary rounds in Critical Care Unit, where patient care was discussed. Visit by: Jeremy Faustin. Edward Ruth.  Moe Causey MA, [address]

## 2017-09-07 NOTE — PROGRESS NOTES
Hospitalist Progress Note    NAME: Viry James   :  1928   MRN:  [id number]       Assessment / Plan:  Sepsis (new fever, leukocytosis, hypotension, tachycardia), not present on admission:  Multiple infectious issues this admission including Candida albicans fungemia , C Diff colitis  with development of new fevers on 9/3. Continues to spike fevers - Tm 102 overnight. Worsening acidosis with ABG 7.08/44/144.  - CXR today with no significant change in the appearance of bibasilar opacities, left greater than right. No significant change in the appearance of the support hardware. - Urine cx 9/3 1+ bacteria but <1000 colonies on culture.  Blood cultures 9/3 no growth. - repeat sputum culture requested, not sent  - con't emperic cefepime, vancomycin [IV and oral] for now (previously on linezolid; anidulofungin completed  for fungemia)  - levophed added  - Discussed with Hillary Rooney again today that I feel that intraabominal sepsis is most likely, but he is not stable for additional imaging or intervention at this time. She is in agreement with this. Discussed with Dr. Mitch Reyes who has not recommended change to plan of care. Continue to work with palliative on goals. After discussion with Lilo Mar, family would like to continue intubation and is okay with addition of vasopressors prn but does NOT want chest compression/shocks if he declines. Paroxysmal atrial fibrillation, CAD s/p recent HORACIO L Cx stent  and chronic diastolic CHF: QP 58-96%.   Uncontrolled HR.  - con't ASA, lipitor  - restart plavix if able to have PEG/trach placement, as soon as possible due to recent stent  - transiently on dilt gtt, now on amiodarone gtt  - stop metoprolol due to hypotension  - appreciate cardiology assistance  Acute respiratory failure with hypoxia (reintubated) s/p PEA arrest with bilateral pleural effusions:  Respiratory status stable but poor, preceeded by large amount of hardened secretions in posterior pharynx, intubated 8/31/2017  - V/Q scan 8/31 low prob and LE dopplers negative  - pulmonology managing ventilator, poor mental status limiting ability to extubate.  May need trach if he can be stabilized for procedures. - con't fentanyl prn  - palliative assisting  C Diff colitis and candida albicans fungemia, not present on admission:  Dx 8/25  - con't oral vancomycin  - completed anidulofungin 9/5  Acute kidney injury:  Cr increasing again today, suspected some component of contrast nephropathy, meds (diuretics) and ATN due to severe medical illness  - holding nephrotoxins (home diovan)  Acute encephalopathy with acute CVAs (possibly embolic) and suspected brain injury with MS change 8/12:  possible anoxia and hypotension contributing  - MRI of brain 8/16 multiple punctate foci of acute infarction in the bilateral corona radiata and centrum semiovale.  Background of mild chronic microvascular ischemic disease.  - appreciate neurology consult  - EEG revealed moderately slow indicating hypoxic ischemic encephalopathy   - carotid duplex scan with bilateral ICA  less than 50% stenosis  - con't ASA  Bladder clot with perforation/stone, abdominal wound dehiscence in setting of BPH:    - s/p cystoscopy with attempted clot evacuation, ex lap, bladder stone retrieval, repair of large posterior bladder perforation with Dr. Yahaira Liu 7/28   - s/p ex lap with findings of diffuse pneumatosis and gastric distention without evidence of bowel perforation with Dr. Carmelia Schaumann 7/29  - s/p ex lap closure of midline incision using retention sutures with Dr. Carlo Coelho 8/6 due to midline abdominal incision fascial wound dehiscence   - last seen by urology 8/28, they have signed off  - holding home ditropan and proscar for now  - con't wick - per urology does not need suprapubic catheter  Severe protein calorie malnutrition:  Albumin 1.4  - con't TPN, not stable for PEG yet with uncontrolled afib and fevers  - appreciate GI assistance  Hyponatremia, resolved      DVT prophylaxis: Lovenox  Code status: Full (2 daughters Torres Hernandes and Mary Barton are acting as mPOAs at this time per family preference)     Subjective:     Chief Complaint / Reason for Physician Visit  Intubated, nonresponsive. Discussed with RN events overnight. Review of Systems:  Symptom Y/N Comments  Symptom Y/N Comments   Fever/Chills    Chest Pain     Poor Appetite    Edema     Cough    Abdominal Pain     Sputum    Joint Pain     SOB/CLARKE    Pruritis/Rash     Nausea/vomit    Tolerating PT/OT     Diarrhea    Tolerating Diet     Constipation    Other       Could NOT obtain due to: nonresponsive     Objective:     VITALS:   Last 24hrs VS reviewed since prior progress note.  Most recent are:  Patient Vitals for the past 24 hrs:   Temp Pulse Resp BP SpO2   09/07/17 1200 100.1 °F (37.8 °C) (!) 103 16 106/50 96 %   09/07/17 1143 - (!) 108 16 - 100 %   09/07/17 1100 - 99 16 92/62 100 %   09/07/17 1000 - 99 16 93/61 100 %   09/07/17 0900 - 97 16 98/46 100 %   09/07/17 0800 100 °F (37.8 °C) (!) 101 15 99/53 100 %   09/07/17 0741 - 96 15 - 100 %   09/07/17 0700 - 100 16 103/55 100 %   09/07/17 0655 - 95 15 - 99 %   09/07/17 0651 - (!) 105 11 - 98 %   09/07/17 0643 - (!) 106 26 - 100 %   09/07/17 0600 - 98 21 97/56 90 %   09/07/17 0500 - (!) 102 19 111/57 98 %   09/07/17 0459 - (!) 105 15 - 97 %   09/07/17 0400 99.4 °F (37.4 °C) 100 19 107/51 97 %   09/07/17 0301 - 100 18 113/54 93 %   09/07/17 0200 - 94 17 107/52 98 %   09/07/17 0100 - 98 16 104/50 98 %   09/07/17 0051 - (!) 104 27 - 100 %   09/07/17 0001 (!) 100.9 °F (38.3 °C) (!) 102 21 115/54 98 %   09/06/17 2300 - (!) 108 20 123/64 97 %   09/06/17 2211 (!) 101 °F (38.3 °C) - - - -   09/06/17 2200 - (!) 107 21 116/51 97 %   09/06/17 2111 - (!) 111 25 - 99 %   09/06/17 2100 - (!) 113 22 122/68 96 %   09/06/17 2000 (!) 102 °F (38.9 °C) (!) 111 21 124/61 96 %   09/06/17 1900 - (!) 109 20 114/57 96 %   09/06/17 1800 - (!) 111 23 - 96 % 09/06/17 1700 - (!) 120 21 119/63 95 %   09/06/17 1600 98.9 °F (37.2 °C) (!) 120 22 130/62 94 %   09/06/17 1535 - (!) 120 23 - 93 %   09/06/17 1500 - (!) 120 23 138/49 93 %   09/06/17 1451 (!) 100.7 °F (38.2 °C) - - - -   09/06/17 1400 - (!) 117 23 122/56 92 %   09/06/17 1300 - (!) 108 25 120/56 96 %       Intake/Output Summary (Last 24 hours) at 09/07/17 1228  Last data filed at 09/07/17 0700   Gross per 24 hour   Intake          3786.48 ml   Output              905 ml   Net          2881.48 ml        PHYSICAL EXAM:  General: WD, WN. Not alert, not able to be cooperative, mild acute distress    EENT:  EOM not intact. Pupils 2mm fixed. Anicteric sclerae. MMM with ET tube in place. Resp:  CTA bilaterally, no wheezing or rales. No accessory muscle use  CV:  Regular rhythm,  No edema  GI:  Soft, moderately distended, Non tender.  +Bowel sounds. Operative sutures in place with mild surrounding erythema. Neurologic:  Oriented X 0, no speech   Psych:   No insight. Not anxious nor agitated  Skin:  No rashes. No jaundice    Reviewed most current lab test results and cultures  YES  Reviewed most current radiology test results   YES  Review and summation of old records today    NO  Reviewed patient's current orders and MAR    YES  PMH/SH reviewed - no change compared to H&P  ________________________________________________________________________  Care Plan discussed with:    Comments   Patient x    Family  x dtr Allyson Devriesbarbara [phone]   RN x    Care Manager     Consultant  x pulmonology                     Multidiciplinary team rounds were held today with , nursing, pharmacist and clinical coordinator. Patient's plan of care was discussed; medications were reviewed and discharge planning was addressed.      ________________________________________________________________________  Total NON critical care TIME:   Minutes    Total CRITICAL CARE TIME Spent:  30 Minutes non procedure based ([id number], 3511-3428) Comments   >50% of visit spent in counseling and coordination of care x    ________________________________________________________________________  Ramya Springer MD     Procedures: see electronic medical records for all procedures/Xrays and details which were not copied into this note but were reviewed prior to creation of Plan. LABS:  I reviewed today's most current labs and imaging studies.   Pertinent labs include:  Recent Labs      09/07/17 0211 09/06/17 0428 09/05/17 0426   WBC  17.3*  13.1*  9.1   HGB  7.0*  7.2*  7.2*   HCT  22.3*  22.6*  22.3*   PLT  237  247  235     Recent Labs      09/07/17 0211 09/06/17 0428 09/05/17 0426   NA  139  141  143   K  4.3  4.2  4.1   CL  112*  114*  113*   CO2  17*  21  21   GLU  246*  194*  188*   BUN  77*  60*  57*   CREA  3.04*  2.15*  1.63*   CA  7.4*  7.6*  7.8*   MG   --    --   2.5*   PHOS   --    --   3.0   ALB  1.3*   --   1.4*   TBILI  0.6   --   0.5   SGOT  133*   --   40*   ALT  59   --   27       Signed: Ramya Springer MD

## 2017-09-07 NOTE — PROGRESS NOTES
Pharmacy Automatic Renal Dosing Protocol - Antimicrobials    Indication for Antimicrobials: Wound infection; HCAP; UTI; Candidemia; CDIFF    Current Regimen of Each Antimicrobial (Start Day & Day of Therapy):  Cefepime 2 gm IV every 12 hours (Started 17; Day #7)  Vancomycin 250 mg PO every 6 hours (Started 17; Day #7)  Vancomycin 1750 mg IV every 36 hours (Started 9/3/17; Day #4)    Previous Antimicrobials:  Metronidazole 500 mg IV every 8 hours (Started 17; Day #10)  Linezolid 600 mg IV every 12 hours (Started 17; Day #12)  Anidulafungin 100 mg IV every 24 hours (Started 17; Day #14 of 14)    Significant cultures:   9/3/17 Urine culture = No growth (FINAL)  9/3/17 Blood culture = No growth x 1 day (Results pending)  17 Respiratory culture = Scant candida (FINAL)  17 CDIFF = Postive (FINAL)  17 Urine culture = Greater than 100K CFU/mL C albicans (FINAL)  17 Blood culture = No growth (FINAL)  17 Wound culture = Few oxacillin-resistant staph epi; Few enterococcus faecalis; Light anaerobic GPC (FINAL)  17 Wound culture = Few oxacillin-resistant staph epi; Light anaerobic GPC (FINAL)  17 Urine culture = No growth (FINAL)  17 Blood culture = Candida albicans in 1/2 (FINAL)  17 Urine culture = No growth (FINAL)  17 Urine culture = Greater than 100K CFU/mL Enterococcus (FINAL)    Labs:  Recent Labs      17   0211  17   0428  17   0426   CREA  3.04*  2.15*  1.63*   BUN  77*  60*  57*   WBC  17.3*  13.1*  9.1     Temp (24hrs), Av.3 °F (37.9 °C), Min:98.9 °F (37.2 °C), Max:102 °F (38.9 °C)    Creatinine Clearance: ~17 mL/min    Impression/Plan:   - Cefepime - will adjust for worsening renal function to cefepime 2 g IV daily per protocol  - Patient still having fevers, there is concern for new infection. Vancomycin trough 20.4, calculated 19 which is therapeutic.  However given worsening renal function overnight will expand interval to Q48H.  - Vancomycin PO does not require renal adjustment. Continue current regimen. 10 day duration added on 9/5/17 CCU rounds. Pharmacy will follow daily and adjust doses of monitored medications as appropriate for renal function and/or serum levels.     Thank you,  Selina Forman, PHARMD

## 2017-09-07 NOTE — PROGRESS NOTES
Interdisciplinary team rounds were held  9/7/2017  with the following team members:Care Management, Diabetes Treatment Specialist, Nursing, Nutrition, Pastoral Care, Pharmacy, Physical Therapy, Physician and Respiratory Therapy. Plan of care discussed. Goal: See MD orders and progress notes for further  interventions and desired outcomes.

## 2017-09-07 NOTE — PROGRESS NOTES
Cardiology Progress Note  9/7/2017     Admit Date: 7/27/2017  Admit Diagnosis: bladder clots  Bladder injury  perperated viscous  wound dehiscence  peg  CC: none currently  Cardiac Assessment/Plan:   CAD: Circ HORACIO 7/20/17; Ef 45-50% after PCI. On asa only since  surgery: still needs to restart plavix when able from post-surgical standpoint.       P Afib: transiently on dilt gtt, then back to NSR for weeks; with recurrent afib, now on IV  Amio; HR higher than ideal: changed IV labetalol to metoprolol with additional prn bblocker. Poor candidate for anticoagulation. HTN: Stable; future PO bblocker/ARB. Resp failure, AMS; post-op bladder repair/stone extraction/explor lap/dehis, anemia, ID, Dispo: per primary team and consultants. For other plans, see orders. Echo 9/1/17: EF 55%; mild MR; Mild RVE; Mild AI. PAp 41.    9/6: HR better but still higher than ideal; tolerating standing IV metoprolol: lower target HR to < 100 for prn IV metoprolol. PEG plan noted. 9/7: HR better; BP stable; No new cardiac recs. LINDSEY/acidosis/fevers noted. For other plans, see orders.   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    Convulsive syncope 08/15/2017    Disturbance of memory 08/15/2017    Bladder injury 07/28/2017        Subjective: [place] remains intubated/can provide no history   Chest pain  none  consistent with: Non-cardiac CP         Atypical CP     None now  On going  Anginal CP     Dyspnea  none  at rest  with exertion         improved  unchanged  worse              PND  none  overnight       Orthopnea  none  improved  unchanged  worse   Presyncope  none  improved  unchanged  worse     Ambulated in hallway without symptoms   Yes   Ambulated in room without symptoms  Yes   Objective:    Physical Exam:  Overall VSSAF;    Visit Vitals    /55    Pulse 96    Temp 99.4 °F (37.4 °C)    Resp 15    Ht 5' 10\" (1.778 m)    Wt 94.1 kg (207 lb 7.3 oz)    SpO2 100%    BMI 29.77 kg/m2     Temp (24hrs), Av.3 °F (37.9 °C), Min:98.9 °F (37.2 °C), Max:102 °F (38.9 °C)    Patient Vitals for the past 8 hrs:   Pulse   17 0741 96   17 0700 100   17 0655 95   17 0651 (!) 105   17 0643 (!) 106   17 0600 98   17 0500 (!) 102   17 0459 (!) 105   17 0400 100   17 0301 100   17 0200 94   17 0100 98    Patient Vitals for the past 8 hrs:   Resp   17 0741 15   17 0700 16   17 0655 15   17 0651 11   17 0643 26   17 0600 21   17 0500 19   17 0459 15   17 0400 19   17 0301 18   17 0200 17   17 0100 16    Patient Vitals for the past 8 hrs:   BP   17 0700 103/55   17 0600 97/56   17 0500 111/57   17 0400 107/51   17 0301 113/54   17 0200 107/52   17 0100 104/50        Intake/Output Summary (Last 24 hours) at 17 0851  Last data filed at 09/07/17 0700   Gross per 24 hour   Intake          4592.08 ml   Output             1445 ml   Net          3147.08 ml     General Appearance: Well developed, elderly, no acute distress. Chronically ill. Ears/Nose/Mouth/Throat:   Intubated; anicteric. JVP: WNL   Resp:   clear to auscultation ant-laterally. Nl resp effort. Cardiovascular:  IRRR, S1, S2 normal, no new murmur. No gallop or rub.    Abdomen:   Soft, non-tender, bowel sounds are present. Extremities: No edema bilaterally. Skin:  Neuro: Warm and dry. Intubated; sedated. cath site intact w/o hematoma or new bruit; distal pulse unchanged  Yes   Data Review:     Telemetry independently reviewed  sinus x persistent afib  parox afib  NSVT     ECG independently reviewed  NSR  afib  no significant changes  NSST-Tw chgs   x no new ECG provided for review   Lab results reviewed as noted below. Current medications reviewed as noted below. Recent Labs      09/07/17   0457  09/06/17   0534   PH  7.08*  7.26*   PCO2  44  41   PO2  114*  95     No results for input(s): CPK, CKMB, CKNDX, TROIQ in the last 72 hours. Recent Labs      09/07/17 0211 09/06/17   0428  09/05/17   0426   NA  139  141  143   K  4.3  4.2  4.1   CL  112*  114*  113*   CO2  17*  21  21   BUN  77*  60*  57*   CREA  3.04*  2.15*  1.63*   GLU  246*  194*  188*   PHOS   --    --   3.0   CA  7.4*  7.6*  7.8*   ALB  1.3*   --   1.4*   WBC  17.3*  13.1*  9.1   HGB  7.0*  7.2*  7.2*   HCT  22.3*  22.6*  22.3*   PLT  237  247  235     Lab Results   Component Value Date/Time    Cholesterol, total 178 07/20/2017 12:28 PM    HDL Cholesterol 48 07/20/2017 12:28 PM    LDL, calculated 117.8 07/20/2017 12:28 PM    Triglyceride 286 08/26/2017 02:36 AM    CHOL/HDL Ratio 3.7 07/20/2017 12:28 PM     Recent Labs      09/07/17 0211 09/05/17 0426   SGOT  133*  40*   AP  189*  186*   TP  7.2  7.0   ALB  1.3*  1.4*   GLOB  5.9*  5.6*     No results for input(s): INR, PTP, APTT in the last 72 hours.     No lab exists for component: INREXT   No components found for: GLPOC    Current Facility-Administered Medications   Medication Dose Route Frequency    sodium bicarbonate (8.4%) 150 mEq in dextrose 5% 1,000 mL infusion   IntraVENous CONTINUOUS    TPN ADULT-CENTRAL AA 5% D20%-MVI W/ VIT K-RCH   IntraVENous CONTINUOUS    TPN ADULT-CENTRAL AA 5% D20%-MVI W/ VIT K-RCH   IntraVENous CONTINUOUS    metoprolol (LOPRESSOR) injection 5 mg  5 mg IntraVENous Q4H PRN    dextrose 10% infusion 125-250 mL  125-250 mL IntraVENous PRN    metoprolol (LOPRESSOR) injection 5 mg  5 mg IntraVENous Q6H    vancomycin (VANCOCIN) 1250 mg in  ml infusion  1,250 mg IntraVENous Q24H    amiodarone (CORDARONE) 450 mg in dextrose 5% 250 mL infusion  0.5-1 mg/min IntraVENous CONTINUOUS    fentaNYL (PF) 900 mcg/30 ml infusion soln  0-200 mcg/hr IntraVENous CONTINUOUS    cefepime (MAXIPIME) 2 g in 0.9% sodium chloride (MBP/ADV) 100 mL  2 g IntraVENous Q12H    vancomycin 50 mg/mL oral solution (compounded) 250 mg  250 mg Per NG tube Q6H    furosemide (LASIX) injection 40 mg  40 mg IntraVENous DAILY PRN    morphine injection 1 mg  1 mg IntraVENous Q4H PRN    albuterol-ipratropium (DUO-NEB) 2.5 MG-0.5 MG/3 ML  3 mL Nebulization Q6H PRN    chlorhexidine (PERIDEX) 0.12 % mouthwash 15 mL  15 mL Oral Q12H    heparin (porcine) injection 5,000 Units  5,000 Units SubCUTAneous Q12H    acetaminophen (TYLENOL) solution 975 mg  975 mg Oral Q6H PRN    Or    acetaminophen (TYLENOL) suppository 650 mg  650 mg Rectal Q6H PRN    bisacodyl (DULCOLAX) suppository 10 mg  10 mg Rectal DAILY PRN    zinc oxide-cod liver oil (DESITIN) 40 % paste   Topical PRN    metoclopramide HCl (REGLAN) injection 5 mg  5 mg IntraVENous Q8H PRN    levalbuterol (XOPENEX) nebulizer soln 0.63 mg/3 mL  0.63 mg Nebulization Q6H PRN    aspirin (ASA) suppository 300 mg  300 mg Rectal DAILY    hydrALAZINE (APRESOLINE) 20 mg/mL injection 20 mg  20 mg IntraVENous Q4H PRN    labetalol (NORMODYNE;TRANDATE) injection 20 mg  20 mg IntraVENous Q4H PRN    fat emulsion 20% (LIPOSYN, INTRALIPID) infusion 250 mL  250 mL IntraVENous Q MON, WED & FRI    insulin lispro (HUMALOG) injection   SubCUTAneous Q6H    glucose chewable tablet 16 g  4 Tab Oral PRN    glucagon (GLUCAGEN) injection 1 mg  1 mg IntraMUSCular PRN    sodium chloride (NS) flush 5-10 mL  5-10 mL IntraVENous Q8H  ondansetron (ZOFRAN) injection 4 mg  4 mg IntraVENous Q4H PRN    sodium chloride (NS) flush 10 mL  10 mL InterCATHeter PRN    sodium chloride (NS) flush 10-40 mL  10-40 mL InterCATHeter Ziggy Gomes MD

## 2017-09-07 NOTE — PROGRESS NOTES
[facility] Consult Service Progress NOTE  Pulmonary, Critical Care, and Sleep Medicine    Name: Jamaica Garcia MRN: [id number]   : 1928 Hospital: [facility]   Date: 2017  Admission Date: 2017     Chart and notes reviewed. Data reviewed. Pt seen on rounds earlier today. I have evaluated and examined the patient. Pt is acutely ill. Hospital Day: 37       Remains on vent. Seems more comfortable and calm      IMPRESSION:   1. Acute respiratory failure with hypoxia s/p emergent intubation  2. PEA arrest    3. Metabolic acidosis  4. Acute renal failure  5. Upper airway obstruction  6. Acute encephalopathy   7. Dysphagia  8. Protein calorie malnutrition on TPN  9. C Diff  on IV Flagyl  10. Complicated Enterococcal UTI- sensitive to Amp  11. NSTEMI demand ischemia  12. CAD recent stents per [facility] 2017, needs plavix to be restarted  13. PAF Cardiology following  14. Pneumonia??: last xray    15. Postop respiratory failure, Extubated second time on  after Midline abdominal incision fascial wound dehiscence 17 Exploratory laparotomy with closure of midline incision using retention sutures per Dr. Maureen Stearns following. 16. S/P removal of bladder stone and repair of perforated bladder POA  17. S/P subsequent ex-lap due to extensive sq emphysema in abdomen/retroperitoneum - no sign of bowel perforation  18. Pt is critically ill and at high risk of end organ dysfunction and failure  19. Critical care time with pt exclusive of procedures  35    minutes      RECOMMENDATIONS/PLAN:   1. Vent support  2. Start bicarb drip  3. Oral care  4. NG tube  5. PO vancomycin  6. IV abx   7. No Ativan per family request  8. renew TPN for now  9. PICC  10. DVT, SUP prophylaxis  11. Palliative care following  12. Renal fx worse  13. Not improving, doing much worse  14. Still full code     ICU disposition :Unchanged. Updated Family. Time above.   Risk of deterioration: high   [x] High complexity decision making was performed   [x] See my orders for details  Tubes:   Rosales and Intubated/Vent    Code: Full Code        Hemodynamics:    CO:    CI:    CVP: CVP (mmHg): 8 mmHg (17 0800)  SVR:   PAP Systolic:    PAP Diastolic:    PVR:    UH69:        Ventilator Settings:      Mode Rate TV Press PEEP FiO2 PIP Min. Vent   Assist control    500 ml 6 cm H2O  6 cm H20 40 %  24 cm H2O  8.08 l/min      Vital Signs: Intake/Output: Intake/Output:   Visit Vitals    /55    Pulse 96    Temp 99.4 °F (37.4 °C)    Resp 15    Ht 5' 10\" (1.778 m)    Wt 94.1 kg (207 lb 7.3 oz)    SpO2 100%    BMI 29.77 kg/m2         O2 Device: Endotracheal tube, Ventilator  O2 Flow Rate (L/min): 12 l/min  Wt Readings from Last 4 Encounters:   17 94.1 kg (207 lb 7.3 oz)   08/15/17 94.2 kg (207 lb 10.8 oz)   17 95.8 kg (211 lb 3.2 oz)   17 97.7 kg (215 lb 6.2 oz)       Temp (24hrs), Av.2 °F (37.9 °C), Min:98.9 °F (37.2 °C), Max:102 °F (38.9 °C)     Intake/Output Summary (Last 24 hours) at 17 0751  Last data filed at 17 0700   Gross per 24 hour   Intake          4779.81 ml   Output             1565 ml   Net          3214.81 ml     Last shift:         Last 3 shifts:  190 -  07  In: 6042.8 [I.V.:6042.8]  Out: 2540 [Urine:2540]     Telemetry:    normal sinus rhythm    Physical Exam:    General: severely ill elderly WM   HEENT: intubated; dry mucosa   Neck: No abnormally enlarged lymph nodes. Chest: normal   Lungs: rhonchi   Heart: Regular rate and rhythm   Abdomen: soft, non-tender, without masses or organomegaly   :    Extremity: negative, clubbing;    Neuro: obtunded   Psych: unresponsive    Skin: Pallor and Turgor absent;   Pulses: Bilateral, Radial, 2+ Normal upper and lower extremity pulses   Capillary refill: normal brisk capillary refill;    DATA:    Interval lab and diagnostic data was reviewed.   Interval radiology images were independently viewed and available reports were reviewed. All lab results for the last 24 hours reviewed.       MAR reviewed and pertinent medications noted or modified as needed    MEDS:   Current Facility-Administered Medications   Medication    sodium bicarbonate (8.4%) 150 mEq in dextrose 5% 1,000 mL infusion    TPN ADULT-CENTRAL AA 5% D20%-MVI W/ VIT K-RCH    metoprolol (LOPRESSOR) injection 5 mg    dextrose 10% infusion 125-250 mL    succinylcholine (ANECTINE) 20 mg/mL injection    propofol (DIPRIVAN) 10 mg/mL injection    metoprolol (LOPRESSOR) injection 5 mg    vancomycin (VANCOCIN) 1250 mg in  ml infusion    amiodarone (CORDARONE) 450 mg in dextrose 5% 250 mL infusion    fentaNYL (PF) 900 mcg/30 ml infusion soln    cefepime (MAXIPIME) 2 g in 0.9% sodium chloride (MBP/ADV) 100 mL    vancomycin 50 mg/mL oral solution (compounded) 250 mg    furosemide (LASIX) injection 40 mg    morphine injection 1 mg    albuterol-ipratropium (DUO-NEB) 2.5 MG-0.5 MG/3 ML    chlorhexidine (PERIDEX) 0.12 % mouthwash 15 mL    heparin (porcine) injection 5,000 Units    acetaminophen (TYLENOL) solution 975 mg    Or    acetaminophen (TYLENOL) suppository 650 mg    bisacodyl (DULCOLAX) suppository 10 mg    zinc oxide-cod liver oil (DESITIN) 40 % paste    metoclopramide HCl (REGLAN) injection 5 mg    levalbuterol (XOPENEX) nebulizer soln 0.63 mg/3 mL    aspirin (ASA) suppository 300 mg    hydrALAZINE (APRESOLINE) 20 mg/mL injection 20 mg    labetalol (NORMODYNE;TRANDATE) injection 20 mg    fat emulsion 20% (LIPOSYN, INTRALIPID) infusion 250 mL    insulin lispro (HUMALOG) injection    glucose chewable tablet 16 g    glucagon (GLUCAGEN) injection 1 mg    sodium chloride (NS) flush 5-10 mL    ondansetron (ZOFRAN) injection 4 mg    sodium chloride (NS) flush 10 mL    sodium chloride (NS) flush 10-40 mL        Labs:    Recent Labs      09/07/17   0211  09/06/17   0428  09/05/17   0426   WBC 17.3*  13.1*  9.1   HGB  7.0*  7.2*  7.2*   PLT  237  247  235     Recent Labs      09/07/17   0612  09/07/17   0211  09/06/17   0428  09/05/17   0426   NA   --   139  141  143   K   --   4.3  4.2  4.1   CL   --   112*  114*  113*   CO2   --   17*  21  21   GLU   --   246*  194*  188*   BUN   --   77*  60*  57*   CREA   --   3.04*  2.15*  1.63*   CA   --   7.4*  7.6*  7.8*   MG   --    --    --   2.5*   PHOS   --    --    --   3.0   LAC  1.4   --    --    --    ALB   --   1.3*   --   1.4*   SGOT   --   133*   --   40*   ALT   --   59   --   27     Recent Labs      09/07/17   0457  09/06/17   0534   PH  7.08*  7.26*   PCO2  44  41   PO2  114*  95   HCO3  13*  18*   FIO2  40  35     No results for input(s): CPK, CKNDX, TROIQ in the last 72 hours. No lab exists for component: CPKMB  Lab Results   Component Value Date/Time     08/24/2017 12:40 AM          Imaging:  [x]                           I have personally reviewed the patients radiographs and reports:  CXR: LLL ATX, no acute changes      I have provided     minutes of critical care time rendering care exclusive of any procedures. During this entire length of time I was immediately available to the patient.      The reason for providing this level of medical care was due to a critical illness that impaired one or more vital organ systems, such that there was a high probability of imminent or life threatening deterioration in the patient's condition. Pt's condition is unstable and unpredictable. This care involved high complexity decision making which includes reviewing the patient's past medical records, current laboratory results, medications that were immediately available to me and actual Xray films in order to assess, support vital system function, and to treat this degree of vital organ system failure, and to prevent further life threatening deterioration of the patients condition.     Willam Cunningham MD

## 2017-09-07 NOTE — PROGRESS NOTES
Patient continues to not be medically appropriate for therapy and unable to mentally participate. He remains intubated and sedated at this time. Nursing in agreement with OT and PT that therapy should be discontinued at this time. Will complete OT order.

## 2017-09-07 NOTE — DIABETES MGMT
DTC Progress Note    Recommendations/ Comments: Pt discussed with rounding team and Dr. Miguel Bran. Plan to change bicarb gtt started this morning in D5 to bicarb in sterile water with next bag. Noted rate of TPN decreased from 83ml/hr to 42ml/hr. Chart reviewed on [address] [place] [address] during Multidisciplinary Rounds. A1c:   Lab Results   Component Value Date/Time    Hemoglobin A1c 6.1 08/02/2017 10:21 AM           Recent Glucose Results:   Lab Results   Component Value Date/Time     (H) 09/07/2017 02:11 AM    GLUCPOC 216 (H) 09/07/2017 05:15 AM    GLUCPOC 214 (H) 09/06/2017 11:18 PM    GLUCPOC 196 (H) 09/06/2017 05:16 PM        Lab Results   Component Value Date/Time    Creatinine 3.04 09/07/2017 02:11 AM     Estimated Creatinine Clearance: 19.6 mL/min (based on Cr of 3.04). Active Orders   Diet    DIET NPO        PO intake: No data found. Will continue to follow as needed. Thank you.   SANAZ Perez, RN, [address]

## 2017-09-07 NOTE — PROGRESS NOTES
Patient remains inappropriate for PT services. Consulted RN who is in agreement to complete orders given continued medical decline. Will complete; please re-consult ahead if there are any changes. Khadijah Moraes PT, DPT.

## 2017-09-07 NOTE — PROGRESS NOTES
Palliative Medicine     Spoke with Dr. Rosalba Kline MD who had spoken with Pt's bedside RN Lisandra Madera). LCSW made supportive visit with Pt. No visitors present at bedside. (family usually visits in morning/midday). Pt is intubated and unresponsive. Provided supportive presence and held hand. LCSW called Pt's dtr Tish Black, [phone]) for support and inquired how Pall Med team could help support them at this time. Ankita Briones thanked for call and said prayer is helpful. Pall team will continue to make supportive visits. Thank you for including Palliative Medicine in [facility] care.       Albertina Patel, [address] ([phone])  Work cell: [phone]

## 2017-09-07 NOTE — PROGRESS NOTES
07:00 Report received from Kaleigh Agrawal [place]     Shift summary: family at the bedside twice today, discussed code status, all questions answered, pt has made little urine today, total of about 50cc's, family notified, pt became dyssynchronous on the vent for a short period of time, after suctioning and repositioning sats returned to normal, pt now with dusky knees and feet, extremities are getting cold, spoke with daughter Simon Keepers who made the patient a partial code, Dr. Lora López called Simon Keepers to discuss. At 17:00 pts pressure began to drop, advised family, they would like a vasopressor added if needed, new order received from Dr. Lora López.      19:00 Shift report given to Ashlie Nino

## 2017-09-07 NOTE — PROGRESS NOTES
GI PROGRESS NOTE    NAME: Concepcion Gallegos   :  1928   MRN:  [id number]       Subjective:   Remains intubated and unresponsive.  + fevers. TF on hold for now. Objective:     VITALS:   Last 24hrs VS reviewed since prior progress note. Most recent are:    Visit Vitals    /55    Pulse 96    Temp 99.4 °F (37.4 °C)    Resp 15    Ht 5' 10\" (1.778 m)    Wt 94.1 kg (207 lb 7.3 oz)    SpO2 100%    BMI 29.77 kg/m2       Intake/Output Summary (Last 24 hours) at 17 0809  Last data filed at 17 0700   Gross per 24 hour   Intake          4592.08 ml   Output             1445 ml   Net          3147.08 ml       PHYSICAL EXAM:    General: Intubated, ill appearing    HEENT: Intubated, OG in place  Chest/ Lungs: Adequate air movement bilaterally. Heart:  Regular rate & rhythm, no murmurs  Abdomen: Soft, Non distended, Non tender.  +Bowel sounds  Neurologic:  Not sedated while intubated       Data Review     Recent Labs      17   WBC  17.3*  13.1*   HGB  7.0*  7.2*   HCT  22.3*  22.6*   PLT  237  247     Recent Labs      17   NA  139  141  143   K  4.3  4.2  4.1   CL  112*  114*  113*   CO2  17*  21  21   BUN  77*  60*  57*   CREA  3.04*  2.15*  1.63*   GLU  246*  194*  188*   PHOS   --    --   3.0   CA  7.4*  7.6*  7.8*     Recent Labs      17   SGOT  133*  40*   AP  189*  186*   TP  7.2  7.0   ALB  1.3*  1.4*   GLOB  5.9*  5.6*     No results for input(s): INR, PTP, APTT in the last 72 hours.     No lab exists for component: INREXT, INREXT  ___________________________________________________________________       Assessment:   · Severely ill,   · Feeding diffculties  · Fevers  · Acidosis  · leukocytosis  · Recent PEA arrest,  · Afib      Plan:   · His pH is 7, bicarb is low and his WBC has gone up (17.3k) suggesting a possible infectious source despite being on IV cefepime and PO/IV Vancomycin. I spoke with Dr Karson Ochoa yesterday and both of us agree that a PEG placement is currently contraindicated. I will cancel the procedure for now. · D/w Dr Jeremy Feldman briefly this morning. · Work up with a CT abd/pelvis to be decided with family consent. Cr is worse. · On TPN, and NGT feedings (on hold for now)  · Code status is full. · His prognosis is guarded.       Signed By: Idalia Lugo MD     9/7/2017

## 2017-09-07 NOTE — PROGRESS NOTES
09/07/17 0643 09/07/17 0648 09/07/17 0651   ABCDE Bundle   SBT Safety Screen Passed Yes --  --    SBT Trial Passed --  --  No   SBT Trial Reason for Failure --  --  Low tidal volume   Weaning Parameters   Resp Rate Observed 20 13 11   Ve 6.2 3.5 4.1    185 154   RSBI 54 52 55

## 2017-09-08 NOTE — PROGRESS NOTES
GI PROGRESS NOTE    NAME: Isaac Waggoner   :  1928   MRN:  [id number]       Subjective:   Multiple family members at bedside. Objective:     VITALS:   Last 24hrs VS reviewed since prior progress note. Most recent are:    Visit Vitals    BP (!) 100/33 (BP 1 Location: Right arm, BP Patient Position: At rest)    Pulse 100    Temp 97 °F (36.1 °C)    Resp 14    Ht 5' 10\" (1.778 m)    Wt 100.9 kg (222 lb 7.1 oz)    SpO2 99%    BMI 31.92 kg/m2       Intake/Output Summary (Last 24 hours) at 17 1352  Last data filed at 17 0700   Gross per 24 hour   Intake           3513.8 ml   Output               30 ml   Net           3483.8 ml       PHYSICAL EXAM:  NA     Data Review     Recent Labs      17   0401  17   0211   WBC  18.7*  17.3*   HGB  6.4*  7.0*   HCT  19.9*  22.3*   PLT  257  237     Recent Labs      17   0401  17   0211   NA  139  139   K  4.0  4.3   CL  105  112*   CO2  21  17*   BUN  97*  77*   CREA  4.26*  3.04*   GLU  100  246*   CA  7.2*  7.4*     Recent Labs      17   0401  17   0211   SGOT  259*  133*   AP  167*  189*   TP  6.7  7.2   ALB  1.3*  1.3*   GLOB  5.4*  5.9*     No results for input(s): INR, PTP, APTT in the last 72 hours. No lab exists for component: INREXT, INREXT  ___________________________________________________________________       Assessment:   · Severely ill,   · Feeding diffculties  · Fevers  · Acidosis  · leukocytosis  · Recent PEA arrest,  · Afib      Plan:   · PEG cancelled. Please call me back with questions. · New code status and notes reviewed.        Signed By: Nazario Donaldson MD     2017

## 2017-09-08 NOTE — PROGRESS NOTES
Nutrition Assessment:    RECOMMENDATIONS:   Continue TPN as ordered for now    ASSESSMENT:   Chart reviewed, case discussed during CCU rounds. Pt intubated, needs re-estimated. Pt is in septic shock, per progress notes possibly intraabdominal source. TPN was held overnight 2' access issues. Second PICC is being placed. amio and levo at 8. NGT clamped, not appropriate for TF. BM noted on 9/7. TPN down to 42mL/h 2' bicarb drip at 150mL/h. Dietitians Intervention(s)/Plan(s): Continue TPN as ordered, monitor plan of care. SUBJECTIVE/OBJECTIVE:   Pt intubated and unresponsive   Diet Order: NPO, Other (comment) (TPN: D20, 5% AA @ 42mL/h (provides 1101kcals/50gPro))  % Eaten:  No data found. to suction  at flush with       via NG Tube       Pertinent Medications:cefepime, humalog, vancomycin; [email or web address]); Drips: amio, levo, fentanyl. Chemistries:  Lab Results   Component Value Date/Time    Sodium 139 09/08/2017 04:01 AM    Potassium 4.0 09/08/2017 04:01 AM    Chloride 105 09/08/2017 04:01 AM    CO2 21 09/08/2017 04:01 AM    Anion gap 13 09/08/2017 04:01 AM    Glucose 100 09/08/2017 04:01 AM    BUN 97 09/08/2017 04:01 AM    Creatinine 4.26 09/08/2017 04:01 AM    BUN/Creatinine ratio 23 09/08/2017 04:01 AM    GFR est AA 16 09/08/2017 04:01 AM    GFR est non-AA 13 09/08/2017 04:01 AM    Calcium 7.2 09/08/2017 04:01 AM    Albumin 1.3 09/08/2017 04:01 AM      Anthropometrics: Height: 5' 10\" (177.8 cm) Weight: 100.9 kg (222 lb 7.1 oz)    IBW (%IBW):   ( ) UBW (%UBW):   (  %)    BMI: Body mass index is 31.92 kg/(m^2). This BMI is indicative of:  []Underweight   []Normal   []Overweight   [x] Obesity   [] Extreme Obesity (BMI>40)  Estimated Nutrition Needs (Based on): 2181 Kcals/day (PSU (MSJ 2823)) , 88 g (1g/kg bw) Protein  Carbohydrate:  At Least 130 g/day  Fluids: 2000 mL/day    Last BM: 9/7   []Active     []Hyperactive  [x]Hypoactive       [] Absent   BS  Skin:    [] Intact   [x] Incision  [x] Breakdown(sacrum-PU?)   [] DTI   [] Tears/Excoriation/Abrasion  [x]Edema(+3 pitting-BUE; +3-generalized; +2-BLE) [] Other: Wt Readings from Last 30 Encounters:   09/07/17 100.9 kg (222 lb 7.1 oz)   08/15/17 94.2 kg (207 lb 10.8 oz)   07/26/17 95.8 kg (211 lb 3.2 oz)   03/26/17 97.7 kg (215 lb 6.2 oz)   11/03/16 98.6 kg (217 lb 6 oz)   03/17/15 97.2 kg (214 lb 4.8 oz)   10/02/14 90.7 kg (200 lb)   05/16/13 96.9 kg (213 lb 10 oz)   02/10/13 99.5 kg (219 lb 5.7 oz)   04/02/12 96.1 kg (211 lb 13.8 oz)   03/13/11 96.8 kg (213 lb 6.5 oz)      NUTRITION DIAGNOSES:   Problem:  Swallowing difficulty      Etiology: related to AMS, encephalopathy     Signs/Symptoms: as evidenced by NPO per SLP    Previous dx re: swallowing difficulty continues.      NUTRITION INTERVENTIONS:   Enteral/Parenteral Nutrition: Other (Continue TPN as ordered for now )                GOAL:   Pt will tolerate TPN with stable electrolytes in 2-4 days     NUTRITION MONITORING AND EVALUATION   Previous Goal: Pt will tolerate TPN with stable electrolytes in 2-4 days   Previous Goal Met: Yes   Previous Recommendations Implemented: Yes   Cultural, Advent, or Ethnic Dietary Needs: None   LEARNING NEEDS (Diet, Food/Nutrient-Drug Interaction):    [x] None Identified   [] Identified and Education Provided/Documented   [] Identified and Pt declined/was not appropriate      [x] Interdisciplinary Care Plan Reviewed/Documented    [x] Participated in Discharge Planning: Unable to determine    [x] Interdisciplinary Rounds     NUTRITION RISK:    [x] High              [] Moderate           []  Low  []  Minimal/Uncompromised      Belkis Beard, [address]  Pager [phone]  Weekend Pager [phone]

## 2017-09-08 NOTE — PROGRESS NOTES
[facility] Consult Service Progress NOTE  Pulmonary, Critical Care, and Sleep Medicine    Name: Zenobia Palma MRN: [id number]   : 1928 Hospital: [facility]   Date: 2017  Admission Date: 2017     Chart and notes reviewed. Data reviewed. Pt seen on rounds earlier today. I have evaluated and examined the patient. Pt is acutely ill. Hospital Day: 44       Remains on vent. Now on pressors      IMPRESSION:   1. Acute respiratory failure with hypoxia s/p emergent intubation  2. PEA arrest    3. Metabolic acidosis  4. Acute renal failure  5. Upper airway obstruction  6. Acute encephalopathy   7. Dysphagia  8. Protein calorie malnutrition on TPN  9. C Diff  on IV Flagyl  10. Complicated Enterococcal UTI- sensitive to Amp  11. NSTEMI demand ischemia  12. CAD recent stents per [facility] 2017, needs plavix to be restarted  13. PAF Cardiology following  14. Pneumonia??: last xray    15. Postop respiratory failure, Extubated second time on  after Midline abdominal incision fascial wound dehiscence 17 Exploratory laparotomy with closure of midline incision using retention sutures per Dr. Melvi Carmona following. 16. S/P removal of bladder stone and repair of perforated bladder POA  17. S/P subsequent ex-lap due to extensive sq emphysema in abdomen/retroperitoneum - no sign of bowel perforation  18. Pt is critically ill and at high risk of end organ dysfunction and failure  19. Critical care time with pt exclusive of procedures  35    minutes      RECOMMENDATIONS/PLAN:   1. Vent support  2. Bicarb drip  3. Pressors   4. Transfuse today  5. NG tube  6. PO vancomycin  7. IV abx   8. No Ativan per family request  9. Renew TPN  10. PICC on left arm, needs a PICC on right arm for more IV access, aware of elevated creatinine, still needs PICC placement due to multiple drips and lack of IV access  11. DVT, SUP prophylaxis  12.  Palliative care following  13. Renal fx worsening  14. Not improving, doing much worse  15. Partial code     ICU disposition :Unchanged. Updated Family. Time above. Risk of deterioration: high   [x] High complexity decision making was performed   [x] See my orders for details  Tubes:   Rosales and Intubated/Vent    Code: Partial Code        Hemodynamics:    CO:    CI:    CVP: CVP (mmHg): 8 mmHg (17 0800)  SVR:   PAP Systolic:    PAP Diastolic:    PVR:    XP45:        Ventilator Settings:      Mode Rate TV Press PEEP FiO2 PIP Min. Vent   Assist control, Volume control    500 ml 6 cm H2O  6 cm H20 40 %  21 cm H2O  7.38 l/min      Vital Signs: Intake/Output: Intake/Output:   Visit Vitals    /53    Pulse (!) 101    Temp 98.1 °F (36.7 °C)    Resp 14    Ht 5' 10\" (1.778 m)    Wt 100.9 kg (222 lb 7.1 oz)    SpO2 98%    BMI 31.92 kg/m2         O2 Device: Endotracheal tube  O2 Flow Rate (L/min): 12 l/min  Wt Readings from Last 4 Encounters:   17 100.9 kg (222 lb 7.1 oz)   08/15/17 94.2 kg (207 lb 10.8 oz)   17 95.8 kg (211 lb 3.2 oz)   17 97.7 kg (215 lb 6.2 oz)       Temp (24hrs), Av.4 °F (37.4 °C), Min:98.1 °F (36.7 °C), Max:100.1 °F (37.8 °C)     Intake/Output Summary (Last 24 hours) at 17 0806  Last data filed at 17 0700   Gross per 24 hour   Intake           4770.8 ml   Output               60 ml   Net           4710.8 ml     Last shift:         Last 3 shifts: 1901 -  07  In: 7331.9 [I.V.:7331.9]  Out: 400 [Urine:400]     Telemetry:    normal sinus rhythm    Physical Exam:    General: severely ill elderly WM   HEENT: intubated; dry mucosa   Neck: No abnormally enlarged lymph nodes.    Chest: normal   Lungs: rhonchi   Heart: Regular rate and rhythm   Abdomen: soft, non-tender, without masses or organomegaly   :    Extremity: negative, clubbing;    Neuro: obtunded   Psych: unresponsive    Skin: Pallor and Turgor absent;   Pulses: Bilateral, Radial, 2+ Normal upper and lower extremity pulses   Capillary refill: normal brisk capillary refill;    DATA:    Interval lab and diagnostic data was reviewed. Interval radiology images were independently viewed and available reports were reviewed. All lab results for the last 24 hours reviewed.       MAR reviewed and pertinent medications noted or modified as needed    MEDS:   Current Facility-Administered Medications   Medication    TPN ADULT-CENTRAL AA 5% D20%-MVI W/ VIT K-RCH    sodium bicarbonate (8.4%) 150 mEq in sterile water 1,000 mL infusion    aspirin chewable tablet 81 mg    [START ON 9/9/2017] vancomycin (VANCOCIN) 1250 mg in  ml infusion    cefepime (MAXIPIME) 2 g in 0.9% sodium chloride (MBP/ADV) 100 mL    NOREPINephrine (LEVOPHED) 8,000 mcg in dextrose 5% 250 mL infusion    amiodarone (CORDARONE) 300 mg in dextrose 5% 250 mL infusion    metoprolol (LOPRESSOR) injection 5 mg    dextrose 10% infusion 125-250 mL    fentaNYL (PF) 900 mcg/30 ml infusion soln    vancomycin 50 mg/mL oral solution (compounded) 250 mg    furosemide (LASIX) injection 40 mg    morphine injection 1 mg    albuterol-ipratropium (DUO-NEB) 2.5 MG-0.5 MG/3 ML    chlorhexidine (PERIDEX) 0.12 % mouthwash 15 mL    heparin (porcine) injection 5,000 Units    acetaminophen (TYLENOL) solution 975 mg    Or    acetaminophen (TYLENOL) suppository 650 mg    bisacodyl (DULCOLAX) suppository 10 mg    zinc oxide-cod liver oil (DESITIN) 40 % paste    metoclopramide HCl (REGLAN) injection 5 mg    levalbuterol (XOPENEX) nebulizer soln 0.63 mg/3 mL    hydrALAZINE (APRESOLINE) 20 mg/mL injection 20 mg    labetalol (NORMODYNE;TRANDATE) injection 20 mg    fat emulsion 20% (LIPOSYN, INTRALIPID) infusion 250 mL    insulin lispro (HUMALOG) injection    glucose chewable tablet 16 g    glucagon (GLUCAGEN) injection 1 mg    sodium chloride (NS) flush 5-10 mL    ondansetron (ZOFRAN) injection 4 mg    sodium chloride (NS) flush 10 mL    sodium chloride (NS) flush 10-40 mL        Labs:    Recent Labs      09/08/17   0401  09/07/17   0211  09/06/17   0428   WBC  18.7*  17.3*  13.1*   HGB  6.4*  7.0*  7.2*   PLT  257  237  247     Recent Labs      09/08/17   0401  09/07/17   0612  09/07/17   0211  09/06/17   0428   NA  139   --   139  141   K  4.0   --   4.3  4.2   CL  105   --   112*  114*   CO2  21   --   17*  21   GLU  100   --   246*  194*   BUN  97*   --   77*  60*   CREA  4.26*   --   3.04*  2.15*   CA  7.2*   --   7.4*  7.6*   LAC   --   1.4   --    --    ALB  1.3*   --   1.3*   --    SGOT  259*   --   133*   --    ALT  101*   --   59   --      Recent Labs      09/08/17   0540  09/07/17   0457  09/06/17   0534   PH  7.27*  7.08*  7.26*   PCO2  44  44  41   PO2  124*  114*  95   HCO3  19*  13*  18*   FIO2  40  40  35     No results for input(s): CPK, CKNDX, TROIQ in the last 72 hours. No lab exists for component: CPKMB  Lab Results   Component Value Date/Time     08/24/2017 12:40 AM          Imaging:  [x]                           I have personally reviewed the patients radiographs and reports:  CXR: bibasilar atx, no acute changes      I have provided  35   minutes of critical care time rendering care exclusive of any procedures. During this entire length of time I was immediately available to the patient.      The reason for providing this level of medical care was due to a critical illness that impaired one or more vital organ systems, such that there was a high probability of imminent or life threatening deterioration in the patient's condition. Pt's condition is unstable and unpredictable.  This care involved high complexity decision making which includes reviewing the patient's past medical records, current laboratory results, medications that were immediately available to me and actual Xray films in order to assess, support vital system function, and to treat this degree of vital organ system failure, and to prevent further life threatening deterioration of the patients condition.     Fallon Ayala MD

## 2017-09-08 NOTE — PROGRESS NOTES
1900: Bedside and Verbal shift [id number] by Nguyen Allen RN (offgoing nurse). Report included the following information SBAR, Kardex, OR Summary, Procedure Summary, Intake/Output, MAR, Recent Results, Med Rec Status and Cardiac Rhythm A fib. 1930:  Assessment completed, A fib, intubated (AC14//40%/P6), afebrile. Patient withdraws from pain, +cough, +gag, PERRLA. No command following. Lungs diminished, small amount of thick white/tan sputum via inline suction catheter. Bowel sounds hypoactive, abdominal incision with sutures noted. Rosales catheter present draining minimal edwin urine. Anasarca present,  BUE +3 edema, BLE +2. BLE cool to the touch and toes cyanotic, pulses palpable. PICC line infusing NaHCO3 gtt at 150 ml/hr, amio gtt at 0.5 mg/min, TPN at 42 ml/hr. PIV present infusing fentanyl gtt at 50 mcg/hr. Will continue to monitor. 2000:  SBP<90, patient requiring levophed gtt, however not enough IV access available. Dr. Cardenas Valerio paged, orders received to stop TPN for tonight and have attending MD address during rounds. MD approved request to change amiodarone gtt to peripheral concentration. Levophed gtt started at 5 mcg/kait. Will continue to monitor. 2215:  Daughter Leonidas Gloria contacted unit, daughter updated on patient's status, questions answered. 0000:  Reassessment completed, no changes noted. Will continue to monitor. 0200:  Patient's nieces from [place] at bedside praying over patient. 0400:  Reassessment completed, no changes noted. Minimal urine output noted.

## 2017-09-08 NOTE — PROGRESS NOTES
Pt family in room as I entered. Daughters Danay Bruce and Flo Bryant at bedside, granddaughter with Mrs. Temi Mondragon who was seated. Provided support and active listening to Mrs. Temi Mondragon as she spoke about her own making peace with pt's ultimate passing. Wife shared that her sons were coming in now too. Mrs. Temi Mondragon eluded to a phone call this morning in which they understand that pt is approaching end of life. Flo Dakotant shared that it is a win/win -- either there is a great miracle, or he gets the reward of being in heaven. While wife & lFo Bryant went to bedside pt's blood pressure started going down. Family shared that it was okay to go and were sharing gratitude for Armando -- seeming to pray for an easy slipping from this life. Family seems to be in an uneasy peace about pt passing.  will follow as able. Leyla Glover M.Div.   Palliative  Fellow

## 2017-09-08 NOTE — PROGRESS NOTES
Attended Interdisciplinary rounds in Critical Care Unit, where patient care was discussed. Visit by: Yoselin Santiago. Cele Goodman.  Tejas Puentes MA, [address]

## 2017-09-08 NOTE — PROGRESS NOTES
Brief Progress Note    Chart reviewed. Our team has continued to provide support. Please refer to notes by Tequila Bender (SW) and Andre Hernandez (). Family had expressed in past not wishing to speak with palliative care provider. We will continue to honor and be respectful of family's wishes. We will also continue to support our staff including bedside RNs. Should you have questions/concerns or the need for a bedside visit by our team, please do not hesitate to contact us at [phone] Thank you for providing us w/ the continued opportunity to be involved in this patient's care.       Demetria Weber MD  Palliative Care Team

## 2017-09-08 NOTE — PROGRESS NOTES
Hospitalist Progress Note    NAME: Jamaica Garcia   :  1928   MRN:  [id number]       Assessment / Plan:  Sepsis (new fever, leukocytosis, hypotension, tachycardia), not present on admission:  Multiple infectious issues this admission including Candida albicans fungemia , C Diff colitis  with development of new fevers on 9/3. .  - CXR today with no significant change bilateral lower lobe opacities consistent with atelectasis/effusion/airspace disease.   - Urine cx 9/3 1+ bacteria but <1000 colonies on culture.  Blood cultures 9/3 no growth. - con't emperic cefepime, vancomycin [IV and oral] for now (previously on linezolid; anidulofungin completed  for fungemia)  - levophed added yesterday - noted right foot is cool and LINDSEY progresses - discussed with family that levophed will worsen these hypoperfusion injuries and they understand  - attempted to place additional PICC today for access but unable to pass catheter. TPN stopped as we do not have enough ports for all current IV needs (blood, IVF, levophed, amiodarone gtt). Discussed with family today. - family meeting at bedside with wife and 2 daughters today. They asked good questions about his complex medical issues and seem to be understanding that he has entered a point of inevitable decline. 2 sons and another daughter are coming in to [place] this weekend and we can have further discussions regarding possible comfort care. Anemia of chronic disease:  Hg lower this morning  - transfusing 1u pRBCs today  Paroxysmal atrial fibrillation, CAD s/p recent HORACIO L Cx stent  and chronic diastolic CHF: AO 19-30%.  Uncontrolled HR.  - con't ASA, lipitor  - goal to restart plavix if able to have PEG/trach placement, as soon as possible due to recent stent but felt unlikely   - con't amiodarone gtt  - off metoprolol since  due to hypotension  - appreciate cardiology assistance  Acute respiratory failure with hypoxia (reintubated) s/p PEA arrest with bilateral pleural effusions:  Respiratory status stable but poor, preceeded by large amount of hardened secretions in posterior pharynx, intubated 8/31/2017  - V/Q scan 8/31 low prob and LE dopplers negative  - CXR today as above  - pulmonology managing ventilator, poor mental status limiting ability to extubate  C Diff colitis and candida albicans fungemia, not present on admission:  Dx 8/25  - con't oral vancomycin  - completed anidulofungin 9/5  Acute kidney injury:  Cr increasing again today, suspected some component of contrast nephropathy, meds (diuretics) and ATN due to severe medical illness  - holding nephrotoxins (home diovan)  - only 75mL of urine out in the last 24 hours - discussed with family today  - Pt would not be candidate for dialysis given multiple severe medical issues and pressor dependent hypotension  Acute encephalopathy with acute CVAs (possibly embolic) and suspected brain injury with MS change 8/12:  possible anoxia and hypotension contributing  - MRI of brain 8/16 multiple punctate foci of acute infarction in the bilateral corona radiata and centrum semiovale.  Background of mild chronic microvascular ischemic disease.  - appreciate neurology consult  - EEG revealed moderately slow indicating hypoxic ischemic encephalopathy   - carotid duplex scan with bilateral ICA  less than 50% stenosis  - con't ASA  Bladder clot with perforation/stone, abdominal wound dehiscence in setting of BPH:    - s/p cystoscopy with attempted clot evacuation, ex lap, bladder stone retrieval, repair of large posterior bladder perforation with Dr. Francois Syed 7/28   - s/p ex lap with findings of diffuse pneumatosis and gastric distention without evidence of bowel perforation with Dr. Joseph Brown 7/29  - s/p ex lap closure of midline incision using retention sutures with Dr. Pete William 8/6 due to midline abdominal incision fascial wound dehiscence   - last seen by urology 8/28, they have signed off  - holding home ditropan and proscar for now  - con't iwck - per urology does not need suprapubic catheter  Severe protein calorie malnutrition:  Albumin 1.4  - holding TPN due to lack of access, discussed with family today  - appreciate GI assistance  Hyponatremia, resolved  Obesity (Body mass index is 31.92 kg/(m^2).     DVT prophylaxis: Lovenox  Code status: Partial - no CPR (2 daughters Ankita Briones and Evonne Erickson are acting as mPOAs at this time per family preference)     Subjective:     Chief Complaint / Reason for Physician Visit  Essentially nonresponsive except to pain. Discussed with RN events overnight. Review of Systems:  Symptom Y/N Comments  Symptom Y/N Comments   Fever/Chills    Chest Pain     Poor Appetite    Edema     Cough    Abdominal Pain     Sputum    Joint Pain     SOB/CLARKE    Pruritis/Rash     Nausea/vomit    Tolerating PT/OT     Diarrhea    Tolerating Diet     Constipation    Other       Could NOT obtain due to: nonresponsive     Objective:     VITALS:   Last 24hrs VS reviewed since prior progress note.  Most recent are:  Patient Vitals for the past 24 hrs:   Temp Pulse Resp BP SpO2   09/08/17 0800 98.6 °F (37 °C) - - - -   09/08/17 0700 - (!) 101 14 105/53 98 %   09/08/17 0600 - 98 16 102/54 99 %   09/08/17 0500 - 100 14 109/54 99 %   09/08/17 0400 98.1 °F (36.7 °C) 96 13 (!) 89/51 98 %   09/08/17 0325 - 91 14 - 99 %   09/08/17 0302 - 97 15 102/49 99 %   09/08/17 0300 - 96 16 (!) 89/52 99 %   09/08/17 0200 - (!) 104 16 97/63 99 %   09/08/17 0100 - 96 15 95/60 99 %   09/08/17 0000 100 °F (37.8 °C) 97 15 100/52 99 %   09/07/17 2354 - (!) 107 15 - 99 %   09/07/17 2300 - 99 15 100/56 99 %   09/07/17 2200 - 100 16 101/66 99 %   09/07/17 2100 - (!) 105 17 105/58 99 %   09/07/17 2000 99.5 °F (37.5 °C) (!) 104 15 106/68 98 %   09/07/17 1956 - (!) 112 14 - 98 %   09/07/17 1900 - 94 16 (!) 87/58 99 %   09/07/17 1514 - (!) 103 12 - 100 %   09/07/17 1509 - 97 20 - 97 %   09/07/17 1500 - 91 14 (!) 86/51 97 %   09/07/17 1400 - 100 14 91/51 96 %   09/07/17 1300 - 96 15 100/51 96 %   09/07/17 1200 100.1 °F (37.8 °C) (!) 103 16 106/50 96 %   09/07/17 1143 - (!) 108 16 - 100 %   09/07/17 1100 - 99 16 92/62 100 %   09/07/17 1000 - 99 16 93/61 100 %       Intake/Output Summary (Last 24 hours) at 09/08/17 0936  Last data filed at 09/08/17 0700   Gross per 24 hour   Intake           4519.4 ml   Output               60 ml   Net           4459.4 ml        PHYSICAL EXAM:  General: WD, WN. Not alert, not cooperative, no acute distress    EENT:  EOM not intact. Anicteric sclerae. MMM with ET tube in place. Briefly opens eyes with family and when pushing on abd. Resp:  CTA bilaterally, no wheezing or rales. No accessory muscle use  CV:  Regular rhythm,  No edema  GI:  Soft, moderately distended, Non tender. Diminished bowel sounds. Midline sutures in place with mild surrounding erythema unchanged. Neurologic:  Not alert and oriented X 0, no speech,   Psych:   No insight. Not anxious nor agitated  Skin:  No rashes. No jaundice    Reviewed most current lab test results and cultures  YES  Reviewed most current radiology test results   YES  Review and summation of old records today    NO  Reviewed patient's current orders and MAR    YES  PMH/ reviewed - no change compared to H&P  ________________________________________________________________________  Care Plan discussed with:    Comments   Patient x    Family  x Wife, 2 dtrs   RN x    Care Manager     Consultant  x pulmonology                     Multidiciplinary team rounds were held today with , nursing, pharmacist and clinical coordinator. Patient's plan of care was discussed; medications were reviewed and discharge planning was addressed.      ________________________________________________________________________  Total NON critical care TIME:  45 Minutes    Total CRITICAL CARE TIME Spent:   Minutes non procedure based      Comments   >50% of visit spent in counseling and coordination of care x    ________________________________________________________________________  Marilou Fleischer, MD     Procedures: see electronic medical records for all procedures/Xrays and details which were not copied into this note but were reviewed prior to creation of Plan. LABS:  I reviewed today's most current labs and imaging studies.   Pertinent labs include:  Recent Labs      09/08/17 0401 09/07/17 0211 09/06/17 0428   WBC  18.7*  17.3*  13.1*   HGB  6.4*  7.0*  7.2*   HCT  19.9*  22.3*  22.6*   PLT  257  237  247     Recent Labs      09/08/17 0401 09/07/17 0211 09/06/17 0428   NA  139  139  141   K  4.0  4.3  4.2   CL  105  112*  114*   CO2  21  17*  21   GLU  100  246*  194*   BUN  97*  77*  60*   CREA  4.26*  3.04*  2.15*   CA  7.2*  7.4*  7.6*   ALB  1.3*  1.3*   --    TBILI  0.8  0.6   --    SGOT  259*  133*   --    ALT  101*  59   --        Signed: Marilou Fleischer, MD

## 2017-09-08 NOTE — PROGRESS NOTES
0730: Report received from [address] [facility], RN.    0800: Pt. Is only opens his eyes to painful stimuli. Pt. Grimaces to pain but does not withdrawal. Pt. Has a purple non blanchable spot to his gluteal fold, and right buttock. Pt. Has a purple spot to his right shin. Pt. Has two red spots to his right nostril, and a red spot to the top of his left foot. Will consult wound care if it has not already been done. Pt. Has a midline incision to his ABD, open to air and intact with sutures. Will continue to monitor closely. 1200: Pt. Is Hypotensive. Will titrate Levo drip up now as ordered. 1412: First unit of blood started. Pt. Has Limited central venous access. PICC team was not able to get second PICC line as ordered. Order received from Dr. Obie Sanders to Stop the Bicarb drip long enough to give the 2 units of blood that is ordered, and then can restart the Bicarb drip afterwards. TPN on hold indefinitely per Dr. Roberto Carlos Morin due to limited central venous access. 1545: Pt. Is hypertensive. Pt. Tolerating first unit of blood well. Weaning pt.'s Levo back down. See MAR.     7283: First blood transfusion has completed. Pt. Tolerated it well.     1702: Second blood transfusion started. 1900: Report given to [address], [address]: Second unit of blood completed.

## 2017-09-08 NOTE — PROGRESS NOTES
Cardiology Progress Note  9/8/2017     Admit Date: 7/27/2017  Admit Diagnosis: bladder clots  Bladder injury  perperated viscous  wound dehiscence  peg  CC: none currently  Cardiac Assessment/Plan:   CAD: Circ HORACIO 7/20/17; Ef 45-50% after PCI. On asa only since  surgery: still needs to restart plavix when able from post-surgical standpoint.       Paroxysmal then persistent Afib: transiently on dilt gtt, then back to NSR for weeks; with recurrent afib, now on IV  Amio; HR higher than ideal: changed IV labetalol to metoprolol with additional prn bblocker. Poor candidate for anticoagulation. HTN: Stable; future PO bblocker/ARB. Resp failure, AMS; post-op bladder repair/stone extraction/explor lap/dehis, anemia, ID, Dispo: per primary team and consultants. For other plans, see orders. Echo 9/1/17: EF 55%; mild MR; Mild RVE; Mild AI. PAp 41.    9/6: HR better but still higher than ideal; tolerating standing IV metoprolol: lower target HR to < 100 for prn IV metoprolol. PEG plan noted. 9/7: HR better; BP stable; No new cardiac recs. LINDSEY/acidosis/fevers noted. 9/8: now with septic shock (levo @ 5): /HR98 currently; off metoprolol with pressors; on IV amio. Acute renal failure/worse anemia/elevated LFTs noted. No new cardiac recs. For other plans, see orders.   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    Convulsive syncope 08/15/2017    Disturbance of memory 08/15/2017    Bladder injury 2017        Subjective: Rodriguez Montaño remains intubated/can provide no history   Chest pain  none  consistent with:  Non-cardiac CP         Atypical CP     None now  On going  Anginal CP     Dyspnea  none  at rest  with exertion         improved  unchanged  worse              PND  none  overnight       Orthopnea  none  improved  unchanged  worse   Presyncope  none  improved  unchanged  worse     Ambulated in hallway without symptoms   Yes   Ambulated in room without symptoms  Yes   Objective:    Physical Exam:  Overall VSSAF;    Visit Vitals    /53    Pulse (!) 101    Temp 98.6 °F (37 °C)    Resp 14    Ht 5' 10\" (1.778 m)    Wt 100.9 kg (222 lb 7.1 oz)    SpO2 98%    BMI 31.92 kg/m2     Temp (24hrs), Av.3 °F (37.4 °C), Min:98.1 °F (36.7 °C), Max:100.1 °F (37.8 °C)    Patient Vitals for the past 8 hrs:   Pulse   17 0700 (!) 101   17 0600 98   17 0500 100   17 0400 96   17 0325 91   17 0302 97   17 0300 96   17 0200 (!) 104   17 0100 96    Patient Vitals for the past 8 hrs:   Resp   17 0700 14   17 0600 16   17 0500 14   17 0400 13   17 0325 14   17 0302 15   17 0300 16   17 0200 16   17 0100 15    Patient Vitals for the past 8 hrs:   BP   17 0700 105/53   17 0600 102/54   17 0500 109/54   17 0400 (!) 89/51   17 0302 102/49   17 0300 (!) 89/52   17 0200 97/63   17 0100 95/60          Intake/Output Summary (Last 24 hours) at 17 0835  Last data filed at 17 0700   Gross per 24 hour   Intake           4770.8 ml   Output               60 ml   Net           4710.8 ml     General Appearance: Well developed, elderly, no acute distress. Chronically ill. Ears/Nose/Mouth/Throat:   Intubated; anicteric.      JVP: WNL   Resp:   clear to auscultation ant-laterally. Nl resp effort. Cardiovascular:  IRRR, S1, S2 normal, no new murmur. No gallop or rub. Abdomen:   Soft, non-tender, bowel sounds are not heard. Extremities: Anasarca noted. Skin:  Neuro: Warm and dry. Intubated; sedated. cath site intact w/o hematoma or new bruit; distal pulse unchanged  Yes   Data Review:     Telemetry independently reviewed  sinus x persistent afib  parox afib  NSVT     ECG independently reviewed  NSR  afib  no significant changes  NSST-Tw chgs   x no new ECG provided for review   Lab results reviewed as noted below. Current medications reviewed as noted below. Recent Labs      09/08/17   0540  09/07/17   0457   PH  7.27*  7.08*   PCO2  44  44   PO2  124*  114*     No results for input(s): CPK, CKMB, CKNDX, TROIQ in the last 72 hours. Recent Labs      09/08/17   0401  09/07/17   0211  09/06/17   0428   NA  139  139  141   K  4.0  4.3  4.2   CL  105  112*  114*   CO2  21  17*  21   BUN  97*  77*  60*   CREA  4.26*  3.04*  2.15*   GLU  100  246*  194*   CA  7.2*  7.4*  7.6*   ALB  1.3*  1.3*   --    WBC  18.7*  17.3*  13.1*   HGB  6.4*  7.0*  7.2*   HCT  19.9*  22.3*  22.6*   PLT  257  237  247     Lab Results   Component Value Date/Time    Cholesterol, total 178 07/20/2017 12:28 PM    HDL Cholesterol 48 07/20/2017 12:28 PM    LDL, calculated 117.8 07/20/2017 12:28 PM    Triglyceride 286 08/26/2017 02:36 AM    CHOL/HDL Ratio 3.7 07/20/2017 12:28 PM     Recent Labs      09/08/17   0401  09/07/17   0211   SGOT  259*  133*   AP  167*  189*   TP  6.7  7.2   ALB  1.3*  1.3*   GLOB  5.4*  5.9*     No results for input(s): INR, PTP, APTT in the last 72 hours.     No lab exists for component: INREXT, INREXT   No components found for: [place]    Current Facility-Administered Medications   Medication Dose Route Frequency    0.9% sodium chloride infusion 250 mL  250 mL IntraVENous PRN    TPN ADULT-CENTRAL AA 5% D20%-MVI W/ VIT K-RCH   IntraVENous CONTINUOUS    sodium bicarbonate (8.4%) 150 mEq in sterile water 1,000 mL infusion   IntraVENous CONTINUOUS    aspirin chewable tablet 81 mg  81 mg Per NG tube DAILY    [START ON 9/9/2017] vancomycin (VANCOCIN) 1250 mg in  ml infusion  1,250 mg IntraVENous Q48H    cefepime (MAXIPIME) 2 g in 0.9% sodium chloride (MBP/ADV) 100 mL  2 g IntraVENous Q24H    NOREPINephrine (LEVOPHED) 8,000 mcg in dextrose 5% 250 mL infusion  2-16 mcg/min IntraVENous TITRATE    amiodarone (CORDARONE) 300 mg in dextrose 5% 250 mL infusion  0.5-1 mg/min IntraVENous TITRATE    metoprolol (LOPRESSOR) injection 5 mg  5 mg IntraVENous Q4H PRN    dextrose 10% infusion 125-250 mL  125-250 mL IntraVENous PRN    fentaNYL (PF) 900 mcg/30 ml infusion soln  0-200 mcg/hr IntraVENous CONTINUOUS    vancomycin 50 mg/mL oral solution (compounded) 250 mg  250 mg Per NG tube Q6H    furosemide (LASIX) injection 40 mg  40 mg IntraVENous DAILY PRN    morphine injection 1 mg  1 mg IntraVENous Q4H PRN    albuterol-ipratropium (DUO-NEB) 2.5 MG-0.5 MG/3 ML  3 mL Nebulization Q6H PRN    chlorhexidine (PERIDEX) 0.12 % mouthwash 15 mL  15 mL Oral Q12H    heparin (porcine) injection 5,000 Units  5,000 Units SubCUTAneous Q12H    acetaminophen (TYLENOL) solution 975 mg  975 mg Oral Q6H PRN    Or    acetaminophen (TYLENOL) suppository 650 mg  650 mg Rectal Q6H PRN    bisacodyl (DULCOLAX) suppository 10 mg  10 mg Rectal DAILY PRN    zinc oxide-cod liver oil (DESITIN) 40 % paste   Topical PRN    metoclopramide HCl (REGLAN) injection 5 mg  5 mg IntraVENous Q8H PRN    levalbuterol (XOPENEX) nebulizer soln 0.63 mg/3 mL  0.63 mg Nebulization Q6H PRN    hydrALAZINE (APRESOLINE) 20 mg/mL injection 20 mg  20 mg IntraVENous Q4H PRN    labetalol (NORMODYNE;TRANDATE) injection 20 mg  20 mg IntraVENous Q4H PRN    fat emulsion 20% (LIPOSYN, INTRALIPID) infusion 250 mL  250 mL IntraVENous Q MON, WED & FRI    insulin lispro (HUMALOG) injection   SubCUTAneous Q6H    glucose chewable tablet 16 g  4 Tab Oral PRN    glucagon (GLUCAGEN) injection 1 mg  1 mg IntraMUSCular PRN    sodium chloride (NS) flush 5-10 mL  5-10 mL IntraVENous Q8H    ondansetron (ZOFRAN) injection 4 mg  4 mg IntraVENous Q4H PRN    sodium chloride (NS) flush 10 mL  10 mL InterCATHeter PRN    sodium chloride (NS) flush 10-40 mL  10-40 mL InterCATHeter Q8H        Virgen Bear MD

## 2017-09-08 NOTE — PROGRESS NOTES
09/08/17 0645   ABCDE Bundle   SBT Safety Screen Passed Yes   SBT Trial Passed No   SBT Trial Reason for Failure Low tidal volume;RSBI>105  (RSBI 120, )

## 2017-09-08 NOTE — PROGRESS NOTES
PICC (Peripherally Inserted Central Catheter) line insertion  procedure note :     Procedure explained to patient's daughters along with risks and benefits  and patient's daughters agreed to proceed. Informed consent obtained from  Patient's  daughters Leonidas Gloria and Silverio. Timeout completed. Pre-procedure assessment done. Maximum sterile barrier precautions observed throughout procedure. Attempted to cannulae the basilic vessel. Was able to access however the introducer was difficult to place and curved. Attempt to place PICC through the introducer, however, the PICC would not pass. A second and third attempt was made to access the basilic above the first site but the guide wire would not pass. The cephalic vessel was located and accessed but the guide wire would not pass into the vessel. Unsuccessful PICC placement. Primary nurse [facility] notified.   Luis Antonio Young RN    Vascular Access Team

## 2017-09-08 NOTE — PROGRESS NOTES
Critical care interdisciplinary rounds held on 09/08/2017. Following members present, Pharmacy, Diabetes Treatment, Case Management, Respiratory Therapy, Physical Therapy, Pastoral Care,Clinical Lead  and Nutrition. Led by MAURICE Ly RN  and Dr. Geovanna Lara. .Plan of care discussed. See clinical pathway for plan of care and interventions and desired outcomes.

## 2017-09-09 NOTE — PROGRESS NOTES
Hospitalist Progress Note    NAME: Soo Robles   :  1928   MRN:  [id number]       Assessment / Plan:  Sepsis (new fever, leukocytosis, hypotension, tachycardia), not present on admission:  Multiple infectious issues this admission including Candida albicans fungemia , C Diff colitis  with development of new fevers on 9/3. .  - CXR today demonstrating decreased right pleural effusion. Unchanged mild left pleural effusion.  - Urine cx 9/3 1+ bacteria but <1000 colonies on culture.  Blood cultures 9/3 no growth. - de-escalating emperic cefepime, vancomycin after this AM doses [IV and oral] due to change in plan of care (previously on linezolid; anidulofungin completed  for fungemia)  - con't levophed for blood pressure support for now  - attempted to place additional PICC  for additional access but unable to pass catheter. Off TPN due to lack of access. Con't current PICC for vital meds (fentanyl gtt, IVF, levophed, amiodarone gtt). - discussed with wife and oldest son/daughter at bedside this morning. They are waiting for another son who is arriving from [place] today and are anticipating compassionate extubation and transition to comfort care at that time. Anemia of chronic disease:  s/p 1u pRBCs . No active bleeding source identified.   Paroxysmal atrial fibrillation, CAD s/p recent HORACIO L Cx stent  and chronic diastolic CHF: MB 91-11%  - off ASA, lipitor, plavix (never able to restart plavix) due to goals of care  - con't amiodarone gtt for now  - off metoprolol since  due to hypotension  - appreciate cardiology assistance  Acute respiratory failure with hypoxia (reintubated) s/p PEA arrest with bilateral pleural effusions:  Respiratory status stable but poor, preceeded by large amount of hardened secretions in posterior pharynx, intubated 2017  - V/Q scan  low prob and LE dopplers negative  - CXR today as above  - pulmonology managing ventilator, poor mental status and low tidal volumes limiting ability to extubate  C Diff colitis and candida albicans fungemia, not present on admission:  Dx 8/25  - will stop oral vancomycin after dose this morning - completed 14 days  - completed anidulofungin 9/5  Acute kidney injury:  Cr increasing again today, multifactorial renal failure  - holding nephrotoxins (home diovan)  - only 95mL of urine out in the last 24 hours, discussed progression of renal failure with dtrs yesterday  - Pt would not be candidate for dialysis given multiple severe medical issues and pressor dependent hypotension  Acute encephalopathy with acute CVAs (possibly embolic) and suspected brain injury with MS change 8/12:  possible anoxia and hypotension contributing  - MRI of brain 8/16 multiple punctate foci of acute infarction in the bilateral corona radiata and centrum semiovale.  Background of mild chronic microvascular ischemic disease.  - appreciate neurology consult  - EEG revealed moderately slow indicating hypoxic ischemic encephalopathy   - carotid duplex scan with bilateral ICA  less than 50% stenosis  - con't ASA  Bladder clot with perforation/stone, abdominal wound dehiscence in setting of BPH:    - s/p cystoscopy with attempted clot evacuation, ex lap, bladder stone retrieval, repair of large posterior bladder perforation with Dr. Estela Parrish 7/28   - s/p ex lap with findings of diffuse pneumatosis and gastric distention without evidence of bowel perforation with Dr. Godwin Berg 7/29  - s/p ex lap closure of midline incision using retention sutures with Dr. Nadia Steel 8/6 due to midline abdominal incision fascial wound dehiscence   - last seen by urology 8/28, they have signed off  - holding home ditropan and proscar for now  - con't wick - per urology does not need suprapubic catheter  Severe protein calorie malnutrition:  Albumin 1.4  - holding TPN due to lack of access, discussed with family today  - appreciate GI assistance  Hyponatremia, resolved  Obesity (Body mass index is 31.92 kg/(m^2).     DVT prophylaxis: Lovenox  Code status: Partial - no CPR (2 daughters Viki Callahan and Toni Anderson are acting as mPOAs at this time per family preference)     Subjective:     Chief Complaint / Reason for Physician Visit  Intubated, opens eyes but otherwise not moving extremities. Discussed with RN events overnight. Review of Systems:  Symptom Y/N Comments  Symptom Y/N Comments   Fever/Chills    Chest Pain     Poor Appetite    Edema     Cough    Abdominal Pain     Sputum    Joint Pain     SOB/CLARKE    Pruritis/Rash     Nausea/vomit    Tolerating PT/OT     Diarrhea    Tolerating Diet     Constipation    Other       Could NOT obtain due to: encephalopathy     Objective:     VITALS:   Last 24hrs VS reviewed since prior progress note.  Most recent are:  Patient Vitals for the past 24 hrs:   Temp Pulse Resp BP SpO2   09/09/17 0745 - (!) 58 14 121/63 98 %   09/09/17 0730 97.7 °F (36.5 °C) 60 26 119/52 98 %   09/09/17 0715 - (!) 56 14 - 98 %   09/09/17 0700 - (!) 58 15 112/54 98 %   09/09/17 0600 - 62 21 113/55 98 %   09/09/17 0412 - 63 16 - 99 %   09/09/17 0400 97.1 °F (36.2 °C) 64 17 129/55 99 %   09/09/17 0300 - 62 15 116/42 98 %   09/09/17 0200 - 62 14 112/47 99 %   09/09/17 0100 - 64 16 116/54 99 %   09/09/17 0000 97.5 °F (36.4 °C) 60 15 101/45 99 %   09/08/17 2349 - 65 21 - 99 %   09/08/17 2320 - (!) 58 15 92/45 98 %   09/08/17 2315 - 61 16 93/41 98 %   09/08/17 2310 - (!) 54 15 - 98 %   09/08/17 2300 - (!) 111 17 105/48 97 %   09/08/17 2200 - 99 16 122/60 98 %   09/08/17 2100 - 91 16 110/55 98 %   09/08/17 2000 98.1 °F (36.7 °C) (!) 101 23 131/51 99 %   09/08/17 1914 - 93 17 - 98 %   09/08/17 1900 - 93 14 113/70 99 %   09/08/17 1830 98.1 °F (36.7 °C) 90 16 109/64 99 %   09/08/17 1800 98 °F (36.7 °C) 93 16 116/55 99 %   09/08/17 1730 98.2 °F (36.8 °C) 98 16 106/54 98 %   09/08/17 1715 98 °F (36.7 °C) 99 14 108/61 98 %   09/08/17 1700 98 °F (36.7 °C) (!) 108 23 121/58 99 %   09/08/17 1610 98.3 °F (36.8 °C) (!) 103 19 115/62 98 %   09/08/17 1538 98.9 °F (37.2 °C) (!) 102 18 175/69 99 %   09/08/17 1500 98.4 °F (36.9 °C) (!) 110 24 150/70 99 %   09/08/17 1445 98.6 °F (37 °C) (!) 106 15 130/77 99 %   09/08/17 1430 98.4 °F (36.9 °C) (!) 104 14 (!) 89/64 99 %   09/08/17 1412 98.3 °F (36.8 °C) (!) 103 14 119/43 99 %   09/08/17 1400 - (!) 103 15 115/46 99 %   09/08/17 1200 97 °F (36.1 °C) 95 14 (!) 86/65 100 %   09/08/17 1100 - 100 14 (!) 100/33 99 %   09/08/17 0930 - 98 16 105/52 98 %   09/08/17 0900 - 95 15 109/58 99 %       Intake/Output Summary (Last 24 hours) at 09/09/17 0829  Last data filed at 09/09/17 0800   Gross per 24 hour   Intake          4068.74 ml   Output               69 ml   Net          3999.74 ml        PHYSICAL EXAM:  General: WD, WN. Opens eyes to voice, not able to be cooperative, moderate acute distress    EENT:  EOMI. Anicteric sclerae. MMM with ET tube in place  Resp:  CTA bilaterally, no wheezing or rales. No accessory muscle use  CV:  Regular rhythm,  No edema  GI:  Soft, moderately distended, diffusely tender.  +Bowel sounds  Neurologic:  Oriented X 0-1, no speech,   Psych:   Unclear insight. Not anxious nor agitated  Skin:  No rashes. No jaundice    Reviewed most current lab test results and cultures  YES  Reviewed most current radiology test results   YES  Review and summation of old records today    NO  Reviewed patient's current orders and MAR    YES  PMH/SH reviewed - no change compared to H&P  ________________________________________________________________________  Care Plan discussed with:    Comments   Patient x    Family  x Wife, dtr, son   RN x    Care Manager     Consultant  x pulmonology                     Multidiciplinary team rounds were held today with , nursing, pharmacist and clinical coordinator. Patient's plan of care was discussed; medications were reviewed and discharge planning was addressed. ________________________________________________________________________  Total NON critical care TIME:  30 Minutes    Total CRITICAL CARE TIME Spent:   Minutes non procedure based      Comments   >50% of visit spent in counseling and coordination of care x    ________________________________________________________________________  Betty Chamberlain MD     Procedures: see electronic medical records for all procedures/Xrays and details which were not copied into this note but were reviewed prior to creation of Plan. LABS:  I reviewed today's most current labs and imaging studies.   Pertinent labs include:  Recent Labs      09/09/17   0432  09/08/17   0401  09/07/17   0211   WBC  16.7*  18.7*  17.3*   HGB  8.8*  6.4*  7.0*   HCT  25.8*  19.9*  22.3*   PLT  239  257  237     Recent Labs      09/09/17   0432  09/08/17   0401  09/07/17   0211   NA  134*  139  139   K  4.0  4.0  4.3   CL  98  105  112*   CO2  24  21  17*   GLU  144*  100  246*   BUN  97*  97*  77*   CREA  5.04*  4.26*  3.04*   CA  6.8*  7.2*  7.4*   MG  2.6*   --    --    PHOS  8.0*   --    --    ALB  1.3*  1.3*  1.3*   TBILI  1.4*  0.8  0.6   SGOT  282*  259*  133*   ALT  127*  101*  59       Signed: Betty Chamberlain MD

## 2017-09-09 NOTE — PROGRESS NOTES
1900: Bedside and Verbal shift [id number] by Claudene Oaks F RN (offgoing nurse). Report included the following information SBAR, Kardex, Procedure Summary, Intake/Output, MAR, Recent Results, Med Rec Status and Cardiac Rhythm A fib. 1930:  Large amount of family at bedside, mostly children from out of town. Out of town family upset over patient's status and declining health. Family confrontational regarding lack of nutrition and adequate pain medication. Therapeutic teaching provided and family understanding. Per daughter Thad, family awaiting arrival of patient's son from [place] tomorrow (9/9/2017), and then would more than likely want to pursue comfort measures. 2000:   Assessment completed, A fib, intubated (AC14//40%/P6), afebrile. Patient withdraws from pain, +cough, +gag, PERRLA. No command following. Lungs diminished, small amount of thick white/tan sputum via inline suction catheter. Bowel sounds hypoactive, abdominal incision with sutures noted. Rosales catheter present with no urine output. Anasarca present,  BUE +3/+4 edema, BLE +2. BLE cool to the touch and toes dusky, pulses palpable. PICC line infusing NaHCO3 gtt at 150 ml/hr, and levophed gtt at 4 mcg/min. PIV present infusing fentanyl gtt at 50 mcg/hr and amio gtt at 0.5 mg/min. Family at bedside, will continue to monitor. 2223:  Levophed gtt decreased to 2 mcg/min.   2322:  Patient has converted to sinus royce, HR 55, BP 92/45. Levophed gtt increased to 4 mcg/min. Dr. Behzad Curiel paged regarding amio gtt at 0.5 mg/min. Per MD, continue with amiodarone gtt at current rate. Will continue to monitor. 0000:  Reassessment completed, no changes noted. Will continue to monitor. 0400: Reassessment completed, no changes noted. Will continue to monitor. 2022: Levophed gtt decreased to 2 mcg/min.   0531:  Levophed gtt stopped. 0600:  Daughter [facility] called unit for update.   Thad stated that family would be interested in changing patient to comfort measures after family from out of town arrive today. RN will pass along to day shift RN. 0700: Bedside and Verbal shift change report given to Hina Ramesh RN (oncoming nurse) by Amanuel Sim RN (offgoing nurse). Report included the following information SBAR, Kardex, ED Summary, OR Summary, Procedure Summary, Intake/Output, MAR, Recent Results, Med Rec Status and Cardiac Rhythm NSR.

## 2017-09-09 NOTE — PROGRESS NOTES
09/09/17 0614   ABCDE Bundle   SBT Safety Screen Passed Yes   SBT Trial Passed No   SBT Trial Reason for Failure Low tidal volume;RSBI>105   Pt placed back on previous vent settings

## 2017-09-09 NOTE — PROGRESS NOTES
0700:  Bedside handoff report received from Concepción Chau RN (offgoing nurse). Patient remains on AC, Rate 14, PEEP 6, Tidal volume 500, FiO2 40%. Patient's pupils sluggishly reactive bilaterally, does not follow commands, withdraws from pain, eyes open to pain. Sodium bicarbonate gtt infusing at 150 mL/hr; Amiodarone infusing at 0.5 mg/min; Fentanyl infusing at 50 mcg/hr. NG Tube clamped  Patient sinus royce on monitor,  BP stable, pt afebrile. Abdominal incision open to air, retention sutures in place. 2999:  Sodium bicarbonate gtt decreased to 84 mL/hr per order. Will monitor. 0945:  S/w Dr. Lara Huang and patient's wife and one daughter. Medical team and family in agreement for patient to have compassionate extubation later today. They are waiting on one additional family member to arrive prior to proceeding. Fentanyl gtt increased to 75 mcg/hr for comfort. S/w Dr. Lara Huang regarding patient's sodium bicarbonate gtt; will discontinue as patient is no longer making urine, anasarcic. Family updated; agreeable to current interventions. 1115:  Fentanyl gtt increased to 85 mcg/hr for RR ~ 28-30, mouth care preformed. Family at bedside, awaiting arrival of one more family member. 26:  Patient's daughter requests that patient not receive lorazepam under any circumstances. Dr. Lara Huang updated, agreeable checking in with family. 1248:  Patient's family at bedside, per patient's wife, Peder Dandy is here\". Writer spoke with family to update them on process, what to expect. Patient's fentanyl increased to 125 mcg/hr. 1315:  Patient extubated to nasal cannula, mouth care/oral suctioning preformed. 2 mg morphine given prior to extubation. NG tube removed. Patient breathing around 18-20 bpm, snoring respirations but in NAD. S/w Dr. Lara Huang, orders received for scopolamine patch. Fentanyl gtt now at 150 mcg/hr. SaO2 monitoring discontinued.     1341:  Scopoloamine patch placed behind patient's left ear; rationale behind medication explained to family.     Sonal Hernandez RN

## 2017-09-09 NOTE — PROGRESS NOTES
Responded to staff request to provide family support as family moves forward with decision for compassionate withdrawal. Large family presence in room at this time. One daughter and patient's son from [place] came out of room and shared concerns about process. Offered assurance CCU staff will do everything to insure patient is comfortable. They seemed to respond well to this.  remains available as needed.   Chelle Quinteros, MPS, [address], [address] [place] [address] [address]     Paging Service  [phone])

## 2017-09-09 NOTE — ROUTINE PROCESS
Met with family at bedside including his wife and children. Appropriate questions were asked and answered. Family would like to proceed with extubation and transition to comfort care at this time.

## 2017-09-10 NOTE — PROGRESS NOTES
1900  Received bedside/verbal report and assumed care of patient from Brice Mireles RN. Patient is comfort measures only. 1  Family confirmed that they do not want the patient to be turned because they believe it causes him too much pain. Importance of position changes for both skin integrity and comfort explained, but family refused despite education. 2000  Shift assessment completed. See doc flowsheet for details. Patient does not open eyes or move his extremities purposefully. No commands following, but he withdraws from pain in all extremities. Pupils are equal, round, sluggish, and reactive. Breath sounds coarse with scattered rhonchi, on 6L NC. Midline incision well approximated with sutures. Anasarca noted, skin cool to touch, weeping. Fentanyl infusing through LUE PICC via PCA at 150mcg, patient appears to be comfortable. Mouth care provided. Large amount of family at bedside, singing hymns and praying for patient. 80  Wife tearful outside of the room. Provided with therapeutic listening and presence. Wife verbalized appreciation for nursing staff, states she feels patient is suffering. [id number]  Patient appears to be gasping for air, PRN Morphine administered. Basal Fentanyl also increased to 200 mcg for maximum comfort. 0430  Family decided to go home since patient appears stable at this time. Checked patient for stool (none noted) and shifted patient in the bed to alleviated pressure, but did not turn completely per family's adamant request.  7353  Bedside and Verbal shift change report given to Madelyn Abel (oncoming nurse) by Chu Pavon (offgoing nurse). Report included the following information SBAR, Kardex, ED Summary, Intake/Output, MAR, Recent Results and Cardiac Rhythm NSR.

## 2017-09-10 NOTE — PROGRESS NOTES
Hospitalist Progress Note    NAME: Jamaica Garcia   :  1928   MRN:  [id number]       Assessment / Plan:  Extubated to comfort care yesterday. Pt remains nonresponsive with periods of apnea. BP lower this morning (37J systolic) with hypoxia (low 80s). Issues addressed during this admission:    Sepsis (new fever, leukocytosis, hypotension, tachycardia), not present on admission:  Multiple infectious issues this admission including Candida albicans fungemia , C Diff colitis  with development of new fevers on 9/3. .  - most recent CXRs with right pleural effusion and mild left pleural effusion.  - Urine cx 9/3 1+ bacteria but <1000 colonies on culture.  Blood cultures 9/3 no growth. - treated with cefepime, vancomycin [IV and oral] most recently and previously on linezolid; anidulofungin completed  for fungemia  - now off levophed for hypotension  - attempted to place additional PICC  for additional access but unable to pass catheter  Anemia of chronic disease:  s/p 1u pRBCs . No active bleeding source identified. Paroxysmal atrial fibrillation, CAD s/p recent HORACIO L Cx stent  and chronic diastolic CHF: RL 78-08%  - off ASA, lipitor, plavix (never able to restart plavix), diovan, metoprolol due to goals of care. Treated with amiodarone gtt (now off) due to afib. Acute respiratory failure with hypoxia (reintubated) s/p PEA arrest with bilateral pleural effusions:  Respiratory status stable but poor, preceeded by large amount of hardened secretions in posterior pharynx, intubated 2017. V/Q scan  low prob and LE dopplers negative. Pt was unable to wean from vent due to poor mental status and low tidal volumes. C Diff colitis and candida albicans fungemia, not present on admission:  Dx , completed 14 days of oral vancomycin. Completed anidulofungin . Acute kidney injury with anuria:  Part of multi-organ dysfunction.   Pt would not be candidate for dialysis given multiple severe medical issues. Acute encephalopathy with acute CVAs (possibly embolic) and suspected brain injury with MS change 8/12:  possible anoxia and hypotension contributing. MRI of brain 8/16 multiple punctate foci of acute infarction in the bilateral corona radiata and centrum semiovale. Background of mild chronic microvascular ischemic disease.  - appreciate neurology consult  - EEG revealed moderately slow indicating hypoxic ischemic encephalopathy   - carotid duplex scan with bilateral ICA  less than 50% stenosis  Bladder clot with perforation/stone, abdominal wound dehiscence in setting of BPH:    - s/p cystoscopy with attempted clot evacuation, ex lap, bladder stone retrieval, repair of large posterior bladder perforation with Dr. Nilsa Benito 7/28   - s/p ex lap with findings of diffuse pneumatosis and gastric distention without evidence of bowel perforation with Dr. Maddie Javed 7/29  - s/p ex lap closure of midline incision using retention sutures with Dr. Pernell Mercado 8/6 due to midline abdominal incision fascial wound dehiscence   - last seen by urology 8/28, they have signed off  - holding home ditropan and proscar due to goals of care  Severe protein calorie malnutrition:  Albumin 1.4. Pt on TPN, now stopped. Hyponatremia, resolved  Obesity (Body mass index is 35)      DVT prophylaxis: Lovenox  Code status: DNR (wife and 2 daughters are making decisions together)     Subjective:     Chief Complaint / Reason for Physician Visit  Nonresponsive with apnea. Discussed with RN events overnight.      Review of Systems:  Symptom Y/N Comments  Symptom Y/N Comments   Fever/Chills    Chest Pain     Poor Appetite    Edema     Cough    Abdominal Pain     Sputum    Joint Pain     SOB/CLARKE    Pruritis/Rash     Nausea/vomit    Tolerating PT/OT     Diarrhea    Tolerating Diet     Constipation    Other       Could NOT obtain due to: nonresponsive     Objective:     VITALS:   Last 24hrs VS reviewed since prior progress note. Most recent are:  Patient Vitals for the past 24 hrs:   Temp Pulse Resp BP SpO2   09/10/17 0700 - 68 8 (!) 96/38 (!) 80 %   09/10/17 0600 - 69 17 (!) 99/37 (!) 85 %   09/10/17 0500 - 72 9 (!) 96/39 (!) 87 %   09/10/17 0400 98.1 °F (36.7 °C) 73 16 (!) 106/39 (!) 86 %   09/10/17 0300 - 72 14 112/44 -   09/10/17 0200 - 72 11 113/45 -   09/10/17 0100 - 72 15 117/47 -   09/10/17 0000 98.2 °F (36.8 °C) 73 11 120/50 (!) 89 %   09/09/17 2300 - 74 11 122/50 -   09/09/17 2200 - 75 12 103/75 -   09/09/17 2100 - 75 12 130/50 -   09/09/17 2000 96.9 °F (36.1 °C) 76 12 132/52 90 %   09/09/17 1900 - 78 11 135/53 -   09/09/17 1800 - 79 11 138/51 -   09/09/17 1700 - 80 11 143/51 -   09/09/17 1600 97.5 °F (36.4 °C) 78 12 146/54 (!) 88 %   09/09/17 1500 - 78 12 147/55 -   09/09/17 1430 - 77 13 153/51 -   09/09/17 1400 - 75 14 145/56 -   09/09/17 1330 - 74 17 145/61 -   09/09/17 1300 - (!) 53 17 98/51 98 %   09/09/17 1230 - (!) 53 17 101/51 98 %   09/09/17 1200 - (!) 53 13 103/52 98 %   09/09/17 1130 - (!) 54 15 102/52 97 %   09/09/17 1100 96.2 °F (35.7 °C) (!) 54 16 106/46 98 %   09/09/17 1030 - (!) 54 15 99/48 98 %       Intake/Output Summary (Last 24 hours) at 09/10/17 0911  Last data filed at 09/10/17 0400   Gross per 24 hour   Intake           589.05 ml   Output               27 ml   Net           562.05 ml        PHYSICAL EXAM:  General: Anasarca. Not alert, not able to be cooperative, moderately acute distress    EENT:  EOM not intact. Anicteric sclerae. MMM with thick secretions  Resp:  Shallow breaths with periods of apnea. Course bilaterally, no wheezing. +accessory muscle use  CV:  Bradycardic, regular rhythm,  1+ edema  GI:  Soft, moderately distended, Non tender.  +Bowel sounds  Neurologic:  Oriented X 0, no speech,   Psych:   No insight. Not anxious nor agitated  Skin:  No rashes.   No jaundice    Reviewed most current lab test results and cultures  YES  Reviewed most current radiology test results   YES  Review and summation of old records today    NO  Reviewed patient's current orders and MAR    YES  PMH/SH reviewed - no change compared to H&P  ________________________________________________________________________  Care Plan discussed with:    Comments   Patient x    Family      RN x    Care Manager     Consultant                        Multidiciplinary team rounds were held today with , nursing, pharmacist and clinical coordinator. Patient's plan of care was discussed; medications were reviewed and discharge planning was addressed. ________________________________________________________________________  Total NON critical care TIME:  25 Minutes    Total CRITICAL CARE TIME Spent:   Minutes non procedure based      Comments   >50% of visit spent in counseling and coordination of care x    ________________________________________________________________________  Emily Pascal MD     Procedures: see electronic medical records for all procedures/Xrays and details which were not copied into this note but were reviewed prior to creation of Plan. LABS:  I reviewed today's most current labs and imaging studies.   Pertinent labs include:  Recent Labs      09/09/17   0432  09/08/17   0401   WBC  16.7*  18.7*   HGB  8.8*  6.4*   HCT  25.8*  19.9*   PLT  239  257     Recent Labs      09/09/17   0432  09/08/17   0401   NA  134*  139   K  4.0  4.0   CL  98  105   CO2  24  21   GLU  144*  100   BUN  97*  97*   CREA  5.04*  4.26*   CA  6.8*  7.2*   MG  2.6*   --    PHOS  8.0*   --    ALB  1.3*  1.3*   TBILI  1.4*  0.8   SGOT  282*  259*   ALT  127*  101*       Signed: Emily Pascal MD

## 2017-09-10 NOTE — PROGRESS NOTES
5111 Report received from [address] [place] [address]  0800 Assessment completed. Patient remains on CCU monitors per family request.  Breann José Daughter called and updated on the patient. We discussed patient periods of apnea and shallow breathing. Reviewed vital signs blood pressure, hr, rr 8-12 and SpO2 maintaining between 84-91%. 09/10/17 0800   Vitals   Pulse (Heart Rate) 68   Resp Rate 12   O2 Sat (%) (!) 84 %   BP (!) 98/39   MAP (Monitor) (!) 53   MAP (Calculated) (!) 59    They are aware and said he was in the same condition as last night. I told her I would call with any changes but I would not be able to say how long until he passes away. She said she was told there were signs that would tell the nurse when the end was near. It was reiterated once the signs start low HR, low blood pressure, longer periods of apnea nurse will not know how long until he will pass. She was informed exact time of passing would be unknown. TRANSFER - OUT REPORT:    Verbal report given to Audrey Hoang RN(name) on [facility]  being transferred to Gen-Surg (unit) for routine progression of care       Report consisted of patients Situation, Background, Assessment and   Recommendations(SBAR). Information from the following report(s) SBAR, Kardex, Intake/Output, MAR, Recent Results and Med Rec Status was reviewed with the receiving nurse.     Lines:   PICC Double Lumen 36/55/62 Basilic;Left (Active)   Central Line Being Utilized Yes 9/10/2017  8:00 AM   Criteria for Appropriate Use Limited/no vessel suitable for conventional peripheral access 9/10/2017  8:00 AM   Site Assessment Clean, dry, & intact 9/10/2017  8:00 AM   Phlebitis Assessment 0 9/10/2017  8:00 AM   Infiltration Assessment 0 9/10/2017  8:00 AM   Arm Circumference (cm) 33 cm 9/1/2017  7:00 AM   Date of Last Dressing Change 09/02/17 9/10/2017  8:00 AM   Dressing Status Clean, dry, & intact 9/10/2017  8:00 AM   Action Taken Open ports on tubing capped 9/10/2017  8:00 AM   External Catheter Length (cm) 0 centimeters 9/2/2017  2:32 PM   Dressing Type Disk with Chlorhexadine gluconate (CHG); Tape;Transparent 9/10/2017  8:00 AM   Hub Color/Line Status Red;Capped 9/10/2017  8:00 AM   Positive Blood Return (Site #1) Yes 9/10/2017  8:00 AM   Hub Color/Line Status Purple; Infusing 9/10/2017  8:00 AM   Positive Blood Return (Site #2) Yes 9/10/2017  8:00 AM   Alcohol Cap Used Yes 9/10/2017  4:00 AM        Opportunity for questions and clarification was provided. Patient transported with:   O2 @ 6 liters      1525 Patient medicated with morphine prior to transfer. He arrived to room 2135 and placed on gen-surg bed. Total of 6 people used to change beds. Special attention given to arms and legs to make sure trama did not occur. Slide board used for easy transition. Old linins removed and patient was positioned for comfort. All belongings sent with patient.

## 2017-09-11 NOTE — DISCHARGE SUMMARY
Hospitalist Discharge Summary     Patient ID:  Samuel Coyle  [id number]  80 y.o.  7/22/1928    PCP on record: Robb Venegas MD    Admit date: 7/27/2017  Date and time of death: 9/11/2017 at [address] [place] [address] Avenue:  Bladder clot with perforation/stone, abdominal wound dehiscence in setting of BPH  Sepsis (new fever, leukocytosis, hypotension, tachycardia), not present on admission  Anemia of chronic disease  Paroxysmal atrial fibrillation, CAD s/p recent HORACIO L Cx stent 7/17 and chronic diastolic CHF   Acute respiratory failure with hypoxia (reintubated) s/p PEA arrest with bilateral pleural effusions  C Diff colitis and candida albicans fungemia, not present on admission  Acute kidney injury with anuria  Acute encephalopathy with acute CVAs (possibly embolic) and suspected brain injury with MS change 8/12  Severe protein calorie malnutrition  Hyponatremia, resolved  Obesity (Body mass index is 35)      CONSULTATIONS:  IP CONSULT TO UROLOGY  IP CONSULT TO GASTROENTEROLOGY  IP CONSULT TO NEUROLOGY  IP CONSULT TO UROLOGY  IP CONSULT TO GASTROENTEROLOGY  IP CONSULT TO PALLIATIVE CARE - PROVIDER  IP CONSULT TO CARDIOLOGY    Excerpted Consult H&P of Dr. Elaine Campos MD:  The patient is an 66-year-old gentleman with a past medical history of coronary artery disease,   status post recent episode of unstable angina and cardiac catheterization with stent placement on 07/20/2017, history of hypertension and BPH, who presents to the ED with complaints of   hematuria and clotting of his Rosales catheter that was changed prior to his cardiac catheterization at [facility]. The patient was seen by Urology, started on antibiotics.  Dr. Glenda Dalton admitted the patient and apparently the patient had a cystoscopy with  attempted clot evacuation, but that was not successful, so the patient underwent exploratory laparotomy for bladder stone retrieval and for a large posterior wall bladder perforation. Surgery was performed on   7/28/2017. The nurse called me at around 8 p.m. stating that the patient is having significant abdominal pain, has had abdominal pain throughout the day and it is getting worse. She had called Dr. Judy Newman who had requested the patient to be given Dilaudid 0.5 mg IV and hospitalist service to be consulted. When I went to evaluate the patient, the patient seems to be in extreme distress with significant pain, not providing much history because he is moaning and groaning and does   not answer my questions because of what appears to be abdominal pain. His wife, who is present at the bedside, reports that the patient has been getting progressively worse pain with some abdominal distention. Per patient, he has passed any flatus. He had an episode of vomiting earlier this morning and has not had a bowel movement since his surgery. The patient is on clear liquid diet, but has not been eating or drinking. The nurse reports that as far as she knows, the patient has not had any bowel movement, neither has he been passing flatus. The patient complains of pain all over his belly. Reports that he feels that it is, Brooke Joseph is stuck in my belly and I can't pass the gas\". The patient was also found to have elevated white count . The patient was evaluated   by Urology earlier today, Dr. Glen Barbour., who does not have any significant recommendations . ______________________________________________________________________  DISCHARGE SUMMARY/HOSPITAL COURSE:  for full details see H&P, daily progress notes, labs, consult notes. Hospital course:  Bladder clot with perforation/stone, abdominal wound dehiscence in setting of BPH: Pt underwent cystoscopy with attempted clot evacuation, ex lap, bladder stone retrieval, repair of large posterior bladder perforation with Dr. Judy Newman 7/28.   He further had ex lap with findings of diffuse pneumatosis and gastric distention without evidence of bowel perforation with Dr. Morro Avelar 7/29. A second ex lap for closure of midline incision using retention sutures with Dr. Huong Hollis 8/6 due to midline abdominal incision fascial wound dehiscence. Pt last seen by urology 8/28, not felt to have any further issues from their standpoint, only non-urologic complications. We had to hold home ditropan and proscar due to medical issues. Sepsis (new fever, leukocytosis, hypotension, tachycardia), not present on admission:  Multiple infectious issues this admission including Candida albicans fungemia 8/19, C Diff colitis 8/25 with development of new fevers on 9/3. Pt had CXRs the week of his death with right pleural effusion and mild left pleural effusion. Urine cx 9/3 1+ bacteria but <1000 colonies on culture. Blood cultures 9/3 no growth. Pt was treated with cefepime, vancomycin [IV and oral] most recently and previously on linezolid; anidulofungin completed 9/6 for fungemia. He additionally required levophed for hypotension. Anemia of chronic disease:  s/p 1u pRBCs 9/8.  No active bleeding source identified. Paroxysmal atrial fibrillation, CAD s/p recent HORACIO L Cx stent 7/17 and chronic diastolic CHF: AQ 87-19%. Pt had to be taken off ASA, lipitor, plavix (never able to restart plavix), diovan, metoprolol due to comorbid issues and goals of care. He was also treated with amiodarone gtt due to afib per cardiology recommendations. Acute respiratory failure with hypoxia (reintubated) s/p PEA arrest with bilateral pleural effusions:  Respiratory status stable but poor, preceeded by large amount of hardened secretions in posterior pharynx, intubated 8/31/2017. V/Q scan 8/31 low prob and LE dopplers negative. Pt was unable to wean from vent due to poor mental status and low tidal volumes. C Diff colitis and candida albicans fungemia, not present on admission:  Dx 8/25, completed 14 days of oral vancomycin. Completed anidulofungin 9/5.   Acute kidney injury with anuria:  Part of multi-organ dysfunction. Pt would not be candidate for dialysis given multiple severe medical issues. His last measured Cr was 5.04 on  with anuria level urine output. Acute encephalopathy with acute CVAs (possibly embolic) and suspected brain injury with MS change :  possible anoxia and hypotension contributing. MRI of brain  multiple punctate foci of acute infarction in the bilateral corona radiata and centrum semiovale. Background of mild chronic microvascular ischemic disease. Pt was seen in consultation by neurology. He had an EEG which revealed moderately slow indicating hypoxic ischemic encephalopathy. A carotid duplex scan with bilateral ICA less than 50% stenosis  Severe protein calorie malnutrition:  Albumin 1.4. Pt on TPN, now stopped. Hyponatremia, resolved  Obesity (Body mass index is 35)    _______________________________________________________________________  Patient not seen or examined by me on discharge day.    _______________________________________________________________________  DISCHARGE MEDICATIONS:   Discharge Medication List as of 2017  6:11 AM          My Recommended Diet, Activity, Wound Care, and follow-up labs are listed in the patient's Discharge Insturctions which I have personally completed and reviewed.     _______________________________________________________________________  DISPOSITION:    Home with Family:    Home with HH/PT/OT/RN:    MARYANNE/LTC:    CHHAYA:    OTHER: x       Condition at Discharge:    _______________________________________________________________________  Follow up with:   PCP : Curt George MD  Follow-up Information     None              Total time in minutes spent coordinating this discharge (includes going over instructions, follow-up, prescriptions, and preparing report for sign off to her PCP) :  10 minutes    Signed:  Baudilio Marlow MD

## 2017-09-11 NOTE — PROGRESS NOTES
Hospitalist death note     Called to pronounce patient.   Unresponsive to verbal or painful stimuli   No respiratory effort or breath sounds   No pulse or heart sounds   TOD: 1220 am.  Family was notified earlier by RN    D/C Summary to be dictated by Attending MD.    Khloe Cuellar MD

## 2017-09-11 NOTE — PROGRESS NOTES
responded to death in Gen Surg, Pt's 2 daughters, wife, and son-in-law were present. They shared a life review and how the pt was the last few months of his life. They mentioned that his passing was expected but they were hoping for a recovery. However, now that he has passed, they added that they were glad he is not suffering anymore. 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 They seemed to be dealing well with the situation and seemed to be grieving appropriately. No needs were expressed. Advised of availability and assured them of continued support as needed/ desired. Kenny Bishop, M.S.   Spiritual Care Department  If needs rise please call [phone])

## 2017-09-11 NOTE — PROGRESS NOTES
It was informed by patient's daughter at bedside of the patient death at 56. Hospitalist on call, Dr Robert Canales  notified, came in and confirmed patient death. Nurse supervisor notified, Fredo Goodrich called at  and spoke to Shereen Simmonds, Donation Coordinator, who after obtaining information about the patient's age, diagnosis, and cause of death, approved release of body to [facility]. Patient's two daughters, one son in-law and the  wife remained at bedside until 46 and seemed to be dealing well with the situation. Pastoral Care came and assisted them.

## 2017-09-11 NOTE — ROUTINE PROCESS
Quality: Chart reviewed for death and restraints. Restraints (bilat soft wrist and bilat mittens) noted during hospitalization. Restraints not a contributing factor in patient death. Documented for tracking according to CMS guidelines at 12:33 on 9/11/17.

## 2018-04-06 NOTE — PROGRESS NOTES
Labs re-ordered for both am and pm cortisols.   [facility] Consult Service Progress NOTE  Pulmonary, Critical Care, and Sleep Medicine    Name: Amanda Alejo MRN: [id number]   : 1928 Hospital: [facility]   Date: 2017  Admission Date: 2017     Chart and notes reviewed. Data reviewed. Pt seen on rounds earlier today. I have evaluated and examined the patient. Pt is acutely ill. Reviewed the evaluation and will assist in implementing the plan as outlined by Dr. Davi Hernandez and team    Hospital Day: 39    I was asked by Shell Pope MD to see Amanda Alejo in consultation for a chief complaint of Acute respiratory failure with hypoxia. Pt moved to CCU with agonal respirations and rapid PEA arrest. Emergently intubated noting thick, dried irregular layers of respiratory secretions that caused upper airway obstruction. MacGill forceps needed to tease away the debris and expose the glottis, cords. Pt was transferred from CCU to floor on  after a complicated CCU stay. Initial surgery was on 17 for a cystoscopy with resultant large bladder perforation requiring Ex Lap and repair of bladder perforation. Post op he had atrial Fib RVR, NSTEMI. Had a period of agitation, HTN. Pt was on a course of Abx for sepsis, wound infection. Subsequently, he had wound dehiscence . Pt eventually had return of bowel function but failed clear liquids due to dysphagia. On  has RRT call for AMS. Had negative head Ct but on  had MRI and Neurology consult. 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 EEG was slowed and he remained hypersomnolent. On  he developed a fever, hyperventilation and abnormal CXR. After empiric abx, fever resolved but he had hypernatremia. All sutures removed with good wound healing. Suprapubic catheter removed. Pt has been followed closely by primary team, Palliative Care, Urology and Surgery.     I visited with Pt's wife, 2 dtrs Wendy Aguilera and Silverio) and elena/Colby's dtr in waiting room after [employer] called. Pt's management was discussed in detail They expressed their concerns and I tried to answer their questions. His heart did stop for a few minutes and it remains to be seen how this might affect his recovery. He is stil critically ill and has been through a lot. They accept that pt will need a PEG for support. IMPRESSION:   1. Acute respiratory failure with hypoxia s/p emergent intubation  2. PEA arrest from rspiratory insufiiciency due to   3. Upper airway obstruction from dry, thick coating of supraglottic secretions which covered his cords; mouth breathing dried his airway and mucous with poor clearance of secretions  4. Acute encephalopathy from hypoxia per MRI and Neurology evaluation; hypersomnolent with level of alertness very poor for past 10-14 days  5. Dysphagia  6. Protein calorie malnutrition on TPN  7. C Diff 8/25 on IV Flagyl  8. Complicated Enterococcal UTI- sensitive to Amp  9. NSTEMI demand ischemia  10. CAD recent stents per [facility] July 2017, needs plavix to be restarted  11. PAF Cardiology following  12. Pneumonia??: last xray 8/23   13. Postop respiratory failure, Extubated second time on 8-7 after Midline abdominal incision fascial wound dehiscence 8/5/17 Exploratory laparotomy with closure of midline incision using retention sutures per Dr. Mukesh Pitt following. 14. S/P removal of bladder stone and repair of perforated bladder POA  15. S/P subsequent ex-lap due to extensive sq emphysema in abdomen/retroperitoneum - no sign of bowel perforation  16. Pt is critically ill and at high risk of end organ dysfunction and failure  17. Critical care time with pt exclusive of procedures   75      minutes      RECOMMENDATIONS/PLAN:   1. CCU  2. Vent  3. Oral care  4. Sputum for culture  5. NG tube  6. Add PO vancomycin  7. abx per  - looks like will be completing course soon but sputum looks ominous  8. Trophic feeds  9.  If tolerating Tf, will ask GI to consider PEG while on vent and before we resume Plavix  10. No Ativan per family request  11. Discuss any scna with family before ordering  12. renew TPN for now  15. replete lytes  14. PICC  15. DVT, SUP prophylaxis  16. Patient requiring restraints due to threat of injury to self, interference with medical devices. 17. Will be available to assist in medical management while in the CCU pending disposition     ICU disposition :Unchanged. Updated Family. Time above. Risk of deterioration: high   [x] High complexity decision making was performed   [x] See my orders for details  Tubes:   Rosales and Intubated/Vent    Code: Full Code        Hemodynamics:    CO:    CI:    CVP: CVP (mmHg): 8 mmHg (17 0800)  SVR:   PAP Systolic:    PAP Diastolic:    PVR:    DM30:        Ventilator Settings:      Mode Rate TV Press PEEP FiO2 PIP Min.  Vent   Assist control    500 ml 5 cm H2O  6 cm H20 80 %  28 cm H2O  14.7 l/min      Vital Signs: Intake/Output: Intake/Output:   Visit Vitals    /72    Pulse 84    Temp 97.4 °F (36.3 °C)    Resp 20    Ht 5' 10\" (1.778 m)    Wt 86.9 kg (191 lb 9.3 oz)    SpO2 98%    BMI 27.49 kg/m2         O2 Device: Non-rebreather mask  O2 Flow Rate (L/min): 12 l/min  Wt Readings from Last 4 Encounters:   17 86.9 kg (191 lb 9.3 oz)   08/15/17 94.2 kg (207 lb 10.8 oz)   17 95.8 kg (211 lb 3.2 oz)   17 97.7 kg (215 lb 6.2 oz)       Temp (24hrs), Av.1 °F (36.2 °C), Min:96.2 °F (35.7 °C), Max:97.6 °F (36.4 °C)     Intake/Output Summary (Last 24 hours) at 17 1415  Last data filed at 17 0749   Gross per 24 hour   Intake          5307.14 ml   Output             3350 ml   Net          1957.14 ml     Last shift:       07 -  1900  In: -   Out: 350 [Urine:350]  Last 3 shifts: 1901 -  0700  In: 7646.6 [I.V.:7646.6]  Out: 5400 [Urine:5400]     Telemetry:    normal sinus rhythm    Physical Exam:    General: severely ill elderly WM   HEENT: intubated; dry mucosa   Neck: No abnormally enlarged lymph nodes. Chest: normal   Lungs: rhonchi   Heart: Regular rate and rhythm   Abdomen: soft, non-tender, without masses or organomegaly   :    Extremity: negative, clubbing;    Neuro: obtunded   Psych: unresponsive    Skin: Pallor and Turgor absent;   Pulses: Bilateral, Radial, 2+ Normal upper and lower extremity pulses   Capillary refill: normal brisk capillary refill;    DATA:    Interval lab and diagnostic data was reviewed. Interval radiology images were independently viewed and available reports were reviewed. All lab results for the last 24 hours reviewed.       MAR reviewed and pertinent medications noted or modified as needed    MEDS:   Current Facility-Administered Medications   Medication    cefepime (MAXIPIME) 2 g in 0.9% sodium chloride (MBP/ADV) 100 mL    propofol (DIPRIVAN) infusion    TPN ADULT - CENTRAL AA 5% D20% W/ CA + ELECTROLYTES    vancomycin 50 mg/mL oral solution (compounded) 250 mg    TPN ADULT - CENTRAL AA 5% D20% W/ CA + ELECTROLYTES    furosemide (LASIX) injection 40 mg    morphine injection 1 mg    metroNIDAZOLE (FLAGYL) IVPB premix 500 mg    albuterol-ipratropium (DUO-NEB) 2.5 MG-0.5 MG/3 ML    chlorhexidine (PERIDEX) 0.12 % mouthwash 15 mL    heparin (porcine) injection 5,000 Units    anidulafungin (ERAXIS) 100 mg in 0.9% sodium chloride 130 mL IVPB    linezolid (ZYVOX) IVPB premix in D5W 600 mg    acetaminophen (TYLENOL) solution 975 mg    Or    acetaminophen (TYLENOL) suppository 650 mg    bisacodyl (DULCOLAX) suppository 10 mg    zinc oxide-cod liver oil (DESITIN) 40 % paste    metoclopramide HCl (REGLAN) injection 5 mg    levalbuterol (XOPENEX) nebulizer soln 0.63 mg/3 mL    aspirin (ASA) suppository 300 mg    labetalol (NORMODYNE;TRANDATE) injection 10 mg    hydrALAZINE (APRESOLINE) 20 mg/mL injection 20 mg    labetalol (NORMODYNE;TRANDATE) injection 20 mg    fat emulsion 20% (LIPOSYN, INTRALIPID) infusion 250 mL    insulin lispro (HUMALOG) injection    glucose chewable tablet 16 g    dextrose (D50W) injection syrg 12.5-25 g    glucagon (GLUCAGEN) injection 1 mg    sodium chloride (NS) flush 5-10 mL    ondansetron (ZOFRAN) injection 4 mg    sodium chloride (NS) flush 10 mL    sodium chloride (NS) flush 10-40 mL        Labs:    Recent Labs      08/31/17 0120 08/30/17 0524 08/29/17   0025   WBC  11.2*  11.7*  12.3*   HGB  9.2*  8.5*  9.0*   PLT  274  256  307     Recent Labs      08/31/17 0120 08/30/17 0524 08/29/17   0025   NA  144  146*  153*   K  3.7  3.6  3.6   CL  109*  111*  115*   CO2  32  31  33*   GLU  183*  183*  223*   BUN  43*  46*  53*   CREA  1.46*  1.79*  1.78*   CA  8.0*  7.7*  8.0*   MG  2.3   --   2.2   PHOS  3.3   --   4.0   ALB   --   1.6*   --    SGOT   --   42*   --    ALT   --   46   --      Recent Labs      08/30/17 2053   PH  7.45   PCO2  39   PO2  62*   HCO3  26   FIO2  50     Recent Labs      08/30/17 0524   TROIQ  <0.04     Lab Results   Component Value Date/Time     08/24/2017 12:40 AM          Imaging:  [x]                           I have personally reviewed the patients radiographs and reports:    I have provided   75    minutes of critical care time rendering care exclusive of any procedures. During this entire length of time I was immediately available to the patient.      The reason for providing this level of medical care was due to a critical illness that impaired one or more vital organ systems, such that there was a high probability of imminent or life threatening deterioration in the patient's condition. Pt's condition is unstable and unpredictable.  This care involved high complexity decision making which includes reviewing the patient's past medical records, current laboratory results, medications that were immediately available to me and actual Xray films in order to assess, support vital system function, and to treat this degree of vital organ system failure, and to prevent further life threatening deterioration of the patients condition.     Karyna Fernández MD

## 2019-10-15 NOTE — H&P
Was notified by RN, Radiologist called her with an prilim read \" lots of subq air not able to see much, ? Pneumo, gastric distention\" spoke with Dr. Federica Aleman he will evaluate the Pt.  Emergently, Dr Mark Rodriguez ( urology) on his way for evaluation, discuseed that patient is critical and needs emergent surgical consult as high risk for decompensation No Yes

## 2019-11-21 NOTE — CONSULTS
[address]   [address] [place] [address]       Name:  Lima Beltre   MR#:  [id number]   :  1928   Account #:  [de-identified]    Date of Consultation:  2017   Date of Adm:  2017       REASON FOR CONSULT: Abdominal pain. HISTORY OF PRESENT ILLNESS: The patient is an 80-year-old   gentleman with a past medical history of coronary artery disease,   status post recent episode of unstable angina and cardiac   catheterization with stent placement on 2017, history of   hypertension and BPH, who presents to the ED with complaints of   hematuria and clotting of his Rosales catheter that was changed prior to   his cardiac catheterization at [facility]. The   patient was seen by Urology, started on antibiotics. Dr. Talia Lindquist   admitted the patient and apparently the patient had a cystoscopy with   attempted clot evacuation, but that was not successful, so the patient   underwent exploratory laparotomy for bladder stone retrieval and for a   large posterior wall bladder perforation. Surgery was performed on   2017. The nurse called me at around 8 p.m. stating that the   patient is having significant abdominal pain, has had abdominal pain   throughout the day and it is getting worse. She had called Dr. Talia Lindquist   who had requested the patient to be given Dilaudid 0.5 mg IV and   hospitalist service to be consulted. When I went to evaluate the patient,   the patient seems to be in extreme distress with significant pain, not   providing much history because he is moaning and groaning and does   not answer my questions because of what appears to be abdominal   pain. His wife, who is present at the bedside, reports that the patient   has been getting progressively worse pain with some abdominal   distention. Per patient, he has passed any flatus.  He had an episode of   vomiting earlier this morning and has not had a bowel movement since   his surgery. The patient is on clear liquid diet, but has not been eating   or drinking. The nurse reports that as far as she knows, the patient has   not had any bowel movement, neither has he been passing flatus. The   patient complains of pain all over his belly. Reports that he feels that it   is, [employer] is stuck in my belly and I can't pass the gas\". The patient was   also found to have elevated white count . The patient was evaluated   by Urology earlier today, Dr. Susan Berumen., who does not have any   significant recommendations . REVIEW OF SYMPTOMS: Could not be obtained from the patient   because of his pain. No further history could be obtained from the   patient. PAST MEDICAL HISTORY: See above. HOME MEDICATIONS   Currently the patient is on:    1. Nystatin powder. 2. Losartan 50 mg daily. 3. Aspirin 81 mg daily. 4. Lipitor 20 mg nightly. 5. Coreg 3.125 mg b.i.d.   6. Plavix 75 mg daily. 7. Proscar 5 mg daily. 8. Norco 5/325 mg as needed. SOCIAL HISTORY: The patient has no history of tobacco abuse,   alcohol use, or IV drug abuse. Lives at home. ALLERGIES:      FAMILY HISTORY: Could not be obtained from the patient . PHYSICAL EXAMINATION   VITAL SIGNS: Temperature 98.2, pulse , respiratory rate 22, blood   pressure , pulse oximetry 96% on room air. The patient had a blood   pressure of 208/. GENERAL: Alert and oriented x3, severely distressed, pleasant male,   appears to be stated age. HEENT: Pupils equal and reactive. .   CORONARY: Tachycardic. ABDOMEN: Distended. Bowel sounds . Tender to palpation diffusely. No rebound. There is some guarding. No organomegaly. EXTREMITIES: No clubbing, no cyanosis, no edema. NEUROPSYCHIATRIC: Limited exam for the patient secondary to the   patient noncompliance. DTR 2+/4. SKIN: Warm. LABORATORY: Done this morning show a white count of 8.4,   hemoglobin 9, , platelets 604.  Sodium 136, potassium 3.7, chloride   109, bicarbonate 19, glucose 122, BUN 23, creatinine 1.21, calcium   7.2. CT of the abdomen was . ASSESSMENT AND PLAN   1. Postoperative day #0 status post exploratory laparotomy with   bladder stone retrieval and repair of large posterior wall bladder   perforation. The patient appears to be in significant pain  concerned   about intra-abdominal perforation with small-bowel obstruction versus   other etiology. I spoke with Dr. Ashley Castillo from urology, who recommended   getting a CT of the abdomen and pelvis done, also general surgery. I   spoke with Dr. Petra Valadez from general surgery who recommend getting the   CT done  perforation or any acute , but recommend Dr. Ashley Castillo to   physically evaluate the patient at the earliest. I spoke with Dr. Ashley Castillo   again. I feel that he will be at [facility] as   soon as possible. I will keep the patient's strictly n.p.o. for now. We will   get a CT of the abdomen and pelvis. Will get a lactate level,   hemoglobin and hematocrit and continue to closely monitor. The   patient has high risk for decompensation, that was conveyed to   Urology and  and they are aware of the same. Will await the    results and further intervention will be per hospital course. Provide   pain control and continue to closely monitor. 2. Hypertension. Suboptimally controlled  pain. We will to provide   pain control and that should control the hypertension. Will   continue intravenous fluids and .   3. History of coronary artery disease. The patient currently is off aspirin   and Plavix per Urology. May consider restarting once okay with them,   continue to closely monitor. The patient denies any chest pain or  at   this time. Further intervention will be per hospital course. Reassess as   needed. 4. Gastrointestinal/deep venous thrombosis prophylaxis.    5. The patient again is at high risk for decompensation  evaluation  both urology and .        Basim Christensen MD      MM / MP D:  07/28/2017   22:04   T:  07/29/2017   10:08   Job #:  [id number] DISPLAY PLAN FREE TEXT

## 2020-03-04 NOTE — PROGRESS NOTES
Palliative Medicine     Note, Carolin Falk, Pt's wife/nok medical surrogate (age 80) has deferred to [facility] of daughters Bladimir So, [phone] and Zuly , [phone]). Main contact would be Marisela Lisa not wife. LCSW had conversation with Pt's dtr, Dora Betancourt. Dora Betancourt works f-t and is trying to streamline projects so she can take time away from office when Pt is discharged from hospital. LCSW affirmed her cg role and challenges with managing multiple responsibilities. Normalized emotional time for family with loved one in hospital. She verbalized her perception as disjointed information and inconsistent care. Through active listening, LCSW was able to answer Manda's questions and family will be meeting with attending provider tomorrow at 9 am.     Informed Dora Betancourt that Palliative team is supportive service and not meant to add burden to them. We will not have a meeting unless they request one. Communication will be by phone. Dora Betancourt asked that Marisela Gonzalez be the contact because she has most flexible schedule. Spoke with RN Armani Maxwell). Sabrina Sims, [phone] yes

## 2021-09-09 NOTE — PROGRESS NOTES
Gastroenterology Progress Note    8/11/2017    Admit Date: 7/27/2017    Subjective: Follow up for: distension, increased NG output      Confused. 500 cc per shift NG output with stable hgb  Patient was seen in rounds by me today. Family at bedside.       Current Facility-Administered Medications   Medication Dose Route Frequency    TPN ADULT-CENTRAL AA 5% D20%-MVI W/ VIT K-RCH   IntraVENous CONTINUOUS    nitroglycerin (NITROBID) 2 % ointment 1 Inch  1 Inch Topical BID    levalbuterol (XOPENEX) nebulizer soln 0.63 mg/3 mL  0.63 mg Nebulization Q6H PRN    acetaminophen (TYLENOL) solution 975 mg  975 mg Oral Q12H PRN    Or    acetaminophen (OFIRMEV) infusion 1,000 mg  1,000 mg IntraVENous Q12H PRN    haloperidol lactate (HALDOL) injection 3 mg  3 mg IntraVENous Q6H    metoclopramide HCl (REGLAN) injection 5 mg  5 mg IntraVENous Q6H    aspirin (ASA) suppository 300 mg  300 mg Rectal DAILY    labetalol (NORMODYNE;TRANDATE) injection 10 mg  10 mg IntraVENous Q6H    hydrALAZINE (APRESOLINE) 20 mg/mL injection 20 mg  20 mg IntraVENous Q4H PRN    labetalol (NORMODYNE;TRANDATE) injection 20 mg  20 mg IntraVENous Q4H PRN    fat emulsion 20% (LIPOSYN, INTRALIPID) infusion 250 mL  250 mL IntraVENous Q MON, WED & FRI    insulin lispro (HUMALOG) injection   SubCUTAneous Q6H    glucose chewable tablet 16 g  4 Tab Oral PRN    dextrose (D50W) injection syrg 12.5-25 g  12.5-25 g IntraVENous PRN    glucagon (GLUCAGEN) injection 1 mg  1 mg IntraMUSCular PRN    enoxaparin (LOVENOX) injection 40 mg  40 mg SubCUTAneous Q24H    famotidine (PF) (PEPCID) 20 mg in sodium chloride 0.9 % 10 mL injection  20 mg IntraVENous Q12H    sodium chloride (NS) flush 5-10 mL  5-10 mL IntraVENous Q8H    ondansetron (ZOFRAN) injection 4 mg  4 mg IntraVENous Q4H PRN    0.9% sodium chloride infusion  25 mL/hr IntraVENous CONTINUOUS    sodium chloride (NS) flush 10 mL  10 mL InterCATHeter PRN    sodium chloride (NS) flush Physical Medicine & Rehabilitation    Consult request received.  Will proceed with physiatry evaluation.    Laurie Lizama RN, Rehab Admissions Coordinator, [phone]       10-40 mL  10-40 mL InterCATHeter Q8H        Objective:     Blood pressure 180/81, pulse 79, temperature 98.6 °F (37 °C), resp. rate 19, height 5' 10\" (1.778 m), weight 95.9 kg (211 lb 6.7 oz), SpO2 98 %. 08/09 1901 - 08/11 0700  In: 1925.6 [I.V.:1925.6]  Out: 5925 [Urine:2895]        Physical Examination:       General:Agitated, NGT in   HEENT:  EOMI,  Chest:  CTA,   Heart: S1, S2, RRR  GI: Soft, bandaged      Data Review    Recent Results (from the past 24 hour(s))   GLUCOSE, POC    Collection Time: 08/10/17 12:25 PM   Result Value Ref Range    Glucose (POC) 183 (H) 65 - 100 mg/dL    Performed by Marcella Bernabe (PCT)    GLUCOSE, POC    Collection Time: 08/10/17  5:48 PM   Result Value Ref Range    Glucose (POC) 158 (H) 65 - 100 mg/dL    Performed by Marcella Bernabe (PCT)    GLUCOSE, POC    Collection Time: 08/10/17 11:27 PM   Result Value Ref Range    Glucose (POC) 146 (H) 65 - 100 mg/dL    Performed by Jewels Adams    GLUCOSE, POC    Collection Time: 08/11/17  5:57 AM   Result Value Ref Range    Glucose (POC) 156 (H) 65 - 100 mg/dL    Performed by Jewels Adams      Recent Labs      08/10/17   0223  08/09/17   0346   WBC  10.9  11.8*   HGB  10.7*  10.9*   HCT  31.6*  33.2*   PLT  340  329     Recent Labs      08/10/17   0223  08/09/17   0346   NA  136  137   K  4.5  4.4   CL  103  104   CO2  28  29   BUN  36*  36*   CREA  1.05  1.02   GLU  162*  176*   CA  7.6*  7.9*   MG  2.0  2.2   PHOS  3.1  3.2     Recent Labs      08/10/17   0223  08/09/17   0346   SGOT  60*  72*   AP  90  83   TP  6.3*  6.1*   ALB  1.7*  1.7*   GLOB  4.6*  4.4*     No results for input(s): INR, PTP, APTT in the last 72 hours. No lab exists for component: INREXT   No results for input(s): FE, TIBC, PSAT, FERR in the last 72 hours. No results found for: FOL, RBCF   No results for input(s): PH, PCO2, PO2 in the last 72 hours. No results for input(s): CPK, CKNDX, TROIQ in the last 72 hours.     No lab exists for component: CPKMB  Lab Results   Component Value Date/Time    Cholesterol, total 178 07/20/2017 12:28 PM    HDL Cholesterol 48 07/20/2017 12:28 PM    LDL, calculated 117.8 07/20/2017 12:28 PM    Triglyceride 61 07/20/2017 12:28 PM    CHOL/HDL Ratio 3.7 07/20/2017 12:28 PM     No components found for: [place]  Lab Results   Component Value Date/Time    Color DARK YELLOW 07/26/2017 11:20 AM    Appearance TURBID 07/26/2017 11:20 AM    Specific gravity 1.012 07/26/2017 11:20 AM    Specific gravity 1.025 07/21/2017 08:24 AM    pH (UA) 5.5 07/26/2017 11:20 AM    Protein 100 07/26/2017 11:20 AM    Glucose NEGATIVE  07/26/2017 11:20 AM    Ketone TRACE 07/26/2017 11:20 AM    Bilirubin NEGATIVE  07/26/2017 11:20 AM    Urobilinogen 1.0 07/26/2017 11:20 AM    Nitrites NEGATIVE  07/26/2017 11:20 AM    Leukocyte Esterase MODERATE 07/26/2017 11:20 AM    Epithelial cells FEW 07/26/2017 11:20 AM    Bacteria 3+ 07/26/2017 11:20 AM    WBC  07/26/2017 11:20 AM    RBC >100 07/26/2017 11:20 AM        ROS: -CP, SOB, Dysuria, palpitations, cough. Assessment:  Increased NG output  Confusion    Active Problems:    Bladder injury (7/28/2017)             Plan/Discussion:     1. KUB shows no ileus but NG output still is over 5000 ml /24 hours. He is on low dose     Reglan but is very confused. 2. No indication for EGD at this juncture. Will keep on acid blockers. 3. Decreased Reglan to 5 mg q 8 hrs. 4. Repeat KUB. D/w family in detail. 5. Complex pt          Signed By: Maira Stewart MD    8/11/2017  11:38 AM

## 2023-08-01 NOTE — PROGRESS NOTES
D/w Carrillo Pickens, bedside RN. Time and input appreciated. Pt is + for C diff. Marisela Gonzalez was informed by RN. O/w No staff concerns at this time.     Susana Valencia MD  Palliative Care Team [place]

## 2023-10-12 NOTE — PROGRESS NOTES
Surgery      POD #9 from wound closure for dehiscence    Chart and notes reviewed    For Swallowing eval.    May start plavix when taking PO from surg standpoint, check with Urol. No acute surgical issues  Plan pre primary care team.          Myrna Collins.  Huong Hollis MD, [facility] Detail Level: Detailed Render Post-Care Instructions In Note?: no Show Aperture Variable?: Yes Consent: The patient's consent was obtained including but not limited to risks of crusting, scabbing, blistering, scarring, darker or lighter pigmentary change, recurrence, incomplete removal and infection. Post-Care Instructions: I reviewed with the patient in detail post-care instructions. Patient is to wear sunprotection, and avoid picking at any of the treated lesions. Pt may apply Vaseline to crusted or scabbing areas. Duration Of Freeze Thaw-Cycle (Seconds): 0

## 2023-12-24 NOTE — PROGRESS NOTES
Hospitalist Progress Note    NAME: Schuyler Muller   :  1928   MRN:  [id number]       Interim Hospital Summary: 80 y.o. male whom presented on 2017 with      Assessment / Plan:  Postop respiratory failure re- intubated after surgery for wound dehiscence  S/P removal of bladder stone and repair of perforated bladder  S/P subsequent ex-lap due to extensive sq emphysema in abdomen/retroperitoneum - no sign of bowel perforation. Wound Dehiscence s/p ex lap, has abdominal binder  Shock back on pressor weaning down  LINDSEY monitor, diuresis hold for low bp  \"pneumothorax\" due to dissection of air from retroperitoneum cephalad, different physiology or behavior of typical pneumothorax -conservative management  CAD afib with RVR- cardizem gtt, nitrobid  Anemia, post ops/p PRBC  UTI with enterococcus on ampicillin  Electrolyte disturbance replete as indicated  HTN labetalol  Encephalopathy ? Medication/infectious etiology, on IV abx for UTI , sedated today  On TPN, Paralytic ileus, NGT  Critically ill  Code status: FULL code  Prophylaxis: per primary team  Recommended Disposition: per primary team-      Subjective:     Chief Complaint / Reason for Physician Visit intubated   Discussed with RN events overnight. Review of Systems:  Symptom Y/N Comments  Symptom Y/N Comments   Fever/Chills    Chest Pain     Poor Appetite    Edema     Cough    Abdominal Pain     Sputum    Joint Pain     SOB/CLARKE    Pruritis/Rash     Nausea/vomit    Tolerating PT/OT     Diarrhea    Tolerating Diet     Constipation    Other       Could NOT obtain due to: intubated     Objective:     VITALS:   Last 24hrs VS reviewed since prior progress note.  Most recent are:  Patient Vitals for the past 24 hrs:   Temp Pulse Resp BP SpO2   17 1200 98 °F (36.7 °C) 70 19 153/60 100 %   17 1113 - 67 16 - 100 %   17 1100 - 76 20 165/67 100 %   17 1000 - 74 18 152/63 100 %   17 0900 - 67 16 130/52 98 %   17 0800 97.8 °F (36.6 °C) 67 15 132/47 99 %   08/06/17 0717 - 69 22 - 100 %   08/06/17 0700 - 65 16 130/52 100 %   08/06/17 0600 99.5 °F (37.5 °C) 65 14 130/47 100 %   08/06/17 0500 - 65 15 112/53 100 %   08/06/17 0408 - 63 - 131/51 -   08/06/17 0400 - 65 15 112/53 99 %   08/06/17 0353 - 60 12 - 100 %   08/06/17 0307 - - - - 100 %   08/06/17 0300 - 70 19 133/60 100 %   08/06/17 0206 97.9 °F (36.6 °C) 67 18 130/60 100 %   08/06/17 0130 - 62 - (!) 88/44 -   08/06/17 0115 - 65 15 104/46 100 %   08/06/17 0100 - 69 20 147/64 100 %   08/06/17 0040 - - - 104/42 -   08/06/17 0000 - 62 14 99/42 100 %   08/05/17 2348 - 66 - 139/53 -   08/05/17 2341 - 71 - 127/53 -   08/05/17 2340 - 71 19 - 99 %   08/05/17 2311 - - - 93/45 -   08/05/17 2302 - - - - 98 %   08/05/17 2300 98.6 °F (37 °C) 62 14 108/47 99 %   08/05/17 2245 - 64 16 93/45 98 %   08/05/17 2234 - 70 - 100/47 -   08/05/17 2215 - 67 15 90/48 100 %   08/05/17 2200 - 61 19 93/45 100 %   08/05/17 2145 - 76 18 159/76 100 %   08/05/17 2134 98.6 °F (37 °C) 68 13 125/60 100 %   08/05/17 2115 - 66 15 97/53 100 %   08/05/17 2110 97.7 °F (36.5 °C) 64 13 94/49 100 %   08/05/17 2105 - 66 12 99/52 100 %   08/05/17 2100 - 66 12 91/53 100 %   08/05/17 2055 - 67 12 102/55 99 %   08/05/17 2050 - 69 12 100/61 100 %   08/05/17 2045 - 70 12 103/51 100 %   08/05/17 2040 - 68 18 109/61 100 %   08/05/17 2035 - 69 12 140/70 100 %   08/05/17 2031 98.1 °F (36.7 °C) 69 12 162/86 100 %   08/05/17 2030 - 72 12 162/86 100 %   08/05/17 2028 - - - - 100 %   08/05/17 2027 98.1 °F (36.7 °C) 69 - (!) 179/97 100 %   08/05/17 1850 97.9 °F (36.6 °C) 69 15 140/59 94 %   08/05/17 1845 - 62 14 144/56 92 %   08/05/17 1843 - - - 140/56 -   08/05/17 1800 - 64 12 122/58 98 %   08/05/17 1700 - 69 14 - 98 %   08/05/17 1600 97.8 °F (36.6 °C) 71 18 153/61 97 %   08/05/17 1500 - 68 13 137/52 97 %   08/05/17 1441 - 71 18 - 95 %   08/05/17 1400 - 75 19 148/57 94 %   08/05/17 1300 - 77 20 155/61 96 %   08/05/17 1255 - 74 21 - 96 % Intake/Output Summary (Last 24 hours) at 08/06/17 1209  Last data filed at 08/06/17 1100   Gross per 24 hour   Intake          3721.16 ml   Output             2074 ml   Net          1647.16 ml        PHYSICAL EXAM:  General:  nad, intubated  EENT:  EOMI. Anicteric sclerae. MMM  Resp:  decrease BS BL, no wheezing or rales. No accessory muscle use  CV:  Regular  rhythm,  + jose pedal and UE edema  GI:  Soft, +distended, Non tender.  -Bowel sounds, binder  Neurologic:  Limited as encepahopathic    Reviewed most current lab test results and cultures  YES  Reviewed most current radiology test results   YES  Review and summation of old records today    NO  Reviewed patient's current orders and MAR    YES  PMH/SH reviewed - no change compared to H&P  ________________________________________________________________________  Care Plan discussed with:    Comments   Patient     Family      RN x    Care Manager     Consultant                        Multidiciplinary team rounds were held today with , nursing, pharmacist and clinical coordinator. Patient's plan of care was discussed; medications were reviewed and discharge planning was addressed. ________________________________________________________________________  Total NON critical care TIME:  25  Minutes    Total CRITICAL CARE TIME Spent:   Minutes non procedure based      Comments   >50% of visit spent in counseling and coordination of care     ________________________________________________________________________  Mike Rogers MD     Procedures: see electronic medical records for all procedures/Xrays and details which were not copied into this note but were reviewed prior to creation of Plan. LABS:  I reviewed today's most current labs and imaging studies.   Pertinent labs include:  Recent Labs      08/06/17   0406  08/04/17   0415   WBC  15.4*  13.8*   HGB  11.0*  11.4*   HCT  34.1*  34.7*   PLT  317  275     Recent Labs      08/06/17 0406  08/04/17   0415   NA  144  145   K  3.7  3.7   CL  108  108   CO2  29  33*   GLU  146*  192*   BUN  33*  30*   CREA  1.02  0.93   CA  6.8*  7.0*   MG  2.1  2.1   PHOS  3.6  2.4*   ALB   --   1.8*   TBILI   --   0.8   SGOT   --   35   ALT   --   36       Signed: Wyatt Hagen MD Statement Selected [Negative] : Heme/Lymph

## 2025-04-10 NOTE — PROGRESS NOTES
End of Shift Nursing Note    Bedside shift change report given to Juma Sutton RN (oncoming nurse) by Niki Garibay RN (offgoing nurse). Report included the following information SBAR, Kardex, OR Summary, Intake/Output and MAR. Zone Phone:   [phone]    Significant changes during shift:    None   Non-emergent issues for physician to address:   KUB (negative), NGT remains in place. Number times ambulated in hallway past shift: 0      Number of times OOB to chair past shift: 0    POD #:      Vital Signs:    Temp: 97.4 °F (36.3 °C)     Pulse (Heart Rate): 61     BP: 143/71     Resp Rate: 18     O2 Sat (%): 94 %    Lines & Drains:     Urinary Catheter? Yes   Placement Date:    Medical Necessity: Yes   Central Line? No   Placement Date:    Medical Necessity:   PICC Line? Yes   Placement Date:    Medical Necessity:     NG tube [x] in [] removed [] not applicable   Drains [x] in [] removed [] not applicable     Skin Integrity:      Wounds: yes   Dressings Present: yes    Wound Concerns: no      GI:    Current diet:  DIET NPO  TPN ADULT-CENTRAL AA 5% D20%-MVI W/ VIT K-RCH    Nausea: NO  Vomiting: NO  Bowel Sounds: YES  Flatus: NO  Last Bowel Movement:  Respiratory:  Supplemental O2: No      Device:    via  Liters/min     Incentive Spirometer: NO  Volume:   Coughing and Deep Breathing: YES  Oral Care: YES  Understanding (patient/family education): YES   Getting out of bed: NO  Head of bed elevation: YES    Patient Safety:    Bed Alarm On? Yes  Sitter? No      Opportunity for questions and clarification was given to oncoming nurse. Patient bed is in low position, side rails are up x 3, door & observation blinds open as needed, call bell within reach and patient not in distress.     Theo Cronin RN [[email or web address]] : Surveillance.

## (undated) DEVICE — MEDI-VAC NON-CONDUCTIVE SUCTION TUBING: Brand: CARDINAL HEALTH

## (undated) DEVICE — GOWN,PREVENTION PLUS,XL,ST,24/CS: Brand: MEDLINE

## (undated) DEVICE — SUTURE PERMAHAND SZ 3-0 L18IN NONABSORBABLE BLK L26MM SH C013D

## (undated) DEVICE — SUTURE ABSORBABLE BRAIDED 2-0 CT-1 27 IN UD VICRYL J259H

## (undated) DEVICE — INSULATED BLADE ELECTRODE: Brand: EDGE

## (undated) DEVICE — HEX-LOCKING BLADE ELECTRODE: Brand: EDGE

## (undated) DEVICE — Z CONVERTED USE 2271365 TRAY SKIN W3XL3IN S STL STPL REMV GZ PD DISP MEDI PK

## (undated) DEVICE — ABDOMINAL PAD: Brand: DERMACEA

## (undated) DEVICE — DBD-PACK,LAPAROTOMY,2 REINFORCED GOWNS: Brand: MEDLINE

## (undated) DEVICE — SUTURE ETHLN SZ 2-0 L30IN NONABSORBABLE BLK L36MM PSLX 3/8 1697H

## (undated) DEVICE — SYR IRR CATH TIP LR ADPT 70ML -- CONVERT TO ITEM 363120

## (undated) DEVICE — SUTURE ETHLN SZ 2-0 L20IN NONABSORBABLE BLK L75MM LR 3/8 460T

## (undated) DEVICE — BAG DRAIN URIN 2000ML LF STRL -- CONVERT TO ITEM 363123

## (undated) DEVICE — GOWN,PLEAT,SPECIALTY,XL,STRL: Brand: MEDLINE

## (undated) DEVICE — KENDALL SCD EXPRESS SLEEVES, KNEE LENGTH, MEDIUM: Brand: KENDALL SCD

## (undated) DEVICE — DRAIN SURG 19FR L025IN DIA63MM SIL CHN RND FULL FLUT CLS

## (undated) DEVICE — STAPLER SKIN 35CT WD STRL DISP -- MULTIFIRE PREMIUM

## (undated) DEVICE — HANDLE LT SNAP ON ULT DURABLE LENS FOR TRUMPF ALC DISPOSABLE

## (undated) DEVICE — TOWEL SURG W17XL27IN STD BLU COT NONFENESTRATED PREWASHED

## (undated) DEVICE — 1200 GUARD II KIT W/5MM TUBE W/O VAC TUBE: Brand: GUARDIAN

## (undated) DEVICE — STERILE LATEX POWDER-FREE SURGICAL GLOVESWITH NITRILE COATING: Brand: PROTEXIS

## (undated) DEVICE — ROCKER SWITCH PENCIL BLADE ELECTRODE, HOLSTER: Brand: EDGE

## (undated) DEVICE — SUTURE VCRL SZ 2-0 L27IN ABSRB VLT L26MM SH 1/2 CIR J317H

## (undated) DEVICE — STERILE POLYISOPRENE POWDER-FREE SURGICAL GLOVES: Brand: PROTEXIS

## (undated) DEVICE — GOWN ,SIRUS ,NONREINFORCED 4XL: Brand: MEDLINE

## (undated) DEVICE — LLETZ LOOP ELECTRODE, 10MM WIDE X 10MM DEEP, 11.8 CM SHAFT, YELLOW: Brand: MEGADYNE

## (undated) DEVICE — SOLUTION SCRB 4OZ 10% PVP I POVIDONE IOD TOP PAINT EXIDINE

## (undated) DEVICE — DRAPE FLD WRM W44XL66IN C6L FOR INTRATEMP SYS THERMABASIN

## (undated) DEVICE — (D)PREP SKN CHLRAPRP APPL 26ML -- CONVERT TO ITEM 371833

## (undated) DEVICE — DEVON™ KNEE AND BODY STRAP 60" X 3" (1.5 M X 7.6 CM): Brand: DEVON

## (undated) DEVICE — SOLUTION IRRIG 1000ML H2O STRL BLT

## (undated) DEVICE — CATHETER URETH 26FR 30CC BLLN F 3 W SPEC M RND TIP TWO

## (undated) DEVICE — WRAP SURG W1.31XL1.34M CARD FOR PT 165-172CM THERMOWRP

## (undated) DEVICE — DRAPE SURG CYSTO N REINF ST W O FLD PCH STD

## (undated) DEVICE — Device

## (undated) DEVICE — GAUZE SPONGES,12 PLY: Brand: CURITY

## (undated) DEVICE — TUBING IRRIG L77IN DIA0.241IN L BOR FOR CYSTO W/ NVENT

## (undated) DEVICE — REM POLYHESIVE ADULT PATIENT RETURN ELECTRODE: Brand: VALLEYLAB

## (undated) DEVICE — CYSTOSCOPY PACK: Brand: CONVERTORS

## (undated) DEVICE — CATHETER PLUG WITH CAP: Brand: DOVER

## (undated) DEVICE — INSULATED BLADE ELECTRODE;CAUTION: FOR MANUFACTURING, PROCESSING, OR REPACKING.: Brand: EDGE

## (undated) DEVICE — SOLUTION IV 1000ML 0.9% SOD CHL

## (undated) DEVICE — BAG DRNGE 4000ML CONT IRRIG ROUNDED TEARDROP SHP DISP

## (undated) DEVICE — SLIM BODY SKIN STAPLER: Brand: APPOSE ULC

## (undated) DEVICE — SKIN MARKER,REGULAR TIP WITH RULER AND LABELS: Brand: DEVON

## (undated) DEVICE — JELLY,LUBE,STERILE,FLIP TOP,TUBE,4-OZ: Brand: MEDLINE

## (undated) DEVICE — BLADE ELECTRODE: Brand: EDGE

## (undated) DEVICE — SOLUTION IRRIG 3000ML 0.9% SOD CHL FLX CONT 0797208] ICU MEDICAL INC]

## (undated) DEVICE — STERILE POLYISOPRENE POWDER-FREE SURGICAL GLOVES WITH EMOLLIENT COATING: Brand: PROTEXIS

## (undated) DEVICE — INFECTION CONTROL KIT SYS

## (undated) DEVICE — BAG COLLECTION FLD OR-TBL NS --

## (undated) DEVICE — CUTTING ELECTRODE BIPO 24-26FR 12/30°  FOR RESECTOSCOPES, TELESCOPE Ø 4MM, FOR SHEATHS, INTERMITTENT/CONTINUOUS IRRIGATION, 24/26, FR, LOOP: ROUND, WIRE Ø 0.25MM, FORK COLOR BLUE, STEM COLOR BLUE, PACK=3 PCS, FOR SHARK AND S-LINE RESECTOSCOPES, STERILE, FOR SINGLE USE: Brand: SHARK/S-LINE

## (undated) DEVICE — SURGICAL PROCEDURE PACK BASIN MAJ SET CUST NO CAUT

## (undated) DEVICE — SUTURE PDS II SZ 1 L96IN ABSRB VLT XLH L70MM 1/2 CIR Z881G